# Patient Record
Sex: MALE | Race: WHITE | NOT HISPANIC OR LATINO | Employment: FULL TIME | URBAN - METROPOLITAN AREA
[De-identification: names, ages, dates, MRNs, and addresses within clinical notes are randomized per-mention and may not be internally consistent; named-entity substitution may affect disease eponyms.]

---

## 2018-05-14 LAB
ABSOL LYMPHOCYTES (HISTORICAL): 1.4 K/UL (ref 0.5–4)
ALBUMIN SERPL BCP-MCNC: 3.7 G/DL (ref 3–5.2)
ALP SERPL-CCNC: 102 U/L (ref 43–122)
ALT SERPL W P-5'-P-CCNC: 40 U/L (ref 9–52)
ANION GAP SERPL CALCULATED.3IONS-SCNC: 9 MMOL/L (ref 5–14)
AST SERPL W P-5'-P-CCNC: 25 U/L (ref 17–59)
BASOPHILS # BLD AUTO: 0 % (ref 0–1)
BASOPHILS # BLD AUTO: 0 K/UL (ref 0–0.1)
BILIRUB SERPL-MCNC: 0.4 MG/DL
BILIRUB UR QL STRIP: NEGATIVE MG/DL
BUN SERPL-MCNC: 13 MG/DL (ref 5–25)
CALCIUM SERPL-MCNC: 8.9 MG/DL (ref 8.4–10.2)
CHLORIDE SERPL-SCNC: 102 MEQ/L (ref 97–108)
CHOLEST SERPL-MCNC: 97 MG/DL
CHOLEST/HDLC SERPL: 2.4 {RATIO}
CLARITY UR: CLEAR
CO2 SERPL-SCNC: 28 MMOL/L (ref 22–30)
COLOR UR: YELLOW
CREATINE, SERUM (HISTORICAL): 0.88 MG/DL (ref 0.7–1.5)
CREATININE, RANDOM URINE (HISTORICAL): 122.8 MG/DL (ref 50–200)
DEPRECATED RDW RBC AUTO: 12.8 %
EGFR (HISTORICAL): >60 ML/MIN/1.73 M2
EOSINOPHIL # BLD AUTO: 0.3 K/UL (ref 0–0.4)
EOSINOPHIL NFR BLD AUTO: 7 % (ref 0–6)
EST. AVERAGE GLUCOSE BLD GHB EST-MCNC: 126 MG/DL
GLUCOSE SERPL-MCNC: 116 MG/DL (ref 70–99)
GLUCOSE UR STRIP-MCNC: NEGATIVE MG/DL
HBA1C MFR BLD HPLC: 6 %
HCT VFR BLD AUTO: 39.5 % (ref 41–53)
HDLC SERPL-MCNC: 40 MG/DL
HGB BLD-MCNC: 13.3 G/DL (ref 13.5–17.5)
HGB UR QL STRIP.AUTO: NEGATIVE
KETONES UR STRIP-MCNC: NEGATIVE MG/DL
LDL/HDL RATIO (HISTORICAL): 1.1
LDLC SERPL CALC-MCNC: 43 MG/DL
LEUKOCYTE ESTERASE UR QL STRIP: NEGATIVE
LYMPHOCYTES NFR BLD AUTO: 26 % (ref 25–45)
MCH RBC QN AUTO: 31.6 PG (ref 26–34)
MCHC RBC AUTO-ENTMCNC: 33.7 % (ref 31–36)
MCV RBC AUTO: 94 FL (ref 80–100)
MICROALBUM.,U,RANDOM (HISTORICAL): <0.6 MG/DL
MICROALBUMIN/CREATININE RATIO (HISTORICAL): NORMAL
MONOCYTES # BLD AUTO: 0.6 K/UL (ref 0.2–0.9)
MONOCYTES NFR BLD AUTO: 11 % (ref 1–10)
NEUTROPHILS ABS COUNT (HISTORICAL): 2.9 K/UL (ref 1.8–7.8)
NEUTS SEG NFR BLD AUTO: 56 % (ref 45–65)
NITRITE UR QL STRIP: NEGATIVE
PH UR STRIP.AUTO: 6 [PH] (ref 4.5–8)
PLATELET # BLD AUTO: 136 K/MCL (ref 150–450)
POTASSIUM SERPL-SCNC: 4.3 MEQ/L (ref 3.6–5)
PROT UR STRIP-MCNC: NEGATIVE MG/DL
RBC # BLD AUTO: 4.22 M/MCL (ref 4.5–5.9)
SODIUM SERPL-SCNC: 139 MEQ/L (ref 137–147)
SP GR UR STRIP.AUTO: 1.01 (ref 1–1.04)
TOTAL PROTEIN (HISTORICAL): 5.8 G/DL (ref 5.9–8.4)
TRIGL SERPL-MCNC: 68 MG/DL
TSH SERPL DL<=0.05 MIU/L-ACNC: 1.39 UIU/ML (ref 0.47–4.68)
UROBILINOGEN UR QL STRIP.AUTO: NEGATIVE MG/DL (ref 0–1)
VLDLC SERPL CALC-MCNC: 14 MG/DL (ref 0–40)
WBC # BLD AUTO: 5.2 K/MCL (ref 4.5–11)

## 2018-07-25 DIAGNOSIS — N40.0 BENIGN PROSTATIC HYPERPLASIA, UNSPECIFIED WHETHER LOWER URINARY TRACT SYMPTOMS PRESENT: Primary | ICD-10-CM

## 2018-07-26 RX ORDER — TAMSULOSIN HYDROCHLORIDE 0.4 MG/1
0.4 CAPSULE ORAL
Qty: 90 CAPSULE | Refills: 1 | Status: SHIPPED | OUTPATIENT
Start: 2018-07-26 | End: 2019-04-15 | Stop reason: SDUPTHER

## 2018-08-13 ENCOUNTER — APPOINTMENT (OUTPATIENT)
Dept: LAB | Facility: CLINIC | Age: 68
End: 2018-08-13
Payer: MEDICARE

## 2018-08-13 DIAGNOSIS — E13.8 DIABETES MELLITUS OF OTHER TYPE WITH COMPLICATION, UNSPECIFIED WHETHER LONG TERM INSULIN USE: ICD-10-CM

## 2018-08-13 DIAGNOSIS — E11.8 UNCONTROLLED TYPE 2 DIABETES MELLITUS WITH COMPLICATION, UNSPECIFIED WHETHER LONG TERM INSULIN USE: Primary | ICD-10-CM

## 2018-08-13 DIAGNOSIS — E78.49 FAMILIAL COMBINED HYPERLIPIDEMIA: ICD-10-CM

## 2018-08-13 DIAGNOSIS — E11.65 UNCONTROLLED TYPE 2 DIABETES MELLITUS WITH COMPLICATION, UNSPECIFIED WHETHER LONG TERM INSULIN USE: Primary | ICD-10-CM

## 2018-08-13 DIAGNOSIS — I10 ESSENTIAL HYPERTENSION, MALIGNANT: ICD-10-CM

## 2018-08-13 LAB
ALBUMIN SERPL BCP-MCNC: 3.7 G/DL (ref 3.5–5)
ALP SERPL-CCNC: 97 U/L (ref 46–116)
ALT SERPL W P-5'-P-CCNC: 33 U/L (ref 12–78)
ANION GAP SERPL CALCULATED.3IONS-SCNC: 7 MMOL/L (ref 4–13)
AST SERPL W P-5'-P-CCNC: 23 U/L (ref 5–45)
BASOPHILS # BLD AUTO: 0.03 THOUSANDS/ΜL (ref 0–0.1)
BASOPHILS NFR BLD AUTO: 1 % (ref 0–1)
BILIRUB SERPL-MCNC: 0.57 MG/DL (ref 0.2–1)
BUN SERPL-MCNC: 12 MG/DL (ref 5–25)
CALCIUM SERPL-MCNC: 8.6 MG/DL (ref 8.3–10.1)
CHLORIDE SERPL-SCNC: 104 MMOL/L (ref 100–108)
CHOLEST SERPL-MCNC: 85 MG/DL (ref 50–200)
CO2 SERPL-SCNC: 29 MMOL/L (ref 21–32)
CREAT SERPL-MCNC: 1 MG/DL (ref 0.6–1.3)
EOSINOPHIL # BLD AUTO: 0.31 THOUSAND/ΜL (ref 0–0.61)
EOSINOPHIL NFR BLD AUTO: 6 % (ref 0–6)
ERYTHROCYTE [DISTWIDTH] IN BLOOD BY AUTOMATED COUNT: 12.6 % (ref 11.6–15.1)
EST. AVERAGE GLUCOSE BLD GHB EST-MCNC: 120 MG/DL
GFR SERPL CREATININE-BSD FRML MDRD: 79 ML/MIN/1.73SQ M
GLUCOSE P FAST SERPL-MCNC: 106 MG/DL (ref 65–99)
HBA1C MFR BLD: 5.8 % (ref 4.2–6.3)
HCT VFR BLD AUTO: 41.1 % (ref 36.5–49.3)
HDLC SERPL-MCNC: 45 MG/DL (ref 40–60)
HGB BLD-MCNC: 13.1 G/DL (ref 12–17)
IMM GRANULOCYTES # BLD AUTO: 0.02 THOUSAND/UL (ref 0–0.2)
IMM GRANULOCYTES NFR BLD AUTO: 0 % (ref 0–2)
LDLC SERPL CALC-MCNC: 28 MG/DL (ref 0–100)
LYMPHOCYTES # BLD AUTO: 1.31 THOUSANDS/ΜL (ref 0.6–4.47)
LYMPHOCYTES NFR BLD AUTO: 27 % (ref 14–44)
MCH RBC QN AUTO: 31.4 PG (ref 26.8–34.3)
MCHC RBC AUTO-ENTMCNC: 31.9 G/DL (ref 31.4–37.4)
MCV RBC AUTO: 99 FL (ref 82–98)
MONOCYTES # BLD AUTO: 0.52 THOUSAND/ΜL (ref 0.17–1.22)
MONOCYTES NFR BLD AUTO: 11 % (ref 4–12)
NEUTROPHILS # BLD AUTO: 2.72 THOUSANDS/ΜL (ref 1.85–7.62)
NEUTS SEG NFR BLD AUTO: 55 % (ref 43–75)
NONHDLC SERPL-MCNC: 40 MG/DL
NRBC BLD AUTO-RTO: 0 /100 WBCS
PLATELET # BLD AUTO: 131 THOUSANDS/UL (ref 149–390)
PMV BLD AUTO: 11.2 FL (ref 8.9–12.7)
POTASSIUM SERPL-SCNC: 4 MMOL/L (ref 3.5–5.3)
PROT SERPL-MCNC: 6.4 G/DL (ref 6.4–8.2)
RBC # BLD AUTO: 4.17 MILLION/UL (ref 3.88–5.62)
SODIUM SERPL-SCNC: 140 MMOL/L (ref 136–145)
TRIGL SERPL-MCNC: 59 MG/DL
TSH SERPL DL<=0.05 MIU/L-ACNC: 1.17 UIU/ML (ref 0.36–3.74)
WBC # BLD AUTO: 4.91 THOUSAND/UL (ref 4.31–10.16)

## 2018-08-13 PROCEDURE — 80061 LIPID PANEL: CPT

## 2018-08-13 PROCEDURE — 80053 COMPREHEN METABOLIC PANEL: CPT

## 2018-08-13 PROCEDURE — 83036 HEMOGLOBIN GLYCOSYLATED A1C: CPT

## 2018-08-13 PROCEDURE — 85025 COMPLETE CBC W/AUTO DIFF WBC: CPT

## 2018-08-13 PROCEDURE — 36415 COLL VENOUS BLD VENIPUNCTURE: CPT

## 2018-08-13 PROCEDURE — 84443 ASSAY THYROID STIM HORMONE: CPT

## 2018-08-21 DIAGNOSIS — E11.9 TYPE 2 DIABETES MELLITUS WITHOUT COMPLICATION, UNSPECIFIED WHETHER LONG TERM INSULIN USE (HCC): ICD-10-CM

## 2018-08-21 DIAGNOSIS — I34.0 NONRHEUMATIC MITRAL (VALVE) INSUFFICIENCY: Primary | ICD-10-CM

## 2018-08-21 DIAGNOSIS — I10 ESSENTIAL HYPERTENSION: ICD-10-CM

## 2018-08-21 RX ORDER — LISINOPRIL 10 MG/1
10 TABLET ORAL DAILY
Qty: 90 TABLET | Refills: 1 | Status: SHIPPED | OUTPATIENT
Start: 2018-08-21 | End: 2019-01-04 | Stop reason: SDUPTHER

## 2018-08-21 RX ORDER — ATORVASTATIN CALCIUM 40 MG/1
40 TABLET, FILM COATED ORAL DAILY
Qty: 90 TABLET | Refills: 1 | Status: SHIPPED | OUTPATIENT
Start: 2018-08-21 | End: 2019-01-04 | Stop reason: SDUPTHER

## 2018-08-21 RX ORDER — AMLODIPINE BESYLATE 5 MG/1
5 TABLET ORAL DAILY
Qty: 90 TABLET | Refills: 0 | Status: SHIPPED | OUTPATIENT
Start: 2018-08-21 | End: 2018-12-13 | Stop reason: SDUPTHER

## 2018-08-21 RX ORDER — FINASTERIDE 5 MG/1
5 TABLET, FILM COATED ORAL DAILY
Qty: 90 TABLET | Refills: 1 | Status: SHIPPED | OUTPATIENT
Start: 2018-08-21 | End: 2018-12-03 | Stop reason: SDUPTHER

## 2018-08-27 ENCOUNTER — OFFICE VISIT (OUTPATIENT)
Dept: FAMILY MEDICINE CLINIC | Facility: CLINIC | Age: 68
End: 2018-08-27
Payer: MEDICARE

## 2018-08-27 VITALS
DIASTOLIC BLOOD PRESSURE: 70 MMHG | BODY MASS INDEX: 31.97 KG/M2 | HEART RATE: 62 BPM | OXYGEN SATURATION: 94 % | WEIGHT: 236 LBS | SYSTOLIC BLOOD PRESSURE: 110 MMHG | HEIGHT: 72 IN | TEMPERATURE: 97.7 F

## 2018-08-27 DIAGNOSIS — K30 INDIGESTION: ICD-10-CM

## 2018-08-27 DIAGNOSIS — N40.0 BENIGN PROSTATIC HYPERPLASIA WITHOUT URINARY OBSTRUCTION: ICD-10-CM

## 2018-08-27 DIAGNOSIS — E78.2 MIXED HYPERLIPIDEMIA: ICD-10-CM

## 2018-08-27 DIAGNOSIS — I10 ESSENTIAL HYPERTENSION: ICD-10-CM

## 2018-08-27 DIAGNOSIS — E11.9 TYPE 2 DIABETES MELLITUS WITHOUT COMPLICATION, WITHOUT LONG-TERM CURRENT USE OF INSULIN (HCC): Primary | ICD-10-CM

## 2018-08-27 PROBLEM — K63.5 COLON POLYP: Status: ACTIVE | Noted: 2018-08-27

## 2018-08-27 PROBLEM — I51.7 LEFT VENTRICULAR HYPERTROPHY: Status: ACTIVE | Noted: 2018-02-06

## 2018-08-27 PROBLEM — I34.0 MITRAL VALVE REGURGITATION: Status: ACTIVE | Noted: 2018-02-06

## 2018-08-27 PROCEDURE — 99214 OFFICE O/P EST MOD 30 MIN: CPT | Performed by: FAMILY MEDICINE

## 2018-08-27 RX ORDER — SILDENAFIL 100 MG/1
TABLET, FILM COATED ORAL EVERY 24 HOURS
COMMUNITY
Start: 2018-04-06 | End: 2018-12-05 | Stop reason: ALTCHOICE

## 2018-08-27 RX ORDER — ASPIRIN 81 MG/1
TABLET ORAL
COMMUNITY
Start: 2018-01-22

## 2018-08-27 RX ORDER — PANTOPRAZOLE SODIUM 40 MG/1
TABLET, DELAYED RELEASE ORAL EVERY 24 HOURS
COMMUNITY
Start: 2018-01-22 | End: 2018-11-02 | Stop reason: SDUPTHER

## 2018-08-27 RX ORDER — FLUTICASONE PROPIONATE 50 MCG
SPRAY, SUSPENSION (ML) NASAL AS NEEDED
COMMUNITY
Start: 2018-01-22 | End: 2020-03-17 | Stop reason: SDUPTHER

## 2018-08-27 RX ORDER — TADALAFIL 5 MG/1
TABLET ORAL EVERY 24 HOURS
COMMUNITY
Start: 2017-06-29 | End: 2018-08-27 | Stop reason: SDUPTHER

## 2018-08-27 NOTE — ASSESSMENT & PLAN NOTE
Chronic ,well controlled by pantoprazole  Discuss with pt important lose weight   Multiple small meal ,avoid eat and lying down ,avoid spicy food and avoid provoked food

## 2018-08-27 NOTE — PROGRESS NOTES
Assessment/Plan:    Type 2 diabetes mellitus (HCC)  Lab Results   Component Value Date    HGBA1C 5 8 08/13/2018     Chronic fair control patient on metformin 500 mg twice a day patient already on a statin and the Ace inhibitor the patient does followed by Ophthalmology for diabetic eye care we discussed with the patient important lose weight:      Essential hypertension  Chronic with controlled continue current medication low-salt diet less than 2 g a day ,   low caffeine intake   regular aerobic exercise 20 to totally minute a day diet and important lose weight discussed with the patient      Hyperlipidemia  Chronic ,fair control on current medication  Advised to maintain a low-fat low-cholesterol diet consult regarding potential comorbidity including cardiovascular disease consult regarding important weight loss      Indigestion  Chronic ,well controlled by pantoprazole  Discuss with pt important lose weight   Multiple small meal ,avoid eat and lying down ,avoid spicy food and avoid provoked food        Benign prostatic hyperplasia without urinary obstruction  Chronic patient asymptomatic the he is already on the Proscar and the Flomax but still symptomatic plan to refer the patient to Urology       Diagnoses and all orders for this visit:    Type 2 diabetes mellitus without complication, without long-term current use of insulin (HCC)  -     CBC and differential; Future  -     Comprehensive metabolic panel; Future  -     Hemoglobin A1C; Future  -     Lipid Panel with Direct LDL reflex; Future  -     TSH, 3rd generation with Free T4 reflex; Future  -     Microalbumin / creatinine urine ratio; Future  -     PSA, Total Screen; Future    Essential hypertension  -     CBC and differential; Future  -     Comprehensive metabolic panel; Future  -     Hemoglobin A1C; Future  -     Lipid Panel with Direct LDL reflex; Future  -     TSH, 3rd generation with Free T4 reflex;  Future  -     Microalbumin / creatinine urine ratio; Future  -     PSA, Total Screen; Future    Mixed hyperlipidemia  -     CBC and differential; Future  -     Comprehensive metabolic panel; Future  -     Hemoglobin A1C; Future  -     Lipid Panel with Direct LDL reflex; Future  -     TSH, 3rd generation with Free T4 reflex; Future  -     Microalbumin / creatinine urine ratio; Future  -     PSA, Total Screen; Future    Indigestion  -     CBC and differential; Future  -     Comprehensive metabolic panel; Future  -     Hemoglobin A1C; Future  -     Lipid Panel with Direct LDL reflex; Future  -     TSH, 3rd generation with Free T4 reflex; Future  -     Microalbumin / creatinine urine ratio; Future  -     PSA, Total Screen; Future    Benign prostatic hyperplasia without urinary obstruction  -     PSA, Total Screen; Future  -     Ambulatory referral to Urology; Future    Other orders  -     aspirin (ASPIR-81) 81 mg EC tablet; take 1 tablet (81MG)  by oral route 3X week  -     Discontinue: tadalafil (CIALIS) 5 MG tablet; every 24 hours  -     fluticasone (FLONASE) 50 mcg/act nasal spray; every 24 hours  -     hydrocortisone 2 5 % cream; every 24 hours  -     pantoprazole (PROTONIX) 40 mg tablet; every 24 hours  -     sildenafil (VIAGRA) 100 mg tablet; every 24 hours          Subjective:   Chief Complaint   Patient presents with    Follow-up     chronic conditions        Patient ID: Rosibel Gordon is a 74 y o  male      Patient here follow-up with a chronic condition The patient has long history of hypertension the blood pressure today is in control patient tolerates medication well and no chest pain or short of breath no palpitation no headache also patient was history of indigestion he is on pantoprazole deny any abdomen pain no nausea vomiting no heartburn no weight loss patient's history of non-insulin-dependent diabetic he is on metformin 500 twice a day tolerated well no side effect the his sugar number at home stable and he is asymptomatic, also patient history of hyperlipidemia on statin tolerated well without any side effect the patient history of benign prostatic hypertrophy for what he is taking the Flomax and Proscar per patient he still wake up at nighttime to go to the bathroom 2-3 times and he feel tired during the day secondary to that and he feel the stream is weak had to push to start urination  Recent blood work discussed with the patient        The following portions of the patient's history were reviewed and updated as appropriate: allergies, current medications, past family history, past medical history, past social history, past surgical history and problem list     Review of Systems   Constitutional: Negative for fatigue and fever  HENT: Negative for ear pain, sinus pain, sinus pressure and sore throat  Eyes: Negative for pain and redness  Respiratory: Negative for cough, chest tightness and shortness of breath  Cardiovascular: Negative for chest pain, palpitations and leg swelling  Gastrointestinal: Negative for abdominal pain, blood in stool, constipation, diarrhea and nausea  Genitourinary: Negative for flank pain, frequency and hematuria  Musculoskeletal: Negative for back pain and joint swelling  Skin: Negative for rash  Neurological: Negative for dizziness, numbness and headaches  Hematological: Does not bruise/bleed easily  Objective:  Vitals:    08/27/18 0856   BP: 110/70   Pulse: 62   Temp: 97 7 °F (36 5 °C)   TempSrc: Oral   SpO2: 94%   Weight: 107 kg (236 lb)   Height: 6' (1 829 m)      Physical Exam   Constitutional: He is oriented to person, place, and time  He appears well-developed and well-nourished  HENT:   Head: Normocephalic  Right Ear: External ear normal    Left Ear: External ear normal    Eyes: Conjunctivae and EOM are normal  Right eye exhibits no discharge  Left eye exhibits no discharge  Neck: No JVD present  Cardiovascular: Normal rate and regular rhythm  Exam reveals no gallop      Murmur heard   Pulmonary/Chest: Effort normal  No respiratory distress  He has no wheezes  He has no rales  He exhibits no tenderness  Abdominal: He exhibits no mass  There is no tenderness  There is no rebound  Musculoskeletal: He exhibits no edema or tenderness  Neurological: He is alert and oriented to person, place, and time  Skin: No rash noted  No erythema

## 2018-08-27 NOTE — ASSESSMENT & PLAN NOTE
Lab Results   Component Value Date    HGBA1C 5 8 08/13/2018     Chronic fair control patient on metformin 500 mg twice a day patient already on a statin and the Ace inhibitor the patient does followed by Ophthalmology for diabetic eye care we discussed with the patient important lose weight:

## 2018-08-27 NOTE — PATIENT INSTRUCTIONS
10% - bad control"> 10% - bad control,Hemoglobin A1c (HbA1c) greater than 10% indicating poor diabetic control,Haemoglobin A1c greater than 10% indicating poor diabetic control">   Diabetes Mellitus Type 2 in Adults, Ambulatory Care   GENERAL INFORMATION:   Diabetes mellitus type 2  is a disease that affects how your body uses glucose (sugar)  Insulin helps move sugar out of the blood so it can be used for energy  Normally, when the blood sugar level increases, the pancreas makes more insulin  Type 2 diabetes develops because either the body cannot make enough insulin, or it cannot use the insulin correctly  After many years, your pancreas may stop making insulin  Common symptoms include the following:   · More hunger or thirst than usual     · Frequent urination     · Weight loss without trying     · Blurred vision  Seek immediate care for the following symptoms:   · Severe abdominal pain, or pain that spreads to your back  You may also be vomiting  · Trouble staying awake or focusing    · Shaking or sweating    · Blurred or double vision    · Breath has a fruity, sweet smell    · Breathing is deep and labored, or rapid and shallow    · Heartbeat is fast and weak  Treatment for diabetes mellitus type 2  includes keeping your blood sugar at a normal level  You must eat the right foods, and exercise regularly  You may also need medicine if you cannot control your blood sugar level with nutrition and exercise  Manage diabetes mellitus type 2:   · Check your blood sugar level  You will be taught how to check a small drop of blood in a glucose monitor  Ask your healthcare provider when and how often to check during the day  Ask your healthcare provider what your blood sugar levels should be when you check them  · Keep track of carbohydrates (sugar and starchy foods)  Your blood sugar level can get too high if you eat too many carbohydrates   Your dietitian will help you plan meals and snacks that have the right amount of carbohydrates  · Eat low-fat foods  Some examples are skinless chicken and low-fat milk  · Eat less sodium (salt)  Some examples of high-sodium foods to limit are soy sauce, potato chips, and soup  Do not add salt to food you cook  Limit your use of table salt  · Eat high-fiber foods  Foods that are a good source of fiber include vegetables, whole grain bread, and beans  · Limit alcohol  Alcohol affects your blood sugar level and can make it harder to manage your diabetes  Women should limit alcohol to 1 drink a day  Men should limit alcohol to 2 drinks a day  A drink of alcohol is 12 ounces of beer, 5 ounces of wine, or 1½ ounces of liquor  · Get regular exercise  Exercise can help keep your blood sugar level steady, decrease your risk of heart disease, and help you lose weight  Exercise for at least 30 minutes, 5 days a week  Include muscle strengthening activities 2 days each week  Work with your healthcare provider to create an exercise plan  · Check your feet each day  for injuries or open sores  Ask your healthcare provider for activities you can do if you have an open sore  · Quit smoking  If you smoke, it is never too late to quit  Smoking can worsen the problems that may occur with diabetes  Ask your healthcare provider for information about how to stop smoking if you are having trouble quitting  · Ask about your weight:  Ask healthcare providers if you need to lose weight, and how much to lose  Ask them to help you with a weight loss program  Even a 10 to 15 pound weight loss can help you manage your blood sugar level  · Carry medical alert identification  Wear medical alert jewelry or carry a card that says you have diabetes  Ask your healthcare provider where to get these items  · Ask about vaccines  Diabetes puts you at risk of serious illness if you get the flu, pneumonia, or hepatitis   Ask your healthcare provider if you should get a flu, pneumonia, or hepatitis B vaccine, and when to get the vaccine  Follow up with your healthcare provider as directed:  Write down your questions so you remember to ask them during your visits  CARE AGREEMENT:   You have the right to help plan your care  Learn about your health condition and how it may be treated  Discuss treatment options with your caregivers to decide what care you want to receive  You always have the right to refuse treatment  The above information is an  only  It is not intended as medical advice for individual conditions or treatments  Talk to your doctor, nurse or pharmacist before following any medical regimen to see if it is safe and effective for you  © 2014 [address] is for End User's use only and may not be sold, redistributed or otherwise used for commercial purposes  All illustrations and images included in CareNotes® are the copyrighted property of [employer] D [employer]  or Romario Castillo

## 2018-08-27 NOTE — ASSESSMENT & PLAN NOTE
Chronic patient asymptomatic the he is already on the Proscar and the Flomax but still symptomatic plan to refer the patient to Urology

## 2018-08-27 NOTE — ASSESSMENT & PLAN NOTE
Chronic with controlled continue current medication low-salt diet less than 2 g a day ,   low caffeine intake   regular aerobic exercise 20 to totally minute a day diet and important lose weight discussed with the patient

## 2018-08-28 DIAGNOSIS — L30.9 DERMATITIS: Primary | ICD-10-CM

## 2018-10-19 ENCOUNTER — CLINICAL SUPPORT (OUTPATIENT)
Dept: FAMILY MEDICINE CLINIC | Facility: CLINIC | Age: 68
End: 2018-10-19
Payer: MEDICARE

## 2018-10-19 DIAGNOSIS — Z23 NEED FOR INFLUENZA VACCINATION: Primary | ICD-10-CM

## 2018-10-19 PROCEDURE — 90662 IIV NO PRSV INCREASED AG IM: CPT

## 2018-10-19 PROCEDURE — G0008 ADMIN INFLUENZA VIRUS VAC: HCPCS

## 2018-11-02 DIAGNOSIS — K30 INDIGESTION: Primary | ICD-10-CM

## 2018-11-02 RX ORDER — PANTOPRAZOLE SODIUM 40 MG/1
40 TABLET, DELAYED RELEASE ORAL DAILY
Qty: 90 TABLET | Refills: 1 | Status: SHIPPED | OUTPATIENT
Start: 2018-11-02 | End: 2020-03-17 | Stop reason: ALTCHOICE

## 2018-11-09 LAB — SEVERITY (EYE EXAM): NORMAL

## 2018-11-15 ENCOUNTER — APPOINTMENT (OUTPATIENT)
Dept: LAB | Facility: CLINIC | Age: 68
End: 2018-11-15
Payer: MEDICARE

## 2018-11-15 DIAGNOSIS — E78.2 MIXED HYPERLIPIDEMIA: ICD-10-CM

## 2018-11-15 DIAGNOSIS — N40.0 BENIGN PROSTATIC HYPERPLASIA WITHOUT URINARY OBSTRUCTION: ICD-10-CM

## 2018-11-15 DIAGNOSIS — I10 ESSENTIAL HYPERTENSION: ICD-10-CM

## 2018-11-15 DIAGNOSIS — K30 INDIGESTION: ICD-10-CM

## 2018-11-15 DIAGNOSIS — E11.9 TYPE 2 DIABETES MELLITUS WITHOUT COMPLICATION, WITHOUT LONG-TERM CURRENT USE OF INSULIN (HCC): ICD-10-CM

## 2018-11-15 LAB
ALBUMIN SERPL BCP-MCNC: 3.6 G/DL (ref 3.5–5)
ALP SERPL-CCNC: 113 U/L (ref 46–116)
ALT SERPL W P-5'-P-CCNC: 39 U/L (ref 12–78)
ANION GAP SERPL CALCULATED.3IONS-SCNC: 3 MMOL/L (ref 4–13)
AST SERPL W P-5'-P-CCNC: 22 U/L (ref 5–45)
BASOPHILS # BLD AUTO: 0.03 THOUSANDS/ΜL (ref 0–0.1)
BASOPHILS NFR BLD AUTO: 1 % (ref 0–1)
BILIRUB SERPL-MCNC: 0.61 MG/DL (ref 0.2–1)
BUN SERPL-MCNC: 16 MG/DL (ref 5–25)
CALCIUM SERPL-MCNC: 8.4 MG/DL (ref 8.3–10.1)
CHLORIDE SERPL-SCNC: 101 MMOL/L (ref 100–108)
CHOLEST SERPL-MCNC: 105 MG/DL (ref 50–200)
CO2 SERPL-SCNC: 31 MMOL/L (ref 21–32)
CREAT SERPL-MCNC: 1 MG/DL (ref 0.6–1.3)
CREAT UR-MCNC: 104 MG/DL
EOSINOPHIL # BLD AUTO: 0.3 THOUSAND/ΜL (ref 0–0.61)
EOSINOPHIL NFR BLD AUTO: 6 % (ref 0–6)
ERYTHROCYTE [DISTWIDTH] IN BLOOD BY AUTOMATED COUNT: 12.2 % (ref 11.6–15.1)
EST. AVERAGE GLUCOSE BLD GHB EST-MCNC: 128 MG/DL
GFR SERPL CREATININE-BSD FRML MDRD: 78 ML/MIN/1.73SQ M
GLUCOSE P FAST SERPL-MCNC: 100 MG/DL (ref 65–99)
HBA1C MFR BLD: 6.1 % (ref 4.2–6.3)
HCT VFR BLD AUTO: 43.8 % (ref 36.5–49.3)
HDLC SERPL-MCNC: 45 MG/DL (ref 40–60)
HGB BLD-MCNC: 14 G/DL (ref 12–17)
IMM GRANULOCYTES # BLD AUTO: 0.04 THOUSAND/UL (ref 0–0.2)
IMM GRANULOCYTES NFR BLD AUTO: 1 % (ref 0–2)
LDLC SERPL CALC-MCNC: 47 MG/DL (ref 0–100)
LYMPHOCYTES # BLD AUTO: 1.58 THOUSANDS/ΜL (ref 0.6–4.47)
LYMPHOCYTES NFR BLD AUTO: 29 % (ref 14–44)
MCH RBC QN AUTO: 31.5 PG (ref 26.8–34.3)
MCHC RBC AUTO-ENTMCNC: 32 G/DL (ref 31.4–37.4)
MCV RBC AUTO: 99 FL (ref 82–98)
MICROALBUMIN UR-MCNC: <5 MG/L (ref 0–20)
MICROALBUMIN/CREAT 24H UR: <5 MG/G CREATININE (ref 0–30)
MONOCYTES # BLD AUTO: 0.6 THOUSAND/ΜL (ref 0.17–1.22)
MONOCYTES NFR BLD AUTO: 11 % (ref 4–12)
NEUTROPHILS # BLD AUTO: 2.9 THOUSANDS/ΜL (ref 1.85–7.62)
NEUTS SEG NFR BLD AUTO: 52 % (ref 43–75)
NRBC BLD AUTO-RTO: 0 /100 WBCS
PLATELET # BLD AUTO: 149 THOUSANDS/UL (ref 149–390)
PMV BLD AUTO: 10.5 FL (ref 8.9–12.7)
POTASSIUM SERPL-SCNC: 4.1 MMOL/L (ref 3.5–5.3)
PROT SERPL-MCNC: 6.7 G/DL (ref 6.4–8.2)
PSA SERPL-MCNC: 1 NG/ML (ref 0–4)
RBC # BLD AUTO: 4.44 MILLION/UL (ref 3.88–5.62)
SODIUM SERPL-SCNC: 135 MMOL/L (ref 136–145)
TRIGL SERPL-MCNC: 67 MG/DL
TSH SERPL DL<=0.05 MIU/L-ACNC: 2.34 UIU/ML (ref 0.36–3.74)
WBC # BLD AUTO: 5.45 THOUSAND/UL (ref 4.31–10.16)

## 2018-11-15 PROCEDURE — 85025 COMPLETE CBC W/AUTO DIFF WBC: CPT

## 2018-11-15 PROCEDURE — 83036 HEMOGLOBIN GLYCOSYLATED A1C: CPT

## 2018-11-15 PROCEDURE — 82043 UR ALBUMIN QUANTITATIVE: CPT

## 2018-11-15 PROCEDURE — 80061 LIPID PANEL: CPT

## 2018-11-15 PROCEDURE — 84443 ASSAY THYROID STIM HORMONE: CPT

## 2018-11-15 PROCEDURE — G0103 PSA SCREENING: HCPCS

## 2018-11-15 PROCEDURE — 80053 COMPREHEN METABOLIC PANEL: CPT

## 2018-11-15 PROCEDURE — 36415 COLL VENOUS BLD VENIPUNCTURE: CPT

## 2018-11-15 PROCEDURE — 82570 ASSAY OF URINE CREATININE: CPT

## 2018-11-27 ENCOUNTER — OFFICE VISIT (OUTPATIENT)
Dept: FAMILY MEDICINE CLINIC | Facility: CLINIC | Age: 68
End: 2018-11-27
Payer: MEDICARE

## 2018-11-27 VITALS
HEART RATE: 63 BPM | BODY MASS INDEX: 32.64 KG/M2 | SYSTOLIC BLOOD PRESSURE: 110 MMHG | OXYGEN SATURATION: 97 % | RESPIRATION RATE: 16 BRPM | DIASTOLIC BLOOD PRESSURE: 80 MMHG | WEIGHT: 241 LBS | HEIGHT: 72 IN

## 2018-11-27 DIAGNOSIS — I10 ESSENTIAL HYPERTENSION: ICD-10-CM

## 2018-11-27 DIAGNOSIS — N40.0 BENIGN PROSTATIC HYPERPLASIA WITHOUT URINARY OBSTRUCTION: ICD-10-CM

## 2018-11-27 DIAGNOSIS — K30 INDIGESTION: ICD-10-CM

## 2018-11-27 DIAGNOSIS — E78.2 MIXED HYPERLIPIDEMIA: ICD-10-CM

## 2018-11-27 DIAGNOSIS — E11.9 TYPE 2 DIABETES MELLITUS WITHOUT COMPLICATION, WITHOUT LONG-TERM CURRENT USE OF INSULIN (HCC): Primary | ICD-10-CM

## 2018-11-27 PROCEDURE — 99214 OFFICE O/P EST MOD 30 MIN: CPT | Performed by: FAMILY MEDICINE

## 2018-11-27 NOTE — PATIENT INSTRUCTIONS

## 2018-11-28 NOTE — ASSESSMENT & PLAN NOTE
Chronic fair control asymptomatic patient already on Flomax and Proscar and no blood in the urine patient already refer to see Urology appointment the next months

## 2018-11-28 NOTE — PROGRESS NOTES
Subjective:   Chief Complaint   Patient presents with    Follow-up     chronic conditions         Patient ID: Lucius Biggs is a 79 y o  male  Patient here follow-up with a chronic condition  Patient's history of hyperlipidemia on atorvastatin 40 milligram once a day tolerated well without any side effect no rash no muscle pain deny any chest pain short of breath no palpitation no headache no blurred vision no weakness or lateralized of the symptom also patient had history of hypertension for what he is on lisinopril 10 milligram once a day and amlodipine 5 milligram once a day patient tolerated well without any side effect and he is asymptomatic no chest pain or short of breath and no palpitation   And patient was history of non-insulin-dependent diabetic on metformin 500 twice a day asymptomatic and patient on Ace inhibitor and statin no microalbuminuria and he deny any increased frequency urination no dizziness no thirsty no weight change  The patient's history of benign prostatic hypertrophy on Flomax and Proscar and patient already sent to seen Urology appointment next week he deny seen any blood in the urine  Patient history of indigestion the on pantoprazole he already seen by GI and he had multiple EGD he try to be off of the pantoprazole and did not help and patient will continue on it deny any heartburn no abdomen pain no nausea vomiting or diarrhea no weight change  Recent blood work discussed with the patient        The following portions of the patient's history were reviewed and updated as appropriate: allergies, current medications, past family history, past medical history, past social history, past surgical history and problem list     Review of Systems   Constitutional: Negative for fatigue and fever  HENT: Negative for ear pain, sinus pain, sinus pressure and sore throat  Eyes: Negative for pain and redness  Respiratory: Negative for cough, chest tightness and shortness of breath  Cardiovascular: Negative for chest pain, palpitations and leg swelling  Gastrointestinal: Negative for abdominal pain, blood in stool, constipation, diarrhea and nausea  Genitourinary: Negative for flank pain, frequency and hematuria  Musculoskeletal: Negative for back pain and joint swelling  Skin: Negative for rash  Neurological: Negative for dizziness, numbness and headaches  Hematological: Does not bruise/bleed easily  Objective:  Vitals:    11/27/18 0904   BP: 110/80   BP Location: Left arm   Patient Position: Sitting   Cuff Size: Large   Pulse: 63   Resp: 16   SpO2: 97%   Weight: 109 kg (241 lb)   Height: 6' (1 829 m)      Physical Exam   Constitutional: He is oriented to person, place, and time  He appears well-developed and well-nourished  HENT:   Head: Normocephalic  Right Ear: External ear normal    Left Ear: External ear normal    Eyes: Conjunctivae and EOM are normal  Right eye exhibits no discharge  Left eye exhibits no discharge  Neck: No JVD present  Cardiovascular: Normal rate and regular rhythm  Exam reveals no gallop  Murmur heard  Pulmonary/Chest: Effort normal  No respiratory distress  He has no wheezes  He has no rales  He exhibits no tenderness  Abdominal: He exhibits no mass  There is no tenderness  There is no rebound  Musculoskeletal: He exhibits no edema or tenderness  Neurological: He is alert and oriented to person, place, and time  Skin: No rash noted  No erythema           Assessment/Plan:    [facility]  Lab Results   Component Value Date    HGBA1C 6 1 11/15/2018     Chronic fair control asymptomatic patient on metformin 500 milligram twice a day continue current management patient already on Ace inhibitor and he is on statin negative fly microalbuminuria we encouraged patient to continue with the low carb diet and important lose weight      Essential hypertension  Chronic asymptomatic well controlled continue current medication amlodipine 5 milligram once a day and lisinopril 10 milligram once a day low-salt diet less than 2 g a day ,   low caffeine intake   regular aerobic exercise 20 to totally minute a day diet and important lose weight discussed with the patient      Hyperlipidemia  Chronic ,fair control on current medication  Advised to maintain a low-fat low-cholesterol diet consult regarding potential comorbidity including cardiovascular disease consult regarding important weight loss      Indigestion  Chronic ,well controlled by pantoprazole  Discuss with pt important lose weight   Multiple small meal ,avoid eat and lying down ,avoid spicy food and avoid provoked food        Benign prostatic hyperplasia without urinary obstruction  Chronic fair control asymptomatic patient already on Flomax and Proscar and no blood in the urine patient already refer to see Urology appointment the next months       Diagnoses and all orders for this visit:    Type 2 diabetes mellitus without complication, without long-term current use of insulin (HCC)  -     CBC and differential; Future  -     Comprehensive metabolic panel; Future  -     Hemoglobin A1C; Future  -     Lipid Panel with Direct LDL reflex; Future  -     TSH, 3rd generation with Free T4 reflex; Future    Essential hypertension  -     CBC and differential; Future  -     Comprehensive metabolic panel; Future  -     Hemoglobin A1C; Future  -     Lipid Panel with Direct LDL reflex; Future  -     TSH, 3rd generation with Free T4 reflex; Future    Mixed hyperlipidemia  -     CBC and differential; Future  -     Comprehensive metabolic panel; Future  -     Hemoglobin A1C; Future  -     Lipid Panel with Direct LDL reflex; Future  -     TSH, 3rd generation with Free T4 reflex; Future    Indigestion  -     CBC and differential; Future  -     Comprehensive metabolic panel; Future  -     Hemoglobin A1C; Future  -     Lipid Panel with Direct LDL reflex;  Future  -     TSH, 3rd generation with Free T4 reflex;  Future    Benign prostatic hyperplasia without urinary obstruction

## 2018-11-28 NOTE — ASSESSMENT & PLAN NOTE
Chronic asymptomatic well controlled continue current medication amlodipine 5 milligram once a day and lisinopril 10 milligram once a day low-salt diet less than 2 g a day ,   low caffeine intake   regular aerobic exercise 20 to totally minute a day diet and important lose weight discussed with the patient

## 2018-12-03 DIAGNOSIS — I34.0 NONRHEUMATIC MITRAL (VALVE) INSUFFICIENCY: ICD-10-CM

## 2018-12-03 RX ORDER — FINASTERIDE 5 MG/1
5 TABLET, FILM COATED ORAL DAILY
Qty: 90 TABLET | Refills: 0 | Status: SHIPPED | OUTPATIENT
Start: 2018-12-03 | End: 2019-04-02 | Stop reason: SDUPTHER

## 2018-12-05 ENCOUNTER — TELEPHONE (OUTPATIENT)
Dept: FAMILY MEDICINE CLINIC | Facility: CLINIC | Age: 68
End: 2018-12-05

## 2018-12-05 ENCOUNTER — CLINICAL SUPPORT (OUTPATIENT)
Dept: NUTRITION | Facility: HOSPITAL | Age: 68
End: 2018-12-05
Payer: MEDICARE

## 2018-12-05 ENCOUNTER — OFFICE VISIT (OUTPATIENT)
Dept: UROLOGY | Facility: MEDICAL CENTER | Age: 68
End: 2018-12-05
Payer: MEDICARE

## 2018-12-05 VITALS
HEIGHT: 72 IN | HEART RATE: 60 BPM | WEIGHT: 238 LBS | BODY MASS INDEX: 32.23 KG/M2 | DIASTOLIC BLOOD PRESSURE: 80 MMHG | SYSTOLIC BLOOD PRESSURE: 140 MMHG

## 2018-12-05 VITALS — BODY MASS INDEX: 31.79 KG/M2 | WEIGHT: 234.4 LBS

## 2018-12-05 DIAGNOSIS — N13.8 BPH WITH OBSTRUCTION/LOWER URINARY TRACT SYMPTOMS: ICD-10-CM

## 2018-12-05 DIAGNOSIS — N52.02 CORPORO-VENOUS OCCLUSIVE ERECTILE DYSFUNCTION: ICD-10-CM

## 2018-12-05 DIAGNOSIS — N40.1 BPH WITH OBSTRUCTION/LOWER URINARY TRACT SYMPTOMS: ICD-10-CM

## 2018-12-05 DIAGNOSIS — N40.1 NOCTURIA ASSOCIATED WITH BENIGN PROSTATIC HYPERPLASIA: Primary | ICD-10-CM

## 2018-12-05 DIAGNOSIS — E11.69 DIABETES MELLITUS ASSOCIATED WITH HORMONAL ETIOLOGY (HCC): Primary | ICD-10-CM

## 2018-12-05 DIAGNOSIS — E11.69 TYPE 2 DIABETES MELLITUS WITH OTHER SPECIFIED COMPLICATION, WITHOUT LONG-TERM CURRENT USE OF INSULIN (HCC): Primary | ICD-10-CM

## 2018-12-05 DIAGNOSIS — R35.1 NOCTURIA ASSOCIATED WITH BENIGN PROSTATIC HYPERPLASIA: Primary | ICD-10-CM

## 2018-12-05 LAB — POST-VOID RESIDUAL VOLUME, ML POC: 26 ML

## 2018-12-05 PROCEDURE — 99204 OFFICE O/P NEW MOD 45 MIN: CPT | Performed by: UROLOGY

## 2018-12-05 PROCEDURE — 51798 US URINE CAPACITY MEASURE: CPT | Performed by: UROLOGY

## 2018-12-05 PROCEDURE — 97802 MEDICAL NUTRITION INDIV IN: CPT

## 2018-12-05 RX ORDER — TADALAFIL 5 MG/1
5 TABLET ORAL DAILY PRN
Qty: 90 TABLET | Refills: 3 | Status: SHIPPED | OUTPATIENT
Start: 2018-12-05 | End: 2019-02-22 | Stop reason: SDUPTHER

## 2018-12-05 NOTE — LETTER
December 5, 2018     Almalee Goldmann, 47 [address]    Patient: Chidi Becerra   YOB: 1950   Date of Visit: 12/5/2018       Dear Dr Melvin Randolph: Thank you for referring Devonte Dyson to me for evaluation  Below are my notes for this consultation  If you have questions, please do not hesitate to call me  I look forward to following your patient along with you           Sincerely,        Danilo Lopez MD        CC: No Recipients

## 2018-12-05 NOTE — PROGRESS NOTES
Assessment/Plan:      Diagnoses and all orders for this visit:    Nocturia associated with benign prostatic hyperplasia  -     POCT Measure PVR  -     tadalafil (CIALIS) 5 MG tablet; Take 1 tablet (5 mg total) by mouth daily as needed for erectile dysfunction    BPH with obstruction/lower urinary tract symptoms  -     Ambulatory referral to Urology  -     tadalafil (CIALIS) 5 MG tablet; Take 1 tablet (5 mg total) by mouth daily as needed for erectile dysfunction    Corporo-venous occlusive erectile dysfunction  -     tadalafil (CIALIS) 5 MG tablet; Take 1 tablet (5 mg total) by mouth daily as needed for erectile dysfunction        Plan:  Being at the patient is on maximal BPH meds (Flomax and finasteride), only other medication we could try would be Cialis 5 mg daily  Prescription sent to his mail order pharmacy  Told patient he should not take the Viagra on a p r n  basis if he does take the daily Cialis as prescribed  Can check the status of his symptoms in 6 months  Subjective: Increasing nocturia     Patient ID: Kiley Gaytan is a 79 y o  male  HPI  Patient with a long history of BPH has been on Flomax maximum dosage 0 8 mg with dinner and finasteride  States he is having nocturia still 3-4 times per night  He denies any dysuria, history UTIs, hematuria, flank or abdominal pains  He does admit to modest urgency and frequency in the morning likely related to his caffeinated beverages  He states his PSAs have previously all been normal   He states he has a normal appetite and bowel function, he denies any weight loss or pain in new locations  Also admits to erectile dysfunction for which he is taking Viagra 100 mg p r n         Review of Systems   Constitutional: Negative  HENT: Negative  Eyes: Negative  Respiratory: Negative  Cardiovascular: Negative  Gastrointestinal: Negative  Endocrine: Negative  Genitourinary: Negative  Musculoskeletal: Negative      Skin: Negative  Allergic/Immunologic: Negative  Neurological: Negative  Hematological: Negative  Psychiatric/Behavioral: Negative  Objective:     Physical Exam   Constitutional: He is oriented to person, place, and time  He appears well-developed and well-nourished  No distress  HENT:   Head: Normocephalic and atraumatic  Nose: Nose normal    Mouth/Throat: Oropharynx is clear and moist    Eyes: Pupils are equal, round, and reactive to light  Conjunctivae and EOM are normal  No scleral icterus  Neck: Normal range of motion  Neck supple  Cardiovascular: Normal rate, regular rhythm, normal heart sounds and intact distal pulses  No murmur heard  Pulmonary/Chest: Effort normal and breath sounds normal  No respiratory distress  He has no wheezes  He has no rales  Abdominal: Soft  Bowel sounds are normal  He exhibits no distension and no mass  There is no tenderness  Genitourinary: Prostate is enlarged  Genitourinary Comments: Prostate exam:  2/4 enlarged prostate, without nodules or induration  Musculoskeletal: Normal range of motion  He exhibits no edema or tenderness  Lymphadenopathy:     He has no cervical adenopathy  Neurological: He is alert and oriented to person, place, and time  No cranial nerve deficit  Skin: Skin is warm and dry  No rash noted  No erythema  No pallor  Psychiatric: He has a normal mood and affect  His behavior is normal  Judgment and thought content normal    Nursing note and vitals reviewed          PSA from 11/15/2018 is 1 0    PVR today was 26 cc

## 2018-12-05 NOTE — PROGRESS NOTES
Initial Nutrition Assessment Form    Patient Name: Rufus Rico    YOB: 1950    Sex: Male     Assessment Date: 12/5/2018  Start Time: 130 Stop Time: 230 Total Minutes: 61     Data:  Present at session: self   Parent Concerns: n/a   Medical Dx/Reason for Referral: DM - wants to lose wt   Past Medical History:   Diagnosis Date    BPH (benign prostatic hyperplasia)     ED (erectile dysfunction)     Enlarged prostate     Herpes zoster 12/18/2014    Hyperlipidemia     Hypertension     IFG (impaired fasting glucose)     Metabolic syndrome     Psoriasis     Seasonal allergic rhinitis     Type 2 diabetes mellitus (HCC) 11/17/2014       Current Outpatient Prescriptions   Medication Sig Dispense Refill    amLODIPine (NORVASC) 5 mg tablet Take 1 tablet (5 mg total) by mouth daily 90 tablet 0    aspirin (ASPIR-81) 81 mg EC tablet take 1 tablet (81MG)  by oral route 3X week      atorvastatin (LIPITOR) 40 mg tablet Take 1 tablet (40 mg total) by mouth daily 90 tablet 1    finasteride (PROSCAR) 5 mg tablet Take 1 tablet (5 mg total) by mouth daily 90 tablet 0    fluticasone (FLONASE) 50 mcg/act nasal spray every 24 hours      hydrocortisone 2 5 % cream Apply topically every 24 hours 56 7 g 0    lisinopril (ZESTRIL) 10 mg tablet Take 1 tablet (10 mg total) by mouth daily 90 tablet 1    metFORMIN (GLUCOPHAGE) 500 mg tablet Take 1 tablet (500 mg total) by mouth 2 (two) times a day with meals 180 tablet 1    pantoprazole (PROTONIX) 40 mg tablet Take 1 tablet (40 mg total) by mouth daily 90 tablet 1    tadalafil (CIALIS) 5 MG tablet Take 1 tablet (5 mg total) by mouth daily as needed for erectile dysfunction 90 tablet 3    tamsulosin (FLOMAX) 0 4 mg Take 1 capsule (0 4 mg total) by mouth daily with dinner 90 capsule 1     No current facility-administered medications for this visit           Additional Meds/Supplements: Calcium and MVI   Special Learning Needs:    Height: *  HC Readings from Last 3 Encounters:   No data found for [facility]       Weight: Wt Readings from Last 12 Encounters:   12/05/18 106 kg (234 lb 6 4 oz)   12/05/18 108 kg (238 lb)   11/27/18 109 kg (241 lb)   08/27/18 107 kg (236 lb)     Estimated body mass index is 31 79 kg/m² as calculated from the following:    Height as of an earlier encounter on 12/5/18: 6' (1 829 m)  Weight as of this encounter: 106 kg (234 lb 6 4 oz)  Usual Weight: #  Ideal Body Weight: #   Recent Weight Change: []Yes     [x]No  Amount:       Energy Needs: No calculation needed   Allergies   Allergen Reactions    No Known Allergies     NKFA   History   Alcohol Use    Yes    1-2oz wine nightly; bourbon every night   5-1 oz   History   Smoking Status    Former Smoker   Smokeless Tobacco    Former User    n/a   Who shops? spouse   Who cooks? spouse   Exercise: 30-60 mins walk w/dog Saturday 1 hr in gym 1/2 elliptic/aerobic and 1/2 wts   Prior Counseling? []Yes     [x]No  When:    Why:         Diet Hx:  Cider vinegar in water  Breakfast:  20 oz coffee 1 oz 1/2 1/2; 1c cheerios and 1 c milk 1%; 1 c fruit     730 a m  gym before B yogurt and coffee 10 w/e   Lunch: Tomato soup 8 oz; 1 sl pizza w/ham; large salad- spinach feta carrots broccoli chix-grilled; dressing olive oil vinegar; water  1230-1   p m  Skips on w/e;          Dinner:  10 chix nuggets; caleb coconut soup; green beans ; tuna s/w and green beans; wine with dinner  7-730/ w/e 6ish    p m           Snacks: pretzel w/ cheddar horseradish cheese; carrots/dip AM - 1-2x/wk  PM - 4 1-2x/wk yogurt- greek  HS - n/a  2-3crax and cheese before dinner with drink        Nutrition Diagnosis:   Altered Nutrition-Related Laboratory values  related to Kidney, liver, cardiac, endocrine, neurologic, and/or pulmonary dysfunction as evidenced by  Abnormal plasma glucose and/or HgbA1c levels       Medical Nutrition Therapy Intervention:  [x]Individualized Meal Plan []Understanding Lab Values   []Basic Pathophysiology of Disease []Food/Medication Interactions   [x]Food Diary [x]Exercise   [x]Lifestyle/Behavior Modification Techniques []Medication, Mechanism of Action   []Label Reading []Self Blood Glucose Monitoring   [x]Weight/BMI Goals []Other -    Other Notes: does not check BG at home Bed 1030-11; w/o alarm 8; 7 galo       Comprehension: [x]Excellent  []Very Good  []Good  []Fair   []Poor    Receptivity: [x]Excellent  []Very Good  []Good  []Fair   []Poor    Expected Compliance: [x]Excellent  []Very Good  []Good  []Fair   []Poor        Goals:  1  Consistent meals as able, no skipping   2  Dinner latest 6 pm, larger B and lunch, smaller dinner   3  Decrease ETOh amounts  4  Adequate sleep 8 hrs as able  5  Activity class 1-2x/wk  6  Morning snack and decrease coffee 12 oz??       No Follow-up on file    Labs:  CMP  Lab Results   Component Value Date     05/14/2018    K 4 1 11/15/2018     11/15/2018    CO2 31 11/15/2018    ANIONGAP 9 05/14/2018    BUN 16 11/15/2018    CREATININE 1 00 11/15/2018    GLUCOSE 116 (H) 05/14/2018    GLUF 100 (H) 11/15/2018    CALCIUM 8 4 11/15/2018    AST 22 11/15/2018    ALT 39 11/15/2018    ALKPHOS 113 11/15/2018    PROT 5 8 (L) 05/14/2018    BILITOT 0 4 05/14/2018    EGFR 78 11/15/2018       BMP  Lab Results   Component Value Date    GLUCOSE 116 (H) 05/14/2018    CALCIUM 8 4 11/15/2018     05/14/2018    K 4 1 11/15/2018    CO2 31 11/15/2018     11/15/2018    BUN 16 11/15/2018    CREATININE 1 00 11/15/2018       Lipids  Lab Results   Component Value Date    CHOL 97 05/14/2018     Lab Results   Component Value Date    HDL 45 11/15/2018    HDL 45 08/13/2018    HDL 40 (L) 05/14/2018     Lab Results   Component Value Date    LDLCALC 47 11/15/2018    LDLCALC 28 08/13/2018    LDLCALC 43 05/14/2018     Lab Results   Component Value Date    TRIG 67 11/15/2018    TRIG 59 08/13/2018    TRIG 68 05/14/2018     No results found for: CHOLHDL    Hemoglobin A1C  Lab Results   Component Value Date HGBA1C 6 1 11/15/2018       Fasting Glucose  Lab Results   Component Value Date    GLUF 100 (H) 11/15/2018       Insulin     Thyroid  No results found for: TSH, D2SBPZJ, S8EHMGF, THYROIDAB    Hepatic Function Panel  Lab Results   Component Value Date    ALT 39 11/15/2018    AST 22 11/15/2018    ALKPHOS 113 11/15/2018    BILITOT 0 4 05/14/2018       Celiac Disease Antibody Panel  No results found for: ENDOMYSIAL IGA, GLIADIN IGA, GLIADIN IGG, IGA, TISSUE TRANSGLUT AB, TTG IGA   Iron  No results found for: IRON, TIBC, FERRITIN    Vitamins  No results found for: VITAMIN B2   No results found for: NICOTINAMIDE, NICOTINIC ACID   No results found for: VITAMINB6  No results found for: [id number]  No results found for: VITB5  No results found for: Y4HGUEVX  No results found for: THYROGLB  No results found for: VITAMIN K   No results found for: 25-HYDROXY VIT D   No components found for: VITAMINE     DSalmon MS RD LDN  [address] [facility] [address]  [address] [facility] [address]

## 2018-12-07 ENCOUNTER — TELEPHONE (OUTPATIENT)
Dept: UROLOGY | Facility: MEDICAL CENTER | Age: 68
End: 2018-12-07

## 2018-12-07 NOTE — TELEPHONE ENCOUNTER
Prior Authorization for Tadalafil (CIALIS) 5mg was requested and initiated via CoverMyMeds  com - Key: [id number] - Case ID#: [id number]  Final determination is expected within 24-72 hours

## 2018-12-10 NOTE — TELEPHONE ENCOUNTER
Request DENIED (CaseID#:  [id number]) over the weekend because a diagnosis was needed  I called [facility] ([phone]) and updated information verbally  Patient is already taking Finasteride and Tamsulosin but apparently needs to show failure of THREE medications?!  Trial of Dutasteride and/or Terazosin did not occur  I told the examiner that both drugs contraindicated for adverse response to blood pressure  Request was the APPROVED  Dara Soulier Dara Soulier Prior Authorization for Tadalafil (CIALIS) 5mg was APPROVED and valid from 09/11//2018 until 12/10/2019 (90 tablets every 90 days)  Left message on patient home phone voice mail regarding same

## 2018-12-13 DIAGNOSIS — I10 ESSENTIAL HYPERTENSION: ICD-10-CM

## 2018-12-13 RX ORDER — AMLODIPINE BESYLATE 5 MG/1
5 TABLET ORAL DAILY
Qty: 90 TABLET | Refills: 1 | Status: SHIPPED | OUTPATIENT
Start: 2018-12-13 | End: 2019-01-03 | Stop reason: SDUPTHER

## 2018-12-28 ENCOUNTER — CLINICAL SUPPORT (OUTPATIENT)
Dept: NUTRITION | Facility: HOSPITAL | Age: 68
End: 2018-12-28
Payer: MEDICARE

## 2018-12-28 VITALS — BODY MASS INDEX: 32.33 KG/M2 | WEIGHT: 238.4 LBS

## 2018-12-28 DIAGNOSIS — E11.69 DIABETES MELLITUS ASSOCIATED WITH HORMONAL ETIOLOGY (HCC): Primary | ICD-10-CM

## 2018-12-28 PROCEDURE — 97803 MED NUTRITION INDIV SUBSEQ: CPT

## 2018-12-28 NOTE — PROGRESS NOTES
Follow-Up Nutrition Assessment Form    Patient Name: Sloan Gatica    YOB: 1950    Sex: Male      Follow Up Date: 12/28/2018  Start Time: 835 Stop Time: 905 Total Minutes: 30     Data:  Present at session: self   Parent Concerns: n/a   Medical Dx/Reason for Referral: DM   Past Medical History:   Diagnosis Date    BPH (benign prostatic hyperplasia)     ED (erectile dysfunction)     Enlarged prostate     Herpes zoster 12/18/2014    Hyperlipidemia     Hypertension     IFG (impaired fasting glucose)     Metabolic syndrome     Psoriasis     Seasonal allergic rhinitis     Type 2 diabetes mellitus (HCC) 11/17/2014       Current Outpatient Prescriptions   Medication Sig Dispense Refill    amLODIPine (NORVASC) 5 mg tablet Take 1 tablet (5 mg total) by mouth daily 90 tablet 1    aspirin (ASPIR-81) 81 mg EC tablet take 1 tablet (81MG)  by oral route 3X week      atorvastatin (LIPITOR) 40 mg tablet Take 1 tablet (40 mg total) by mouth daily 90 tablet 1    finasteride (PROSCAR) 5 mg tablet Take 1 tablet (5 mg total) by mouth daily 90 tablet 0    fluticasone (FLONASE) 50 mcg/act nasal spray every 24 hours      hydrocortisone 2 5 % cream Apply topically every 24 hours 56 7 g 0    lisinopril (ZESTRIL) 10 mg tablet Take 1 tablet (10 mg total) by mouth daily 90 tablet 1    metFORMIN (GLUCOPHAGE) 500 mg tablet Take 1 tablet (500 mg total) by mouth 2 (two) times a day with meals 180 tablet 1    pantoprazole (PROTONIX) 40 mg tablet Take 1 tablet (40 mg total) by mouth daily 90 tablet 1    tadalafil (CIALIS) 5 MG tablet Take 1 tablet (5 mg total) by mouth daily as needed for erectile dysfunction 90 tablet 3    tamsulosin (FLOMAX) 0 4 mg Take 1 capsule (0 4 mg total) by mouth daily with dinner 90 capsule 1     No current facility-administered medications for this visit           Additional Meds/Supplements: n/a   Barriers to Learning: None   Labs: n/a   Height:   Ht Readings from Last 3 Encounters: 12/05/18 6' (1 829 m)   11/27/18 6' (1 829 m)   08/27/18 6' (1 829 m)      Weight: Wt Readings from Last 12 Encounters:   12/28/18 108 kg (238 lb 6 4 oz)   12/05/18 106 kg (234 lb 6 4 oz)   12/05/18 108 kg (238 lb)   11/27/18 109 kg (241 lb)   08/27/18 107 kg (236 lb)     Estimated body mass index is 32 33 kg/m² as calculated from the following:    Height as of 12/5/18: 6' (1 829 m)  Weight as of this encounter: 108 kg (238 lb 6 4 oz)  Wt  Change Since Last Visit: [x]Yes     []No  Amount: Inc 4#      Energy Needs: No calculation needed   Pain Screen: Are you having pain now? No      Goals Achieved:  Bedtime 1030-11 dinner a little eariler eating 2k per day, going over 2-300 cals/day with cocktails and rich foods  Has not started activity yet, mon-fri walking with dogs ; saturday only gym  Quality of sleep is good  B: bowl of cheerios w/fruit 1% milk coffee 1/2 and 1/2 or oatmeal with raisins and honey 7-745;   1230 lunch salad with chicken balsamic only oil and vinegar apple (bring from home) water; dinner cocktail before dinner and small snack while prep dinner cheese and crax or carrots w/dip/dressing; pretzel or two; triscuits x 2 w/2 sli cheese/caal's; 630-7 dinner leftovers; bowl of soup w rice noodles and beef ([place] restaurant) spaghetti and sauce; morrocan meatballs x 2 salad slice of bushtonawel and cookie; slice ham potatoes gruyere sauce salad peas and carrots 1 cocktail and one glass of wine x4 oz;  3 oz cocktail predinner  New Goals:   1   4121-6348 calories max   2  Gym 2x/wk   3  Dinner 630 latest- decrease premeal drinking/snacking       Initial PES:       New PES: No Change      New Problem List:  1 none new   2    3        Assessment:  Trying to watch portions and choices, getting same activity, still doing predinner cocktail is tracking food  Energy good during day, not checking BG         Medical Nutrition Therapy Intervention:  [x]Individualized Meal Plan []Understanding Lab Values   []Basic Pathophysiology of Disease []Food/Medication Interactions   [x]Food Diary [x]Exercise   []Lifestyle/Behavior Modification Techniques []Medication, Mechanism of Action   []Label Reading []Self Blood Glucose Monitoring   [x]Weight/BMI Goals []Other -    Other Notes: Son and GF visiting from [place] x 2 wks       Comprehension: [x]Excellent  []Very Good  []Good  []Fair   []Poor    Receptivity: [x]Excellent  []Very Good  []Good  []Fair   []Poor    Expected Compliance: [x]Excellent  []Very Good  []Good  []Fair   []Poor      Labs:  CMP  Lab Results   Component Value Date     05/14/2018    K 4 1 11/15/2018     11/15/2018    CO2 31 11/15/2018    ANIONGAP 9 05/14/2018    BUN 16 11/15/2018    CREATININE 1 00 11/15/2018    GLUCOSE 116 (H) 05/14/2018    GLUF 100 (H) 11/15/2018    CALCIUM 8 4 11/15/2018    AST 22 11/15/2018    ALT 39 11/15/2018    ALKPHOS 113 11/15/2018    PROT 5 8 (L) 05/14/2018    BILITOT 0 4 05/14/2018    EGFR 78 11/15/2018       BMP  Lab Results   Component Value Date    GLUCOSE 116 (H) 05/14/2018    CALCIUM 8 4 11/15/2018     05/14/2018    K 4 1 11/15/2018    CO2 31 11/15/2018     11/15/2018    BUN 16 11/15/2018    CREATININE 1 00 11/15/2018       Lipids  Lab Results   Component Value Date    CHOL 97 05/14/2018     Lab Results   Component Value Date    HDL 45 11/15/2018    HDL 45 08/13/2018    HDL 40 (L) 05/14/2018     Lab Results   Component Value Date    LDLCALC 47 11/15/2018    LDLCALC 28 08/13/2018    LDLCALC 43 05/14/2018     Lab Results   Component Value Date    TRIG 67 11/15/2018    TRIG 59 08/13/2018    TRIG 68 05/14/2018     No results found for: CHOLHDL    Hemoglobin A1C  Lab Results   Component Value Date    HGBA1C 6 1 11/15/2018       Fasting Glucose  Lab Results   Component Value Date    GLUF 100 (H) 11/15/2018       Insulin     Thyroid  No results found for: TSH, O3UPYBE, U3CFLSZ, THYROIDAB    Hepatic Function Panel  Lab Results   Component Value Date    ALT 39 11/15/2018    AST 22 11/15/2018    ALKPHOS 113 11/15/2018    BILITOT 0 4 05/14/2018       Celiac Disease Antibody Panel  No results found for: ENDOMYSIAL IGA, GLIADIN IGA, GLIADIN IGG, IGA, TISSUE TRANSGLUT AB, TTG IGA   Iron  No results found for: IRON, TIBC, FERRITIN    Vitamins  No results found for: VITAMIN B2   No results found for: NICOTINAMIDE, NICOTINIC ACID   No results found for: VITAMINB6  No results found for: [id number]  No results found for: VITB5  No results found for: H8GLNULA  No results found for: THYROGLB  No results found for: VITAMIN K   No results found for: 25-HYDROXY VIT D   No components found for: VITAMINE     No Follow-up on file      [id number] [facility] [address]  [address]  [facility] [address]

## 2019-01-03 DIAGNOSIS — I10 ESSENTIAL HYPERTENSION: ICD-10-CM

## 2019-01-03 RX ORDER — AMLODIPINE BESYLATE 5 MG/1
5 TABLET ORAL DAILY
Qty: 90 TABLET | Refills: 0 | Status: SHIPPED | OUTPATIENT
Start: 2019-01-03 | End: 2019-01-11 | Stop reason: SDUPTHER

## 2019-01-04 DIAGNOSIS — I34.0 NONRHEUMATIC MITRAL (VALVE) INSUFFICIENCY: ICD-10-CM

## 2019-01-04 DIAGNOSIS — E11.9 TYPE 2 DIABETES MELLITUS WITHOUT COMPLICATION, UNSPECIFIED WHETHER LONG TERM INSULIN USE (HCC): ICD-10-CM

## 2019-01-04 RX ORDER — ATORVASTATIN CALCIUM 40 MG/1
40 TABLET, FILM COATED ORAL DAILY
Qty: 90 TABLET | Refills: 0 | Status: SHIPPED | OUTPATIENT
Start: 2019-01-04 | End: 2019-04-15 | Stop reason: SDUPTHER

## 2019-01-04 RX ORDER — LISINOPRIL 10 MG/1
10 TABLET ORAL DAILY
Qty: 90 TABLET | Refills: 0 | Status: SHIPPED | OUTPATIENT
Start: 2019-01-04 | End: 2019-04-02 | Stop reason: SDUPTHER

## 2019-01-11 DIAGNOSIS — I10 ESSENTIAL HYPERTENSION: ICD-10-CM

## 2019-01-11 RX ORDER — AMLODIPINE BESYLATE 5 MG/1
5 TABLET ORAL DAILY
Qty: 90 TABLET | Refills: 0 | Status: SHIPPED | OUTPATIENT
Start: 2019-01-11 | End: 2019-04-02 | Stop reason: SDUPTHER

## 2019-02-04 ENCOUNTER — OFFICE VISIT (OUTPATIENT)
Dept: FAMILY MEDICINE CLINIC | Facility: CLINIC | Age: 69
End: 2019-02-04
Payer: MEDICARE

## 2019-02-04 VITALS
HEART RATE: 68 BPM | RESPIRATION RATE: 16 BRPM | DIASTOLIC BLOOD PRESSURE: 74 MMHG | HEIGHT: 72 IN | TEMPERATURE: 97.9 F | OXYGEN SATURATION: 94 % | BODY MASS INDEX: 31.42 KG/M2 | SYSTOLIC BLOOD PRESSURE: 132 MMHG | WEIGHT: 232 LBS

## 2019-02-04 DIAGNOSIS — E66.9 OBESITY (BMI 30.0-34.9): ICD-10-CM

## 2019-02-04 DIAGNOSIS — Z00.01 ENCOUNTER FOR WELL ADULT EXAM WITH ABNORMAL FINDINGS: Primary | ICD-10-CM

## 2019-02-04 DIAGNOSIS — Z23 NEED FOR SHINGLES VACCINE: ICD-10-CM

## 2019-02-04 DIAGNOSIS — Z11.59 NEED FOR HEPATITIS C SCREENING TEST: ICD-10-CM

## 2019-02-04 DIAGNOSIS — Z23 NEED FOR 23-POLYVALENT PNEUMOCOCCAL POLYSACCHARIDE VACCINE: ICD-10-CM

## 2019-02-04 PROBLEM — E66.811 OBESITY (BMI 30.0-34.9): Status: ACTIVE | Noted: 2019-02-04

## 2019-02-04 PROCEDURE — G0009 ADMIN PNEUMOCOCCAL VACCINE: HCPCS | Performed by: FAMILY MEDICINE

## 2019-02-04 PROCEDURE — G0439 PPPS, SUBSEQ VISIT: HCPCS | Performed by: FAMILY MEDICINE

## 2019-02-04 PROCEDURE — 90732 PPSV23 VACC 2 YRS+ SUBQ/IM: CPT | Performed by: FAMILY MEDICINE

## 2019-02-04 NOTE — PATIENT INSTRUCTIONS
Obesity   AMBULATORY CARE:   Obesity  is when your body mass index (BMI) is greater than 30  Your healthcare provider will use your height and weight to measure your BMI  The risks of obesity include  many health problems, such as injuries or physical disability  You may need tests to check for the following:  · Diabetes     · High blood pressure or high cholesterol     · Heart disease     · Gallbladder or liver disease     · Cancer of the colon, breast, prostate, liver, or kidney     · Sleep apnea     · Arthritis or gout  Seek care immediately if:   · You have a severe headache, confusion, or difficulty speaking  · You have weakness on one side of your body  · You have chest pain, sweating, or shortness of breath  Contact your healthcare provider if:   · You have symptoms of gallbladder or liver disease, such as pain in your upper abdomen  · You have knee or hip pain and discomfort while walking  · You have symptoms of diabetes, such as intense hunger and thirst, and frequent urination  · You have symptoms of sleep apnea, such as snoring or daytime sleepiness  · You have questions or concerns about your condition or care  Treatment for obesity  focuses on helping you lose weight to improve your health  Even a small decrease in BMI can reduce the risk for many health problems  Your healthcare provider will help you set a weight-loss goal   · Lifestyle changes  are the first step in treating obesity  These include making healthy food choices and getting regular physical activity  Your healthcare provider may suggest a weight-loss program that involves coaching, education, and therapy  · Medicine  may help you lose weight when it is used with a healthy diet and physical activity  · Surgery  can help you lose weight if you are very obese and have other health problems  There are several types of weight-loss surgery  Ask your healthcare provider for more information    Be successful losing weight:   · Set small, realistic goals  An example of a small goal is to walk for 20 minutes 5 days a week  Anther goal is to lose 5% of your body weight  · Tell friends, family members, and coworkers about your goals  and ask for their support  Ask a friend to lose weight with you, or join a weight-loss support group  · Identify foods or triggers that may cause you to overeat , and find ways to avoid them  Remove tempting high-calorie foods from your home and workplace  Place a bowl of fresh fruit on your kitchen counter  If stress causes you to eat, then find other ways to cope with stress  · Keep a diary to track what you eat and drink  Also write down how many minutes of physical activity you do each day  Weigh yourself once a week and record it in your diary  Eating changes: You will need to eat 500 to 1,000 fewer calories each day than you currently eat to lose 1 to 2 pounds a week  The following changes will help you cut calories:  · Eat smaller portions  Use small plates, no larger than 9 inches in diameter  Fill your plate half full of fruits and vegetables  Measure your food using measuring cups until you know what a serving size looks like  · Eat 3 meals and 1 or 2 snacks each day  Plan your meals in advance  Kim Laughter and eat at home most of the time  Eat slowly  · Eat fruits and vegetables at every meal   They are low in calories and high in fiber, which makes you feel full  Do not add butter, margarine, or cream sauce to vegetables  Use herbs to season steamed vegetables  · Eat less fat and fewer fried foods  Eat more baked or grilled chicken and fish  These protein sources are lower in calories and fat than red meat  Limit fast food  Dress your salads with olive oil and vinegar instead of bottled dressing  · Limit the amount of sugar you eat  Do not drink sugary beverages  Limit alcohol  Activity changes:  Physical activity is good for your body in many ways   It helps you burn calories and build strong muscles  It decreases stress and depression, and improves your mood  It can also help you sleep better  Talk to your healthcare provider before you begin an exercise program   · Exercise for at least 30 minutes 5 days a week  Start slowly  Set aside time each day for physical activity that you enjoy and that is convenient for you  It is best to do both weight training and an activity that increases your heart rate, such as walking, bicycling, or swimming  · Find ways to be more active  Do yard work and housecleaning  Walk up the stairs instead of using elevators  Spend your leisure time going to events that require walking, such as outdoor festivals or fairs  This extra physical activity can help you lose weight and keep it off  Follow up with your healthcare provider as directed: You may need to meet with a dietitian  Write down your questions so you remember to ask them during your visits  © 2017 [address] [employer] Information is for End User's use only and may not be sold, redistributed or otherwise used for commercial purposes  All illustrations and images included in CareNotes® are the copyrighted property of A [employer] M [employer] Inc  or Romario Castillo  The above information is an  only  It is not intended as medical advice for individual conditions or treatments  Talk to your doctor, nurse or pharmacist before following any medical regimen to see if it is safe and effective for you

## 2019-02-04 NOTE — PROGRESS NOTES
Assessment and Plan:    Problem List Items Addressed This Visit     Encounter for well adult exam with abnormal findings - Primary     Advice and education were given regarding nutrition, aerobic exercises, weight bearing exercises, cardiovascular risk reduction, fall risk reduction, and age appropriate supplements  The patient was counseled regarding instructions for management, risk factor reductions, prognosis, risks and benefits of treatment options, patient and family education, and importance of compliance with treatment  Obesity (BMI 30 0-34  9)     The BMI is above average  BMI counseling and education was provided to the patient  Nutrition recommendations include reducing portion sizes, decreasing overall calorie intake, 3-5 servings of fruits/vegetables daily, reducing fast food intake, consuming healthier snacks, decreasing soda and/or juice intake, moderation in carbohydrate intake and reducing intake of saturated fat and trans fat  Exercise recommendations include moderate aerobic physical activity for 150 minutes/week, exercising 3-5 times per week and joining a gym  Need for shingles vaccine    Relevant Medications    Zoster Vac Recomb Adjuvanted 50 MCG/0 5ML SUSR      Other Visit Diagnoses     Need for hepatitis C screening test        Relevant Orders    Hepatitis C antibody    Need for 23-polyvalent pneumococcal polysaccharide vaccine        Relevant Orders    PNEUMOCOCCAL POLYSACCHARIDE VACCINE 23-VALENT =>3YO SQ IM (Completed)        Health Maintenance Due   Topic Date Due    Hepatitis C Screening  1950    Medicare Annual Wellness Visit (AWV)  1950    Diabetic Foot Exam  09/07/1962    Pneumococcal PPSV23/PCV13 65+ Years / Low and Medium Risk (2 of 2 - PPSV23) 01/09/2019         HPI:  Frieda Elkins is a 79 y o  male here for his Subsequent Wellness Visit      Patient Active Problem List   Diagnosis    Allergic rhinitis    Benign prostatic hyperplasia without urinary obstruction    Displacement of lumbar intervertebral disc without myelopathy    Diverticulosis of colon    Elevated PSA    Male erectile disorder    Essential hypertension    Herpes zoster    Indigestion    Metabolic syndrome    Left ventricular hypertrophy    Mitral valve regurgitation    Type 2 diabetes mellitus (HCC)    Hyperlipidemia    Idiopathic peripheral neuropathy    Calculus of gallbladder with cholecystitis    Colon polyp    Encounter for well adult exam with abnormal findings    Obesity (BMI 30 0-34  9)    Need for shingles vaccine     Past Medical History:   Diagnosis Date    BPH (benign prostatic hyperplasia)     ED (erectile dysfunction)     Enlarged prostate     Herpes zoster 12/18/2014    Hyperlipidemia     Hypertension     IFG (impaired fasting glucose)     Metabolic syndrome     Psoriasis     Seasonal allergic rhinitis     Type 2 diabetes mellitus ([facility] [address] ) 11/17/2014     Past Surgical History:   Procedure Laterality Date    CHOLECYSTECTOMY  05/14/2015    COLONOSCOPY  2007    HERNIA REPAIR  05/14/2015     Family History   Problem Relation Age of Onset    Skin cancer Mother     Heart disease Father      History   Smoking Status    Former Smoker   Smokeless Tobacco    Former User     History   Alcohol Use    Yes      History   Drug Use No       Current Outpatient Prescriptions   Medication Sig Dispense Refill    amLODIPine (NORVASC) 5 mg tablet Take 1 tablet (5 mg total) by mouth daily 90 tablet 0    aspirin (ASPIR-81) 81 mg EC tablet take 1 tablet (81MG)  by oral route 3X week      atorvastatin (LIPITOR) 40 mg tablet Take 1 tablet (40 mg total) by mouth daily 90 tablet 0    finasteride (PROSCAR) 5 mg tablet Take 1 tablet (5 mg total) by mouth daily 90 tablet 0    fluticasone (FLONASE) 50 mcg/act nasal spray every 24 hours      hydrocortisone 2 5 % cream Apply topically every 24 hours 56 7 g 0    lisinopril (ZESTRIL) 10 mg tablet Take 1 tablet (10 mg total) by mouth daily 90 tablet 0    metFORMIN (GLUCOPHAGE) 500 mg tablet Take 1 tablet (500 mg total) by mouth 2 (two) times a day with meals 180 tablet 0    pantoprazole (PROTONIX) 40 mg tablet Take 1 tablet (40 mg total) by mouth daily 90 tablet 1    tadalafil (CIALIS) 5 MG tablet Take 1 tablet (5 mg total) by mouth daily as needed for erectile dysfunction 90 tablet 3    tamsulosin (FLOMAX) 0 4 mg Take 1 capsule (0 4 mg total) by mouth daily with dinner 90 capsule 1    Zoster Vac Recomb Adjuvanted 50 MCG/0 5ML SUSR Inject 1 vial into a muscle once for 1 dose 1 each 0     No current facility-administered medications for this visit  Allergies   Allergen Reactions    No Known Allergies      Immunization History   Administered Date(s) Administered    Influenza 10/13/2015, 10/31/2016, 10/16/2017    Influenza Split 10/29/2013    Influenza TIV (IM) 11/17/2009, 10/22/2010, 11/11/2011, 10/07/2014    Influenza, high dose seasonal 0 5 mL 10/19/2018    Pneumococcal Conjugate 13-Valent 01/09/2018    Pneumococcal Polysaccharide PPV23 02/04/2019    Tdap 07/28/2015    Zoster 10/27/2015       Patient Care Team:  Sami Dial MD as PCP - General (Family Medicine)    Medicare Screening Tests and Risk Assessments:  Last Medicare Wellness visit information reviewed, patient interviewed and updates made to the record today  Health Risk Assessment:  Patient rates overall health as fair  Patient feels that their physical health rating is Same  Eyesight was rated as Same  Hearing was rated as Same  Patient feels that their emotional and mental health rating is Same  Pain experienced by patient in the last 7 days has been None  Patient states that he has experienced no weight loss or gain in last 6 months  Emotional/Mental Health:  Patient has been feeling nervous/anxious  PHQ-9 Depression Screening:    Frequency of the following problems over the past two weeks:      1   Little interest or pleasure in doing things: 0 - not at all      2  Feeling down, depressed, or hopeless: 0 - not at all  PHQ-2 Score: 0          Broken Bones/Falls: Fall Risk Assessment:    In the past year, patient has experienced: No history of falling in past year          Bladder/Bowel:  Patient has leaked urine accidently in the last six months  Patient reports no loss of bowel control  Immunizations:  Patient has had a flu vaccination within the last year  Patient has received a pneumonia shot  Patient has received a shingles shot  Patient has received tetanus/diphtheria shot  Date of tetanus/diphtheria shot: 7/28/2015    Home Safety:  Patient does not have trouble with stairs inside or outside of their home  Patient currently reports that there are no safety hazards present in home, working smoke alarms, working carbon monoxide detectors  Preventative Screenings:   prostate cancer screen performed, colon cancer screen completed, cholesterol screen completed, glaucoma eye exam completed, 11/9/2018      Nutrition:  Current diet: Low Carb and No Added Salt with servings of the following:    Medications:  Patient is currently taking over-the-counter supplements  List of OTC medications includes: multi vitamins vitamin D  Patient is able to manage medications  Lifestyle Choices:  Patient reports no tobacco use  Patient has smoked or used tobacco in the past   Patient has stopped his tobacco use  Patient reports alcohol use  Patient drives a vehicle  Patient wears seat belt  Current level of exercise of physical activity described by patient as: OCCASIONAL           Activities of Daily Living:  Can get out of bed by his or her self, able to dress self, able to make own meals, able to do own shopping, able to bathe self, can do own laundry/housekeeping, can manage own money, pay bills and track expenses    Previous Hospitalizations:  No hospitalization or ED visit in past 12 months        Advanced Directives:  Patient has decided on a power of   Patient has spoken to designated power of   Patient has completed advanced directive  Preventative Screening/Counseling:      Cardiovascular:      General: Risks and Benefits Discussed and Screening Current      Counseling: Healthy Diet, Healthy Weight and Improve Cholesterol          Diabetes:      General: Risks and Benefits Discussed and Screening Current      Counseling: Healthy Diet and Healthy Weight          Colorectal Cancer:      General: Risks and Benefits Discussed and Screening Current      Comments: PATIENT IS UP-TO-DATE COLONOSCOPY February 2018        Prostate Cancer:      General: Risks and Benefits Discussed and Screening Current      Comments: DONE 11/2018         Osteoporosis:      General: Risks and Benefits Discussed          AAA:      General: Risks and Benefits Discussed          Glaucoma:      General: Risks and Benefits Discussed and Screening Current      Comments: Patient does followed by Ophthalmology regularly last eye exam was November 2018        HIV:      General: Risks and Benefits Discussed          Hepatitis C:      General: Risks and Benefits Discussed        Advanced Directives:   Patient has living will for healthcare, has durable POA for healthcare, Additional Comments:  PATIENT WIFE IS THE POWER OF   Other Preventative Counseling (Non-Medicare): Fall Prevention and Nutrition Counseling        Patient's shoes and socks removed  Right Foot/Ankle   Right Foot Inspection  Skin Exam: skin intact no warmth and no pre-ulcer                          Toe Exam: no swelling and erythema  Sensory       Monofilament testing: intact  Vascular  Capillary refills: < 3 seconds  The right DP pulse is 2+       Left Foot/Ankle  Left Foot Inspection  Skin Exam: skin intactno warmth and no pre-ulcer                         Toe Exam: no swelling and no erythema                   Sensory       Monofilament: intact  Vascular  Capillary refills: < 3 seconds  The left DP pulse is 2+  Assign Risk Category:  No deformity present; No loss of protective sensation; No weak pulses       Risk: 0   Patient and office for his annual physical exam and patient and deny any chest pain short of breath no palpitation no headache no blurred vision no weakness or lateralized of the symptom   Patient is up-to-date with the Tdap flu shot , the Prevnar 13 a due for a Pneumovax 23 will have it today, the patient already had the zoster VAX  I discussed with the patient the new immunization for the shingles and the CDC recommendation  No recent fall no recent hospitalization  BMI Counseling: Body mass index is 31 46 kg/m²  Discussed the patient's BMI with him  The BMI is above average  BMI counseling and education was provided to the patient  Nutrition recommendations include reducing portion sizes, decreasing overall calorie intake, 3-5 servings of fruits/vegetables daily, reducing fast food intake, consuming healthier snacks, decreasing soda and/or juice intake, moderation in carbohydrate intake and reducing intake of cholesterol  Exercise recommendations include moderate aerobic physical activity for 150 minutes/week

## 2019-02-22 DIAGNOSIS — N52.9 MALE ERECTILE DISORDER: Primary | ICD-10-CM

## 2019-02-22 RX ORDER — SILDENAFIL 100 MG/1
100 TABLET, FILM COATED ORAL DAILY PRN
Qty: 10 TABLET | Refills: 0 | Status: SHIPPED | OUTPATIENT
Start: 2019-02-22 | End: 2019-02-26 | Stop reason: SDUPTHER

## 2019-02-25 ENCOUNTER — TELEPHONE (OUTPATIENT)
Dept: FAMILY MEDICINE CLINIC | Facility: CLINIC | Age: 69
End: 2019-02-25

## 2019-02-26 DIAGNOSIS — N52.9 MALE ERECTILE DISORDER: ICD-10-CM

## 2019-02-26 RX ORDER — SILDENAFIL 100 MG/1
100 TABLET, FILM COATED ORAL DAILY PRN
Qty: 10 TABLET | Refills: 0 | Status: SHIPPED | OUTPATIENT
Start: 2019-02-26 | End: 2019-07-15 | Stop reason: SDUPTHER

## 2019-02-27 ENCOUNTER — APPOINTMENT (OUTPATIENT)
Dept: LAB | Facility: CLINIC | Age: 69
End: 2019-02-27
Payer: MEDICARE

## 2019-02-27 DIAGNOSIS — I10 ESSENTIAL HYPERTENSION: ICD-10-CM

## 2019-02-27 DIAGNOSIS — K30 INDIGESTION: ICD-10-CM

## 2019-02-27 DIAGNOSIS — E11.9 TYPE 2 DIABETES MELLITUS WITHOUT COMPLICATION, WITHOUT LONG-TERM CURRENT USE OF INSULIN (HCC): ICD-10-CM

## 2019-02-27 DIAGNOSIS — Z11.59 NEED FOR HEPATITIS C SCREENING TEST: ICD-10-CM

## 2019-02-27 DIAGNOSIS — E78.2 MIXED HYPERLIPIDEMIA: ICD-10-CM

## 2019-02-27 LAB
ALBUMIN SERPL BCP-MCNC: 3.8 G/DL (ref 3.5–5)
ALP SERPL-CCNC: 91 U/L (ref 46–116)
ALT SERPL W P-5'-P-CCNC: 34 U/L (ref 12–78)
ANION GAP SERPL CALCULATED.3IONS-SCNC: 5 MMOL/L (ref 4–13)
AST SERPL W P-5'-P-CCNC: 22 U/L (ref 5–45)
BASOPHILS # BLD AUTO: 0.02 THOUSANDS/ΜL (ref 0–0.1)
BASOPHILS NFR BLD AUTO: 0 % (ref 0–1)
BILIRUB SERPL-MCNC: 0.54 MG/DL (ref 0.2–1)
BUN SERPL-MCNC: 12 MG/DL (ref 5–25)
CALCIUM SERPL-MCNC: 8.5 MG/DL (ref 8.3–10.1)
CHLORIDE SERPL-SCNC: 105 MMOL/L (ref 100–108)
CHOLEST SERPL-MCNC: 96 MG/DL (ref 50–200)
CO2 SERPL-SCNC: 30 MMOL/L (ref 21–32)
CREAT SERPL-MCNC: 0.98 MG/DL (ref 0.6–1.3)
EOSINOPHIL # BLD AUTO: 0.26 THOUSAND/ΜL (ref 0–0.61)
EOSINOPHIL NFR BLD AUTO: 6 % (ref 0–6)
ERYTHROCYTE [DISTWIDTH] IN BLOOD BY AUTOMATED COUNT: 12.9 % (ref 11.6–15.1)
EST. AVERAGE GLUCOSE BLD GHB EST-MCNC: 117 MG/DL
GFR SERPL CREATININE-BSD FRML MDRD: 80 ML/MIN/1.73SQ M
GLUCOSE P FAST SERPL-MCNC: 108 MG/DL (ref 65–99)
HBA1C MFR BLD: 5.7 % (ref 4.2–6.3)
HCT VFR BLD AUTO: 42 % (ref 36.5–49.3)
HCV AB SER QL: NORMAL
HDLC SERPL-MCNC: 46 MG/DL (ref 40–60)
HGB BLD-MCNC: 13.6 G/DL (ref 12–17)
IMM GRANULOCYTES # BLD AUTO: 0.01 THOUSAND/UL (ref 0–0.2)
IMM GRANULOCYTES NFR BLD AUTO: 0 % (ref 0–2)
LDLC SERPL CALC-MCNC: 39 MG/DL (ref 0–100)
LYMPHOCYTES # BLD AUTO: 1.3 THOUSANDS/ΜL (ref 0.6–4.47)
LYMPHOCYTES NFR BLD AUTO: 28 % (ref 14–44)
MCH RBC QN AUTO: 32 PG (ref 26.8–34.3)
MCHC RBC AUTO-ENTMCNC: 32.4 G/DL (ref 31.4–37.4)
MCV RBC AUTO: 99 FL (ref 82–98)
MONOCYTES # BLD AUTO: 0.44 THOUSAND/ΜL (ref 0.17–1.22)
MONOCYTES NFR BLD AUTO: 9 % (ref 4–12)
NEUTROPHILS # BLD AUTO: 2.66 THOUSANDS/ΜL (ref 1.85–7.62)
NEUTS SEG NFR BLD AUTO: 57 % (ref 43–75)
NRBC BLD AUTO-RTO: 0 /100 WBCS
PLATELET # BLD AUTO: 143 THOUSANDS/UL (ref 149–390)
PMV BLD AUTO: 10.6 FL (ref 8.9–12.7)
POTASSIUM SERPL-SCNC: 4.1 MMOL/L (ref 3.5–5.3)
PROT SERPL-MCNC: 6.4 G/DL (ref 6.4–8.2)
RBC # BLD AUTO: 4.25 MILLION/UL (ref 3.88–5.62)
SODIUM SERPL-SCNC: 140 MMOL/L (ref 136–145)
TRIGL SERPL-MCNC: 55 MG/DL
TSH SERPL DL<=0.05 MIU/L-ACNC: 1.81 UIU/ML (ref 0.36–3.74)
WBC # BLD AUTO: 4.69 THOUSAND/UL (ref 4.31–10.16)

## 2019-02-27 PROCEDURE — 80053 COMPREHEN METABOLIC PANEL: CPT

## 2019-02-27 PROCEDURE — 36415 COLL VENOUS BLD VENIPUNCTURE: CPT

## 2019-02-27 PROCEDURE — 80061 LIPID PANEL: CPT

## 2019-02-27 PROCEDURE — 84443 ASSAY THYROID STIM HORMONE: CPT

## 2019-02-27 PROCEDURE — 83036 HEMOGLOBIN GLYCOSYLATED A1C: CPT

## 2019-02-27 PROCEDURE — 85025 COMPLETE CBC W/AUTO DIFF WBC: CPT

## 2019-02-27 PROCEDURE — 86803 HEPATITIS C AB TEST: CPT

## 2019-03-05 ENCOUNTER — OFFICE VISIT (OUTPATIENT)
Dept: FAMILY MEDICINE CLINIC | Facility: CLINIC | Age: 69
End: 2019-03-05
Payer: MEDICARE

## 2019-03-05 VITALS
TEMPERATURE: 97.7 F | OXYGEN SATURATION: 96 % | SYSTOLIC BLOOD PRESSURE: 130 MMHG | DIASTOLIC BLOOD PRESSURE: 78 MMHG | HEIGHT: 72 IN | BODY MASS INDEX: 31.15 KG/M2 | RESPIRATION RATE: 16 BRPM | WEIGHT: 230 LBS | HEART RATE: 60 BPM

## 2019-03-05 DIAGNOSIS — E78.2 MIXED HYPERLIPIDEMIA: ICD-10-CM

## 2019-03-05 DIAGNOSIS — K30 INDIGESTION: ICD-10-CM

## 2019-03-05 DIAGNOSIS — N40.0 BENIGN PROSTATIC HYPERPLASIA WITHOUT URINARY OBSTRUCTION: ICD-10-CM

## 2019-03-05 DIAGNOSIS — E11.69 DIABETES MELLITUS ASSOCIATED WITH HORMONAL ETIOLOGY (HCC): ICD-10-CM

## 2019-03-05 DIAGNOSIS — I10 ESSENTIAL HYPERTENSION: ICD-10-CM

## 2019-03-05 DIAGNOSIS — E11.9 TYPE 2 DIABETES MELLITUS WITHOUT COMPLICATION, WITHOUT LONG-TERM CURRENT USE OF INSULIN (HCC): Primary | ICD-10-CM

## 2019-03-05 PROCEDURE — 99214 OFFICE O/P EST MOD 30 MIN: CPT | Performed by: FAMILY MEDICINE

## 2019-03-05 RX ORDER — TADALAFIL 5 MG/1
1 TABLET ORAL EVERY 24 HOURS
COMMUNITY
Start: 2017-06-29 | End: 2019-12-17 | Stop reason: SDUPTHER

## 2019-03-05 NOTE — ASSESSMENT & PLAN NOTE
Chronic asymptomatic fair control the patient does use his pantoprazole p r n  and as needed   We encouraged patient to lose weight do not eat and lie down avoid the provoke food

## 2019-03-05 NOTE — PATIENT INSTRUCTIONS

## 2019-03-05 NOTE — ASSESSMENT & PLAN NOTE
Chronic asymptomatic fair control on atorvastatin 40 mg continue current management low-fat diet discussed with the patient increase physical activity discussed with the patient

## 2019-03-05 NOTE — PROGRESS NOTES
Subjective:   Chief Complaint   Patient presents with    Follow-up     chronic conditions        Patient ID: Nnamdi Jauregui is a 79 y o  male  Patient here follow-up with a chronic condition patient was history of non-insulin-dependent diabetic on metformin tolerated well without any side effect patient asymptomatic deny any increased thirsty no increased frequency urination and no dizziness no abdomen pain and no fatigue the patient already on Ace inhibitor and statin the patient was history of hyperlipidemia on statin tolerated well no rash no muscle pain deny any chest pain short of breath no palpitation no headache no blurred vision no weakness or lateralized of the symptom patient's history of hypertension on Norvasc and lisinopril tolerated well asymptomatic the blood pressure controlled the patient deny any the chest pain short of breath no palpitation no lower extremity edema no dyspnea on exertion patient history of benign prostatic hypertrophy he is on the finasteride and Flomax the asymptomatic no abdomen pain no flank pain no blood in the urine no increased frequency urination and no on October E a the patient was history of GERD he on pantoprazole tolerated well patient does use it on a p r n  basis the deny any heartburn no abdomen pain nausea vomiting no weight change  Recent blood work discussed with the patient      The following portions of the patient's history were reviewed and updated as appropriate: allergies, current medications, past family history, past medical history, past social history, past surgical history and problem list     Review of Systems   Constitutional: Negative for fatigue and fever  HENT: Negative for ear pain, sinus pressure, sinus pain and sore throat  Eyes: Negative for pain and redness  Respiratory: Negative for cough, chest tightness and shortness of breath  Cardiovascular: Negative for chest pain, palpitations and leg swelling     Gastrointestinal: Negative for abdominal pain, blood in stool, constipation, diarrhea and nausea  Genitourinary: Negative for flank pain, frequency and hematuria  Musculoskeletal: Negative for back pain and joint swelling  Skin: Negative for rash  Neurological: Negative for dizziness, numbness and headaches  Hematological: Does not bruise/bleed easily  Objective:  Vitals:    03/05/19 0841   BP: 130/78   BP Location: Left arm   Patient Position: Sitting   Cuff Size: Large   Pulse: 60   Resp: 16   Temp: 97 7 °F (36 5 °C)   TempSrc: Oral   SpO2: 96%   Weight: 104 kg (230 lb)   Height: 6' (1 829 m)      Physical Exam   Constitutional: He is oriented to person, place, and time  He appears well-developed and well-nourished  HENT:   Head: Normocephalic  Right Ear: External ear normal    Left Ear: External ear normal    Eyes: Conjunctivae and EOM are normal  Right eye exhibits no discharge  Left eye exhibits no discharge  Neck: No JVD present  Cardiovascular: Normal rate, regular rhythm and normal heart sounds  Exam reveals no gallop  No murmur heard  Pulmonary/Chest: Effort normal  No respiratory distress  He has no wheezes  He has no rales  He exhibits no tenderness  Abdominal: He exhibits no mass  There is no tenderness  There is no rebound  Musculoskeletal: He exhibits no edema or tenderness  Neurological: He is alert and oriented to person, place, and time  Skin: No rash noted  No erythema           Assessment/Plan:    Type 2 diabetes mellitus ([facility] [address] )  Lab Results   Component Value Date    HGBA1C 5 7 02/27/2019      chronic asymptomatic fair control continue current medication patient already on Ace inhibitor and patient already on statin the encouraged patient to continue lose weight and the monitor his blood sugar properly patient does followed by Ophthalmology    Essential hypertension  Chronic asymptomatic fair control with the current management continue current medication encouraged patient to continue lose weight increased physical activity and low-salt diet discussed with the patient    Indigestion  Chronic asymptomatic fair control the patient does use his pantoprazole p r n  and as needed   We encouraged patient to lose weight do not eat and lie down avoid the provoke food    Hyperlipidemia  Chronic asymptomatic fair control on atorvastatin 40 mg continue current management low-fat diet discussed with the patient increase physical activity discussed with the patient    Benign prostatic hyperplasia without urinary obstruction  Chronic asymptomatic fair control on current management patient tolerated well continue       Diagnoses and all orders for this visit:    Type 2 diabetes mellitus without complication, without long-term current use of insulin ([facility])  -     CBC and differential; Future  -     Hemoglobin A1C; Future  -     Lipid panel; Future  -     TSH, 3rd generation; Future  -     Microalbumin / creatinine urine ratio; Future  -     Comprehensive metabolic panel; Future    Diabetes mellitus associated with hormonal etiology ([facility] [address] )    Essential hypertension  -     CBC and differential; Future  -     Hemoglobin A1C; Future  -     Lipid panel; Future  -     TSH, 3rd generation; Future  -     Microalbumin / creatinine urine ratio; Future  -     Comprehensive metabolic panel; Future    Mixed hyperlipidemia  -     CBC and differential; Future  -     Hemoglobin A1C; Future  -     Lipid panel; Future  -     TSH, 3rd generation; Future  -     Microalbumin / creatinine urine ratio; Future  -     Comprehensive metabolic panel;  Future    Indigestion    Benign prostatic hyperplasia without urinary obstruction    Other orders  -     tadalafil (CIALIS) 5 MG tablet; 1 tablet every 24 hours

## 2019-03-05 NOTE — ASSESSMENT & PLAN NOTE
Lab Results   Component Value Date    HGBA1C 5 7 02/27/2019      chronic asymptomatic fair control continue current medication patient already on Ace inhibitor and patient already on statin the encouraged patient to continue lose weight and the monitor his blood sugar properly patient does followed by Ophthalmology

## 2019-03-05 NOTE — ASSESSMENT & PLAN NOTE
Chronic asymptomatic fair control with the current management continue current medication encouraged patient to continue lose weight increased physical activity and low-salt diet discussed with the patient

## 2019-04-02 DIAGNOSIS — E11.9 TYPE 2 DIABETES MELLITUS WITHOUT COMPLICATION, UNSPECIFIED WHETHER LONG TERM INSULIN USE (HCC): ICD-10-CM

## 2019-04-02 DIAGNOSIS — I10 ESSENTIAL HYPERTENSION: ICD-10-CM

## 2019-04-02 DIAGNOSIS — I34.0 NONRHEUMATIC MITRAL (VALVE) INSUFFICIENCY: ICD-10-CM

## 2019-04-02 RX ORDER — LISINOPRIL 10 MG/1
10 TABLET ORAL DAILY
Qty: 90 TABLET | Refills: 0 | Status: SHIPPED | OUTPATIENT
Start: 2019-04-02 | End: 2019-04-24 | Stop reason: SDUPTHER

## 2019-04-02 RX ORDER — FINASTERIDE 5 MG/1
5 TABLET, FILM COATED ORAL DAILY
Qty: 90 TABLET | Refills: 0 | Status: SHIPPED | OUTPATIENT
Start: 2019-04-02 | End: 2019-04-24 | Stop reason: SDUPTHER

## 2019-04-02 RX ORDER — AMLODIPINE BESYLATE 5 MG/1
5 TABLET ORAL DAILY
Qty: 90 TABLET | Refills: 0 | Status: SHIPPED | OUTPATIENT
Start: 2019-04-02 | End: 2019-04-24 | Stop reason: SDUPTHER

## 2019-04-15 DIAGNOSIS — I34.0 NONRHEUMATIC MITRAL (VALVE) INSUFFICIENCY: ICD-10-CM

## 2019-04-15 DIAGNOSIS — N40.0 BENIGN PROSTATIC HYPERPLASIA, UNSPECIFIED WHETHER LOWER URINARY TRACT SYMPTOMS PRESENT: ICD-10-CM

## 2019-04-15 DIAGNOSIS — E11.9 TYPE 2 DIABETES MELLITUS WITHOUT COMPLICATION, UNSPECIFIED WHETHER LONG TERM INSULIN USE (HCC): ICD-10-CM

## 2019-04-15 RX ORDER — ATORVASTATIN CALCIUM 40 MG/1
40 TABLET, FILM COATED ORAL DAILY
Qty: 90 TABLET | Refills: 1 | Status: SHIPPED | OUTPATIENT
Start: 2019-04-15 | End: 2019-04-24 | Stop reason: SDUPTHER

## 2019-04-15 RX ORDER — TAMSULOSIN HYDROCHLORIDE 0.4 MG/1
0.4 CAPSULE ORAL
Qty: 90 CAPSULE | Refills: 1 | Status: SHIPPED | OUTPATIENT
Start: 2019-04-15 | End: 2019-04-24 | Stop reason: SDUPTHER

## 2019-04-24 DIAGNOSIS — I34.0 NONRHEUMATIC MITRAL (VALVE) INSUFFICIENCY: ICD-10-CM

## 2019-04-24 DIAGNOSIS — E11.9 TYPE 2 DIABETES MELLITUS WITHOUT COMPLICATION, UNSPECIFIED WHETHER LONG TERM INSULIN USE (HCC): ICD-10-CM

## 2019-04-24 DIAGNOSIS — N40.0 BENIGN PROSTATIC HYPERPLASIA, UNSPECIFIED WHETHER LOWER URINARY TRACT SYMPTOMS PRESENT: ICD-10-CM

## 2019-04-24 DIAGNOSIS — I10 ESSENTIAL HYPERTENSION: ICD-10-CM

## 2019-04-24 RX ORDER — FINASTERIDE 5 MG/1
5 TABLET, FILM COATED ORAL DAILY
Qty: 90 TABLET | Refills: 0 | Status: SHIPPED | OUTPATIENT
Start: 2019-04-24 | End: 2019-08-05 | Stop reason: SDUPTHER

## 2019-04-24 RX ORDER — ATORVASTATIN CALCIUM 40 MG/1
40 TABLET, FILM COATED ORAL DAILY
Qty: 90 TABLET | Refills: 1 | Status: SHIPPED | OUTPATIENT
Start: 2019-04-24 | End: 2019-09-25 | Stop reason: SDUPTHER

## 2019-04-24 RX ORDER — TAMSULOSIN HYDROCHLORIDE 0.4 MG/1
0.4 CAPSULE ORAL
Qty: 90 CAPSULE | Refills: 1 | Status: SHIPPED | OUTPATIENT
Start: 2019-04-24 | End: 2019-09-25 | Stop reason: SDUPTHER

## 2019-04-24 RX ORDER — LISINOPRIL 10 MG/1
10 TABLET ORAL DAILY
Qty: 90 TABLET | Refills: 0 | Status: SHIPPED | OUTPATIENT
Start: 2019-04-24 | End: 2019-08-05 | Stop reason: SDUPTHER

## 2019-04-24 RX ORDER — AMLODIPINE BESYLATE 5 MG/1
5 TABLET ORAL DAILY
Qty: 90 TABLET | Refills: 0 | Status: SHIPPED | OUTPATIENT
Start: 2019-04-24 | End: 2019-07-13 | Stop reason: SDUPTHER

## 2019-06-05 DIAGNOSIS — E11.9 TYPE 2 DIABETES MELLITUS WITHOUT COMPLICATION, WITHOUT LONG-TERM CURRENT USE OF INSULIN (HCC): Primary | ICD-10-CM

## 2019-06-10 ENCOUNTER — APPOINTMENT (OUTPATIENT)
Dept: LAB | Facility: CLINIC | Age: 69
End: 2019-06-10
Payer: MEDICARE

## 2019-06-10 DIAGNOSIS — I10 ESSENTIAL HYPERTENSION: ICD-10-CM

## 2019-06-10 DIAGNOSIS — E78.2 MIXED HYPERLIPIDEMIA: ICD-10-CM

## 2019-06-10 DIAGNOSIS — E11.9 TYPE 2 DIABETES MELLITUS WITHOUT COMPLICATION, WITHOUT LONG-TERM CURRENT USE OF INSULIN (HCC): ICD-10-CM

## 2019-06-10 LAB
ALBUMIN SERPL BCP-MCNC: 3.6 G/DL (ref 3.5–5)
ALP SERPL-CCNC: 93 U/L (ref 46–116)
ALT SERPL W P-5'-P-CCNC: 30 U/L (ref 12–78)
ANION GAP SERPL CALCULATED.3IONS-SCNC: 4 MMOL/L (ref 4–13)
AST SERPL W P-5'-P-CCNC: 20 U/L (ref 5–45)
BASOPHILS # BLD AUTO: 0.03 THOUSANDS/ΜL (ref 0–0.1)
BASOPHILS NFR BLD AUTO: 1 % (ref 0–1)
BILIRUB SERPL-MCNC: 0.5 MG/DL (ref 0.2–1)
BUN SERPL-MCNC: 15 MG/DL (ref 5–25)
CALCIUM SERPL-MCNC: 8.5 MG/DL (ref 8.3–10.1)
CHLORIDE SERPL-SCNC: 106 MMOL/L (ref 100–108)
CHOLEST SERPL-MCNC: 108 MG/DL (ref 50–200)
CO2 SERPL-SCNC: 29 MMOL/L (ref 21–32)
CREAT SERPL-MCNC: 0.97 MG/DL (ref 0.6–1.3)
CREAT UR-MCNC: 190 MG/DL
EOSINOPHIL # BLD AUTO: 0.3 THOUSAND/ΜL (ref 0–0.61)
EOSINOPHIL NFR BLD AUTO: 6 % (ref 0–6)
ERYTHROCYTE [DISTWIDTH] IN BLOOD BY AUTOMATED COUNT: 12.8 % (ref 11.6–15.1)
EST. AVERAGE GLUCOSE BLD GHB EST-MCNC: 114 MG/DL
GFR SERPL CREATININE-BSD FRML MDRD: 81 ML/MIN/1.73SQ M
GLUCOSE P FAST SERPL-MCNC: 103 MG/DL (ref 65–99)
HBA1C MFR BLD: 5.6 % (ref 4.2–6.3)
HCT VFR BLD AUTO: 42 % (ref 36.5–49.3)
HDLC SERPL-MCNC: 45 MG/DL (ref 40–60)
HGB BLD-MCNC: 13.5 G/DL (ref 12–17)
IMM GRANULOCYTES # BLD AUTO: 0.01 THOUSAND/UL (ref 0–0.2)
IMM GRANULOCYTES NFR BLD AUTO: 0 % (ref 0–2)
LDLC SERPL CALC-MCNC: 50 MG/DL (ref 0–100)
LYMPHOCYTES # BLD AUTO: 1.18 THOUSANDS/ΜL (ref 0.6–4.47)
LYMPHOCYTES NFR BLD AUTO: 23 % (ref 14–44)
MCH RBC QN AUTO: 31.7 PG (ref 26.8–34.3)
MCHC RBC AUTO-ENTMCNC: 32.1 G/DL (ref 31.4–37.4)
MCV RBC AUTO: 99 FL (ref 82–98)
MICROALBUMIN UR-MCNC: 9.3 MG/L (ref 0–20)
MICROALBUMIN/CREAT 24H UR: 5 MG/G CREATININE (ref 0–30)
MONOCYTES # BLD AUTO: 0.53 THOUSAND/ΜL (ref 0.17–1.22)
MONOCYTES NFR BLD AUTO: 10 % (ref 4–12)
NEUTROPHILS # BLD AUTO: 3.12 THOUSANDS/ΜL (ref 1.85–7.62)
NEUTS SEG NFR BLD AUTO: 60 % (ref 43–75)
NONHDLC SERPL-MCNC: 63 MG/DL
NRBC BLD AUTO-RTO: 0 /100 WBCS
PLATELET # BLD AUTO: 133 THOUSANDS/UL (ref 149–390)
PMV BLD AUTO: 10.5 FL (ref 8.9–12.7)
POTASSIUM SERPL-SCNC: 4 MMOL/L (ref 3.5–5.3)
PROT SERPL-MCNC: 6.5 G/DL (ref 6.4–8.2)
RBC # BLD AUTO: 4.26 MILLION/UL (ref 3.88–5.62)
SODIUM SERPL-SCNC: 139 MMOL/L (ref 136–145)
TRIGL SERPL-MCNC: 64 MG/DL
TSH SERPL DL<=0.05 MIU/L-ACNC: 1.62 UIU/ML (ref 0.36–3.74)
WBC # BLD AUTO: 5.17 THOUSAND/UL (ref 4.31–10.16)

## 2019-06-10 PROCEDURE — 36415 COLL VENOUS BLD VENIPUNCTURE: CPT

## 2019-06-10 PROCEDURE — 82043 UR ALBUMIN QUANTITATIVE: CPT

## 2019-06-10 PROCEDURE — 80053 COMPREHEN METABOLIC PANEL: CPT

## 2019-06-10 PROCEDURE — 85025 COMPLETE CBC W/AUTO DIFF WBC: CPT

## 2019-06-10 PROCEDURE — 80061 LIPID PANEL: CPT

## 2019-06-10 PROCEDURE — 82570 ASSAY OF URINE CREATININE: CPT

## 2019-06-10 PROCEDURE — 83036 HEMOGLOBIN GLYCOSYLATED A1C: CPT

## 2019-06-10 PROCEDURE — 84443 ASSAY THYROID STIM HORMONE: CPT

## 2019-06-12 ENCOUNTER — OFFICE VISIT (OUTPATIENT)
Dept: UROLOGY | Facility: MEDICAL CENTER | Age: 69
End: 2019-06-12
Payer: MEDICARE

## 2019-06-12 VITALS
HEIGHT: 72 IN | SYSTOLIC BLOOD PRESSURE: 136 MMHG | HEART RATE: 66 BPM | DIASTOLIC BLOOD PRESSURE: 82 MMHG | BODY MASS INDEX: 30.48 KG/M2 | WEIGHT: 225 LBS

## 2019-06-12 DIAGNOSIS — N13.8 BPH WITH OBSTRUCTION/LOWER URINARY TRACT SYMPTOMS: Primary | ICD-10-CM

## 2019-06-12 DIAGNOSIS — N40.1 NOCTURIA ASSOCIATED WITH BENIGN PROSTATIC HYPERPLASIA: ICD-10-CM

## 2019-06-12 DIAGNOSIS — N40.1 BPH WITH OBSTRUCTION/LOWER URINARY TRACT SYMPTOMS: Primary | ICD-10-CM

## 2019-06-12 DIAGNOSIS — N52.02 CORPORO-VENOUS OCCLUSIVE ERECTILE DYSFUNCTION: ICD-10-CM

## 2019-06-12 DIAGNOSIS — R35.1 NOCTURIA ASSOCIATED WITH BENIGN PROSTATIC HYPERPLASIA: ICD-10-CM

## 2019-06-12 LAB
SL AMB  POCT GLUCOSE, UA: NEGATIVE
SL AMB LEUKOCYTE ESTERASE,UA: NEGATIVE
SL AMB POCT BILIRUBIN,UA: NEGATIVE
SL AMB POCT BLOOD,UA: NEGATIVE
SL AMB POCT CLARITY,UA: CLEAR
SL AMB POCT COLOR,UA: YELLOW
SL AMB POCT KETONES,UA: NEGATIVE
SL AMB POCT NITRITE,UA: NEGATIVE
SL AMB POCT PH,UA: 7.5
SL AMB POCT SPECIFIC GRAVITY,UA: 1.01
SL AMB POCT URINE PROTEIN: NEGATIVE
SL AMB POCT UROBILINOGEN: 1

## 2019-06-12 PROCEDURE — 81003 URINALYSIS AUTO W/O SCOPE: CPT | Performed by: UROLOGY

## 2019-06-12 PROCEDURE — 99214 OFFICE O/P EST MOD 30 MIN: CPT | Performed by: UROLOGY

## 2019-06-14 ENCOUNTER — OFFICE VISIT (OUTPATIENT)
Dept: FAMILY MEDICINE CLINIC | Facility: CLINIC | Age: 69
End: 2019-06-14
Payer: MEDICARE

## 2019-06-14 VITALS
OXYGEN SATURATION: 97 % | HEIGHT: 73 IN | WEIGHT: 234 LBS | DIASTOLIC BLOOD PRESSURE: 70 MMHG | TEMPERATURE: 97.7 F | HEART RATE: 60 BPM | BODY MASS INDEX: 31.01 KG/M2 | SYSTOLIC BLOOD PRESSURE: 120 MMHG

## 2019-06-14 DIAGNOSIS — E11.9 TYPE 2 DIABETES MELLITUS WITHOUT COMPLICATION, WITHOUT LONG-TERM CURRENT USE OF INSULIN (HCC): Primary | ICD-10-CM

## 2019-06-14 DIAGNOSIS — I10 ESSENTIAL HYPERTENSION: ICD-10-CM

## 2019-06-14 DIAGNOSIS — E78.2 MIXED HYPERLIPIDEMIA: ICD-10-CM

## 2019-06-14 DIAGNOSIS — K30 INDIGESTION: ICD-10-CM

## 2019-06-14 DIAGNOSIS — N40.0 BENIGN PROSTATIC HYPERPLASIA WITHOUT URINARY OBSTRUCTION: ICD-10-CM

## 2019-06-14 PROCEDURE — 99214 OFFICE O/P EST MOD 30 MIN: CPT | Performed by: FAMILY MEDICINE

## 2019-07-13 DIAGNOSIS — I10 ESSENTIAL HYPERTENSION: ICD-10-CM

## 2019-07-13 RX ORDER — AMLODIPINE BESYLATE 5 MG/1
TABLET ORAL
Qty: 90 TABLET | Refills: 0 | Status: SHIPPED | OUTPATIENT
Start: 2019-07-13 | End: 2019-09-25 | Stop reason: SDUPTHER

## 2019-07-15 DIAGNOSIS — N52.9 MALE ERECTILE DISORDER: ICD-10-CM

## 2019-07-15 RX ORDER — SILDENAFIL 100 MG/1
100 TABLET, FILM COATED ORAL DAILY PRN
Qty: 10 TABLET | Refills: 0 | Status: SHIPPED | OUTPATIENT
Start: 2019-07-15 | End: 2019-12-17 | Stop reason: SDUPTHER

## 2019-08-05 DIAGNOSIS — E11.9 TYPE 2 DIABETES MELLITUS WITHOUT COMPLICATION, UNSPECIFIED WHETHER LONG TERM INSULIN USE (HCC): ICD-10-CM

## 2019-08-05 DIAGNOSIS — I34.0 NONRHEUMATIC MITRAL (VALVE) INSUFFICIENCY: ICD-10-CM

## 2019-08-05 RX ORDER — LISINOPRIL 10 MG/1
TABLET ORAL
Qty: 90 TABLET | Refills: 0 | Status: SHIPPED | OUTPATIENT
Start: 2019-08-05 | End: 2019-08-08 | Stop reason: SDUPTHER

## 2019-08-05 RX ORDER — FINASTERIDE 5 MG/1
TABLET, FILM COATED ORAL
Qty: 90 TABLET | Refills: 0 | Status: SHIPPED | OUTPATIENT
Start: 2019-08-05 | End: 2019-09-25 | Stop reason: SDUPTHER

## 2019-08-08 DIAGNOSIS — I10 ESSENTIAL HYPERTENSION: Primary | ICD-10-CM

## 2019-08-08 DIAGNOSIS — I34.0 NONRHEUMATIC MITRAL (VALVE) INSUFFICIENCY: ICD-10-CM

## 2019-08-08 DIAGNOSIS — E11.9 TYPE 2 DIABETES MELLITUS WITHOUT COMPLICATION, UNSPECIFIED WHETHER LONG TERM INSULIN USE (HCC): ICD-10-CM

## 2019-08-08 RX ORDER — LISINOPRIL 10 MG/1
10 TABLET ORAL DAILY
Qty: 90 TABLET | Refills: 0 | Status: SHIPPED | OUTPATIENT
Start: 2019-08-08 | End: 2019-09-25 | Stop reason: SDUPTHER

## 2019-09-18 ENCOUNTER — APPOINTMENT (OUTPATIENT)
Dept: LAB | Facility: CLINIC | Age: 69
End: 2019-09-18
Payer: MEDICARE

## 2019-09-18 DIAGNOSIS — I10 ESSENTIAL HYPERTENSION: ICD-10-CM

## 2019-09-18 DIAGNOSIS — E78.2 MIXED HYPERLIPIDEMIA: ICD-10-CM

## 2019-09-18 DIAGNOSIS — E11.9 TYPE 2 DIABETES MELLITUS WITHOUT COMPLICATION, WITHOUT LONG-TERM CURRENT USE OF INSULIN (HCC): ICD-10-CM

## 2019-09-18 LAB
ANION GAP SERPL CALCULATED.3IONS-SCNC: 4 MMOL/L (ref 4–13)
BASOPHILS # BLD AUTO: 0.03 THOUSANDS/ΜL (ref 0–0.1)
BASOPHILS NFR BLD AUTO: 1 % (ref 0–1)
BUN SERPL-MCNC: 16 MG/DL (ref 5–25)
CALCIUM SERPL-MCNC: 8.9 MG/DL (ref 8.3–10.1)
CHLORIDE SERPL-SCNC: 108 MMOL/L (ref 100–108)
CHOLEST SERPL-MCNC: 112 MG/DL (ref 50–200)
CO2 SERPL-SCNC: 30 MMOL/L (ref 21–32)
CREAT SERPL-MCNC: 0.94 MG/DL (ref 0.6–1.3)
EOSINOPHIL # BLD AUTO: 0.4 THOUSAND/ΜL (ref 0–0.61)
EOSINOPHIL NFR BLD AUTO: 7 % (ref 0–6)
ERYTHROCYTE [DISTWIDTH] IN BLOOD BY AUTOMATED COUNT: 12.6 % (ref 11.6–15.1)
EST. AVERAGE GLUCOSE BLD GHB EST-MCNC: 117 MG/DL
GFR SERPL CREATININE-BSD FRML MDRD: 84 ML/MIN/1.73SQ M
GLUCOSE P FAST SERPL-MCNC: 110 MG/DL (ref 65–99)
HBA1C MFR BLD: 5.7 % (ref 4.2–6.3)
HCT VFR BLD AUTO: 42.1 % (ref 36.5–49.3)
HDLC SERPL-MCNC: 52 MG/DL (ref 40–60)
HGB BLD-MCNC: 13.7 G/DL (ref 12–17)
IMM GRANULOCYTES # BLD AUTO: 0.04 THOUSAND/UL (ref 0–0.2)
IMM GRANULOCYTES NFR BLD AUTO: 1 % (ref 0–2)
LDLC SERPL CALC-MCNC: 48 MG/DL (ref 0–100)
LYMPHOCYTES # BLD AUTO: 1.2 THOUSANDS/ΜL (ref 0.6–4.47)
LYMPHOCYTES NFR BLD AUTO: 21 % (ref 14–44)
MCH RBC QN AUTO: 32.2 PG (ref 26.8–34.3)
MCHC RBC AUTO-ENTMCNC: 32.5 G/DL (ref 31.4–37.4)
MCV RBC AUTO: 99 FL (ref 82–98)
MONOCYTES # BLD AUTO: 0.62 THOUSAND/ΜL (ref 0.17–1.22)
MONOCYTES NFR BLD AUTO: 11 % (ref 4–12)
NEUTROPHILS # BLD AUTO: 3.33 THOUSANDS/ΜL (ref 1.85–7.62)
NEUTS SEG NFR BLD AUTO: 59 % (ref 43–75)
NRBC BLD AUTO-RTO: 0 /100 WBCS
PLATELET # BLD AUTO: 129 THOUSANDS/UL (ref 149–390)
PMV BLD AUTO: 10.7 FL (ref 8.9–12.7)
POTASSIUM SERPL-SCNC: 4.1 MMOL/L (ref 3.5–5.3)
RBC # BLD AUTO: 4.26 MILLION/UL (ref 3.88–5.62)
SODIUM SERPL-SCNC: 142 MMOL/L (ref 136–145)
TRIGL SERPL-MCNC: 59 MG/DL
TSH SERPL DL<=0.05 MIU/L-ACNC: 1.22 UIU/ML (ref 0.36–3.74)
WBC # BLD AUTO: 5.62 THOUSAND/UL (ref 4.31–10.16)

## 2019-09-18 PROCEDURE — 80048 BASIC METABOLIC PNL TOTAL CA: CPT

## 2019-09-18 PROCEDURE — 83036 HEMOGLOBIN GLYCOSYLATED A1C: CPT

## 2019-09-18 PROCEDURE — 84443 ASSAY THYROID STIM HORMONE: CPT

## 2019-09-18 PROCEDURE — 85025 COMPLETE CBC W/AUTO DIFF WBC: CPT

## 2019-09-18 PROCEDURE — 80061 LIPID PANEL: CPT

## 2019-09-18 PROCEDURE — 36415 COLL VENOUS BLD VENIPUNCTURE: CPT

## 2019-09-25 ENCOUNTER — OFFICE VISIT (OUTPATIENT)
Dept: FAMILY MEDICINE CLINIC | Facility: CLINIC | Age: 69
End: 2019-09-25
Payer: MEDICARE

## 2019-09-25 VITALS
HEIGHT: 73 IN | WEIGHT: 234 LBS | BODY MASS INDEX: 31.01 KG/M2 | SYSTOLIC BLOOD PRESSURE: 130 MMHG | OXYGEN SATURATION: 97 % | TEMPERATURE: 98 F | DIASTOLIC BLOOD PRESSURE: 80 MMHG | HEART RATE: 56 BPM

## 2019-09-25 DIAGNOSIS — E11.9 DIABETIC EYE EXAM (HCC): ICD-10-CM

## 2019-09-25 DIAGNOSIS — E11.9 TYPE 2 DIABETES MELLITUS WITHOUT COMPLICATION, UNSPECIFIED WHETHER LONG TERM INSULIN USE (HCC): Primary | ICD-10-CM

## 2019-09-25 DIAGNOSIS — I10 ESSENTIAL HYPERTENSION: ICD-10-CM

## 2019-09-25 DIAGNOSIS — N40.0 BENIGN PROSTATIC HYPERPLASIA, UNSPECIFIED WHETHER LOWER URINARY TRACT SYMPTOMS PRESENT: ICD-10-CM

## 2019-09-25 DIAGNOSIS — Z01.00 DIABETIC EYE EXAM (HCC): ICD-10-CM

## 2019-09-25 DIAGNOSIS — Z23 NEED FOR INFLUENZA VACCINATION: ICD-10-CM

## 2019-09-25 DIAGNOSIS — E78.2 MIXED HYPERLIPIDEMIA: ICD-10-CM

## 2019-09-25 PROCEDURE — 90662 IIV NO PRSV INCREASED AG IM: CPT | Performed by: FAMILY MEDICINE

## 2019-09-25 PROCEDURE — G0008 ADMIN INFLUENZA VIRUS VAC: HCPCS | Performed by: FAMILY MEDICINE

## 2019-09-25 PROCEDURE — 99214 OFFICE O/P EST MOD 30 MIN: CPT | Performed by: FAMILY MEDICINE

## 2019-09-25 RX ORDER — FINASTERIDE 5 MG/1
5 TABLET, FILM COATED ORAL DAILY
Qty: 90 TABLET | Refills: 1 | Status: SHIPPED | OUTPATIENT
Start: 2019-09-25 | End: 2019-09-25 | Stop reason: SDUPTHER

## 2019-09-25 RX ORDER — TAMSULOSIN HYDROCHLORIDE 0.4 MG/1
0.4 CAPSULE ORAL
Qty: 90 CAPSULE | Refills: 1 | Status: SHIPPED | OUTPATIENT
Start: 2019-09-25 | End: 2020-02-12 | Stop reason: SDUPTHER

## 2019-09-25 RX ORDER — AMLODIPINE BESYLATE 5 MG/1
5 TABLET ORAL DAILY
Qty: 90 TABLET | Refills: 1 | Status: SHIPPED | OUTPATIENT
Start: 2019-09-25 | End: 2019-10-03 | Stop reason: SDUPTHER

## 2019-09-25 RX ORDER — ATORVASTATIN CALCIUM 40 MG/1
40 TABLET, FILM COATED ORAL DAILY
Qty: 90 TABLET | Refills: 1 | Status: SHIPPED | OUTPATIENT
Start: 2019-09-25 | End: 2020-04-03

## 2019-09-25 RX ORDER — LISINOPRIL 10 MG/1
10 TABLET ORAL DAILY
Qty: 90 TABLET | Refills: 1 | Status: SHIPPED | OUTPATIENT
Start: 2019-09-25 | End: 2019-12-17 | Stop reason: SDUPTHER

## 2019-09-25 RX ORDER — FINASTERIDE 5 MG/1
5 TABLET, FILM COATED ORAL DAILY
Qty: 90 TABLET | Refills: 1 | Status: SHIPPED | OUTPATIENT
Start: 2019-09-25 | End: 2020-04-03

## 2019-09-25 NOTE — ASSESSMENT & PLAN NOTE
A chronic asymptomatic fair control with the current diet patient the does have pantoprazole but does not use it daily basis day use it as needed and encouraged patient to lose weight

## 2019-09-25 NOTE — ASSESSMENT & PLAN NOTE
Chronic asymptomatic fair control LDL therapeutic in diabetic patient continue with the atorvastatin 40 mg once a day low-fat diet discussed with the patient

## 2019-09-25 NOTE — ASSESSMENT & PLAN NOTE
Lab Results   Component Value Date    HGBA1C 5 7 09/18/2019       Chronic asymptomatic fair control no sinus symptom of hypoglycemia encouraged patient to continue the current medication the patient already on statin patient already on Ace inhibitor patient does followed by Ophthalmology for diabetic eye care

## 2019-09-25 NOTE — PROGRESS NOTES
Subjective:   Chief Complaint   Patient presents with    Follow-up     chronic conditions        Patient ID: Alton Mc is a 81 y o  male  Patient here follow-up with a chronic condition patient was history of non-insulin-dependent diabetic on metformin tolerated well without any side effect deny any increased thirsty increased frequency urination no dizziness no headache and no abdomen pain patient history of hypertension blood pressure fair control on current medication and he tolerated medication well deny any chest pain short of breath no palpitation no headache no blurred vision no weakness and no dyspnea on exertion no lower extremity edema patient was history of hyperlipidemia on atorvastatin tolerated well no rash no muscle pain deny any chest pain short of breath no palpitation no TIA symptom patient history of indigestion control with diet asymptomatic patient has been pantoprazole [place] at home use it on p r n  basis no heartburn no abdomen pain no nausea vomiting and no weight change patient history of benign prostatic hypertrophy asymptomatic no abdomen pain no flank pain no nocturia no blood in the urine patient does followed by Urology  Recent blood work discussed with the patient      The following portions of the patient's history were reviewed and updated as appropriate: allergies, current medications, past family history, past medical history, past social history, past surgical history and problem list     Review of Systems   Constitutional: Negative for fatigue and fever  HENT: Negative for ear pain, sinus pressure, sinus pain and sore throat  Eyes: Negative for pain and redness  Respiratory: Negative for cough, chest tightness and shortness of breath  Cardiovascular: Negative for chest pain, palpitations and leg swelling  Gastrointestinal: Negative for abdominal pain, blood in stool, constipation, diarrhea and nausea     Genitourinary: Negative for flank pain, frequency and hematuria  Musculoskeletal: Negative for back pain and joint swelling  Skin: Negative for rash  Neurological: Negative for dizziness, numbness and headaches  Hematological: Does not bruise/bleed easily  Objective:  Vitals:    09/25/19 0843   BP: 130/80   Pulse: 56   Temp: 98 °F (36 7 °C)   TempSrc: Tympanic   SpO2: 97%   Weight: 106 kg (234 lb)   Height: 6' 1" (1 854 m)      Physical Exam   Constitutional: He is oriented to person, place, and time  He appears well-developed and well-nourished  HENT:   Head: Normocephalic  Right Ear: External ear normal    Left Ear: External ear normal    Eyes: Conjunctivae and EOM are normal  Right eye exhibits no discharge  Left eye exhibits no discharge  Neck: No JVD present  Cardiovascular: Normal rate and regular rhythm  Exam reveals no gallop  Murmur heard  Pulmonary/Chest: Effort normal  No respiratory distress  He has no wheezes  He has no rales  He exhibits no tenderness  Abdominal: He exhibits no mass  There is no tenderness  There is no rebound  Musculoskeletal: He exhibits no edema or tenderness  Neurological: He is alert and oriented to person, place, and time  Skin: No rash noted  No erythema           Assessment/Plan:    Type 2 diabetes mellitus ([facility] [address] )  Lab Results   Component Value Date    HGBA1C 5 7 09/18/2019       Chronic asymptomatic fair control no sinus symptom of hypoglycemia encouraged patient to continue the current medication the patient already on statin patient already on Ace inhibitor patient does followed by Ophthalmology for diabetic eye care      Essential hypertension  Chronic asymptomatic fair control continue current management encouraged patient to lose weight low-salt diet and increased physical activity    Hyperlipidemia  Chronic asymptomatic fair control LDL therapeutic in diabetic patient continue with the atorvastatin 40 mg once a day low-fat diet discussed with the patient    Benign prostatic hyperplasia  A chronic asymptomatic fair control continue current management patient does follow-up by Urology    Indigestion  A chronic asymptomatic fair control with the current diet patient the does have pantoprazole but does not use it daily basis day use it as needed and encouraged patient to lose weight       Diagnoses and all orders for this visit:    Type 2 diabetes mellitus without complication, unspecified whether long term insulin use (HCC)  -     metFORMIN (GLUCOPHAGE) 500 mg tablet; Take 1 tablet (500 mg total) by mouth 2 (two) times a day with meals  -     lisinopril (ZESTRIL) 10 mg tablet; Take 1 tablet (10 mg total) by mouth daily  -     CBC and differential; Future  -     Basic metabolic panel; Future  -     Lipid Panel with Direct LDL reflex; Future  -     Hemoglobin A1C; Future    Essential hypertension  -     lisinopril (ZESTRIL) 10 mg tablet; Take 1 tablet (10 mg total) by mouth daily  -     amLODIPine (NORVASC) 5 mg tablet; Take 1 tablet (5 mg total) by mouth daily  -     CBC and differential; Future  -     Basic metabolic panel; Future  -     Lipid Panel with Direct LDL reflex; Future  -     Hemoglobin A1C; Future    Benign prostatic hyperplasia, unspecified whether lower urinary tract symptoms present  -     tamsulosin (FLOMAX) 0 4 mg; Take 1 capsule (0 4 mg total) by mouth daily with dinner  -     finasteride (PROSCAR) 5 mg tablet; Take 1 tablet (5 mg total) by mouth daily    Mixed hyperlipidemia  -     atorvastatin (LIPITOR) 40 mg tablet; Take 1 tablet (40 mg total) by mouth daily  -     CBC and differential; Future  -     Basic metabolic panel; Future  -     Lipid Panel with Direct LDL reflex; Future  -     Hemoglobin A1C; Future    Diabetic eye exam [facility])  -     Ambulatory Referral to Ophthalmology; Future    Need for influenza vaccination  -     FLUZONE HIGH-DOSE: influenza vaccine, high-dose, preservative-free 0 5 mL    Other orders  -     Discontinue: finasteride (PROSCAR) 5 mg tablet;  Take 1 tablet (5 mg total) by mouth daily

## 2019-10-03 DIAGNOSIS — I10 ESSENTIAL HYPERTENSION: ICD-10-CM

## 2019-10-03 RX ORDER — AMLODIPINE BESYLATE 5 MG/1
TABLET ORAL
Qty: 90 TABLET | Refills: 0 | Status: SHIPPED | OUTPATIENT
Start: 2019-10-03 | End: 2020-01-06 | Stop reason: SDUPTHER

## 2019-11-12 LAB
LEFT EYE DIABETIC RETINOPATHY: NORMAL
RIGHT EYE DIABETIC RETINOPATHY: NORMAL

## 2019-12-12 ENCOUNTER — TRANSCRIBE ORDERS (OUTPATIENT)
Dept: LAB | Facility: CLINIC | Age: 69
End: 2019-12-12

## 2019-12-12 ENCOUNTER — APPOINTMENT (OUTPATIENT)
Dept: LAB | Facility: CLINIC | Age: 69
End: 2019-12-12
Payer: MEDICARE

## 2019-12-12 DIAGNOSIS — E11.9 TYPE 2 DIABETES MELLITUS WITHOUT COMPLICATION, UNSPECIFIED WHETHER LONG TERM INSULIN USE (HCC): ICD-10-CM

## 2019-12-12 DIAGNOSIS — I10 ESSENTIAL HYPERTENSION: ICD-10-CM

## 2019-12-12 DIAGNOSIS — N13.8 BPH WITH OBSTRUCTION/LOWER URINARY TRACT SYMPTOMS: ICD-10-CM

## 2019-12-12 DIAGNOSIS — N40.1 BPH WITH OBSTRUCTION/LOWER URINARY TRACT SYMPTOMS: ICD-10-CM

## 2019-12-12 DIAGNOSIS — E78.2 MIXED HYPERLIPIDEMIA: ICD-10-CM

## 2019-12-12 LAB
ANION GAP SERPL CALCULATED.3IONS-SCNC: 5 MMOL/L (ref 4–13)
BASOPHILS # BLD AUTO: 0.04 THOUSANDS/ΜL (ref 0–0.1)
BASOPHILS NFR BLD AUTO: 1 % (ref 0–1)
BUN SERPL-MCNC: 13 MG/DL (ref 5–25)
CALCIUM SERPL-MCNC: 9 MG/DL (ref 8.3–10.1)
CHLORIDE SERPL-SCNC: 105 MMOL/L (ref 100–108)
CHOLEST SERPL-MCNC: 102 MG/DL (ref 50–200)
CO2 SERPL-SCNC: 29 MMOL/L (ref 21–32)
CREAT SERPL-MCNC: 0.9 MG/DL (ref 0.6–1.3)
EOSINOPHIL # BLD AUTO: 0.35 THOUSAND/ΜL (ref 0–0.61)
EOSINOPHIL NFR BLD AUTO: 6 % (ref 0–6)
ERYTHROCYTE [DISTWIDTH] IN BLOOD BY AUTOMATED COUNT: 12.4 % (ref 11.6–15.1)
EST. AVERAGE GLUCOSE BLD GHB EST-MCNC: 114 MG/DL
GFR SERPL CREATININE-BSD FRML MDRD: 88 ML/MIN/1.73SQ M
GLUCOSE P FAST SERPL-MCNC: 106 MG/DL (ref 65–99)
HBA1C MFR BLD: 5.6 % (ref 4.2–6.3)
HCT VFR BLD AUTO: 42.4 % (ref 36.5–49.3)
HDLC SERPL-MCNC: 45 MG/DL
HGB BLD-MCNC: 13.9 G/DL (ref 12–17)
IMM GRANULOCYTES # BLD AUTO: 0.02 THOUSAND/UL (ref 0–0.2)
IMM GRANULOCYTES NFR BLD AUTO: 0 % (ref 0–2)
LDLC SERPL CALC-MCNC: 41 MG/DL (ref 0–100)
LYMPHOCYTES # BLD AUTO: 1.45 THOUSANDS/ΜL (ref 0.6–4.47)
LYMPHOCYTES NFR BLD AUTO: 27 % (ref 14–44)
MCH RBC QN AUTO: 32 PG (ref 26.8–34.3)
MCHC RBC AUTO-ENTMCNC: 32.8 G/DL (ref 31.4–37.4)
MCV RBC AUTO: 98 FL (ref 82–98)
MONOCYTES # BLD AUTO: 0.55 THOUSAND/ΜL (ref 0.17–1.22)
MONOCYTES NFR BLD AUTO: 10 % (ref 4–12)
NEUTROPHILS # BLD AUTO: 3.03 THOUSANDS/ΜL (ref 1.85–7.62)
NEUTS SEG NFR BLD AUTO: 56 % (ref 43–75)
NRBC BLD AUTO-RTO: 0 /100 WBCS
PLATELET # BLD AUTO: 142 THOUSANDS/UL (ref 149–390)
PMV BLD AUTO: 10.9 FL (ref 8.9–12.7)
POTASSIUM SERPL-SCNC: 3.9 MMOL/L (ref 3.5–5.3)
PSA SERPL-MCNC: 1.7 NG/ML (ref 0–4)
RBC # BLD AUTO: 4.34 MILLION/UL (ref 3.88–5.62)
SODIUM SERPL-SCNC: 139 MMOL/L (ref 136–145)
TRIGL SERPL-MCNC: 82 MG/DL
WBC # BLD AUTO: 5.44 THOUSAND/UL (ref 4.31–10.16)

## 2019-12-12 PROCEDURE — 84153 ASSAY OF PSA TOTAL: CPT

## 2019-12-12 PROCEDURE — 36415 COLL VENOUS BLD VENIPUNCTURE: CPT

## 2019-12-12 PROCEDURE — 83036 HEMOGLOBIN GLYCOSYLATED A1C: CPT

## 2019-12-12 PROCEDURE — 80048 BASIC METABOLIC PNL TOTAL CA: CPT

## 2019-12-12 PROCEDURE — 80061 LIPID PANEL: CPT

## 2019-12-12 PROCEDURE — 85025 COMPLETE CBC W/AUTO DIFF WBC: CPT

## 2019-12-17 ENCOUNTER — OFFICE VISIT (OUTPATIENT)
Dept: FAMILY MEDICINE CLINIC | Facility: CLINIC | Age: 69
End: 2019-12-17
Payer: MEDICARE

## 2019-12-17 VITALS
DIASTOLIC BLOOD PRESSURE: 70 MMHG | HEART RATE: 58 BPM | BODY MASS INDEX: 31.68 KG/M2 | HEIGHT: 73 IN | SYSTOLIC BLOOD PRESSURE: 130 MMHG | TEMPERATURE: 98 F | WEIGHT: 239 LBS | OXYGEN SATURATION: 97 %

## 2019-12-17 DIAGNOSIS — E11.9 TYPE 2 DIABETES MELLITUS WITHOUT COMPLICATION, UNSPECIFIED WHETHER LONG TERM INSULIN USE (HCC): ICD-10-CM

## 2019-12-17 DIAGNOSIS — E78.2 MIXED HYPERLIPIDEMIA: ICD-10-CM

## 2019-12-17 DIAGNOSIS — N52.02 CORPORO-VENOUS OCCLUSIVE ERECTILE DYSFUNCTION: Primary | ICD-10-CM

## 2019-12-17 DIAGNOSIS — G60.9 IDIOPATHIC PERIPHERAL NEUROPATHY: ICD-10-CM

## 2019-12-17 DIAGNOSIS — K30 INDIGESTION: Primary | ICD-10-CM

## 2019-12-17 DIAGNOSIS — I10 ESSENTIAL HYPERTENSION: ICD-10-CM

## 2019-12-17 DIAGNOSIS — N52.9 MALE ERECTILE DISORDER: ICD-10-CM

## 2019-12-17 PROCEDURE — 99214 OFFICE O/P EST MOD 30 MIN: CPT | Performed by: FAMILY MEDICINE

## 2019-12-17 RX ORDER — KETOCONAZOLE 20 MG/G
CREAM TOPICAL
Refills: 2 | COMMUNITY
Start: 2019-10-23 | End: 2021-07-30 | Stop reason: ALTCHOICE

## 2019-12-17 RX ORDER — LISINOPRIL 10 MG/1
10 TABLET ORAL DAILY
Qty: 90 TABLET | Refills: 1 | Status: SHIPPED | OUTPATIENT
Start: 2019-12-17 | End: 2020-07-19

## 2019-12-17 RX ORDER — KETOCONAZOLE 20 MG/ML
SHAMPOO TOPICAL
Refills: 5 | COMMUNITY
Start: 2019-10-23

## 2019-12-17 RX ORDER — TADALAFIL 5 MG/1
TABLET ORAL
Qty: 90 TABLET | Refills: 3 | Status: SHIPPED | OUTPATIENT
Start: 2019-12-17 | End: 2020-01-02 | Stop reason: SDUPTHER

## 2019-12-17 RX ORDER — SILDENAFIL 100 MG/1
100 TABLET, FILM COATED ORAL DAILY PRN
Qty: 10 TABLET | Refills: 0 | Status: SHIPPED | OUTPATIENT
Start: 2019-12-17 | End: 2020-06-30 | Stop reason: SDUPTHER

## 2019-12-17 NOTE — ASSESSMENT & PLAN NOTE
Lab Results   Component Value Date    HGBA1C 5 6 12/12/2019     Chronic asymptomatic fair control continue current management the metformin 500 twice a day  Encouraged patient to continue low carb diet and important lose weight patient already on statin  Negative for microalbuminuria

## 2019-12-17 NOTE — PROGRESS NOTES
Subjective:   Chief Complaint   Patient presents with    Follow-up     chronic conditions        Patient ID: Eliseo Pfeiffer is a 81 y o  male  Patient here follow-up with a chronic condition patient history of diabetic she he is on metformin 500 twice a day tolerated well without any side effect deny any increased thirsty increased frequency urination no dizziness no headache and no abdomen pain the patient already on statin he does followed by Ophthalmology no diabetic retinopathy and the microalbuminuria is negative patient's history of hyperlipidemia on statin tolerated well LDL therapeutic in diabetic patient deny any chest pain short of breath no palpitation no TIA symptom patient history of hypertension blood pressure fair control on current medication tolerated well deny any chest pain short of breath no palpitation no dyspnea on exertion patient with history of peripheral neuropathy he is on gabapentin stable asymptomatic and a deny any muscle weakness no numbness no tingling   Recent blood work discussed with the patient      The following portions of the patient's history were reviewed and updated as appropriate: allergies, current medications, past family history, past medical history, past social history, past surgical history and problem list     Review of Systems   Constitutional: Negative for fatigue and fever  HENT: Negative for ear pain, sinus pressure, sinus pain and sore throat  Eyes: Negative for pain and redness  Respiratory: Negative for cough, chest tightness and shortness of breath  Cardiovascular: Negative for chest pain, palpitations and leg swelling  Gastrointestinal: Negative for abdominal pain, blood in stool, constipation, diarrhea and nausea  Genitourinary: Negative for flank pain, frequency and hematuria  Musculoskeletal: Negative for back pain and joint swelling  Skin: Negative for rash  Neurological: Negative for dizziness, numbness and headaches  Hematological: Does not bruise/bleed easily  Objective:  Vitals:    12/17/19 0850   BP: 130/70   Pulse: 58   Temp: 98 °F (36 7 °C)   TempSrc: Tympanic   SpO2: 97%   Weight: 108 kg (239 lb)   Height: 6' 0 5" (1 842 m)      Physical Exam   Constitutional: He is oriented to person, place, and time  He appears well-developed and well-nourished  HENT:   Head: Normocephalic  Right Ear: External ear normal    Left Ear: External ear normal    Eyes: Conjunctivae and EOM are normal  Right eye exhibits no discharge  Left eye exhibits no discharge  Neck: No JVD present  Cardiovascular: Normal rate, regular rhythm and normal heart sounds  Exam reveals no gallop  No murmur heard  Pulmonary/Chest: Effort normal  No respiratory distress  He has no wheezes  He has no rales  He exhibits no tenderness  Abdominal: He exhibits no mass  There is no tenderness  There is no rebound  Musculoskeletal: He exhibits no edema or tenderness  Neurological: He is alert and oriented to person, place, and time  Skin: No rash noted  No erythema           Assessment/Plan:    Type 2 diabetes mellitus ([facility] [address] )    Lab Results   Component Value Date    HGBA1C 5 6 12/12/2019     Chronic asymptomatic fair control continue current management the metformin 500 twice a day  Encouraged patient to continue low carb diet and important lose weight patient already on statin  Negative for microalbuminuria    Essential hypertension  Chronic asymptomatic fair control continue current management low-salt diet increase physical activity discussed with the patient    Idiopathic peripheral neuropathy  Chronic fair controlled can be multifactorial including diabetic and including his lumbar discogenic disease the patient already on gabapentin tolerated well    Indigestion  A chronic asymptomatic fair control continue with the pantoprazole 40 mg once a day encouraged patient to avoid provoke food do not eat and lie down    Hyperlipidemia  Chronic asymptomatic fair control continue with the statin LDL therapeutic in diabetic patient       Diagnoses and all orders for this visit:    Indigestion  -     CBC and differential; Future  -     Basic metabolic panel; Future  -     Lipid Panel with Direct LDL reflex; Future  -     TSH, 3rd generation with Free T4 reflex; Future  -     Hemoglobin A1C; Future    Type 2 diabetes mellitus without complication, unspecified whether long term insulin use (HCC)  -     lisinopril (ZESTRIL) 10 mg tablet; Take 1 tablet (10 mg total) by mouth daily  -     CBC and differential; Future  -     Basic metabolic panel; Future  -     Lipid Panel with Direct LDL reflex; Future  -     TSH, 3rd generation with Free T4 reflex; Future  -     Hemoglobin A1C; Future    Essential hypertension  -     lisinopril (ZESTRIL) 10 mg tablet; Take 1 tablet (10 mg total) by mouth daily  -     CBC and differential; Future  -     Basic metabolic panel; Future  -     Lipid Panel with Direct LDL reflex; Future  -     TSH, 3rd generation with Free T4 reflex; Future  -     Hemoglobin A1C; Future    Male erectile disorder  -     sildenafil (VIAGRA) 100 mg tablet; Take 1 tablet (100 mg total) by mouth daily as needed for erectile dysfunction    Mixed hyperlipidemia  -     CBC and differential; Future  -     Basic metabolic panel; Future  -     Lipid Panel with Direct LDL reflex; Future  -     TSH, 3rd generation with Free T4 reflex;  Future  -     Hemoglobin A1C; Future    Idiopathic peripheral neuropathy    Other orders  -     ketoconazole (NIZORAL) 2 % shampoo; WASH SCALP AND FACE 2-3 TIMES A WEEK AS NEEDED FOR FLARES  -     ketoconazole (NIZORAL) 2 % cream; APPLY TO RASH ON FACE DAILY AS NEEDED FOR FLARES

## 2019-12-17 NOTE — ASSESSMENT & PLAN NOTE
A chronic asymptomatic fair control continue with the pantoprazole 40 mg once a day encouraged patient to avoid provoke food do not eat and lie down

## 2019-12-17 NOTE — ASSESSMENT & PLAN NOTE
Chronic fair controlled can be multifactorial including diabetic and including his lumbar discogenic disease the patient already on gabapentin tolerated well

## 2019-12-23 ENCOUNTER — OFFICE VISIT (OUTPATIENT)
Dept: URGENT CARE | Facility: MEDICAL CENTER | Age: 69
End: 2019-12-23
Payer: MEDICARE

## 2019-12-23 VITALS
DIASTOLIC BLOOD PRESSURE: 77 MMHG | HEIGHT: 74 IN | BODY MASS INDEX: 30.03 KG/M2 | RESPIRATION RATE: 20 BRPM | WEIGHT: 234 LBS | TEMPERATURE: 98.7 F | HEART RATE: 66 BPM | OXYGEN SATURATION: 98 % | SYSTOLIC BLOOD PRESSURE: 130 MMHG

## 2019-12-23 DIAGNOSIS — J40 BRONCHITIS: ICD-10-CM

## 2019-12-23 DIAGNOSIS — J01.90 ACUTE NON-RECURRENT SINUSITIS, UNSPECIFIED LOCATION: Primary | ICD-10-CM

## 2019-12-23 PROCEDURE — G0463 HOSPITAL OUTPT CLINIC VISIT: HCPCS | Performed by: PHYSICIAN ASSISTANT

## 2019-12-23 PROCEDURE — 99213 OFFICE O/P EST LOW 20 MIN: CPT | Performed by: PHYSICIAN ASSISTANT

## 2019-12-23 RX ORDER — BENZONATATE 100 MG/1
100 CAPSULE ORAL 3 TIMES DAILY PRN
Qty: 20 CAPSULE | Refills: 0 | Status: SHIPPED | OUTPATIENT
Start: 2019-12-23 | End: 2019-12-23

## 2019-12-23 RX ORDER — AMOXICILLIN AND CLAVULANATE POTASSIUM 875; 125 MG/1; MG/1
1 TABLET, FILM COATED ORAL EVERY 12 HOURS SCHEDULED
Qty: 20 TABLET | Refills: 0 | Status: SHIPPED | OUTPATIENT
Start: 2019-12-23 | End: 2019-12-23

## 2019-12-23 RX ORDER — FLUTICASONE PROPIONATE 50 MCG
1 SPRAY, SUSPENSION (ML) NASAL DAILY
Qty: 1 BOTTLE | Refills: 0 | Status: SHIPPED | OUTPATIENT
Start: 2019-12-23 | End: 2019-12-23

## 2019-12-23 RX ORDER — FLUTICASONE PROPIONATE 50 MCG
1 SPRAY, SUSPENSION (ML) NASAL DAILY
Qty: 1 BOTTLE | Refills: 0 | Status: SHIPPED | OUTPATIENT
Start: 2019-12-23

## 2019-12-23 RX ORDER — BENZONATATE 100 MG/1
100 CAPSULE ORAL 3 TIMES DAILY PRN
Qty: 20 CAPSULE | Refills: 0 | Status: SHIPPED | OUTPATIENT
Start: 2019-12-23 | End: 2020-02-04 | Stop reason: ALTCHOICE

## 2019-12-23 RX ORDER — AMOXICILLIN AND CLAVULANATE POTASSIUM 875; 125 MG/1; MG/1
1 TABLET, FILM COATED ORAL EVERY 12 HOURS SCHEDULED
Qty: 20 TABLET | Refills: 0 | Status: SHIPPED | OUTPATIENT
Start: 2019-12-23 | End: 2020-01-02

## 2019-12-23 NOTE — PROGRESS NOTES
[id number] [facility]        NAME: Malcom Maher is a 81 y o  male  : 1950    MRN: [id number]  DATE: 2019  TIME: 3:30 PM    /77   Pulse 66   Temp 98 7 °F (37 1 °C)   Resp 20   Ht 6' 2" (1 88 m)   Wt 106 kg (234 lb)   SpO2 98%   BMI 30 04 kg/m²     Assessment and Plan   Acute non-recurrent sinusitis, unspecified location [J01 90]  1  Acute non-recurrent sinusitis, unspecified location  amoxicillin-clavulanate (AUGMENTIN) 875-125 mg per tablet    benzonatate (TESSALON PERLES) 100 mg capsule    fluticasone (FLONASE) 50 mcg/act nasal spray    DISCONTINUED: amoxicillin-clavulanate (AUGMENTIN) 875-125 mg per tablet    DISCONTINUED: benzonatate (TESSALON PERLES) 100 mg capsule    DISCONTINUED: fluticasone (FLONASE) 50 mcg/act nasal spray   2  Bronchitis  amoxicillin-clavulanate (AUGMENTIN) 875-125 mg per tablet    benzonatate (TESSALON PERLES) 100 mg capsule    fluticasone (FLONASE) 50 mcg/act nasal spray    DISCONTINUED: amoxicillin-clavulanate (AUGMENTIN) 875-125 mg per tablet    DISCONTINUED: benzonatate (TESSALON PERLES) 100 mg capsule    DISCONTINUED: fluticasone (FLONASE) 50 mcg/act nasal spray         Patient Instructions       Follow up with PCP in 3-5 days  Proceed to  ER if symptoms worsen  Chief Complaint     Chief Complaint   Patient presents with    Cold Like Symptoms     Patient state he thinks he has th eflu  He has had a cough and nasal congestin since Friday, no fevers         History of Present Illness       Pt with cough and with congestion  X 3 days     Sinusitis   This is a new problem  The current episode started in the past 7 days  The problem is unchanged  There has been no fever  The fever has been present for 3 to 4 days  His pain is at a severity of 3/10  The pain is mild  Associated symptoms include sinus pressure and a sore throat   Pertinent negatives include no congestion, coughing, diaphoresis, ear pain, headaches, hoarse voice, neck pain, shortness of breath, sneezing or swollen glands  Past treatments include nothing  The treatment provided no relief  Review of Systems   Review of Systems   Constitutional: Negative  Negative for diaphoresis  HENT: Positive for sinus pressure and sore throat  Negative for congestion, ear pain, hoarse voice and sneezing  Eyes: Negative  Respiratory: Negative  Negative for cough and shortness of breath  Cardiovascular: Negative  Gastrointestinal: Negative  Endocrine: Negative  Genitourinary: Negative  Musculoskeletal: Negative  Negative for neck pain  Skin: Negative  Allergic/Immunologic: Negative  Neurological: Negative  Negative for headaches  Hematological: Negative  Psychiatric/Behavioral: Negative  All other systems reviewed and are negative          Current Medications       Current Outpatient Medications:     amLODIPine (NORVASC) 5 mg tablet, TAKE 1 TABLET DAILY, Disp: 90 tablet, Rfl: 0    aspirin (ASPIR-81) 81 mg EC tablet, take 1 tablet (81MG), Disp: , Rfl:     atorvastatin (LIPITOR) 40 mg tablet, Take 1 tablet (40 mg total) by mouth daily, Disp: 90 tablet, Rfl: 1    finasteride (PROSCAR) 5 mg tablet, Take 1 tablet (5 mg total) by mouth daily, Disp: 90 tablet, Rfl: 1    ketoconazole (NIZORAL) 2 % cream, APPLY TO RASH ON FACE DAILY AS NEEDED FOR FLARES, Disp: , Rfl: 2    lisinopril (ZESTRIL) 10 mg tablet, Take 1 tablet (10 mg total) by mouth daily, Disp: 90 tablet, Rfl: 1    metFORMIN (GLUCOPHAGE) 500 mg tablet, Take 1 tablet (500 mg total) by mouth 2 (two) times a day with meals, Disp: 180 tablet, Rfl: 1    pantoprazole (PROTONIX) 40 mg tablet, Take 1 tablet (40 mg total) by mouth daily, Disp: 90 tablet, Rfl: 1    sildenafil (VIAGRA) 100 mg tablet, Take 1 tablet (100 mg total) by mouth daily as needed for erectile dysfunction, Disp: 10 tablet, Rfl: 0    tadalafil (CIALIS) 5 MG tablet, TAKE 1 TABLET DAILY AS     NEEDED FOR ERECTILE        DYSFUNCTION, Disp: 90 tablet, Rfl: 3    tamsulosin (FLOMAX) 0 4 mg, Take 1 capsule (0 4 mg total) by mouth daily with dinner, Disp: 90 capsule, Rfl: 1    amoxicillin-clavulanate (AUGMENTIN) 875-125 mg per tablet, Take 1 tablet by mouth every 12 (twelve) hours for 10 days, Disp: 20 tablet, Rfl: 0    benzonatate (TESSALON PERLES) 100 mg capsule, Take 1 capsule (100 mg total) by mouth 3 (three) times a day as needed for cough, Disp: 20 capsule, Rfl: 0    fluticasone (FLONASE) 50 mcg/act nasal spray, as needed , Disp: , Rfl:     fluticasone (FLONASE) 50 mcg/act nasal spray, 1 spray into each nostril daily, Disp: 1 Bottle, Rfl: 0    hydrocortisone 2 5 % cream, Apply topically every 24 hours (Patient not taking: Reported on 12/23/2019), Disp: 56 7 g, Rfl: 0    ketoconazole (NIZORAL) 2 % shampoo, WASH SCALP AND FACE 2-3 TIMES A WEEK AS NEEDED FOR FLARES, Disp: , Rfl: 5    Current Allergies     Allergies as of 12/23/2019 - Reviewed 12/23/2019   Allergen Reaction Noted    No known allergies  05/22/2018            The following portions of the patient's history were reviewed and updated as appropriate: allergies, current medications, past family history, past medical history, past social history, past surgical history and problem list      Past Medical History:   Diagnosis Date    BPH (benign prostatic hyperplasia)     Diabetes mellitus associated with hormonal etiology ([facility] ) 3/5/2019    ED (erectile dysfunction)     Elevated PSA 1/15/2013    Enlarged prostate     Herpes zoster 12/18/2014    Hyperlipidemia     Hypertension     IFG (impaired fasting glucose)     Metabolic syndrome     Psoriasis     Seasonal allergic rhinitis     [facility] 11/17/2014       Past Surgical History:   Procedure Laterality Date    CHOLECYSTECTOMY  05/14/2015    COLONOSCOPY  2007    HERNIA REPAIR  05/14/2015       Family History   Problem Relation Age of Onset    Skin cancer Mother     Heart disease Father Medications have been verified  Objective   /77   Pulse 66   Temp 98 7 °F (37 1 °C)   Resp 20   Ht 6' 2" (1 88 m)   Wt 106 kg (234 lb)   SpO2 98%   BMI 30 04 kg/m²        Physical Exam     Physical Exam   Constitutional: He is oriented to person, place, and time  He appears well-developed and well-nourished  HENT:   Head: Normocephalic and atraumatic  Right Ear: External ear normal    Left Ear: External ear normal    Mouth/Throat: Oropharynx is clear and moist    Boggy mucosa  Yellow nasal d/c  Max sinus tenderness    Eyes: Pupils are equal, round, and reactive to light  Conjunctivae and EOM are normal    Neck: Normal range of motion  Neck supple  Cardiovascular: Normal rate, regular rhythm and normal heart sounds  Pulmonary/Chest: Effort normal    Minor coarse sounds    Abdominal: Soft  Bowel sounds are normal    Musculoskeletal: Normal range of motion  Neurological: He is alert and oriented to person, place, and time  Skin: Skin is warm  Capillary refill takes less than 2 seconds  Psychiatric: He has a normal mood and affect  His behavior is normal    Nursing note and vitals reviewed

## 2019-12-23 NOTE — PATIENT INSTRUCTIONS
Acute Bronchitis   WHAT YOU NEED TO KNOW:   Acute bronchitis is swelling and irritation in the air passages of your lungs  This irritation may cause you to cough or have other breathing problems  Acute bronchitis often starts because of another illness, such as a cold or the flu  The illness spreads from your nose and throat to your windpipe and airways  Bronchitis is often called a chest cold  Acute bronchitis lasts about 3 to 6 weeks and is usually not a serious illness  Your cough can last for several weeks  DISCHARGE INSTRUCTIONS:   Return to the emergency department if:   · You cough up blood  · Your lips or fingernails turn blue  · You feel like you are not getting enough air when you breathe  Contact your healthcare provider if:   · You have a fever  · Your breathing problems do not go away or get worse  · Your cough does not get better within 4 weeks  · You have questions or concerns about your condition or care  Self-care:   · Get more rest   Rest helps your body to heal  Slowly start to do more each day  Rest when you feel it is needed  · Avoid irritants in the air  Avoid chemicals, fumes, and dust  Wear a face mask if you must work around dust or fumes  Stay inside on days when air pollution levels are high  If you have allergies, stay inside when pollen counts are high  Do not use aerosol products, such as spray-on deodorant, bug spray, and hair spray  · Do not smoke or be around others who smoke  Nicotine and other chemicals in cigarettes and cigars damages the cilia that move mucus out of your lungs  Ask your healthcare provider for information if you currently smoke and need help to quit  E-cigarettes or smokeless tobacco still contain nicotine  Talk to your healthcare provider before you use these products  · Drink liquids as directed  Liquids help keep your air passages moist and help you cough up mucus   You may need to drink more liquids when you have acute bronchitis  Ask how much liquid to drink each day and which liquids are best for you  · Use a humidifier or vaporizer  Use a cool mist humidifier or a vaporizer to increase air moisture in your home  This may make it easier for you to breathe and help decrease your cough  Decrease risk for acute bronchitis:   · Get the vaccinations you need  Ask your healthcare provider if you should get vaccinated against the flu or pneumonia  · Prevent the spread of germs  You can decrease your risk of acute bronchitis and other illnesses by doing the following:     [facility] your hands often with soap and water  Carry germ-killing hand lotion or gel with you  You can use the lotion or gel to clean your hands when soap and water are not available  ¨ Do not touch your eyes, nose, or mouth unless you have washed your hands first     ¨ Always cover your mouth when you cough to prevent the spread of germs  It is best to cough into a tissue or your shirt sleeve instead of into your hand  Ask those around you cover their mouths when they cough  ¨ Try to avoid people who have a cold or the flu  If you are sick, stay away from others as much as possible  Medicines: Your healthcare provider may  give you any of the following:  · Ibuprofen or acetaminophen  are medicines that help lower your fever  They are available without a doctor's order  Ask your healthcare provider which medicine is right for you  Ask how much to take and how often to take it  Follow directions  These medicines can cause stomach bleeding if not taken correctly  Ibuprofen can cause kidney damage  Do not take ibuprofen if you have kidney disease, an ulcer, or allergies to aspirin  Acetaminophen can cause liver damage  Do not take more than 4,000 milligrams in 24 hours  · Decongestants  help loosen mucus in your lungs and make it easier to cough up  This can help you breathe easier  · Cough suppressants  decrease your urge to cough   If your cough produces mucus, do not take a cough suppressant unless your healthcare provider tells you to  Your healthcare provider may suggest that you take a cough suppressant at night so you can rest     · Inhalers  may be given  Your healthcare provider may give you one or more inhalers to help you breathe easier and cough less  An inhaler gives your medicine to open your airways  Ask your healthcare provider to show you how to use your inhaler correctly  · Take your medicine as directed  Contact your healthcare provider if you think your medicine is not helping or if you have side effects  Tell him of her if you are allergic to any medicine  Keep a list of the medicines, vitamins, and herbs you take  Include the amounts, and when and why you take them  Bring the list or the pill bottles to follow-up visits  Carry your medicine list with you in case of an emergency  Follow up with your healthcare provider as directed:  Write down questions you have so you will remember to ask them during your follow-up visits  © 2017 [employer] Information is for End User's use only and may not be sold, redistributed or otherwise used for commercial purposes  All illustrations and images included in CareNotes® are the copyrighted property of [employer]  or [employer] Cast[employer]  The above information is an  only  It is not intended as medical advice for individual conditions or treatments  Talk to your doctor, nurse or pharmacist before following any medical regimen to see if it is safe and effective for you  Sinusitis   WHAT YOU NEED TO KNOW:   Sinusitis is inflammation or infection of your sinuses  It is most often caused by a virus  Acute sinusitis may last up to 12 weeks  Chronic sinusitis lasts longer than 12 weeks  Recurrent sinusitis means you have 4 or more times in 1 year     DISCHARGE INSTRUCTIONS:   Return to the emergency department if:   · Your eye and eyelid are red, swollen, and painful  · You cannot open your eye  · You have vision changes, such as double vision  · Your eyeball bulges out or you cannot move your eye  · You are more sleepy than normal, or you notice changes in your ability to think, move, or talk  · You have a stiff neck, a fever, or a bad headache  · You have swelling of your forehead or scalp  Contact your healthcare provider if:   · Your symptoms do not improve after 3 days  · Your symptoms do not go away after 10 days  · You have nausea and are vomiting  · Your nose is bleeding  · You have questions or concerns about your condition or care  Medicines: Your symptoms may go away on their own  Your healthcare provider may recommend watchful waiting for up to 10 days before starting antibiotics  You may  need any of the following:  · Acetaminophen  decreases pain and fever  It is available without a doctor's order  Ask how much to take and how often to take it  Follow directions  Read the labels of all other medicines you are using to see if they also contain acetaminophen, or ask your doctor or pharmacist  Acetaminophen can cause liver damage if not taken correctly  Do not use more than 4 grams (4,000 milligrams) total of acetaminophen in one day  · NSAIDs , such as ibuprofen, help decrease swelling, pain, and fever  This medicine is available with or without a doctor's order  NSAIDs can cause stomach bleeding or kidney problems in certain people  If you take blood thinner medicine, always ask your healthcare provider if NSAIDs are safe for you  Always read the medicine label and follow directions  · Nasal steroid sprays  may help decrease inflammation in your nose and sinuses  · Decongestants  help reduce swelling and drain mucus in the nose and sinuses  They may help you breathe easier  · Antihistamines  help dry mucus in the nose and relieve sneezing       · Antibiotics  help treat or prevent a bacterial infection  · Take your medicine as directed  Contact your healthcare provider if you think your medicine is not helping or if you have side effects  Tell him or her if you are allergic to any medicine  Keep a list of the medicines, vitamins, and herbs you take  Include the amounts, and when and why you take them  Bring the list or the pill bottles to follow-up visits  Carry your medicine list with you in case of an emergency  Self-care:   · Rinse your sinuses  Use a sinus rinse device to rinse your nasal passages with a saline (salt water) solution or distilled water  Do not use tap water  This will help thin the mucus in your nose and rinse away pollen and dirt  It will also help reduce swelling so you can breathe normally  Ask your healthcare provider how often to do this  · Breathe in steam   Heat a bowl of water until you see steam  Lean over the bowl and make a tent over your head with a large towel  Breathe deeply for about 20 minutes  Be careful not to get too close to the steam or burn yourself  Do this 3 times a day  You can also breathe deeply when you take a hot shower  · Sleep with your head elevated  Place an extra pillow under your head before you go to sleep to help your sinuses drain  · Drink liquids as directed  Ask your healthcare provider how much liquid to drink each day and which liquids are best for you  Liquids will thin the mucus in your nose and help it drain  Avoid drinks that contain alcohol or caffeine  · Do not smoke, and avoid secondhand smoke  Nicotine and other chemicals in cigarettes and cigars can make your symptoms worse  Ask your healthcare provider for information if you currently smoke and need help to quit  E-cigarettes or smokeless tobacco still contain nicotine  Talk to your healthcare provider before you use these products  Prevent the spread of germs that cause sinusitis:  Wash your hands often with soap and water   Wash your hands after you use the bathroom, change a child's diaper, or sneeze  Wash your hands before you prepare or eat food  Follow up with your healthcare provider as directed: You may be referred to an ear, nose, and throat specialist  Write down your questions so you remember to ask them during your visits  © 2017 [employer] Information is for End User's use only and may not be sold, redistributed or otherwise used for commercial purposes  All illustrations and images included in CareNotes® are the copyrighted property of [employer] [place]  or Romario Castillo  The above information is an  only  It is not intended as medical advice for individual conditions or treatments  Talk to your doctor, nurse or pharmacist before following any medical regimen to see if it is safe and effective for you

## 2020-01-02 ENCOUNTER — OFFICE VISIT (OUTPATIENT)
Dept: UROLOGY | Facility: MEDICAL CENTER | Age: 70
End: 2020-01-02
Payer: MEDICARE

## 2020-01-02 VITALS
DIASTOLIC BLOOD PRESSURE: 80 MMHG | WEIGHT: 238 LBS | BODY MASS INDEX: 30.54 KG/M2 | HEIGHT: 74 IN | SYSTOLIC BLOOD PRESSURE: 130 MMHG | HEART RATE: 67 BPM

## 2020-01-02 DIAGNOSIS — N13.8 BPH WITH OBSTRUCTION/LOWER URINARY TRACT SYMPTOMS: Primary | ICD-10-CM

## 2020-01-02 DIAGNOSIS — R35.1 NOCTURIA ASSOCIATED WITH BENIGN PROSTATIC HYPERPLASIA: ICD-10-CM

## 2020-01-02 DIAGNOSIS — N52.02 CORPORO-VENOUS OCCLUSIVE ERECTILE DYSFUNCTION: ICD-10-CM

## 2020-01-02 DIAGNOSIS — N40.1 BPH WITH OBSTRUCTION/LOWER URINARY TRACT SYMPTOMS: Primary | ICD-10-CM

## 2020-01-02 DIAGNOSIS — N40.1 NOCTURIA ASSOCIATED WITH BENIGN PROSTATIC HYPERPLASIA: ICD-10-CM

## 2020-01-02 LAB
SL AMB  POCT GLUCOSE, UA: NORMAL
SL AMB LEUKOCYTE ESTERASE,UA: NORMAL
SL AMB POCT BILIRUBIN,UA: NORMAL
SL AMB POCT BLOOD,UA: NORMAL
SL AMB POCT CLARITY,UA: CLEAR
SL AMB POCT COLOR,UA: YELLOW
SL AMB POCT KETONES,UA: NORMAL
SL AMB POCT NITRITE,UA: NORMAL
SL AMB POCT PH,UA: 6
SL AMB POCT SPECIFIC GRAVITY,UA: 1.02
SL AMB POCT URINE PROTEIN: NORMAL
SL AMB POCT UROBILINOGEN: 0.2

## 2020-01-02 PROCEDURE — 81003 URINALYSIS AUTO W/O SCOPE: CPT | Performed by: UROLOGY

## 2020-01-02 PROCEDURE — 99214 OFFICE O/P EST MOD 30 MIN: CPT | Performed by: UROLOGY

## 2020-01-02 RX ORDER — TADALAFIL 5 MG/1
5 TABLET ORAL DAILY
Qty: 90 TABLET | Refills: 3 | Status: SHIPPED | OUTPATIENT
Start: 2020-01-02 | End: 2020-01-06 | Stop reason: SDUPTHER

## 2020-01-02 NOTE — PROGRESS NOTES
Assessment/Plan:      Diagnoses and all orders for this visit:    BPH with obstruction/lower urinary tract symptoms  Comments:  tadalafil (CIALIS) 5 MG tablet; Take 1 tablet (5 mg total) by mouth daily  Orders:  -     PSA Total, Diagnostic; Future    Nocturia associated with benign prostatic hyperplasia  Comments:  tadalafil (CIALIS) 5 MG tablet; Take 1 tablet (5 mg total) by mouth daily  Orders:  -     POCT urine dip auto non-scope    Corporo-venous occlusive erectile dysfunction  -     tadalafil (CIALIS) 5 MG tablet; Take 1 tablet (5 mg total) by mouth daily          Subjective:  No complaints     Patient ID: Arcelia Mittal is a 81 y o  male  HPI  Patient with a long history of BPH has been on Flomax maximum dosage 0 8 mg with dinner and finasteride  He was having nocturia still 3-4 times per night, so we recommended he take the finasteride and Flomax 0 4 mg, and added Cialis 5 mg daily and now his nocturia is down to 1-2 times per night, and he states he has been voiding during the day much better on that new regimen    He denies any dysuria, history UTIs, hematuria, flank or abdominal pains   He does admit to modest urgency and frequency in the morning likely related to his caffeinated beverages        He states his PSAs have previously all been normal   He states he has a normal appetite and bowel function, he denies any weight loss or pain in new locations       Also admits to erectile dysfunction for which he is taking Viagra 100 mg p r n  Carmina Serum        Review of Systems   Constitutional: Negative  HENT: Negative  Eyes: Negative  Respiratory: Negative  Cardiovascular: Negative  Gastrointestinal: Negative  Endocrine: Negative  Genitourinary: Negative  Musculoskeletal: Negative  Skin: Negative  Allergic/Immunologic: Negative  Neurological: Negative  Hematological: Negative  Psychiatric/Behavioral: Negative            Objective:     Physical Exam   Constitutional: He is oriented to person, place, and time  He appears well-developed and well-nourished  No distress  HENT:   Head: Normocephalic and atraumatic  Nose: Nose normal    Mouth/Throat: Oropharynx is clear and moist    Eyes: Pupils are equal, round, and reactive to light  Conjunctivae and EOM are normal  No scleral icterus  Neck: Normal range of motion  Neck supple  Cardiovascular: Normal rate, regular rhythm, normal heart sounds and intact distal pulses  No murmur heard  Pulmonary/Chest: Effort normal and breath sounds normal  No respiratory distress  He has no wheezes  He has no rales  Abdominal: Soft  Bowel sounds are normal  He exhibits no distension and no mass  There is no tenderness  Musculoskeletal: Normal range of motion  He exhibits no edema or tenderness  Lymphadenopathy:     He has no cervical adenopathy  Neurological: He is alert and oriented to person, place, and time  No cranial nerve deficit  Skin: Skin is warm and dry  No rash noted  No erythema  No pallor  Psychiatric: He has a normal mood and affect  His behavior is normal  Judgment and thought content normal    Nursing note and vitals reviewed          PSA Total, Diagnostic    Ref Range & Units 12/12/19  8:49 AM 11/15/18  8:34 AM   PSA, Diagnostic 0 0 - 4 0 ng/mL 1 7  1 0 CM   Comment:

## 2020-01-03 DIAGNOSIS — N52.02 CORPORO-VENOUS OCCLUSIVE ERECTILE DYSFUNCTION: ICD-10-CM

## 2020-01-03 NOTE — TELEPHONE ENCOUNTER
Paperwork dropped off by the patient during his visit yesterday (1/2/20)  [facility] DENIED his request for Tadalafil 5mg  I left a message for the patient to return my call to discuss cost-effective options for obtaining the drug  I left my direct dial number for ease in communicating with the patient

## 2020-01-06 DIAGNOSIS — I10 ESSENTIAL HYPERTENSION: ICD-10-CM

## 2020-01-06 RX ORDER — AMLODIPINE BESYLATE 5 MG/1
5 TABLET ORAL DAILY
Qty: 90 TABLET | Refills: 0 | Status: SHIPPED | OUTPATIENT
Start: 2020-01-06 | End: 2020-03-25

## 2020-01-06 RX ORDER — TADALAFIL 5 MG/1
5 TABLET ORAL DAILY
Qty: 90 TABLET | Refills: 3 | Status: SHIPPED | OUTPATIENT
Start: 2020-01-06 | End: 2021-07-30 | Stop reason: ALTCHOICE

## 2020-01-06 NOTE — TELEPHONE ENCOUNTER
Patient returned my call  He is paying a $90 copay for this same medication through his mail order pharmacy  He can obtain the same 90 count supply via [email or web address] in [facility] discount card and pay only $[phone]  Patient was very please and in agreement with the pharmacy change  Discount card was texted to the patient via cell phone; he verified receipt    Script for Tadalafil 5mg 1 PO QD #90 with 3 refills was queued and forwarded to the Advanced Practitioner covering the hospitals location for approval

## 2020-02-04 ENCOUNTER — OFFICE VISIT (OUTPATIENT)
Dept: FAMILY MEDICINE CLINIC | Facility: CLINIC | Age: 70
End: 2020-02-04
Payer: MEDICARE

## 2020-02-04 VITALS
DIASTOLIC BLOOD PRESSURE: 80 MMHG | HEIGHT: 73 IN | TEMPERATURE: 98.1 F | BODY MASS INDEX: 31.68 KG/M2 | OXYGEN SATURATION: 97 % | WEIGHT: 239 LBS | SYSTOLIC BLOOD PRESSURE: 120 MMHG | HEART RATE: 60 BPM

## 2020-02-04 DIAGNOSIS — E11.9 TYPE 2 DIABETES MELLITUS WITHOUT COMPLICATION, WITHOUT LONG-TERM CURRENT USE OF INSULIN (HCC): ICD-10-CM

## 2020-02-04 DIAGNOSIS — Z00.01 ENCOUNTER FOR WELL ADULT EXAM WITH ABNORMAL FINDINGS: ICD-10-CM

## 2020-02-04 PROCEDURE — 1160F RVW MEDS BY RX/DR IN RCRD: CPT | Performed by: FAMILY MEDICINE

## 2020-02-04 PROCEDURE — 3079F DIAST BP 80-89 MM HG: CPT | Performed by: FAMILY MEDICINE

## 2020-02-04 PROCEDURE — 1125F AMNT PAIN NOTED PAIN PRSNT: CPT | Performed by: FAMILY MEDICINE

## 2020-02-04 PROCEDURE — 3008F BODY MASS INDEX DOCD: CPT | Performed by: FAMILY MEDICINE

## 2020-02-04 PROCEDURE — 3074F SYST BP LT 130 MM HG: CPT | Performed by: FAMILY MEDICINE

## 2020-02-04 PROCEDURE — 1123F ACP DISCUSS/DSCN MKR DOCD: CPT | Performed by: FAMILY MEDICINE

## 2020-02-04 PROCEDURE — G0439 PPPS, SUBSEQ VISIT: HCPCS | Performed by: FAMILY MEDICINE

## 2020-02-04 PROCEDURE — 4040F PNEUMOC VAC/ADMIN/RCVD: CPT | Performed by: FAMILY MEDICINE

## 2020-02-04 PROCEDURE — 1036F TOBACCO NON-USER: CPT | Performed by: FAMILY MEDICINE

## 2020-02-04 PROCEDURE — 1170F FXNL STATUS ASSESSED: CPT | Performed by: FAMILY MEDICINE

## 2020-02-04 NOTE — ASSESSMENT & PLAN NOTE
Lab Results   Component Value Date    HGBA1C 5 6 12/12/2019   A chronic asymptomatic plan to refer the patient to see diabetic educator continue current management

## 2020-02-04 NOTE — ASSESSMENT & PLAN NOTE
Advice and education were given regarding nutrition, aerobic exercises, weight bearing exercises, cardiovascular risk reduction, fall risk reduction, and age appropriate supplements  The patient was counseled regarding instructions for management, risk factor reductions, prognosis, risks and benefits of treatment options, patient and family education, and importance of compliance with treatment       Patient does have a script for shingle vaccine but on the waiting list to get it

## 2020-02-04 NOTE — PATIENT INSTRUCTIONS

## 2020-02-04 NOTE — PROGRESS NOTES
Assessment and Plan:     Problem List Items Addressed This Visit        Endocrine    Type 2 diabetes mellitus ([facility] [address] )       Lab Results   Component Value Date    HGBA1C 5 6 12/12/2019   A chronic asymptomatic plan to refer the patient to see diabetic educator continue current management         Relevant Orders    Ambulatory referral to Diabetic Education       Other    Encounter for well adult exam with abnormal findings     Advice and education were given regarding nutrition, aerobic exercises, weight bearing exercises, cardiovascular risk reduction, fall risk reduction, and age appropriate supplements  The patient was counseled regarding instructions for management, risk factor reductions, prognosis, risks and benefits of treatment options, patient and family education, and importance of compliance with treatment  Patient does have a script for shingle vaccine but on the waiting list to get it         BMI 31 0-31 9,adult - Primary     The BMI is above average  BMI counseling and education was provided to the patient  Nutrition recommendations include reducing portion sizes, decreasing overall calorie intake, 3-5 servings of fruits/vegetables daily, reducing fast food intake, consuming healthier snacks, decreasing soda and/or juice intake, moderation in carbohydrate intake and reducing intake of saturated fat and trans fat  Exercise recommendations include moderate aerobic physical activity for 150 minutes/week, exercising 3-5 times per week and joining a gym  Preventive health issues were discussed with patient, and age appropriate screening tests were ordered as noted in patient's After Visit Summary  Personalized health advice and appropriate referrals for health education or preventive services given if needed, as noted in patient's After Visit Summary       History of Present Illness:     Patient presents for Medicare Annual Wellness visit    Patient Care Team:  Jagdish Lala MD as PCP - General (Family Medicine)  Marcelo Vail MD (Urology)  Gina Valiente MD (Ophthalmology)     Problem List:     Patient Active Problem List   Diagnosis    Allergic rhinitis    Benign prostatic hyperplasia    Displacement of lumbar intervertebral disc without myelopathy    Diverticulosis of colon    Male erectile disorder    Essential hypertension    Herpes zoster    Indigestion    Metabolic syndrome    Left ventricular hypertrophy    Mitral valve regurgitation    Type 2 diabetes mellitus (HCC)    Hyperlipidemia    Idiopathic peripheral neuropathy    Calculus of gallbladder with cholecystitis    Colon polyp    Encounter for well adult exam with abnormal findings    Obesity (BMI 30 0-34  9)    Need for shingles vaccine    BMI 31 0-31 9,adult      Past Medical and Surgical History:     Past Medical History:   Diagnosis Date    BPH (benign prostatic hyperplasia)     [facility] 3/5/2019    ED (erectile dysfunction)     Elevated PSA 1/15/2013    Enlarged prostate     Herpes zoster 12/18/2014    Hyperlipidemia     Hypertension     IFG (impaired fasting glucose)     Metabolic syndrome     Psoriasis     Seasonal allergic rhinitis     Type 2 diabetes mellitus [facility] 11/17/2014     Past Surgical History:   Procedure Laterality Date    CHOLECYSTECTOMY  05/14/2015    COLONOSCOPY  2007    HERNIA REPAIR  05/14/2015      Family History:     Family History   Problem Relation Age of Onset    Skin cancer Mother     Heart disease Father       Social History:     Social History     Socioeconomic History    Marital status: /Civil Union     Spouse name: None    Number of children: None    Years of education: None    Highest education level: None   Occupational History    None   Social Needs    Financial resource strain: Not hard at all   Diane-Valery insecurity:     Worry: Never true     Inability: Never true   frenting needs: Medical: No     Non-medical: No   Tobacco Use    Smoking status: Former Smoker    Smokeless tobacco: Former User   Substance and Sexual Activity    Alcohol use: Yes     Frequency: Never     Binge frequency: Never    Drug use: No    Sexual activity: Yes     Partners: Female   Lifestyle    Physical activity:     Days per week: 1 day     Minutes per session: 60 min    Stress: Not at all   Relationships    Social connections:     Talks on phone: More than three times a week     Gets together: Once a week     Attends Gnosticist service: More than 4 times per year     Active member of club or organization: Yes     Attends meetings of clubs or organizations: More than 4 times per year     Relationship status:     Intimate partner violence:     Fear of current or ex partner: No     Emotionally abused: No     Physically abused: No     Forced sexual activity: No   Other Topics Concern    None   Social History Narrative    Has children    Right handed       Medications and Allergies:     Current Outpatient Medications   Medication Sig Dispense Refill    amLODIPine (NORVASC) 5 mg tablet Take 1 tablet (5 mg total) by mouth daily 90 tablet 0    aspirin (ASPIR-81) 81 mg EC tablet take 1 tablet (81MG)      atorvastatin (LIPITOR) 40 mg tablet Take 1 tablet (40 mg total) by mouth daily 90 tablet 1    finasteride (PROSCAR) 5 mg tablet Take 1 tablet (5 mg total) by mouth daily 90 tablet 1    fluticasone (FLONASE) 50 mcg/act nasal spray as needed       fluticasone (FLONASE) 50 mcg/act nasal spray 1 spray into each nostril daily 1 Bottle 0    hydrocortisone 2 5 % cream Apply topically every 24 hours 56 7 g 0    ketoconazole (NIZORAL) 2 % cream APPLY TO RASH ON FACE DAILY AS NEEDED FOR FLARES  2    ketoconazole (NIZORAL) 2 % shampoo WASH SCALP AND FACE 2-3 TIMES A WEEK AS NEEDED FOR FLARES  5    lisinopril (ZESTRIL) 10 mg tablet Take 1 tablet (10 mg total) by mouth daily 90 tablet 1    metFORMIN (GLUCOPHAGE) 500 mg tablet Take 1 tablet (500 mg total) by mouth 2 (two) times a day with meals 180 tablet 1    pantoprazole (PROTONIX) 40 mg tablet Take 1 tablet (40 mg total) by mouth daily 90 tablet 1    sildenafil (VIAGRA) 100 mg tablet Take 1 tablet (100 mg total) by mouth daily as needed for erectile dysfunction 10 tablet 0    tadalafil (CIALIS) 5 MG tablet Take 1 tablet (5 mg total) by mouth daily 90 tablet 3    tamsulosin (FLOMAX) 0 4 mg Take 1 capsule (0 4 mg total) by mouth daily with dinner 90 capsule 1     No current facility-administered medications for this visit  Allergies   Allergen Reactions    No Known Allergies       Immunizations:     Immunization History   Administered Date(s) Administered    INFLUENZA 10/13/2015, 10/31/2016, 10/16/2017    Influenza Split 10/29/2013    Influenza TIV (IM) 11/17/2009, 10/22/2010, 11/11/2011, 10/07/2014    Influenza, high dose seasonal 0 5 mL 10/19/2018, 09/25/2019    Pneumococcal Conjugate 13-Valent 01/09/2018    Pneumococcal Polysaccharide PPV23 02/04/2019    Tdap 07/28/2015    Zoster 10/27/2015    Zoster Vaccine Recombinant 10/27/2015      Health Maintenance:         Topic Date Due    CRC Screening: Colonoscopy  02/21/2021    Hepatitis C Screening  Completed         Topic Date Due    Hepatitis B Vaccine (1 of 3 - Risk 3-dose series) 09/07/1969      Medicare Health Risk Assessment:     /80   Pulse 60   Temp 98 1 °F (36 7 °C) (Tympanic)   Ht 6' 0 5" (1 842 m)   Wt 108 kg (239 lb)   SpO2 97%   BMI 31 97 kg/m²      Yanira Zaman is here for his Subsequent Wellness visit  Last Medicare Wellness visit information reviewed, patient interviewed and updates made to the record today  Health Risk Assessment:   Patient rates overall health as good  Patient feels that their physical health rating is same  Eyesight was rated as same  Hearing was rated as same  Patient feels that their emotional and mental health rating is same   Pain experienced in the last 7 days has been none  Patient states that he has experienced no weight loss or gain in last 6 months  Depression Screening:   PHQ-2 Score: 0      Fall Risk Screening: In the past year, patient has experienced: no history of falling in past year      Home Safety:  Patient does not have trouble with stairs inside or outside of their home  Patient has working smoke alarms and has working carbon monoxide detector  Home safety hazards include: none  Nutrition:   Current diet is Regular  Medications:   Patient is currently taking over-the-counter supplements  OTC medications include: see medication list  Patient is able to manage medications  Activities of Daily Living (ADLs)/Instrumental Activities of Daily Living (IADLs):   Walk and transfer into and out of bed and chair?: Yes  Dress and groom yourself?: Yes    Bathe or shower yourself?: Yes    Feed yourself? Yes  Do your laundry/housekeeping?: Yes  Manage your money, pay your bills and track your expenses?: Yes  Make your own meals?: Yes    Do your own shopping?: Yes    Durable Medical Equipment Suppliers  none    Previous Hospitalizations:   Any hospitalizations or ED visits within the last 12 months?: No      Advance Care Planning:   Living will: Yes    Durable POA for healthcare:  Yes    Advanced directive: Yes    Advanced directive counseling given: Yes    Five wishes given: Yes    Patient declined ACP directive: No    End of Life Decisions reviewed with patient: Yes    Provider agrees with end of life decisions: Yes      Comments: Patient will provide copy of Living will    Cognitive Screening:   Provider or family/friend/caregiver concerned regarding cognition?: No    PREVENTIVE SCREENINGS      Cardiovascular Screening:    General: Screening Not Indicated and History Lipid Disorder      Diabetes Screening:     General: Screening Not Indicated and History Diabetes      Colorectal Cancer Screening:     General: Screening Current      Prostate Cancer Screening:    General: Screening Current      Osteoporosis Screening:    General: Screening Not Indicated      Abdominal Aortic Aneurysm (AAA) Screening:    Risk factors include: age between 73-69 yo and tobacco use        General: Screening Current      Lung Cancer Screening:     General: Screening Not Indicated      Hepatitis C Screening:    General: Screening Current    Patient's shoes and socks removed  Right Foot/Ankle   Right Foot Inspection  Skin Exam: skin intact no warmth and no pre-ulcer                          Toe Exam: no swelling and erythema  Sensory       Monofilament testing: intact  Vascular  Capillary refills: < 3 seconds  The right DP pulse is 2+  Left Foot/Ankle  Left Foot Inspection  Skin Exam: skin intactno warmth and no pre-ulcer                         Toe Exam: no swelling and no erythema                   Sensory       Monofilament: intact  Vascular  Capillary refills: < 3 seconds  The left DP pulse is 2+  Assign Risk Category:  No deformity present; No loss of protective sensation; No weak pulses       Risk: 0        Juany Bagley MD  BMI Counseling: Body mass index is 31 97 kg/m²  The BMI is above normal  Nutrition recommendations include reducing portion sizes, decreasing overall calorie intake, 3-5 servings of fruits/vegetables daily and consuming healthier snacks  Exercise recommendations include moderate aerobic physical activity for 150 minutes/week

## 2020-02-12 DIAGNOSIS — N40.0 BENIGN PROSTATIC HYPERPLASIA, UNSPECIFIED WHETHER LOWER URINARY TRACT SYMPTOMS PRESENT: ICD-10-CM

## 2020-02-12 RX ORDER — TAMSULOSIN HYDROCHLORIDE 0.4 MG/1
0.4 CAPSULE ORAL
Qty: 90 CAPSULE | Refills: 1 | Status: SHIPPED | OUTPATIENT
Start: 2020-02-12 | End: 2020-10-19 | Stop reason: SDUPTHER

## 2020-03-11 ENCOUNTER — APPOINTMENT (OUTPATIENT)
Dept: LAB | Facility: CLINIC | Age: 70
End: 2020-03-11
Payer: MEDICARE

## 2020-03-11 DIAGNOSIS — E78.2 MIXED HYPERLIPIDEMIA: ICD-10-CM

## 2020-03-11 DIAGNOSIS — K30 INDIGESTION: ICD-10-CM

## 2020-03-11 DIAGNOSIS — E11.9 TYPE 2 DIABETES MELLITUS WITHOUT COMPLICATION, UNSPECIFIED WHETHER LONG TERM INSULIN USE (HCC): ICD-10-CM

## 2020-03-11 DIAGNOSIS — I10 ESSENTIAL HYPERTENSION: ICD-10-CM

## 2020-03-11 LAB
ANION GAP SERPL CALCULATED.3IONS-SCNC: 3 MMOL/L (ref 4–13)
BASOPHILS # BLD AUTO: 0.03 THOUSANDS/ΜL (ref 0–0.1)
BASOPHILS NFR BLD AUTO: 1 % (ref 0–1)
BUN SERPL-MCNC: 17 MG/DL (ref 5–25)
CALCIUM SERPL-MCNC: 8.8 MG/DL (ref 8.3–10.1)
CHLORIDE SERPL-SCNC: 108 MMOL/L (ref 100–108)
CHOLEST SERPL-MCNC: 111 MG/DL (ref 50–200)
CO2 SERPL-SCNC: 30 MMOL/L (ref 21–32)
CREAT SERPL-MCNC: 0.94 MG/DL (ref 0.6–1.3)
EOSINOPHIL # BLD AUTO: 0.26 THOUSAND/ΜL (ref 0–0.61)
EOSINOPHIL NFR BLD AUTO: 5 % (ref 0–6)
ERYTHROCYTE [DISTWIDTH] IN BLOOD BY AUTOMATED COUNT: 12.4 % (ref 11.6–15.1)
EST. AVERAGE GLUCOSE BLD GHB EST-MCNC: 114 MG/DL
GFR SERPL CREATININE-BSD FRML MDRD: 84 ML/MIN/1.73SQ M
GLUCOSE P FAST SERPL-MCNC: 106 MG/DL (ref 65–99)
HBA1C MFR BLD: 5.6 %
HCT VFR BLD AUTO: 43.5 % (ref 36.5–49.3)
HDLC SERPL-MCNC: 45 MG/DL
HGB BLD-MCNC: 14 G/DL (ref 12–17)
IMM GRANULOCYTES # BLD AUTO: 0.02 THOUSAND/UL (ref 0–0.2)
IMM GRANULOCYTES NFR BLD AUTO: 0 % (ref 0–2)
LDLC SERPL CALC-MCNC: 51 MG/DL (ref 0–100)
LYMPHOCYTES # BLD AUTO: 1.33 THOUSANDS/ΜL (ref 0.6–4.47)
LYMPHOCYTES NFR BLD AUTO: 26 % (ref 14–44)
MCH RBC QN AUTO: 32 PG (ref 26.8–34.3)
MCHC RBC AUTO-ENTMCNC: 32.2 G/DL (ref 31.4–37.4)
MCV RBC AUTO: 99 FL (ref 82–98)
MONOCYTES # BLD AUTO: 0.52 THOUSAND/ΜL (ref 0.17–1.22)
MONOCYTES NFR BLD AUTO: 10 % (ref 4–12)
NEUTROPHILS # BLD AUTO: 2.93 THOUSANDS/ΜL (ref 1.85–7.62)
NEUTS SEG NFR BLD AUTO: 58 % (ref 43–75)
NRBC BLD AUTO-RTO: 0 /100 WBCS
PLATELET # BLD AUTO: 128 THOUSANDS/UL (ref 149–390)
PMV BLD AUTO: 10.6 FL (ref 8.9–12.7)
POTASSIUM SERPL-SCNC: 3.9 MMOL/L (ref 3.5–5.3)
RBC # BLD AUTO: 4.38 MILLION/UL (ref 3.88–5.62)
SODIUM SERPL-SCNC: 141 MMOL/L (ref 136–145)
TRIGL SERPL-MCNC: 76 MG/DL
TSH SERPL DL<=0.05 MIU/L-ACNC: 1.3 UIU/ML (ref 0.36–3.74)
WBC # BLD AUTO: 5.09 THOUSAND/UL (ref 4.31–10.16)

## 2020-03-11 PROCEDURE — 36415 COLL VENOUS BLD VENIPUNCTURE: CPT

## 2020-03-11 PROCEDURE — 85025 COMPLETE CBC W/AUTO DIFF WBC: CPT

## 2020-03-11 PROCEDURE — 80048 BASIC METABOLIC PNL TOTAL CA: CPT

## 2020-03-11 PROCEDURE — 84443 ASSAY THYROID STIM HORMONE: CPT

## 2020-03-11 PROCEDURE — 83036 HEMOGLOBIN GLYCOSYLATED A1C: CPT

## 2020-03-11 PROCEDURE — 80061 LIPID PANEL: CPT

## 2020-03-17 ENCOUNTER — OFFICE VISIT (OUTPATIENT)
Dept: FAMILY MEDICINE CLINIC | Facility: CLINIC | Age: 70
End: 2020-03-17
Payer: MEDICARE

## 2020-03-17 VITALS
TEMPERATURE: 97.8 F | BODY MASS INDEX: 31.01 KG/M2 | OXYGEN SATURATION: 97 % | SYSTOLIC BLOOD PRESSURE: 120 MMHG | HEART RATE: 57 BPM | DIASTOLIC BLOOD PRESSURE: 80 MMHG | WEIGHT: 234 LBS | HEIGHT: 73 IN

## 2020-03-17 DIAGNOSIS — I10 ESSENTIAL HYPERTENSION: Primary | ICD-10-CM

## 2020-03-17 DIAGNOSIS — E78.2 MIXED HYPERLIPIDEMIA: ICD-10-CM

## 2020-03-17 DIAGNOSIS — N40.0 BENIGN PROSTATIC HYPERPLASIA WITHOUT LOWER URINARY TRACT SYMPTOMS: ICD-10-CM

## 2020-03-17 PROBLEM — E66.9 OBESITY (BMI 30.0-34.9): Status: RESOLVED | Noted: 2019-02-04 | Resolved: 2020-03-17

## 2020-03-17 PROBLEM — E66.811 OBESITY (BMI 30.0-34.9): Status: RESOLVED | Noted: 2019-02-04 | Resolved: 2020-03-17

## 2020-03-17 PROCEDURE — 1160F RVW MEDS BY RX/DR IN RCRD: CPT | Performed by: FAMILY MEDICINE

## 2020-03-17 PROCEDURE — 4040F PNEUMOC VAC/ADMIN/RCVD: CPT | Performed by: FAMILY MEDICINE

## 2020-03-17 PROCEDURE — 1036F TOBACCO NON-USER: CPT | Performed by: FAMILY MEDICINE

## 2020-03-17 PROCEDURE — 99214 OFFICE O/P EST MOD 30 MIN: CPT | Performed by: FAMILY MEDICINE

## 2020-03-17 PROCEDURE — 3079F DIAST BP 80-89 MM HG: CPT | Performed by: FAMILY MEDICINE

## 2020-03-17 PROCEDURE — 3074F SYST BP LT 130 MM HG: CPT | Performed by: FAMILY MEDICINE

## 2020-03-17 PROCEDURE — 3008F BODY MASS INDEX DOCD: CPT | Performed by: FAMILY MEDICINE

## 2020-03-17 PROCEDURE — 3044F HG A1C LEVEL LT 7.0%: CPT | Performed by: FAMILY MEDICINE

## 2020-03-17 NOTE — ASSESSMENT & PLAN NOTE
Chronic asymptomatic fair control continue current diet encouraged patient avoid provoke food do not eat and lie down

## 2020-03-17 NOTE — PROGRESS NOTES
Subjective:   Chief Complaint   Patient presents with    Follow-up     chronic conditions        Patient ID: Josefina Chambers is a 81 y o  male  Patient here follow-up with a chronic condition patient history of the hypertension blood pressure fair control on current medication tolerated well without any side effect deny any chest pain short of breath no palpitation no dyspnea on exertion and no lower extremity edema patient history of hyperlipidemia on atorvastatin tolerated well deny any muscle pain or rash deny any chest pain short of breath no palpitation no TIA symptom patient history of indigestion he is off of the pantoprazole deny any heartburn no abdomen pain nausea vomiting or diarrhea no weight loss no blood in the urine patient was history of the benign prostatic hypertrophy he does follow up with the Urology periodically he tolerated his medication without side effect deny any abdomen pain no flank pain no blood in the urine recent blood work discussed with the patient      The following portions of the patient's history were reviewed and updated as appropriate: allergies, current medications, past family history, past medical history, past social history, past surgical history and problem list     Review of Systems   Constitutional: Negative for fatigue and fever  HENT: Negative for ear pain, sinus pressure, sinus pain and sore throat  Eyes: Negative for pain and redness  Respiratory: Negative for cough, chest tightness and shortness of breath  Cardiovascular: Negative for chest pain, palpitations and leg swelling  Gastrointestinal: Negative for abdominal pain, blood in stool, constipation, diarrhea and nausea  Genitourinary: Negative for flank pain, frequency and hematuria  Musculoskeletal: Negative for back pain and joint swelling  Skin: Negative for rash  Neurological: Negative for dizziness, numbness and headaches  Hematological: Does not bruise/bleed easily  Objective:  Vitals:    03/17/20 0940   BP: 120/80   Pulse: 57   Temp: 97 8 °F (36 6 °C)   TempSrc: Tympanic   SpO2: 97%   Weight: 106 kg (234 lb)   Height: 6' 1" (1 854 m)      Physical Exam   Constitutional: He is oriented to person, place, and time  He appears well-developed and well-nourished  HENT:   Head: Normocephalic  Right Ear: External ear normal    Left Ear: External ear normal    Eyes: Conjunctivae and EOM are normal  Right eye exhibits no discharge  Left eye exhibits no discharge  Neck: No JVD present  Cardiovascular: Normal rate, regular rhythm and normal heart sounds  Exam reveals no gallop  No murmur heard  Pulmonary/Chest: Effort normal  No respiratory distress  He has no wheezes  He has no rales  He exhibits no tenderness  Abdominal: He exhibits no mass  There is no tenderness  There is no rebound  Musculoskeletal: He exhibits no edema or tenderness  Neurological: He is alert and oriented to person, place, and time  Skin: No rash noted  No erythema  Assessment/Plan:    Essential hypertension  A chronic asymptomatic fair control continue current management low-salt diet increase physical activity discussed with the patient important lose weight    Benign prostatic hyperplasia  Chronic asymptomatic fair control continue current management    Hyperlipidemia  Chronic asymptomatic fair control LDL therapeutic in diabetic patient continue current dose of atorvastatin low-fat diet discussed with the patient important lose weight    Indigestion  Chronic asymptomatic fair control continue current diet encouraged patient avoid provoke food do not eat and lie down       Diagnoses and all orders for this visit:    Essential hypertension  -     CBC and differential; Future  -     Lipid Panel with Direct LDL reflex;  Future  -     Hemoglobin A1C; Future    Benign prostatic hyperplasia without lower urinary tract symptoms    Mixed hyperlipidemia  -     CBC and differential;

## 2020-03-17 NOTE — ASSESSMENT & PLAN NOTE
A chronic asymptomatic fair control continue current management low-salt diet increase physical activity discussed with the patient important lose weight

## 2020-03-17 NOTE — ASSESSMENT & PLAN NOTE
Chronic asymptomatic fair control LDL therapeutic in diabetic patient continue current dose of atorvastatin low-fat diet discussed with the patient important lose weight

## 2020-03-25 DIAGNOSIS — I10 ESSENTIAL HYPERTENSION: ICD-10-CM

## 2020-03-25 RX ORDER — AMLODIPINE BESYLATE 5 MG/1
TABLET ORAL
Qty: 90 TABLET | Refills: 0 | Status: SHIPPED | OUTPATIENT
Start: 2020-03-25 | End: 2020-06-29

## 2020-04-03 DIAGNOSIS — E78.2 MIXED HYPERLIPIDEMIA: ICD-10-CM

## 2020-04-03 DIAGNOSIS — N40.0 BENIGN PROSTATIC HYPERPLASIA, UNSPECIFIED WHETHER LOWER URINARY TRACT SYMPTOMS PRESENT: ICD-10-CM

## 2020-04-03 RX ORDER — ATORVASTATIN CALCIUM 40 MG/1
TABLET, FILM COATED ORAL
Qty: 90 TABLET | Refills: 1 | Status: SHIPPED | OUTPATIENT
Start: 2020-04-03 | End: 2020-09-21

## 2020-04-03 RX ORDER — FINASTERIDE 5 MG/1
TABLET, FILM COATED ORAL
Qty: 90 TABLET | Refills: 1 | Status: SHIPPED | OUTPATIENT
Start: 2020-04-03 | End: 2020-10-04

## 2020-04-20 DIAGNOSIS — E11.9 TYPE 2 DIABETES MELLITUS WITHOUT COMPLICATION, UNSPECIFIED WHETHER LONG TERM INSULIN USE (HCC): ICD-10-CM

## 2020-06-29 DIAGNOSIS — I10 ESSENTIAL HYPERTENSION: ICD-10-CM

## 2020-06-29 RX ORDER — AMLODIPINE BESYLATE 5 MG/1
TABLET ORAL
Qty: 90 TABLET | Refills: 0 | Status: SHIPPED | OUTPATIENT
Start: 2020-06-29 | End: 2020-09-21

## 2020-06-30 DIAGNOSIS — N52.9 MALE ERECTILE DISORDER: ICD-10-CM

## 2020-07-01 RX ORDER — SILDENAFIL 100 MG/1
100 TABLET, FILM COATED ORAL DAILY PRN
Qty: 10 TABLET | Refills: 0 | Status: SHIPPED | OUTPATIENT
Start: 2020-07-01 | End: 2021-05-14

## 2020-07-08 ENCOUNTER — APPOINTMENT (OUTPATIENT)
Dept: LAB | Facility: CLINIC | Age: 70
End: 2020-07-08
Payer: MEDICARE

## 2020-07-08 DIAGNOSIS — I10 ESSENTIAL HYPERTENSION: ICD-10-CM

## 2020-07-08 DIAGNOSIS — E78.2 MIXED HYPERLIPIDEMIA: ICD-10-CM

## 2020-07-08 LAB
BASOPHILS # BLD AUTO: 0.03 THOUSANDS/ΜL (ref 0–0.1)
BASOPHILS NFR BLD AUTO: 1 % (ref 0–1)
CHOLEST SERPL-MCNC: 116 MG/DL (ref 50–200)
EOSINOPHIL # BLD AUTO: 0.25 THOUSAND/ΜL (ref 0–0.61)
EOSINOPHIL NFR BLD AUTO: 5 % (ref 0–6)
ERYTHROCYTE [DISTWIDTH] IN BLOOD BY AUTOMATED COUNT: 12.5 % (ref 11.6–15.1)
EST. AVERAGE GLUCOSE BLD GHB EST-MCNC: 117 MG/DL
HBA1C MFR BLD: 5.7 %
HCT VFR BLD AUTO: 43.9 % (ref 36.5–49.3)
HDLC SERPL-MCNC: 46 MG/DL
HGB BLD-MCNC: 14.2 G/DL (ref 12–17)
IMM GRANULOCYTES # BLD AUTO: 0.01 THOUSAND/UL (ref 0–0.2)
IMM GRANULOCYTES NFR BLD AUTO: 0 % (ref 0–2)
LDLC SERPL CALC-MCNC: 57 MG/DL (ref 0–100)
LYMPHOCYTES # BLD AUTO: 1.52 THOUSANDS/ΜL (ref 0.6–4.47)
LYMPHOCYTES NFR BLD AUTO: 28 % (ref 14–44)
MCH RBC QN AUTO: 31.9 PG (ref 26.8–34.3)
MCHC RBC AUTO-ENTMCNC: 32.3 G/DL (ref 31.4–37.4)
MCV RBC AUTO: 99 FL (ref 82–98)
MONOCYTES # BLD AUTO: 0.54 THOUSAND/ΜL (ref 0.17–1.22)
MONOCYTES NFR BLD AUTO: 10 % (ref 4–12)
NEUTROPHILS # BLD AUTO: 3 THOUSANDS/ΜL (ref 1.85–7.62)
NEUTS SEG NFR BLD AUTO: 56 % (ref 43–75)
NRBC BLD AUTO-RTO: 0 /100 WBCS
PLATELET # BLD AUTO: 135 THOUSANDS/UL (ref 149–390)
PMV BLD AUTO: 10.7 FL (ref 8.9–12.7)
RBC # BLD AUTO: 4.45 MILLION/UL (ref 3.88–5.62)
TRIGL SERPL-MCNC: 64 MG/DL
WBC # BLD AUTO: 5.35 THOUSAND/UL (ref 4.31–10.16)

## 2020-07-08 PROCEDURE — 80061 LIPID PANEL: CPT

## 2020-07-08 PROCEDURE — 36415 COLL VENOUS BLD VENIPUNCTURE: CPT

## 2020-07-08 PROCEDURE — 83036 HEMOGLOBIN GLYCOSYLATED A1C: CPT

## 2020-07-08 PROCEDURE — 85025 COMPLETE CBC W/AUTO DIFF WBC: CPT

## 2020-07-19 DIAGNOSIS — I10 ESSENTIAL HYPERTENSION: ICD-10-CM

## 2020-07-19 DIAGNOSIS — E11.9 TYPE 2 DIABETES MELLITUS WITHOUT COMPLICATION, UNSPECIFIED WHETHER LONG TERM INSULIN USE (HCC): ICD-10-CM

## 2020-07-19 RX ORDER — LISINOPRIL 10 MG/1
TABLET ORAL
Qty: 90 TABLET | Refills: 0 | Status: SHIPPED | OUTPATIENT
Start: 2020-07-19 | End: 2020-10-04

## 2020-08-14 ENCOUNTER — OFFICE VISIT (OUTPATIENT)
Dept: FAMILY MEDICINE CLINIC | Facility: CLINIC | Age: 70
End: 2020-08-14
Payer: MEDICARE

## 2020-08-14 VITALS
SYSTOLIC BLOOD PRESSURE: 110 MMHG | DIASTOLIC BLOOD PRESSURE: 70 MMHG | WEIGHT: 236 LBS | HEART RATE: 60 BPM | TEMPERATURE: 96.8 F | OXYGEN SATURATION: 97 % | BODY MASS INDEX: 31.97 KG/M2 | HEIGHT: 72 IN

## 2020-08-14 DIAGNOSIS — N40.0 BENIGN PROSTATIC HYPERPLASIA WITHOUT LOWER URINARY TRACT SYMPTOMS: ICD-10-CM

## 2020-08-14 DIAGNOSIS — I10 ESSENTIAL HYPERTENSION: ICD-10-CM

## 2020-08-14 DIAGNOSIS — R06.83 SNORING: Primary | ICD-10-CM

## 2020-08-14 DIAGNOSIS — E11.9 TYPE 2 DIABETES MELLITUS WITHOUT COMPLICATION, WITHOUT LONG-TERM CURRENT USE OF INSULIN (HCC): ICD-10-CM

## 2020-08-14 DIAGNOSIS — Z12.5 ENCOUNTER FOR SCREENING FOR MALIGNANT NEOPLASM OF PROSTATE: ICD-10-CM

## 2020-08-14 DIAGNOSIS — E78.2 MIXED HYPERLIPIDEMIA: ICD-10-CM

## 2020-08-14 PROCEDURE — 1036F TOBACCO NON-USER: CPT | Performed by: FAMILY MEDICINE

## 2020-08-14 PROCEDURE — 3078F DIAST BP <80 MM HG: CPT | Performed by: FAMILY MEDICINE

## 2020-08-14 PROCEDURE — 3044F HG A1C LEVEL LT 7.0%: CPT | Performed by: FAMILY MEDICINE

## 2020-08-14 PROCEDURE — 3008F BODY MASS INDEX DOCD: CPT | Performed by: FAMILY MEDICINE

## 2020-08-14 PROCEDURE — 2022F DILAT RTA XM EVC RTNOPTHY: CPT | Performed by: FAMILY MEDICINE

## 2020-08-14 PROCEDURE — 4040F PNEUMOC VAC/ADMIN/RCVD: CPT | Performed by: FAMILY MEDICINE

## 2020-08-14 PROCEDURE — 3074F SYST BP LT 130 MM HG: CPT | Performed by: FAMILY MEDICINE

## 2020-08-14 PROCEDURE — 99214 OFFICE O/P EST MOD 30 MIN: CPT | Performed by: FAMILY MEDICINE

## 2020-08-14 PROCEDURE — 1160F RVW MEDS BY RX/DR IN RCRD: CPT | Performed by: FAMILY MEDICINE

## 2020-08-14 NOTE — ASSESSMENT & PLAN NOTE
A chronic asymptomatic fair control on current management patient does cough follow up with the Urology periodically he is due for the PSA yearly and in December will order today

## 2020-08-14 NOTE — ASSESSMENT & PLAN NOTE
Chronic asymptomatic fair control LDL therapeutic in diabetic patient continue current dose of atorvastatin 40 mg once a day

## 2020-08-14 NOTE — ASSESSMENT & PLAN NOTE
Lab Results   Component Value Date    HGBA1C 5 7 (H) 07/08/2020     Chronic asymptomatic continue metformin 500 mg twice a day hemoglobin A1c will control discussed with the patient low carb diet important lose weight increased physical activity patient already on a statin and patient already on Ace inhibitor

## 2020-08-14 NOTE — ASSESSMENT & PLAN NOTE
Chronic asymptomatic fair control continue current management including amlodipine 5 mg lisinopril 10 mg low-salt diet increase physical activity important lose weight discussed with the patient

## 2020-08-14 NOTE — PROGRESS NOTES
BMI Counseling: Body mass index is 32 01 kg/m²  The BMI is above normal  Nutrition recommendations include consuming healthier snacks and limiting drinks that contain sugar  Exercise recommendations include exercising 3-5 times per week  Subjective:   Chief Complaint   Patient presents with    Follow-up     chronic conditions        Patient ID: Merle Wheeler is a 81 y o  male  Patient here follow-up with a chronic condition patient history of non-insulin-dependent diabetic on metformin 500 twice a day tolerated well without any side effect patient asymptomatic deny any increased thirsty increased frequency urination no dizziness no headache and no abdomen pain patient was history of hyperlipidemia on atorvastatin and no rash no muscle pain deny any chest pain short of breath no palpitation no TIA symptom patient history of hypertension blood pressure fair control on current medication tolerated well without any side effect a deny any chest pain short of breath no palpitation no dyspnea on exertion and no lower extremity edema  Patient with BMI 32 he been concerned about the snoring and sometimes get up at night gasping for air fatigue on and off no major weight change no rash and no muscle atrophy  Recent blood work discussed with the patient      The following portions of the patient's history were reviewed and updated as appropriate: allergies, current medications, past family history, past medical history, past social history, past surgical history and problem list     Review of Systems   Constitutional: Negative for activity change, appetite change, fatigue and fever  Snoring   HENT: Negative for congestion, ear pain, sinus pressure, sinus pain and sore throat  Eyes: Negative for pain, discharge, redness and itching  Respiratory: Negative for cough, chest tightness, shortness of breath and stridor  Cardiovascular: Negative for chest pain, palpitations and leg swelling  Gastrointestinal: Negative for abdominal pain, blood in stool, constipation, diarrhea and nausea  Genitourinary: Negative for dysuria, flank pain, frequency and hematuria  Musculoskeletal: Negative for back pain, joint swelling and neck pain  Skin: Negative for pallor and rash  Neurological: Negative for dizziness, tremors, weakness, numbness and headaches  Hematological: Does not bruise/bleed easily  Objective:  Vitals:    08/14/20 0826   BP: 110/70   Pulse: 60   Temp: (!) 96 8 °F (36 °C)   TempSrc: Tympanic   SpO2: 97%   Weight: 107 kg (236 lb)   Height: 6' (1 829 m)      Physical Exam  Constitutional:       General: He is not in acute distress  Appearance: He is well-developed  He is not diaphoretic  HENT:      Head: Normocephalic  Right Ear: Tympanic membrane, ear canal and external ear normal       Left Ear: Tympanic membrane, ear canal and external ear normal       Nose: Nose normal  No congestion or rhinorrhea  Mouth/Throat:      Mouth: Mucous membranes are moist       Pharynx: Oropharynx is clear  No oropharyngeal exudate or posterior oropharyngeal erythema  Eyes:      General:         Right eye: No discharge  Left eye: No discharge  Conjunctiva/sclera: Conjunctivae normal    Neck:      Musculoskeletal: Normal range of motion and neck supple  Vascular: No JVD  Cardiovascular:      Rate and Rhythm: Normal rate and regular rhythm  Heart sounds: Normal heart sounds  No murmur  No gallop  Pulmonary:      Effort: Pulmonary effort is normal  No respiratory distress  Breath sounds: Normal breath sounds  No stridor  No wheezing or rales  Chest:      Chest wall: No tenderness  Abdominal:      General: There is no distension  Palpations: Abdomen is soft  There is no mass  Tenderness: There is no abdominal tenderness  There is no rebound  Musculoskeletal: Normal range of motion  General: No tenderness     Lymphadenopathy: Cervical: No cervical adenopathy  Skin:     General: Skin is warm  Findings: No erythema or rash  Neurological:      Mental Status: He is alert and oriented to person, place, and time  Sensory: No sensory deficit  Gait: Gait normal    Psychiatric:         Mood and Affect: Mood normal          Behavior: Behavior normal            Assessment/Plan:    Type 2 diabetes mellitus (HCC)    Lab Results   Component Value Date    HGBA1C 5 7 (H) 07/08/2020     Chronic asymptomatic continue metformin 500 mg twice a day hemoglobin A1c will control discussed with the patient low carb diet important lose weight increased physical activity patient already on a statin and patient already on Ace inhibitor    Snoring  New diagnosis symptomatic discussed with the patient sleep position and the important lose weight and plan to refer the patient to have a sleep study    BMI 32 0-32 9,adult  Chronic asymptomatic uncontrolled discussed portion control low carb low-fat diet increase physical activity    Hyperlipidemia  Chronic asymptomatic fair control LDL therapeutic in diabetic patient continue current dose of atorvastatin 40 mg once a day    Benign prostatic hyperplasia  A chronic asymptomatic fair control on current management patient does cough follow up with the Urology periodically he is due for the PSA yearly and in December will order today    Essential hypertension  Chronic asymptomatic fair control continue current management including amlodipine 5 mg lisinopril 10 mg low-salt diet increase physical activity important lose weight discussed with the patient       Diagnoses and all orders for this visit:    Snoring  -     Ambulatory referral to Sleep Medicine; Future    Type 2 diabetes mellitus without complication, without long-term current use of insulin (HCC)  -     CBC and differential; Future  -     Basic metabolic panel; Future  -     Lipid Panel with Direct LDL reflex;  Future    Essential hypertension  -     CBC and differential; Future  -     Basic metabolic panel; Future  -     Lipid Panel with Direct LDL reflex; Future    Benign prostatic hyperplasia without lower urinary tract symptoms    BMI 32 0-32 9,adult  -     CBC and differential; Future  -     Basic metabolic panel; Future  -     Lipid Panel with Direct LDL reflex;  Future    Encounter for screening for malignant neoplasm of prostate     Mixed hyperlipidemia

## 2020-08-14 NOTE — ASSESSMENT & PLAN NOTE
New diagnosis symptomatic discussed with the patient sleep position and the important lose weight and plan to refer the patient to have a sleep study

## 2020-08-14 NOTE — ASSESSMENT & PLAN NOTE
Chronic asymptomatic uncontrolled discussed portion control low carb low-fat diet increase physical activity

## 2020-09-20 DIAGNOSIS — E78.2 MIXED HYPERLIPIDEMIA: ICD-10-CM

## 2020-09-20 DIAGNOSIS — I10 ESSENTIAL HYPERTENSION: ICD-10-CM

## 2020-09-21 RX ORDER — AMLODIPINE BESYLATE 5 MG/1
TABLET ORAL
Qty: 90 TABLET | Refills: 1 | Status: SHIPPED | OUTPATIENT
Start: 2020-09-21 | End: 2021-03-17 | Stop reason: SDUPTHER

## 2020-09-21 RX ORDER — ATORVASTATIN CALCIUM 40 MG/1
TABLET, FILM COATED ORAL
Qty: 90 TABLET | Refills: 1 | Status: SHIPPED | OUTPATIENT
Start: 2020-09-21 | End: 2021-03-17 | Stop reason: SDUPTHER

## 2020-09-29 ENCOUNTER — OFFICE VISIT (OUTPATIENT)
Dept: SLEEP CENTER | Facility: CLINIC | Age: 70
End: 2020-09-29
Payer: MEDICARE

## 2020-09-29 VITALS
HEART RATE: 58 BPM | BODY MASS INDEX: 30.6 KG/M2 | DIASTOLIC BLOOD PRESSURE: 72 MMHG | SYSTOLIC BLOOD PRESSURE: 122 MMHG | OXYGEN SATURATION: 98 % | TEMPERATURE: 97.1 F | HEIGHT: 74 IN | WEIGHT: 238.4 LBS

## 2020-09-29 DIAGNOSIS — R06.83 SNORING: ICD-10-CM

## 2020-09-29 DIAGNOSIS — G47.19 EXCESSIVE DAYTIME SLEEPINESS: ICD-10-CM

## 2020-09-29 DIAGNOSIS — G47.33 OBSTRUCTIVE SLEEP APNEA: Primary | ICD-10-CM

## 2020-09-29 PROCEDURE — 99204 OFFICE O/P NEW MOD 45 MIN: CPT | Performed by: NURSE PRACTITIONER

## 2020-09-29 NOTE — PATIENT INSTRUCTIONS
1  Schedule diagnostic sleep study with CPAP titration study to follow, if needed  2  Schedule follow up visit to review study results  3  Schedule DME appointment for CPAP equipment, if needed      Nursing Support:  When: Monday through Friday 7A-5PM except holidays  Where: Our direct line is [phone]  If you are having a true emergency please call 911  In the event that the line is busy or it is after hours please leave a voice message and we will return your call  Please speak clearly, leaving your full name, birth date, best number to reach you and the reason for your call  Medication refills: We will need the name of the medication, the dosage, the ordering provider, whether you get a 30 or 90 day refill, and the pharmacy name and address  Medications will be ordered by the provider only  Nurses cannot call in prescriptions  Please allow 7 days for medication refills  Physician requested updates: If your provider requested that you call with an update after starting medication, please be ready to provide us the medication and dosage, what time you take your medication, the time you attempt to fall asleep, time you fall asleep, when you wake up, and what time you get out of bed  Sleep Study Results: We will contact you with sleep study results and/or next steps after the physician has reviewed your testing

## 2020-09-29 NOTE — PROGRESS NOTES
Consultation - 24 [address] Goodwin, 1950, MRN: [id number]    9/29/2020        Reason for Consult / Principal Problem:  Evaluation of possible Obstructive Sleep Apnea  Excessive daytime sleepiness  Obesity       Thank you for the opportunity of participating in the evaluation and care of this patient in the Sleep Clinic at [facility]  Subjective:     HPI: Iker Carranza is a 78y o  year old male  He presents for a consultation regarding evaluation of possible obstructive sleep apnea  His wife reported snoring and choking/gasping after brief periods of apnea  He experiences some difficulty with his mind racing at bedtime, causing difficulty with sleep initiation on some nights  He does not always awaken feeling refreshed and may experience some mental fogginess in the am   He becomes sleepy in the afternoon hours, which has been worsening  In addition to insomnia and EDS, his comorbid conditions include mild obesity, HTN, type 2 diabetes, GERD  Review of Systems      Genitourinary need to urinate more than twice a night and difficulty with erection   Cardiology ankle/leg swelling   Gastrointestinal frequent heartburn/acid reflux   Neurology difficulty with memory and balance problems   Constitutional fatigue and excessive sweating at night   Integumentary rash or dry skin   Psychiatry none   Musculoskeletal muscle aches and back pain   Pulmonary frequent cough and snoring   ENT none   Endocrine frequent urination   Hematological none       Employment:  He currently works full time as a , working 9:00am-5:00pm    Sleep Schedule:       Bedtime:  11:00-11:30pm      Latency:  15 minutes      Wakeup time:  8:00-9:00am without the use of an alarm    Awakenings:       Frequency:  2 times per night      Causes:   For urination nightly and occasionally reflux symptoms 2 times per week       Duration:  He takes pepcid or tums and may be up for an hour    Daytime Sleepiness / Inappropriate Sleep:       Most severe:  Late afternoon 3-4:00pm       Naps :  He does not intentionally take naps      Inappropriate drowsiness / sleep:  He may doze off in the afternoon, when daytime has been physically active    Snoring:  He snores with interrupted breathing with gagging and gasping    Apnea:  He has had witnessed apnea    Change in Weight:  Weight has been relatively stable    Restless Leg Syndrome:  No clinical symptoms consistent with this diagnosis     Other Complaints:  No reports of sleep walking, sleep talking, sleep paralysis or hallucinations surrounding sleep  He awakens with some frontal headaches at times, which he attributes to sinus pressure  He may have had bruxism in the past with no treatment and no associated symptoms  Social History:      Caffeine:  3-4 cups of coffee in the am, may have a cup in the afternoon       Tobacco:   reports that he has quit smoking  His smoking use included pipe and cigarettes  He has quit using smokeless tobacco      E-cig/Vaping:    E-Cigarette/Vaping    E-Cigarette Use Never User       E-Cigarette/Vaping Substances    Nicotine No     THC No     CBD No     Flavoring No     Other No     Unknown No          Alcohol:   reports current alcohol use  Wine with dinner, no later than 8:00pm      Drugs:   reports no history of drug use       Reports has vaped medical marijuana at bedtime with improvement in sleep initiation insomnia       The review of systems and following portions of the patient's history were reviewed and updated as appropriate: allergies, current medications, past family history, past medical history, past social history, past surgical history and problem list         Objective:       Vitals:    09/29/20 0900   BP: 122/72   Pulse: 58   Temp: (!) 97 1 °F (36 2 °C)   SpO2: 98%   Weight: 108 kg (238 lb 6 4 oz)   Height: 6' 2" (1 88 m)     Body mass index is 30 61 kg/m²  Neck Circumference: 17  Irene Sleepiness Scale:  Total score: 12      Current Outpatient Medications:     amLODIPine (NORVASC) 5 mg tablet, TAKE 1 TABLET DAILY, Disp: 90 tablet, Rfl: 1    aspirin (ASPIR-81) 81 mg EC tablet, take 1 tablet (81MG), Disp: , Rfl:     atorvastatin (LIPITOR) 40 mg tablet, TAKE 1 TABLET DAILY, Disp: 90 tablet, Rfl: 1    finasteride (PROSCAR) 5 mg tablet, TAKE 1 TABLET DAILY, Disp: 90 tablet, Rfl: 1    fluticasone (FLONASE) 50 mcg/act nasal spray, 1 spray into each nostril daily, Disp: 1 Bottle, Rfl: 0    hydrocortisone 2 5 % cream, Apply topically every 24 hours, Disp: 56 7 g, Rfl: 0    ketoconazole (NIZORAL) 2 % cream, APPLY TO RASH ON FACE DAILY AS NEEDED FOR FLARES, Disp: , Rfl: 2    ketoconazole (NIZORAL) 2 % shampoo, WASH SCALP AND FACE 2-3 TIMES A WEEK AS NEEDED FOR FLARES, Disp: , Rfl: 5    lisinopril (ZESTRIL) 10 mg tablet, TAKE 1 TABLET DAILY, Disp: 90 tablet, Rfl: 0    metFORMIN (GLUCOPHAGE) 500 mg tablet, TAKE 1 TABLET TWICE A DAY  WITH MEALS, Disp: 180 tablet, Rfl: 0    sildenafil (VIAGRA) 100 mg tablet, Take 1 tablet (100 mg total) by mouth daily as needed for erectile dysfunction, Disp: 10 tablet, Rfl: 0    tadalafil (CIALIS) 5 MG tablet, Take 1 tablet (5 mg total) by mouth daily, Disp: 90 tablet, Rfl: 3    tamsulosin (FLOMAX) 0 4 mg, Take 1 capsule (0 4 mg total) by mouth daily with dinner, Disp: 90 capsule, Rfl: 1    Physical Exam  General Appearance:   Alert, cooperative, no distress, appears stated age, mildly obese     Head:   Normocephalic, without obvious abnormality, atraumatic     Eyes:   PERRL, conjunctiva/corneas clear, EOM's intact          Nose:  Nares normal, septum slightly deviated, mucosa normal, no drainage or sinus tenderness           Throat:  Lips, teeth and gums normal; tongue normal size and  shape and midline in position; mucosa moist and redundant bilaterally, uvula thick and dipping below the base of the tongue, tonsils not visualized, Mallampati class 4       Neck:  Supple, symmetrical, trachea midline, no adenopathy; Thyroid: No enlargement, tenderness or nodules; no carotid bruit or JVD     Lungs:      Clear to auscultation bilaterally, respirations unlabored     Heart:   Regular rate and rhythm, S1 and S2 normal, no murmur, rub or gallop       Extremities:  Extremities normal, atraumatic, no cyanosis and +1 pitting edema on right LE, no edema noted on left       Skin:  Skin color, texture, turgor normal, no rashes or lesions       Neurologic:  No focal deficits noted  Normal strength, sensation throughout     Sleep Study Results:  No prior sleep study      ASSESSMENT / PLAN     1  Obstructive sleep apnea  Diagnostic Sleep Study    CPAP Study   2  Snoring  Ambulatory referral to Sleep Medicine    Diagnostic Sleep Study    CPAP Study   3  Excessive daytime sleepiness  Diagnostic Sleep Study    CPAP Study         Counseling / Coordination of Care  Total clinic time spent today 50 minutes  Greater than 50% of total time was spent with the patient and / or family counseling and / or coordination of care  A description of the counseling / coordination of care:     diagnostic results, instructions for management, risk factor reductions, prognosis, patient and family education, impressions, risks and benefits of treatment options and importance of compliance with treatment    Today we discussed the anatomy and physiology of the upper airway  I pointed out how changes in this region can result in both snoring and abnormal breathing events including apneas and hypopneas  I explained the most common co-morbidities of untreated sleep apnea  After this we talked about some forms of treatment including application of positive airway pressure, mandibular advancement devices and surgery       In order to evaluate the possibility of Obstructive Sleep Apnea as a cause of the patient's symptoms, a diagnostic sleep study will be completed to identify the presence or absence of abnormal nocturnal breathing  If significant abnormal nocturnal breathing is detected, nasal CPAP will be titrated to find the optimum pressure needed to maintain upper airway patency during sleep  Following testing, the patient will return to the Sleep [address] [place] [address] for a review of the test results and to discuss possible treatment options  Will plan for set up of CPAP equipment that day, if needed  The following instructions have been given to the patient today:    Patient Instructions   1  Schedule diagnostic sleep study with CPAP titration study to follow, if needed  2  Schedule follow up visit to review study results  3  Schedule DME appointment for CPAP equipment, if needed      Nursing Support:  When: Monday through Friday 7A-5PM except holidays  Where: Our direct line is [phone]  If you are having a true emergency please call 911  In the event that the line is busy or it is after hours please leave a voice message and we will return your call  Please speak clearly, leaving your full name, birth date, best number to reach you and the reason for your call  Medication refills: We will need the name of the medication, the dosage, the ordering provider, whether you get a 30 or 90 day refill, and the pharmacy name and address  Medications will be ordered by the provider only  Nurses cannot call in prescriptions  Please allow 7 days for medication refills  Physician requested updates: If your provider requested that you call with an update after starting medication, please be ready to provide us the medication and dosage, what time you take your medication, the time you attempt to fall asleep, time you fall asleep, when you wake up, and what time you get out of bed  Sleep Study Results: We will contact you with sleep study results and/or next steps after the physician has reviewed your testing        NISHANT Epstein  [facility]

## 2020-10-04 DIAGNOSIS — E11.9 TYPE 2 DIABETES MELLITUS WITHOUT COMPLICATION, UNSPECIFIED WHETHER LONG TERM INSULIN USE (HCC): ICD-10-CM

## 2020-10-04 DIAGNOSIS — I10 ESSENTIAL HYPERTENSION: ICD-10-CM

## 2020-10-04 DIAGNOSIS — N40.0 BENIGN PROSTATIC HYPERPLASIA, UNSPECIFIED WHETHER LOWER URINARY TRACT SYMPTOMS PRESENT: ICD-10-CM

## 2020-10-04 RX ORDER — FINASTERIDE 5 MG/1
TABLET, FILM COATED ORAL
Qty: 90 TABLET | Refills: 1 | Status: SHIPPED | OUTPATIENT
Start: 2020-10-04 | End: 2021-04-05

## 2020-10-04 RX ORDER — LISINOPRIL 10 MG/1
TABLET ORAL
Qty: 90 TABLET | Refills: 0 | Status: SHIPPED | OUTPATIENT
Start: 2020-10-04 | End: 2020-12-26

## 2020-10-19 DIAGNOSIS — N40.0 BENIGN PROSTATIC HYPERPLASIA, UNSPECIFIED WHETHER LOWER URINARY TRACT SYMPTOMS PRESENT: ICD-10-CM

## 2020-10-19 RX ORDER — TAMSULOSIN HYDROCHLORIDE 0.4 MG/1
0.4 CAPSULE ORAL
Qty: 90 CAPSULE | Refills: 1 | Status: SHIPPED | OUTPATIENT
Start: 2020-10-19 | End: 2021-04-25

## 2020-11-20 ENCOUNTER — APPOINTMENT (OUTPATIENT)
Dept: LAB | Facility: CLINIC | Age: 70
End: 2020-11-20
Payer: MEDICARE

## 2020-11-20 DIAGNOSIS — E11.9 TYPE 2 DIABETES MELLITUS WITHOUT COMPLICATION, WITHOUT LONG-TERM CURRENT USE OF INSULIN (HCC): ICD-10-CM

## 2020-11-20 DIAGNOSIS — Z12.5 ENCOUNTER FOR SCREENING FOR MALIGNANT NEOPLASM OF PROSTATE: ICD-10-CM

## 2020-11-20 DIAGNOSIS — I10 ESSENTIAL HYPERTENSION: ICD-10-CM

## 2020-11-20 DIAGNOSIS — N40.0 BENIGN PROSTATIC HYPERPLASIA WITHOUT LOWER URINARY TRACT SYMPTOMS: ICD-10-CM

## 2020-11-20 LAB
ANION GAP SERPL CALCULATED.3IONS-SCNC: 2 MMOL/L (ref 4–13)
BASOPHILS # BLD AUTO: 0.03 THOUSANDS/ΜL (ref 0–0.1)
BASOPHILS NFR BLD AUTO: 1 % (ref 0–1)
BUN SERPL-MCNC: 14 MG/DL (ref 5–25)
CALCIUM SERPL-MCNC: 9.2 MG/DL (ref 8.3–10.1)
CHLORIDE SERPL-SCNC: 108 MMOL/L (ref 100–108)
CHOLEST SERPL-MCNC: 109 MG/DL (ref 50–200)
CO2 SERPL-SCNC: 31 MMOL/L (ref 21–32)
CREAT SERPL-MCNC: 0.96 MG/DL (ref 0.6–1.3)
EOSINOPHIL # BLD AUTO: 0.35 THOUSAND/ΜL (ref 0–0.61)
EOSINOPHIL NFR BLD AUTO: 6 % (ref 0–6)
ERYTHROCYTE [DISTWIDTH] IN BLOOD BY AUTOMATED COUNT: 12.4 % (ref 11.6–15.1)
GFR SERPL CREATININE-BSD FRML MDRD: 81 ML/MIN/1.73SQ M
GLUCOSE P FAST SERPL-MCNC: 117 MG/DL (ref 65–99)
HCT VFR BLD AUTO: 42.5 % (ref 36.5–49.3)
HDLC SERPL-MCNC: 53 MG/DL
HGB BLD-MCNC: 14.2 G/DL (ref 12–17)
IMM GRANULOCYTES # BLD AUTO: 0.02 THOUSAND/UL (ref 0–0.2)
IMM GRANULOCYTES NFR BLD AUTO: 0 % (ref 0–2)
LDLC SERPL CALC-MCNC: 44 MG/DL (ref 0–100)
LYMPHOCYTES # BLD AUTO: 1.55 THOUSANDS/ΜL (ref 0.6–4.47)
LYMPHOCYTES NFR BLD AUTO: 26 % (ref 14–44)
MCH RBC QN AUTO: 32.8 PG (ref 26.8–34.3)
MCHC RBC AUTO-ENTMCNC: 33.4 G/DL (ref 31.4–37.4)
MCV RBC AUTO: 98 FL (ref 82–98)
MONOCYTES # BLD AUTO: 0.62 THOUSAND/ΜL (ref 0.17–1.22)
MONOCYTES NFR BLD AUTO: 10 % (ref 4–12)
NEUTROPHILS # BLD AUTO: 3.47 THOUSANDS/ΜL (ref 1.85–7.62)
NEUTS SEG NFR BLD AUTO: 57 % (ref 43–75)
NRBC BLD AUTO-RTO: 0 /100 WBCS
PLATELET # BLD AUTO: 141 THOUSANDS/UL (ref 149–390)
PMV BLD AUTO: 10.5 FL (ref 8.9–12.7)
POTASSIUM SERPL-SCNC: 3.9 MMOL/L (ref 3.5–5.3)
PSA SERPL-MCNC: 0.6 NG/ML (ref 0–4)
RBC # BLD AUTO: 4.33 MILLION/UL (ref 3.88–5.62)
SODIUM SERPL-SCNC: 141 MMOL/L (ref 136–145)
TRIGL SERPL-MCNC: 62 MG/DL
WBC # BLD AUTO: 6.04 THOUSAND/UL (ref 4.31–10.16)

## 2020-11-20 PROCEDURE — 80061 LIPID PANEL: CPT

## 2020-11-20 PROCEDURE — 85025 COMPLETE CBC W/AUTO DIFF WBC: CPT

## 2020-11-20 PROCEDURE — G0103 PSA SCREENING: HCPCS

## 2020-11-20 PROCEDURE — 36415 COLL VENOUS BLD VENIPUNCTURE: CPT

## 2020-11-20 PROCEDURE — 80048 BASIC METABOLIC PNL TOTAL CA: CPT

## 2020-11-23 ENCOUNTER — OFFICE VISIT (OUTPATIENT)
Dept: FAMILY MEDICINE CLINIC | Facility: CLINIC | Age: 70
End: 2020-11-23
Payer: MEDICARE

## 2020-11-23 VITALS
DIASTOLIC BLOOD PRESSURE: 80 MMHG | OXYGEN SATURATION: 97 % | BODY MASS INDEX: 32.23 KG/M2 | TEMPERATURE: 98 F | WEIGHT: 238 LBS | HEIGHT: 72 IN | SYSTOLIC BLOOD PRESSURE: 132 MMHG | HEART RATE: 56 BPM

## 2020-11-23 DIAGNOSIS — I10 ESSENTIAL HYPERTENSION: ICD-10-CM

## 2020-11-23 DIAGNOSIS — E11.9 TYPE 2 DIABETES MELLITUS WITHOUT COMPLICATION, WITHOUT LONG-TERM CURRENT USE OF INSULIN (HCC): ICD-10-CM

## 2020-11-23 DIAGNOSIS — E78.2 MIXED HYPERLIPIDEMIA: ICD-10-CM

## 2020-11-23 DIAGNOSIS — Z23 NEED FOR INFLUENZA VACCINATION: Primary | ICD-10-CM

## 2020-11-23 DIAGNOSIS — K30 INDIGESTION: ICD-10-CM

## 2020-11-23 PROCEDURE — 90662 IIV NO PRSV INCREASED AG IM: CPT

## 2020-11-23 PROCEDURE — G0008 ADMIN INFLUENZA VIRUS VAC: HCPCS

## 2020-11-23 PROCEDURE — 99214 OFFICE O/P EST MOD 30 MIN: CPT | Performed by: FAMILY MEDICINE

## 2020-12-18 ENCOUNTER — HOSPITAL ENCOUNTER (OUTPATIENT)
Dept: SLEEP CENTER | Facility: CLINIC | Age: 70
Discharge: HOME | End: 2020-12-18
Payer: MEDICARE

## 2020-12-18 DIAGNOSIS — R06.83 SNORING: ICD-10-CM

## 2020-12-18 DIAGNOSIS — G47.33 OBSTRUCTIVE SLEEP APNEA: ICD-10-CM

## 2020-12-18 DIAGNOSIS — G47.19 EXCESSIVE DAYTIME SLEEPINESS: ICD-10-CM

## 2020-12-18 PROCEDURE — 95810 POLYSOM 6/> YRS 4/> PARAM: CPT

## 2020-12-21 ENCOUNTER — TELEPHONE (OUTPATIENT)
Dept: SLEEP CENTER | Facility: CLINIC | Age: 70
End: 2020-12-21

## 2020-12-21 DIAGNOSIS — Z20.822 ENCOUNTER FOR PREPROCEDURE SCREENING LABORATORY TESTING FOR COVID-19: ICD-10-CM

## 2020-12-21 DIAGNOSIS — Z01.812 ENCOUNTER FOR PREPROCEDURE SCREENING LABORATORY TESTING FOR COVID-19: ICD-10-CM

## 2020-12-21 DIAGNOSIS — G47.33 OBSTRUCTIVE SLEEP APNEA: Primary | ICD-10-CM

## 2020-12-26 DIAGNOSIS — E11.9 TYPE 2 DIABETES MELLITUS WITHOUT COMPLICATION, UNSPECIFIED WHETHER LONG TERM INSULIN USE (HCC): ICD-10-CM

## 2020-12-26 DIAGNOSIS — I10 ESSENTIAL HYPERTENSION: ICD-10-CM

## 2020-12-26 RX ORDER — LISINOPRIL 10 MG/1
TABLET ORAL
Qty: 90 TABLET | Refills: 0 | Status: SHIPPED | OUTPATIENT
Start: 2020-12-26 | End: 2021-03-17 | Stop reason: SDUPTHER

## 2020-12-29 RX ORDER — ZOLPIDEM TARTRATE 10 MG/1
TABLET ORAL
Qty: 1 TABLET | Refills: 0 | Status: SHIPPED | OUTPATIENT
Start: 2020-12-29 | End: 2021-04-06 | Stop reason: ALTCHOICE

## 2020-12-30 ENCOUNTER — TELEPHONE (OUTPATIENT)
Dept: SLEEP CENTER | Facility: CLINIC | Age: 70
End: 2020-12-30

## 2021-01-14 ENCOUNTER — TELEPHONE (OUTPATIENT)
Dept: SLEEP CENTER | Facility: CLINIC | Age: 71
End: 2021-01-14

## 2021-01-22 ENCOUNTER — TELEPHONE (OUTPATIENT)
Dept: UROLOGY | Facility: MEDICAL CENTER | Age: 71
End: 2021-01-22

## 2021-01-22 NOTE — TELEPHONE ENCOUNTER
Call placed to patient and spoke with him  Informed him that PSA drawn in November can be used for his upcoming appointment in March  Pt is aware

## 2021-01-22 NOTE — TELEPHONE ENCOUNTER
This is a patient of Dr Lewis Hannon in [facility]  Patient called and had his PSA done by his primary in November and has an appointment in March with us  He wants to know if he needs another PSA  He goes to [facility]  Please call patient back at [phone]  He said a voice mail can be left on his phone

## 2021-01-28 DIAGNOSIS — Z01.812 ENCOUNTER FOR PREPROCEDURE SCREENING LABORATORY TESTING FOR COVID-19: ICD-10-CM

## 2021-01-28 DIAGNOSIS — Z20.822 ENCOUNTER FOR PREPROCEDURE SCREENING LABORATORY TESTING FOR COVID-19: ICD-10-CM

## 2021-01-28 PROCEDURE — U0003 INFECTIOUS AGENT DETECTION BY NUCLEIC ACID (DNA OR RNA); SEVERE ACUTE RESPIRATORY SYNDROME CORONAVIRUS 2 (SARS-COV-2) (CORONAVIRUS DISEASE [COVID-19]), AMPLIFIED PROBE TECHNIQUE, MAKING USE OF HIGH THROUGHPUT TECHNOLOGIES AS DESCRIBED BY CMS-2020-01-R: HCPCS

## 2021-01-28 PROCEDURE — U0005 INFEC AGEN DETEC AMPLI PROBE: HCPCS

## 2021-01-29 LAB — SARS-COV-2 RNA RESP QL NAA+PROBE: NEGATIVE

## 2021-02-07 ENCOUNTER — HOSPITAL ENCOUNTER (OUTPATIENT)
Dept: SLEEP CENTER | Facility: CLINIC | Age: 71
Discharge: HOME | End: 2021-02-07
Payer: MEDICARE

## 2021-02-07 DIAGNOSIS — G47.19 EXCESSIVE DAYTIME SLEEPINESS: ICD-10-CM

## 2021-02-07 DIAGNOSIS — G47.33 OBSTRUCTIVE SLEEP APNEA: ICD-10-CM

## 2021-02-07 DIAGNOSIS — R06.83 SNORING: ICD-10-CM

## 2021-02-07 PROCEDURE — 95811 POLYSOM 6/>YRS CPAP 4/> PARM: CPT

## 2021-02-07 PROCEDURE — 95811 POLYSOM 6/>YRS CPAP 4/> PARM: CPT | Performed by: INTERNAL MEDICINE

## 2021-02-09 DIAGNOSIS — G47.33 OBSTRUCTIVE SLEEP APNEA: Primary | ICD-10-CM

## 2021-02-09 NOTE — PROGRESS NOTES
CPAP titration study completed  Patient titrated to BiPAP IPAP 20cm/EPAP 16cm, using full face mask  Equipment ordered  Patient to be scheduled for set up of equipment with compliance follow up visit 31-91 days after set up

## 2021-02-11 ENCOUNTER — TELEPHONE (OUTPATIENT)
Dept: SLEEP CENTER | Facility: CLINIC | Age: 71
End: 2021-02-11

## 2021-02-11 NOTE — TELEPHONE ENCOUNTER
----- Message from Luis Alfredo Keene sent at 2/9/2021  4:05 PM EST -----  CPAP titration study completed  Patient titrated to BiPAP IPAP 20cm/EPAP 16cm, using full face mask  Equipment ordered  Patient to be scheduled for set up of equipment with compliance follow up visit 31-91 days after set up

## 2021-02-11 NOTE — TELEPHONE ENCOUNTER
Spoke with patient, advised above      Scheduled DME set up 3/8/2021 (patient request)  in [facility] representative aware    Compliance follow up scheduled 4/20/2021

## 2021-02-27 ENCOUNTER — IMMUNIZATIONS (OUTPATIENT)
Dept: FAMILY MEDICINE CLINIC | Facility: HOSPITAL | Age: 71
End: 2021-02-27

## 2021-02-27 DIAGNOSIS — Z23 ENCOUNTER FOR IMMUNIZATION: Primary | ICD-10-CM

## 2021-02-27 PROCEDURE — 91300 SARS-COV-2 / COVID-19 MRNA VACCINE (PFIZER-BIONTECH) 30 MCG: CPT

## 2021-02-27 PROCEDURE — 0001A SARS-COV-2 / COVID-19 MRNA VACCINE (PFIZER-BIONTECH) 30 MCG: CPT

## 2021-03-01 DIAGNOSIS — E11.9 TYPE 2 DIABETES MELLITUS WITHOUT COMPLICATION, UNSPECIFIED WHETHER LONG TERM INSULIN USE (HCC): ICD-10-CM

## 2021-03-03 ENCOUNTER — OFFICE VISIT (OUTPATIENT)
Dept: FAMILY MEDICINE CLINIC | Facility: CLINIC | Age: 71
End: 2021-03-03
Payer: MEDICARE

## 2021-03-03 VITALS
HEIGHT: 72 IN | BODY MASS INDEX: 33.46 KG/M2 | SYSTOLIC BLOOD PRESSURE: 110 MMHG | OXYGEN SATURATION: 97 % | TEMPERATURE: 98.1 F | WEIGHT: 247 LBS | DIASTOLIC BLOOD PRESSURE: 70 MMHG | HEART RATE: 58 BPM

## 2021-03-03 DIAGNOSIS — E78.2 MIXED HYPERLIPIDEMIA: ICD-10-CM

## 2021-03-03 DIAGNOSIS — K63.5 POLYP OF COLON, UNSPECIFIED PART OF COLON, UNSPECIFIED TYPE: ICD-10-CM

## 2021-03-03 DIAGNOSIS — Z00.00 MEDICARE ANNUAL WELLNESS VISIT, SUBSEQUENT: Primary | ICD-10-CM

## 2021-03-03 DIAGNOSIS — E11.9 TYPE 2 DIABETES MELLITUS WITHOUT COMPLICATION, WITHOUT LONG-TERM CURRENT USE OF INSULIN (HCC): ICD-10-CM

## 2021-03-03 LAB — SL AMB POCT HEMOGLOBIN AIC: 6 (ref ?–6.5)

## 2021-03-03 PROCEDURE — G0438 PPPS, INITIAL VISIT: HCPCS | Performed by: FAMILY MEDICINE

## 2021-03-03 PROCEDURE — 83036 HEMOGLOBIN GLYCOSYLATED A1C: CPT | Performed by: FAMILY MEDICINE

## 2021-03-03 PROCEDURE — 1123F ACP DISCUSS/DSCN MKR DOCD: CPT | Performed by: FAMILY MEDICINE

## 2021-03-03 NOTE — PATIENT INSTRUCTIONS

## 2021-03-03 NOTE — PROGRESS NOTES
Assessment and Plan:     Problem List Items Addressed This Visit        Digestive    Colon polyp      A patient history of colon polyp the colonoscopy 2018 recommend to repeat in 3 years patient due for 1 will contact the a GI            Endocrine    Type 2 diabetes mellitus ([facility] [address] )    Relevant Orders    POCT hemoglobin A1c (Completed)    Microalbumin / creatinine urine ratio    CBC and differential    Comprehensive metabolic panel    Lipid Panel with Direct LDL reflex    TSH, 3rd generation with Free T4 reflex       Other    Hyperlipidemia    Relevant Orders    Microalbumin / creatinine urine ratio    CBC and differential    Comprehensive metabolic panel    Lipid Panel with Direct LDL reflex    TSH, 3rd generation with Free T4 reflex    BMI 33 0-33 9,adult     Chronic uncontrolled portion control low carb low-fat diet and increased physical activity discussed with the patient         Medicare annual wellness visit, subsequent - Primary     Advice and education were given regarding nutrition, aerobic exercises, weight bearing exercises, cardiovascular risk reduction, fall risk reduction, and age appropriate supplements  The patient was counseled regarding instructions for management, risk factor reductions, prognosis, risks and benefits of treatment options, patient and family education, and importance of compliance with treatment  BMI Counseling: Body mass index is 33 5 kg/m²  The BMI is above normal  Nutrition recommendations include decreasing portion sizes, decreasing fast food intake and limiting drinks that contain sugar  Exercise recommendations include exercising 3-5 times per week  No pharmacotherapy was ordered  Patient referred to PCP due to patient being overweight  Preventive health issues were discussed with patient, and age appropriate screening tests were ordered as noted in patient's After Visit Summary    Personalized health advice and appropriate referrals for health education or preventive services given if needed, as noted in patient's After Visit Summary       History of Present Illness:     Patient presents for Medicare Annual Wellness visit    Patient Care Team:  Kirsten Renee MD as PCP - General (Family Medicine)  Anya Law MD (Ophthalmology)  Dinora Linder MD (Urology)     Problem List:     Patient Active Problem List   Diagnosis    Allergic rhinitis    Benign prostatic hyperplasia    Displacement of lumbar intervertebral disc without myelopathy    Diverticulosis of colon    Male erectile disorder    Essential hypertension    Herpes zoster    Indigestion    Metabolic syndrome    Left ventricular hypertrophy    Mitral valve regurgitation    Type 2 diabetes mellitus ([address] )    Hyperlipidemia    Idiopathic peripheral neuropathy    Calculus of gallbladder with cholecystitis    Colon polyp    Encounter for well adult exam with abnormal findings    Need for shingles vaccine    BMI 33 0-33 9,adult    Snoring    Obstructive sleep apnea    Excessive daytime sleepiness    Medicare annual wellness visit, subsequent      Past Medical and Surgical History:     Past Medical History:   Diagnosis Date    BPH (benign prostatic hyperplasia)     Diabetes mellitus associated with hormonal etiology ([facility] [address] ) 3/5/2019    ED (erectile dysfunction)     Elevated PSA 1/15/2013    Enlarged prostate     Herpes zoster 12/18/2014    Hyperlipidemia     Hypertension     IFG (impaired fasting glucose)     Metabolic syndrome     Obesity (BMI 30 0-34 9) 2/4/2019    Psoriasis     Seasonal allergic rhinitis     Type 2 diabetes mellitus ([address] ) 11/17/2014     Past Surgical History:   Procedure Laterality Date    CHOLECYSTECTOMY  05/14/2015    COLONOSCOPY  2007    HERNIA REPAIR  05/14/2015      Family History:     Family History   Problem Relation Age of Onset    Skin cancer Mother     Heart disease Father       Social History:     E-Cigarette/Vaping    E-Cigarette Use Never User      E-Cigarette/Vaping Substances    Nicotine No     THC No     CBD No     Flavoring No     Other No     Unknown No      Social History     Socioeconomic History    Marital status: /Civil Union     Spouse name: None    Number of children: None    Years of education: None    Highest education level: None   Occupational History    None   Social Needs    Financial resource strain: Not hard at all   Diane-Valery insecurity     Worry: Never true     Inability: Never true    Transportation needs     Medical: No     Non-medical: No   Tobacco Use    Smoking status: Former Smoker     Types: Pipe, Cigarettes    Smokeless tobacco: Former User   Substance and Sexual Activity    Alcohol use: Yes     Frequency: 4 or more times a week     Binge frequency: Never    Drug use: No    Sexual activity: Yes     Partners: Female   Lifestyle    Physical activity     Days per week: 1 day     Minutes per session: 60 min    Stress: Not at all   Relationships    Social connections     Talks on phone: More than three times a week     Gets together: Once a week     Attends [employer] service: More than 4 times per year     Active member of club or organization: Yes     Attends meetings of clubs or organizations: More than 4 times per year     Relationship status:     Intimate partner violence     Fear of current or ex partner: No     Emotionally abused: No     Physically abused: No     Forced sexual activity: No   Other Topics Concern    None   Social History Narrative    Has children    Right handed      Medications and Allergies:     Current Outpatient Medications   Medication Sig Dispense Refill    amLODIPine (NORVASC) 5 mg tablet TAKE 1 TABLET DAILY 90 tablet 1    aspirin (ASPIR-81) 81 mg EC tablet take 1 tablet (81MG)      atorvastatin (LIPITOR) 40 mg tablet TAKE 1 TABLET DAILY 90 tablet 1    finasteride (PROSCAR) 5 mg tablet TAKE 1 TABLET DAILY 90 tablet 1    fluticasone (FLONASE) 50 mcg/act nasal spray 1 spray into each nostril daily 1 Bottle 0    hydrocortisone 2 5 % cream Apply topically every 24 hours 56 7 g 0    ketoconazole (NIZORAL) 2 % cream APPLY TO RASH ON FACE DAILY AS NEEDED FOR FLARES  2    ketoconazole (NIZORAL) 2 % shampoo WASH SCALP AND FACE 2-3 TIMES A WEEK AS NEEDED FOR FLARES  5    lisinopril (ZESTRIL) 10 mg tablet TAKE 1 TABLET DAILY 90 tablet 0    metFORMIN (GLUCOPHAGE) 500 mg tablet TAKE 1 TABLET TWICE A DAY  WITH MEALS 180 tablet 0    sildenafil (VIAGRA) 100 mg tablet Take 1 tablet (100 mg total) by mouth daily as needed for erectile dysfunction 10 tablet 0    tadalafil (CIALIS) 5 MG tablet Take 1 tablet (5 mg total) by mouth daily 90 tablet 3    tamsulosin (FLOMAX) 0 4 mg Take 1 capsule (0 4 mg total) by mouth daily with dinner 90 capsule 1    zolpidem (AMBIEN) 10 mg tablet Take 1/2 tablet at lights out and may repeat in 30 minutes, if not sleeping 1 tablet 0     No current facility-administered medications for this visit  Allergies   Allergen Reactions    No Known Allergies       Immunizations:     Immunization History   Administered Date(s) Administered    INFLUENZA 10/13/2015, 10/31/2016, 10/16/2017    Influenza Split 10/29/2013    Influenza, high dose seasonal 0 7 mL 10/19/2018, 09/25/2019, 11/23/2020    Influenza, seasonal, injectable 11/17/2009, 10/22/2010, 11/11/2011, 10/07/2014    Pneumococcal Conjugate 13-Valent 01/09/2018    Pneumococcal Polysaccharide PPV23 02/04/2019    SARS-CoV-2 / COVID-19 mRNA IM (Pfizer-BioNTech) 02/27/2021    Tdap 07/28/2015    Zoster 10/27/2015    Zoster Vaccine Recombinant 10/27/2015      Health Maintenance:         Topic Date Due    Colonoscopy Surveillance  02/21/2021    Colorectal Cancer Screening  02/21/2028    Hepatitis C Screening  Completed     There are no preventive care reminders to display for this patient     Medicare Health Risk Assessment:     /70   Pulse 58   Temp 98 1 °F (36 7 °C) (Tympanic) Ht 6' (1 829 m)   Wt 112 kg (247 lb)   SpO2 97%   BMI 33 50 kg/m²      Jemima Cortez is here for his Subsequent Wellness visit  Last Medicare Wellness visit information reviewed, patient interviewed and updates made to the record today  Health Risk Assessment:   Patient rates overall health as good  Patient feels that their physical health rating is slightly worse  Eyesight was rated as same  Hearing was rated as same  Patient feels that their emotional and mental health rating is same  Pain experienced in the last 7 days has been none  Patient states that he has experienced no weight loss or gain in last 6 months  Depression Screening:   PHQ-2 Score: 0      Fall Risk Screening: In the past year, patient has experienced: no history of falling in past year      Home Safety:  Patient does not have trouble with stairs inside or outside of their home  Patient has working smoke alarms and has working carbon monoxide detector  Home safety hazards include: none  Nutrition:   Current diet is Regular  Medications:   Patient is currently taking over-the-counter supplements  OTC medications include: see medication list  Patient is able to manage medications  Activities of Daily Living (ADLs)/Instrumental Activities of Daily Living (IADLs):   Walk and transfer into and out of bed and chair?: Yes  Dress and groom yourself?: Yes    Bathe or shower yourself?: Yes    Feed yourself? Yes  Do your laundry/housekeeping?: Yes  Manage your money, pay your bills and track your expenses?: Yes  Make your own meals?: Yes    Do your own shopping?: Yes    Durable Medical Equipment Suppliers  none    Previous Hospitalizations:   Any hospitalizations or ED visits within the last 12 months?: No      Advance Care Planning:   Living will: Yes    Durable POA for healthcare:  Yes    Advanced directive: Yes    Advanced directive counseling given: Yes    Five wishes given: No    Patient declined ACP directive: No    End of Life Decisions reviewed with patient: Yes    Provider agrees with end of life decisions: Yes      Cognitive Screening:   Provider or family/friend/caregiver concerned regarding cognition?: No    PREVENTIVE SCREENINGS      Cardiovascular Screening:    General: Screening Not Indicated and History Lipid Disorder      Diabetes Screening:     General: Screening Not Indicated and History Diabetes      Colorectal Cancer Screening:     General: Screening Current      Prostate Cancer Screening:    General: Screening Current      Osteoporosis Screening:    General: Screening Not Indicated      Abdominal Aortic Aneurysm (AAA) Screening:    Risk factors include: age between 73-69 yo and tobacco use        Lung Cancer Screening:     General: Screening Not Indicated      Hepatitis C Screening:    General: Screening Current    Patient's shoes and socks removed  Right Foot/Ankle   Right Foot Inspection  Skin Exam: skin intact no warmth and no pre-ulcer                          Toe Exam: no swelling and erythema  Sensory       Monofilament testing: intact  Vascular  Capillary refills: < 3 seconds  The right DP pulse is 2+  Left Foot/Ankle  Left Foot Inspection  Skin Exam: skin intactno warmth and no pre-ulcer                         Toe Exam: no swelling and no erythema                   Sensory       Monofilament: intact  Vascular  Capillary refills: < 3 seconds  The left DP pulse is 2+  Assign Risk Category:  No deformity present; No loss of protective sensation;  No weak pulses       Risk: 0      Shikha Gentile MD

## 2021-03-03 NOTE — ASSESSMENT & PLAN NOTE
Chronic uncontrolled portion control low carb low-fat diet and increased physical activity discussed with the patient

## 2021-03-04 NOTE — ASSESSMENT & PLAN NOTE
A patient history of colon polyp the colonoscopy 2018 recommend to repeat in 3 years patient due for 1 will contact the a GI

## 2021-03-09 ENCOUNTER — OFFICE VISIT (OUTPATIENT)
Dept: FAMILY MEDICINE CLINIC | Facility: CLINIC | Age: 71
End: 2021-03-09
Payer: MEDICARE

## 2021-03-09 VITALS
HEIGHT: 72 IN | TEMPERATURE: 97.8 F | HEART RATE: 60 BPM | DIASTOLIC BLOOD PRESSURE: 80 MMHG | WEIGHT: 247 LBS | BODY MASS INDEX: 33.46 KG/M2 | SYSTOLIC BLOOD PRESSURE: 120 MMHG | OXYGEN SATURATION: 98 %

## 2021-03-09 DIAGNOSIS — M54.50 ACUTE BILATERAL LOW BACK PAIN WITHOUT SCIATICA: Primary | ICD-10-CM

## 2021-03-09 PROCEDURE — 99214 OFFICE O/P EST MOD 30 MIN: CPT | Performed by: FAMILY MEDICINE

## 2021-03-09 RX ORDER — METHOCARBAMOL 500 MG/1
500 TABLET, FILM COATED ORAL 3 TIMES DAILY
Qty: 30 TABLET | Refills: 0 | Status: SHIPPED | OUTPATIENT
Start: 2021-03-09 | End: 2021-07-30 | Stop reason: ALTCHOICE

## 2021-03-09 RX ORDER — NAPROXEN 250 MG/1
250 TABLET ORAL 2 TIMES DAILY WITH MEALS
Qty: 60 TABLET | Refills: 0 | Status: SHIPPED | OUTPATIENT
Start: 2021-03-09 | End: 2021-04-01

## 2021-03-10 ENCOUNTER — TELEPHONE (OUTPATIENT)
Dept: SLEEP CENTER | Facility: CLINIC | Age: 71
End: 2021-03-10

## 2021-03-10 PROBLEM — M54.50 ACUTE BILATERAL LOW BACK PAIN WITHOUT SCIATICA: Status: ACTIVE | Noted: 2021-03-09

## 2021-03-10 PROBLEM — M54.50 ACUTE BILATERAL LOW BACK PAIN WITHOUT SCIATICA: Status: ACTIVE | Noted: 2021-03-10

## 2021-03-10 NOTE — PROGRESS NOTES
Subjective:   Chief Complaint   Patient presents with    Back Pain     spasms        Patient ID: Crow Bush is a 78 y o  male  Back Pain  This is a new problem  The current episode started yesterday  The problem occurs constantly  The problem is unchanged  The pain is present in the lumbar spine  The pain is at a severity of 6/10  The pain is moderate  The symptoms are aggravated by twisting  Stiffness is present all day  Pertinent negatives include no abdominal pain, bladder incontinence, bowel incontinence, chest pain, dysuria, fever, headaches, leg pain, numbness, paresthesias, tingling, weakness or weight loss  Risk factors include obesity  He has tried nothing for the symptoms  The following portions of the patient's history were reviewed and updated as appropriate: allergies, current medications, past family history, past medical history, past social history, past surgical history and problem list     Review of Systems   Constitutional: Negative for activity change, appetite change, fatigue, fever and weight loss  HENT: Negative for congestion, ear pain, sinus pressure, sinus pain and sore throat  Eyes: Negative for pain, discharge, redness and itching  Respiratory: Negative for cough, chest tightness, shortness of breath and stridor  Cardiovascular: Negative for chest pain, palpitations and leg swelling  Gastrointestinal: Negative for abdominal pain, blood in stool, bowel incontinence, constipation, diarrhea and nausea  Genitourinary: Negative for bladder incontinence, dysuria, flank pain, frequency and hematuria  Musculoskeletal: Positive for back pain  Negative for joint swelling and neck pain  Skin: Negative for pallor and rash  Neurological: Negative for dizziness, tingling, tremors, weakness, numbness, headaches and paresthesias  Hematological: Does not bruise/bleed easily               Objective:  Vitals:    03/09/21 1106   BP: 120/80   Pulse: 60   Temp: 97 8 °F (36 6 °C)   TempSrc: Tympanic   SpO2: 98%   Weight: 112 kg (247 lb)   Height: 6' (1 829 m)      Physical Exam  Vitals signs and nursing note reviewed  Constitutional:       General: He is not in acute distress  Appearance: Normal appearance  He is well-developed  He is not diaphoretic  HENT:      Head: Normocephalic  Right Ear: Tympanic membrane, ear canal and external ear normal       Left Ear: Tympanic membrane, ear canal and external ear normal       Nose: Nose normal  No congestion or rhinorrhea  Mouth/Throat:      Mouth: Mucous membranes are moist       Pharynx: Oropharynx is clear  No oropharyngeal exudate or posterior oropharyngeal erythema  Eyes:      General:         Right eye: No discharge  Left eye: No discharge  Conjunctiva/sclera: Conjunctivae normal    Neck:      Musculoskeletal: Normal range of motion and neck supple  Vascular: No JVD  Cardiovascular:      Rate and Rhythm: Normal rate and regular rhythm  Heart sounds: Normal heart sounds  No murmur  No gallop  Pulmonary:      Effort: Pulmonary effort is normal  No respiratory distress  Breath sounds: Normal breath sounds  No stridor  No wheezing or rales  Chest:      Chest wall: No tenderness  Abdominal:      General: There is no distension  Palpations: Abdomen is soft  There is no mass  Tenderness: There is no abdominal tenderness  There is no rebound  Musculoskeletal:      Lumbar back: He exhibits decreased range of motion, pain and spasm  He exhibits no tenderness and no bony tenderness  Comments: DTR is normal B/L sensation intact   Lymphadenopathy:      Cervical: No cervical adenopathy  Skin:     General: Skin is warm  Findings: No erythema or rash  Neurological:      Mental Status: He is alert and oriented to person, place, and time  Sensory: No sensory deficit        Gait: Gait normal    Psychiatric:         Mood and Affect: Mood normal          Behavior: Behavior normal            Assessment/Plan:    Acute bilateral low back pain without sciatica   A new diagnosis acute symptomatic no history of fall or trauma most probably secondary to muscle spasm start patient on muscle relaxer Robaxin 500 mg proper use and possible side effect discussed with the patient also a Naprosyn 250 twice a day proper use and possible side effect discussed with the patient if there is no improvement to call the office       Diagnoses and all orders for this visit:    Acute bilateral low back pain without sciatica  -     naproxen (NAPROSYN) 250 mg tablet; Take 1 tablet (250 mg total) by mouth 2 (two) times a day with meals  -     methocarbamol (ROBAXIN) 500 mg tablet;  Take 1 tablet (500 mg total) by mouth 3 (three) times a day

## 2021-03-10 NOTE — ASSESSMENT & PLAN NOTE
A new diagnosis acute symptomatic no history of fall or trauma most probably secondary to muscle spasm start patient on muscle relaxer Robaxin 500 mg proper use and possible side effect discussed with the patient also a Naprosyn 250 twice a day proper use and possible side effect discussed with the patient if there is no improvement to call the office

## 2021-03-17 DIAGNOSIS — E11.9 TYPE 2 DIABETES MELLITUS WITHOUT COMPLICATION, UNSPECIFIED WHETHER LONG TERM INSULIN USE (HCC): ICD-10-CM

## 2021-03-17 DIAGNOSIS — E78.2 MIXED HYPERLIPIDEMIA: ICD-10-CM

## 2021-03-17 DIAGNOSIS — I10 ESSENTIAL HYPERTENSION: ICD-10-CM

## 2021-03-17 RX ORDER — AMLODIPINE BESYLATE 5 MG/1
5 TABLET ORAL DAILY
Qty: 90 TABLET | Refills: 1 | Status: SHIPPED | OUTPATIENT
Start: 2021-03-17 | End: 2021-09-09

## 2021-03-17 RX ORDER — LISINOPRIL 10 MG/1
10 TABLET ORAL DAILY
Qty: 90 TABLET | Refills: 1 | Status: SHIPPED | OUTPATIENT
Start: 2021-03-17 | End: 2021-04-06 | Stop reason: DRUGHIGH

## 2021-03-17 RX ORDER — ATORVASTATIN CALCIUM 40 MG/1
40 TABLET, FILM COATED ORAL DAILY
Qty: 90 TABLET | Refills: 1 | Status: SHIPPED | OUTPATIENT
Start: 2021-03-17 | End: 2021-09-09

## 2021-03-18 ENCOUNTER — OFFICE VISIT (OUTPATIENT)
Dept: UROLOGY | Facility: MEDICAL CENTER | Age: 71
End: 2021-03-18
Payer: MEDICARE

## 2021-03-18 VITALS
BODY MASS INDEX: 33.46 KG/M2 | HEART RATE: 70 BPM | DIASTOLIC BLOOD PRESSURE: 80 MMHG | SYSTOLIC BLOOD PRESSURE: 140 MMHG | WEIGHT: 247 LBS | HEIGHT: 72 IN

## 2021-03-18 DIAGNOSIS — N13.8 BPH WITH URINARY OBSTRUCTION: Primary | ICD-10-CM

## 2021-03-18 DIAGNOSIS — N40.1 BPH WITH URINARY OBSTRUCTION: Primary | ICD-10-CM

## 2021-03-18 PROCEDURE — 99214 OFFICE O/P EST MOD 30 MIN: CPT | Performed by: UROLOGY

## 2021-03-20 ENCOUNTER — IMMUNIZATIONS (OUTPATIENT)
Dept: FAMILY MEDICINE CLINIC | Facility: HOSPITAL | Age: 71
End: 2021-03-20

## 2021-03-20 DIAGNOSIS — Z23 ENCOUNTER FOR IMMUNIZATION: Primary | ICD-10-CM

## 2021-03-20 PROCEDURE — 91300 SARS-COV-2 / COVID-19 MRNA VACCINE (PFIZER-BIONTECH) 30 MCG: CPT

## 2021-03-20 PROCEDURE — 0002A SARS-COV-2 / COVID-19 MRNA VACCINE (PFIZER-BIONTECH) 30 MCG: CPT

## 2021-04-01 DIAGNOSIS — M54.50 ACUTE BILATERAL LOW BACK PAIN WITHOUT SCIATICA: ICD-10-CM

## 2021-04-01 RX ORDER — NAPROXEN 250 MG/1
250 TABLET ORAL 2 TIMES DAILY WITH MEALS
Qty: 60 TABLET | Refills: 0 | Status: SHIPPED | OUTPATIENT
Start: 2021-04-01 | End: 2021-04-06 | Stop reason: ALTCHOICE

## 2021-04-03 ENCOUNTER — APPOINTMENT (OUTPATIENT)
Dept: LAB | Facility: HOSPITAL | Age: 71
End: 2021-04-03
Payer: MEDICARE

## 2021-04-03 DIAGNOSIS — E11.9 TYPE 2 DIABETES MELLITUS WITHOUT COMPLICATION, WITHOUT LONG-TERM CURRENT USE OF INSULIN (HCC): ICD-10-CM

## 2021-04-03 DIAGNOSIS — E78.2 MIXED HYPERLIPIDEMIA: ICD-10-CM

## 2021-04-03 LAB
ALBUMIN SERPL BCP-MCNC: 3.9 G/DL (ref 3–5.2)
ALP SERPL-CCNC: 120 U/L (ref 43–122)
ALT SERPL W P-5'-P-CCNC: 29 U/L
ANION GAP SERPL CALCULATED.3IONS-SCNC: 3 MMOL/L (ref 5–14)
AST SERPL W P-5'-P-CCNC: 29 U/L (ref 17–59)
BASOPHILS # BLD AUTO: 0 THOUSANDS/ΜL (ref 0–0.1)
BASOPHILS NFR BLD AUTO: 1 % (ref 0–1)
BILIRUB SERPL-MCNC: 0.5 MG/DL
BUN SERPL-MCNC: 18 MG/DL (ref 5–25)
CALCIUM SERPL-MCNC: 8.9 MG/DL (ref 8.4–10.2)
CHLORIDE SERPL-SCNC: 104 MMOL/L (ref 97–108)
CHOLEST SERPL-MCNC: 103 MG/DL
CO2 SERPL-SCNC: 33 MMOL/L (ref 22–30)
CREAT SERPL-MCNC: 0.98 MG/DL (ref 0.7–1.5)
EOSINOPHIL # BLD AUTO: 0.3 THOUSAND/ΜL (ref 0–0.4)
EOSINOPHIL NFR BLD AUTO: 5 % (ref 0–6)
ERYTHROCYTE [DISTWIDTH] IN BLOOD BY AUTOMATED COUNT: 13.2 %
GFR SERPL CREATININE-BSD FRML MDRD: 79 ML/MIN/1.73SQ M
GLUCOSE P FAST SERPL-MCNC: 116 MG/DL (ref 70–99)
HCT VFR BLD AUTO: 40.5 % (ref 41–53)
HDLC SERPL-MCNC: 35 MG/DL
HGB BLD-MCNC: 13.5 G/DL (ref 13.5–17.5)
LDLC SERPL CALC-MCNC: 55 MG/DL
LYMPHOCYTES # BLD AUTO: 1.4 THOUSANDS/ΜL (ref 0.5–4)
LYMPHOCYTES NFR BLD AUTO: 19 % (ref 25–45)
MCH RBC QN AUTO: 32.1 PG (ref 26–34)
MCHC RBC AUTO-ENTMCNC: 33.2 G/DL (ref 31–36)
MCV RBC AUTO: 97 FL (ref 80–100)
MONOCYTES # BLD AUTO: 0.8 THOUSAND/ΜL (ref 0.2–0.9)
MONOCYTES NFR BLD AUTO: 11 % (ref 1–10)
NEUTROPHILS # BLD AUTO: 4.6 THOUSANDS/ΜL (ref 1.8–7.8)
NEUTS SEG NFR BLD AUTO: 64 % (ref 45–65)
PLATELET # BLD AUTO: 155 THOUSANDS/UL (ref 150–450)
PMV BLD AUTO: 8.8 FL (ref 8.9–12.7)
POTASSIUM SERPL-SCNC: 4.2 MMOL/L (ref 3.6–5)
PROT SERPL-MCNC: 6.7 G/DL (ref 5.9–8.4)
RBC # BLD AUTO: 4.19 MILLION/UL (ref 4.5–5.9)
SODIUM SERPL-SCNC: 140 MMOL/L (ref 137–147)
TRIGL SERPL-MCNC: 64 MG/DL
TSH SERPL DL<=0.05 MIU/L-ACNC: 1.14 UIU/ML (ref 0.47–4.68)
WBC # BLD AUTO: 7.2 THOUSAND/UL (ref 4.5–11)

## 2021-04-03 PROCEDURE — 36415 COLL VENOUS BLD VENIPUNCTURE: CPT

## 2021-04-03 PROCEDURE — 84443 ASSAY THYROID STIM HORMONE: CPT

## 2021-04-03 PROCEDURE — 85025 COMPLETE CBC W/AUTO DIFF WBC: CPT

## 2021-04-03 PROCEDURE — 80053 COMPREHEN METABOLIC PANEL: CPT

## 2021-04-03 PROCEDURE — 80061 LIPID PANEL: CPT

## 2021-04-04 DIAGNOSIS — N40.0 BENIGN PROSTATIC HYPERPLASIA, UNSPECIFIED WHETHER LOWER URINARY TRACT SYMPTOMS PRESENT: ICD-10-CM

## 2021-04-05 ENCOUNTER — APPOINTMENT (OUTPATIENT)
Dept: LAB | Facility: HOSPITAL | Age: 71
End: 2021-04-05
Payer: MEDICARE

## 2021-04-05 LAB
CREAT UR-MCNC: 76.5 MG/DL
MICROALBUMIN UR-MCNC: 5.4 MG/L (ref 0–20)
MICROALBUMIN/CREAT 24H UR: 7 MG/G CREATININE (ref 0–30)

## 2021-04-05 PROCEDURE — 82043 UR ALBUMIN QUANTITATIVE: CPT | Performed by: FAMILY MEDICINE

## 2021-04-05 PROCEDURE — 82570 ASSAY OF URINE CREATININE: CPT | Performed by: FAMILY MEDICINE

## 2021-04-05 RX ORDER — FINASTERIDE 5 MG/1
TABLET, FILM COATED ORAL
Qty: 90 TABLET | Refills: 1 | Status: SHIPPED | OUTPATIENT
Start: 2021-04-05 | End: 2021-12-14

## 2021-04-06 ENCOUNTER — OFFICE VISIT (OUTPATIENT)
Dept: FAMILY MEDICINE CLINIC | Facility: CLINIC | Age: 71
End: 2021-04-06
Payer: MEDICARE

## 2021-04-06 VITALS
TEMPERATURE: 97.1 F | OXYGEN SATURATION: 99 % | HEART RATE: 61 BPM | WEIGHT: 246 LBS | HEIGHT: 72 IN | SYSTOLIC BLOOD PRESSURE: 148 MMHG | DIASTOLIC BLOOD PRESSURE: 90 MMHG | BODY MASS INDEX: 33.32 KG/M2

## 2021-04-06 DIAGNOSIS — E78.2 MIXED HYPERLIPIDEMIA: ICD-10-CM

## 2021-04-06 DIAGNOSIS — E11.9 TYPE 2 DIABETES MELLITUS WITHOUT COMPLICATION, WITHOUT LONG-TERM CURRENT USE OF INSULIN (HCC): Primary | ICD-10-CM

## 2021-04-06 DIAGNOSIS — I10 ESSENTIAL HYPERTENSION: ICD-10-CM

## 2021-04-06 PROCEDURE — 99214 OFFICE O/P EST MOD 30 MIN: CPT | Performed by: FAMILY MEDICINE

## 2021-04-06 RX ORDER — LISINOPRIL 20 MG/1
20 TABLET ORAL DAILY
Qty: 90 TABLET | Refills: 0 | Status: SHIPPED | OUTPATIENT
Start: 2021-04-06 | End: 2021-06-23

## 2021-04-06 NOTE — PROGRESS NOTES
Subjective:   Chief Complaint   Patient presents with    Follow-up     chronic conditions and review labs         Patient ID: Gayle Estrada is a 78 y o  male  Patient here follow-up condition patient was history of hypertension currently on lisinopril 10 mg and amlodipine 5 mg blood pressure today in the office on control in he had previously on the reading blood pressure uncontrolled patient asymptomatic deny any chest pain short of breath no palpitation no dyspnea on exertion no lower extremity edema patient has been taking his medication daily and an not skipping dose patient history of the non-insulin-dependent diabetic on metformin 500 mg once a day deny any increased thirsty increased frequency urination no dizziness no headache and no abdomen pain patient history of hyperlipidemia on statin deny any rash or muscle pain and patient asymptomatic recent blood work review with the patient      The following portions of the patient's history were reviewed and updated as appropriate: allergies, current medications, past family history, past medical history, past social history, past surgical history and problem list     Review of Systems   Constitutional: Negative for activity change, appetite change, fatigue and fever  HENT: Negative for congestion, ear pain, sinus pressure, sinus pain and sore throat  Eyes: Negative for pain, discharge, redness and itching  Respiratory: Negative for cough, chest tightness, shortness of breath and stridor  Cardiovascular: Negative for chest pain, palpitations and leg swelling  Gastrointestinal: Negative for abdominal pain, blood in stool, constipation, diarrhea and nausea  Genitourinary: Negative for dysuria, flank pain, frequency and hematuria  Musculoskeletal: Negative for back pain, joint swelling and neck pain  Skin: Negative for pallor and rash  Neurological: Negative for dizziness, tremors, weakness, numbness and headaches     Hematological: Does not bruise/bleed easily  Objective:  Vitals:    04/06/21 0929 04/06/21 0941   BP: 142/90 148/90   BP Location: Left arm    Patient Position: Sitting    Pulse: 61    Temp: (!) 97 1 °F (36 2 °C)    TempSrc: Tympanic    SpO2: 99%    Weight: 112 kg (246 lb)    Height: 6' (1 829 m)       Physical Exam  Vitals signs and nursing note reviewed  Constitutional:       General: He is not in acute distress  Appearance: Normal appearance  He is well-developed  He is not diaphoretic  HENT:      Head: Normocephalic  Right Ear: Tympanic membrane, ear canal and external ear normal       Left Ear: Tympanic membrane, ear canal and external ear normal       Nose: Nose normal  No congestion or rhinorrhea  Mouth/Throat:      Mouth: Mucous membranes are moist       Pharynx: Oropharynx is clear  No oropharyngeal exudate or posterior oropharyngeal erythema  Eyes:      General:         Right eye: No discharge  Left eye: No discharge  Conjunctiva/sclera: Conjunctivae normal    Neck:      Musculoskeletal: Normal range of motion and neck supple  Vascular: No JVD  Cardiovascular:      Rate and Rhythm: Normal rate and regular rhythm  Heart sounds: Normal heart sounds  No murmur  No gallop  Pulmonary:      Effort: Pulmonary effort is normal  No respiratory distress  Breath sounds: Normal breath sounds  No stridor  No wheezing or rales  Chest:      Chest wall: No tenderness  Abdominal:      General: There is no distension  Palpations: Abdomen is soft  There is no mass  Tenderness: There is no abdominal tenderness  There is no rebound  Musculoskeletal: Normal range of motion  General: No tenderness  Lymphadenopathy:      Cervical: No cervical adenopathy  Skin:     General: Skin is warm  Findings: No erythema or rash  Neurological:      Mental Status: He is alert and oriented to person, place, and time  Sensory: No sensory deficit        Gait: Gait normal    Psychiatric:         Mood and Affect: Mood normal          Behavior: Behavior normal            Assessment/Plan:    Essential hypertension   A chronic asymptomatic uncontrolled blood pressure in the office normal we check her blood pressure it is 148/90 will increase the lisinopril to 20 mg once a day continue with the amlodipine 5 mg once a day and proper use of medication possible side effect discussed the patient  Low salt diet and important lose weight discussed with the patient    [facility]    Lab Results   Component Value Date    HGBA1C 6 0 03/03/2021      chronic asymptomatic hemoglobin A1c fair control continue with the metformin 500 mg once a day low carb diet important lose weight discussed the patient patient already on statin and he is on Ace inhibitor    Hyperlipidemia   Chronic asymptomatic LDL therapeutic in diabetic patient continue atorvastatin 40 mg once a day low-fat diet discussed the patient       Diagnoses and all orders for this visit:    Type 2 diabetes mellitus without complication, without long-term current use of insulin (HCC)  -     metFORMIN (GLUCOPHAGE) 500 mg tablet; Take 1 tablet (500 mg total) by mouth daily with breakfast    Essential hypertension  -     lisinopril (ZESTRIL) 20 mg tablet; Take 1 tablet (20 mg total) by mouth daily    Mixed hyperlipidemia    Other orders  -     Discontinue: metFORMIN (GLUCOPHAGE) 500 mg tablet;  Take 1 tablet (500 mg total) by mouth daily with breakfast

## 2021-04-06 NOTE — ASSESSMENT & PLAN NOTE
Lab Results   Component Value Date    HGBA1C 6 0 03/03/2021      chronic asymptomatic hemoglobin A1c fair control continue with the metformin 500 mg once a day low carb diet important lose weight discussed the patient patient already on statin and he is on Ace inhibitor

## 2021-04-06 NOTE — ASSESSMENT & PLAN NOTE
A chronic asymptomatic uncontrolled blood pressure in the office normal we check her blood pressure it is 148/90 will increase the lisinopril to 20 mg once a day continue with the amlodipine 5 mg once a day and proper use of medication possible side effect discussed the patient  Low salt diet and important lose weight discussed with the patient

## 2021-04-06 NOTE — ASSESSMENT & PLAN NOTE
Chronic asymptomatic LDL therapeutic in diabetic patient continue atorvastatin 40 mg once a day low-fat diet discussed the patient

## 2021-04-23 ENCOUNTER — OFFICE VISIT (OUTPATIENT)
Dept: SLEEP CENTER | Facility: CLINIC | Age: 71
End: 2021-04-23
Payer: MEDICARE

## 2021-04-23 VITALS
BODY MASS INDEX: 33.59 KG/M2 | SYSTOLIC BLOOD PRESSURE: 142 MMHG | WEIGHT: 248 LBS | DIASTOLIC BLOOD PRESSURE: 80 MMHG | HEIGHT: 72 IN

## 2021-04-23 DIAGNOSIS — G47.33 OBSTRUCTIVE SLEEP APNEA TREATED WITH BILEVEL POSITIVE AIRWAY PRESSURE (BIPAP): Primary | ICD-10-CM

## 2021-04-23 PROBLEM — R06.83 SNORING: Status: RESOLVED | Noted: 2020-08-14 | Resolved: 2021-04-23

## 2021-04-23 PROBLEM — Z99.89 OBSTRUCTIVE SLEEP APNEA TREATED WITH CONTINUOUS POSITIVE AIRWAY PRESSURE (CPAP): Status: ACTIVE | Noted: 2020-09-29

## 2021-04-23 PROCEDURE — 99214 OFFICE O/P EST MOD 30 MIN: CPT | Performed by: NURSE PRACTITIONER

## 2021-04-23 NOTE — PATIENT INSTRUCTIONS
1   Continue use of CPAP equipment nightly  2  Continue to clean your equipment, as discussed  3  Contact the Sleep [address] [place] [address] with any questions or concerns prior to your next visit, as needed  4  Schedule visit for follow-up in 6 months    Consider downloading the Dream  nina to view your usage, mask fit and AHI, with a goal of AHI less than 5  Call if it is consistently higher than 5-8  Consider changing the tubing heat to 1 and keep the humidity at 4, increasing or decreasing to your comfort  Biotene oral rinse can help with oral dryness  Xylimelts can be considered    Nursing Support:  When: Monday through Friday 7A-5PM except holidays  Where: Our direct line is [phone]  If you are having a true emergency please call 911  In the event that the line is busy or it is after hours please leave a voice message and we will return your call  Please speak clearly, leaving your full name, birth date, best number to reach you and the reason for your call  Medication refills: We will need the name of the medication, the dosage, the ordering provider, whether you get a 30 or 90 day refill, and the pharmacy name and address  Medications will be ordered by the provider only  Nurses cannot call in prescriptions  Please allow 7 days for medication refills  Physician requested updates: If your provider requested that you call with an update after starting medication, please be ready to provide us the medication and dosage, what time you take your medication, the time you attempt to fall asleep, time you fall asleep, when you wake up, and what time you get out of bed  Sleep Study Results: We will contact you with sleep study results and/or next steps after the physician has reviewed your testing

## 2021-04-23 NOTE — PROGRESS NOTES
Progress Note - [address] 78 y o  male   DQF:[birth year], MRN: [id number]  4/23/2021      Follow Up Evaluation / Problem:  Obstructive Sleep Apnea  Obesity  HTN      Thank you for the opportunity of participating in the evaluation and care of this patient in the Sleep Clinic at [facility]  HPI: Agustina Toure is a 78y o  year old male  The patient presents for follow up of obstructive sleep apnea  He had concern of loud snoring, choking, gasping and having periods of witnessed apnea  He also noted mental fogginess in the morning and excessive daytime sleepiness  He completed a diagnostic sleep study in December 2020, followed by a CPAP titration study  He was titrated to BiPAP and was set up with  equipment and began using it in early March  He presents to review compliance and effectiveness of treatment      Review of Systems      Genitourinary need to urinate more than twice a night and difficulty with erection   Cardiology none   Gastrointestinal none   Neurology none   Constitutional none   Integumentary rash or dry skin   Psychiatry none   Musculoskeletal none   Pulmonary snoring   ENT none   Endocrine frequent urination   Hematological none       Current Outpatient Medications:     amLODIPine (NORVASC) 5 mg tablet, Take 1 tablet (5 mg total) by mouth daily, Disp: 90 tablet, Rfl: 1    aspirin (ASPIR-81) 81 mg EC tablet, take 1 tablet (81MG), Disp: , Rfl:     atorvastatin (LIPITOR) 40 mg tablet, Take 1 tablet (40 mg total) by mouth daily, Disp: 90 tablet, Rfl: 1    finasteride (PROSCAR) 5 mg tablet, TAKE 1 TABLET DAILY, Disp: 90 tablet, Rfl: 1    fluticasone (FLONASE) 50 mcg/act nasal spray, 1 spray into each nostril daily, Disp: 1 Bottle, Rfl: 0    hydrocortisone 2 5 % cream, Apply topically every 24 hours, Disp: 56 7 g, Rfl: 0    ketoconazole (NIZORAL) 2 % cream, APPLY TO RASH ON FACE DAILY AS NEEDED FOR FLARES, Disp: , Rfl: 2    ketoconazole (NIZORAL) 2 % shampoo, WASH SCALP AND FACE 2-3 TIMES A WEEK AS NEEDED FOR FLARES, Disp: , Rfl: 5    lisinopril (ZESTRIL) 20 mg tablet, Take 1 tablet (20 mg total) by mouth daily, Disp: 90 tablet, Rfl: 0    metFORMIN (GLUCOPHAGE) 500 mg tablet, Take 1 tablet (500 mg total) by mouth daily with breakfast, Disp: 90 tablet, Rfl: 0    methocarbamol (ROBAXIN) 500 mg tablet, Take 1 tablet (500 mg total) by mouth 3 (three) times a day, Disp: 30 tablet, Rfl: 0    sildenafil (VIAGRA) 100 mg tablet, Take 1 tablet (100 mg total) by mouth daily as needed for erectile dysfunction, Disp: 10 tablet, Rfl: 0    tadalafil (CIALIS) 5 MG tablet, Take 1 tablet (5 mg total) by mouth daily, Disp: 90 tablet, Rfl: 3    tamsulosin (FLOMAX) 0 4 mg, Take 1 capsule (0 4 mg total) by mouth daily with dinner, Disp: 90 capsule, Rfl: 1    Arrey Sleepiness Scale  Sitting and reading: Slight chance of dozing  Watching TV: Slight chance of dozing  Sitting, inactive in a public place (e g  a theatre or a meeting): Would never doze  As a passenger in a car for an hour without a break: Slight chance of dozing  Lying down to rest in the afternoon when circumstances permit: Would never doze  Sitting and talking to someone: Would never doze  Sitting quietly after a lunch without alcohol: Would never doze  In a car, while stopped for a few minutes in traffic: Would never doze  Total score: 3              Vitals:    04/23/21 0900   BP: 142/80   Weight: 112 kg (248 lb)   Height: 6' (1 829 m)   He takes BP medications at night    Body mass index is 33 63 kg/m²  Neck Circumference: 17       EPWORTH SLEEPINESS SCORE  Total score: 3      Past History Since Last Sleep Center Visit:   He denies any changes to his health since his last visit  He mentions that he has had his lisinopril increased slightly due to elevated BP readings  He reports that he has been using the BiPAP equipment since set up    He states that the pressure is tolerable, but feels a bit strong at times  He notices improved mental clarity in the am since using BiPAP  Nocturia has improved as well with one trip to the bathroom per night now instead of 2-3  The patient reports that he cleans the equipment appropriately using mild soap and water  Results of the diagnostic sleep study, completed on 12/19/20, are as follows: The AHI in the supine position was 70 8  The AHI during REM sleep was 30 3  The amount of sleep time below 90% was 106 3 minutes  The lowest oxygen saturation was 76 0%  IMPRESSION:   1  Severe obstructive sleep apnea   2  Severe hypoxia  3  Sleep onset and sleep maintenance insomnia     Based on the results of this study, a follow-up study to titrate positive airway pressure is strongly recommended  He may benefit from a sleep aid for the study night  Clinical correlation is advised  Results of the CPAP titration study, completed on 2/8/21, are as follows:  IMPRESSION:   The patient had significant reduction of his sleep disorder breathing events down from AHI 28 3 events per hour on his control night study to 0 0 events per hour after titration to bilevel PAP therapy 20/16 cm H2O, he achieved REM sleep and slept in supine position on this pressure  To be noted the patient was switched from CPAP to bilevel PAP therapy due to persistent events on CPAP  A bilevel PAP therapy with pressure 20/16 cm H2O using a ResMed AitTouch medium fullface mask is recommended  Compliance to be reviewed within 4-6 weeks, clinical correlation suggested       The review of systems and following portions of the patient's history were reviewed and updated as appropriate: allergies, current medications, past family history, past medical history, past social history, past surgical history, and problem list         OBJECTIVE    PAP Pressure: Nasal BiPAP set to deliver 20 cm of IPAP and 16 cm of EPAP  Pressure change to AutoBiPAP max IPAP 20cm, min EPAP 9cm, min PS 0, max PS 5  Type of mask used: full face    DME Provider: [employer]    Physical Exam:     General Appearance:   Alert, cooperative, no distress, appears stated age, mildly obese     Head:   Normocephalic, without obvious abnormality, atraumatic     Eyes:   PERRL, conjunctiva/corneas clear, EOM's intact          Nose:  Nares normal, septum midline, no drainage or sinus tenderness           Throat:  Lips, teeth and gums normal; tongue normal size and  shape and midline mucosa moist and redundant bilaterally, uvula thick and only partially visualized, tonsils not visualized, Mallampati class 4       Neck:  Supple, symmetrical, trachea midline, no adenopathy; Thyroid: No enlargement, tenderness or nodules; no carotid bruit or JVD     Lungs:      Clear to auscultation bilaterally, respirations unlabored     Heart:   Regular rate and rhythm, S1 and S2 normal, no murmur, rub or gallop       Extremities:  Extremities normal, atraumatic, no cyanosis or edema       Skin:  Skin color, texture, turgor normal, no rashes or lesions       Neurologic:  No focal deficits noted       ASSESSMENT / PLAN    1  Obstructive sleep apnea treated with bilevel positive airway pressure (BiPAP)  PAP DME Pressure Change    PAP DME Resupply/Reorder           Counseling / Coordination of Care  Total clinic time spent today 30 minutes  Greater than 50% of total time was spent with the patient and / or family counseling and / or coordination of care  A description of the counseling / coordination of care:     Impressions, Diagnostic results, Prognosis, Instructions for management, Risks and benefits of treatment, Patient and family education, Risk factor reductions and Importance of compliance with treatment    Today I reviewed the patient's compliance data  he has been able to use the equipment 100% of all days recorded  Average usage was 4 or more hours 90% of all days recorded    The estimated AHI is 4 4 abnormal breathing events per hour  Due to concern of pressure feeling very strong and the mask lifting at times, a pressure change to AutoBiPAP has been ordered at the settings noted above  The patient feels they benefit from the use of PAP equipment and would like to continue PAP therapy  Response to treatment has been very good  [place] rating improved from 12 to 3  A prescription for supplies has been provided for the next year  We discussed the use of a CPAP pillow, which may help to prevent movement of the mask with lateral sleeping  We discussed the use of Dream  nina, which will allow the patient to view usage, mask fit and AHI, with a goal of less than 5  He will continue using this equipment at the settings noted above for the next 6 months  At that timehe will then return for a routine follow-up evaluation  I have asked the patient to contact the [address] if he encounters any difficulties prior to that time  The following instructions have been given to the patient today:    Patient Instructions   1  Continue use of CPAP equipment nightly  2  Continue to clean your equipment, as discussed  3  Contact the Sleep [address] [place] [address] with any questions or concerns prior to your next visit, as needed  4  Schedule visit for follow-up in 6 months    Consider downloading the Dream  nina to view your usage, mask fit and AHI, with a goal of AHI less than 5  Call if it is consistently higher than 5-8  Consider changing the tubing heat to 1 and keep the humidity at 4, increasing or decreasing to your comfort  Biotene oral rinse can help with oral dryness  Xylimelts can be considered    Nursing Support:  When: Monday through Friday 7A-5PM except holidays  Where: Our direct line is [phone]  If you are having a true emergency please call 911  In the event that the line is busy or it is after hours please leave a voice message and we will return your call    Please speak clearly, leaving your full name, birth date, best number to reach you and the reason for your call  Medication refills: We will need the name of the medication, the dosage, the ordering provider, whether you get a 30 or 90 day refill, and the pharmacy name and address  Medications will be ordered by the provider only  Nurses cannot call in prescriptions  Please allow 7 days for medication refills  Physician requested updates: If your provider requested that you call with an update after starting medication, please be ready to provide us the medication and dosage, what time you take your medication, the time you attempt to fall asleep, time you fall asleep, when you wake up, and what time you get out of bed  Sleep Study Results: We will contact you with sleep study results and/or next steps after the physician has reviewed your testing          Maddy Abbasi [address] [place] [address]

## 2021-04-25 DIAGNOSIS — N40.0 BENIGN PROSTATIC HYPERPLASIA, UNSPECIFIED WHETHER LOWER URINARY TRACT SYMPTOMS PRESENT: ICD-10-CM

## 2021-04-25 RX ORDER — TAMSULOSIN HYDROCHLORIDE 0.4 MG/1
CAPSULE ORAL
Qty: 90 CAPSULE | Refills: 1 | Status: SHIPPED | OUTPATIENT
Start: 2021-04-25 | End: 2021-10-14

## 2021-04-26 ENCOUNTER — TELEPHONE (OUTPATIENT)
Dept: SLEEP CENTER | Facility: CLINIC | Age: 71
End: 2021-04-26

## 2021-05-14 DIAGNOSIS — N52.9 MALE ERECTILE DISORDER: ICD-10-CM

## 2021-05-14 RX ORDER — SILDENAFIL 100 MG/1
TABLET, FILM COATED ORAL
Qty: 10 TABLET | Refills: 0 | Status: SHIPPED | OUTPATIENT
Start: 2021-05-14 | End: 2021-06-09

## 2021-05-17 DIAGNOSIS — E11.9 TYPE 2 DIABETES MELLITUS WITHOUT COMPLICATION, WITHOUT LONG-TERM CURRENT USE OF INSULIN (HCC): ICD-10-CM

## 2021-06-08 ENCOUNTER — OFFICE VISIT (OUTPATIENT)
Dept: FAMILY MEDICINE CLINIC | Facility: CLINIC | Age: 71
End: 2021-06-08
Payer: MEDICARE

## 2021-06-08 VITALS
DIASTOLIC BLOOD PRESSURE: 70 MMHG | HEART RATE: 67 BPM | SYSTOLIC BLOOD PRESSURE: 110 MMHG | WEIGHT: 249 LBS | OXYGEN SATURATION: 97 % | TEMPERATURE: 98.5 F | HEIGHT: 72 IN | BODY MASS INDEX: 33.72 KG/M2

## 2021-06-08 DIAGNOSIS — E11.9 TYPE 2 DIABETES MELLITUS WITHOUT COMPLICATION, WITHOUT LONG-TERM CURRENT USE OF INSULIN (HCC): ICD-10-CM

## 2021-06-08 DIAGNOSIS — I10 ESSENTIAL HYPERTENSION: Primary | ICD-10-CM

## 2021-06-08 DIAGNOSIS — E78.2 MIXED HYPERLIPIDEMIA: ICD-10-CM

## 2021-06-08 PROCEDURE — 99213 OFFICE O/P EST LOW 20 MIN: CPT | Performed by: FAMILY MEDICINE

## 2021-06-08 NOTE — PROGRESS NOTES
Subjective:   Chief Complaint   Patient presents with    Blood Pressure Check        Patient ID: Cathleen Nguyen is a 78 y o  male  Patient here follow-up with a blood pressure patient had the a blood pressure and control the last visit to increase the lisinopril to 20 mg once a day and Norvasc 5 mg tolerated the medication without side effect no cough no rash and and patient asymptomatic no chest pain or short of breath no palpitation no lower extremity edema      The following portions of the patient's history were reviewed and updated as appropriate: allergies, current medications, past family history, past medical history, past social history, past surgical history and problem list     Review of Systems   Constitutional: Negative for activity change, appetite change, fatigue and fever  HENT: Negative for congestion, ear pain, sinus pressure, sinus pain and sore throat  Eyes: Negative for pain, discharge, redness and itching  Respiratory: Negative for cough, chest tightness, shortness of breath and stridor  Cardiovascular: Negative for chest pain, palpitations and leg swelling  Gastrointestinal: Negative for abdominal pain, blood in stool, constipation, diarrhea and nausea  Genitourinary: Negative for dysuria, flank pain, frequency and hematuria  Musculoskeletal: Negative for back pain, joint swelling and neck pain  Skin: Negative for pallor and rash  Neurological: Negative for dizziness, tremors, weakness, numbness and headaches  Hematological: Does not bruise/bleed easily  Objective:  Vitals:    06/08/21 1327   BP: 110/70   Pulse: 67   Temp: 98 5 °F (36 9 °C)   TempSrc: Tympanic   SpO2: 97%   Weight: 113 kg (249 lb)   Height: 6' (1 829 m)      Physical Exam  Vitals signs and nursing note reviewed  Constitutional:       General: He is not in acute distress  Appearance: Normal appearance  He is well-developed  He is not diaphoretic  HENT:      Head: Normocephalic  Right Ear: Tympanic membrane, ear canal and external ear normal       Left Ear: Tympanic membrane, ear canal and external ear normal       Nose: Nose normal  No congestion or rhinorrhea  Mouth/Throat:      Mouth: Mucous membranes are moist       Pharynx: Oropharynx is clear  No oropharyngeal exudate or posterior oropharyngeal erythema  Eyes:      General:         Right eye: No discharge  Left eye: No discharge  Conjunctiva/sclera: Conjunctivae normal    Neck:      Musculoskeletal: Normal range of motion and neck supple  Vascular: No JVD  Cardiovascular:      Rate and Rhythm: Normal rate and regular rhythm  Heart sounds: Normal heart sounds  No murmur  No gallop  Pulmonary:      Effort: Pulmonary effort is normal  No respiratory distress  Breath sounds: Normal breath sounds  No stridor  No wheezing or rales  Chest:      Chest wall: No tenderness  Abdominal:      General: There is no distension  Palpations: Abdomen is soft  There is no mass  Tenderness: There is no abdominal tenderness  There is no rebound  Musculoskeletal: Normal range of motion  General: No tenderness  Lymphadenopathy:      Cervical: No cervical adenopathy  Skin:     General: Skin is warm  Findings: No erythema or rash  Neurological:      Mental Status: He is alert and oriented to person, place, and time  Sensory: No sensory deficit        Gait: Gait normal    Psychiatric:         Mood and Affect: Mood normal          Behavior: Behavior normal            Assessment/Plan:    Essential hypertension   Chronic asymptomatic improve in the blood pressure since we adjust the the lisinopril to 20 mg once a day and we continue also continue Norvasc 5 mg once a day low-salt diet important lose weight discussed with the patient       Diagnoses and all orders for this visit:    Essential hypertension  -     Hemoglobin A1C; Future  -     CBC and differential; Future  -     Basic metabolic panel; Future  -     Lipid Panel with Direct LDL reflex; Future  -     TSH, 3rd generation with Free T4 reflex; Future    Type 2 diabetes mellitus without complication, without long-term current use of insulin (HCC)  -     Hemoglobin A1C; Future  -     CBC and differential; Future  -     Basic metabolic panel; Future  -     Lipid Panel with Direct LDL reflex; Future  -     TSH, 3rd generation with Free T4 reflex; Future    Mixed hyperlipidemia  -     Hemoglobin A1C; Future  -     CBC and differential; Future  -     Basic metabolic panel; Future  -     Lipid Panel with Direct LDL reflex; Future  -     TSH, 3rd generation with Free T4 reflex;  Future

## 2021-06-09 DIAGNOSIS — N52.9 MALE ERECTILE DISORDER: ICD-10-CM

## 2021-06-09 RX ORDER — SILDENAFIL 100 MG/1
TABLET, FILM COATED ORAL
Qty: 10 TABLET | Refills: 0 | Status: SHIPPED | OUTPATIENT
Start: 2021-06-09 | End: 2021-08-20 | Stop reason: SDUPTHER

## 2021-06-10 NOTE — ASSESSMENT & PLAN NOTE
Chronic asymptomatic improve in the blood pressure since we adjust the the lisinopril to 20 mg once a day and we continue also continue Norvasc 5 mg once a day low-salt diet important lose weight discussed with the patient

## 2021-06-22 DIAGNOSIS — I10 ESSENTIAL HYPERTENSION: ICD-10-CM

## 2021-06-23 RX ORDER — LISINOPRIL 20 MG/1
TABLET ORAL
Qty: 90 TABLET | Refills: 0 | Status: SHIPPED | OUTPATIENT
Start: 2021-06-23 | End: 2021-09-09

## 2021-07-16 ENCOUNTER — TELEPHONE (OUTPATIENT)
Dept: LAB | Facility: HOSPITAL | Age: 71
End: 2021-07-16

## 2021-07-19 ENCOUNTER — TELEPHONE (OUTPATIENT)
Dept: LAB | Facility: HOSPITAL | Age: 71
End: 2021-07-19

## 2021-07-21 ENCOUNTER — APPOINTMENT (OUTPATIENT)
Dept: LAB | Facility: CLINIC | Age: 71
End: 2021-07-21
Payer: MEDICARE

## 2021-07-21 DIAGNOSIS — E78.2 MIXED HYPERLIPIDEMIA: ICD-10-CM

## 2021-07-21 DIAGNOSIS — I10 ESSENTIAL HYPERTENSION: ICD-10-CM

## 2021-07-21 DIAGNOSIS — E11.9 TYPE 2 DIABETES MELLITUS WITHOUT COMPLICATION, WITHOUT LONG-TERM CURRENT USE OF INSULIN (HCC): ICD-10-CM

## 2021-07-21 LAB
ANION GAP SERPL CALCULATED.3IONS-SCNC: 3 MMOL/L (ref 4–13)
BASOPHILS # BLD AUTO: 0.03 THOUSANDS/ΜL (ref 0–0.1)
BASOPHILS NFR BLD AUTO: 1 % (ref 0–1)
BUN SERPL-MCNC: 12 MG/DL (ref 5–25)
CALCIUM SERPL-MCNC: 8.9 MG/DL (ref 8.3–10.1)
CHLORIDE SERPL-SCNC: 104 MMOL/L (ref 100–108)
CHOLEST SERPL-MCNC: 99 MG/DL (ref 50–200)
CO2 SERPL-SCNC: 32 MMOL/L (ref 21–32)
CREAT SERPL-MCNC: 0.9 MG/DL (ref 0.6–1.3)
EOSINOPHIL # BLD AUTO: 0.23 THOUSAND/ΜL (ref 0–0.61)
EOSINOPHIL NFR BLD AUTO: 4 % (ref 0–6)
ERYTHROCYTE [DISTWIDTH] IN BLOOD BY AUTOMATED COUNT: 12.8 % (ref 11.6–15.1)
EST. AVERAGE GLUCOSE BLD GHB EST-MCNC: 120 MG/DL
GFR SERPL CREATININE-BSD FRML MDRD: 87 ML/MIN/1.73SQ M
GLUCOSE P FAST SERPL-MCNC: 112 MG/DL (ref 65–99)
HBA1C MFR BLD: 5.8 %
HCT VFR BLD AUTO: 40.4 % (ref 36.5–49.3)
HDLC SERPL-MCNC: 45 MG/DL
HGB BLD-MCNC: 13.1 G/DL (ref 12–17)
IMM GRANULOCYTES # BLD AUTO: 0.02 THOUSAND/UL (ref 0–0.2)
IMM GRANULOCYTES NFR BLD AUTO: 0 % (ref 0–2)
LDLC SERPL CALC-MCNC: 42 MG/DL (ref 0–100)
LYMPHOCYTES # BLD AUTO: 1.16 THOUSANDS/ΜL (ref 0.6–4.47)
LYMPHOCYTES NFR BLD AUTO: 21 % (ref 14–44)
MCH RBC QN AUTO: 32.3 PG (ref 26.8–34.3)
MCHC RBC AUTO-ENTMCNC: 32.4 G/DL (ref 31.4–37.4)
MCV RBC AUTO: 100 FL (ref 82–98)
MONOCYTES # BLD AUTO: 0.59 THOUSAND/ΜL (ref 0.17–1.22)
MONOCYTES NFR BLD AUTO: 11 % (ref 4–12)
NEUTROPHILS # BLD AUTO: 3.4 THOUSANDS/ΜL (ref 1.85–7.62)
NEUTS SEG NFR BLD AUTO: 63 % (ref 43–75)
NRBC BLD AUTO-RTO: 0 /100 WBCS
PLATELET # BLD AUTO: 133 THOUSANDS/UL (ref 149–390)
PMV BLD AUTO: 10.8 FL (ref 8.9–12.7)
POTASSIUM SERPL-SCNC: 3.9 MMOL/L (ref 3.5–5.3)
RBC # BLD AUTO: 4.05 MILLION/UL (ref 3.88–5.62)
SODIUM SERPL-SCNC: 139 MMOL/L (ref 136–145)
TRIGL SERPL-MCNC: 60 MG/DL
TSH SERPL DL<=0.05 MIU/L-ACNC: 1.34 UIU/ML (ref 0.36–3.74)
WBC # BLD AUTO: 5.43 THOUSAND/UL (ref 4.31–10.16)

## 2021-07-21 PROCEDURE — 36415 COLL VENOUS BLD VENIPUNCTURE: CPT

## 2021-07-21 PROCEDURE — 84443 ASSAY THYROID STIM HORMONE: CPT

## 2021-07-21 PROCEDURE — 80048 BASIC METABOLIC PNL TOTAL CA: CPT

## 2021-07-21 PROCEDURE — 80061 LIPID PANEL: CPT

## 2021-07-21 PROCEDURE — 85025 COMPLETE CBC W/AUTO DIFF WBC: CPT

## 2021-07-21 PROCEDURE — 83036 HEMOGLOBIN GLYCOSYLATED A1C: CPT

## 2021-07-30 ENCOUNTER — OFFICE VISIT (OUTPATIENT)
Dept: FAMILY MEDICINE CLINIC | Facility: CLINIC | Age: 71
End: 2021-07-30
Payer: MEDICARE

## 2021-07-30 VITALS
DIASTOLIC BLOOD PRESSURE: 80 MMHG | WEIGHT: 245 LBS | TEMPERATURE: 98 F | SYSTOLIC BLOOD PRESSURE: 120 MMHG | OXYGEN SATURATION: 99 % | HEART RATE: 60 BPM | BODY MASS INDEX: 33.18 KG/M2 | HEIGHT: 72 IN

## 2021-07-30 DIAGNOSIS — Z99.89 OBSTRUCTIVE SLEEP APNEA TREATED WITH CONTINUOUS POSITIVE AIRWAY PRESSURE (CPAP): ICD-10-CM

## 2021-07-30 DIAGNOSIS — E11.9 TYPE 2 DIABETES MELLITUS WITHOUT COMPLICATION, WITHOUT LONG-TERM CURRENT USE OF INSULIN (HCC): ICD-10-CM

## 2021-07-30 DIAGNOSIS — I10 ESSENTIAL HYPERTENSION: Primary | ICD-10-CM

## 2021-07-30 DIAGNOSIS — D69.6 PLATELETS DECREASED (HCC): ICD-10-CM

## 2021-07-30 DIAGNOSIS — E78.2 MIXED HYPERLIPIDEMIA: ICD-10-CM

## 2021-07-30 DIAGNOSIS — G47.33 OBSTRUCTIVE SLEEP APNEA TREATED WITH CONTINUOUS POSITIVE AIRWAY PRESSURE (CPAP): ICD-10-CM

## 2021-07-30 PROCEDURE — 99214 OFFICE O/P EST MOD 30 MIN: CPT | Performed by: FAMILY MEDICINE

## 2021-07-30 NOTE — ASSESSMENT & PLAN NOTE
A chronic patient on CPAP with pressure 9 tolerated well without side effect he notice a dramatic improve in his sleep the and even improve in his acid reflex medication recommend to continue using the CPAP machine and sleep position discussed with the patient

## 2021-07-30 NOTE — ASSESSMENT & PLAN NOTE
Chronic asymptomatic fair control continue current management including atorvastatin 40 mg once a day LDL therapeutic in diabetic patient low-fat diet recommended

## 2021-07-30 NOTE — ASSESSMENT & PLAN NOTE
Chronic ,well controlled   Anti -reflex measures :  · Raise the head of the bed 4 to 6 inches  · Avoid smoking, alcohol  · Avoid excess coffee, tea or other caffeinated beverages  · Avoid garments that fit tightly through the abdomen  · Avoid eating before bed     · Weight loss is beneficial

## 2021-07-30 NOTE — PROGRESS NOTES
Subjective:   Chief Complaint   Patient presents with    Follow-up     chronic conditions        Patient ID: Alexandria Herrera is a 78 y o  male  Patient here follow-up with a chronic condition patient history of non-insulin-dependent diabetic on metformin 500 mg once a day tolerated well without any side effect he deny any increased thirsty increased frequency urination no dizziness no headache and no abdomen pain patient compliant with the medication and he been trying to lose weight he lost 4 lb from previous visit patient was history of hyperlipidemia on statin tolerated well without any rash or muscle pain deny any chest pain short of breath no palpitation no TIA symptom patient was history of the indigestion deny any heartburn and he notice since he been using the CPAP machine for obstructive sleep apnea heartburn symptom has been improved recent blood work review with the patient      The following portions of the patient's history were reviewed and updated as appropriate: allergies, current medications, past family history, past medical history, past social history, past surgical history and problem list     Review of Systems   Constitutional: Negative for activity change, appetite change, fatigue and fever  HENT: Negative for congestion, ear pain, sinus pressure, sinus pain and sore throat  Eyes: Negative for pain, discharge, redness and itching  Respiratory: Negative for cough, chest tightness, shortness of breath and stridor  Cardiovascular: Negative for chest pain, palpitations and leg swelling  Gastrointestinal: Negative for abdominal pain, blood in stool, constipation, diarrhea and nausea  Genitourinary: Negative for dysuria, flank pain, frequency and hematuria  Musculoskeletal: Negative for back pain, joint swelling and neck pain  Skin: Negative for pallor and rash  Neurological: Negative for dizziness, tremors, weakness, numbness and headaches     Hematological: Does not bruise/bleed easily  Objective:  Vitals:    07/30/21 0931   BP: 120/80   Pulse: 60   Temp: 98 °F (36 7 °C)   TempSrc: Tympanic   SpO2: 99%   Weight: 111 kg (245 lb)   Height: 6' (1 829 m)      Physical Exam  Vitals and nursing note reviewed  Constitutional:       General: He is not in acute distress  Appearance: Normal appearance  He is well-developed  He is not diaphoretic  HENT:      Head: Normocephalic  Right Ear: Tympanic membrane, ear canal and external ear normal       Left Ear: Tympanic membrane, ear canal and external ear normal       Nose: Nose normal  No congestion or rhinorrhea  Mouth/Throat:      Mouth: Mucous membranes are moist       Pharynx: Oropharynx is clear  No oropharyngeal exudate or posterior oropharyngeal erythema  Eyes:      General:         Right eye: No discharge  Left eye: No discharge  Conjunctiva/sclera: Conjunctivae normal    Neck:      Vascular: No JVD  Cardiovascular:      Rate and Rhythm: Normal rate and regular rhythm  Heart sounds: Normal heart sounds  No murmur heard  No gallop  Pulmonary:      Effort: Pulmonary effort is normal  No respiratory distress  Breath sounds: Normal breath sounds  No stridor  No wheezing or rales  Chest:      Chest wall: No tenderness  Abdominal:      General: There is no distension  Palpations: Abdomen is soft  There is no mass  Tenderness: There is no abdominal tenderness  There is no rebound  Musculoskeletal:         General: No tenderness  Normal range of motion  Cervical back: Normal range of motion and neck supple  Lymphadenopathy:      Cervical: No cervical adenopathy  Skin:     General: Skin is warm  Findings: No erythema or rash  Neurological:      Mental Status: He is alert and oriented to person, place, and time  Sensory: No sensory deficit        Gait: Gait normal    Psychiatric:         Mood and Affect: Mood normal          Behavior: Behavior normal            Assessment/Plan:    Type 2 diabetes mellitus ([facility] [address] )    Lab Results   Component Value Date    HGBA1C 5 8 (H) 07/21/2021    chronic asymptomatic fair control continue with the metformin 500 mg  Once a day hemoglobin A1c improved compared with before encouraged patient to continue with the low carb diet and continue losing weight patient already on Ace inhibitor and he is status on statin patient up-to-date with diabetic eye in foot exam    Obstructive sleep apnea treated with continuous positive airway pressure (CPAP)   A chronic patient on CPAP with pressure 9 tolerated well without side effect he notice a dramatic improve in his sleep the and even improve in his acid reflex medication recommend to continue using the CPAP machine and sleep position discussed with the patient    Hyperlipidemia   Chronic asymptomatic fair control continue current management including atorvastatin 40 mg once a day LDL therapeutic in diabetic patient low-fat diet recommended    Indigestion   Chronic ,well controlled   Anti -reflex measures :  · Raise the head of the bed 4 to 6 inches  · Avoid smoking, alcohol  · Avoid excess coffee, tea or other caffeinated beverages  · Avoid garments that fit tightly through the abdomen  · Avoid eating before bed  · Weight loss is beneficial            Platelets decreased (HCC)   A chronic asymptomatic fluctuated and the platelet no bleeding or ecchymosis discussed with the patient aware of the diagnosis the who recommend avoid nonsteroidal anti-inflammatory drugs       Diagnoses and all orders for this visit:    Essential hypertension  -     CBC and differential; Future  -     Comprehensive metabolic panel; Future  -     Lipid panel; Future  -     TSH, 3rd generation with Free T4 reflex; Future  -     Hemoglobin A1C; Future    Type 2 diabetes mellitus without complication, without long-term current use of insulin (HCC)  -     metFORMIN (GLUCOPHAGE) 500 mg tablet;  Take 1 tablet (500 mg total) by mouth daily with breakfast  -     CBC and differential; Future  -     Comprehensive metabolic panel; Future  -     Lipid panel; Future  -     TSH, 3rd generation with Free T4 reflex; Future  -     Hemoglobin A1C; Future    Obstructive sleep apnea treated with continuous positive airway pressure (CPAP)  -     CBC and differential; Future  -     Comprehensive metabolic panel; Future  -     Lipid panel; Future  -     TSH, 3rd generation with Free T4 reflex; Future  -     Hemoglobin A1C; Future    Mixed hyperlipidemia  -     CBC and differential; Future  -     Comprehensive metabolic panel; Future  -     Lipid panel; Future  -     TSH, 3rd generation with Free T4 reflex; Future  -     Hemoglobin A1C; Future    Platelets decreased (HCC)  -     CBC and differential; Future  -     Comprehensive metabolic panel; Future  -     Lipid panel; Future  -     TSH, 3rd generation with Free T4 reflex;  Future  -     Hemoglobin A1C; Future

## 2021-07-30 NOTE — ASSESSMENT & PLAN NOTE
Lab Results   Component Value Date    HGBA1C 5 8 (H) 07/21/2021    chronic asymptomatic fair control continue with the metformin 500 mg  Once a day hemoglobin A1c improved compared with before encouraged patient to continue with the low carb diet and continue losing weight patient already on Ace inhibitor and he is status on statin patient up-to-date with diabetic eye in foot exam

## 2021-07-30 NOTE — ASSESSMENT & PLAN NOTE
A chronic asymptomatic fluctuated and the platelet no bleeding or ecchymosis discussed with the patient aware of the diagnosis the who recommend avoid nonsteroidal anti-inflammatory drugs

## 2021-08-20 DIAGNOSIS — N52.9 MALE ERECTILE DISORDER: ICD-10-CM

## 2021-08-20 RX ORDER — SILDENAFIL 100 MG/1
100 TABLET, FILM COATED ORAL AS NEEDED
Qty: 10 TABLET | Refills: 0 | Status: SHIPPED | OUTPATIENT
Start: 2021-08-20 | End: 2022-01-07 | Stop reason: SDUPTHER

## 2021-09-08 DIAGNOSIS — I10 ESSENTIAL HYPERTENSION: ICD-10-CM

## 2021-09-09 DIAGNOSIS — E78.2 MIXED HYPERLIPIDEMIA: ICD-10-CM

## 2021-09-09 DIAGNOSIS — I10 ESSENTIAL HYPERTENSION: ICD-10-CM

## 2021-09-09 RX ORDER — LISINOPRIL 20 MG/1
TABLET ORAL
Qty: 90 TABLET | Refills: 0 | Status: SHIPPED | OUTPATIENT
Start: 2021-09-09 | End: 2021-12-14

## 2021-09-09 RX ORDER — ATORVASTATIN CALCIUM 40 MG/1
TABLET, FILM COATED ORAL
Qty: 90 TABLET | Refills: 1 | Status: SHIPPED | OUTPATIENT
Start: 2021-09-09 | End: 2022-03-04 | Stop reason: SDUPTHER

## 2021-09-09 RX ORDER — AMLODIPINE BESYLATE 5 MG/1
TABLET ORAL
Qty: 90 TABLET | Refills: 1 | Status: SHIPPED | OUTPATIENT
Start: 2021-09-09 | End: 2022-03-04 | Stop reason: SDUPTHER

## 2021-10-02 ENCOUNTER — IMMUNIZATIONS (OUTPATIENT)
Dept: FAMILY MEDICINE CLINIC | Facility: MEDICAL CENTER | Age: 71
End: 2021-10-02

## 2021-10-02 DIAGNOSIS — Z23 ENCOUNTER FOR IMMUNIZATION: Primary | ICD-10-CM

## 2021-10-02 PROCEDURE — 91300 SARSCOV2 VAC 30MCG/0.3ML IM: CPT

## 2021-10-14 DIAGNOSIS — N40.0 BENIGN PROSTATIC HYPERPLASIA, UNSPECIFIED WHETHER LOWER URINARY TRACT SYMPTOMS PRESENT: ICD-10-CM

## 2021-10-14 RX ORDER — TAMSULOSIN HYDROCHLORIDE 0.4 MG/1
CAPSULE ORAL
Qty: 90 CAPSULE | Refills: 1 | Status: SHIPPED | OUTPATIENT
Start: 2021-10-14 | End: 2022-04-15

## 2021-10-21 DIAGNOSIS — E11.9 TYPE 2 DIABETES MELLITUS WITHOUT COMPLICATION, WITHOUT LONG-TERM CURRENT USE OF INSULIN (HCC): ICD-10-CM

## 2021-10-25 ENCOUNTER — OFFICE VISIT (OUTPATIENT)
Dept: SLEEP CENTER | Facility: CLINIC | Age: 71
End: 2021-10-25
Payer: MEDICARE

## 2021-10-25 VITALS
WEIGHT: 250 LBS | SYSTOLIC BLOOD PRESSURE: 150 MMHG | DIASTOLIC BLOOD PRESSURE: 84 MMHG | HEART RATE: 72 BPM | HEIGHT: 72 IN | BODY MASS INDEX: 33.86 KG/M2

## 2021-10-25 DIAGNOSIS — Z99.89 OBSTRUCTIVE SLEEP APNEA TREATED WITH CONTINUOUS POSITIVE AIRWAY PRESSURE (CPAP): Primary | ICD-10-CM

## 2021-10-25 DIAGNOSIS — G47.33 OBSTRUCTIVE SLEEP APNEA TREATED WITH CONTINUOUS POSITIVE AIRWAY PRESSURE (CPAP): Primary | ICD-10-CM

## 2021-10-25 PROCEDURE — 99214 OFFICE O/P EST MOD 30 MIN: CPT | Performed by: NURSE PRACTITIONER

## 2021-10-26 ENCOUNTER — TELEPHONE (OUTPATIENT)
Dept: SLEEP CENTER | Facility: CLINIC | Age: 71
End: 2021-10-26

## 2021-11-06 ENCOUNTER — APPOINTMENT (OUTPATIENT)
Dept: LAB | Facility: HOSPITAL | Age: 71
End: 2021-11-06
Payer: MEDICARE

## 2021-11-06 DIAGNOSIS — D69.6 PLATELETS DECREASED (HCC): ICD-10-CM

## 2021-11-06 DIAGNOSIS — G47.33 OBSTRUCTIVE SLEEP APNEA TREATED WITH CONTINUOUS POSITIVE AIRWAY PRESSURE (CPAP): ICD-10-CM

## 2021-11-06 DIAGNOSIS — E78.2 MIXED HYPERLIPIDEMIA: ICD-10-CM

## 2021-11-06 DIAGNOSIS — I10 ESSENTIAL HYPERTENSION: ICD-10-CM

## 2021-11-06 DIAGNOSIS — Z99.89 OBSTRUCTIVE SLEEP APNEA TREATED WITH CONTINUOUS POSITIVE AIRWAY PRESSURE (CPAP): ICD-10-CM

## 2021-11-06 DIAGNOSIS — E11.9 TYPE 2 DIABETES MELLITUS WITHOUT COMPLICATION, WITHOUT LONG-TERM CURRENT USE OF INSULIN (HCC): ICD-10-CM

## 2021-11-06 LAB
ALBUMIN SERPL BCP-MCNC: 3.9 G/DL (ref 3–5.2)
ALP SERPL-CCNC: 96 U/L (ref 43–122)
ALT SERPL W P-5'-P-CCNC: 31 U/L
ANION GAP SERPL CALCULATED.3IONS-SCNC: 4 MMOL/L (ref 5–14)
AST SERPL W P-5'-P-CCNC: 34 U/L (ref 17–59)
BASOPHILS # BLD AUTO: 0 THOUSANDS/ΜL (ref 0–0.1)
BASOPHILS NFR BLD AUTO: 1 % (ref 0–1)
BILIRUB SERPL-MCNC: 0.61 MG/DL
BUN SERPL-MCNC: 17 MG/DL (ref 5–25)
CALCIUM SERPL-MCNC: 9.4 MG/DL (ref 8.4–10.2)
CHLORIDE SERPL-SCNC: 102 MMOL/L (ref 97–108)
CHOLEST SERPL-MCNC: 108 MG/DL
CO2 SERPL-SCNC: 31 MMOL/L (ref 22–30)
CREAT SERPL-MCNC: 0.92 MG/DL (ref 0.7–1.5)
EOSINOPHIL # BLD AUTO: 0.3 THOUSAND/ΜL (ref 0–0.4)
EOSINOPHIL NFR BLD AUTO: 6 % (ref 0–6)
ERYTHROCYTE [DISTWIDTH] IN BLOOD BY AUTOMATED COUNT: 13.1 %
EST. AVERAGE GLUCOSE BLD GHB EST-MCNC: 117 MG/DL
GFR SERPL CREATININE-BSD FRML MDRD: 85 ML/MIN/1.73SQ M
GLUCOSE P FAST SERPL-MCNC: 115 MG/DL (ref 70–99)
HBA1C MFR BLD: 5.7 %
HCT VFR BLD AUTO: 38.4 % (ref 41–53)
HDLC SERPL-MCNC: 34 MG/DL
HGB BLD-MCNC: 13.1 G/DL (ref 13.5–17.5)
LDLC SERPL CALC-MCNC: 61 MG/DL
LYMPHOCYTES # BLD AUTO: 1.4 THOUSANDS/ΜL (ref 0.5–4)
LYMPHOCYTES NFR BLD AUTO: 29 % (ref 25–45)
MCH RBC QN AUTO: 32.7 PG (ref 26–34)
MCHC RBC AUTO-ENTMCNC: 34.1 G/DL (ref 31–36)
MCV RBC AUTO: 96 FL (ref 80–100)
MONOCYTES # BLD AUTO: 0.5 THOUSAND/ΜL (ref 0.2–0.9)
MONOCYTES NFR BLD AUTO: 10 % (ref 1–10)
NEUTROPHILS # BLD AUTO: 2.7 THOUSANDS/ΜL (ref 1.8–7.8)
NEUTS SEG NFR BLD AUTO: 55 % (ref 45–65)
NONHDLC SERPL-MCNC: 74 MG/DL
PLATELET # BLD AUTO: 130 THOUSANDS/UL (ref 150–450)
PMV BLD AUTO: 9.2 FL (ref 8.9–12.7)
POTASSIUM SERPL-SCNC: 4.2 MMOL/L (ref 3.6–5)
PROT SERPL-MCNC: 6.5 G/DL (ref 5.9–8.4)
RBC # BLD AUTO: 4.01 MILLION/UL (ref 4.5–5.9)
SODIUM SERPL-SCNC: 137 MMOL/L (ref 137–147)
TRIGL SERPL-MCNC: 65 MG/DL
TSH SERPL DL<=0.05 MIU/L-ACNC: 1.23 UIU/ML (ref 0.47–4.68)
WBC # BLD AUTO: 5 THOUSAND/UL (ref 4.5–11)

## 2021-11-06 PROCEDURE — 85025 COMPLETE CBC W/AUTO DIFF WBC: CPT

## 2021-11-06 PROCEDURE — 84443 ASSAY THYROID STIM HORMONE: CPT

## 2021-11-06 PROCEDURE — 80053 COMPREHEN METABOLIC PANEL: CPT

## 2021-11-06 PROCEDURE — 83036 HEMOGLOBIN GLYCOSYLATED A1C: CPT

## 2021-11-06 PROCEDURE — 36415 COLL VENOUS BLD VENIPUNCTURE: CPT

## 2021-11-06 PROCEDURE — 80061 LIPID PANEL: CPT

## 2021-11-09 ENCOUNTER — OFFICE VISIT (OUTPATIENT)
Dept: FAMILY MEDICINE CLINIC | Facility: CLINIC | Age: 71
End: 2021-11-09
Payer: MEDICARE

## 2021-11-09 VITALS
HEART RATE: 65 BPM | SYSTOLIC BLOOD PRESSURE: 130 MMHG | HEIGHT: 72 IN | BODY MASS INDEX: 33.67 KG/M2 | WEIGHT: 248.6 LBS | TEMPERATURE: 97.8 F | OXYGEN SATURATION: 99 % | DIASTOLIC BLOOD PRESSURE: 80 MMHG | RESPIRATION RATE: 16 BRPM

## 2021-11-09 DIAGNOSIS — I10 ESSENTIAL HYPERTENSION: ICD-10-CM

## 2021-11-09 DIAGNOSIS — E11.9 TYPE 2 DIABETES MELLITUS WITHOUT COMPLICATION, WITHOUT LONG-TERM CURRENT USE OF INSULIN (HCC): Primary | ICD-10-CM

## 2021-11-09 DIAGNOSIS — N40.1 BPH WITH URINARY OBSTRUCTION: ICD-10-CM

## 2021-11-09 DIAGNOSIS — E78.2 MIXED HYPERLIPIDEMIA: ICD-10-CM

## 2021-11-09 DIAGNOSIS — N13.8 BPH WITH URINARY OBSTRUCTION: ICD-10-CM

## 2021-11-09 PROCEDURE — 99214 OFFICE O/P EST MOD 30 MIN: CPT | Performed by: FAMILY MEDICINE

## 2021-12-02 ENCOUNTER — TELEPHONE (OUTPATIENT)
Dept: FAMILY MEDICINE CLINIC | Facility: CLINIC | Age: 71
End: 2021-12-02

## 2021-12-02 DIAGNOSIS — Z20.822 EXPOSURE TO COVID-19 VIRUS: Primary | ICD-10-CM

## 2021-12-02 PROCEDURE — 0241U HB NFCT DS VIR RESP RNA 4 TRGT: CPT | Performed by: FAMILY MEDICINE

## 2022-01-07 DIAGNOSIS — N52.9 MALE ERECTILE DISORDER: ICD-10-CM

## 2022-01-07 DIAGNOSIS — E11.9 TYPE 2 DIABETES MELLITUS WITHOUT COMPLICATION, WITHOUT LONG-TERM CURRENT USE OF INSULIN (HCC): ICD-10-CM

## 2022-01-07 RX ORDER — SILDENAFIL 100 MG/1
100 TABLET, FILM COATED ORAL AS NEEDED
Qty: 10 TABLET | Refills: 0 | Status: SHIPPED | OUTPATIENT
Start: 2022-01-07 | End: 2022-06-30 | Stop reason: SDUPTHER

## 2022-01-27 ENCOUNTER — TELEPHONE (OUTPATIENT)
Dept: FAMILY MEDICINE CLINIC | Facility: CLINIC | Age: 72
End: 2022-01-27

## 2022-03-02 ENCOUNTER — RA CDI HCC (OUTPATIENT)
Dept: OTHER | Facility: HOSPITAL | Age: 72
End: 2022-03-02

## 2022-03-04 ENCOUNTER — APPOINTMENT (OUTPATIENT)
Dept: LAB | Facility: CLINIC | Age: 72
End: 2022-03-04
Payer: MEDICARE

## 2022-03-04 DIAGNOSIS — E78.2 MIXED HYPERLIPIDEMIA: ICD-10-CM

## 2022-03-04 DIAGNOSIS — E11.9 TYPE 2 DIABETES MELLITUS WITHOUT COMPLICATION, WITHOUT LONG-TERM CURRENT USE OF INSULIN (HCC): ICD-10-CM

## 2022-03-04 DIAGNOSIS — I10 ESSENTIAL HYPERTENSION: ICD-10-CM

## 2022-03-04 DIAGNOSIS — N40.0 BENIGN PROSTATIC HYPERPLASIA, UNSPECIFIED WHETHER LOWER URINARY TRACT SYMPTOMS PRESENT: ICD-10-CM

## 2022-03-04 LAB
ANION GAP SERPL CALCULATED.3IONS-SCNC: 4 MMOL/L (ref 4–13)
BASOPHILS # BLD AUTO: 0.04 THOUSANDS/ΜL (ref 0–0.1)
BASOPHILS NFR BLD AUTO: 1 % (ref 0–1)
BUN SERPL-MCNC: 16 MG/DL (ref 5–25)
CALCIUM SERPL-MCNC: 8.9 MG/DL (ref 8.3–10.1)
CHLORIDE SERPL-SCNC: 106 MMOL/L (ref 100–108)
CHOLEST SERPL-MCNC: 95 MG/DL
CO2 SERPL-SCNC: 29 MMOL/L (ref 21–32)
CREAT SERPL-MCNC: 1.04 MG/DL (ref 0.6–1.3)
EOSINOPHIL # BLD AUTO: 0.27 THOUSAND/ΜL (ref 0–0.61)
EOSINOPHIL NFR BLD AUTO: 5 % (ref 0–6)
ERYTHROCYTE [DISTWIDTH] IN BLOOD BY AUTOMATED COUNT: 13.1 % (ref 11.6–15.1)
EST. AVERAGE GLUCOSE BLD GHB EST-MCNC: 126 MG/DL
GFR SERPL CREATININE-BSD FRML MDRD: 72 ML/MIN/1.73SQ M
GLUCOSE P FAST SERPL-MCNC: 126 MG/DL (ref 65–99)
HBA1C MFR BLD: 6 %
HCT VFR BLD AUTO: 41.2 % (ref 36.5–49.3)
HDLC SERPL-MCNC: 43 MG/DL
HGB BLD-MCNC: 13.1 G/DL (ref 12–17)
IMM GRANULOCYTES # BLD AUTO: 0.03 THOUSAND/UL (ref 0–0.2)
IMM GRANULOCYTES NFR BLD AUTO: 1 % (ref 0–2)
LDLC SERPL CALC-MCNC: 38 MG/DL (ref 0–100)
LYMPHOCYTES # BLD AUTO: 1.83 THOUSANDS/ΜL (ref 0.6–4.47)
LYMPHOCYTES NFR BLD AUTO: 31 % (ref 14–44)
MCH RBC QN AUTO: 31.6 PG (ref 26.8–34.3)
MCHC RBC AUTO-ENTMCNC: 31.8 G/DL (ref 31.4–37.4)
MCV RBC AUTO: 100 FL (ref 82–98)
MONOCYTES # BLD AUTO: 0.55 THOUSAND/ΜL (ref 0.17–1.22)
MONOCYTES NFR BLD AUTO: 9 % (ref 4–12)
NEUTROPHILS # BLD AUTO: 3.27 THOUSANDS/ΜL (ref 1.85–7.62)
NEUTS SEG NFR BLD AUTO: 53 % (ref 43–75)
NONHDLC SERPL-MCNC: 52 MG/DL
NRBC BLD AUTO-RTO: 0 /100 WBCS
PLATELET # BLD AUTO: 135 THOUSANDS/UL (ref 149–390)
PMV BLD AUTO: 11.4 FL (ref 8.9–12.7)
POTASSIUM SERPL-SCNC: 3.8 MMOL/L (ref 3.5–5.3)
RBC # BLD AUTO: 4.14 MILLION/UL (ref 3.88–5.62)
SODIUM SERPL-SCNC: 139 MMOL/L (ref 136–145)
TRIGL SERPL-MCNC: 68 MG/DL
TSH SERPL DL<=0.05 MIU/L-ACNC: 1.37 UIU/ML (ref 0.36–3.74)
WBC # BLD AUTO: 5.99 THOUSAND/UL (ref 4.31–10.16)

## 2022-03-04 PROCEDURE — 85025 COMPLETE CBC W/AUTO DIFF WBC: CPT

## 2022-03-04 PROCEDURE — 84443 ASSAY THYROID STIM HORMONE: CPT

## 2022-03-04 PROCEDURE — 80048 BASIC METABOLIC PNL TOTAL CA: CPT

## 2022-03-04 PROCEDURE — 83036 HEMOGLOBIN GLYCOSYLATED A1C: CPT

## 2022-03-04 PROCEDURE — 80061 LIPID PANEL: CPT

## 2022-03-04 PROCEDURE — 36415 COLL VENOUS BLD VENIPUNCTURE: CPT

## 2022-03-04 RX ORDER — LISINOPRIL 20 MG/1
20 TABLET ORAL DAILY
Qty: 90 TABLET | Refills: 0 | Status: SHIPPED | OUTPATIENT
Start: 2022-03-04 | End: 2022-06-06

## 2022-03-04 RX ORDER — FINASTERIDE 5 MG/1
5 TABLET, FILM COATED ORAL DAILY
Qty: 90 TABLET | Refills: 0 | Status: SHIPPED | OUTPATIENT
Start: 2022-03-04 | End: 2022-06-06

## 2022-03-04 RX ORDER — ATORVASTATIN CALCIUM 40 MG/1
40 TABLET, FILM COATED ORAL DAILY
Qty: 90 TABLET | Refills: 0 | Status: SHIPPED | OUTPATIENT
Start: 2022-03-04 | End: 2022-06-06

## 2022-03-04 RX ORDER — AMLODIPINE BESYLATE 5 MG/1
5 TABLET ORAL DAILY
Qty: 90 TABLET | Refills: 0 | Status: SHIPPED | OUTPATIENT
Start: 2022-03-04 | End: 2022-06-06

## 2022-03-08 ENCOUNTER — OFFICE VISIT (OUTPATIENT)
Dept: FAMILY MEDICINE CLINIC | Facility: CLINIC | Age: 72
End: 2022-03-08
Payer: MEDICARE

## 2022-03-08 VITALS
SYSTOLIC BLOOD PRESSURE: 110 MMHG | HEIGHT: 71 IN | BODY MASS INDEX: 35.7 KG/M2 | WEIGHT: 255 LBS | HEART RATE: 74 BPM | TEMPERATURE: 98.6 F | DIASTOLIC BLOOD PRESSURE: 70 MMHG | OXYGEN SATURATION: 99 %

## 2022-03-08 DIAGNOSIS — Z00.00 MEDICARE ANNUAL WELLNESS VISIT, SUBSEQUENT: Primary | ICD-10-CM

## 2022-03-08 DIAGNOSIS — D69.6 PLATELETS DECREASED (HCC): ICD-10-CM

## 2022-03-08 DIAGNOSIS — E11.9 TYPE 2 DIABETES MELLITUS WITHOUT COMPLICATION, WITHOUT LONG-TERM CURRENT USE OF INSULIN (HCC): ICD-10-CM

## 2022-03-08 DIAGNOSIS — E78.2 MIXED HYPERLIPIDEMIA: ICD-10-CM

## 2022-03-08 DIAGNOSIS — E66.01 OBESITY, MORBID (HCC): ICD-10-CM

## 2022-03-08 DIAGNOSIS — I10 ESSENTIAL HYPERTENSION: ICD-10-CM

## 2022-03-08 PROBLEM — M54.50 ACUTE BILATERAL LOW BACK PAIN WITHOUT SCIATICA: Status: RESOLVED | Noted: 2021-03-09 | Resolved: 2022-03-08

## 2022-03-08 PROBLEM — Z00.01 ENCOUNTER FOR WELL ADULT EXAM WITH ABNORMAL FINDINGS: Status: RESOLVED | Noted: 2019-02-04 | Resolved: 2022-03-08

## 2022-03-08 PROCEDURE — 1123F ACP DISCUSS/DSCN MKR DOCD: CPT | Performed by: FAMILY MEDICINE

## 2022-03-08 PROCEDURE — G0439 PPPS, SUBSEQ VISIT: HCPCS | Performed by: FAMILY MEDICINE

## 2022-03-08 PROCEDURE — 99214 OFFICE O/P EST MOD 30 MIN: CPT | Performed by: FAMILY MEDICINE

## 2022-03-08 NOTE — PROGRESS NOTES
Assessment and Plan:     Problem List Items Addressed This Visit        Endocrine    Type 2 diabetes mellitus ([facility] [address] )       Lab Results   Component Value Date    HGBA1C 6 0 (H) 03/04/2022     Chronic asymptomatic fair control continue with the metformin 500 mg once a day proper use and possible side effect discussed the patient low carb diet important lose weight discussed the patient patient already on statin and already on Ace inhibitor         Relevant Medications    metFORMIN (GLUCOPHAGE) 500 mg tablet    Other Relevant Orders    CBC and differential    Basic metabolic panel    Lipid Panel with Direct LDL reflex    TSH, 3rd generation with Free T4 reflex    Hemoglobin A1C       Cardiovascular and Mediastinum    Essential hypertension     Chronic asymptomatic fair control continue current management including lisinopril 20 mg once a day amlodipine 5 mg once a day low-salt diet important lose weight discussed the patient         Relevant Orders    CBC and differential    Basic metabolic panel    Lipid Panel with Direct LDL reflex    TSH, 3rd generation with Free T4 reflex    Hemoglobin A1C       Other    Hyperlipidemia     Chronic asymptomatic LDL therapeutic in diabetic patient continue with atorvastatin 40 mg once a day low-fat diet discussed the patient         Relevant Orders    CBC and differential    Basic metabolic panel    Lipid Panel with Direct LDL reflex    TSH, 3rd generation with Free T4 reflex    Hemoglobin A1C    Medicare annual wellness visit, subsequent - Primary     Advice and education were given regarding nutrition, aerobic exercises, weight bearing exercises, cardiovascular risk reduction, fall risk reduction, and age appropriate supplements  The patient was counseled regarding instructions for management, risk factor reductions, prognosis, risks and benefits of treatment options, patient and family education, and importance of compliance with treatment                Platelets decreased ([facility] [address] )     Chronic a no bleeding no bruises the platelet number recent blood work improved compared with before         Relevant Orders    CBC and differential    Basic metabolic panel    Lipid Panel with Direct LDL reflex    TSH, 3rd generation with Free T4 reflex    Hemoglobin A1C    Obesity, morbid (HCC)     A chronic uncontrolled and BMI 35 57 patient history of hypertension and diabetic patient has been trying to do diet not successful to lose weight the recommend the portion control low carb low-fat diet increase physical activity refer the patient to weight management         Relevant Orders    Ambulatory Referral to Weight Management    CBC and differential    Basic metabolic panel    Lipid Panel with Direct LDL reflex    TSH, 3rd generation with Free T4 reflex    Hemoglobin A1C        BMI Counseling: Body mass index is 35 57 kg/m²  The BMI is above normal  Nutrition recommendations include decreasing portion sizes, decreasing fast food intake and limiting drinks that contain sugar  Exercise recommendations include exercising 3-5 times per week  No pharmacotherapy was ordered  Patient referred to weight management  Rationale for BMI follow-up plan is due to patient being overweight or obese  Depression Screening and Follow-up Plan: Patient was screened for depression during today's encounter  They screened negative with a PHQ-2 score of 0  Preventive health issues were discussed with patient, and age appropriate screening tests were ordered as noted in patient's After Visit Summary  Personalized health advice and appropriate referrals for health education or preventive services given if needed, as noted in patient's After Visit Summary       History of Present Illness:     Patient presents for Medicare Annual Wellness visit    Patient Care Team:  Yuniel Gonzales MD as PCP - General (Family Medicine)  Mark Hill MD (Ophthalmology)  Lily Sifuentes MD (Urology)     Problem List:     Patient Active Problem List   Diagnosis    Allergic rhinitis    BPH with urinary obstruction    Displacement of lumbar intervertebral disc without myelopathy    Diverticulosis of colon    Male erectile disorder    Essential hypertension    Herpes zoster    Indigestion    Metabolic syndrome    Left ventricular hypertrophy    Mitral valve regurgitation    Type 2 diabetes mellitus ([facility] [address] )    Hyperlipidemia    Idiopathic peripheral neuropathy    Calculus of gallbladder with cholecystitis    Colon polyp    Need for shingles vaccine    Obstructive sleep apnea treated with continuous positive airway pressure (CPAP)    Excessive daytime sleepiness    Medicare annual wellness visit, subsequent    Platelets decreased ([facility] [address] )    Obesity, morbid ([facility] [address] )      Past Medical and Surgical History:     Past Medical History:   Diagnosis Date    Acute bilateral low back pain without sciatica 3/9/2021    BMI 33 0-33 9,adult 2/4/2020    BPH (benign prostatic hyperplasia)     Diabetes mellitus associated with hormonal etiology ([facility] [address] ) 3/5/2019    ED (erectile dysfunction)     Elevated PSA 1/15/2013    Encounter for well adult exam with abnormal findings 2/4/2019    Enlarged prostate     Herpes zoster 12/18/2014    Hyperlipidemia     Hypertension     IFG (impaired fasting glucose)     Metabolic syndrome     Obesity (BMI 30 0-34 9) 2/4/2019    Psoriasis     Seasonal allergic rhinitis     Type 2 diabetes mellitus ([facility] [address] ) 11/17/2014     Past Surgical History:   Procedure Laterality Date    CHOLECYSTECTOMY  05/14/2015    COLONOSCOPY  2007    HERNIA REPAIR  05/14/2015      Family History:     Family History   Problem Relation Age of Onset    Skin cancer Mother     Heart disease Father       Social History:     Social History     Socioeconomic History    Marital status: /Civil Union     Spouse name: None    Number of children: None    Years of education: None    Highest education level: None   Occupational History  None   Tobacco Use    Smoking status: Former Smoker     Types: Pipe, Cigarettes    Smokeless tobacco: Former User   Vaping Use    Vaping Use: Never used   Substance and Sexual Activity    Alcohol use: Yes    Drug use: No    Sexual activity: Yes     Partners: Female   Other Topics Concern    None   Social History Narrative    Has children    Right handed     Social Determinants of Health     Financial Resource Strain: Low Risk     Difficulty of Paying Living Expenses: Not hard at all   Food Insecurity: No Food Insecurity    Worried About Running Out of Food in the Last Year: Never true    Melissa of Food in the Last Year: Never true   Transportation Needs: No Transportation Needs    Lack of Transportation (Medical): No    Lack of Transportation (Non-Medical): No   Physical Activity: Insufficiently Active    Days of Exercise per Week: 1 day    Minutes of Exercise per Session: 60 min   Stress: No Stress Concern Present    Feeling of Stress : Not at all   Social Connections: Socially Integrated    Frequency of Communication with Friends and Family: More than three times a week    Frequency of Social Gatherings with Friends and Family: Once a week    Attends Faith Services: More than 4 times per year    Active Member of [employer] or Organizations:  Yes    Attends Club or Organization Meetings: More than 4 times per year    Marital Status:    Intimate Partner Violence: Not At Risk    Fear of Current or Ex-Partner: No    Emotionally Abused: No    Physically Abused: No    Sexually Abused: No   Housing Stability: Not on file      Medications and Allergies:     Current Outpatient Medications   Medication Sig Dispense Refill    amLODIPine (NORVASC) 5 mg tablet Take 1 tablet (5 mg total) by mouth daily 90 tablet 0    aspirin (ASPIR-81) 81 mg EC tablet take 1 tablet (81MG)      atorvastatin (LIPITOR) 40 mg tablet Take 1 tablet (40 mg total) by mouth daily 90 tablet 0    finasteride (PROSCAR) 5 mg tablet Take 1 tablet (5 mg total) by mouth daily 90 tablet 0    fluticasone (FLONASE) 50 mcg/act nasal spray 1 spray into each nostril daily 1 Bottle 0    hydrocortisone 2 5 % cream Apply topically every 24 hours 56 7 g 0    ketoconazole (NIZORAL) 2 % shampoo WASH SCALP AND FACE 2-3 TIMES A WEEK AS NEEDED FOR FLARES  5    lisinopril (ZESTRIL) 20 mg tablet Take 1 tablet (20 mg total) by mouth daily 90 tablet 0    metFORMIN (GLUCOPHAGE) 500 mg tablet Take 1 tablet (500 mg total) by mouth daily with breakfast 90 tablet 0    sildenafil (VIAGRA) 100 mg tablet Take 1 tablet (100 mg total) by mouth as needed for erectile dysfunction 10 tablet 0    tamsulosin (FLOMAX) 0 4 mg TAKE 1 CAPSULE DAILY WITH  DINNER 90 capsule 1     No current facility-administered medications for this visit  Allergies   Allergen Reactions    No Known Allergies       Immunizations:     Immunization History   Administered Date(s) Administered    COVID-19 PFIZER VACCINE 0 3 ML IM 02/27/2021, 03/20/2021, 10/02/2021    INFLUENZA 10/13/2015, 10/31/2016, 10/16/2017    Influenza Split 10/29/2013    Influenza, high dose seasonal 0 7 mL 10/19/2018, 09/25/2019, 11/23/2020, 10/26/2021    Influenza, seasonal, injectable 11/17/2009, 10/22/2010, 11/11/2011, 10/07/2014    Pneumococcal Conjugate 13-Valent 01/09/2018    Pneumococcal Polysaccharide PPV23 02/04/2019    Tdap 07/28/2015    Zoster 10/27/2015    Zoster Vaccine Recombinant 10/27/2015, 06/12/2021      Health Maintenance:         Topic Date Due    Colorectal Cancer Screening  06/23/2024    Hepatitis C Screening  Completed     There are no preventive care reminders to display for this patient  Medicare Health Risk Assessment:     /70   Pulse 74   Temp 98 6 °F (37 °C) (Tympanic)   Ht 5' 11" (1 803 m)   Wt 116 kg (255 lb)   SpO2 99%   BMI 35 57 kg/m²      Yesenia Session is here for his Subsequent Wellness visit   Last Medicare Wellness visit information reviewed, patient interviewed and updates made to the record today  Health Risk Assessment:   Patient rates overall health as good  Patient feels that their physical health rating is same  Patient is satisfied with their life  Eyesight was rated as same  Hearing was rated as same  Patient feels that their emotional and mental health rating is same  Patients states they are never, rarely angry  Patient states they are never, rarely unusually tired/fatigued  Pain experienced in the last 7 days has been none  Patient states that he has experienced no weight loss or gain in last 6 months  Depression Screening:   PHQ-2 Score: 0      Fall Risk Screening: In the past year, patient has experienced: no history of falling in past year      Home Safety:  Patient does not have trouble with stairs inside or outside of their home  Patient has working smoke alarms and has working carbon monoxide detector  Home safety hazards include: none  Nutrition:   Current diet is Regular  Medications:   Patient is currently taking over-the-counter supplements  OTC medications include: see medication list  Patient is able to manage medications  Activities of Daily Living (ADLs)/Instrumental Activities of Daily Living (IADLs):   Walk and transfer into and out of bed and chair?: Yes  Dress and groom yourself?: Yes    Bathe or shower yourself?: Yes    Feed yourself? Yes  Do your laundry/housekeeping?: Yes  Manage your money, pay your bills and track your expenses?: Yes  Make your own meals?: Yes    Do your own shopping?: Yes    Durable Medical Equipment Suppliers  none    Previous Hospitalizations:   Any hospitalizations or ED visits within the last 12 months?: No      Advance Care Planning:   Living will: Yes    Durable POA for healthcare:  Yes    Advanced directive: Yes    Advanced directive counseling given: Yes    Five wishes given: Yes    Patient declined ACP directive: No    End of Life Decisions reviewed with patient: Yes Provider agrees with end of life decisions: Yes      Cognitive Screening:   Provider or family/friend/caregiver concerned regarding cognition?: No    PREVENTIVE SCREENINGS      Cardiovascular Screening:    General: Screening Not Indicated and History Lipid Disorder      Diabetes Screening:     General: Screening Not Indicated and History Diabetes      Colorectal Cancer Screening:     General: Screening Current      Prostate Cancer Screening:    General: Risks and Benefits Discussed      Osteoporosis Screening:    General: Risks and Benefits Discussed      Abdominal Aortic Aneurysm (AAA) Screening:    Risk factors include: age between 73-69 yo and tobacco use        General: Risks and Benefits Discussed      Lung Cancer Screening:     General: Screening Not Indicated      Hepatitis C Screening:    General: Screening Current    Screening, Brief Intervention, and Referral to Treatment (SBIRT)    Screening  Typical number of drinks in a day: 2  Typical number of drinks in a week: 7  Interpretation: Low risk drinking behavior  AUDIT-C Screenin) How often did you have a drink containing alcohol in the past year? never  2) How many drinks did you have on a typical day when you were drinking in the past year? 0  3) How often did you have 6 or more drinks on one occasion in the past year? never    AUDIT-C Score: 0  Interpretation: Score 0-3 (male): Negative screen for alcohol misuse    Single Item Drug Screening:  How often have you used an illegal drug (including marijuana) or a prescription medication for non-medical reasons in the past year? never    Single Item Drug Screen Score: 0  Interpretation: Negative screen for possible drug use disorder    Brief Intervention  Alcohol & drug use screenings were reviewed  No concerns regarding substance use disorder identified         Yashira Benitez MD

## 2022-03-08 NOTE — PATIENT INSTRUCTIONS

## 2022-03-08 NOTE — PROGRESS NOTES
BMI Counseling: Body mass index is 35 57 kg/m²  The BMI is above normal  Nutrition recommendations include decreasing portion sizes, decreasing fast food intake and limiting drinks that contain sugar  Exercise recommendations include exercising 3-5 times per week  No pharmacotherapy was ordered  Rationale for BMI follow-up plan is due to patient being overweight or obese  Depression Screening and Follow-up Plan: Patient was screened for depression during today's encounter  They screened negative with a PHQ-2 score of 0  Subjective:   Chief Complaint   Patient presents with    Medicare Wellness Visit    Follow-up     chronic conditions        Patient ID: Philip Rodriguez is a 73 y o  male  Patient here for Medicare exam follow-up with a chronic condition including insulin-dependent diabetic hypertension hyperlipidemia and the patient compliant with the medication and the tolerated well without side effect patient having hard time to lose weight recent blood work discussed the patient      The following portions of the patient's history were reviewed and updated as appropriate: allergies, current medications, past family history, past medical history, past social history, past surgical history and problem list     Review of Systems   Constitutional: Negative for activity change, appetite change, fatigue and fever  HENT: Negative for congestion, ear pain, sinus pressure, sinus pain and sore throat  Eyes: Negative for pain, discharge, redness and itching  Respiratory: Negative for cough, chest tightness, shortness of breath and stridor  Cardiovascular: Negative for chest pain, palpitations and leg swelling  Gastrointestinal: Negative for abdominal pain, blood in stool, constipation, diarrhea and nausea  Genitourinary: Negative for dysuria, flank pain, frequency and hematuria  Musculoskeletal: Negative for back pain, joint swelling and neck pain  Skin: Negative for pallor and rash  Neurological: Negative for dizziness, tremors, weakness, numbness and headaches  Hematological: Does not bruise/bleed easily  Objective:  Vitals:    03/08/22 0922   BP: 110/70   Pulse: 74   Temp: 98 6 °F (37 °C)   TempSrc: Tympanic   SpO2: 99%   Weight: 116 kg (255 lb)   Height: 5' 11" (1 803 m)      Physical Exam  Vitals and nursing note reviewed  Constitutional:       General: He is not in acute distress  Appearance: He is well-developed  He is not diaphoretic  HENT:      Head: Normocephalic  Right Ear: External ear normal       Left Ear: External ear normal       Nose: No congestion or rhinorrhea  Mouth/Throat:      Pharynx: No oropharyngeal exudate or posterior oropharyngeal erythema  Eyes:      General:         Right eye: No discharge  Left eye: No discharge  Conjunctiva/sclera: Conjunctivae normal    Neck:      Vascular: No JVD  Cardiovascular:      Rate and Rhythm: Normal rate and regular rhythm  Pulses: no weak pulses          Dorsalis pedis pulses are 2+ on the right side and 2+ on the left side  Heart sounds: Normal heart sounds  No murmur heard  No gallop  Pulmonary:      Effort: Pulmonary effort is normal  No respiratory distress  Breath sounds: Normal breath sounds  No stridor  No wheezing or rales  Chest:      Chest wall: No tenderness  Abdominal:      General: There is no distension  Palpations: Abdomen is soft  There is no mass  Tenderness: There is no abdominal tenderness  There is no rebound  Musculoskeletal:         General: No tenderness  Cervical back: Normal range of motion and neck supple  Feet:      Right foot:      Skin integrity: No warmth  Left foot:      Skin integrity: No warmth  Lymphadenopathy:      Cervical: No cervical adenopathy  Skin:     General: Skin is warm  Findings: No erythema or rash     Neurological:      Mental Status: He is alert and oriented to person, place, and time          Patient's shoes and socks removed  Right Foot/Ankle   Right Foot Inspection  Skin Exam: skin intact  No warmth and no pre-ulcer  Toe Exam: No swelling and erythema    Sensory   Monofilament testing: intact    Vascular  Capillary refills: < 3 seconds  The right DP pulse is 2+  Left Foot/Ankle  Left Foot Inspection  Skin Exam: skin intact  No warmth and no pre-ulcer  Toe Exam: No swelling and no erythema  Sensory   Monofilament testing: intact    Vascular  Capillary refills: < 3 seconds  The left DP pulse is 2+  Assign Risk Category  No deformity present  No loss of protective sensation  No weak pulses  Risk: 0    Assessment/Plan:    Medicare annual wellness visit, subsequent  Advice and education were given regarding nutrition, aerobic exercises, weight bearing exercises, cardiovascular risk reduction, fall risk reduction, and age appropriate supplements  The patient was counseled regarding instructions for management, risk factor reductions, prognosis, risks and benefits of treatment options, patient and family education, and importance of compliance with treatment           Obesity, morbid ([facility] )  A chronic uncontrolled and BMI 35 57 patient history of hypertension and diabetic patient has been trying to do diet not successful to lose weight the recommend the portion control low carb low-fat diet increase physical activity refer the patient to weight management    Type 2 diabetes mellitus ([facility] )    Lab Results   Component Value Date    HGBA1C 6 0 (H) 03/04/2022     Chronic asymptomatic fair control continue with the metformin 500 mg once a day proper use and possible side effect discussed the patient low carb diet important lose weight discussed the patient patient already on statin and already on Ace inhibitor    Essential hypertension  Chronic asymptomatic fair control continue current management including lisinopril 20 mg once a day amlodipine 5 mg once a day low-salt diet important lose weight discussed the patient    Platelets decreased ([facility] [address] )  Chronic a no bleeding no bruises the platelet number recent blood work improved compared with before    Hyperlipidemia  Chronic asymptomatic LDL therapeutic in diabetic patient continue with atorvastatin 40 mg once a day low-fat diet discussed the patient       Diagnoses and all orders for this visit:    Medicare annual wellness visit, subsequent    Obesity, morbid ([facility] [address] )  -     Ambulatory Referral to Weight Management; Future  -     CBC and differential; Future  -     Basic metabolic panel; Future  -     Lipid Panel with Direct LDL reflex; Future  -     TSH, 3rd generation with Free T4 reflex; Future  -     Hemoglobin A1C; Future    Type 2 diabetes mellitus without complication, without long-term current use of insulin (HCC)  -     metFORMIN (GLUCOPHAGE) 500 mg tablet; Take 1 tablet (500 mg total) by mouth daily with breakfast  -     CBC and differential; Future  -     Basic metabolic panel; Future  -     Lipid Panel with Direct LDL reflex; Future  -     TSH, 3rd generation with Free T4 reflex; Future  -     Hemoglobin A1C; Future    Platelets decreased (HCC)  -     CBC and differential; Future  -     Basic metabolic panel; Future  -     Lipid Panel with Direct LDL reflex; Future  -     TSH, 3rd generation with Free T4 reflex; Future  -     Hemoglobin A1C; Future    Essential hypertension  -     CBC and differential; Future  -     Basic metabolic panel; Future  -     Lipid Panel with Direct LDL reflex; Future  -     TSH, 3rd generation with Free T4 reflex; Future  -     Hemoglobin A1C; Future    Mixed hyperlipidemia  -     CBC and differential; Future  -     Basic metabolic panel; Future  -     Lipid Panel with Direct LDL reflex; Future  -     TSH, 3rd generation with Free T4 reflex;  Future  -     Hemoglobin A1C; Future

## 2022-03-09 NOTE — ASSESSMENT & PLAN NOTE
Lab Results   Component Value Date    HGBA1C 6 0 (H) 03/04/2022     Chronic asymptomatic fair control continue with the metformin 500 mg once a day proper use and possible side effect discussed the patient low carb diet important lose weight discussed the patient patient already on statin and already on Ace inhibitor

## 2022-03-09 NOTE — ASSESSMENT & PLAN NOTE
A chronic uncontrolled and BMI 35 57 patient history of hypertension and diabetic patient has been trying to do diet not successful to lose weight the recommend the portion control low carb low-fat diet increase physical activity refer the patient to weight management

## 2022-03-09 NOTE — ASSESSMENT & PLAN NOTE
Chronic a no bleeding no bruises the platelet number recent blood work improved compared with before

## 2022-03-09 NOTE — ASSESSMENT & PLAN NOTE
Chronic asymptomatic LDL therapeutic in diabetic patient continue with atorvastatin 40 mg once a day low-fat diet discussed the patient

## 2022-03-09 NOTE — ASSESSMENT & PLAN NOTE
Chronic asymptomatic fair control continue current management including lisinopril 20 mg once a day amlodipine 5 mg once a day low-salt diet important lose weight discussed the patient

## 2022-04-04 ENCOUNTER — TELEPHONE (OUTPATIENT)
Dept: OTHER | Facility: OTHER | Age: 72
End: 2022-04-04

## 2022-04-05 NOTE — TELEPHONE ENCOUNTER
Left voice message for patient to give us a call back [phone] if still interested in scheduling an appointment at our office

## 2022-04-13 ENCOUNTER — TELEPHONE (OUTPATIENT)
Dept: BARIATRICS | Facility: CLINIC | Age: 72
End: 2022-04-13

## 2022-04-15 DIAGNOSIS — N40.0 BENIGN PROSTATIC HYPERPLASIA, UNSPECIFIED WHETHER LOWER URINARY TRACT SYMPTOMS PRESENT: ICD-10-CM

## 2022-04-15 RX ORDER — TAMSULOSIN HYDROCHLORIDE 0.4 MG/1
CAPSULE ORAL
Qty: 90 CAPSULE | Refills: 1 | Status: SHIPPED | OUTPATIENT
Start: 2022-04-15

## 2022-05-13 ENCOUNTER — CONSULT (OUTPATIENT)
Dept: BARIATRICS | Facility: CLINIC | Age: 72
End: 2022-05-13
Payer: MEDICARE

## 2022-05-13 VITALS
DIASTOLIC BLOOD PRESSURE: 80 MMHG | HEART RATE: 86 BPM | SYSTOLIC BLOOD PRESSURE: 136 MMHG | WEIGHT: 245.9 LBS | TEMPERATURE: 98.3 F | HEIGHT: 72 IN | BODY MASS INDEX: 33.31 KG/M2

## 2022-05-13 DIAGNOSIS — G47.33 OBSTRUCTIVE SLEEP APNEA TREATED WITH CONTINUOUS POSITIVE AIRWAY PRESSURE (CPAP): ICD-10-CM

## 2022-05-13 DIAGNOSIS — Z99.89 OBSTRUCTIVE SLEEP APNEA TREATED WITH CONTINUOUS POSITIVE AIRWAY PRESSURE (CPAP): ICD-10-CM

## 2022-05-13 DIAGNOSIS — E66.9 OBESITY (BMI 30.0-34.9): Primary | ICD-10-CM

## 2022-05-13 DIAGNOSIS — R73.9 HYPERGLYCEMIA: ICD-10-CM

## 2022-05-13 PROCEDURE — 99203 OFFICE O/P NEW LOW 30 MIN: CPT | Performed by: FAMILY MEDICINE

## 2022-05-13 NOTE — PROGRESS NOTES
Assessment/Plan:  Karuna Garcia was seen today for consult  Diagnoses and all orders for this visit:    Obesity (BMI 30 0-34 9)  -     Ambulatory Referral to Weight Management  Hyperglycemia  Obstructive sleep apnea treated with continuous positive airway pressure (CPAP)  Patient presents for medical weight management consultation  After discussing the programs at [facility] offers he feels healthy core will be the most beneficial   Patient has started to exercise and is aware of his dietary pitfalls therefore I suspect he will do quite well with healthy core  He will be scheduled as such  May benefit from anti obesity medication in the future but hold off at this time  Weight loss will help improve his glucose levels and help improve sleep apnea  Patient is already on metformin for glucose and does wear a CPAP for sleep apnea  - Discussed options of HealthyCORE-Intensive Lifestyle Intervention Program, Very Low Calorie Diet-VLCD and Conservative Program and the role of weight loss medications  - Explained the importance of making lifestyle changes first before starting any anti-obesity medications  Patient should demonstrate lifestyle changes first before anti-obesity medication can be initiated  - Patient is interested in pursuing HealthyCORE-Intensive Lifestyle Intervention Program  - Initial weight loss goal of 5-10% weight loss for improved health  - Weight loss can improve patient's co-morbid conditions and/or prevent weight-related complications  Goals:  Do not skip any meals! Food log (ie ) www myfitnesspal com,sparkpeople  com,loseit com,calorieking  com,etc  baritastic (use skinnytaste  com, dietdoctor  com or smartphone [employer] for recipes)  No sugary beverages  At least 64oz of water daily  Increase physical activity by 10 minutes daily   Gradually increase physical activity to a goal of 5 days per week for 30 minutes of MODERATE intensity PLUS 2 days per week of FULL BODY resistance training (use smartphone apps FitON, Home Workout, etc )    Total time spent: 30 min, with >50% face-to-face time spent counseling patient on nonsurgical interventions for the treatment of excess weight  Discussed in detail nonsurgical options including intensive lifestyle intervention program, very low-calorie diet program and conservative program   Discussed the role of weight loss medications  Counseled patient on diet behavior and exercise modification for weight loss  Follow up in approximately 3 months with Non-Surgical Physician/Advanced Practitioner  Subjective:   Chief Complaint   Patient presents with    Consult     MWM - Consult ,Pt goal wt is 210lb, Patient has sleep apnea  Patient ID: Crow Bush  is a 73 y o  male with excess weight/obesity here to pursue weight management  Previous notes and records have been reviewed  Past Medical History:   Diagnosis Date    Acute bilateral low back pain without sciatica 3/9/2021    BMI 33 0-33 9,adult 2/4/2020    BPH (benign prostatic hyperplasia)     Diabetes mellitus associated with hormonal etiology ([facility] ) 3/5/2019    ED (erectile dysfunction)     Elevated PSA 1/15/2013    Encounter for well adult exam with abnormal findings 2/4/2019    Enlarged prostate     Herpes zoster 12/18/2014    Hyperlipidemia     Hypertension     IFG (impaired fasting glucose)     Metabolic syndrome     Obesity (BMI 30 0-34 9) 2/4/2019    Psoriasis     Seasonal allergic rhinitis     Type 2 diabetes mellitus ([facility] [address] ) 11/17/2014     Past Surgical History:   Procedure Laterality Date    CHOLECYSTECTOMY  05/14/2015    COLONOSCOPY  2007    HERNIA REPAIR  05/14/2015       HPI:  Patient presents for medical weight management consultation  He is aware his diet needs to change  He also is aware he needs to exercise more  He has started exercising two days per week  He plans on increasing this  He would like help with dietary changes      Wt Readings from Last 20 Encounters:   05/13/22 112 kg (245 lb 14 4 oz)   03/08/22 116 kg (255 lb)   11/09/21 113 kg (248 lb 9 6 oz)   10/25/21 113 kg (250 lb)   07/30/21 111 kg (245 lb)   06/08/21 113 kg (249 lb)   04/23/21 112 kg (248 lb)   04/06/21 112 kg (246 lb)   03/18/21 112 kg (247 lb)   03/09/21 112 kg (247 lb)   03/03/21 112 kg (247 lb)   11/23/20 108 kg (238 lb)   09/29/20 108 kg (238 lb 6 4 oz)   08/14/20 107 kg (236 lb)   03/17/20 106 kg (234 lb)   02/04/20 108 kg (239 lb)   01/02/20 108 kg (238 lb)   12/23/19 106 kg (234 lb)   12/17/19 108 kg (239 lb)   09/25/19 106 kg (234 lb)     Obesity/Excess Weight:  Severity: Mild  Onset:  Past few years    Modifiers: Diet and Exercise  Contributing factors: Poor Food Choices and Insufficient Physical Activity  Associated symptoms: comorbid conditions and fatigue    B- cheerios w/ fruit and 2%milk  S- no  L- salad w/ chicken  S- veggie w/ dip and maybe cheese and crackers  D- protein w/ rice and veggie  S- cookies    Hydration: 6 16oz bottles water daily, 24oz coffee daily half and half no sugar, 2%milk in cereal  Alcohol: 1 glass bourbon daily and 1 glass wine w/ dinner daily   Smoking: no  Exercise: 2 days/wk 1hr  Occupation:   Sleep: 8  STOP bang: HUMBERTO on CPAP    The following portions of the patient's history were reviewed and updated as appropriate: allergies, current medications, past family history, past medical history, past social history, past surgical history, and problem list     Review of Systems   Constitutional: Negative for fever  Respiratory: Negative for shortness of breath  Cardiovascular: Negative for chest pain  Gastrointestinal: Negative for abdominal pain and blood in stool  Genitourinary: Negative for dysuria and hematuria  Musculoskeletal: Negative for arthralgias and myalgias  Skin: Negative for rash  Neurological: Negative for headaches  Psychiatric/Behavioral: Negative for dysphoric mood and suicidal ideas   The patient is not nervous/anxious  Objective:  /80   Pulse 86   Temp 98 3 °F (36 8 °C) (Tympanic)   Ht 6' 0 2" (1 834 m)   Wt 112 kg (245 lb 14 4 oz)   BMI 33 17 kg/m²   Constitutional: Well-developed, well-nourished and Obese Body mass index is 33 17 kg/m²  Yamilet Aspen HEENT: No conjunctival injection  Pulmonary: No increased work of breathing or signs of respiratory distress  CV: Well-perfused  GI: Obese  Non-distended  MSK: No edema   Neuro: Oriented to person, place and time  Normal Speech  Normal gait  Psych: Normal affect and mood  Labs and Imaging  Recent labs and imaging have been personally reviewed    Lab Results   Component Value Date    WBC 5 99 03/04/2022    HGB 13 1 03/04/2022    HCT 41 2 03/04/2022     (H) 03/04/2022     (L) 03/04/2022     Lab Results   Component Value Date     05/14/2018    SODIUM 139 03/04/2022    K 3 8 03/04/2022     03/04/2022    CO2 29 03/04/2022    ANIONGAP 9 05/14/2018    AGAP 4 03/04/2022    BUN 16 03/04/2022    CREATININE 1 04 03/04/2022    GLUF 126 (H) 03/04/2022    CALCIUM 8 9 03/04/2022    AST 34 11/06/2021    ALT 31 11/06/2021    ALKPHOS 96 11/06/2021    PROT 5 8 (L) 05/14/2018    TP 6 5 11/06/2021    BILITOT 0 4 05/14/2018    TBILI 0 61 11/06/2021    EGFR 72 03/04/2022     Lab Results   Component Value Date    HGBA1C 6 0 (H) 03/04/2022     Lab Results   Component Value Date    EGR2BLRFYHDH 1 370 03/04/2022     Lab Results   Component Value Date    CHOLESTEROL 95 03/04/2022     Lab Results   Component Value Date    HDL 43 03/04/2022     Lab Results   Component Value Date    TRIG 68 03/04/2022     Lab Results   Component Value Date    LDLCALC 38 03/04/2022

## 2022-06-05 DIAGNOSIS — I10 ESSENTIAL HYPERTENSION: ICD-10-CM

## 2022-06-05 DIAGNOSIS — E78.2 MIXED HYPERLIPIDEMIA: ICD-10-CM

## 2022-06-05 DIAGNOSIS — N40.0 BENIGN PROSTATIC HYPERPLASIA, UNSPECIFIED WHETHER LOWER URINARY TRACT SYMPTOMS PRESENT: ICD-10-CM

## 2022-06-06 RX ORDER — LISINOPRIL 20 MG/1
TABLET ORAL
Qty: 90 TABLET | Refills: 0 | Status: SHIPPED | OUTPATIENT
Start: 2022-06-06

## 2022-06-06 RX ORDER — ATORVASTATIN CALCIUM 40 MG/1
TABLET, FILM COATED ORAL
Qty: 90 TABLET | Refills: 0 | Status: SHIPPED | OUTPATIENT
Start: 2022-06-06 | End: 2022-07-15 | Stop reason: DRUGHIGH

## 2022-06-06 RX ORDER — AMLODIPINE BESYLATE 5 MG/1
TABLET ORAL
Qty: 90 TABLET | Refills: 0 | Status: SHIPPED | OUTPATIENT
Start: 2022-06-06

## 2022-06-06 RX ORDER — FINASTERIDE 5 MG/1
TABLET, FILM COATED ORAL
Qty: 90 TABLET | Refills: 0 | Status: SHIPPED | OUTPATIENT
Start: 2022-06-06

## 2022-06-07 NOTE — PROGRESS NOTES
Weight Management Medical Nutrition Assessment  Jasmine Scott is here for initial RD session for Healthy Core  Seen by MD last month  Current wt: 246 7 lbs  Seca completed, results reviewed  Excess calories via dessert items, alcohol, snacking prior to dinner  Importance of interval eating reviewed  His wife enjoys baking and this is the source of the desserts  Discussed not having to give them up but to have smaller sizes than prior  Has attempted food logging in the past but not currently doing  Recommend this for self accountability  Meal plan provided      Dislikes: cottage cheese    Patient seen by Medical Provider in past 6 months:  yes  Requested to schedule appointment with Medical Provider: Yes    Anthropometric Measurements  Start Weight (#): 246 4 lbs 6/13/22  Current Weight (#): -  TBW % Change from start weight: -  Ideal Body Weight (#):184 6 lbs BMI 25 (6'0 25")  Goal Weight (#): 210 lbs   Highest: 250 lbs  Lowest: 180 lbs     Weight Loss History  Previous weight loss attempts: Exercise  Self Created Diets (Portion Control, Healthy Food Choices, etc )    Food and Nutrition Related History  Wake up: 7:00   Bed Time:     Food Recall  Breakfast: 7:30-8:00: cheerios, 2%, mixed fruit, coffee w/h/h   Snack: skip   Lunch: 12:30-1:00: salad w/~3 chicken, sometimes hard boiled egg, 1/2 avocado, 2 tbsp oil/2 tbsp vinegar, apple   Snack: 6:00: hummus/chips, carrots, few slices cheese/crackers   Dinner: 6:30-7:00: ~6 oz protein, ?1/2 cup carb, ~1/2 cup veggie   Snack: apple OR baked good     Beverages: water and coffee, alcohol   Volume of beverage intake:  80-96 oz water, 2-3 cups coffee, 1 drink (scotch or bourbon/vermouth) and 1 glass wine     Weekends: Same  Cravings: desserts  Trouble area of day: dinner     Frequency of Eating out: irregularly   Food restrictions: n/a  Cooking: spouse    Food Shopping: spouse     Physical Activity Intake  Activity:yoga, would like to swim 1x/wk  Frequency:1-2x/wk  Physical limitations/barriers to exercise: n/a    Estimated Needs  Energy  SECA: BMR: 1896    X 1 3 -1000 =1465  [address] [place] [employer] Needs: BMR : 1924  1-2# loss weekly sedentary: 8877-0986           1-2# loss weekly lightly active: 3680-6277  Maintenance calories for sedentary activity level: 2309  Protein:  gm     (1 2-1 5g/kg IBW)  Fluid: 95 oz     (35mL/kg IBW)    Nutrition Diagnosis  Yes; Overweight/obesity  related to Excess energy intake as evidenced by  BMI more than normative standard for age and sex (obesity-grade I 26-30  9)       Nutrition Intervention    Nutrition Prescription  Calories: 0665-4224  Protein:  gm    Meal Plan (Cooper/Pro)  Breakfast: 350-400, 20-25  Snack: 100-150, 5-10  Lunch: 350-450, 25-30  Snack: 100-150, 5-10  Dinner: 500-550, 35  Snack: 0-100, 0-5    Nutrition Education:    Healthy Core Manual  Calorie controlled menu  Lean protein food choices  Healthy snack options  Food journaling tips      Nutrition Counseling:  Strategies: meal planning, portion sizes, healthy snack choices, hydration, fiber intake, protein intake, exercise, food journal      Monitoring and Evaluation:  Evaluation criteria:  Energy Intake  Meet protein needs  Maintain adequate hydration  Monitor weekly weight  Meal planning/preparation  Food journal   Decreased portions at mealtimes and snacks  Physical activity     Barriers to learning:none  Readiness to change: Preparation:  (Getting ready to change)   Comprehension: very good  Expected Compliance: good

## 2022-06-13 ENCOUNTER — OFFICE VISIT (OUTPATIENT)
Dept: BARIATRICS | Facility: CLINIC | Age: 72
End: 2022-06-13

## 2022-06-13 VITALS — HEIGHT: 72 IN | BODY MASS INDEX: 33.37 KG/M2 | WEIGHT: 246.37 LBS

## 2022-06-13 DIAGNOSIS — R63.5 ABNORMAL WEIGHT GAIN: ICD-10-CM

## 2022-06-13 PROCEDURE — WMPRO12

## 2022-06-13 PROCEDURE — RECHECK

## 2022-06-23 ENCOUNTER — CLINICAL SUPPORT (OUTPATIENT)
Dept: BARIATRICS | Facility: CLINIC | Age: 72
End: 2022-06-23

## 2022-06-23 VITALS — BODY MASS INDEX: 33.13 KG/M2 | WEIGHT: 244.6 LBS | HEIGHT: 72 IN

## 2022-06-23 DIAGNOSIS — R63.5 ABNORMAL WEIGHT GAIN: Primary | ICD-10-CM

## 2022-06-23 PROCEDURE — RECHECK

## 2022-06-30 DIAGNOSIS — N52.9 MALE ERECTILE DISORDER: ICD-10-CM

## 2022-07-01 RX ORDER — SILDENAFIL 100 MG/1
100 TABLET, FILM COATED ORAL AS NEEDED
Qty: 10 TABLET | Refills: 0 | Status: SHIPPED | OUTPATIENT
Start: 2022-07-01

## 2022-07-07 ENCOUNTER — CLINICAL SUPPORT (OUTPATIENT)
Dept: BARIATRICS | Facility: CLINIC | Age: 72
End: 2022-07-07

## 2022-07-07 VITALS — BODY MASS INDEX: 31.97 KG/M2 | HEIGHT: 72 IN | WEIGHT: 236 LBS

## 2022-07-07 DIAGNOSIS — R63.5 ABNORMAL WEIGHT GAIN: Primary | ICD-10-CM

## 2022-07-07 PROCEDURE — RECHECK

## 2022-07-11 ENCOUNTER — APPOINTMENT (OUTPATIENT)
Dept: LAB | Facility: CLINIC | Age: 72
End: 2022-07-11
Payer: MEDICARE

## 2022-07-11 DIAGNOSIS — E11.9 TYPE 2 DIABETES MELLITUS WITHOUT COMPLICATION, WITHOUT LONG-TERM CURRENT USE OF INSULIN (HCC): ICD-10-CM

## 2022-07-11 DIAGNOSIS — E78.2 MIXED HYPERLIPIDEMIA: ICD-10-CM

## 2022-07-11 DIAGNOSIS — E66.01 OBESITY, MORBID (HCC): ICD-10-CM

## 2022-07-11 DIAGNOSIS — I10 ESSENTIAL HYPERTENSION: ICD-10-CM

## 2022-07-11 DIAGNOSIS — D69.6 PLATELETS DECREASED (HCC): ICD-10-CM

## 2022-07-11 LAB
ANION GAP SERPL CALCULATED.3IONS-SCNC: 3 MMOL/L (ref 4–13)
BASOPHILS # BLD AUTO: 0.03 THOUSANDS/ΜL (ref 0–0.1)
BASOPHILS NFR BLD AUTO: 1 % (ref 0–1)
BUN SERPL-MCNC: 17 MG/DL (ref 5–25)
CALCIUM SERPL-MCNC: 9 MG/DL (ref 8.3–10.1)
CHLORIDE SERPL-SCNC: 105 MMOL/L (ref 100–108)
CHOLEST SERPL-MCNC: 73 MG/DL
CO2 SERPL-SCNC: 28 MMOL/L (ref 21–32)
CREAT SERPL-MCNC: 1.12 MG/DL (ref 0.6–1.3)
EOSINOPHIL # BLD AUTO: 0.18 THOUSAND/ΜL (ref 0–0.61)
EOSINOPHIL NFR BLD AUTO: 4 % (ref 0–6)
ERYTHROCYTE [DISTWIDTH] IN BLOOD BY AUTOMATED COUNT: 12.6 % (ref 11.6–15.1)
EST. AVERAGE GLUCOSE BLD GHB EST-MCNC: 114 MG/DL
GFR SERPL CREATININE-BSD FRML MDRD: 66 ML/MIN/1.73SQ M
GLUCOSE P FAST SERPL-MCNC: 95 MG/DL (ref 65–99)
HBA1C MFR BLD: 5.6 %
HCT VFR BLD AUTO: 40 % (ref 36.5–49.3)
HDLC SERPL-MCNC: 41 MG/DL
HGB BLD-MCNC: 13.1 G/DL (ref 12–17)
IMM GRANULOCYTES # BLD AUTO: 0.01 THOUSAND/UL (ref 0–0.2)
IMM GRANULOCYTES NFR BLD AUTO: 0 % (ref 0–2)
LDLC SERPL CALC-MCNC: 19 MG/DL (ref 0–100)
LYMPHOCYTES # BLD AUTO: 1.39 THOUSANDS/ΜL (ref 0.6–4.47)
LYMPHOCYTES NFR BLD AUTO: 31 % (ref 14–44)
MCH RBC QN AUTO: 32.7 PG (ref 26.8–34.3)
MCHC RBC AUTO-ENTMCNC: 32.8 G/DL (ref 31.4–37.4)
MCV RBC AUTO: 100 FL (ref 82–98)
MONOCYTES # BLD AUTO: 0.54 THOUSAND/ΜL (ref 0.17–1.22)
MONOCYTES NFR BLD AUTO: 12 % (ref 4–12)
NEUTROPHILS # BLD AUTO: 2.38 THOUSANDS/ΜL (ref 1.85–7.62)
NEUTS SEG NFR BLD AUTO: 52 % (ref 43–75)
NRBC BLD AUTO-RTO: 0 /100 WBCS
PLATELET # BLD AUTO: 139 THOUSANDS/UL (ref 149–390)
PMV BLD AUTO: 10.9 FL (ref 8.9–12.7)
POTASSIUM SERPL-SCNC: 4.2 MMOL/L (ref 3.5–5.3)
RBC # BLD AUTO: 4.01 MILLION/UL (ref 3.88–5.62)
SODIUM SERPL-SCNC: 136 MMOL/L (ref 136–145)
TRIGL SERPL-MCNC: 65 MG/DL
TSH SERPL DL<=0.05 MIU/L-ACNC: 1.3 UIU/ML (ref 0.45–4.5)
WBC # BLD AUTO: 4.53 THOUSAND/UL (ref 4.31–10.16)

## 2022-07-11 PROCEDURE — 80061 LIPID PANEL: CPT

## 2022-07-11 PROCEDURE — 84443 ASSAY THYROID STIM HORMONE: CPT

## 2022-07-11 PROCEDURE — 80048 BASIC METABOLIC PNL TOTAL CA: CPT

## 2022-07-11 PROCEDURE — 83036 HEMOGLOBIN GLYCOSYLATED A1C: CPT

## 2022-07-11 PROCEDURE — 85025 COMPLETE CBC W/AUTO DIFF WBC: CPT

## 2022-07-11 PROCEDURE — 36415 COLL VENOUS BLD VENIPUNCTURE: CPT

## 2022-07-15 ENCOUNTER — OFFICE VISIT (OUTPATIENT)
Dept: FAMILY MEDICINE CLINIC | Facility: CLINIC | Age: 72
End: 2022-07-15
Payer: MEDICARE

## 2022-07-15 VITALS
WEIGHT: 235 LBS | DIASTOLIC BLOOD PRESSURE: 80 MMHG | OXYGEN SATURATION: 98 % | SYSTOLIC BLOOD PRESSURE: 120 MMHG | BODY MASS INDEX: 31.83 KG/M2 | TEMPERATURE: 98.2 F | HEART RATE: 63 BPM | HEIGHT: 72 IN

## 2022-07-15 DIAGNOSIS — N13.8 BPH WITH URINARY OBSTRUCTION: ICD-10-CM

## 2022-07-15 DIAGNOSIS — N40.1 BPH WITH URINARY OBSTRUCTION: ICD-10-CM

## 2022-07-15 DIAGNOSIS — E11.9 TYPE 2 DIABETES MELLITUS WITHOUT COMPLICATION, WITHOUT LONG-TERM CURRENT USE OF INSULIN (HCC): Primary | ICD-10-CM

## 2022-07-15 DIAGNOSIS — I10 ESSENTIAL HYPERTENSION: ICD-10-CM

## 2022-07-15 DIAGNOSIS — E78.2 MIXED HYPERLIPIDEMIA: ICD-10-CM

## 2022-07-15 PROCEDURE — 99214 OFFICE O/P EST MOD 30 MIN: CPT | Performed by: FAMILY MEDICINE

## 2022-07-15 RX ORDER — ATORVASTATIN CALCIUM 20 MG/1
20 TABLET, FILM COATED ORAL DAILY
Qty: 90 TABLET | Refills: 0 | Status: SHIPPED | OUTPATIENT
Start: 2022-07-15 | End: 2022-10-07

## 2022-07-15 NOTE — PROGRESS NOTES
Weight Management Medical Nutrition Assessment  Tarikudence Ice is here for f/u RD session for Healthy Core  Current wt: 232 4  lbs  He has lost 14 3 lbs x ~5 wks  He has been using optivia product line with 1 meal per day  He started this on June 30  He does not wish to continue in our program as he found our classes to be superficial and not helpful  All future appointments cancelled  Dislikes: cottage cheese    Patient seen by Medical Provider in past 6 months:  yes  Requested to schedule appointment with Medical Provider: No    Anthropometric Measurements  Start Weight (#): 246 4 lbs 6/13/22  Current Weight (#): 232 4 lbs   TBW % Change from start weight: 5 8%  Ideal Body Weight (#):184 6 lbs BMI 25 (6'0 25")  Goal Weight (#): 210 lbs   Highest: 250 lbs  Lowest: 180 lbs     Weight Loss History  Previous weight loss attempts: Exercise  Self Created Diets (Portion Control, Healthy Food Choices, etc )    Food and Nutrition Related History  Wake up: 7:00   Bed Time:     Food Recall  Breakfast: 7:30-8:00: optivia cereal w/almond milk or optivia shake OR 2 egg beaters, low sodium turkey sausage, coffee w/h/h   Snack: 10-11:00 optivia bar   Lunch: 12:30-1:00: optivia soup    Snack: 3:00: optivia snack item or bar  Dinner: 6:30-7:00: ~6 oz protein, ~1 cup veggie   Snack: optivia shake or bar    Beverages: water and coffee, alcohol   Volume of beverage intake:   60 oz+ water, 2-3 cups coffee, 1 drink (scotch or bourbon/vermouth) and 1 glass wine     Weekends: Same  Cravings: desserts  Trouble area of day: dinner     Frequency of Eating out: irregularly   Food restrictions: n/a  Cooking: spouse    Food Shopping: spouse     Physical Activity Intake  Activity:yoga  Frequency:1x/wk  Physical limitations/barriers to exercise: n/a    Estimated Needs NOT UPDATED 7/18/22  Energy  SECA: BMR: 1896    X 1 3 -1000 =1465  [employer] Needs:  BMR : 1924  1-2# loss weekly sedentary: 4039-8129           1-2# loss weekly lightly active: 6907-7090  Maintenance calories for sedentary activity level: 2309  Protein:  gm     (1 2-1 5g/kg IBW)  Fluid: 95 oz     (35mL/kg IBW)    Nutrition Diagnosis  Yes; Overweight/obesity  related to Excess energy intake as evidenced by  BMI more than normative standard for age and sex (obesity-grade I 26-30  9)       Nutrition Intervention    Nutrition Prescription  Calories: 7252-9646  Protein:  gm    Meal Plan (Cooper/Pro)  Breakfast: 350-400, 20-25  Snack: 100-150, 5-10  Lunch: 350-450, 25-30  Snack: 100-150, 5-10  Dinner: 500-550, 35  Snack: 0-100, 0-5    Nutrition Education:    Healthy Core Manual  Calorie controlled menu  Lean protein food choices  Healthy snack options  Food journaling tips      Nutrition Counseling:  Strategies: meal planning, portion sizes, healthy snack choices, hydration, fiber intake, protein intake, exercise, food journal      Monitoring and Evaluation:  Evaluation criteria:  Energy Intake  Meet protein needs  Maintain adequate hydration  Monitor weekly weight  Meal planning/preparation  Food journal   Decreased portions at mealtimes and snacks  Physical activity     Barriers to learning:none  Readiness to change: Preparation:  (Getting ready to change)   Comprehension: very good  Expected Compliance: good

## 2022-07-15 NOTE — PROGRESS NOTES
Subjective:   Chief Complaint   Patient presents with    Follow-up     Chronic conditions        Patient ID: Elise Adams is a 73 y o  male  Patient here follow-up with a chronic condition including insulin-dependent diabetic hypertension hyperlipidemia patient since been seen last time he start a new diet and is very strict follow it up strictly he did lose weight and he notice the blood sugar number dramatic improve recent blood work review with the patient      The following portions of the patient's history were reviewed and updated as appropriate: allergies, current medications, past family history, past medical history, past social history, past surgical history and problem list     Review of Systems   Constitutional: Negative for activity change, appetite change, fatigue and fever  HENT: Negative for congestion, ear pain, sinus pressure, sinus pain and sore throat  Eyes: Negative for pain, discharge, redness and itching  Respiratory: Negative for cough, chest tightness, shortness of breath and stridor  Cardiovascular: Negative for chest pain, palpitations and leg swelling  Gastrointestinal: Negative for abdominal pain, blood in stool, constipation, diarrhea and nausea  Genitourinary: Negative for dysuria, flank pain, frequency and hematuria  Musculoskeletal: Negative for back pain, joint swelling and neck pain  Skin: Negative for pallor and rash  Neurological: Negative for dizziness, tremors, weakness, numbness and headaches  Hematological: Does not bruise/bleed easily  Objective:  Vitals:    07/15/22 0956   BP: 120/80   Pulse: 63   Temp: 98 2 °F (36 8 °C)   TempSrc: Tympanic   SpO2: 98%   Weight: 107 kg (235 lb)   Height: 6' (1 829 m)      Physical Exam  Vitals and nursing note reviewed  Constitutional:       General: He is not in acute distress  Appearance: He is well-developed  He is not diaphoretic  HENT:      Head: Normocephalic        Right Ear: External ear normal       Left Ear: External ear normal    Eyes:      General:         Right eye: No discharge  Left eye: No discharge  Conjunctiva/sclera: Conjunctivae normal    Neck:      Vascular: No JVD  Cardiovascular:      Rate and Rhythm: Normal rate and regular rhythm  Heart sounds: Normal heart sounds  No murmur heard  No gallop  Pulmonary:      Effort: Pulmonary effort is normal  No respiratory distress  Breath sounds: Normal breath sounds  No stridor  No wheezing or rales  Chest:      Chest wall: No tenderness  Abdominal:      General: There is no distension  Palpations: Abdomen is soft  There is no mass  Tenderness: There is no abdominal tenderness  There is no rebound  Musculoskeletal:         General: No tenderness  Cervical back: Normal range of motion and neck supple  Lymphadenopathy:      Cervical: No cervical adenopathy  Skin:     General: Skin is warm  Findings: No erythema or rash  Neurological:      Mental Status: He is alert and oriented to person, place, and time             Assessment/Plan:    Type 2 diabetes mellitus ([facility] )    Lab Results   Component Value Date    HGBA1C 5 6 07/11/2022   Chronic asymptomatic hemoglobin A1c improved compared with before patient on metformin 500 mg once a day tolerated well without side effect the patient has been strict with his diet and he lost weight happy with the result patient already on statin and ACEI he is up-to-date diabetic eye in foot exam    Essential hypertension  A chronic asymptomatic fair control continue with the amlodipine 5 mg once a day lisinopril 20 mg once a day low-salt diet discussed the patient encouraged patient to continue with his current diet as his been losing weight    Hyperlipidemia  Chronic asymptomatic LDL is very low current blood work short level 19 patient currently on atorvastatin 40 mg recommend to decrease it to 20 mg once a day and will re-evaluate continue with the low-fat diet    BPH with urinary obstruction  A chronic stable on current medication including Flomax 0 4 mg once a day        Diagnoses and all orders for this visit:    Type 2 diabetes mellitus without complication, without long-term current use of insulin (HCC)  -     CBC and differential; Future  -     Basic metabolic panel; Future  -     Lipid panel; Future  -     Hemoglobin A1C; Future    Essential hypertension  -     CBC and differential; Future  -     Basic metabolic panel; Future  -     Lipid panel; Future  -     Hemoglobin A1C; Future    Mixed hyperlipidemia  -     atorvastatin (LIPITOR) 20 mg tablet; Take 1 tablet (20 mg total) by mouth daily  -     CBC and differential; Future  -     Basic metabolic panel; Future  -     Lipid panel;  Future  -     Hemoglobin A1C; Future    BPH with urinary obstruction

## 2022-07-17 NOTE — ASSESSMENT & PLAN NOTE
A chronic asymptomatic fair control continue with the amlodipine 5 mg once a day lisinopril 20 mg once a day low-salt diet discussed the patient encouraged patient to continue with his current diet as his been losing weight

## 2022-07-17 NOTE — ASSESSMENT & PLAN NOTE
Chronic asymptomatic LDL is very low current blood work short level 19 patient currently on atorvastatin 40 mg recommend to decrease it to 20 mg once a day and will re-evaluate continue with the low-fat diet

## 2022-07-17 NOTE — ASSESSMENT & PLAN NOTE
Lab Results   Component Value Date    HGBA1C 5 6 07/11/2022   Chronic asymptomatic hemoglobin A1c improved compared with before patient on metformin 500 mg once a day tolerated well without side effect the patient has been strict with his diet and he lost weight happy with the result patient already on statin and ACEI he is up-to-date diabetic eye in foot exam

## 2022-07-18 ENCOUNTER — OFFICE VISIT (OUTPATIENT)
Dept: BARIATRICS | Facility: CLINIC | Age: 72
End: 2022-07-18

## 2022-07-18 VITALS — BODY MASS INDEX: 31.48 KG/M2 | WEIGHT: 232.4 LBS | HEIGHT: 72 IN

## 2022-07-18 DIAGNOSIS — R63.5 ABNORMAL WEIGHT GAIN: Primary | ICD-10-CM

## 2022-07-18 PROCEDURE — RECHECK

## 2022-08-28 DIAGNOSIS — I10 ESSENTIAL HYPERTENSION: ICD-10-CM

## 2022-08-29 RX ORDER — LISINOPRIL 20 MG/1
TABLET ORAL
Qty: 90 TABLET | Refills: 0 | Status: SHIPPED | OUTPATIENT
Start: 2022-08-29

## 2022-08-29 RX ORDER — AMLODIPINE BESYLATE 5 MG/1
TABLET ORAL
Qty: 90 TABLET | Refills: 0 | Status: SHIPPED | OUTPATIENT
Start: 2022-08-29

## 2022-09-14 DIAGNOSIS — N40.0 BENIGN PROSTATIC HYPERPLASIA, UNSPECIFIED WHETHER LOWER URINARY TRACT SYMPTOMS PRESENT: ICD-10-CM

## 2022-09-14 RX ORDER — FINASTERIDE 5 MG/1
TABLET, FILM COATED ORAL
Qty: 90 TABLET | Refills: 0 | Status: SHIPPED | OUTPATIENT
Start: 2022-09-14

## 2022-09-28 ENCOUNTER — OFFICE VISIT (OUTPATIENT)
Dept: FAMILY MEDICINE CLINIC | Facility: CLINIC | Age: 72
End: 2022-09-28
Payer: MEDICARE

## 2022-09-28 ENCOUNTER — APPOINTMENT (OUTPATIENT)
Dept: LAB | Facility: HOSPITAL | Age: 72
End: 2022-09-28
Payer: MEDICARE

## 2022-09-28 ENCOUNTER — HOSPITAL ENCOUNTER (OUTPATIENT)
Dept: RADIOLOGY | Facility: HOSPITAL | Age: 72
Discharge: HOME | End: 2022-09-28
Payer: MEDICARE

## 2022-09-28 VITALS
HEIGHT: 72 IN | TEMPERATURE: 98.2 F | HEART RATE: 64 BPM | OXYGEN SATURATION: 95 % | SYSTOLIC BLOOD PRESSURE: 120 MMHG | BODY MASS INDEX: 28.99 KG/M2 | WEIGHT: 214 LBS | DIASTOLIC BLOOD PRESSURE: 80 MMHG

## 2022-09-28 DIAGNOSIS — Z86.16 HISTORY OF COVID-19: ICD-10-CM

## 2022-09-28 DIAGNOSIS — J12.82 PNEUMONIA DUE TO COVID-19 VIRUS: ICD-10-CM

## 2022-09-28 DIAGNOSIS — R06.02 SHORTNESS OF BREATH: ICD-10-CM

## 2022-09-28 DIAGNOSIS — R05.9 COUGH: ICD-10-CM

## 2022-09-28 DIAGNOSIS — I10 ESSENTIAL HYPERTENSION: ICD-10-CM

## 2022-09-28 DIAGNOSIS — J12.82 PNEUMONIA DUE TO COVID-19 VIRUS: Primary | ICD-10-CM

## 2022-09-28 DIAGNOSIS — E78.2 MIXED HYPERLIPIDEMIA: ICD-10-CM

## 2022-09-28 DIAGNOSIS — R06.02 SHORTNESS OF BREATH: Primary | ICD-10-CM

## 2022-09-28 DIAGNOSIS — E11.9 TYPE 2 DIABETES MELLITUS WITHOUT COMPLICATION, WITHOUT LONG-TERM CURRENT USE OF INSULIN (HCC): ICD-10-CM

## 2022-09-28 DIAGNOSIS — U07.1 PNEUMONIA DUE TO COVID-19 VIRUS: Primary | ICD-10-CM

## 2022-09-28 DIAGNOSIS — U07.1 PNEUMONIA DUE TO COVID-19 VIRUS: ICD-10-CM

## 2022-09-28 DIAGNOSIS — J18.9 PNEUMONIA DUE TO INFECTIOUS ORGANISM, UNSPECIFIED LATERALITY, UNSPECIFIED PART OF LUNG: Primary | ICD-10-CM

## 2022-09-28 LAB
BASOPHILS # BLD AUTO: 0.02 THOUSANDS/ΜL (ref 0–0.1)
BASOPHILS NFR BLD AUTO: 0 % (ref 0–1)
D DIMER PPP FEU-MCNC: 8.91 UG/ML FEU
EOSINOPHIL # BLD AUTO: 0.11 THOUSAND/ΜL (ref 0–0.61)
EOSINOPHIL NFR BLD AUTO: 2 % (ref 0–6)
ERYTHROCYTE [DISTWIDTH] IN BLOOD BY AUTOMATED COUNT: 12.5 % (ref 11.6–15.1)
HCT VFR BLD AUTO: 37.8 % (ref 36.5–49.3)
HGB BLD-MCNC: 12 G/DL (ref 12–17)
IMM GRANULOCYTES # BLD AUTO: 0.05 THOUSAND/UL (ref 0–0.2)
IMM GRANULOCYTES NFR BLD AUTO: 1 % (ref 0–2)
LYMPHOCYTES # BLD AUTO: 1.16 THOUSANDS/ΜL (ref 0.6–4.47)
LYMPHOCYTES NFR BLD AUTO: 19 % (ref 14–44)
MCH RBC QN AUTO: 31.3 PG (ref 26.8–34.3)
MCHC RBC AUTO-ENTMCNC: 31.7 G/DL (ref 31.4–37.4)
MCV RBC AUTO: 98 FL (ref 82–98)
MONOCYTES # BLD AUTO: 0.6 THOUSAND/ΜL (ref 0.17–1.22)
MONOCYTES NFR BLD AUTO: 10 % (ref 4–12)
NEUTROPHILS # BLD AUTO: 4.3 THOUSANDS/ΜL (ref 1.85–7.62)
NEUTS SEG NFR BLD AUTO: 68 % (ref 43–75)
NRBC BLD AUTO-RTO: 0 /100 WBCS
PLATELET # BLD AUTO: 299 THOUSANDS/UL (ref 149–390)
PMV BLD AUTO: 8.5 FL (ref 8.9–12.7)
RBC # BLD AUTO: 3.84 MILLION/UL (ref 3.88–5.62)
WBC # BLD AUTO: 6.24 THOUSAND/UL (ref 4.31–10.16)

## 2022-09-28 PROCEDURE — 99215 OFFICE O/P EST HI 40 MIN: CPT | Performed by: FAMILY MEDICINE

## 2022-09-28 PROCEDURE — 85025 COMPLETE CBC W/AUTO DIFF WBC: CPT

## 2022-09-28 PROCEDURE — 71046 X-RAY EXAM CHEST 2 VIEWS: CPT

## 2022-09-28 PROCEDURE — 85379 FIBRIN DEGRADATION QUANT: CPT

## 2022-09-28 PROCEDURE — 36415 COLL VENOUS BLD VENIPUNCTURE: CPT

## 2022-09-28 PROCEDURE — 93000 ELECTROCARDIOGRAM COMPLETE: CPT | Performed by: FAMILY MEDICINE

## 2022-09-28 RX ORDER — ALBUTEROL SULFATE 90 UG/1
2 AEROSOL, METERED RESPIRATORY (INHALATION) EVERY 6 HOURS PRN
Qty: 18 G | Refills: 0 | Status: SHIPPED | OUTPATIENT
Start: 2022-09-28 | End: 2022-10-20

## 2022-09-28 RX ORDER — BENZONATATE 100 MG/1
100 CAPSULE ORAL 3 TIMES DAILY PRN
Qty: 20 CAPSULE | Refills: 0 | Status: SHIPPED | OUTPATIENT
Start: 2022-09-28

## 2022-09-28 RX ORDER — METHYLPREDNISOLONE 4 MG/1
TABLET ORAL
Qty: 21 EACH | Refills: 0 | Status: SHIPPED | OUTPATIENT
Start: 2022-09-28

## 2022-09-28 RX ORDER — AZITHROMYCIN 250 MG/1
TABLET, FILM COATED ORAL
Qty: 6 TABLET | Refills: 0 | Status: SHIPPED | OUTPATIENT
Start: 2022-09-28 | End: 2022-10-02

## 2022-09-29 ENCOUNTER — TELEPHONE (OUTPATIENT)
Dept: FAMILY MEDICINE CLINIC | Facility: CLINIC | Age: 72
End: 2022-09-29

## 2022-09-29 PROBLEM — U07.1 PNEUMONIA DUE TO COVID-19 VIRUS: Status: ACTIVE | Noted: 2022-09-29

## 2022-09-29 PROBLEM — U07.1 PNEUMONIA DUE TO COVID-19 VIRUS: Status: ACTIVE | Noted: 2022-09-28

## 2022-09-29 PROBLEM — Z86.16 HISTORY OF COVID-19: Status: ACTIVE | Noted: 2022-09-29

## 2022-09-29 PROBLEM — R06.02 SHORTNESS OF BREATH: Status: ACTIVE | Noted: 2022-09-29

## 2022-09-29 PROBLEM — J12.82 PNEUMONIA DUE TO COVID-19 VIRUS: Status: ACTIVE | Noted: 2022-09-29

## 2022-09-29 PROBLEM — J12.82 PNEUMONIA DUE TO COVID-19 VIRUS: Status: ACTIVE | Noted: 2022-09-28

## 2022-09-29 PROBLEM — R05.9 COUGH: Status: ACTIVE | Noted: 2022-09-29

## 2022-09-29 PROBLEM — R05.9 COUGH: Status: ACTIVE | Noted: 2022-09-28

## 2022-09-29 PROBLEM — R06.02 SHORTNESS OF BREATH: Status: ACTIVE | Noted: 2022-09-28

## 2022-09-29 NOTE — ASSESSMENT & PLAN NOTE
A call patient later on statin the chest x-ray it show COVID pneumonia ground-glass the bilateral lower lobe recommend the Zithromax the and Medrol pack as directed proper use and possible side effect discussed the patient if there is no improvement to go to the emergency room

## 2022-09-29 NOTE — PROGRESS NOTES
Name: Malia Simmons      : 1950      MRN: [id number]  Encounter Provider: Lucita Jade MD  Encounter Date: 2022   Encounter department: [facility]     1  Shortness of breath  Assessment & Plan:  A new diagnosis symptomatic she short of breath the patient who recently and  diagnosed with COVID EKG in the office the no significant finding discussed the patient and his wife  On physical exam rales in the lower lung recommend for stat chest x-ray it will order echocardiogram to rule out the viral induced cardiomyopathy plan also to check CBC and D-dimer    Orders:  -     CBC and differential; Future  -     D-dimer, quantitative; Future  -     XR chest pa & lateral; Future; Expected date: 2022  -     Echo complete w/ contrast if indicated; Future; Expected date: 2022  -     POCT ECG    2  History of COVID-19  Assessment & Plan:  History of COVID the tested positive in     Orders:  -     CBC and differential; Future  -     D-dimer, quantitative; Future  -     XR chest pa & lateral; Future; Expected date: 2022  -     Echo complete w/ contrast if indicated; Future; Expected date: 2022    3  Cough  Assessment & Plan:  Acute symptomatic secondary to COVID recommend the albuterol inhaler 2 puffs every 4-6 hours stat chest x-ray will order today    Orders:  -     benzonatate (TESSALON PERLES) 100 mg capsule; Take 1 capsule (100 mg total) by mouth 3 (three) times a day as needed for cough  -     albuterol (Ventolin HFA) 90 mcg/act inhaler; Inhale 2 puffs every 6 (six) hours as needed for wheezing    4   Pneumonia due to COVID-19 virus  Assessment & Plan:  A call patient later on statin the chest x-ray it show COVID pneumonia ground-glass the bilateral lower lobe recommend the Zithromax the and Medrol pack as directed proper use and possible side effect discussed the patient if there is no improvement to go to the emergency room             Subjective      Patient here with his wife the concerned about the short of breath and cough patient recently was in 8 of the attending his son sara and he got the DATY the on September 13 and since then he been coughing short of breath and fatigue the no hematosis no dyspnea on exertion no lower extremity edema    Review of Systems   Constitutional: Positive for fatigue  Negative for chills and fever  HENT: Negative for ear pain and sore throat  Eyes: Negative for pain and visual disturbance  Respiratory: Positive for cough and shortness of breath  Cardiovascular: Negative for chest pain and palpitations  Gastrointestinal: Negative for abdominal pain and vomiting  Genitourinary: Negative for dysuria and hematuria  Musculoskeletal: Negative for arthralgias and back pain  Skin: Negative for color change and rash  Neurological: Negative for seizures and syncope  All other systems reviewed and are negative        Current Outpatient Medications on File Prior to Visit   Medication Sig    amLODIPine (NORVASC) 5 mg tablet TAKE 1 TABLET DAILY    aspirin (ECOTRIN LOW STRENGTH) 81 mg EC tablet take 1 tablet (81MG)    atorvastatin (LIPITOR) 20 mg tablet Take 1 tablet (20 mg total) by mouth daily    finasteride (PROSCAR) 5 mg tablet TAKE 1 TABLET DAILY    fluticasone (FLONASE) 50 mcg/act nasal spray 1 spray into each nostril daily    hydrocortisone 2 5 % cream Apply topically every 24 hours    ketoconazole (NIZORAL) 2 % shampoo WASH SCALP AND FACE 2-3 TIMES A WEEK AS NEEDED FOR FLARES    lisinopril (ZESTRIL) 20 mg tablet TAKE 1 TABLET DAILY    metFORMIN (GLUCOPHAGE) 500 mg tablet Take 1 tablet (500 mg total) by mouth daily with breakfast    sildenafil (VIAGRA) 100 mg tablet Take 1 tablet (100 mg total) by mouth as needed for erectile dysfunction    tamsulosin (FLOMAX) 0 4 mg TAKE 1 CAPSULE DAILY WITH  DINNER       Objective     /80   Pulse 64   Temp 98 2 °F (36 8 °C) (Tympanic)   Ht 6' (1 829 m)   Wt 97 1 kg (214 lb)   SpO2 95%   BMI 29 02 kg/m²     Physical Exam  Vitals and nursing note reviewed  Constitutional:       General: He is not in acute distress  Appearance: He is well-developed  He is not diaphoretic  HENT:      Head: Normocephalic  Right Ear: External ear normal       Left Ear: External ear normal    Eyes:      General:         Right eye: No discharge  Left eye: No discharge  Conjunctiva/sclera: Conjunctivae normal    Neck:      Vascular: No JVD  Cardiovascular:      Rate and Rhythm: Normal rate and regular rhythm  Heart sounds: Normal heart sounds  No murmur heard  No gallop  Pulmonary:      Effort: Pulmonary effort is normal  No respiratory distress  Breath sounds: No stridor  Rales present  No wheezing  Chest:      Chest wall: No tenderness  Abdominal:      General: There is no distension  Palpations: Abdomen is soft  There is no mass  Tenderness: There is no abdominal tenderness  There is no rebound  Musculoskeletal:         General: No tenderness  Cervical back: Normal range of motion and neck supple  Lymphadenopathy:      Cervical: No cervical adenopathy  Skin:     General: Skin is warm  Findings: No erythema or rash  Neurological:      Mental Status: He is alert and oriented to person, place, and time         Sisi Oshea MD

## 2022-09-29 NOTE — TELEPHONE ENCOUNTER
I call the patient yesterday at 6:30 p m   I discussed the result with him we send the Zithromax and Medrol pack to the pharmacy proper use and possible side effect discussed the patient also discussed the patient [place] symptom get worse to go to the emergency room

## 2022-09-29 NOTE — ASSESSMENT & PLAN NOTE
A new diagnosis symptomatic she short of breath the patient who recently and September 13 diagnosed with COVID EKG in the office the no significant finding discussed the patient and his wife  On physical exam rales in the lower lung recommend for stat chest x-ray it will order echocardiogram to rule out the viral induced cardiomyopathy plan also to check CBC and D-dimer

## 2022-09-29 NOTE — ASSESSMENT & PLAN NOTE
Acute symptomatic secondary to COVID recommend the albuterol inhaler 2 puffs every 4-6 hours stat chest x-ray will order today

## 2022-10-07 DIAGNOSIS — E78.2 MIXED HYPERLIPIDEMIA: ICD-10-CM

## 2022-10-07 RX ORDER — ATORVASTATIN CALCIUM 20 MG/1
TABLET, FILM COATED ORAL
Qty: 90 TABLET | Refills: 0 | Status: SHIPPED | OUTPATIENT
Start: 2022-10-07

## 2022-10-12 PROBLEM — Z00.00 MEDICARE ANNUAL WELLNESS VISIT, SUBSEQUENT: Status: RESOLVED | Noted: 2021-03-03 | Resolved: 2022-10-12

## 2022-10-25 ENCOUNTER — OFFICE VISIT (OUTPATIENT)
Dept: SLEEP CENTER | Facility: CLINIC | Age: 72
End: 2022-10-25

## 2022-10-25 VITALS
HEART RATE: 63 BPM | DIASTOLIC BLOOD PRESSURE: 65 MMHG | BODY MASS INDEX: 28.04 KG/M2 | WEIGHT: 207 LBS | SYSTOLIC BLOOD PRESSURE: 135 MMHG | HEIGHT: 72 IN

## 2022-10-25 DIAGNOSIS — G47.33 OSA TREATED WITH BIPAP: Primary | ICD-10-CM

## 2022-10-25 NOTE — PROGRESS NOTES
Progress Note - [address] 81 y o  male   YNN:[birth year], MRN: [id number]  10/25/2022          Follow Up Evaluation / Problem:     Obstructive Sleep Apnea      Thank you for the opportunity of participating in the evaluation and care of this patient in the Sleep Clinic at [facility]  HPI: Agustina Adame is a 81y o  year old male  The patient presents for follow up of obstructive sleep apnea  Patient was diagnosis with moderate to severe obstructive sleep apnea with hypoxia on 12/21/2020 on a diagnostic sleep study  He then had a CPAP study on 02/07/2021  He was set up with BiPAP therapy on 03/02/2021 with a maximum IPAP of 20 and a minimum EPAP of 16 with a full face mask  He then had a pressure adjustment with Valene Mullet on 04/23/2021 to a max IPAP of 20 and a minimum EPAP of 9 and max pressure support of 5  Patient has been using the current setting since the change was made in April  He presents today to review his compliance and effectiveness of treatment      Review of Systems      Genitourinary need to urinate more than twice a night and difficulty with erection   Cardiology none   Gastrointestinal none   Neurology none   Constitutional excessive sweating at night   Integumentary none   Psychiatry none   Musculoskeletal none   Pulmonary frequent cough   ENT none   Endocrine frequent urination   Hematological none       Current Outpatient Medications:   •  albuterol (PROVENTIL HFA,VENTOLIN HFA) 90 mcg/act inhaler, INHALE 2 PUFFS EVERY 6 HOURS AS NEEDED FOR WHEEZING, Disp: 18 g, Rfl: 2  •  amLODIPine (NORVASC) 5 mg tablet, TAKE 1 TABLET DAILY, Disp: 90 tablet, Rfl: 0  •  aspirin (ECOTRIN LOW STRENGTH) 81 mg EC tablet, take 1 tablet (81MG), Disp: , Rfl:   •  atorvastatin (LIPITOR) 20 mg tablet, TAKE 1 TABLET DAILY, Disp: 90 tablet, Rfl: 0  •  benzonatate (TESSALON PERLES) 100 mg capsule, Take 1 capsule (100 mg total) by mouth 3 (three) times a day as needed for cough, Disp: 20 capsule, Rfl: 0  •  finasteride (PROSCAR) 5 mg tablet, TAKE 1 TABLET DAILY, Disp: 90 tablet, Rfl: 0  •  fluticasone (FLONASE) 50 mcg/act nasal spray, 1 spray into each nostril daily, Disp: 1 Bottle, Rfl: 0  •  hydrocortisone 2 5 % cream, Apply topically every 24 hours, Disp: 56 7 g, Rfl: 0  •  ketoconazole (NIZORAL) 2 % shampoo, WASH SCALP AND FACE 2-3 TIMES A WEEK AS NEEDED FOR FLARES, Disp: , Rfl: 5  •  lisinopril (ZESTRIL) 20 mg tablet, TAKE 1 TABLET DAILY, Disp: 90 tablet, Rfl: 0  •  metFORMIN (GLUCOPHAGE) 500 mg tablet, Take 1 tablet (500 mg total) by mouth daily with breakfast, Disp: 90 tablet, Rfl: 0  •  methylPREDNISolone 4 MG tablet therapy pack, Use as directed on package, Disp: 21 each, Rfl: 0  •  sildenafil (VIAGRA) 100 mg tablet, Take 1 tablet (100 mg total) by mouth as needed for erectile dysfunction, Disp: 10 tablet, Rfl: 0  •  tamsulosin (FLOMAX) 0 4 mg, TAKE 1 CAPSULE DAILY WITH  DINNER, Disp: 90 capsule, Rfl: 0    [place] Sleepiness Scale  Sitting and reading: Slight chance of dozing  Watching TV: Slight chance of dozing  Sitting, inactive in a public place (e g  a theatre or a meeting): Would never doze  As a passenger in a car for an hour without a break: Slight chance of dozing  Lying down to rest in the afternoon when circumstances permit: Would never doze  Sitting and talking to someone: Would never doze  Sitting quietly after a lunch without alcohol: Would never doze  In a car, while stopped for a few minutes in traffic: Would never doze  Total score: 3              Vitals:    10/25/22 1402   BP: 135/65   BP Location: Left arm   Patient Position: Sitting   Cuff Size: Standard   Pulse: 63   Weight: 93 9 kg (207 lb)   Height: 6' (1 829 m)       Body mass index is 28 07 kg/m²              [place] SLEEPINESS SCORE  Total score: 3      Past History Since Last Sleep Center Visit:     Patient states he was using his BiPAP nightly without difficulty up until August when he traveled to [facility] for 5 weeks and then contracted COVID-19 on September 13th  He states he did not take his BiPAP with him on his trip  When he returned after [place] COVID, he found it impossible to use his BiPAP machine due to coughing, shortness of breath and difficulty breathing  He states he is mostly back to normal in regards to his breathing  He is able to use his BiPAP machine now  His compliance report is only showing 50% usage as he had COVID during this time  The patient reports that he cleans the equipment appropriately and changes supplies on a regular basis  The review of systems and following portions of the patient's history were reviewed and updated as appropriate: allergies, current medications, past family history, past medical history, past social history, past surgical history, and problem list         OBJECTIVE  Equipment set up date:  03/02/2021  PAP Pressure: Nasal BiPAP set to deliver 20 cm of IPAP and 9 cm of EPAP pressure support max of 5, minimum pressure support 0  Type of mask used: full face  DME Provider: Darron Toth  Denies aerophagia or AM headaches    Physical Exam:     General Appearance:   Alert, cooperative, no distress, appears stated age     Head:   Normocephalic, without obvious abnormality, atraumatic     Eyes:   PERRL, conjunctiva/corneas clear, EOM's intact          Nose:  Nares normal, septum midline, no drainage or sinus tenderness           Throat:  Lips, teeth and gums normal; tongue normal size and midline mucosa moist, uvula thick, tonsils not visualized, Mallampati class 3-4       Neck:  Supple, symmetrical, trachea midline, no adenopathy;  Thyroid: No enlargement, tenderness or nodules; no carotid bruit or JVD     Lungs:      Clear to auscultation bilaterally, respirations unlabored     Heart:   Regular rate and rhythm, S1 and S2 normal, no murmur, rub or gallop       Extremities:  Extremities normal, atraumatic, no cyanosis or edema       Skin:  Skin color, texture, turgor normal, no rashes or lesions       Neurologic:  No focal deficits noted       ASSESSMENT / PLAN    1  HUMBERTO treated with BiPAP  Assessment & Plan:  Patient has not been using his BiPAP consistently due to his recent travel to [facility] and COVID-19 infection  He states he will not be traveling any time in the near future and is feeling much better after recovering from [place]  He states he is now able to use his BiPAP machine  When he was able to use it, his AHI was noted to be 1 4 on his compliance report  He will continue to use it nightly at his current pressure settings with a full face mask and return in 3 months for re-evaluation of his compliance and effectiveness of treatment  If his compliance report looks good at that time, we can continue following him once a year  Orders:  -     PAP DME Resupply/Reorder           Counseling / Coordination of Care  Total clinic time spent today 30 minutes in chart review, face-to-face time spent with the patient, coordination of care and documentation  A description of the counseling / coordination of care:     Impressions, Diagnostic results, Prognosis, Instructions for management, Risks and benefits of treatment, Patient and family education, Risk factor reductions and Importance of compliance with treatment    Today I reviewed the patient's compliance data  he has been able to use the equipment 50 % of all days recorded  Average usage was 4 or more hours 46 7 % of all days recorded  The patient uses the equipment for an average of 6 hours and 51 minutes per night  The estimated AHI is 1 4 abnormal breathing events per hour  The patient feels they benefit from the use of PAP equipment and would like to continue PAP therapy  Response to treatment has been good  A prescription for supplies has been provided to last for the next year      He will continue using this equipment at the settings noted above for the next 3 months  Since the patient had COVID and was not using his BiPAP consistently, I asked him to return in 3 months to review his compliance report to ensure that his treatment is effective  At that timehe will then return for a routine follow-up evaluation  I have asked the patient to contact the Sleep [address] [place] [address] if he encounters any difficulties prior to that time  The following instructions have been given to the patient today:    Patient Instructions   Continue using her BiPAP nightly at the current settings  When you are using it, your compliance report looks good  Will follow back with you in 3 months to ensure proper treatment  Nursing Support:  When: Monday through Friday 7A-5PM except holidays  Where: Our direct line is [phone]  If you are having a true emergency please call 911  In the event that the line is busy or it is after hours please leave a voice message and we will return your call  Please speak clearly, leaving your full name, birth date, best number to reach you and the reason for your call  Medication refills: We will need the name of the medication, the dosage, the ordering provider, whether you get a 30 or 90 day refill, and the pharmacy name and address  Medications will be ordered by the provider only  Nurses cannot call in prescriptions  Please allow 7 days for medication refills  Physician requested updates: If your provider requested that you call with an update after starting medication, please be ready to provide us the medication and dosage, what time you take your medication, the time you attempt to fall asleep, time you fall asleep, when you wake up, and what time you get out of bed  Sleep Study Results: We will contact you with sleep study results and/or next steps after the physician has reviewed your testing             KEE Cradenas 15

## 2022-10-25 NOTE — ASSESSMENT & PLAN NOTE
Patient has not been using his BiPAP consistently due to his recent travel to [facility] and COVID-19 infection  He states he will not be traveling any time in the near future and is feeling much better after recovering from [place]  He states he is now able to use his BiPAP machine  When he was able to use it, his AHI was noted to be 1 4 on his compliance report  He will continue to use it nightly at his current pressure settings with a full face mask and return in 3 months for re-evaluation of his compliance and effectiveness of treatment  If his compliance report looks good at that time, we can continue following him once a year

## 2022-10-25 NOTE — PATIENT INSTRUCTIONS
Continue using her BiPAP nightly at the current settings  When you are using it, your compliance report looks good  Will follow back with you in 3 months to ensure proper treatment  Nursing Support:  When: Monday through Friday 7A-5PM except holidays  Where: Our direct line is [phone]  If you are having a true emergency please call 911  In the event that the line is busy or it is after hours please leave a voice message and we will return your call  Please speak clearly, leaving your full name, birth date, best number to reach you and the reason for your call  Medication refills: We will need the name of the medication, the dosage, the ordering provider, whether you get a 30 or 90 day refill, and the pharmacy name and address  Medications will be ordered by the provider only  Nurses cannot call in prescriptions  Please allow 7 days for medication refills  Physician requested updates: If your provider requested that you call with an update after starting medication, please be ready to provide us the medication and dosage, what time you take your medication, the time you attempt to fall asleep, time you fall asleep, when you wake up, and what time you get out of bed  Sleep Study Results: We will contact you with sleep study results and/or next steps after the physician has reviewed your testing

## 2022-10-26 ENCOUNTER — TELEPHONE (OUTPATIENT)
Dept: SLEEP CENTER | Facility: CLINIC | Age: 72
End: 2022-10-26

## 2022-10-26 ENCOUNTER — HOSPITAL ENCOUNTER (OUTPATIENT)
Dept: NON INVASIVE DIAGNOSTICS | Facility: HOSPITAL | Age: 72
Discharge: HOME | End: 2022-10-26
Payer: MEDICARE

## 2022-10-26 VITALS
HEART RATE: 63 BPM | WEIGHT: 214 LBS | BODY MASS INDEX: 28.99 KG/M2 | SYSTOLIC BLOOD PRESSURE: 135 MMHG | DIASTOLIC BLOOD PRESSURE: 65 MMHG | HEIGHT: 72 IN

## 2022-10-26 DIAGNOSIS — Z86.16 HISTORY OF COVID-19: ICD-10-CM

## 2022-10-26 DIAGNOSIS — R06.02 SHORTNESS OF BREATH: ICD-10-CM

## 2022-10-26 LAB
AORTIC ROOT: 4 CM
APICAL FOUR CHAMBER EJECTION FRACTION: 69 %
ASCENDING AORTA: 4.3 CM
AV REGURGITATION PRESSURE HALF TIME: 955 MS
E WAVE DECELERATION TIME: 184 MS
FRACTIONAL SHORTENING: 27 % (ref 28–44)
INTERVENTRICULAR SEPTUM IN DIASTOLE (PARASTERNAL SHORT AXIS VIEW): 1.4 CM
INTERVENTRICULAR SEPTUM: 1.4 CM (ref 0.6–1.1)
LAAS-AP2: 35.7 CM2
LAAS-AP4: 32.7 CM2
LEFT ATRIUM SIZE: 4.5 CM
LEFT INTERNAL DIMENSION IN SYSTOLE: 3.6 CM (ref 2.1–4)
LEFT VENTRICULAR INTERNAL DIMENSION IN DIASTOLE: 4.9 CM (ref 3.5–6)
LEFT VENTRICULAR POSTERIOR WALL IN END DIASTOLE: 1.3 CM
LEFT VENTRICULAR STROKE VOLUME: 58 ML
LVSV (TEICH): 58 ML
MV E'TISSUE VEL-SEP: 8 CM/S
MV PEAK A VEL: 0.4 M/S
MV PEAK E VEL: 75 CM/S
MV STENOSIS PRESSURE HALF TIME: 53 MS
MV VALVE AREA P 1/2 METHOD: 4.15 CM2
RIGHT ATRIAL 2D VOLUME: 101 ML
RIGHT ATRIUM AREA SYSTOLE A4C: 27.2 CM2
RIGHT VENTRICLE ID DIMENSION: 5.8 CM
SL CV AV DECELERATION TIME RETROGRADE: 3292 MS
SL CV AV PEAK GRADIENT RETROGRADE: 68 MMHG
SL CV LEFT ATRIUM LENGTH A2C: 7.2 CM
SL CV PED ECHO LEFT VENTRICLE DIASTOLIC VOLUME (MOD BIPLANE) 2D: 112 ML
SL CV PED ECHO LEFT VENTRICLE SYSTOLIC VOLUME (MOD BIPLANE) 2D: 54 ML
TR MAX PG: 36 MMHG
TR PEAK VELOCITY: 3 M/S
TRICUSPID VALVE PEAK REGURGITATION VELOCITY: 2.98 M/S

## 2022-10-26 PROCEDURE — 93306 TTE W/DOPPLER COMPLETE: CPT

## 2022-10-26 PROCEDURE — 93306 TTE W/DOPPLER COMPLETE: CPT | Performed by: INTERNAL MEDICINE

## 2022-11-09 DIAGNOSIS — N52.9 MALE ERECTILE DISORDER: ICD-10-CM

## 2022-11-09 RX ORDER — SILDENAFIL 100 MG/1
100 TABLET, FILM COATED ORAL AS NEEDED
Qty: 10 TABLET | Refills: 0 | Status: SHIPPED | OUTPATIENT
Start: 2022-11-09

## 2022-11-14 ENCOUNTER — APPOINTMENT (OUTPATIENT)
Dept: LAB | Facility: CLINIC | Age: 72
End: 2022-11-14

## 2022-11-14 LAB
ANION GAP SERPL CALCULATED.3IONS-SCNC: 4 MMOL/L (ref 4–13)
BASOPHILS # BLD AUTO: 0.02 THOUSANDS/ÂΜL (ref 0–0.1)
BASOPHILS NFR BLD AUTO: 1 % (ref 0–1)
BUN SERPL-MCNC: 16 MG/DL (ref 5–25)
CALCIUM SERPL-MCNC: 9.4 MG/DL (ref 8.3–10.1)
CHLORIDE SERPL-SCNC: 104 MMOL/L (ref 96–108)
CHOLEST SERPL-MCNC: 92 MG/DL
CO2 SERPL-SCNC: 28 MMOL/L (ref 21–32)
CREAT SERPL-MCNC: 0.89 MG/DL (ref 0.6–1.3)
EOSINOPHIL # BLD AUTO: 0.3 THOUSAND/ÂΜL (ref 0–0.61)
EOSINOPHIL NFR BLD AUTO: 7 % (ref 0–6)
ERYTHROCYTE [DISTWIDTH] IN BLOOD BY AUTOMATED COUNT: 13.5 % (ref 11.6–15.1)
EST. AVERAGE GLUCOSE BLD GHB EST-MCNC: 105 MG/DL
GFR SERPL CREATININE-BSD FRML MDRD: 86 ML/MIN/1.73SQ M
GLUCOSE P FAST SERPL-MCNC: 100 MG/DL (ref 65–99)
HBA1C MFR BLD: 5.3 %
HCT VFR BLD AUTO: 39.4 % (ref 36.5–49.3)
HDLC SERPL-MCNC: 49 MG/DL
HGB BLD-MCNC: 12.5 G/DL (ref 12–17)
IMM GRANULOCYTES # BLD AUTO: 0.01 THOUSAND/UL (ref 0–0.2)
IMM GRANULOCYTES NFR BLD AUTO: 0 % (ref 0–2)
LDLC SERPL CALC-MCNC: 29 MG/DL (ref 0–100)
LYMPHOCYTES # BLD AUTO: 1.38 THOUSANDS/ÂΜL (ref 0.6–4.47)
LYMPHOCYTES NFR BLD AUTO: 34 % (ref 14–44)
MCH RBC QN AUTO: 31.7 PG (ref 26.8–34.3)
MCHC RBC AUTO-ENTMCNC: 31.7 G/DL (ref 31.4–37.4)
MCV RBC AUTO: 100 FL (ref 82–98)
MONOCYTES # BLD AUTO: 0.4 THOUSAND/ÂΜL (ref 0.17–1.22)
MONOCYTES NFR BLD AUTO: 10 % (ref 4–12)
NEUTROPHILS # BLD AUTO: 1.93 THOUSANDS/ÂΜL (ref 1.85–7.62)
NEUTS SEG NFR BLD AUTO: 48 % (ref 43–75)
NONHDLC SERPL-MCNC: 43 MG/DL
NRBC BLD AUTO-RTO: 0 /100 WBCS
PLATELET # BLD AUTO: 126 THOUSANDS/UL (ref 149–390)
PMV BLD AUTO: 10.9 FL (ref 8.9–12.7)
POTASSIUM SERPL-SCNC: 3.5 MMOL/L (ref 3.5–5.3)
RBC # BLD AUTO: 3.94 MILLION/UL (ref 3.88–5.62)
SODIUM SERPL-SCNC: 136 MMOL/L (ref 135–147)
TRIGL SERPL-MCNC: 70 MG/DL
WBC # BLD AUTO: 4.04 THOUSAND/UL (ref 4.31–10.16)

## 2022-11-17 DIAGNOSIS — I10 ESSENTIAL HYPERTENSION: ICD-10-CM

## 2022-11-18 ENCOUNTER — OFFICE VISIT (OUTPATIENT)
Dept: FAMILY MEDICINE CLINIC | Facility: CLINIC | Age: 72
End: 2022-11-18

## 2022-11-18 VITALS
OXYGEN SATURATION: 99 % | DIASTOLIC BLOOD PRESSURE: 80 MMHG | WEIGHT: 203 LBS | TEMPERATURE: 97.5 F | HEIGHT: 71 IN | HEART RATE: 61 BPM | BODY MASS INDEX: 28.42 KG/M2 | SYSTOLIC BLOOD PRESSURE: 120 MMHG

## 2022-11-18 DIAGNOSIS — I51.7 LEFT VENTRICULAR HYPERTROPHY: ICD-10-CM

## 2022-11-18 DIAGNOSIS — Z23 NEED FOR INFLUENZA VACCINATION: ICD-10-CM

## 2022-11-18 DIAGNOSIS — E78.2 MIXED HYPERLIPIDEMIA: ICD-10-CM

## 2022-11-18 DIAGNOSIS — I34.0 NONRHEUMATIC MITRAL VALVE REGURGITATION: ICD-10-CM

## 2022-11-18 DIAGNOSIS — I10 ESSENTIAL HYPERTENSION: ICD-10-CM

## 2022-11-18 DIAGNOSIS — D69.6 PLATELETS DECREASED (HCC): ICD-10-CM

## 2022-11-18 DIAGNOSIS — R79.89 ABNORMAL CBC: Primary | ICD-10-CM

## 2022-11-18 DIAGNOSIS — E11.9 TYPE 2 DIABETES MELLITUS WITHOUT COMPLICATION, WITHOUT LONG-TERM CURRENT USE OF INSULIN (HCC): ICD-10-CM

## 2022-11-18 DIAGNOSIS — I51.7 ATRIAL ENLARGEMENT, LEFT: ICD-10-CM

## 2022-11-18 RX ORDER — AMLODIPINE BESYLATE 5 MG/1
TABLET ORAL
Qty: 90 TABLET | Refills: 0 | Status: SHIPPED | OUTPATIENT
Start: 2022-11-18

## 2022-11-19 PROBLEM — I51.7 ATRIAL ENLARGEMENT, LEFT: Status: ACTIVE | Noted: 2022-11-18

## 2022-11-19 PROBLEM — I51.7 ATRIAL ENLARGEMENT, LEFT: Status: ACTIVE | Noted: 2022-11-19

## 2022-11-19 PROBLEM — R79.89 ABNORMAL CBC: Status: ACTIVE | Noted: 2022-11-18

## 2022-11-19 PROBLEM — R79.89 ABNORMAL CBC: Status: ACTIVE | Noted: 2022-11-19

## 2022-11-19 NOTE — ASSESSMENT & PLAN NOTE
Lab Results   Component Value Date    HGBA1C 5 3 11/14/2022   Chronic asymptomatic hemoglobin A1c will control I patient has been watching for the low carb diet and he lost weight the recommend the to start the metformin encouraged patient to continue with the low carb diet patient already on statin on Ace inhibitor

## 2022-11-19 NOTE — ASSESSMENT & PLAN NOTE
New diagnosis finding on recent echocardiogram patient asymptomatic recommend patient to be evaluated by Cardiology

## 2022-11-19 NOTE — ASSESSMENT & PLAN NOTE
New diagnosis finding on the the recent echocardiogram result review with the patient also there is enlargement in the left atrial recommend patient to be evaluated by Cardiology

## 2022-11-19 NOTE — PROGRESS NOTES
Name: aMrixa Reynoso      : 1950      MRN: [id number]  Encounter Provider: Jae Iniguez MD  Encounter Date: 2022   Encounter department: [facility]     1  Abnormal CBC  Assessment & Plan:  Finding on recent blood work abnormal CBC recommend to repeat CBC in 4 week    Orders:  -     CBC and differential; Future; Expected date: 2022  -     CBC and differential; Future; Expected date: 2023  -     Comprehensive metabolic panel; Future; Expected date: 2023  -     Lipid panel; Future; Expected date: 2023  -     TSH, 3rd generation with Free T4 reflex; Future; Expected date: 2023    2  Type 2 diabetes mellitus without complication, without long-term current use of insulin [facility])  Assessment & Plan:    Lab Results   Component Value Date    HGBA1C 5 3 2022   Chronic asymptomatic hemoglobin A1c will control I patient has been watching for the low carb diet and he lost weight the recommend the to start the metformin encouraged patient to continue with the low carb diet patient already on statin on Ace inhibitor    Orders:  -     CBC and differential; Future; Expected date: 2023  -     Comprehensive metabolic panel; Future; Expected date: 2023  -     Lipid panel; Future; Expected date: 2023  -     TSH, 3rd generation with Free T4 reflex; Future; Expected date: 2023    3  Need for influenza vaccination  Comments:  Benefits versus side effect discussed the patient  Orders:  -     FLUZONE HIGH-DOSE: influenza vaccine, high-dose, preservative-free 0 7 mL    4  Mixed hyperlipidemia  -     CBC and differential; Future; Expected date: 2023  -     Comprehensive metabolic panel; Future; Expected date: 2023  -     Lipid panel; Future; Expected date: 2023  -     TSH, 3rd generation with Free T4 reflex; Future; Expected date: 2023    5   Essential hypertension  Assessment & Plan:  Chronic asymptomatic fair control continue current management including amlodipine 5 mg once a day lisinopril 20 mg once a day low-salt diet important lose weight review    Orders:  -     CBC and differential; Future; Expected date: 02/18/2023  -     Comprehensive metabolic panel; Future; Expected date: 02/18/2023  -     Lipid panel; Future; Expected date: 02/18/2023  -     TSH, 3rd generation with Free T4 reflex; Future; Expected date: 02/18/2023    6  Platelets decreased (HCC)  -     CBC and differential; Future; Expected date: 12/16/2022  -     CBC and differential; Future; Expected date: 02/18/2023  -     Comprehensive metabolic panel; Future; Expected date: 02/18/2023  -     Lipid panel; Future; Expected date: 02/18/2023  -     TSH, 3rd generation with Free T4 reflex; Future; Expected date: 02/18/2023    7  Atrial enlargement, left  Assessment & Plan:  New diagnosis finding on recent echocardiogram patient asymptomatic recommend patient to be evaluated by Cardiology    Orders:  -     Ambulatory Referral to Cardiology; Future    8  Nonrheumatic mitral valve regurgitation  Assessment & Plan:  New diagnosis finding on the the recent echocardiogram result review with the patient also there is enlargement in the left atrial recommend patient to be evaluated by Cardiology    Orders:  -     Ambulatory Referral to Cardiology; Future    9  Left ventricular hypertrophy  Assessment & Plan:  Chronic asymptomatic blood pressure well controlled the patient recently had echocardiogram result review with the patient             Subjective      Patient here in the office follow-up with a chronic condition the patient compliant with the medication tolerated well without side effect no new concern the recent blood work discussed the patient and echocardiogram also review with the patient    Review of Systems   Constitutional: Negative for chills and fever  HENT: Negative for ear pain and sore throat      Eyes: Negative for pain and visual disturbance  Respiratory: Negative for cough and shortness of breath  Cardiovascular: Negative for chest pain and palpitations  Gastrointestinal: Negative for abdominal pain and vomiting  Genitourinary: Negative for dysuria and hematuria  Musculoskeletal: Negative for arthralgias and back pain  Skin: Negative for color change and rash  Neurological: Negative for seizures and syncope  All other systems reviewed and are negative  Current Outpatient Medications on File Prior to Visit   Medication Sig   • albuterol (PROVENTIL HFA,VENTOLIN HFA) 90 mcg/act inhaler INHALE 2 PUFFS EVERY 6 HOURS AS NEEDED FOR WHEEZING   • amLODIPine (NORVASC) 5 mg tablet TAKE 1 TABLET DAILY   • aspirin (ECOTRIN LOW STRENGTH) 81 mg EC tablet take 1 tablet (81MG)   • atorvastatin (LIPITOR) 20 mg tablet TAKE 1 TABLET DAILY   • finasteride (PROSCAR) 5 mg tablet TAKE 1 TABLET DAILY   • fluticasone (FLONASE) 50 mcg/act nasal spray 1 spray into each nostril daily   • hydrocortisone 2 5 % cream Apply topically every 24 hours   • ketoconazole (NIZORAL) 2 % shampoo WASH SCALP AND FACE 2-3 TIMES A WEEK AS NEEDED FOR FLARES   • lisinopril (ZESTRIL) 20 mg tablet TAKE 1 TABLET DAILY   • sildenafil (VIAGRA) 100 mg tablet Take 1 tablet (100 mg total) by mouth as needed for erectile dysfunction   • tamsulosin (FLOMAX) 0 4 mg TAKE 1 CAPSULE DAILY WITH  DINNER       Objective     /80 (BP Location: Left arm, Patient Position: Sitting)   Pulse 61   Temp 97 5 °F (36 4 °C) (Tympanic)   Ht 5' 11" (1 803 m)   Wt 92 1 kg (203 lb)   SpO2 99%   BMI 28 31 kg/m²     Physical Exam  Vitals and nursing note reviewed  Constitutional:       General: He is not in acute distress  Appearance: He is well-developed and well-nourished  He is not diaphoretic  HENT:      Head: Normocephalic        Right Ear: External ear normal       Left Ear: External ear normal       Nose: Nose normal       Mouth/Throat:      Pharynx: Oropharynx is clear    Eyes:      General:         Right eye: No discharge  Left eye: No discharge  Extraocular Movements: EOM normal       Conjunctiva/sclera: Conjunctivae normal    Neck:      Vascular: No JVD  Cardiovascular:      Rate and Rhythm: Normal rate and regular rhythm  Heart sounds: Normal heart sounds  No murmur heard  No gallop  Pulmonary:      Effort: Pulmonary effort is normal  No respiratory distress  Breath sounds: Normal breath sounds  No stridor  No wheezing or rales  Chest:      Chest wall: No tenderness  Abdominal:      General: There is no distension  Palpations: Abdomen is soft  There is no mass  Tenderness: There is no abdominal tenderness  There is no rebound  Musculoskeletal:         General: No tenderness or edema  Cervical back: Normal range of motion and neck supple  Lymphadenopathy:      Cervical: No cervical adenopathy  Skin:     General: Skin is warm  Findings: No erythema or rash  Neurological:      Mental Status: He is alert and oriented to person, place, and time     Psychiatric:         Mood and Affect: Mood normal        Emani Peralta MD

## 2022-11-19 NOTE — ASSESSMENT & PLAN NOTE
Chronic asymptomatic blood pressure well controlled the patient recently had echocardiogram result review with the patient

## 2022-11-19 NOTE — ASSESSMENT & PLAN NOTE
Chronic asymptomatic fair control continue current management including amlodipine 5 mg once a day lisinopril 20 mg once a day low-salt diet important lose weight review

## 2022-11-26 DIAGNOSIS — I10 ESSENTIAL HYPERTENSION: ICD-10-CM

## 2022-11-28 PROBLEM — R05.9 COUGH: Status: RESOLVED | Noted: 2022-09-28 | Resolved: 2022-11-28

## 2022-11-28 PROBLEM — J12.82 PNEUMONIA DUE TO COVID-19 VIRUS: Status: RESOLVED | Noted: 2022-09-28 | Resolved: 2022-11-28

## 2022-11-28 PROBLEM — U07.1 PNEUMONIA DUE TO COVID-19 VIRUS: Status: RESOLVED | Noted: 2022-09-28 | Resolved: 2022-11-28

## 2022-11-28 RX ORDER — LISINOPRIL 20 MG/1
TABLET ORAL
Qty: 90 TABLET | Refills: 0 | Status: SHIPPED | OUTPATIENT
Start: 2022-11-28

## 2022-12-06 LAB
LEFT EYE DIABETIC RETINOPATHY: NORMAL
RIGHT EYE DIABETIC RETINOPATHY: NORMAL
SEVERITY (EYE EXAM): NORMAL

## 2022-12-13 DIAGNOSIS — N40.0 BENIGN PROSTATIC HYPERPLASIA, UNSPECIFIED WHETHER LOWER URINARY TRACT SYMPTOMS PRESENT: ICD-10-CM

## 2022-12-13 RX ORDER — FINASTERIDE 5 MG/1
TABLET, FILM COATED ORAL
Qty: 90 TABLET | Refills: 0 | Status: SHIPPED | OUTPATIENT
Start: 2022-12-13

## 2022-12-29 ENCOUNTER — TELEMEDICINE (OUTPATIENT)
Dept: FAMILY MEDICINE CLINIC | Facility: CLINIC | Age: 72
End: 2022-12-29

## 2022-12-29 ENCOUNTER — TELEPHONE (OUTPATIENT)
Dept: FAMILY MEDICINE CLINIC | Facility: CLINIC | Age: 72
End: 2022-12-29

## 2022-12-29 DIAGNOSIS — K92.1 BLOOD IN STOOL: Primary | ICD-10-CM

## 2022-12-29 NOTE — TELEPHONE ENCOUNTER
He wanted to update you  Said he just had diarrhea and the liquid was bright red  He said you asked him about this on your call    his # [phone]

## 2022-12-29 NOTE — TELEPHONE ENCOUNTER
Patient called and left message stating he had blood in stool   Called and left message for patient to call office to set up apt or virtual apt with benjamin

## 2022-12-30 ENCOUNTER — HOSPITAL ENCOUNTER (INPATIENT)
Facility: HOSPITAL | Age: 72
LOS: 2 days | Discharge: HOME | End: 2023-01-01
Attending: EMERGENCY MEDICINE | Admitting: INTERNAL MEDICINE

## 2022-12-30 ENCOUNTER — APPOINTMENT (OUTPATIENT)
Dept: LAB | Facility: HOSPITAL | Age: 72
End: 2022-12-30

## 2022-12-30 ENCOUNTER — APPOINTMENT (EMERGENCY)
Dept: CT IMAGING | Facility: HOSPITAL | Age: 72
End: 2022-12-30

## 2022-12-30 ENCOUNTER — TELEPHONE (OUTPATIENT)
Dept: FAMILY MEDICINE CLINIC | Facility: CLINIC | Age: 72
End: 2022-12-30

## 2022-12-30 DIAGNOSIS — K62.5 RECTAL BLEEDING: ICD-10-CM

## 2022-12-30 DIAGNOSIS — K63.1 PERFORATION BOWEL (HCC): ICD-10-CM

## 2022-12-30 DIAGNOSIS — K92.1 BLOOD IN STOOL: ICD-10-CM

## 2022-12-30 DIAGNOSIS — K57.20 DIVERTICULITIS OF COLON WITH PERFORATION: ICD-10-CM

## 2022-12-30 DIAGNOSIS — K57.92 ACUTE DIVERTICULITIS: Primary | ICD-10-CM

## 2022-12-30 LAB
ALBUMIN SERPL BCP-MCNC: 3.8 G/DL (ref 3.5–5)
ALP SERPL-CCNC: 110 U/L (ref 43–122)
ALT SERPL W P-5'-P-CCNC: 42 U/L
ANION GAP SERPL CALCULATED.3IONS-SCNC: 6 MMOL/L (ref 5–14)
APTT PPP: 33 SECONDS (ref 23–37)
AST SERPL W P-5'-P-CCNC: 33 U/L (ref 17–59)
ATRIAL RATE: 63 BPM
BACTERIA UR QL AUTO: NORMAL /HPF
BASOPHILS # BLD AUTO: 0.02 THOUSANDS/ÂΜL (ref 0–0.1)
BASOPHILS # BLD AUTO: 0.03 THOUSANDS/ÂΜL (ref 0–0.1)
BASOPHILS NFR BLD AUTO: 0 % (ref 0–1)
BASOPHILS NFR BLD AUTO: 1 % (ref 0–1)
BILIRUB SERPL-MCNC: 0.81 MG/DL (ref 0.2–1)
BILIRUB UR QL STRIP: NEGATIVE
BUN SERPL-MCNC: 15 MG/DL (ref 5–25)
CALCIUM SERPL-MCNC: 9.3 MG/DL (ref 8.4–10.2)
CHLORIDE SERPL-SCNC: 100 MMOL/L (ref 96–108)
CLARITY UR: CLEAR
CO2 SERPL-SCNC: 32 MMOL/L (ref 21–32)
COLOR UR: YELLOW
CREAT SERPL-MCNC: 0.8 MG/DL (ref 0.7–1.5)
EOSINOPHIL # BLD AUTO: 0.3 THOUSAND/ÂΜL (ref 0–0.61)
EOSINOPHIL # BLD AUTO: 0.44 THOUSAND/ÂΜL (ref 0–0.61)
EOSINOPHIL NFR BLD AUTO: 6 % (ref 0–6)
EOSINOPHIL NFR BLD AUTO: 6 % (ref 0–6)
ERYTHROCYTE [DISTWIDTH] IN BLOOD BY AUTOMATED COUNT: 13.3 % (ref 11.6–15.1)
ERYTHROCYTE [DISTWIDTH] IN BLOOD BY AUTOMATED COUNT: 13.3 % (ref 11.6–15.1)
GFR SERPL CREATININE-BSD FRML MDRD: 90 ML/MIN/1.73SQ M
GLUCOSE SERPL-MCNC: 109 MG/DL (ref 70–99)
GLUCOSE SERPL-MCNC: 96 MG/DL (ref 65–140)
GLUCOSE UR STRIP-MCNC: NEGATIVE MG/DL
HCT VFR BLD AUTO: 33.5 % (ref 36.5–49.3)
HCT VFR BLD AUTO: 34.4 % (ref 36.5–49.3)
HEMOCCULT STL QL IA: POSITIVE
HGB BLD-MCNC: 10.6 G/DL (ref 12–17)
HGB BLD-MCNC: 10.9 G/DL (ref 12–17)
HGB UR QL STRIP.AUTO: NEGATIVE
IMM GRANULOCYTES # BLD AUTO: 0.01 THOUSAND/UL (ref 0–0.2)
IMM GRANULOCYTES # BLD AUTO: 0.02 THOUSAND/UL (ref 0–0.2)
IMM GRANULOCYTES NFR BLD AUTO: 0 % (ref 0–2)
IMM GRANULOCYTES NFR BLD AUTO: 0 % (ref 0–2)
INR PPP: 1.04 (ref 0.84–1.19)
KETONES UR STRIP-MCNC: NEGATIVE MG/DL
LACTATE SERPL-SCNC: 0.6 MMOL/L (ref 0.5–2)
LEUKOCYTE ESTERASE UR QL STRIP: 25
LIPASE SERPL-CCNC: 26 U/L (ref 23–300)
LYMPHOCYTES # BLD AUTO: 1.05 THOUSANDS/ÂΜL (ref 0.6–4.47)
LYMPHOCYTES # BLD AUTO: 1.6 THOUSANDS/ÂΜL (ref 0.6–4.47)
LYMPHOCYTES NFR BLD AUTO: 20 % (ref 14–44)
LYMPHOCYTES NFR BLD AUTO: 23 % (ref 14–44)
MAGNESIUM SERPL-MCNC: 2.2 MG/DL (ref 1.6–2.3)
MCH RBC QN AUTO: 31.1 PG (ref 26.8–34.3)
MCH RBC QN AUTO: 31.5 PG (ref 26.8–34.3)
MCHC RBC AUTO-ENTMCNC: 31.6 G/DL (ref 31.4–37.4)
MCHC RBC AUTO-ENTMCNC: 31.7 G/DL (ref 31.4–37.4)
MCV RBC AUTO: 98 FL (ref 82–98)
MCV RBC AUTO: 99 FL (ref 82–98)
MONOCYTES # BLD AUTO: 0.5 THOUSAND/ÂΜL (ref 0.17–1.22)
MONOCYTES # BLD AUTO: 0.75 THOUSAND/ÂΜL (ref 0.17–1.22)
MONOCYTES NFR BLD AUTO: 11 % (ref 4–12)
MONOCYTES NFR BLD AUTO: 9 % (ref 4–12)
NEUTROPHILS # BLD AUTO: 3.46 THOUSANDS/ÂΜL (ref 1.85–7.62)
NEUTROPHILS # BLD AUTO: 4.15 THOUSANDS/ÂΜL (ref 1.85–7.62)
NEUTS SEG NFR BLD AUTO: 60 % (ref 43–75)
NEUTS SEG NFR BLD AUTO: 64 % (ref 43–75)
NITRITE UR QL STRIP: NEGATIVE
NON-SQ EPI CELLS URNS QL MICRO: NORMAL /HPF
NRBC BLD AUTO-RTO: 0 /100 WBCS
NRBC BLD AUTO-RTO: 0 /100 WBCS
P AXIS: 81 DEGREES
PH UR STRIP.AUTO: 7 [PH]
PHOSPHATE SERPL-MCNC: 4.4 MG/DL (ref 2.5–4.8)
PLATELET # BLD AUTO: 128 THOUSANDS/UL (ref 149–390)
PLATELET # BLD AUTO: 136 THOUSANDS/UL (ref 149–390)
PMV BLD AUTO: 10.3 FL (ref 8.9–12.7)
PMV BLD AUTO: 10.3 FL (ref 8.9–12.7)
POTASSIUM SERPL-SCNC: 3.6 MMOL/L (ref 3.5–5.3)
PR INTERVAL: 156 MS
PROT SERPL-MCNC: 7 G/DL (ref 6.4–8.4)
PROT UR STRIP-MCNC: NEGATIVE MG/DL
PROTHROMBIN TIME: 13.9 SECONDS (ref 11.6–14.5)
QRS AXIS: 52 DEGREES
QRSD INTERVAL: 114 MS
QT INTERVAL: 432 MS
QTC INTERVAL: 442 MS
RBC # BLD AUTO: 3.37 MILLION/UL (ref 3.88–5.62)
RBC # BLD AUTO: 3.51 MILLION/UL (ref 3.88–5.62)
RBC #/AREA URNS AUTO: NORMAL /HPF
SODIUM SERPL-SCNC: 138 MMOL/L (ref 135–147)
SP GR UR STRIP.AUTO: 1.01 (ref 1–1.04)
T WAVE AXIS: 39 DEGREES
UROBILINOGEN UA: NEGATIVE MG/DL
VENTRICULAR RATE: 63 BPM
WBC # BLD AUTO: 5.35 THOUSAND/UL (ref 4.31–10.16)
WBC # BLD AUTO: 6.98 THOUSAND/UL (ref 4.31–10.16)
WBC #/AREA URNS AUTO: NORMAL /HPF

## 2022-12-30 RX ORDER — INSULIN LISPRO 100 [IU]/ML
1-6 INJECTION, SOLUTION INTRAVENOUS; SUBCUTANEOUS
Status: DISCONTINUED | OUTPATIENT
Start: 2022-12-31 | End: 2023-01-01 | Stop reason: HOSPADM

## 2022-12-30 RX ORDER — METRONIDAZOLE 500 MG/100ML
500 INJECTION, SOLUTION INTRAVENOUS ONCE
Status: DISCONTINUED | OUTPATIENT
Start: 2022-12-30 | End: 2022-12-30

## 2022-12-30 RX ORDER — CEFTRIAXONE 1 G/50ML
1000 INJECTION, SOLUTION INTRAVENOUS ONCE
Status: COMPLETED | OUTPATIENT
Start: 2022-12-30 | End: 2022-12-30

## 2022-12-30 RX ORDER — ACETAMINOPHEN 325 MG/1
650 TABLET ORAL EVERY 6 HOURS PRN
Status: DISCONTINUED | OUTPATIENT
Start: 2022-12-30 | End: 2023-01-01 | Stop reason: HOSPADM

## 2022-12-30 RX ORDER — ONDANSETRON 2 MG/ML
4 INJECTION INTRAMUSCULAR; INTRAVENOUS EVERY 6 HOURS PRN
Status: DISCONTINUED | OUTPATIENT
Start: 2022-12-30 | End: 2023-01-01 | Stop reason: HOSPADM

## 2022-12-30 RX ORDER — INSULIN LISPRO 100 [IU]/ML
1-6 INJECTION, SOLUTION INTRAVENOUS; SUBCUTANEOUS
Status: DISCONTINUED | OUTPATIENT
Start: 2022-12-30 | End: 2023-01-01 | Stop reason: HOSPADM

## 2022-12-30 RX ADMIN — CEFTRIAXONE 1000 MG: 1 INJECTION, SOLUTION INTRAVENOUS at 20:23

## 2022-12-30 RX ADMIN — SODIUM CHLORIDE 1000 ML: 0.9 INJECTION, SOLUTION INTRAVENOUS at 17:03

## 2022-12-30 RX ADMIN — IOHEXOL 100 ML: 350 INJECTION, SOLUTION INTRAVENOUS at 17:59

## 2022-12-30 RX ADMIN — METRONIDAZOLE 500 MG: 500 INJECTION, SOLUTION INTRAVENOUS at 21:20

## 2022-12-30 RX ADMIN — PIPERACILLIN SODIUM,TAZOBACTAM SODIUM 3.38 G: 3; .375 INJECTION, POWDER, FOR SOLUTION INTRAVENOUS at 23:42

## 2022-12-30 NOTE — PROGRESS NOTES
Name: Macie Washington      : 1950      MRN: [id number]  Encounter Provider: NISHANT Delaney  Encounter Date: 2022   Encounter department: [place] [address]  Blood in stool  Assessment & Plan:  Acute symptomatic new onset blood in stool following BM  Denies abdominal pain, diarrhea, constipation, and fever  Not in toilet bowl or on toilet paper when wiping  Will recommend patient have cbc to evaluate hemoglobin, occult blood test, and referral placed to GI  Educated with worsening of symptoms report to ED  Orders:  -     Ambulatory Referral to Gastroenterology; Future  -     CBC and differential; Future  -     Occult Blood, Fecal Immunochemical; Future           Subjective      Patient with virtual appt with c/o blood in stool  Patient reports that yesterday started with blood in his stool  Patient reports it's imbedded into the stool, not during wiping or outside of BM  Patient denies abdominal pain, constipation, diarrhea, and fever  Does have PMH GERD    Review of Systems   Constitutional: Negative for activity change, appetite change, chills, fatigue and fever  HENT: Negative for congestion, rhinorrhea, sinus pressure, sinus pain and sore throat  Respiratory: Negative for cough, chest tightness and shortness of breath  Gastrointestinal: Positive for blood in stool  Negative for abdominal distention, abdominal pain, anal bleeding, constipation, diarrhea, nausea and vomiting  Musculoskeletal: Negative for myalgias  Skin: Negative for color change, pallor and rash  Neurological: Negative for dizziness, syncope, weakness, light-headedness and headaches  Hematological: Negative for adenopathy  Psychiatric/Behavioral: Negative for agitation and confusion         Current Outpatient Medications on File Prior to Visit   Medication Sig   • albuterol (PROVENTIL HFA,VENTOLIN HFA) 90 mcg/act inhaler INHALE 2 PUFFS EVERY 6 HOURS AS NEEDED FOR WHEEZING   • amLODIPine (NORVASC) 5 mg tablet TAKE 1 TABLET DAILY   • aspirin (ECOTRIN LOW STRENGTH) 81 mg EC tablet take 1 tablet (81MG)   • atorvastatin (LIPITOR) 20 mg tablet TAKE 1 TABLET DAILY   • finasteride (PROSCAR) 5 mg tablet TAKE 1 TABLET DAILY   • fluticasone (FLONASE) 50 mcg/act nasal spray 1 spray into each nostril daily   • hydrocortisone 2 5 % cream Apply topically every 24 hours   • ketoconazole (NIZORAL) 2 % shampoo WASH SCALP AND FACE 2-3 TIMES A WEEK AS NEEDED FOR FLARES   • lisinopril (ZESTRIL) 20 mg tablet TAKE 1 TABLET DAILY   • sildenafil (VIAGRA) 100 mg tablet Take 1 tablet (100 mg total) by mouth as needed for erectile dysfunction   • tamsulosin (FLOMAX) 0 4 mg TAKE 1 CAPSULE DAILY WITH  DINNER       Objective     There were no vitals taken for this visit  Physical Exam  Vitals and nursing note reviewed  Constitutional:       General: He is not in acute distress  Appearance: Normal appearance  He is not ill-appearing, toxic-appearing or diaphoretic  HENT:      Head: Normocephalic and atraumatic  Nose: No congestion or rhinorrhea  Mouth/Throat:      Mouth: Mucous membranes are moist    Eyes:      General: No scleral icterus  Right eye: No discharge  Left eye: No discharge  Conjunctiva/sclera: Conjunctivae normal    Pulmonary:      Effort: Pulmonary effort is normal  No respiratory distress  Musculoskeletal:         General: Normal range of motion  Cervical back: Normal range of motion  Skin:     General: Skin is dry  Neurological:      Mental Status: He is alert and oriented to person, place, and time  Psychiatric:         Mood and Affect: Mood normal          Behavior: Behavior normal          Thought Content:  Thought content normal          Judgment: Judgment normal        Morenita Cami Frame

## 2022-12-30 NOTE — ED PROVIDER NOTES
History  Chief Complaint   Patient presents with   • Rectal Bleeding     Patient reports having bright blood in his stools with some gas  Reports he called his GI office and they said to come to the ED for testing  82 yo M with past medical history diverticulosis, BPH, diabetes, hypertension, hyperlipidemia presenting for evaluation of bright red blood per rectum  Patient states for the last 2 days he has had explosive diarrhea and noticed bright red blood in the toilet bowl and around the stool  He reports cramping bloating abdominal sensation but denies any abdominal pain  He denies any nausea or vomiting  Denies any real changes in his diet  No new restaurants supplements or vitamins  No new medications  Denies taking any over-the-counter medication for symptoms prior to arrival   He did follow-up with his GI doctor who did outpatient lab work  Patient was found to have a drop in his hemoglobin from 12 to 10 and Hemoccult test was positive so he was advised to come to the ED for further evaluation  Last colonoscopy 1 year ago  Prior to Admission Medications   Prescriptions Last Dose Informant Patient Reported? Taking?    albuterol (PROVENTIL HFA,VENTOLIN HFA) 90 mcg/act inhaler   No No   Sig: INHALE 2 PUFFS EVERY 6 HOURS AS NEEDED FOR WHEEZING   amLODIPine (NORVASC) 5 mg tablet   No No   Sig: TAKE 1 TABLET DAILY   aspirin (ECOTRIN LOW STRENGTH) 81 mg EC tablet   Yes No   Sig: take 1 tablet (81MG)   atorvastatin (LIPITOR) 20 mg tablet   No No   Sig: TAKE 1 TABLET DAILY   finasteride (PROSCAR) 5 mg tablet   No No   Sig: TAKE 1 TABLET DAILY   fluticasone (FLONASE) 50 mcg/act nasal spray   No No   Si spray into each nostril daily   hydrocortisone 2 5 % cream   No No   Sig: Apply topically every 24 hours   ketoconazole (NIZORAL) 2 % shampoo   Yes No   Sig: WASH SCALP AND FACE 2-3 TIMES A WEEK AS NEEDED FOR FLARES   lisinopril (ZESTRIL) 20 mg tablet   No No   Sig: TAKE 1 TABLET DAILY sildenafil (VIAGRA) 100 mg tablet   No No   Sig: Take 1 tablet (100 mg total) by mouth as needed for erectile dysfunction   tamsulosin (FLOMAX) 0 4 mg   No No   Sig: TAKE 1 CAPSULE DAILY WITH  DINNER      Facility-Administered Medications: None       Past Medical History:   Diagnosis Date   • Acute bilateral low back pain without sciatica 3/9/2021   • BMI 33 0-33 9,adult 2020   • BPH (benign prostatic hyperplasia)    • Diabetes mellitus associated with hormonal etiology ([facility] ) 3/5/2019   • ED (erectile dysfunction)    • Elevated PSA 1/15/2013   • Encounter for well adult exam with abnormal findings 2019   • Enlarged prostate    • Herpes zoster 2014   • Hyperlipidemia    • Hypertension    • IFG (impaired fasting glucose)    • Metabolic syndrome    • Obesity (BMI 30 0-34 9) 2019   • Psoriasis    • Seasonal allergic rhinitis    • Type 2 diabetes mellitus ([place] [address] ) 2014       Past Surgical History:   Procedure Laterality Date   • CHOLECYSTECTOMY  2015   • COLONOSCOPY     • HERNIA REPAIR  2015       Family History   Problem Relation Age of Onset   • Skin cancer Mother    • Heart disease Father      I have reviewed and agree with the history as documented  E-Cigarette/Vaping   • E-Cigarette Use Never User      E-Cigarette/Vaping Substances   • Nicotine No    • THC No    • CBD No    • Flavoring No    • Other No    • Unknown No      Social History     Tobacco Use   • Smoking status: Former     Packs/day: 0 50     Years: 12 00     Pack years: 6 00     Types: Pipe, Cigarettes     Start date: 1968     Quit date: 6/10/1980     Years since quittin 5   • Smokeless tobacco: Never   Vaping Use   • Vaping Use: Never used   Substance Use Topics   • Alcohol use: Yes     Comment: social   • Drug use: No       Review of Systems   All other systems reviewed and are negative  Physical Exam  Physical Exam  Vitals and nursing note reviewed     Constitutional:       General: He is not in acute distress  Appearance: He is well-developed  HENT:      Head: Normocephalic and atraumatic  Eyes:      Conjunctiva/sclera: Conjunctivae normal       Comments: EOM grossly intact   Neck:      Vascular: No JVD  Cardiovascular:      Rate and Rhythm: Normal rate  Pulmonary:      Effort: Pulmonary effort is normal    Abdominal:      General: Abdomen is flat  There is distension (mild)  Palpations: Abdomen is soft  Tenderness: There is abdominal tenderness (mild generalized tenderness)  There is no guarding or rebound  Hernia: No hernia is present  Genitourinary:     Comments: No fissure visualized, no external hemorrhoids visualized  Musculoskeletal:      Cervical back: Normal range of motion and neck supple  Comments: FROM, steady gait, cap refill brisk, strength and sensation grossly intact throughout   Skin:     General: Skin is warm and dry  Capillary Refill: Capillary refill takes less than 2 seconds  Neurological:      Mental Status: He is alert and oriented to person, place, and time     Psychiatric:         Behavior: Behavior normal          Vital Signs  ED Triage Vitals   Temperature Pulse Respirations Blood Pressure SpO2   12/30/22 1631 12/30/22 1631 12/30/22 1631 12/30/22 1631 12/30/22 1631   98 2 °F (36 8 °C) 60 16 124/67 100 %      Temp Source Heart Rate Source Patient Position - Orthostatic VS BP Location FiO2 (%)   12/30/22 1631 12/30/22 1631 12/30/22 1631 12/30/22 1631 --   Tympanic Monitor Sitting Left arm       Pain Score       12/30/22 1832       No Pain           Vitals:    12/30/22 1631 12/30/22 1832   BP: 124/67 121/72   Pulse: 60 56   Patient Position - Orthostatic VS: Sitting Lying         Visual Acuity      ED Medications  Medications   metroNIDAZOLE (FLAGYL) IVPB (premix) 500 mg 100 mL (has no administration in time range)   cefTRIAXone (ROCEPHIN) IVPB (premix in dextrose) 1,000 mg 50 mL (1,000 mg Intravenous New Bag 12/30/22 2023)   sodium chloride 0 9 % bolus 1,000 mL (1,000 mL Intravenous New Bag 12/30/22 1703)   iohexol (OMNIPAQUE) 350 MG/ML injection (SINGLE-DOSE) 100 mL (100 mL Intravenous Given 12/30/22 1759)       Diagnostic Studies  Results Reviewed     Procedure Component Value Units Date/Time    Blood culture [[id number]]     Lab Status: No result Specimen: Blood from Arm, Left     Urine Microscopic [[id number]]  (Normal) Collected: 12/30/22 1716    Lab Status: Final result Specimen: Urine, Clean Catch Updated: 12/30/22 1746     RBC, UA None Seen /hpf      WBC, UA 1-2 /hpf      Epithelial Cells None Seen /hpf      Bacteria, UA None Seen /hpf     Lipase [[id number]]  (Normal) Collected: 12/30/22 1702    Lab Status: Final result Specimen: Blood from Arm, Right Updated: 12/30/22 1743     Lipase 26 u/L     Phosphorus [[id number]]  (Normal) Collected: 12/30/22 1702    Lab Status: Final result Specimen: Blood from Arm, Right Updated: 12/30/22 1743     Phosphorus 4 4 mg/dL     Magnesium [[id number]]  (Normal) Collected: 12/30/22 1702    Lab Status: Final result Specimen: Blood from Arm, Right Updated: 12/30/22 1743     Magnesium 2 2 mg/dL     Lactic acid [[id number]]  (Normal) Collected: 12/30/22 1702    Lab Status: Final result Specimen: Blood from Arm, Right Updated: 12/30/22 1743     LACTIC ACID 0 6 mmol/L     Narrative:      Result may be elevated if tourniquet was used during collection      Comprehensive metabolic panel [[id number]]  (Abnormal) Collected: 12/30/22 1702    Lab Status: Final result Specimen: Blood from Arm, Right Updated: 12/30/22 1743     Sodium 138 mmol/L      Potassium 3 6 mmol/L      Chloride 100 mmol/L      CO2 32 mmol/L      ANION GAP 6 mmol/L      BUN 15 mg/dL      Creatinine 0 80 mg/dL      Glucose 109 mg/dL      Calcium 9 3 mg/dL      AST 33 U/L      ALT 42 U/L      Alkaline Phosphatase 110 U/L      Total Protein 7 0 g/dL      Albumin 3 8 g/dL      Total Bilirubin 0 81 mg/dL      eGFR 90 ml/min/1 73sq m     Narrative:      [employer] guidelines for Chronic Kidney Disease (CKD):   •  Stage 1 with normal or high GFR (GFR > 90 mL/min/1 73 square meters)  •  Stage 2 Mild CKD (GFR = 60-89 mL/min/1 73 square meters)  •  Stage 3A Moderate CKD (GFR = 45-59 mL/min/1 73 square meters)  •  Stage 3B Moderate CKD (GFR = 30-44 mL/min/1 73 square meters)  •  Stage 4 Severe CKD (GFR = 15-29 mL/min/1 73 square meters)  •  Stage 5 End Stage CKD (GFR <15 mL/min/1 73 square meters)  Note: GFR calculation is accurate only with a steady state creatinine    Protime-INR [[id number]]  (Normal) Collected: 12/30/22 1702    Lab Status: Final result Specimen: Blood from Arm, Right Updated: 12/30/22 1743     Protime 13 9 seconds      INR 1 04    APTT [[id number]]  (Normal) Collected: 12/30/22 1702    Lab Status: Final result Specimen: Blood from Arm, Right Updated: 12/30/22 1743     PTT 33 seconds     UA w Reflex to Microscopic w Reflex to Culture [[id number]]  (Abnormal) Collected: 12/30/22 1716    Lab Status: Final result Specimen: Urine, Clean Catch Updated: 12/30/22 1733     Color, UA Yellow     Clarity, UA Clear     Specific Gravity, UA 1 010     pH, UA 7 0     Leukocytes, UA 25 0     Nitrite, UA Negative     Protein, UA Negative mg/dl      Glucose, UA Negative mg/dl      Ketones, UA Negative mg/dl      Bilirubin, UA Negative     Occult Blood, UA Negative     UROBILINOGEN UA Negative mg/dL     CBC and differential [[id number]]  (Abnormal) Collected: 12/30/22 1702    Lab Status: Final result Specimen: Blood from Arm, Right Updated: 12/30/22 1731     WBC 5 35 Thousand/uL      RBC 3 51 Million/uL      Hemoglobin 10 9 g/dL      Hematocrit 34 4 %      MCV 98 fL      MCH 31 1 pg      MCHC 31 7 g/dL      RDW 13 3 %      MPV 10 3 fL      Platelets 750 Thousands/uL      nRBC 0 /100 WBCs      Neutrophils Relative 64 %      Immat GRANS % 0 %      Lymphocytes Relative 20 %      Monocytes Relative 9 %      Eosinophils Relative 6 %      Basophils Relative 1 % Neutrophils Absolute 3 46 Thousands/µL      Immature Grans Absolute 0 01 Thousand/uL      Lymphocytes Absolute 1 05 Thousands/µL      Monocytes Absolute 0 50 Thousand/µL      Eosinophils Absolute 0 30 Thousand/µL      Basophils Absolute 0 03 Thousands/µL                  CT abdomen pelvis with contrast   Final Result by Danni Salas DO ([id number])      Colonic diverticulosis with inflammatory changes around the sigmoid colon and phlegmonous changes with a questionable area of microperforation  Findings likely represent complicated acute diverticulitis  No drainable fluid collection  Recommend    posttreatment colonoscopy to rule out underlying neoplasm  The study was marked in [facility] for immediate notification  Workstation performed: [id number]                    Procedures  ECG 12 Lead Documentation Only    Date/Time: 12/30/2022 5:56 PM  Performed by: Allyn Sanon PA-C  Authorized by: Allyn Sanon PA-C     Indications / Diagnosis:  Rectal bleeding  ECG reviewed by me, the ED Provider: yes    Patient location:  ED  Interpretation:     Interpretation: non-specific    Rate:     ECG rate:  63    ECG rate assessment: normal    Rhythm:     Rhythm: sinus rhythm      Rhythm comment:  PAC  Ectopy:     Ectopy: none    QRS:     QRS axis:  Normal  Conduction:     Conduction: normal    ST segments:     ST segments:  Normal  Comments:                   ED Course  ED Course as of 12/30/22 2049   Fri Dec 30, 2022   2016 CT findings: Colonic diverticulosis with inflammatory changes around the sigmoid colon and phlegmonous changes with a questionable area of microperforation  Findings likely represent complicated acute diverticulitis  No drainable fluid collection  Recommend   posttreatment colonoscopy to rule out underlying neoplasm       2030 TT sent to Dr Dipak Worley with gen surg who advised pt is able to stay here under medicine service     2031 TT sent to [facility] for admission SBIRT 22yo+    Flowsheet Row Most Recent Value   SBIRT (25 yo +)    In order to provide better care to our patients, we are screening all of our patients for alcohol and drug use  Would it be okay to ask you these screening questions? No Filed at: 12/30/2022 1702                    [facility]  Number of Diagnoses or Management Options  Acute diverticulitis  Perforation bowel [facility])  Rectal bleeding  Diagnosis management comments: 80-year-old male presenting for initial evaluation of rectal bleeding, patient was found to have acute complicated diverticulitis with microperforation, was given Flagyl and ceftriaxone in the emergency department, blood cultures were sent, did reach out to general surgery and they advised medicine admit, will admit to the hospital service  He is well appearing with some abd distension but it is soft  Hemodynamically stable  Portions of the record may have been created with voice recognition software  Occasional wrong word or "sound a like" substitutions may have occurred due to the inherent limitations of voice recognition software  Read the chart carefully and recognize, using context, where substitutions have occurred          Disposition  Final diagnoses:   Acute diverticulitis   Rectal bleeding   Perforation bowel ([address] )     Time reflects when diagnosis was documented in both MDM as applicable and the Disposition within this note     Time User Action Codes Description Comment    12/30/2022  8:46 PM Adilia Muhammad Add [K57 92] Acute diverticulitis     12/30/2022  8:46 PM Adilia Muhammad Add [K62 5] Rectal bleeding     12/30/2022  8:47 PM Adilia Muhammad Add [K63 1] Perforation bowel [facility])       ED Disposition     ED Disposition   Admit    Condition   Stable    Date/Time   Fri Dec 30, 2022  8:46 PM    Comment   Case was discussed with SLIM PA and the patient's admission status was agreed to be Admission Status: inpatient status to the service of   [facility]             Follow-up Information    None         Patient's Medications   Discharge Prescriptions    No medications on file       No discharge procedures on file      PDMP Review       Value Time User    PDMP Reviewed  Yes 12/29/2020 12:33 PM Ning Saldaña, [facility]          ED Provider  Electronically Signed by           Joe Agee PA-C  12/30/22 2049

## 2022-12-30 NOTE — TELEPHONE ENCOUNTER
Spoke with patient advised we have left a message with the GI department to get him scheduled and instructions to go to the ER if the rectal bleeding continues

## 2022-12-30 NOTE — ASSESSMENT & PLAN NOTE
Acute symptomatic new onset blood in stool following BM  Denies abdominal pain, diarrhea, constipation, and fever  Not in toilet bowl or on toilet paper when wiping  Will recommend patient have cbc to evaluate hemoglobin, occult blood test, and referral placed to GI  Educated with worsening of symptoms report to ED

## 2022-12-30 NOTE — LETTER
[address] [place] [address]  [address] [facility] [id number]      January 13, 2023    MRN: [id number]     Phone: [phone]     Dear Mr Linnea Rogers recently had a(n)  performed on  at  [address] [place] [address] that was requested by Federico Patricio PA-C  The study was reviewed by a radiologist, which is a physician who specializes in medical imaging  The radiologist issued a report describing his or her findings  In that report there was a finding that the radiologist felt warranted further discussion with your health care provider and that discussion would be beneficial to you  The results were sent to Federico Patricio PA-C on    We recommend that you contact Federico Patricio PA-C at  or set up an appointment to discuss the results of the imaging test  If you have already heard from Federico Patricio PA-C regarding the results of your study, you can disregard this letter  This letter is not meant to alarm you, but intended to encourage you to follow-up on your results with the provider that sent you for the imaging study  In addition, we have enclosed answers to frequently asked questions by other patients who have also received a letter to review results with their health care provider (see page two)  Thank you for choosing [address] [place] [address] for your medical imaging needs  FREQUENTLY ASKED QUESTIONS    1  Why am I receiving this letter? [facility] requires us to notify patients who have findings on imaging exams that may require more testing or follow-up with a health professional within the next 3 months  2  How serious is the finding on the imaging test?  This letter is sent to all patients who may need follow-up or more testing within the next 3 months    Receiving this letter does not necessarily mean you have a life-threatening imaging finding or disease  Recommendations in the radiologist’s imaging report are general in nature and it is up to your healthcare provider to say whether those recommendations make sense for your situation  You are strongly encouraged to talk to your health care provider about the results and ask whether additional steps need to be taken  3  Where can I get a copy of the final report for my recent radiology exam?  To get a full copy of the report you can access your records online at [email or web address] or please contact [facility] Medical Records Department at [phone] Monday through Friday between 8 am and 6 pm          4  What do I need to do now? Please contact your health care provider who requested the imaging study to discuss what further actions (if any) are needed  You may have already heard from (your ordering provider) in regard to this test in which case you can disregard this letter  NOTICE IN ACCORDANCE WITH THE [place] STATE “PATIENT TEST RESULT INFORMATION ACT OF 2018”    You are receiving this notice as a result of a determination by your diagnostic imaging service that further discussions of your test results are warranted and would be beneficial to you  The complete results of your test or tests have been or will be sent to the health care practitioner that ordered the test or tests  It is recommended that you contact your health care practitioner to discuss your results as soon as possible

## 2022-12-30 NOTE — TELEPHONE ENCOUNTER
----- Message from Renny Benitez, [address] sent at 12/30/2022 10:22 AM EST -----  Patient is having a drop in hemoglobin  Can you please see how soon we can get him into GI   With worsening of rectal bleeding should go to ED

## 2022-12-31 DIAGNOSIS — N40.0 BENIGN PROSTATIC HYPERPLASIA, UNSPECIFIED WHETHER LOWER URINARY TRACT SYMPTOMS PRESENT: ICD-10-CM

## 2022-12-31 PROBLEM — K57.91 GASTROINTESTINAL HEMORRHAGE ASSOCIATED WITH INTESTINAL DIVERTICULOSIS: Status: ACTIVE | Noted: 2022-12-31

## 2022-12-31 PROBLEM — K57.20 DIVERTICULITIS OF COLON WITH PERFORATION: Status: ACTIVE | Noted: 2022-12-31

## 2022-12-31 PROBLEM — K57.93 GASTROINTESTINAL HEMORRHAGE ASSOCIATED WITH INTESTINAL DIVERTICULITIS: Status: ACTIVE | Noted: 2022-12-31

## 2022-12-31 PROBLEM — K57.92 DIVERTICULITIS: Status: ACTIVE | Noted: 2022-12-31

## 2022-12-31 LAB
ANION GAP SERPL CALCULATED.3IONS-SCNC: 4 MMOL/L (ref 5–14)
BUN SERPL-MCNC: 12 MG/DL (ref 5–25)
CALCIUM SERPL-MCNC: 8.5 MG/DL (ref 8.4–10.2)
CHLORIDE SERPL-SCNC: 105 MMOL/L (ref 96–108)
CO2 SERPL-SCNC: 29 MMOL/L (ref 21–32)
CREAT SERPL-MCNC: 0.76 MG/DL (ref 0.7–1.5)
ERYTHROCYTE [DISTWIDTH] IN BLOOD BY AUTOMATED COUNT: 13.2 % (ref 11.6–15.1)
GFR SERPL CREATININE-BSD FRML MDRD: 92 ML/MIN/1.73SQ M
GLUCOSE SERPL-MCNC: 117 MG/DL (ref 65–140)
GLUCOSE SERPL-MCNC: 144 MG/DL (ref 65–140)
GLUCOSE SERPL-MCNC: 92 MG/DL (ref 70–99)
GLUCOSE SERPL-MCNC: 94 MG/DL (ref 65–140)
GLUCOSE SERPL-MCNC: 94 MG/DL (ref 65–140)
HCT VFR BLD AUTO: 29.6 % (ref 36.5–49.3)
HGB BLD-MCNC: 9.7 G/DL (ref 12–17)
MCH RBC QN AUTO: 31.7 PG (ref 26.8–34.3)
MCHC RBC AUTO-ENTMCNC: 32.8 G/DL (ref 31.4–37.4)
MCV RBC AUTO: 97 FL (ref 82–98)
PLATELET # BLD AUTO: 115 THOUSANDS/UL (ref 149–390)
PMV BLD AUTO: 10.5 FL (ref 8.9–12.7)
POTASSIUM SERPL-SCNC: 3.6 MMOL/L (ref 3.5–5.3)
RBC # BLD AUTO: 3.06 MILLION/UL (ref 3.88–5.62)
SODIUM SERPL-SCNC: 138 MMOL/L (ref 135–147)
WBC # BLD AUTO: 3.66 THOUSAND/UL (ref 4.31–10.16)

## 2022-12-31 RX ORDER — ATORVASTATIN CALCIUM 20 MG/1
20 TABLET, FILM COATED ORAL
Status: DISCONTINUED | OUTPATIENT
Start: 2022-12-31 | End: 2023-01-01 | Stop reason: HOSPADM

## 2022-12-31 RX ORDER — ALBUTEROL SULFATE 90 UG/1
2 AEROSOL, METERED RESPIRATORY (INHALATION) EVERY 4 HOURS PRN
Status: DISCONTINUED | OUTPATIENT
Start: 2022-12-31 | End: 2023-01-01 | Stop reason: HOSPADM

## 2022-12-31 RX ORDER — AMLODIPINE BESYLATE 5 MG/1
5 TABLET ORAL DAILY
Status: DISCONTINUED | OUTPATIENT
Start: 2022-12-31 | End: 2023-01-01 | Stop reason: HOSPADM

## 2022-12-31 RX ORDER — FLUTICASONE PROPIONATE 50 MCG
1 SPRAY, SUSPENSION (ML) NASAL DAILY
Status: DISCONTINUED | OUTPATIENT
Start: 2022-12-31 | End: 2023-01-01 | Stop reason: HOSPADM

## 2022-12-31 RX ORDER — LISINOPRIL 20 MG/1
20 TABLET ORAL DAILY
Status: DISCONTINUED | OUTPATIENT
Start: 2022-12-31 | End: 2023-01-01 | Stop reason: HOSPADM

## 2022-12-31 RX ORDER — FINASTERIDE 5 MG/1
5 TABLET, FILM COATED ORAL DAILY
Status: DISCONTINUED | OUTPATIENT
Start: 2022-12-31 | End: 2023-01-01 | Stop reason: HOSPADM

## 2022-12-31 RX ORDER — TAMSULOSIN HYDROCHLORIDE 0.4 MG/1
0.4 CAPSULE ORAL
Status: DISCONTINUED | OUTPATIENT
Start: 2022-12-31 | End: 2023-01-01 | Stop reason: HOSPADM

## 2022-12-31 RX ADMIN — FINASTERIDE 5 MG: 5 TABLET, FILM COATED ORAL at 08:20

## 2022-12-31 RX ADMIN — PIPERACILLIN SODIUM,TAZOBACTAM SODIUM 3.38 G: 3; .375 INJECTION, POWDER, FOR SOLUTION INTRAVENOUS at 04:57

## 2022-12-31 RX ADMIN — TAMSULOSIN HYDROCHLORIDE 0.4 MG: 0.4 CAPSULE ORAL at 15:45

## 2022-12-31 RX ADMIN — PIPERACILLIN SODIUM,TAZOBACTAM SODIUM 3.38 G: 3; .375 INJECTION, POWDER, FOR SOLUTION INTRAVENOUS at 23:00

## 2022-12-31 RX ADMIN — AMLODIPINE BESYLATE 5 MG: 5 TABLET ORAL at 08:21

## 2022-12-31 RX ADMIN — PIPERACILLIN SODIUM,TAZOBACTAM SODIUM 3.38 G: 3; .375 INJECTION, POWDER, FOR SOLUTION INTRAVENOUS at 11:23

## 2022-12-31 RX ADMIN — PIPERACILLIN SODIUM,TAZOBACTAM SODIUM 3.38 G: 3; .375 INJECTION, POWDER, FOR SOLUTION INTRAVENOUS at 16:03

## 2022-12-31 RX ADMIN — ATORVASTATIN CALCIUM 20 MG: 20 TABLET, FILM COATED ORAL at 17:01

## 2022-12-31 RX ADMIN — LISINOPRIL 20 MG: 20 TABLET ORAL at 08:20

## 2022-12-31 NOTE — ASSESSMENT & PLAN NOTE
· Admit to medicine  · CT imaging obtained by ER showed "Colonic diverticulosis with inflammatory changes around the sigmoid colon and phlegmonous changes with a questionable area of microperforation  Findings likely represent complicated acute diverticulitis  No drainable fluid collection  Recommend posttreatment colonoscopy to rule out underlying neoplasm    · Case was discussed by ER provider with general surgery on-call  · Placed on clear liquid diet  · Give Zosyn 3 375 g IV every 6 hours  · Blood cultures currently pending  · Will give pain control and antiemetics as needed  · Consult general surgery

## 2022-12-31 NOTE — ED NOTES
Patient OOB using a toothbrush and washing his face; ate a clear liquid breakfast and denies pain at this time     Richardson Osuna RN  12/31/22 5299

## 2022-12-31 NOTE — ASSESSMENT & PLAN NOTE
· Presented with bright red blood per rectum  · Hemoglobin 12 5 (11/14)   · Hemoglobin notably 10 9 on arrival which has down trended to 9 7 this morning  · The patient denies any further active bleeding  · Continue to trend hemoglobin and transfuse for hemoglobin less than 7  · Otherwise hemodynamically stable at this time

## 2022-12-31 NOTE — PLAN OF CARE
Problem: PAIN - ADULT  Goal: Verbalizes/displays adequate comfort level or baseline comfort level  Description: Interventions:  - Encourage patient to monitor pain and request assistance  - Assess pain using appropriate pain scale  - Administer analgesics based on type and severity of pain and evaluate response  - Implement non-pharmacological measures as appropriate and evaluate response  - Consider cultural and social influences on pain and pain management  - Notify physician/advanced practitioner if interventions unsuccessful or patient reports new pain  Outcome: Progressing     Problem: INFECTION - ADULT  Goal: Absence or prevention of progression during hospitalization  Description: INTERVENTIONS:  - Assess and monitor for signs and symptoms of infection  - Monitor lab/diagnostic results  - Monitor all insertion sites, i e  indwelling lines, tubes, and drains  - Monitor endotracheal if appropriate and nasal secretions for changes in amount and color  - [place] appropriate cooling/warming therapies per order  - Administer medications as ordered  - Instruct and encourage patient and family to use good hand hygiene technique  - Identify and instruct in appropriate isolation precautions for identified infection/condition  Outcome: Progressing  Goal: Absence of fever/infection during neutropenic period  Description: INTERVENTIONS:  - Monitor WBC    Outcome: Progressing     Problem: SAFETY ADULT  Goal: Patient will remain free of falls  Description: INTERVENTIONS:  - Educate patient/family on patient safety including physical limitations  - Instruct patient to call for assistance with activity   - Consult OT/PT to assist with strengthening/mobility   - Keep Call bell within reach  - Keep bed low and locked with side rails adjusted as appropriate  - Keep care items and personal belongings within reach  - Initiate and maintain comfort rounds  - Make Fall Risk Sign visible to staff  - Apply yellow socks and bracelet for high fall risk patients  - Consider moving patient to room near nurses station  Outcome: Progressing  Goal: Maintain or return to baseline ADL function  Description: INTERVENTIONS:  -  Assess patient's ability to carry out ADLs; assess patient's baseline for ADL function and identify physical deficits which impact ability to perform ADLs (bathing, care of mouth/teeth, toileting, grooming, dressing, etc )  - Assess/evaluate cause of self-care deficits   - Assess range of motion  - Assess patient's mobility; develop plan if impaired  - Assess patient's need for assistive devices and provide as appropriate  - Encourage maximum independence but intervene and supervise when necessary  - Involve family in performance of ADLs  - Assess for home care needs following discharge   - Consider OT consult to assist with ADL evaluation and planning for discharge  - Provide patient education as appropriate  Outcome: Progressing  Goal: Maintains/Returns to pre admission functional level  Description: INTERVENTIONS:  - Perform BMAT or MOVE assessment daily    - Set and communicate daily mobility goal to care team and patient/family/caregiver     - Collaborate with rehabilitation services on mobility goals if consulted    - Out of bed for toileting  - Record patient progress and toleration of activity level   Outcome: Progressing     Problem: DISCHARGE PLANNING  Goal: Discharge to home or other facility with appropriate resources  Description: INTERVENTIONS:  - Identify barriers to discharge w/patient and caregiver  - Arrange for needed discharge resources and transportation as appropriate  - Identify discharge learning needs (meds, wound care, etc )  - Arrange for interpretive services to assist at discharge as needed  - Refer to Case Management Department for coordinating discharge planning if the patient needs post-hospital services based on physician/advanced practitioner order or complex needs related to functional status, cognitive ability, or social support system  Outcome: Progressing     Problem: Knowledge Deficit  Goal: Patient/family/caregiver demonstrates understanding of disease process, treatment plan, medications, and discharge instructions  Description: Complete learning assessment and assess knowledge base    Interventions:  - Provide teaching at level of understanding  - Provide teaching via preferred learning methods  Outcome: Progressing

## 2022-12-31 NOTE — ASSESSMENT & PLAN NOTE
· CT abd/pelvis (12/30): Colonic diverticulosis with inflammatory changes around the sigmoid colon and phlegmonous changes with a questionable area of microperforation   Findings likely represent complicated acute diverticulitis   No drainable fluid collection     · Continue clear liquid diet  · Continue Zosyn  · Recommend posttreatment colonoscopy to rule out underlying neoplasm  · General surgery consultation requested

## 2022-12-31 NOTE — ASSESSMENT & PLAN NOTE
· Continue prehospital lisinopril 20 mg p o  daily and Norvasc 5 mg p o  daily  · Monitor blood pressure trends while admitted

## 2022-12-31 NOTE — ASSESSMENT & PLAN NOTE
Lab Results   Component Value Date    HGBA1C 5 3 11/14/2022       Recent Labs     12/30/22 2151   POCGLU 96       Blood Sugar Average: Last 72 hrs:  (P) 96     · Placed on [facility] clear liquid diet  · Patient is not on any home medications for diabetes  · Obtain Accu-Cheks before meals and at bedtime with Humalog correction dose before meals and at bedtime

## 2022-12-31 NOTE — H&P
[facility]  H&P- [place] 1950, 81 y o  male MRN: [id number]  Unit/Bed#: 7T [facility] 709-01 Encounter: [id number]  Primary Care Provider: Karly Hope MD   Date and time admitted to hospital: 12/30/2022  4:27 PM    * Gastrointestinal hemorrhage associated with intestinal diverticulosis  Assessment & Plan  · Presented with bright red blood per rectum  · Hemoglobin 12 5 (11/14)   · Hemoglobin notably 10 9 on arrival which has down trended to 9 7 this morning  · The patient denies any further active bleeding  · Continue to trend hemoglobin and transfuse for hemoglobin less than 7  · Otherwise hemodynamically stable at this time    Diverticulitis of colon with perforation  Assessment & Plan  · CT abd/pelvis (12/30): Colonic diverticulosis with inflammatory changes around the sigmoid colon and phlegmonous changes with a questionable area of microperforation   Findings likely represent complicated acute diverticulitis   No drainable fluid collection     · Continue clear liquid diet  · Continue Zosyn  · Recommend posttreatment colonoscopy to rule out underlying neoplasm  · General surgery consultation requested    Essential hypertension  Assessment & Plan  · Continue prehospital lisinopril 20 mg p o  daily and Norvasc 5 mg p o  daily  · Monitor blood pressure trends while admitted    Hyperlipidemia  Assessment & Plan  · Continue prehospital Lipitor 20 mg p o  daily    Type 2 diabetes mellitus ([facility] [address] )  Assessment & Plan  Lab Results   Component Value Date    HGBA1C 5 3 11/14/2022       Recent Labs     12/30/22  2151   POCGLU 96       Blood Sugar Average: Last 72 hrs:  (P) 96     · Placed on [facility] clear liquid diet  · Patient is not on any home medications for diabetes  · Obtain Accu-Cheks before meals and at bedtime with Humalog correction dose before meals and at bedtime    HUMBERTO treated with BiPAP  Assessment & Plan  · Continue prehospital BiPAP nightly    BPH with urinary obstruction  Assessment & Plan  · Continue prehospital Flomax 0 4 mg p o  nightly and Proscar 5 mg p o  daily    VTE Prophylaxis: SCDs only secondary to GI bleed  Code Status: Level 1 until more information is available  Discussion with family: None present at bedside at time of exam    Anticipated Length of Stay:  Patient will be admitted on an Inpatient basis with an anticipated length of stay of  > 2 midnights  Justification for Hospital Stay: Lower GI bleed secondary to diverticulitis with discern for possible microperforate requiring IV antibiotics and further surgical evaluation    Total Time for Visit, including Counseling / Coordination of Care: 1 hour  Greater than 50% of this total time spent on direct patient counseling and coordination of care  Chief Complaint:   Bright red blood per rectum x2 days    History of Present Illness:    Philip Rodriguez is a 81 y o  male who presents with bright red blood per rectum x2 days  Patient with a known history of diverticulosis presents to the ER for evaluation of bright red blood per rectum and diarrhea  The patient notes symptoms began approximately 2 days ago and are as associated with some cramping and bloating though he denies any abdominal pain  Patient denies any fever or chills  He did initially reach out to his gastroenterologist and was advised to present to the emergency department  Upon arrival he was noted to have a drop in his hemoglobin  Previously documented hemoglobin from 11/14 was 12 5 and notably 10 6 on arrival   CT abdomen and pelvis was obtained and demonstrated diverticulosis with likely complicated diverticulitis given possible microperforation  He was ultimately started on Zosyn and admitted to the medical floor for further observation and management  Review of Systems:    Review of Systems   Constitutional: Negative for chills and fever  HENT: Negative for ear pain, sinus pressure and sore throat      Eyes: Negative for pain and visual disturbance  Respiratory: Negative for cough, shortness of breath and wheezing  Cardiovascular: Negative for chest pain and palpitations  Gastrointestinal: Positive for abdominal distention, blood in stool and diarrhea  Negative for abdominal pain, nausea and vomiting  Genitourinary: Negative for dysuria, frequency, hematuria and urgency  Musculoskeletal: Negative for arthralgias and back pain  Skin: Negative for color change and rash  Neurological: Negative for seizures, syncope, weakness, light-headedness and headaches  Psychiatric/Behavioral: Negative for agitation, confusion and hallucinations  All other systems reviewed and are negative  Past Medical and Surgical History:     Past Medical History:   Diagnosis Date   • Acute bilateral low back pain without sciatica 3/9/2021   • BMI 33 0-33 9,adult 2/4/2020   • BPH (benign prostatic hyperplasia)    • Diabetes mellitus associated with hormonal etiology ([facility] ) 3/5/2019   • ED (erectile dysfunction)    • Elevated PSA 1/15/2013   • Encounter for well adult exam with abnormal findings 2/4/2019   • Herpes zoster 12/18/2014   • Hyperlipidemia    • Hypertension    • IFG (impaired fasting glucose)    • Metabolic syndrome    • Obesity (BMI 30 0-34 9) 2/4/2019   • Psoriasis    • Seasonal allergic rhinitis    • Type 2 diabetes mellitus ([facility] ) 11/17/2014       Past Surgical History:   Procedure Laterality Date   • CHOLECYSTECTOMY  05/14/2015   • COLONOSCOPY  2007   • HERNIA REPAIR  05/14/2015       Meds/Allergies:    Prior to Admission medications    Medication Sig Start Date End Date Taking?  Authorizing Provider   albuterol (PROVENTIL HFA,VENTOLIN HFA) 90 mcg/act inhaler INHALE 2 PUFFS EVERY 6 HOURS AS NEEDED FOR WHEEZING 10/20/22   Bonnie Schuster MD   amLODIPine (NORVASC) 5 mg tablet TAKE 1 TABLET DAILY 11/18/22   Bonnie Schuster MD   aspirin (ECOTRIN LOW STRENGTH) 81 mg EC tablet take 1 tablet (81MG) 1/22/18   Historical Provider, MD atorvastatin (LIPITOR) 20 mg tablet TAKE 1 TABLET DAILY 22   Lucita Jade MD   finasteride (PROSCAR) 5 mg tablet TAKE 1 TABLET DAILY 22   Lucita Jade MD   fluticasone [facility]) 50 mcg/act nasal spray 1 spray into each nostril daily 19   Bhupendra Shaw PA-C   hydrocortisone 2 5 % cream Apply topically every 24 hours 18   Lucita Jade MD   ketoconazole (NIZORAL) 2 % shampoo WASH SCALP AND FACE 2-3 TIMES A WEEK AS NEEDED FOR FLARES 10/23/19   Historical Provider, MD   lisinopril (ZESTRIL) 20 mg tablet TAKE 1 TABLET DAILY 22   Lucita Jade MD   sildenafil (VIAGRA) 100 mg tablet Take 1 tablet (100 mg total) by mouth as needed for erectile dysfunction 22   Lucita Jade MD   tamsulosin (FLOMAX) 0 4 mg TAKE 1 CAPSULE DAILY WITH  DINNER 10/3/22   Lucita Jade MD     all medications and allergies reviewed    Allergies:    Allergies   Allergen Reactions   • No Known Allergies        Social History:     Marital Status: /Civil Union   Occupation: Retired  Patient Pre-hospital Living Situation: Home  Patient Pre-hospital Level of Mobility: Ambulates without difficulty  Patient Pre-hospital Diet Restrictions: None    Substance Use History:   Social History     Substance and Sexual Activity   Alcohol Use Yes    Comment: social     Social History     Tobacco Use   Smoking Status Former   • Packs/day: 0 50   • Years: 12 00   • Pack years: 6 00   • Types: Pipe, Cigarettes   • Start date: 1968   • Quit date: 6/10/1980   • Years since quittin 5   Smokeless Tobacco Never     Social History     Substance and Sexual Activity   Drug Use No       Family History:  I have reviewed the patient's family history    Physical Exam:     Vitals:   Blood Pressure: 136/88 (22 0900)  Pulse: 60 (22)  Temperature: (!) 96 4 °F (35 8 °C) (22)  Temp Source: Temporal (22)  Respirations: 18 (22)  Height: 6' 1" (185 4 cm) (22)  Weight - Scale: 89 8 kg (197 lb 15 6 oz) (12/31/22 0900)  SpO2: 97 % (12/31/22 0900)    Physical Exam  Vitals and nursing note reviewed  Constitutional:       General: He is not in acute distress  Appearance: Normal appearance  HENT:      Head: Normocephalic and atraumatic  Nose: Nose normal       Mouth/Throat:      Mouth: Mucous membranes are moist       Pharynx: Oropharynx is clear  No oropharyngeal exudate or posterior oropharyngeal erythema  Eyes:      Extraocular Movements: Extraocular movements intact  Conjunctiva/sclera: Conjunctivae normal       Pupils: Pupils are equal, round, and reactive to light  Cardiovascular:      Rate and Rhythm: Normal rate and regular rhythm  Pulses: Normal pulses  Heart sounds: Normal heart sounds  Pulmonary:      Effort: Pulmonary effort is normal  No respiratory distress  Breath sounds: Normal breath sounds  Abdominal:      General: Abdomen is flat  Bowel sounds are normal       Palpations: Abdomen is soft  Tenderness: There is no abdominal tenderness  Genitourinary:     Rectum: Guaiac result positive  Musculoskeletal:         General: Normal range of motion  Cervical back: Normal range of motion and neck supple  Right lower leg: No edema  Left lower leg: No edema  Skin:     General: Skin is warm and dry  Capillary Refill: Capillary refill takes less than 2 seconds  Neurological:      General: No focal deficit present  Mental Status: He is alert and oriented to person, place, and time  Psychiatric:         Mood and Affect: Mood normal          Behavior: Behavior normal          Judgment: Judgment normal          Additional Data:     Lab Results: I have personally reviewed pertinent reports        Results from last 7 days   Lab Units 12/31/22  0503 12/30/22  1702   WBC Thousand/uL 3 66* 5 35   HEMOGLOBIN g/dL 9 7* 10 9*   HEMATOCRIT % 29 6* 34 4*   PLATELETS Thousands/uL 115* 136*   NEUTROS PCT %  --  64 LYMPHS PCT %  --  20   MONOS PCT %  --  9   EOS PCT %  --  6     Results from last 7 days   Lab Units 12/31/22  0503 12/30/22  1702   SODIUM mmol/L 138 138   POTASSIUM mmol/L 3 6 3 6   CHLORIDE mmol/L 105 100   CO2 mmol/L 29 32   BUN mg/dL 12 15   CREATININE mg/dL 0 76 0 80   ANION GAP mmol/L 4* 6   CALCIUM mg/dL 8 5 9 3   ALBUMIN g/dL  --  3 8   TOTAL BILIRUBIN mg/dL  --  0 81   ALK PHOS U/L  --  110   ALT U/L  --  42   AST U/L  --  33   GLUCOSE RANDOM mg/dL 92 109*     Results from last 7 days   Lab Units 12/30/22  1702   INR  1 04     Results from last 7 days   Lab Units 12/31/22  0745 12/30/22  2151   POC GLUCOSE mg/dl 94 96         Results from last 7 days   Lab Units 12/30/22  1702   LACTIC ACID mmol/L 0 6       Imaging: I have personally reviewed pertinent reports  CT abdomen pelvis with contrast   Final Result by Emiliano Perea DO ([id number])      Colonic diverticulosis with inflammatory changes around the sigmoid colon and phlegmonous changes with a questionable area of microperforation  Findings likely represent complicated acute diverticulitis  No drainable fluid collection  Recommend    posttreatment colonoscopy to rule out underlying neoplasm  The study was marked in [facility] for immediate notification  Workstation performed: [id number]               EKG, Pathology, and Other Studies Reviewed on Admission:   · EKG: Normal sinus rhythm at a rate of 63 without ischemia    Epic / Care Everywhere Records Reviewed: Yes    ** Please Note: This note has been constructed using a voice recognition system   **

## 2022-12-31 NOTE — CONSULTS
Consultation - General Surgery   Afshan Sofia 81 y o  male MRN: [id number]  Unit/Bed#: 7T [facility] 709-01 Encounter: [id number]    Assessment/Plan     Assessment:  Diverticulosis, diverticulitis with likely diverticular bleed  Plan:  Diverticular bleed seems to have resolved, will check another hemoglobin tomorrow morning  Can likely advance to a low fiber diet, discussed examples of low fiber diet with patient and his wife and explained that he will likely need to be on this for about 6 weeks that we does typically stick to a low fiber diet at baseline  He already has an appointment with GI this coming week, discussed potential need for repeat colonoscopy given that the last colonoscopy listed poor prep though endoscopist was able to resect 2 small polyps so if there was some type of underlying tumor this likely would have been identified on the colonoscopy  Discussed repeating a CT scan in about 4 weeks to follow inflammatory changes, phlegmon to ensure resolution, this can be discussed with GI  Continue IV antibiotics while inpatient, transition to oral on discharge  History of Present Illness     HPI:  Afshan Sofia is a 81 y o  male who presents with bright red blood per rectum  He had 2 days of diarrhea with lower abdominal cramping and urgency which has since improved  He denies any abdominal pain  Loose stools improved and had a normal bowel movement in the hospital   He has a known history of diverticulosis as seen on colonoscopy in 2021  He says that he recently had peanuts on [place] mar[place]us and thinks that this could have brought on this episode, denies ever having an episode of diverticulitis in the past   He normally follows a low fiber diet  He did CT scan in the emergency room which shows inflammatory changes around the sigmoid colon without drainable collection  Hemoglobin was 10 6 in the emergency room, 9 7 this morning  He denies any further blood per rectum    He is currently tolerating a clear liquid diet with no nausea vomiting or abdominal pain  Consults    Review of Systems   Gastrointestinal: Positive for anal bleeding and diarrhea  Negative for abdominal pain, constipation, nausea and rectal pain  All other systems reviewed and are negative        Historical Information   Past Medical History:   Diagnosis Date   • Acute bilateral low back pain without sciatica 3/9/2021   • BMI 33 0-33 9,adult 2020   • BPH (benign prostatic hyperplasia)    • Diabetes mellitus associated with hormonal etiology ([facility] ) 3/5/2019   • ED (erectile dysfunction)    • Elevated PSA 1/15/2013   • Encounter for well adult exam with abnormal findings 2019   • Herpes zoster 2014   • Hyperlipidemia    • Hypertension    • IFG (impaired fasting glucose)    • Metabolic syndrome    • Obesity (BMI 30 0-34 9) 2019   • Psoriasis    • Seasonal allergic rhinitis    • Type 2 diabetes mellitus ([facility] ) 2014     Past Surgical History:   Procedure Laterality Date   • CHOLECYSTECTOMY  2015   • COLONOSCOPY     • HERNIA REPAIR  2015     Social History   Social History     Substance and Sexual Activity   Alcohol Use Yes    Comment: social     Social History     Substance and Sexual Activity   Drug Use No     E-Cigarette/Vaping   • E-Cigarette Use Never User      E-Cigarette/Vaping Substances   • Nicotine No    • THC No    • CBD No    • Flavoring No    • Other No    • Unknown No      Social History     Tobacco Use   Smoking Status Former   • Packs/day: 0 50   • Years: 12 00   • Pack years: 6 00   • Types: Pipe, Cigarettes   • Start date: 1968   • Quit date: 6/10/1980   • Years since quittin 5   Smokeless Tobacco Never     Family History: non-contributory    Meds/Allergies   all current active meds have been reviewed  Allergies   Allergen Reactions   • No Known Allergies        Objective   First Vitals:   Blood Pressure: 124/67 (22 1631)  Pulse: 60 (22 1631)  Temperature: 98 2 °F (36 8 °C) (12/30/22 1631)  Temp Source: Tympanic (12/30/22 1631)  Respirations: 16 (12/30/22 1631)  Height: 6' 1" (185 4 cm) (12/31/22 0900)  Weight - Scale: 93 1 kg (205 lb 3 2 oz) (12/30/22 1631)  SpO2: 100 % (12/30/22 1631)    Current Vitals:   Blood Pressure: 136/88 (12/31/22 0900)  Pulse: 60 (12/31/22 0900)  Temperature: (!) 96 4 °F (35 8 °C) (12/31/22 0900)  Temp Source: Temporal (12/31/22 0900)  Respirations: 18 (12/31/22 0900)  Height: 6' 1" (185 4 cm) (12/31/22 0900)  Weight - Scale: 89 8 kg (197 lb 15 6 oz) (12/31/22 0900)  SpO2: 97 % (12/31/22 0900)      Intake/Output Summary (Last 24 hours) at 12/31/2022 1251  Last data filed at 12/31/2022 5588  Gross per 24 hour   Intake 1630 ml   Output --   Net 1630 ml       Invasive Devices     Peripheral Intravenous Line  Duration           Peripheral IV 12/31/22 Left;Ventral (anterior) Forearm <1 day                Physical Exam  Vitals reviewed  Constitutional:       General: He is not in acute distress  Appearance: Normal appearance  He is normal weight  He is not toxic-appearing  HENT:      Head: Normocephalic and atraumatic  Nose: Nose normal       Mouth/Throat:      Mouth: Mucous membranes are moist       Pharynx: Oropharynx is clear  Eyes:      Extraocular Movements: Extraocular movements intact  Conjunctiva/sclera: Conjunctivae normal       Pupils: Pupils are equal, round, and reactive to light  Cardiovascular:      Rate and Rhythm: Normal rate and regular rhythm  Heart sounds: Normal heart sounds  Pulmonary:      Effort: Pulmonary effort is normal  No respiratory distress  Breath sounds: Normal breath sounds  Abdominal:      General: Abdomen is flat  There is no distension  Palpations: Abdomen is soft  Tenderness: There is no abdominal tenderness  There is no guarding or rebound  Musculoskeletal:         General: No swelling or tenderness  Normal range of motion        Cervical back: Normal range of motion and neck supple  Skin:     General: Skin is warm and dry  Neurological:      General: No focal deficit present  Mental Status: He is alert and oriented to person, place, and time  Lab Results:   I have personally reviewed pertinent lab results  , CBC:   Lab Results   Component Value Date    WBC 3 66 (L) 12/31/2022    HGB 9 7 (L) 12/31/2022    HCT 29 6 (L) 12/31/2022    MCV 97 12/31/2022     (L) 12/31/2022    MCH 31 7 12/31/2022    MCHC 32 8 12/31/2022    RDW 13 2 12/31/2022    MPV 10 5 12/31/2022    NRBC 0 12/30/2022   , CMP:   Lab Results   Component Value Date    SODIUM 138 12/31/2022    K 3 6 12/31/2022     12/31/2022    CO2 29 12/31/2022    BUN 12 12/31/2022    CREATININE 0 76 12/31/2022    CALCIUM 8 5 12/31/2022    AST 33 12/30/2022    ALT 42 12/30/2022    ALKPHOS 110 12/30/2022    EGFR 92 12/31/2022     Imaging: I have personally reviewed pertinent reports  and I have personally reviewed pertinent films in PACS  EKG, Pathology, and Other Studies: I have personally reviewed pertinent reports  Counseling / Coordination of Care  Total floor / unit time spent today 20 minutes  Greater than 50% of total time was spent with the patient and / or family counseling and / or coordination of care    A description of the counseling / coordination of care: Review of records, discussion of outpatient follow-up and plan of care

## 2023-01-01 VITALS
DIASTOLIC BLOOD PRESSURE: 71 MMHG | OXYGEN SATURATION: 97 % | HEIGHT: 73 IN | BODY MASS INDEX: 26.24 KG/M2 | RESPIRATION RATE: 18 BRPM | HEART RATE: 66 BPM | WEIGHT: 197.97 LBS | SYSTOLIC BLOOD PRESSURE: 116 MMHG | TEMPERATURE: 97.7 F

## 2023-01-01 LAB
ANION GAP SERPL CALCULATED.3IONS-SCNC: 4 MMOL/L (ref 5–14)
BUN SERPL-MCNC: 9 MG/DL (ref 5–25)
CALCIUM SERPL-MCNC: 8.8 MG/DL (ref 8.4–10.2)
CHLORIDE SERPL-SCNC: 104 MMOL/L (ref 96–108)
CO2 SERPL-SCNC: 32 MMOL/L (ref 21–32)
CREAT SERPL-MCNC: 0.91 MG/DL (ref 0.7–1.5)
ERYTHROCYTE [DISTWIDTH] IN BLOOD BY AUTOMATED COUNT: 13.1 % (ref 11.6–15.1)
GFR SERPL CREATININE-BSD FRML MDRD: 85 ML/MIN/1.73SQ M
GLUCOSE SERPL-MCNC: 106 MG/DL (ref 70–99)
GLUCOSE SERPL-MCNC: 116 MG/DL (ref 65–140)
HCT VFR BLD AUTO: 31.6 % (ref 36.5–49.3)
HGB BLD-MCNC: 10.5 G/DL (ref 12–17)
MCH RBC QN AUTO: 32.6 PG (ref 26.8–34.3)
MCHC RBC AUTO-ENTMCNC: 33.2 G/DL (ref 31.4–37.4)
MCV RBC AUTO: 98 FL (ref 82–98)
PLATELET # BLD AUTO: 149 THOUSANDS/UL (ref 149–390)
PMV BLD AUTO: 9.8 FL (ref 8.9–12.7)
POTASSIUM SERPL-SCNC: 3.8 MMOL/L (ref 3.5–5.3)
RBC # BLD AUTO: 3.22 MILLION/UL (ref 3.88–5.62)
SODIUM SERPL-SCNC: 140 MMOL/L (ref 135–147)
WBC # BLD AUTO: 5.46 THOUSAND/UL (ref 4.31–10.16)

## 2023-01-01 RX ORDER — AMOXICILLIN AND CLAVULANATE POTASSIUM 875; 125 MG/1; MG/1
1 TABLET, FILM COATED ORAL EVERY 12 HOURS SCHEDULED
Qty: 14 TABLET | Refills: 0 | Status: SHIPPED | OUTPATIENT
Start: 2023-01-01 | End: 2023-01-01 | Stop reason: SDUPTHER

## 2023-01-01 RX ORDER — AMOXICILLIN AND CLAVULANATE POTASSIUM 875; 125 MG/1; MG/1
1 TABLET, FILM COATED ORAL EVERY 12 HOURS SCHEDULED
Qty: 14 TABLET | Refills: 0 | Status: SHIPPED | OUTPATIENT
Start: 2023-01-01 | End: 2023-01-08

## 2023-01-01 RX ADMIN — FINASTERIDE 5 MG: 5 TABLET, FILM COATED ORAL at 08:48

## 2023-01-01 RX ADMIN — LISINOPRIL 20 MG: 20 TABLET ORAL at 08:48

## 2023-01-01 RX ADMIN — AMLODIPINE BESYLATE 5 MG: 5 TABLET ORAL at 08:48

## 2023-01-01 RX ADMIN — PIPERACILLIN SODIUM,TAZOBACTAM SODIUM 3.38 G: 3; .375 INJECTION, POWDER, FOR SOLUTION INTRAVENOUS at 05:01

## 2023-01-01 NOTE — ASSESSMENT & PLAN NOTE
· Presented with bright red blood per rectum  · Hemoglobin 12 5 (11/14)   · Able globin stable at 10 5  · The patient denies any further active bleeding

## 2023-01-01 NOTE — ASSESSMENT & PLAN NOTE
· CT abd/pelvis (12/30): Colonic diverticulosis with inflammatory changes around the sigmoid colon and phlegmonous changes with a questionable area of microperforation   Findings likely represent complicated acute diverticulitis   No drainable fluid collection     · Tolerating low fiber low residue diet without difficulty or pain  · Scheduled with his outpatient GI doctor this coming Thursday  · Plan to discharge home with an additional 7 days of Augmentin

## 2023-01-01 NOTE — DISCHARGE SUMMARY
[facility]  [facility] 1950[facility] 81 [facility]  [facility] MRN: [id number]  Unit/Bed#: 7T [facility] 709-01 Encounter: [id number]  Primary Care Provider: Karuna Chan MD   Date and time admitted to hospital: 12/30/2022  4:27 PM    * Gastrointestinal hemorrhage associated with intestinal diverticulosis  Assessment & Plan  · Presented with bright red blood per rectum  · Hemoglobin 12 5 (11/14)   · Able globin stable at 10 5  · The patient denies any further active bleeding    Diverticulitis of colon with perforation  Assessment & Plan  · CT abd/pelvis (12/30): Colonic diverticulosis with inflammatory changes around the sigmoid colon and phlegmonous changes with a questionable area of microperforation   Findings likely represent complicated acute diverticulitis   No drainable fluid collection     · Tolerating low fiber low residue diet without difficulty or pain  · Scheduled with his outpatient GI doctor this coming Thursday  · Plan to discharge home with an additional 7 days of Augmentin    Essential hypertension  Assessment & Plan  · Continue prehospital lisinopril 20 mg p o  daily and Norvasc 5 mg p o  daily    Hyperlipidemia  Assessment & Plan  · Continue prehospital Lipitor 20 mg p o  daily    Type 2 diabetes mellitus [facility])  Assessment & Plan  Lab Results   Component Value Date    HGBA1C 5 3 11/14/2022       Recent Labs     12/31/22  1119 12/31/22  1532 12/31/22 2000 01/01/23  0547   POCGLU 144* 94 117 116       Blood Sugar Average: Last 72 hrs:  (P) 368 [id number]     · Patient is not on any home medications for diabetes  · Carb controlled diet recommended    HUMBERTO treated with BiPAP  Assessment & Plan  · Continue prehospital BiPAP nightly    BPH with urinary obstruction  Assessment & Plan  · Continue prehospital Flomax 0 4 mg p o  nightly and Proscar 5 mg p o  daily    Medical Problems     Resolved Problems  Date Reviewed: 12/30/2022   None       Discharging Physician / Practitioner: Vesta Amezcua DO  PCP: Maurisio Wetzel MD  Admission Date:   Admission Orders (From admission, onward)     Ordered        12/30/22 2047  INPATIENT ADMISSION  Once                      Discharge Date: 01/01/23    Consultations During Hospital Stay:  · General surgery    Procedures Performed:   · None    Significant Findings / Test Results:   · CT abd/pelvis (12/30): Colonic diverticulosis with inflammatory changes around the sigmoid colon and phlegmonous changes with a questionable area of microperforation   Findings likely represent complicated acute diverticulitis   No drainable fluid collection       Incidental Findings:   · None     Test Results Pending at Discharge (will require follow up): · None     Outpatient Tests Requested:  · To be determined upon outpatient follow-up with PCP and GI    Complications:  None    Reason for Admission: Rectal bleeding and complicated diverticulitis    Hospital Course:   Philip Rodriguez is a 81 y o  male patient who originally presented to the hospital on 12/30/2022 due to rectal bleeding  Please see H&P as documented by myself for complete details regarding history of presenting illness  In brief, the patient has a known history of diverticulosis who noted 2 days of bright red blood per rectum  Bleeding was associated with some diarrhea, cramping, and bloating  He reached out to his outpatient GI physician who recommended he present to the ED  Upon arrival he did note to have a two-point drop in his hemoglobin from previously documented laboratory values and imaging demonstrated complicated diverticulitis with possible microperforation  He was ultimately admitted to the medical floor for for further observation and general surgery evaluation  During his admission he remained hemodynamically stable with no further bleeding or drop in hemoglobin    As his pain and bleeding resolved he was initiated on a low fiber low residue diet which he tolerated without difficulty  He was ultimately discharged home with an additional 7 days of oral Augmentin and advised to follow-up outpatient with his previously established gastroenterologist   He was discharged in stable condition and all of his questions were answered prior to departure  This is a brief discharge summary please see full medical record for more details  Please see above list of diagnoses and related plan for additional information  Condition at Discharge: good    Discharge Day Visit / Exam:   Subjective: Patient sitting up in bedside chair without any acute complaints  He was eating breakfast without difficulty or pain  No significant overnight events reported by nursing  Vitals: Blood Pressure: 116/71 (01/01/23 0719)  Pulse: 66 (01/01/23 0719)  Temperature: 97 7 °F (36 5 °C) (01/01/23 0719)  Temp Source: Temporal (01/01/23 0719)  Respirations: 18 (01/01/23 0719)  Height: 6' 1" (185 4 cm) (12/31/22 0900)  Weight - Scale: 89 8 kg (197 lb 15 6 oz) (12/31/22 0900)  SpO2: 97 % (01/01/23 0719)     Exam:   Physical Exam  Vitals and nursing note reviewed  Constitutional:       General: He is not in acute distress  Appearance: Normal appearance  HENT:      Head: Normocephalic and atraumatic  Mouth/Throat:      Mouth: Mucous membranes are moist       Pharynx: Oropharynx is clear  Eyes:      Extraocular Movements: Extraocular movements intact  Conjunctiva/sclera: Conjunctivae normal    Cardiovascular:      Rate and Rhythm: Normal rate and regular rhythm  Pulses: Normal pulses  Heart sounds: Normal heart sounds  Pulmonary:      Effort: Pulmonary effort is normal  No respiratory distress  Breath sounds: Normal breath sounds  No wheezing  Abdominal:      General: Bowel sounds are normal  There is no distension  Palpations: Abdomen is soft  Tenderness: There is no abdominal tenderness  Musculoskeletal:         General: Normal range of motion        Cervical back: Normal range of motion and neck supple  Skin:     General: Skin is warm and dry  Neurological:      General: No focal deficit present  Mental Status: He is alert and oriented to person, place, and time  Mental status is at baseline  Psychiatric:         Mood and Affect: Mood normal          Behavior: Behavior normal          Judgment: Judgment normal        Discussion with Family: Patient updated on plan of care  Ismael Caldera Discharge instructions/Information to patient and family:   See after visit summary for information provided to patient and family  Provisions for Follow-Up Care:  See after visit summary for information related to follow-up care and any pertinent home health orders  Disposition:   Home    Planned Readmission: None     Discharge Statement:  I spent > 35 minutes discharging the patient  This time was spent on the day of discharge  I had direct contact with the patient on the day of discharge  Greater than 50% of the total time was spent examining patient, answering all patient questions, arranging and discussing plan of care with patient as well as directly providing post-discharge instructions  Additional time then spent on discharge activities  Discharge Medications:  See after visit summary for reconciled discharge medications provided to patient and/or family        **Please Note: This note may have been constructed using a voice recognition system**

## 2023-01-01 NOTE — ASSESSMENT & PLAN NOTE
Lab Results   Component Value Date    HGBA1C 5 3 11/14/2022       Recent Labs     12/31/22  1119 12/31/22  1532 12/31/22 2000 01/01/23  0547   POCGLU 144* 94 117 116       Blood Sugar Average: Last 72 hrs:  (P) 003 [id number]     · Patient is not on any home medications for diabetes  · Carb controlled diet recommended

## 2023-01-01 NOTE — PLAN OF CARE
Problem: INFECTION - ADULT  Goal: Absence or prevention of progression during hospitalization  Description: INTERVENTIONS:  - Assess and monitor for signs and symptoms of infection  - Monitor lab/diagnostic results  - Monitor all insertion sites, i e  indwelling lines, tubes, and drains  - Monitor endotracheal if appropriate and nasal secretions for changes in amount and color  - [place] appropriate cooling/warming therapies per order  - Administer medications as ordered  - Instruct and encourage patient and family to use good hand hygiene technique  - Identify and instruct in appropriate isolation precautions for identified infection/condition  Outcome: Progressing     Problem: INFECTION - ADULT  Goal: Absence of fever/infection during neutropenic period  Description: INTERVENTIONS:  - Monitor WBC    Outcome: Progressing     Problem: DISCHARGE PLANNING  Goal: Discharge to home or other facility with appropriate resources  Description: INTERVENTIONS:  - Identify barriers to discharge w/patient and caregiver  - Arrange for needed discharge resources and transportation as appropriate  - Identify discharge learning needs (meds, wound care, etc )  - Arrange for interpretive services to assist at discharge as needed  - Refer to Case Management Department for coordinating discharge planning if the patient needs post-hospital services based on physician/advanced practitioner order or complex needs related to functional status, cognitive ability, or social support system  Outcome: Progressing     Problem: Knowledge Deficit  Goal: Patient/family/caregiver demonstrates understanding of disease process, treatment plan, medications, and discharge instructions  Description: Complete learning assessment and assess knowledge base    Interventions:  - Provide teaching at level of understanding  - Provide teaching via preferred learning methods  Outcome: Progressing

## 2023-01-03 ENCOUNTER — TRANSITIONAL CARE MANAGEMENT (OUTPATIENT)
Dept: FAMILY MEDICINE CLINIC | Facility: CLINIC | Age: 73
End: 2023-01-03

## 2023-01-03 RX ORDER — TAMSULOSIN HYDROCHLORIDE 0.4 MG/1
CAPSULE ORAL
Qty: 90 CAPSULE | Refills: 0 | Status: SHIPPED | OUTPATIENT
Start: 2023-01-03

## 2023-01-04 ENCOUNTER — OFFICE VISIT (OUTPATIENT)
Dept: FAMILY MEDICINE CLINIC | Facility: CLINIC | Age: 73
End: 2023-01-04

## 2023-01-04 VITALS
SYSTOLIC BLOOD PRESSURE: 120 MMHG | DIASTOLIC BLOOD PRESSURE: 70 MMHG | WEIGHT: 201 LBS | OXYGEN SATURATION: 99 % | HEART RATE: 67 BPM | HEIGHT: 72 IN | TEMPERATURE: 98.5 F | BODY MASS INDEX: 27.22 KG/M2

## 2023-01-04 DIAGNOSIS — K57.20 DIVERTICULITIS OF COLON WITH PERFORATION: Primary | ICD-10-CM

## 2023-01-04 DIAGNOSIS — R71.0 DROP IN HEMOGLOBIN: ICD-10-CM

## 2023-01-04 DIAGNOSIS — E66.3 OVERWEIGHT WITH BODY MASS INDEX (BMI) OF 27 TO 27.9 IN ADULT: ICD-10-CM

## 2023-01-04 DIAGNOSIS — E11.9 TYPE 2 DIABETES MELLITUS WITHOUT COMPLICATION, WITHOUT LONG-TERM CURRENT USE OF INSULIN (HCC): ICD-10-CM

## 2023-01-04 DIAGNOSIS — K57.91 GASTROINTESTINAL HEMORRHAGE ASSOCIATED WITH INTESTINAL DIVERTICULOSIS: ICD-10-CM

## 2023-01-04 DIAGNOSIS — D69.6 PLATELETS DECREASED (HCC): ICD-10-CM

## 2023-01-04 LAB
CREAT UR-MCNC: 17.9 MG/DL
MICROALBUMIN UR-MCNC: <5 MG/L (ref 0–20)
MICROALBUMIN/CREAT 24H UR: <28 MG/G CREATININE (ref 0–30)

## 2023-01-04 NOTE — PROGRESS NOTES
Assessment & Plan     1  Diverticulitis of colon with perforation  Assessment & Plan:  Status post recent hospitalization from December atorvastatin UA first patient went on to receive diverticulosis developed blood in the stool abdomen cramps and diarrhea patient discharged on Augmentin for 7 days recommendation to follow-up with a GI reevaluation to have a colonoscopy in 6 to 8-week      2  Drop in hemoglobin  Assessment & Plan:  Drop in hemoglobin secondary to GI bleed during hospitalization patient did not have any blood transfusion at the time of discharge January 1 hemoglobin was 10 5 recommend to repeat the CBC in 1 week    Orders:  -     CBC and differential; Future  -     Ferritin; Future  -     Iron; Future  -     Iron Saturation %; Future    3  Gastrointestinal hemorrhage associated with intestinal diverticulosis  Assessment & Plan:   Status post hospitalization from December 3, 1930 January 1 patient had abdomen cramping with diarrhea and blood in the stool was a drop in hemoglobin two-point patient during hospitalization evaluated by GI and surgical team received antibiotic recommend to follow-up with GI as outpatient hemoglobin at the time of the discharge 10 5 recommend repeat a CBC in 1 week      4  Type 2 diabetes mellitus without complication, without long-term current use of insulin (HCC)  -     Microalbumin / creatinine urine ratio    5  Overweight with body mass index (BMI) of 27 to 27 9 in adult    6  Platelets decreased ([address] )        Depression Screening and Follow-up Plan: Patient was screened for depression during today's encounter  They screened negative with a PHQ-2 score of 0  Subjective     Transitional Care Management Review:   Lewis Hurtado is a 81 y o  male here for TCM follow up       During the TCM phone call patient stated:  TCM Call     Date and time call was made  1/3/2023 10:12 AM    Hospital care reviewed  Records reviewed    Patient was hospitialized at  [address] [facility] [facility]    Date of Admission  12/30/22    Date of discharge  01/01/23    Diagnosis  gastrointestinal hemorrhage associated with diverticulosis    Disposition  Home    Were the patients medications reviewed and updated  No    Current Symptoms  None      TCM Call     Post hospital issues  None    Should patient be enrolled in anticoag monitoring? No    Scheduled for follow up? Yes    Did you obtain your prescribed medications  Yes    Do you need help managing your prescriptions or medications  No    Is transportation to your appointment needed  No    I have advised the patient to call PCP with any new or worsening symptoms  mary kate chahal    Living Arrangements  Spouse or Significiant other    Support System  Spouse    The type of support provided  Emotional; Physical    Do you have social support  Yes, as much as I need    Are you recieving any outpatient services  No    Are you recieving home care services  No    Are you using any community resources  No    Current waiver services  No    Have you fallen in the last 12 months  No    Interperter language line needed  No    Counseling  Patient        Patient here in the office status post hospitalization patient to the hospital in December 2022 with blood in the stool has been going on for 2 days associated with diarrhea and bloating and cramping    Patient has history of diverticulosis that he contacted GI who recommended to the emergency room in the emergency room blood work showed drop in the hemoglobin also CAT scan of the abdomen showed diverticulosis there was a possible microperforation patient had a hospitalization evaluated by GI and surgical team patient was started on antibiotic and recommended to treat empirically he discharged with a 7-day course of Augmentin and recommendation to follow-up with GI as outpatient no blood transfusion and no surgical approach has been done during hospitalization patient was a stable to discharge on January 1, 2023  I reviewed with the patient hospital record including CAT scan of the abdomen and pelvic result blood work result and we reconciled medication today he denies any abdomen pain no nausea no vomiting no abdomen distention no decrease in oral intake     Review of Systems   Constitutional: Negative for chills and fever  HENT: Negative for ear pain and sore throat  Eyes: Negative for pain and visual disturbance  Respiratory: Negative for cough and shortness of breath  Cardiovascular: Negative for chest pain and palpitations  Gastrointestinal: Negative for abdominal pain and vomiting  Genitourinary: Negative for dysuria and hematuria  Musculoskeletal: Negative for arthralgias and back pain  Skin: Negative for color change and rash  Neurological: Negative for seizures and syncope  All other systems reviewed and are negative  Objective     /70 (BP Location: Left arm, Patient Position: Sitting)   Pulse 67   Temp 98 5 °F (36 9 °C) (Tympanic)   Ht 6' (1 829 m)   Wt 91 2 kg (201 lb)   SpO2 99%   BMI 27 26 kg/m²      Physical Exam  Vitals and nursing note reviewed  Constitutional:       General: He is not in acute distress  Appearance: He is well-developed  HENT:      Head: Normocephalic and atraumatic  Eyes:      Conjunctiva/sclera: Conjunctivae normal    Cardiovascular:      Rate and Rhythm: Normal rate and regular rhythm  Heart sounds: No murmur heard  Pulmonary:      Effort: Pulmonary effort is normal  No respiratory distress  Breath sounds: Normal breath sounds  Abdominal:      General: There is no distension  Palpations: Abdomen is soft  There is no mass  Tenderness: There is no abdominal tenderness  There is no guarding or rebound  Musculoskeletal:         General: No swelling  Cervical back: Neck supple  Skin:     General: Skin is warm and dry  Capillary Refill: Capillary refill takes less than 2 seconds     Neurological:      Mental Status: He is alert     Psychiatric:         Mood and Affect: Mood normal        Medications have been reviewed by provider in current encounter    Maurisio Wetzel MD

## 2023-01-05 LAB — BACTERIA BLD CULT: NORMAL

## 2023-01-06 PROBLEM — R71.0 DROP IN HEMOGLOBIN: Status: ACTIVE | Noted: 2023-01-06

## 2023-01-08 PROBLEM — E66.3 OVERWEIGHT WITH BODY MASS INDEX (BMI) OF 27 TO 27.9 IN ADULT: Status: ACTIVE | Noted: 2022-03-08

## 2023-01-08 NOTE — ASSESSMENT & PLAN NOTE
Status post recent hospitalization from December atorvastatin UA first patient went on to receive diverticulosis developed blood in the stool abdomen cramps and diarrhea patient discharged on Augmentin for 7 days recommendation to follow-up with a GI reevaluation to have a colonoscopy in 6 to 8-week

## 2023-01-08 NOTE — ASSESSMENT & PLAN NOTE
Drop in hemoglobin secondary to GI bleed during hospitalization patient did not have any blood transfusion at the time of discharge January 1 hemoglobin was 10 5 recommend to repeat the CBC in 1 week

## 2023-01-13 DIAGNOSIS — N52.9 MALE ERECTILE DISORDER: ICD-10-CM

## 2023-01-13 RX ORDER — SILDENAFIL 100 MG/1
100 TABLET, FILM COATED ORAL AS NEEDED
Qty: 10 TABLET | Refills: 0 | Status: SHIPPED | OUTPATIENT
Start: 2023-01-13

## 2023-01-26 ENCOUNTER — OFFICE VISIT (OUTPATIENT)
Dept: SLEEP CENTER | Facility: CLINIC | Age: 73
End: 2023-01-26

## 2023-01-26 ENCOUNTER — CONSULT (OUTPATIENT)
Dept: CARDIOLOGY CLINIC | Facility: CLINIC | Age: 73
End: 2023-01-26

## 2023-01-26 VITALS
HEIGHT: 72 IN | BODY MASS INDEX: 27.44 KG/M2 | HEART RATE: 52 BPM | DIASTOLIC BLOOD PRESSURE: 78 MMHG | SYSTOLIC BLOOD PRESSURE: 128 MMHG | WEIGHT: 202.6 LBS

## 2023-01-26 VITALS
BODY MASS INDEX: 28.04 KG/M2 | DIASTOLIC BLOOD PRESSURE: 66 MMHG | SYSTOLIC BLOOD PRESSURE: 129 MMHG | WEIGHT: 207 LBS | HEIGHT: 72 IN | HEART RATE: 53 BPM

## 2023-01-26 DIAGNOSIS — I51.7 LEFT VENTRICULAR HYPERTROPHY: ICD-10-CM

## 2023-01-26 DIAGNOSIS — G47.19 EXCESSIVE DAYTIME SLEEPINESS: ICD-10-CM

## 2023-01-26 DIAGNOSIS — R06.02 SHORTNESS OF BREATH: ICD-10-CM

## 2023-01-26 DIAGNOSIS — I34.0 NONRHEUMATIC MITRAL VALVE REGURGITATION: ICD-10-CM

## 2023-01-26 DIAGNOSIS — E78.2 MIXED HYPERLIPIDEMIA: ICD-10-CM

## 2023-01-26 DIAGNOSIS — Z86.16 HISTORY OF COVID-19: ICD-10-CM

## 2023-01-26 DIAGNOSIS — I51.7 ATRIAL ENLARGEMENT, LEFT: Primary | ICD-10-CM

## 2023-01-26 DIAGNOSIS — G47.33 OSA TREATED WITH BIPAP: Primary | ICD-10-CM

## 2023-01-26 DIAGNOSIS — I10 ESSENTIAL HYPERTENSION: ICD-10-CM

## 2023-01-26 RX ORDER — SODIUM, POTASSIUM,MAG SULFATES 17.5-3.13G
SOLUTION, RECONSTITUTED, ORAL ORAL
COMMUNITY
Start: 2023-01-05

## 2023-01-26 NOTE — PROGRESS NOTES
Cardiology   MD Ash Salcedo MD Lana Army, DO, [facility]  MD Samm Sinha DO, Briant Meckel, DO, [facility]  -------------------------------------------------------------------  [facility]  [address], [place] [address]  [phone]  [phone]  01/26/23  Parth Bullard  YOB: 1950   MRN: [id number]      Referring Physician: Yimi Dwyer MD  [address], [place] [address]     HPI: Parth Bullard is a 81 y o  male with:   Mild concentric left ventricular hypertrophy  Grade 2 diastolic dysfunction  Dilated RV with normal RV systolic function  Marked left atrial and mild to moderate right atrial enlargement  Mitral valve sclerosis with slight redundancy of the anterior Leaflet of the Valve with Mild to Moderate Mitral Regurgitation with a Eccentric jet  Mild to moderate tricuspid regurgitation with mild pulmonary hypertension estimated PASP of 46 mmHg  Diverticulitis  HLD  HUMBERTO on bipap  HTN  BPH    Tobacco: none, quit 1980  Alcohol: cocktail + glass of wine daily  Drugs: none    Family History: Mother - afib,   Father - valve issues needed heart surgery    He presents today for evaluation with cardiology given the findings on his echocardiogram in October as well as some symptoms of of shortness of breath with exertion  He states that he had COVID in late September and since then has felt like he has never quite fully recovered  He states that he still gets fatigue  He states that when going up steps he will feel like he gets a little bit winded as well  No significant lower extremity edema  He states that over the past 6 months he has been trying to lose weight and is doing very good regarding that, overall down about 50 pounds, along with exercise    However he states that regarding his exercise he feels like he has lost significant stamina and feels short of breath to the point where he cannot do the level of exercise that he used to  Review of Systems   Constitutional: Positive for fatigue  Negative for chills and fever  HENT: Negative for facial swelling and sore throat  Eyes: Negative for visual disturbance  Respiratory: Negative for cough, chest tightness, shortness of breath and wheezing  Cardiovascular: Negative for chest pain, palpitations and leg swelling  Gastrointestinal: Negative for abdominal pain, blood in stool, constipation, diarrhea, nausea and vomiting  Endocrine: Negative for cold intolerance and heat intolerance  Genitourinary: Negative for decreased urine volume, difficulty urinating, dysuria and hematuria  Musculoskeletal: Negative for arthralgias, back pain and myalgias  Skin: Negative for rash  Neurological: Negative for dizziness, syncope, weakness and numbness  Psychiatric/Behavioral: Negative for agitation, behavioral problems and confusion  The patient is not nervous/anxious  OBJECTIVE  Vitals:    01/26/23 0922   BP: 128/78   Pulse: (!) 52       Physical Exam  Constitutional: awake, alert and oriented, in no acute distress, no obvious deformities  Head: Normocephalic, without obvious abnormality, atraumatic  Eyes: conjunctivae clear and moist  Sclera anicteric  No xanthelasmas  Pupils equal bilaterally  Extraocular motions are full  Ear nose mouth and throat: ears are symmetrical bilaterally, hearing appears to be equal bilaterally, no nasal discharge or epistaxis, oropharynx is clear with moist mucous membranes  Neck:  Trachea is midline, neck is supple, no thyromegaly or significant lymphadenopathy, there is full range of motion    Lungs: clear to auscultation bilaterally, no wheezes, no rales, no rhonchi, no accessory muscle use, breathing is nonlabored  Heart: regular rate and rhythm, S1, S2 normal, he has a 3 out of 6 systolic ejection murmur heard best laterally at the left midclavicular line with radiation towards the axilla  There is a slight diastolic murmur heard at the right upper sternal border as well, no click, rub or gallop, no lower extremity edema  Abdomen: soft, non-tender; bowel sounds normal; no masses,  no organomegaly  Psychiatric:  Patient is oriented to time, place, person, mood/affect is negative for depression, anxiety, agitation, appears to have appropriate insight  Skin: Skin is warm, dry, intact  No obvious rashes or lesions on exposed extremities  Nail beds are pink with no cyanosis or clubbing  EKG:  Results for orders placed or performed in visit on 01/26/23   POCT ECG    Impression    Sinus bradycardia incomplete RBBB        IMPRESSION:  Mild concentric left ventricular hypertrophy by echo  Grade 2 diastolic dysfunction  Dilated RV with normal RV systolic function  Marked left atrial and mild to moderate right atrial enlargement  Mitral valve sclerosis with slight redundancy of the anterior Leaflet of the Valve with Mild to Moderate Mitral Regurgitation with a Eccentric jet  Mild to moderate tricuspid regurgitation with mild pulmonary hypertension estimated PASP of 46 mmHg  Diverticulitis  HLD  HUMBERTO on bipap  HTN  BPH    DISCUSSION/RECOMMENDATIONS:  He presents today for evaluation with cardiology given the findings on his echocardiogram as well as symptoms of fatigue and shortness of breath that have been present since he was diagnosed with COVID back in September/October 2022  His echo shows moderate mitral regurgitation with a eccentric jet which could be underestimating the true degree of MR  He has a significant murmur on physical examination and marked left atrial enlargement on his transthoracic echo  With his symptoms of worsening fatigue and dyspnea to the point where he cannot exercise to the same degree as he used to, with these findings on echo, his valvular heart disease could certainly be the cause of all of the above    Would plan for further, closer evaluation of his valvular heart disease with a transesophageal echocardiogram  He has no contraindications to TANG, reviewed these with him in the office today  Ember Caldera DO, MARTI, [facility]  --------------------------------------------------------------------------------  TREADMILL STRESS  No results found for this or any previous visit      ----------------------------------------------------------------------------------------------  NUCLEAR STRESS TEST: No results found for this or any previous visit  No results found for this or any previous visit       --------------------------------------------------------------------------------  CATH:  No results found for this or any previous visit     --------------------------------------------------------------------------------  ECHO:   No results found for this or any previous visit  No results found for this or any previous visit     --------------------------------------------------------------------------------  HOLTER  No results found for this or any previous visit  No results found for this or any previous visit     --------------------------------------------------------------------------------  CAROTIDS  No results found for this or any previous visit      --------------------------------------------------------------------------------  Diagnoses and all orders for this visit:    Atrial enlargement, left  -     Ambulatory Referral to Cardiology  -     POCT ECG  -     TANG; Future    Nonrheumatic mitral valve regurgitation  -     Ambulatory Referral to Cardiology  -     POCT ECG  -     TANG; Future    Essential hypertension    Left ventricular hypertrophy    Mixed hyperlipidemia    Excessive daytime sleepiness    History of COVID-19    Shortness of breath  -     TANG;  Future    Other orders  -     Na Sulfate-K Sulfate-Mg Sulf 17 5-3 13-1 6 GM/177ML SOLN; 177 MILLILITER BY MOUTH AS DIRECTED (Patient not taking: Reported on 1/26/2023) ======================================================    Past Medical History:   Diagnosis Date   • Acute bilateral low back pain without sciatica 3/9/2021   • BMI 33 0-33 9,adult 2/4/2020   • BPH (benign prostatic hyperplasia)    • Diabetes mellitus associated with hormonal etiology ([facility] ) 3/5/2019   • ED (erectile dysfunction)    • Elevated PSA 1/15/2013   • Encounter for well adult exam with abnormal findings 2/4/2019   • Herpes zoster 12/18/2014   • Hyperlipidemia    • Hypertension    • IFG (impaired fasting glucose)    • Metabolic syndrome    • Obesity (BMI 30 0-34 9) 2/4/2019   • Psoriasis    • Seasonal allergic rhinitis    • Type 2 diabetes mellitus ([facility] ) 11/17/2014     Past Surgical History:   Procedure Laterality Date   • CHOLECYSTECTOMY  05/14/2015   • COLONOSCOPY  2007   • HERNIA REPAIR  05/14/2015         Medications  Current Outpatient Medications   Medication Sig Dispense Refill   • albuterol (PROVENTIL HFA,VENTOLIN HFA) 90 mcg/act inhaler INHALE 2 PUFFS EVERY 6 HOURS AS NEEDED FOR WHEEZING 18 g 2   • amLODIPine (NORVASC) 5 mg tablet TAKE 1 TABLET DAILY 90 tablet 0   • aspirin (ECOTRIN LOW STRENGTH) 81 mg EC tablet take 1 tablet (81MG)     • atorvastatin (LIPITOR) 20 mg tablet TAKE 1 TABLET DAILY 90 tablet 1   • finasteride (PROSCAR) 5 mg tablet TAKE 1 TABLET DAILY 90 tablet 0   • fluticasone (FLONASE) 50 mcg/act nasal spray 1 spray into each nostril daily 1 Bottle 0   • hydrocortisone 2 5 % cream Apply topically every 24 hours 56 7 g 0   • ketoconazole (NIZORAL) 2 % shampoo WASH SCALP AND FACE 2-3 TIMES A WEEK AS NEEDED FOR FLARES  5   • lisinopril (ZESTRIL) 20 mg tablet TAKE 1 TABLET DAILY 90 tablet 0   • sildenafil (VIAGRA) 100 mg tablet Take 1 tablet (100 mg total) by mouth as needed for erectile dysfunction 10 tablet 0   • tamsulosin (FLOMAX) 0 4 mg TAKE 1 CAPSULE DAILY WITH  DINNER 90 capsule 0   • Na Sulfate-K Sulfate-Mg Sulf 17 5-3 13-1 6 GM/177ML SOLN 177 MILLILITER BY MOUTH AS DIRECTED (Patient not taking: Reported on 2023)       No current facility-administered medications for this visit  Allergies   Allergen Reactions   • No Known Allergies        Social History     Socioeconomic History   • Marital status: /Civil Union     Spouse name: Not on file   • Number of children: Not on file   • Years of education: Not on file   • Highest education level: Not on file   Occupational History   • Not on file   Tobacco Use   • Smoking status: Former     Packs/day: 0 50     Years: 12 00     Pack years: 6 00     Types: Pipe, Cigarettes     Start date: 1968     Quit date: 6/10/1980     Years since quittin 6   • Smokeless tobacco: Never   Vaping Use   • Vaping Use: Never used   Substance and Sexual Activity   • Alcohol use: Yes     Comment: social   • Drug use: No   • Sexual activity: Yes     Partners: Female   Other Topics Concern   • Not on file   Social History Narrative    Has children    Right handed     Social Determinants of Health     Financial Resource Strain: Not on file   Food Insecurity: Not on file   Transportation Needs: No Transportation Needs   • Lack of Transportation (Medical): No   • Lack of Transportation (Non-Medical):  No   Physical Activity: Not on file   Stress: Not on file   Social Connections: Not on file   Intimate Partner Violence: Not on file   Housing Stability: Not on file        Family History   Problem Relation Age of Onset   • Skin cancer Mother    • Heart disease Father        Lab Results   Component Value Date    WBC 5 46 2023    HGB 10 5 (L) 2023    HCT 31 6 (L) 2023    MCV 98 2023     2023      Lab Results   Component Value Date    SODIUM 140 2023    K 3 8 2023     2023    CO2 32 2023    BUN 9 2023    CREATININE 0 91 2023    GLUC 106 (H) 2023    CALCIUM 8 8 2023      Lab Results   Component Value Date    HGBA1C 5 3 2022      Lab Results   Component Value Date    CHOL 97 05/14/2018     Lab Results   Component Value Date    HDL 49 11/14/2022    HDL 41 07/11/2022    HDL 43 03/04/2022     Lab Results   Component Value Date    LDLCALC 29 11/14/2022    LDLCALC 19 07/11/2022    LDLCALC 38 03/04/2022     Lab Results   Component Value Date    TRIG 70 11/14/2022    TRIG 65 07/11/2022    TRIG 68 03/04/2022     No results found for: [place], [place]   Lab Results   Component Value Date    INR 1 04 12/30/2022    PROTIME 13 9 12/30/2022          Patient Active Problem List    Diagnosis Date Noted   • Drop in hemoglobin 01/06/2023   • Gastrointestinal hemorrhage associated with intestinal diverticulosis 12/31/2022   • Diverticulitis of colon with perforation 12/31/2022   • Blood in stool 12/30/2022   • Abnormal CBC 11/18/2022   • Atrial enlargement, left 11/18/2022   • History of COVID-19 09/29/2022   • Shortness of breath 09/28/2022   • Overweight with body mass index (BMI) of 27 to 27 9 in adult 03/08/2022   • Platelets decreased ([address] ) 07/30/2021   • HUMBERTO treated with BiPAP 09/29/2020   • Excessive daytime sleepiness 09/29/2020   • Need for shingles vaccine 02/04/2019   • Colon polyp 08/27/2018   • Left ventricular hypertrophy 02/06/2018   • Mitral valve regurgitation 02/06/2018   • BPH with urinary obstruction 10/31/2016   • Calculus of gallbladder with cholecystitis 04/29/2015   • Displacement of lumbar intervertebral disc without myelopathy 04/09/2015   • Idiopathic peripheral neuropathy 04/09/2015   • Diverticulosis of colon 01/26/2015   • Indigestion 12/23/2014   • Herpes zoster 12/18/2014   • Type 2 diabetes mellitus ([address] ) 11/17/2014   • Allergic rhinitis 01/15/2013   • Male erectile disorder 01/15/2013   • Essential hypertension 22/56/1640   • Metabolic syndrome 00/55/7071   • Hyperlipidemia 01/15/2013       Portions of the record may have been created with voice recognition software   Occasional wrong word or "sound a like" substitutions may have occurred due to the inherent limitations of voice recognition software  Read the chart carefully and recognize, using context, where substitutions have occurred      Rolan Martinez DO, [facility]  1/26/2023 10:04 AM

## 2023-01-26 NOTE — ASSESSMENT & PLAN NOTE
Patient returns for follow-up of his obstructive sleep apnea  At his last visit, he was having difficulty in using his BiPAP due to a recent COVID infection  He is back to using it nightly without difficulty  His compliance report looks great with an AHI of 1 9  He will continue at his current settings and follow-up in 1 year or sooner if he has any concerns

## 2023-01-26 NOTE — PATIENT INSTRUCTIONS
Your compliance report looks great  Continue to use your BiPAP nightly  We can follow back with you in 1 year or sooner if you have any concerns  Nursing Support:  When: Monday through Friday 7A-5PM except holidays  Where: Our direct line is [phone]  If you are having a true emergency please call 911  In the event that the line is busy or it is after hours please leave a voice message and we will return your call  Please speak clearly, leaving your full name, birth date, best number to reach you and the reason for your call  Medication refills: We will need the name of the medication, the dosage, the ordering provider, whether you get a 30 or 90 day refill, and the pharmacy name and address  Medications will be ordered by the provider only  Nurses cannot call in prescriptions  Please allow 7 days for medication refills  Physician requested updates: If your provider requested that you call with an update after starting medication, please be ready to provide us the medication and dosage, what time you take your medication, the time you attempt to fall asleep, time you fall asleep, when you wake up, and what time you get out of bed  Sleep Study Results: We will contact you with sleep study results and/or next steps after the physician has reviewed your testing

## 2023-01-26 NOTE — PROGRESS NOTES
Progress Note - [address] 81 y o  male   XYD:5[birth year], MRN: [id number]  1/26/2023          Follow Up Evaluation / Problem:     Obstructive Sleep Apnea      Thank you for the opportunity of participating in the evaluation and care of this patient in the Sleep Clinic at [facility]  HPI: Mukul Sam is a 81y o  year old male  The patient presents for follow up of obstructive sleep apnea  Patient was diagnosis with moderate to severe obstructive sleep apnea with hypoxia on 12/21/2020 on a diagnostic sleep study  He then had a CPAP study on 02/07/2021  He was set up with BiPAP therapy on 03/02/2021 with a maximum IPAP of 20 and a minimum EPAP of 16 with a full face mask  He then had a pressure adjustment with Tamika Manners on 04/23/2021 to a max IPAP of 20 and a minimum EPAP of 9 and max pressure support of 5  Patient has been using the current setting since the change was made in April  He presents today to review his compliance and effectiveness of treatment          Current Outpatient Medications:   •  albuterol (PROVENTIL HFA,VENTOLIN HFA) 90 mcg/act inhaler, INHALE 2 PUFFS EVERY 6 HOURS AS NEEDED FOR WHEEZING, Disp: 18 g, Rfl: 2  •  amLODIPine (NORVASC) 5 mg tablet, TAKE 1 TABLET DAILY, Disp: 90 tablet, Rfl: 0  •  aspirin (ECOTRIN LOW STRENGTH) 81 mg EC tablet, take 1 tablet (81MG), Disp: , Rfl:   •  atorvastatin (LIPITOR) 20 mg tablet, TAKE 1 TABLET DAILY, Disp: 90 tablet, Rfl: 1  •  finasteride (PROSCAR) 5 mg tablet, TAKE 1 TABLET DAILY, Disp: 90 tablet, Rfl: 0  •  fluticasone (FLONASE) 50 mcg/act nasal spray, 1 spray into each nostril daily, Disp: 1 Bottle, Rfl: 0  •  hydrocortisone 2 5 % cream, Apply topically every 24 hours, Disp: 56 7 g, Rfl: 0  •  ketoconazole (NIZORAL) 2 % shampoo, WASH SCALP AND FACE 2-3 TIMES A WEEK AS NEEDED FOR FLARES, Disp: , Rfl: 5  •  lisinopril (ZESTRIL) 20 mg tablet, TAKE 1 TABLET DAILY, Disp: 90 tablet, Rfl: 0  •  sildenafil (VIAGRA) 100 mg tablet, Take 1 tablet (100 mg total) by mouth as needed for erectile dysfunction, Disp: 10 tablet, Rfl: 0  •  tamsulosin (FLOMAX) 0 4 mg, TAKE 1 CAPSULE DAILY WITH  DINNER, Disp: 90 capsule, Rfl: 0  •  Na Sulfate-K Sulfate-Mg Sulf 17 5-3 13-1 6 GM/177ML SOLN, 177 MILLILITER BY MOUTH AS DIRECTED (Patient not taking: Reported on 1/26/2023), Disp: , Rfl:     [place] Sleepiness Scale  Sitting and reading: Slight chance of dozing  Watching TV: Slight chance of dozing  Sitting, inactive in a public place (e g  a theatre or a meeting): Would never doze  As a passenger in a car for an hour without a break: Moderate chance of dozing  Lying down to rest in the afternoon when circumstances permit: Would never doze  Sitting and talking to someone: Would never doze  Sitting quietly after a lunch without alcohol: Would never doze  In a car, while stopped for a few minutes in traffic: Would never doze  Total score: 4              Vitals:    01/26/23 1328   BP: 129/66   BP Location: Left arm   Patient Position: Sitting   Cuff Size: Large   Pulse: (!) 53   Weight: 93 9 kg (207 lb)   Height: 6' (1 829 m)       Body mass index is 28 07 kg/m²  EPWORTH SLEEPINESS SCORE  Total score: 4      Past History Since Last Sleep Center Visit:     His last appointment in October, the patient was having difficulty in using his BiPAP due to a recent COVID infection  He states he has been using it nightly now without difficulty other than dry mouth  He states his humidifier runs out of water prior to him waking up in the morning  Otherwise he has no complaints or concerns regarding his BiPAP treatment  He was hospitalized for diverticulitis since his last visit  The patient reports that he cleans the equipment appropriately and changes supplies on a regular basis      The review of systems and following portions of the patient's history were reviewed and updated as appropriate: allergies, current medications, past family history, past medical history, past social history, past surgical history, and problem list         OBJECTIVE  Equipment set up date:  03/02/2021  PAP Pressure: Nasal BiPAP set to deliver 20 cm of IPAP and 9 cm of EPAP pressure support max of 5, minimum pressure support 0  Type of mask used: full face  DME Provider: Darron Toth  Denies aerophagia or AM headaches    Physical Exam:     General Appearance:   Alert, cooperative, no distress, appears stated age     ASSESSMENT / PLAN    1  HUMBERTO treated with BiPAP  Assessment & Plan:  Patient returns for follow-up of his obstructive sleep apnea  At his last visit, he was having difficulty in using his BiPAP due to a recent COVID infection  He is back to using it nightly without difficulty  His compliance report looks great with an AHI of 1 9  He will continue at his current settings and follow-up in 1 year or sooner if he has any concerns  Orders:  -     PAP DME Resupply/Reorder           Counseling / Coordination of Care  Total clinic time spent today 20 minutes in chart review, face-to-face time spent with the patient, coordination of care and documentation  A description of the counseling / coordination of care:     Impressions, Diagnostic results, Prognosis, Instructions for management, Risks and benefits of treatment, Patient and family education, Risk factor reductions and Importance of compliance with treatment    Today I reviewed the patient's compliance data  he has been able to use the equipment 93 3% of all days recorded  Average usage was 4 or more hours 93 3% of all days recorded  The patient uses the equipment for an average of 7 hours and 31 minutes per night  The estimated AHI is 1 9 abnormal breathing events per hour  The patient feels they benefit from the use of PAP equipment and would like to continue PAP therapy  Response to treatment has been good    A prescription for supplies has been provided to last for the next year  He will continue using this equipment at the settings noted above for the next 12 months  At that timehe will then return for a routine follow-up evaluation  I have asked the patient to contact the Sleep [address] [place] [address] if he encounters any difficulties prior to that time  The following instructions have been given to the patient today:    Patient Instructions   Your compliance report looks great  Continue to use your BiPAP nightly  We can follow back with you in 1 year or sooner if you have any concerns  Nursing Support:  When: Monday through Friday 7A-5PM except holidays  Where: Our direct line is [phone]  If you are having a true emergency please call 911  In the event that the line is busy or it is after hours please leave a voice message and we will return your call  Please speak clearly, leaving your full name, birth date, best number to reach you and the reason for your call  Medication refills: We will need the name of the medication, the dosage, the ordering provider, whether you get a 30 or 90 day refill, and the pharmacy name and address  Medications will be ordered by the provider only  Nurses cannot call in prescriptions  Please allow 7 days for medication refills  Physician requested updates: If your provider requested that you call with an update after starting medication, please be ready to provide us the medication and dosage, what time you take your medication, the time you attempt to fall asleep, time you fall asleep, when you wake up, and what time you get out of bed  Sleep Study Results: We will contact you with sleep study results and/or next steps after the physician has reviewed your testing             KEE Betancur 15

## 2023-01-27 ENCOUNTER — TELEPHONE (OUTPATIENT)
Dept: SLEEP CENTER | Facility: CLINIC | Age: 73
End: 2023-01-27

## 2023-01-27 LAB

## 2023-01-31 ENCOUNTER — TELEPHONE (OUTPATIENT)
Dept: CARDIOLOGY CLINIC | Facility: CLINIC | Age: 73
End: 2023-01-31

## 2023-01-31 NOTE — TELEPHONE ENCOUNTER
Mr Vidhi Ritter is scheduled for a TANG on february 15th 2023 at the [address] [place] [address] Dr  Please check if a prior authorization is needed  Diagnosis: I51 7, I34 0, and R06 02 and procedure code is [id number]

## 2023-02-07 NOTE — TELEPHONE ENCOUNTER
Joann Loan is not required   Verified that patient has Medicare primary with a supplement secondary

## 2023-02-13 RX ORDER — SODIUM CHLORIDE 9 MG/ML
125 INJECTION, SOLUTION INTRAVENOUS CONTINUOUS
Status: CANCELLED | OUTPATIENT
Start: 2023-02-13

## 2023-02-14 RX ORDER — SODIUM CHLORIDE 9 MG/ML
50 INJECTION, SOLUTION INTRAVENOUS CONTINUOUS
Status: CANCELLED | OUTPATIENT
Start: 2023-02-14

## 2023-02-15 ENCOUNTER — HOSPITAL ENCOUNTER (OUTPATIENT)
Dept: NON INVASIVE DIAGNOSTICS | Facility: HOSPITAL | Age: 73
Discharge: HOME | End: 2023-02-15

## 2023-02-15 ENCOUNTER — ANESTHESIA EVENT (OUTPATIENT)
Dept: NON INVASIVE DIAGNOSTICS | Facility: HOSPITAL | Age: 73
End: 2023-02-15

## 2023-02-15 ENCOUNTER — ANESTHESIA (OUTPATIENT)
Dept: NON INVASIVE DIAGNOSTICS | Facility: HOSPITAL | Age: 73
End: 2023-02-15

## 2023-02-15 VITALS
DIASTOLIC BLOOD PRESSURE: 83 MMHG | BODY MASS INDEX: 26.95 KG/M2 | TEMPERATURE: 97.3 F | RESPIRATION RATE: 20 BRPM | SYSTOLIC BLOOD PRESSURE: 147 MMHG | HEART RATE: 57 BPM | WEIGHT: 199 LBS | HEIGHT: 72 IN | OXYGEN SATURATION: 100 %

## 2023-02-15 DIAGNOSIS — I34.0 MITRAL VALVE INSUFFICIENCY, UNSPECIFIED ETIOLOGY: Primary | ICD-10-CM

## 2023-02-15 DIAGNOSIS — I51.7 ATRIAL ENLARGEMENT, LEFT: ICD-10-CM

## 2023-02-15 DIAGNOSIS — R06.02 SHORTNESS OF BREATH: ICD-10-CM

## 2023-02-15 DIAGNOSIS — I34.0 NONRHEUMATIC MITRAL VALVE REGURGITATION: ICD-10-CM

## 2023-02-15 DIAGNOSIS — I10 ESSENTIAL HYPERTENSION: ICD-10-CM

## 2023-02-15 LAB
ANION GAP SERPL CALCULATED.3IONS-SCNC: 6 MMOL/L (ref 4–13)
ATRIAL RATE: 50 BPM
BUN SERPL-MCNC: 16 MG/DL (ref 5–25)
CALCIUM SERPL-MCNC: 9.2 MG/DL (ref 8.4–10.2)
CHLORIDE SERPL-SCNC: 106 MMOL/L (ref 96–108)
CO2 SERPL-SCNC: 27 MMOL/L (ref 21–32)
CREAT SERPL-MCNC: 0.85 MG/DL (ref 0.6–1.3)
ERYTHROCYTE [DISTWIDTH] IN BLOOD BY AUTOMATED COUNT: 13 % (ref 11.6–15.1)
GFR SERPL CREATININE-BSD FRML MDRD: 88 ML/MIN/1.73SQ M
GLUCOSE P FAST SERPL-MCNC: 105 MG/DL (ref 65–99)
GLUCOSE SERPL-MCNC: 105 MG/DL (ref 65–140)
HCT VFR BLD AUTO: 37.4 % (ref 36.5–49.3)
HGB BLD-MCNC: 12.3 G/DL (ref 12–17)
MAGNESIUM SERPL-MCNC: 2.3 MG/DL (ref 1.9–2.7)
MCH RBC QN AUTO: 32.6 PG (ref 26.8–34.3)
MCHC RBC AUTO-ENTMCNC: 32.9 G/DL (ref 31.4–37.4)
MCV RBC AUTO: 99 FL (ref 82–98)
P AXIS: 32 DEGREES
PLATELET # BLD AUTO: 113 THOUSANDS/UL (ref 149–390)
PMV BLD AUTO: 10.3 FL (ref 8.9–12.7)
POTASSIUM SERPL-SCNC: 4.8 MMOL/L (ref 3.5–5.3)
PR INTERVAL: 156 MS
QRS AXIS: 23 DEGREES
QRSD INTERVAL: 118 MS
QT INTERVAL: 456 MS
QTC INTERVAL: 415 MS
RBC # BLD AUTO: 3.77 MILLION/UL (ref 3.88–5.62)
SL CV LV EF: 60
SODIUM SERPL-SCNC: 139 MMOL/L (ref 135–147)
T WAVE AXIS: 29 DEGREES
VENTRICULAR RATE: 50 BPM
WBC # BLD AUTO: 4.09 THOUSAND/UL (ref 4.31–10.16)

## 2023-02-15 RX ORDER — SODIUM CHLORIDE 9 MG/ML
50 INJECTION, SOLUTION INTRAVENOUS CONTINUOUS
Status: DISCONTINUED | OUTPATIENT
Start: 2023-02-15 | End: 2023-02-16 | Stop reason: HOSPADM

## 2023-02-15 RX ORDER — LIDOCAINE HYDROCHLORIDE 20 MG/ML
INJECTION, SOLUTION EPIDURAL; INFILTRATION; INTRACAUDAL; PERINEURAL AS NEEDED
Status: DISCONTINUED | OUTPATIENT
Start: 2023-02-15 | End: 2023-02-15

## 2023-02-15 RX ORDER — EPHEDRINE SULFATE 50 MG/ML
INJECTION INTRAVENOUS AS NEEDED
Status: DISCONTINUED | OUTPATIENT
Start: 2023-02-15 | End: 2023-02-15

## 2023-02-15 RX ORDER — SODIUM CHLORIDE 9 MG/ML
125 INJECTION, SOLUTION INTRAVENOUS CONTINUOUS
Status: DISCONTINUED | OUTPATIENT
Start: 2023-02-15 | End: 2023-02-16 | Stop reason: HOSPADM

## 2023-02-15 RX ORDER — AMLODIPINE BESYLATE 5 MG/1
TABLET ORAL
Qty: 90 TABLET | Refills: 0 | Status: SHIPPED | OUTPATIENT
Start: 2023-02-15

## 2023-02-15 RX ORDER — PROPOFOL 10 MG/ML
INJECTION, EMULSION INTRAVENOUS CONTINUOUS PRN
Status: DISCONTINUED | OUTPATIENT
Start: 2023-02-15 | End: 2023-02-15

## 2023-02-15 RX ORDER — PROPOFOL 10 MG/ML
INJECTION, EMULSION INTRAVENOUS AS NEEDED
Status: DISCONTINUED | OUTPATIENT
Start: 2023-02-15 | End: 2023-02-15

## 2023-02-15 RX ADMIN — SODIUM CHLORIDE 50 ML/HR: 0.9 INJECTION, SOLUTION INTRAVENOUS at 11:46

## 2023-02-15 RX ADMIN — EPHEDRINE SULFATE 10 MG: 50 INJECTION, SOLUTION INTRAVENOUS at 12:15

## 2023-02-15 RX ADMIN — PROPOFOL 100 MCG/KG/MIN: 10 INJECTION, EMULSION INTRAVENOUS at 12:16

## 2023-02-15 RX ADMIN — PROPOFOL 100 MG: 10 INJECTION, EMULSION INTRAVENOUS at 12:16

## 2023-02-15 RX ADMIN — LIDOCAINE HYDROCHLORIDE 100 MG: 20 INJECTION, SOLUTION EPIDURAL; INFILTRATION; INTRACAUDAL; PERINEURAL at 12:16

## 2023-02-15 NOTE — ANESTHESIA POSTPROCEDURE EVALUATION
Post-Op Assessment Note    CV Status:  Stable    Pain management: adequate     Mental Status:  Alert and awake   Hydration Status:  Euvolemic   PONV Controlled:  Controlled   Airway Patency:  Patent      Post Op Vitals Reviewed: Yes      Staff: Anesthesiologist         No notable events documented      BP      Temp      Pulse    Resp      SpO2      /67   Pulse (!) 52   Temp 97 8 °F (36 6 °C) (Temporal)   Resp 20   Ht 6' (1 829 m)   Wt 90 3 kg (199 lb)   SpO2 93%   BMI 26 99 kg/m²
no weakness/no headache/no paresthesias/no generalized seizures/no transient paralysis/no syncope

## 2023-02-15 NOTE — ANESTHESIA PREPROCEDURE EVALUATION
Procedure:  TANG    Relevant Problems   CARDIO   (+) Essential hypertension   (+) Hyperlipidemia   (+) Mitral valve regurgitation      ENDO   (+) Type 2 diabetes mellitus (HCC)      GI/HEPATIC   (+) Gastrointestinal hemorrhage associated with intestinal diverticulosis      /RENAL   (+) BPH with urinary obstruction      NEURO/PSYCH   (+) History of COVID-19      PULMONARY   (+) HUMBERTO treated with BiPAP   (+) Shortness of breath        Physical Exam    Airway    Mallampati score: II  TM Distance: <3 FB  Neck ROM: full     Dental   No notable dental hx     Cardiovascular  Rhythm: regular, Rate: normal, Murmur, Cardiovascular exam normal    Pulmonary  Pulmonary exam normal Breath sounds clear to auscultation,     Other Findings        Anesthesia Plan  ASA Score- 3     Anesthesia Type- general with ASA Monitors  Additional Monitors:   Airway Plan:           Plan Factors-    Chart reviewed  EKG reviewed  Imaging results reviewed  Existing labs reviewed  Patient summary reviewed  Patient is not a current smoker  Patient not instructed to abstain from smoking on day of procedure  Patient did not smoke on day of surgery  There is medical exclusion for perioperative obstructive sleep apnea risk education  Induction- intravenous  Postoperative Plan-     Informed Consent- Anesthetic plan and risks discussed with patient and spouse Won Rubin

## 2023-02-17 ENCOUNTER — TELEPHONE (OUTPATIENT)
Dept: CARDIAC SURGERY | Facility: CLINIC | Age: 73
End: 2023-02-17

## 2023-02-17 NOTE — TELEPHONE ENCOUNTER
Called patient and left message requesting a call back to schedule a consultation appointment with Dr Shahzad Caballero

## 2023-02-22 ENCOUNTER — OFFICE VISIT (OUTPATIENT)
Dept: CARDIAC SURGERY | Facility: CLINIC | Age: 73
End: 2023-02-22

## 2023-02-22 VITALS
DIASTOLIC BLOOD PRESSURE: 79 MMHG | BODY MASS INDEX: 28.82 KG/M2 | WEIGHT: 212.8 LBS | HEIGHT: 72 IN | TEMPERATURE: 96.9 F | OXYGEN SATURATION: 99 % | SYSTOLIC BLOOD PRESSURE: 130 MMHG | HEART RATE: 54 BPM

## 2023-02-22 DIAGNOSIS — R06.02 SHORTNESS OF BREATH: ICD-10-CM

## 2023-02-22 DIAGNOSIS — Z86.16 HISTORY OF COVID-19: ICD-10-CM

## 2023-02-22 DIAGNOSIS — G47.33 OSA TREATED WITH BIPAP: ICD-10-CM

## 2023-02-22 DIAGNOSIS — I34.0 NONRHEUMATIC MITRAL VALVE REGURGITATION: Primary | ICD-10-CM

## 2023-02-22 DIAGNOSIS — E66.01 OBESITY, MORBID (HCC): ICD-10-CM

## 2023-02-22 DIAGNOSIS — Z82.49 FH: CEREBRAL ANEURYSM: ICD-10-CM

## 2023-02-22 NOTE — H&P (VIEW-ONLY)
Consultation - Cardiothoracic Surgery   Mukul Sam 81 y o  male MRN: [id number]    Physician Requesting Consult: Dr Ermias Greenberg    Reason for Consult / Principal Problem: Mitral regurgitation    History of Present Illness: Mukul Sam is a 81y o  year old male referred for surgical consultation for mitral regurgitation  Patient PMHx is notable for HTN, HLD, DM2, HUMBERTO (CPAP), Diverticulosis w/ GIB, BPH, thrombocytopenia (113), remote h/o skin melanoma (upper arm) s/p excision, psoriasis and elevated PSA  Patient developed SOB w/ cough in September 2022  He was seen by his PCP and tested (+) for Covid  Echo was ordered and performed in October, demonstrating markedly enlarged LA with mild to moderate MR  He was referred for Cardiology consultation, however, he ended up hospitalized in December with lower GIB  CT abd/pelvis demonstrated  findings c/w acute diverticulitis  He has since recovered from his GI illness and was seen in Jan 2023 for Cardiology consultation  3D TANG on 2/15/23 revealed EF 60%, flail posterior MV leaflet with severe MR, LA severely dilated and mild TR  Patient is accompanied by his wife today  He endorses the above history  He reports his PCP first identified a heart murmur in 2018  He denies h/o childhood murmur or rheumativc fever  He reports ongoing fatigue, decrease in activity tolerance,  MICHELLE, occasional lightheadedness and palpitations  He reports issues with LE edema that has improved with an intentional weight loss of 50 lb since June 2022, due to dietary changes  Patient normally practices yoga for an hour, 3 times per week and does HIIT workouts but has not been able to exercise due to his symptoms  Patient is an ,  and lives with his wife in a 3 story home      FH is notable for father undergoing MVR at age 80, no longer living; mother alive at age 80 with new onset Afib and  fatal cerebral aneurysms in multiple family members including brother & 3 paternal cousins and a 4th cousin found to have cerebral aneurysms on imaging, repaired surgically  Patient is a former smoker, quit in [address]; he drinks a glass of wine in the evening and admits to occasional marijuana use (vapes)  He is scheduled for a Colonoscopy 3/6/23  Recent dental care in 2022  Past Medical History:  Past Medical History:   Diagnosis Date   • Acute bilateral low back pain without sciatica 2021   • BMI 33 0-33 9,adult 2020   • BPH (benign prostatic hyperplasia)    • COVID-19    • Diabetes mellitus associated with hormonal etiology ([facility] ) 2019   • Diverticulitis    • ED (erectile dysfunction)    • Elevated PSA 01/15/2013   • Encounter for well adult exam with abnormal findings 2019   • Herpes zoster 2014   • Hyperlipidemia    • Hypertension    • IFG (impaired fasting glucose)    • Metabolic syndrome    • Obesity (BMI 30 0-34 9) 2019   • Psoriasis    • Seasonal allergic rhinitis    • Type 2 diabetes mellitus ([place] [id number] ) 2014         Past Surgical History:   Past Surgical History:   Procedure Laterality Date   • CHOLECYSTECTOMY  2015   • COLONOSCOPY     • HERNIA REPAIR  2015         Family History:  Family History   Problem Relation Age of Onset   • Skin cancer Mother    • Heart disease Father          Social History:    Social History     Substance and Sexual Activity   Alcohol Use Yes    Comment: social     Social History     Substance and Sexual Activity   Drug Use No     Social History     Tobacco Use   Smoking Status Former   • Packs/day: 0 50   • Years: 12 00   • Pack years: 6 00   • Types: Pipe, Cigarettes   • Start date: 1968   • Quit date: 6/10/1980   • Years since quittin 7   Smokeless Tobacco Never       Home Medications:   Prior to Admission medications    Medication Sig Start Date End Date Taking?  Authorizing Provider   amLODIPine (NORVASC) 5 mg tablet TAKE 1 TABLET DAILY 2/15/23  Yes Mira Martin MD   aspirin (ECOTRIN LOW STRENGTH) 81 mg EC tablet take 1 tablet (81MG) 1/22/18  Yes Historical Provider, MD   atorvastatin (LIPITOR) 20 mg tablet TAKE 1 TABLET DAILY 12/28/22  Yes Yeni Maravilla MD   finasteride (PROSCAR) 5 mg tablet TAKE 1 TABLET DAILY 12/13/22  Yes Yeni Maravilla MD   fluticasone (FLONASE) 50 mcg/act nasal spray 1 spray into each nostril daily  Patient taking differently: 1 spray into each nostril if needed 12/23/19  Yes Sobia Caicedo PA-C   hydrocortisone 2 5 % cream Apply topically every 24 hours  Patient taking differently: Apply topically if needed 8/28/18  Yes Yeni Maravilla MD   lisinopril (ZESTRIL) 20 mg tablet TAKE 1 TABLET DAILY 11/28/22  Yes Yeni Maravilla MD   sildenafil (VIAGRA) 100 mg tablet Take 1 tablet (100 mg total) by mouth as needed for erectile dysfunction 1/13/23  Yes Yeni Maravilla MD   tamsulosin (FLOMAX) 0 4 mg TAKE 1 CAPSULE DAILY WITH  DINNER 1/3/23  Yes Yeni Maravilla MD   albuterol (PROVENTIL HFA,VENTOLIN HFA) 90 mcg/act inhaler INHALE 2 PUFFS EVERY 6 HOURS AS NEEDED FOR WHEEZING  Patient not taking: Reported on 2/22/2023 10/20/22   Yeni Maravilla MD   ketoconazole (NIZORAL) 2 % shampoo WASH SCALP AND FACE 2-3 TIMES A WEEK AS NEEDED FOR FLARES  Patient not taking: Reported on 2/22/2023 10/23/19   Historical Provider, MD       Allergies:   Allergies   Allergen Reactions   • No Known Allergies        Review of Systems:  Review of Systems - History obtained from chart review and the patient  General ROS: positive for  - fatigue and change in activity tolerance   negative for - chills, fever or sleep disturbance  Psychological ROS: negative  Ophthalmic ROS: positive for - uses glasses  ENT ROS: negative  Allergy and Immunology ROS: negative  Hematological and Lymphatic ROS: negative  Endocrine ROS: negative  Breast ROS: negative  Respiratory ROS: no cough, shortness of breath, or wheezing  Cardiovascular ROS: positive for - dyspnea on exertion, edema, murmur and palpitations  negative for - chest pain, irregular heartbeat, orthopnea, paroxysmal nocturnal dyspnea or rapid heart rate  Gastrointestinal ROS: no abdominal pain, change in bowel habits, or black or bloody stools  Genito-Urinary ROS: no dysuria, trouble voiding, or hematuria  Musculoskeletal ROS: negative  Neurological ROS: positive for - occasional lightheadedness  Dermatological ROS: negative    Vital Signs:     Vitals:    02/22/23 1025 02/22/23 1026   BP: 128/64 130/79   BP Location: Left arm Right arm   Patient Position: Sitting Sitting   Cuff Size: Standard Standard   Pulse: (!) 54    Temp: (!) 96 9 °F (36 1 °C)    TempSrc: Tympanic    SpO2: 99%    Weight: 96 5 kg (212 lb 12 8 oz)    Height: 6' (1 829 m)        Physical Exam:    General: Alert, oriented, well developed, no acute distress  HEENT/NECK:  PERRLA  No jugular venous distention  Cardiac:Regular rate and rhythm, III/VI systolic murmur at apex  Carotid arteries: 2+ pulses, no bruits  Pulmonary:  Breath sounds clear bilaterally  Abdomen:  Non-tender, Non-distended  Positive bowel sounds  Upper extremities: 2+ radial pulses; brisk capillary refill; right hand dominance  Lower extremities: Extremities warm/dry  PT/DP pulses 2+ bilaterally  1+ edema B/L  Neuro: Alert and oriented X 3  Sensation is grossly intact  No focal deficits  Musculoskeletal: MAEE, stable gait  Skin: Warm/Dry, without rashes or lesions      Lab Results:     Results from last 7 days   Lab Units 02/15/23  1130   WBC Thousand/uL 4 09*   HEMOGLOBIN g/dL 12 3   HEMATOCRIT % 37 4   PLATELETS Thousands/uL 113*     Results from last 7 days   Lab Units 02/15/23  1130   POTASSIUM mmol/L 4 8   CHLORIDE mmol/L 106   CO2 mmol/L 27   BUN mg/dL 16   CREATININE mg/dL 0 85   CALCIUM mg/dL 9 2         Lab Results   Component Value Date    HGBA1C 5 3 11/14/2022         Imaging Studies:     Echocardiogram: 10/26/23    Technically adequate transthoracic echocardiogram study      1  Mild concentric left ventricular hypertrophy with prominent basal interventricular septum, normal left ventricular systolic function, suspected  grade 2 diastolic dysfunction  Normal estimated left ventricular filling pressure  2   dilated right ventricular cavity, normal right ventricular systolic function  3  Marked left atrial and mild to moderate right atrial cavity enlargement  4  Trileaflet aortic valve with sclerosis, mild aortic valve regurgitation  5  Mitral valve sclerosis with slight redundancy of anterior mitral valve  Mild to moderate mitral valve regurgitation with eccentric regurgitation jet  6  Mild-to-moderate tricuspid and mild pulmonic valve regurgitation  7  Mild pulmonary hypertension  Estimated RVSP/PASP is 46 mmHg  8  No pericardial effusion      No previous echocardiogram is available for comparison        Findings    Left Ventricle Normal left ventricular cavity size, mild concentric left ventricular hypertrophy with slightly prominent basal interventricular septum, normal left ventricular systolic function and normal regional wall motion  Ejection fraction is estimated as around 60%  Grade 2 diastolic dysfunction with normal estimated left ventricular filling pressures  Right Ventricle Moderately dilated right ventricular, normal right ventricular systolic function  Estimated right ventricular systolic pressure is increased, 46 mmHg mild pulmonary hypertension  Left Atrium Severe left atrial cavity enlargement  LAESVI was  greater than 60 mL/meter squared  Right Atrium Mild to moderate right atrial cavity enlargement  Prominent eustachian valve noted in right atrial cavity  Aortic Valve Trileaflet aortic valve with normal leaflet thickness and mild sclerosis, adequate cuspal separation  Mild aortic valve regurgitation  Mitral Valve Normal mitral valve structure with slight redundancy of anterior mitral valve leaflet without definite mitral valve prolapse    Mild mitral valve leaflet sclerosis, adequate leaflet mobility  There is mild to moderate mitral valve regurgitation with eccentric, posteriorly directed regurgitation jet  Tricuspid Valve Normal tricuspid valve morphology and leaflet separation  Mild to moderate tricuspid valve regurgitation  Pulmonic Valve Mild thickness of the pulmonic valve  There is at least mild pulmonic valve regurgitation  Ascending Aorta Dilated aortic root and ectasia of ascending aorta measuring up to 4 3 cm  IVC/SVC Borderline dilated inferior vena cava with blunted respirophasic change in diameter  Pericardium The pericardium is normal in appearance  There is no pericardial effusion       Left Ventricle Measurements    Function/Volumes   A4C EF 69 %         Dimensions   LVIDd 4 9 cm         LVIDS 3 6 cm         IVSd 1 4 cm         LVPWd 1 3 cm         FS 27 %         Diastolic Filling   MV E' Tissue Velocity Septal 8 cm/s         E wave deceleration time 184 ms         MV Peak E Guilherme 75 cm/s         MV Peak A Guilherme 0 4 m/s               Right Ventricle Measurements    Dimensions   RVID d 5 8 cm               Left Atrium Measurements    Dimensions   LA size 4 5 cm         LA length (A2C) 7 2 cm               Right Atrium Measurements    Dimensions   RA 2D Volume 101 mL         RAA A4C 27 2 cm2               Aortic Valve Measurements    Regurgitation   AV peak gradient 68 mmHg         AV Deceleration Time 3,292 ms         AV regurgitation pressure 1/2 time 955 ms               Mitral Valve Measurements    Stenosis   MV stenosis pressure 1/2 time 53 ms         MV valve area p 1/2 method 4 15 cm2               Tricuspid Valve Measurements    RVSP Parameters   TR Peak Guilherme 3 m/s         Triscuspid Valve Regurgitation Peak Gradient 36 mmHg               Aorta Measurements    Aortic Dimensions   Ao root 4 cm         Asc Ao 4 3 cm                3D TANG: 2/15/23    •  Left Ventricle: Left ventricular cavity size is normal  Wall thickness is normal  The left ventricular ejection fraction is 60%  Systolic function is normal  Wall motion is normal  Diastolic function is normal   •  Left Atrium: The atrium is severely dilated  •  Atrial Septum: No patent foramen ovale detected using color flow Doppler and saline contrast injection at rest   •  Left Atrial Appendage: There is normal function  There is no thrombus  •  Aortic Valve: There is mild regurgitation with a centrally directed jet  •  Mitral Valve: The posterior leaflet is flail  There is severe regurgitation with an eccentrically directed jet, with coanda effect and pulmonary vein flow reversal noted  The left atrium is severely dilated  PISA could not be performed due to the eccentric jet  No torn chordae were seen  MR Vmax 6 57 m/s  There is no evidence of stenosis  Valve visualization did not allow for optimal 3D imaging  •  Tricuspid Valve: There is mild regurgitation      Findings    Left Ventricle Left ventricular cavity size is normal  Wall thickness is normal  The left ventricular ejection fraction is 60%  Systolic function is normal   Wall motion is normal  Diastolic function is normal    Right Ventricle Right ventricular cavity size is normal  Systolic function is normal  Wall thickness is normal    Left Atrium The atrium is severely dilated  Atrial Septum No patent foramen ovale detected using color flow Doppler and saline contrast injection at rest    Left Atrial Appendage Left atrial appendage is normal in size  There is normal function  There is no thrombus  Aortic Valve The aortic valve is trileaflet  The leaflets are not thickened  The leaflets are not calcified  The leaflets exhibit normal mobility  There is mild regurgitation with a centrally directed jet  The aortic valve has no significant stenosis  Mitral Valve The posterior leaflet is flail  There is severe regurgitation with an eccentrically directed jet, with coanda effect and pulmonary vein flow reversal noted   The left atrium is severely dilated  PISA could not be performed due to the eccentric jet  No torn chordae were seen  MR Vmax 6 57 m/s  There is no evidence of stenosis  Valve visualization did not allow for optimal 3D imaging  Tricuspid Valve Tricuspid valve structure is normal  There is mild regurgitation  There is no evidence of stenosis  Pulmonic Valve Pulmonic valve structure is normal  There is trace regurgitation  There is no evidence of stenosis  Ascending Aorta The aortic root is normal in size  Small amount of atheroma was seen in the upper aspect of the descending thoracic aorta as well as the distal aspect of the aortic arch  Pericardium There is no pericardial effusion  The pericardium is normal in appearance  Pulmonary Veins There is systolic flow reversal in the left upper and left lower veins           I have personally reviewed pertinent films in PACS     PCP and Cardiology notes reviewed    Assessment:  Patient Active Problem List    Diagnosis Date Noted   • FH: cerebral aneurysm 02/22/2023   • Obesity, morbid ([address] ) 02/22/2023   • Drop in hemoglobin 01/06/2023   • Gastrointestinal hemorrhage associated with intestinal diverticulosis 12/31/2022   • Diverticulitis of colon with perforation 12/31/2022   • Blood in stool 12/30/2022   • Abnormal CBC 11/18/2022   • Atrial enlargement, left 11/18/2022   • History of COVID-19 09/29/2022   • Shortness of breath 09/28/2022   • Overweight with body mass index (BMI) of 27 to 27 9 in adult 03/08/2022   • Platelets decreased ([address] ) 07/30/2021   • HUMBERTO treated with BiPAP 09/29/2020   • Excessive daytime sleepiness 09/29/2020   • Need for shingles vaccine 02/04/2019   • Colon polyp 08/27/2018   • Left ventricular hypertrophy 02/06/2018   • Mitral valve regurgitation 02/06/2018   • BPH with urinary obstruction 10/31/2016   • Calculus of gallbladder with cholecystitis 04/29/2015   • Displacement of lumbar intervertebral disc without myelopathy 04/09/2015   • Idiopathic peripheral neuropathy 04/09/2015   • Diverticulosis of colon 01/26/2015   • Indigestion 12/23/2014   • Herpes zoster 12/18/2014   • Type 2 diabetes mellitus ([facility] ) 11/17/2014   • Allergic rhinitis 01/15/2013   • Male erectile disorder 01/15/2013   • Essential hypertension 95/12/7875   • Metabolic syndrome 27/80/0212   • Hyperlipidemia 01/15/2013       Impression/Plan:    Severe Mitral Regurgitation    Risks and benefits of mitral valve repair vs replacement were discussed in detail today with the patient  They understand and wish to proceed with further workup and ultimately surgical intervention  We have ordered routine preoperative laboratory and vascular studies: cardiac cath, CTA H&N (FH cerebral aneurysms) and non-contrast chest CT scan  Patient will return to the office for follow up after testing completed  Yaa Martin was comfortable with our recommendations, and their questions were answered to their satisfaction  Thank you for allowing us to participate in the care of this patient         SIGNATURE: NISHANT Soria  DATE: February 22, 2023  TIME: 11:09 AM

## 2023-02-22 NOTE — PROGRESS NOTES
Consultation - Cardiothoracic Surgery   Ann Marie Burrell 81 y o  male MRN: [id number]    Physician Requesting Consult: Dr Jac Cordero    Reason for Consult / Principal Problem: Mitral regurgitation    History of Present Illness: Ann Marie Burrell is a 81y o  year old male referred for surgical consultation for mitral regurgitation  Patient PMHx is notable for HTN, HLD, DM2, HUMBERTO (CPAP), Diverticulosis w/ GIB, BPH, thrombocytopenia (113), remote h/o skin melanoma (upper arm) s/p excision, psoriasis and elevated PSA  Patient developed SOB w/ cough in September 2022  He was seen by his PCP and tested (+) for Covid  Echo was ordered and performed in October, demonstrating markedly enlarged LA with mild to moderate MR  He was referred for Cardiology consultation, however, he ended up hospitalized in December with lower GIB  CT abd/pelvis demonstrated  findings c/w acute diverticulitis  He has since recovered from his GI illness and was seen in Jan 2023 for Cardiology consultation  3D TANG on 2/15/23 revealed EF 60%, flail posterior MV leaflet with severe MR, LA severely dilated and mild TR  Patient is accompanied by his wife today  He endorses the above history  He reports his PCP first identified a heart murmur in 2018  He denies h/o childhood murmur or rheumativc fever  He reports ongoing fatigue, decrease in activity tolerance,  MICHELLE, occasional lightheadedness and palpitations  He reports issues with LE edema that has improved with an intentional weight loss of 50 lb since June 2022, due to dietary changes  Patient normally practices yoga for an hour, 3 times per week and does HIIT workouts but has not been able to exercise due to his symptoms  Patient is an ,  and lives with his wife in a 3 story home      FH is notable for father undergoing MVR at age 80, no longer living; mother alive at age 80 with new onset Afib and  fatal cerebral aneurysms in multiple family members including brother & 3 paternal cousins and a 4th cousin found to have cerebral aneurysms on imaging, repaired surgically  Patient is a former smoker, quit in [address]; he drinks a glass of wine in the evening and admits to occasional marijuana use (vapes)  He is scheduled for a Colonoscopy 3/6/23  Recent dental care in 2022  Past Medical History:  Past Medical History:   Diagnosis Date   • Acute bilateral low back pain without sciatica 2021   • BMI 33 0-33 9,adult 2020   • BPH (benign prostatic hyperplasia)    • COVID-19    • Diabetes mellitus associated with hormonal etiology ([facility] ) 2019   • Diverticulitis    • ED (erectile dysfunction)    • Elevated PSA 01/15/2013   • Encounter for well adult exam with abnormal findings 2019   • Herpes zoster 2014   • Hyperlipidemia    • Hypertension    • IFG (impaired fasting glucose)    • Metabolic syndrome    • Obesity (BMI 30 0-34 9) 2019   • Psoriasis    • Seasonal allergic rhinitis    • Type 2 diabetes mellitus ([place] [address] ) 2014         Past Surgical History:   Past Surgical History:   Procedure Laterality Date   • CHOLECYSTECTOMY  2015   • COLONOSCOPY     • HERNIA REPAIR  2015         Family History:  Family History   Problem Relation Age of Onset   • Skin cancer Mother    • Heart disease Father          Social History:    Social History     Substance and Sexual Activity   Alcohol Use Yes    Comment: social     Social History     Substance and Sexual Activity   Drug Use No     Social History     Tobacco Use   Smoking Status Former   • Packs/day: 0 50   • Years: 12 00   • Pack years: 6 00   • Types: Pipe, Cigarettes   • Start date: 1968   • Quit date: 6/10/1980   • Years since quittin 7   Smokeless Tobacco Never       Home Medications:   Prior to Admission medications    Medication Sig Start Date End Date Taking?  Authorizing Provider   amLODIPine (NORVASC) 5 mg tablet TAKE 1 TABLET DAILY 2/15/23  Yes Nathalie Lucia MD   aspirin (ECOTRIN LOW STRENGTH) 81 mg EC tablet take 1 tablet (81MG) 1/22/18  Yes Historical Provider, MD   atorvastatin (LIPITOR) 20 mg tablet TAKE 1 TABLET DAILY 12/28/22  Yes Rosemarie Gan MD   finasteride (PROSCAR) 5 mg tablet TAKE 1 TABLET DAILY 12/13/22  Yes Rosemarie Gan MD   fluticasone (FLONASE) 50 mcg/act nasal spray 1 spray into each nostril daily  Patient taking differently: 1 spray into each nostril if needed 12/23/19  Yes Robert Duarte PA-C   hydrocortisone 2 5 % cream Apply topically every 24 hours  Patient taking differently: Apply topically if needed 8/28/18  Yes Rosemarie Gan MD   lisinopril (ZESTRIL) 20 mg tablet TAKE 1 TABLET DAILY 11/28/22  Yes Rosemarie Gan MD   sildenafil (VIAGRA) 100 mg tablet Take 1 tablet (100 mg total) by mouth as needed for erectile dysfunction 1/13/23  Yes Rosemarie Gan MD   tamsulosin (FLOMAX) 0 4 mg TAKE 1 CAPSULE DAILY WITH  DINNER 1/3/23  Yes Rosemarie Gan MD   albuterol (PROVENTIL HFA,VENTOLIN HFA) 90 mcg/act inhaler INHALE 2 PUFFS EVERY 6 HOURS AS NEEDED FOR WHEEZING  Patient not taking: Reported on 2/22/2023 10/20/22   Rosemarie Gan MD   ketoconazole (NIZORAL) 2 % shampoo WASH SCALP AND FACE 2-3 TIMES A WEEK AS NEEDED FOR FLARES  Patient not taking: Reported on 2/22/2023 10/23/19   Historical Provider, MD       Allergies:   Allergies   Allergen Reactions   • No Known Allergies        Review of Systems:  Review of Systems - History obtained from chart review and the patient  General ROS: positive for  - fatigue and change in activity tolerance   negative for - chills, fever or sleep disturbance  Psychological ROS: negative  Ophthalmic ROS: positive for - uses glasses  ENT ROS: negative  Allergy and Immunology ROS: negative  Hematological and Lymphatic ROS: negative  Endocrine ROS: negative  Breast ROS: negative  Respiratory ROS: no cough, shortness of breath, or wheezing  Cardiovascular ROS: positive for - dyspnea on exertion, edema, murmur and palpitations  negative for - chest pain, irregular heartbeat, orthopnea, paroxysmal nocturnal dyspnea or rapid heart rate  Gastrointestinal ROS: no abdominal pain, change in bowel habits, or black or bloody stools  Genito-Urinary ROS: no dysuria, trouble voiding, or hematuria  Musculoskeletal ROS: negative  Neurological ROS: positive for - occasional lightheadedness  Dermatological ROS: negative    Vital Signs:     Vitals:    02/22/23 1025 02/22/23 1026   BP: 128/64 130/79   BP Location: Left arm Right arm   Patient Position: Sitting Sitting   Cuff Size: Standard Standard   Pulse: (!) 54    Temp: (!) 96 9 °F (36 1 °C)    TempSrc: Tympanic    SpO2: 99%    Weight: 96 5 kg (212 lb 12 8 oz)    Height: 6' (1 829 m)        Physical Exam:    General: Alert, oriented, well developed, no acute distress  HEENT/NECK:  PERRLA  No jugular venous distention  Cardiac:Regular rate and rhythm, III/VI systolic murmur at apex  Carotid arteries: 2+ pulses, no bruits  Pulmonary:  Breath sounds clear bilaterally  Abdomen:  Non-tender, Non-distended  Positive bowel sounds  Upper extremities: 2+ radial pulses; brisk capillary refill; right hand dominance  Lower extremities: Extremities warm/dry  PT/DP pulses 2+ bilaterally  1+ edema B/L  Neuro: Alert and oriented X 3  Sensation is grossly intact  No focal deficits  Musculoskeletal: MAEE, stable gait  Skin: Warm/Dry, without rashes or lesions      Lab Results:     Results from last 7 days   Lab Units 02/15/23  1130   WBC Thousand/uL 4 09*   HEMOGLOBIN g/dL 12 3   HEMATOCRIT % 37 4   PLATELETS Thousands/uL 113*     Results from last 7 days   Lab Units 02/15/23  1130   POTASSIUM mmol/L 4 8   CHLORIDE mmol/L 106   CO2 mmol/L 27   BUN mg/dL 16   CREATININE mg/dL 0 85   CALCIUM mg/dL 9 2         Lab Results   Component Value Date    HGBA1C 5 3 11/14/2022         Imaging Studies:     Echocardiogram: 10/26/23    Technically adequate transthoracic echocardiogram study      1  Mild concentric left ventricular hypertrophy with prominent basal interventricular septum, normal left ventricular systolic function, suspected  grade 2 diastolic dysfunction  Normal estimated left ventricular filling pressure  2   dilated right ventricular cavity, normal right ventricular systolic function  3  Marked left atrial and mild to moderate right atrial cavity enlargement  4  Trileaflet aortic valve with sclerosis, mild aortic valve regurgitation  5  Mitral valve sclerosis with slight redundancy of anterior mitral valve  Mild to moderate mitral valve regurgitation with eccentric regurgitation jet  6  Mild-to-moderate tricuspid and mild pulmonic valve regurgitation  7  Mild pulmonary hypertension  Estimated RVSP/PASP is 46 mmHg  8  No pericardial effusion      No previous echocardiogram is available for comparison        Findings    Left Ventricle Normal left ventricular cavity size, mild concentric left ventricular hypertrophy with slightly prominent basal interventricular septum, normal left ventricular systolic function and normal regional wall motion  Ejection fraction is estimated as around 60%  Grade 2 diastolic dysfunction with normal estimated left ventricular filling pressures  Right Ventricle Moderately dilated right ventricular, normal right ventricular systolic function  Estimated right ventricular systolic pressure is increased, 46 mmHg mild pulmonary hypertension  Left Atrium Severe left atrial cavity enlargement  LAESVI was  greater than 60 mL/meter squared  Right Atrium Mild to moderate right atrial cavity enlargement  Prominent eustachian valve noted in right atrial cavity  Aortic Valve Trileaflet aortic valve with normal leaflet thickness and mild sclerosis, adequate cuspal separation  Mild aortic valve regurgitation  Mitral Valve Normal mitral valve structure with slight redundancy of anterior mitral valve leaflet without definite mitral valve prolapse    Mild mitral valve leaflet sclerosis, adequate leaflet mobility  There is mild to moderate mitral valve regurgitation with eccentric, posteriorly directed regurgitation jet  Tricuspid Valve Normal tricuspid valve morphology and leaflet separation  Mild to moderate tricuspid valve regurgitation  Pulmonic Valve Mild thickness of the pulmonic valve  There is at least mild pulmonic valve regurgitation  Ascending Aorta Dilated aortic root and ectasia of ascending aorta measuring up to 4 3 cm  IVC/SVC Borderline dilated inferior vena cava with blunted respirophasic change in diameter  Pericardium The pericardium is normal in appearance  There is no pericardial effusion       Left Ventricle Measurements    Function/Volumes   A4C EF 69 %         Dimensions   LVIDd 4 9 cm         LVIDS 3 6 cm         IVSd 1 4 cm         LVPWd 1 3 cm         FS 27 %         Diastolic Filling   MV E' Tissue Velocity Septal 8 cm/s         E wave deceleration time 184 ms         MV Peak E Guilherme 75 cm/s         MV Peak A Guilherme 0 4 m/s               Right Ventricle Measurements    Dimensions   RVID d 5 8 cm               Left Atrium Measurements    Dimensions   LA size 4 5 cm         LA length (A2C) 7 2 cm               Right Atrium Measurements    Dimensions   RA 2D Volume 101 mL         RAA A4C 27 2 cm2               Aortic Valve Measurements    Regurgitation   AV peak gradient 68 mmHg         AV Deceleration Time 3,292 ms         AV regurgitation pressure 1/2 time 955 ms               Mitral Valve Measurements    Stenosis   MV stenosis pressure 1/2 time 53 ms         MV valve area p 1/2 method 4 15 cm2               Tricuspid Valve Measurements    RVSP Parameters   TR Peak Guilherme 3 m/s         Triscuspid Valve Regurgitation Peak Gradient 36 mmHg               Aorta Measurements    Aortic Dimensions   Ao root 4 cm         Asc Ao 4 3 cm                3D TANG: 2/15/23    •  Left Ventricle: Left ventricular cavity size is normal  Wall thickness is normal  The left ventricular ejection fraction is 60%  Systolic function is normal  Wall motion is normal  Diastolic function is normal   •  Left Atrium: The atrium is severely dilated  •  Atrial Septum: No patent foramen ovale detected using color flow Doppler and saline contrast injection at rest   •  Left Atrial Appendage: There is normal function  There is no thrombus  •  Aortic Valve: There is mild regurgitation with a centrally directed jet  •  Mitral Valve: The posterior leaflet is flail  There is severe regurgitation with an eccentrically directed jet, with coanda effect and pulmonary vein flow reversal noted  The left atrium is severely dilated  PISA could not be performed due to the eccentric jet  No torn chordae were seen  MR Vmax 6 57 m/s  There is no evidence of stenosis  Valve visualization did not allow for optimal 3D imaging  •  Tricuspid Valve: There is mild regurgitation      Findings    Left Ventricle Left ventricular cavity size is normal  Wall thickness is normal  The left ventricular ejection fraction is 60%  Systolic function is normal   Wall motion is normal  Diastolic function is normal    Right Ventricle Right ventricular cavity size is normal  Systolic function is normal  Wall thickness is normal    Left Atrium The atrium is severely dilated  Atrial Septum No patent foramen ovale detected using color flow Doppler and saline contrast injection at rest    Left Atrial Appendage Left atrial appendage is normal in size  There is normal function  There is no thrombus  Aortic Valve The aortic valve is trileaflet  The leaflets are not thickened  The leaflets are not calcified  The leaflets exhibit normal mobility  There is mild regurgitation with a centrally directed jet  The aortic valve has no significant stenosis  Mitral Valve The posterior leaflet is flail  There is severe regurgitation with an eccentrically directed jet, with coanda effect and pulmonary vein flow reversal noted   The left atrium is severely dilated  PISA could not be performed due to the eccentric jet  No torn chordae were seen  MR Vmax 6 57 m/s  There is no evidence of stenosis  Valve visualization did not allow for optimal 3D imaging  Tricuspid Valve Tricuspid valve structure is normal  There is mild regurgitation  There is no evidence of stenosis  Pulmonic Valve Pulmonic valve structure is normal  There is trace regurgitation  There is no evidence of stenosis  Ascending Aorta The aortic root is normal in size  Small amount of atheroma was seen in the upper aspect of the descending thoracic aorta as well as the distal aspect of the aortic arch  Pericardium There is no pericardial effusion  The pericardium is normal in appearance  Pulmonary Veins There is systolic flow reversal in the left upper and left lower veins           I have personally reviewed pertinent films in PACS     PCP and Cardiology notes reviewed    Assessment:  Patient Active Problem List    Diagnosis Date Noted   • FH: cerebral aneurysm 02/22/2023   • Obesity, morbid ([address] ) 02/22/2023   • Drop in hemoglobin 01/06/2023   • Gastrointestinal hemorrhage associated with intestinal diverticulosis 12/31/2022   • Diverticulitis of colon with perforation 12/31/2022   • Blood in stool 12/30/2022   • Abnormal CBC 11/18/2022   • Atrial enlargement, left 11/18/2022   • History of COVID-19 09/29/2022   • Shortness of breath 09/28/2022   • Overweight with body mass index (BMI) of 27 to 27 9 in adult 03/08/2022   • Platelets decreased ([address] ) 07/30/2021   • HUMBERTO treated with BiPAP 09/29/2020   • Excessive daytime sleepiness 09/29/2020   • Need for shingles vaccine 02/04/2019   • Colon polyp 08/27/2018   • Left ventricular hypertrophy 02/06/2018   • Mitral valve regurgitation 02/06/2018   • BPH with urinary obstruction 10/31/2016   • Calculus of gallbladder with cholecystitis 04/29/2015   • Displacement of lumbar intervertebral disc without myelopathy 04/09/2015   • Idiopathic peripheral neuropathy 04/09/2015   • Diverticulosis of colon 01/26/2015   • Indigestion 12/23/2014   • Herpes zoster 12/18/2014   • Type 2 diabetes mellitus ([facility] ) 11/17/2014   • Allergic rhinitis 01/15/2013   • Male erectile disorder 01/15/2013   • Essential hypertension 21/73/2604   • Metabolic syndrome 01/79/6467   • Hyperlipidemia 01/15/2013       Impression/Plan:    Severe Mitral Regurgitation    Risks and benefits of mitral valve repair vs replacement were discussed in detail today with the patient  They understand and wish to proceed with further workup and ultimately surgical intervention  We have ordered routine preoperative laboratory and vascular studies: cardiac cath, CTA H&N (FH cerebral aneurysms) and non-contrast chest CT scan  Patient will return to the office for follow up after testing completed  Alexandru Steele was comfortable with our recommendations, and their questions were answered to their satisfaction  Thank you for allowing us to participate in the care of this patient         SIGNATURE: NISHANT Campos  DATE: February 22, 2023  TIME: 11:09 AM

## 2023-02-22 NOTE — LETTER
February 22, 2023     Barbara Mayer DO  [address] [facility] [address]    Patient: Robin Harrington   YOB: 1950   Date of Visit: 2/22/2023       Dear Dr Ana Han: Thank you for referring Robin Harrington to me for evaluation  Below are my notes for this consultation  If you have questions, please do not hesitate to call me  I look forward to following your patient along with you  Sincerely,        Bud Caicedo DO        CC: MD Carl Vargas, [facility]  2/22/2023 11:48 AM  Attested  Consultation - Cardiothoracic Surgery   Robin Harrington 81 y o  male MRN: [id number]    Physician Requesting Consult: Dr Ana Han    Reason for Consult / Principal Problem: Mitral regurgitation    History of Present Illness: Robin Harrington is a 81y o  year old male referred for surgical consultation for mitral regurgitation  Patient PMHx is notable for HTN, HLD, DM2, HUMBERTO (CPAP), Diverticulosis w/ GIB, BPH, thrombocytopenia (113), remote h/o skin melanoma (upper arm) s/p excision, psoriasis and elevated PSA  Patient developed SOB w/ cough in September 2022  He was seen by his PCP and tested (+) for Covid  Echo was ordered and performed in October, demonstrating markedly enlarged LA with mild to moderate MR  He was referred for Cardiology consultation, however, he ended up hospitalized in December with lower GIB  CT abd/pelvis demonstrated  findings c/w acute diverticulitis  He has since recovered from his GI illness and was seen in Jan 2023 for Cardiology consultation  3D TANG on 2/15/23 revealed EF 60%, flail posterior MV leaflet with severe MR, LA severely dilated and mild TR  Patient is accompanied by his wife today  He endorses the above history  He reports his PCP first identified a heart murmur in 2018  He denies h/o childhood murmur or rheumativc fever  He reports ongoing fatigue, decrease in activity tolerance,  MICHELLE, occasional lightheadedness and palpitations   He reports issues with LE edema that has improved with an intentional weight loss of 50 lb since June 2022, due to dietary changes  Patient normally practices yoga for an hour, 3 times per week and does HIIT workouts but has not been able to exercise due to his symptoms  Patient is an ,  and lives with his wife in a 3 story home  FH is notable for father undergoing MVR at age 80, no longer living; mother alive at age 80 with new onset Afib and  fatal cerebral aneurysms in multiple family members including brother & 3 paternal cousins and a 1th cousin found to have cerebral aneurysms on imaging, repaired surgically  Patient is a former smoker, quit in [address]; he drinks a glass of wine in the evening and admits to occasional marijuana use (vapes)  He is scheduled for a Colonoscopy 3/6/23  Recent dental care in December 2022        Past Medical History:  Past Medical History:   Diagnosis Date   • Acute bilateral low back pain without sciatica 03/09/2021   • BMI 33 0-33 9,adult 02/04/2020   • BPH (benign prostatic hyperplasia)    • COVID-19    • Diabetes mellitus associated with hormonal etiology ([facility] ) 03/05/2019   • Diverticulitis    • ED (erectile dysfunction)    • Elevated PSA 01/15/2013   • Encounter for well adult exam with abnormal findings 02/04/2019   • Herpes zoster 12/18/2014   • Hyperlipidemia    • Hypertension    • IFG (impaired fasting glucose)    • Metabolic syndrome    • Obesity (BMI 30 0-34 9) 02/04/2019   • Psoriasis    • Seasonal allergic rhinitis    • Type 2 diabetes mellitus ([facility] ) 11/17/2014         Past Surgical History:   Past Surgical History:   Procedure Laterality Date   • CHOLECYSTECTOMY  05/14/2015   • COLONOSCOPY  2007   • HERNIA REPAIR  05/14/2015         Family History:  Family History   Problem Relation Age of Onset   • Skin cancer Mother    • Heart disease Father          Social History:    Social History     Substance and Sexual Activity   Alcohol Use Yes    Comment: social     Social History     Substance and Sexual Activity   Drug Use No     Social History     Tobacco Use   Smoking Status Former   • Packs/day: 0 50   • Years: 12 00   • Pack years: 6 00   • Types: Pipe, Cigarettes   • Start date: 1968   • Quit date: 6/10/1980   • Years since quittin 7   Smokeless Tobacco Never       Home Medications:   Prior to Admission medications    Medication Sig Start Date End Date Taking? Authorizing Provider   amLODIPine (NORVASC) 5 mg tablet TAKE 1 TABLET DAILY 2/15/23  Yes Marixa Correa MD   aspirin (ECOTRIN LOW STRENGTH) 81 mg EC tablet take 1 tablet (81MG) 18  Yes Historical Provider, MD   atorvastatin (LIPITOR) 20 mg tablet TAKE 1 TABLET DAILY 22  Yes Marixa Correa MD   finasteride (PROSCAR) 5 mg tablet TAKE 1 TABLET DAILY 22  Yes Marixa Correa MD   fluticasone (FLONASE) 50 mcg/act nasal spray 1 spray into each nostril daily  Patient taking differently: 1 spray into each nostril if needed 19  Yes Marjorie Oreilly PA-C   hydrocortisone 2 5 % cream Apply topically every 24 hours  Patient taking differently: Apply topically if needed 18  Yes Marixa Correa MD   lisinopril (ZESTRIL) 20 mg tablet TAKE 1 TABLET DAILY 22  Yes Marixa Correa MD   sildenafil (VIAGRA) 100 mg tablet Take 1 tablet (100 mg total) by mouth as needed for erectile dysfunction 23  Yes Marixa Correa MD   tamsulosin (FLOMAX) 0 4 mg TAKE 1 CAPSULE DAILY WITH  DINNER 1/3/23  Yes Marixa Correa MD   albuterol (PROVENTIL HFA,VENTOLIN HFA) 90 mcg/act inhaler INHALE 2 PUFFS EVERY 6 HOURS AS NEEDED FOR WHEEZING  Patient not taking: Reported on 2023 10/20/22   Marixa Correa MD   ketoconazole (NIZORAL) 2 % shampoo WASH SCALP AND FACE 2-3 TIMES A WEEK AS NEEDED FOR FLARES  Patient not taking: Reported on 2023 10/23/19   Historical Provider, MD       Allergies:   Allergies   Allergen Reactions   • No Known Allergies        Review of Systems:  Review of Systems - History obtained from chart review and the patient  General ROS: positive for  - fatigue and change in activity tolerance   negative for - chills, fever or sleep disturbance  Psychological ROS: negative  Ophthalmic ROS: positive for - uses glasses  ENT ROS: negative  Allergy and Immunology ROS: negative  Hematological and Lymphatic ROS: negative  Endocrine ROS: negative  Breast ROS: negative  Respiratory ROS: no cough, shortness of breath, or wheezing  Cardiovascular ROS: positive for - dyspnea on exertion, edema, murmur and palpitations  negative for - chest pain, irregular heartbeat, orthopnea, paroxysmal nocturnal dyspnea or rapid heart rate  Gastrointestinal ROS: no abdominal pain, change in bowel habits, or black or bloody stools  Genito-Urinary ROS: no dysuria, trouble voiding, or hematuria  Musculoskeletal ROS: negative  Neurological ROS: positive for - occasional lightheadedness  Dermatological ROS: negative    Vital Signs:     Vitals:    02/22/23 1025 02/22/23 1026   BP: 128/64 130/79   BP Location: Left arm Right arm   Patient Position: Sitting Sitting   Cuff Size: Standard Standard   Pulse: (!) 54    Temp: (!) 96 9 °F (36 1 °C)    TempSrc: Tympanic    SpO2: 99%    Weight: 96 5 kg (212 lb 12 8 oz)    Height: 6' (1 829 m)        Physical Exam:    General: Alert, oriented, well developed, no acute distress  HEENT/NECK:  PERRLA  No jugular venous distention  Cardiac:Regular rate and rhythm, III/VI systolic murmur at apex  Carotid arteries: 2+ pulses, no bruits  Pulmonary:  Breath sounds clear bilaterally  Abdomen:  Non-tender, Non-distended  Positive bowel sounds  Upper extremities: 2+ radial pulses; brisk capillary refill; right hand dominance  Lower extremities: Extremities warm/dry  PT/DP pulses 2+ bilaterally  1+ edema B/L  Neuro: Alert and oriented X 3  Sensation is grossly intact  No focal deficits  Musculoskeletal: MAEE, stable gait  Skin: Warm/Dry, without rashes or lesions      Lab Results: Results from last 7 days   Lab Units 02/15/23  1130   WBC Thousand/uL 4 09*   HEMOGLOBIN g/dL 12 3   HEMATOCRIT % 37 4   PLATELETS Thousands/uL 113*     Results from last 7 days   Lab Units 02/15/23  1130   POTASSIUM mmol/L 4 8   CHLORIDE mmol/L 106   CO2 mmol/L 27   BUN mg/dL 16   CREATININE mg/dL 0 85   CALCIUM mg/dL 9 2         Lab Results   Component Value Date    HGBA1C 5 3 11/14/2022         Imaging Studies:     Echocardiogram: 10/26/23    Technically adequate transthoracic echocardiogram study      1  Mild concentric left ventricular hypertrophy with prominent basal interventricular septum, normal left ventricular systolic function, suspected  grade 2 diastolic dysfunction  Normal estimated left ventricular filling pressure  2   dilated right ventricular cavity, normal right ventricular systolic function  3  Marked left atrial and mild to moderate right atrial cavity enlargement  4  Trileaflet aortic valve with sclerosis, mild aortic valve regurgitation  5  Mitral valve sclerosis with slight redundancy of anterior mitral valve  Mild to moderate mitral valve regurgitation with eccentric regurgitation jet  6  Mild-to-moderate tricuspid and mild pulmonic valve regurgitation  7  Mild pulmonary hypertension  Estimated RVSP/PASP is 46 mmHg  8  No pericardial effusion      No previous echocardiogram is available for comparison        Findings    Left Ventricle Normal left ventricular cavity size, mild concentric left ventricular hypertrophy with slightly prominent basal interventricular septum, normal left ventricular systolic function and normal regional wall motion  Ejection fraction is estimated as around 60%  Grade 2 diastolic dysfunction with normal estimated left ventricular filling pressures  Right Ventricle Moderately dilated right ventricular, normal right ventricular systolic function  Estimated right ventricular systolic pressure is increased, 46 mmHg mild pulmonary hypertension     Left Atrium Severe left atrial cavity enlargement  LAESVI was  greater than 60 mL/meter squared  Right Atrium Mild to moderate right atrial cavity enlargement  Prominent eustachian valve noted in right atrial cavity  Aortic Valve Trileaflet aortic valve with normal leaflet thickness and mild sclerosis, adequate cuspal separation  Mild aortic valve regurgitation  Mitral Valve Normal mitral valve structure with slight redundancy of anterior mitral valve leaflet without definite mitral valve prolapse  Mild mitral valve leaflet sclerosis, adequate leaflet mobility  There is mild to moderate mitral valve regurgitation with eccentric, posteriorly directed regurgitation jet  Tricuspid Valve Normal tricuspid valve morphology and leaflet separation  Mild to moderate tricuspid valve regurgitation  Pulmonic Valve Mild thickness of the pulmonic valve  There is at least mild pulmonic valve regurgitation  Ascending Aorta Dilated aortic root and ectasia of ascending aorta measuring up to 4 3 cm  IVC/SVC Borderline dilated inferior vena cava with blunted respirophasic change in diameter  Pericardium The pericardium is normal in appearance  There is no pericardial effusion       Left Ventricle Measurements    Function/Volumes   A4C EF 69 %         Dimensions   LVIDd 4 9 cm         LVIDS 3 6 cm         IVSd 1 4 cm         LVPWd 1 3 cm         FS 27 %         Diastolic Filling   MV E' Tissue Velocity Septal 8 cm/s         E wave deceleration time 184 ms         MV Peak E Guilherme 75 cm/s         MV Peak A Guilherme 0 4 m/s               Right Ventricle Measurements    Dimensions   RVID d 5 8 cm               Left Atrium Measurements    Dimensions   LA size 4 5 cm         LA length (A2C) 7 2 cm               Right Atrium Measurements    Dimensions   RA 2D Volume 101 mL         RAA A4C 27 2 cm2               Aortic Valve Measurements    Regurgitation   AV peak gradient 68 mmHg         AV Deceleration Time 3,292 ms         AV regurgitation pressure 1/2 time 955 ms               Mitral Valve Measurements    Stenosis   MV stenosis pressure 1/2 time 53 ms         MV valve area p 1/2 method 4 15 cm2               Tricuspid Valve Measurements    RVSP Parameters   TR Peak Guilherme 3 m/s         Triscuspid Valve Regurgitation Peak Gradient 36 mmHg               Aorta Measurements    Aortic Dimensions   Ao root 4 cm         Asc Ao 4 3 cm                3D TANG: 2/15/23    •  Left Ventricle: Left ventricular cavity size is normal  Wall thickness is normal  The left ventricular ejection fraction is 60%  Systolic function is normal  Wall motion is normal  Diastolic function is normal   •  Left Atrium: The atrium is severely dilated  •  Atrial Septum: No patent foramen ovale detected using color flow Doppler and saline contrast injection at rest   •  Left Atrial Appendage: There is normal function  There is no thrombus  •  Aortic Valve: There is mild regurgitation with a centrally directed jet  •  Mitral Valve: The posterior leaflet is flail  There is severe regurgitation with an eccentrically directed jet, with coanda effect and pulmonary vein flow reversal noted  The left atrium is severely dilated  PISA could not be performed due to the eccentric jet  No torn chordae were seen  MR Vmax 6 57 m/s  There is no evidence of stenosis  Valve visualization did not allow for optimal 3D imaging  •  Tricuspid Valve: There is mild regurgitation      Findings    Left Ventricle Left ventricular cavity size is normal  Wall thickness is normal  The left ventricular ejection fraction is 60%  Systolic function is normal   Wall motion is normal  Diastolic function is normal    Right Ventricle Right ventricular cavity size is normal  Systolic function is normal  Wall thickness is normal    Left Atrium The atrium is severely dilated     Atrial Septum No patent foramen ovale detected using color flow Doppler and saline contrast injection at rest    Left Atrial Appendage Left atrial appendage is normal in size  There is normal function  There is no thrombus  Aortic Valve The aortic valve is trileaflet  The leaflets are not thickened  The leaflets are not calcified  The leaflets exhibit normal mobility  There is mild regurgitation with a centrally directed jet  The aortic valve has no significant stenosis  Mitral Valve The posterior leaflet is flail  There is severe regurgitation with an eccentrically directed jet, with coanda effect and pulmonary vein flow reversal noted  The left atrium is severely dilated  PISA could not be performed due to the eccentric jet  No torn chordae were seen  MR Vmax 6 57 m/s  There is no evidence of stenosis  Valve visualization did not allow for optimal 3D imaging  Tricuspid Valve Tricuspid valve structure is normal  There is mild regurgitation  There is no evidence of stenosis  Pulmonic Valve Pulmonic valve structure is normal  There is trace regurgitation  There is no evidence of stenosis  Ascending Aorta The aortic root is normal in size  Small amount of atheroma was seen in the upper aspect of the descending thoracic aorta as well as the distal aspect of the aortic arch  Pericardium There is no pericardial effusion  The pericardium is normal in appearance  Pulmonary Veins There is systolic flow reversal in the left upper and left lower veins           I have personally reviewed pertinent films in PACS     PCP and Cardiology notes reviewed    Assessment:  Patient Active Problem List    Diagnosis Date Noted   • FH: cerebral aneurysm 02/22/2023   • Obesity, morbid ([address] ) 02/22/2023   • Drop in hemoglobin 01/06/2023   • Gastrointestinal hemorrhage associated with intestinal diverticulosis 12/31/2022   • Diverticulitis of colon with perforation 12/31/2022   • Blood in stool 12/30/2022   • Abnormal CBC 11/18/2022   • Atrial enlargement, left 11/18/2022   • History of COVID-19 09/29/2022   • Shortness of breath 09/28/2022   • Overweight with body mass index (BMI) of 27 to 27 9 in adult 03/08/2022   • Platelets decreased ([facility] ) 07/30/2021   • HUMBERTO treated with BiPAP 09/29/2020   • Excessive daytime sleepiness 09/29/2020   • Need for shingles vaccine 02/04/2019   • Colon polyp 08/27/2018   • Left ventricular hypertrophy 02/06/2018   • Mitral valve regurgitation 02/06/2018   • BPH with urinary obstruction 10/31/2016   • Calculus of gallbladder with cholecystitis 04/29/2015   • Displacement of lumbar intervertebral disc without myelopathy 04/09/2015   • Idiopathic peripheral neuropathy 04/09/2015   • Diverticulosis of colon 01/26/2015   • Indigestion 12/23/2014   • Herpes zoster 12/18/2014   • Type 2 diabetes mellitus ([facility] ) 11/17/2014   • Allergic rhinitis 01/15/2013   • Male erectile disorder 01/15/2013   • Essential hypertension 15/49/0637   • Metabolic syndrome 99/18/8673   • Hyperlipidemia 01/15/2013       Impression/Plan:    Severe Mitral Regurgitation    Risks and benefits of mitral valve repair vs replacement were discussed in detail today with the patient  They understand and wish to proceed with further workup and ultimately surgical intervention  We have ordered routine preoperative laboratory and vascular studies: cardiac cath, CTA H&N (FH cerebral aneurysms) and non-contrast chest CT scan  Patient will return to the office for follow up after testing completed  Sara Simmons was comfortable with our recommendations, and their questions were answered to their satisfaction  Thank you for allowing us to participate in the care of this patient  SIGNATURE: NISHANT Oliver  DATE: February 22, 2023  TIME: 11:09 AM  Attestation signed by Lilly Shannon DO at 2/22/2023 12:53 PM:  I supervised the Advanced Practitioner  ? I performed, in its entirety, the assessment/plan component of the visit    I agree with the Advanced Practitioner's note with the following additions/exceptions:      Mr Sawant Boots to the office today accompanied by his wife   Referred for mitral regurgitation  His echocardiogram was reviewed by myself personally and findings shared with him  Demonstrates severe aortic regurgitation secondary to P2 prolapse and likely torn cord  We discussed treatment options I recommended mitral valve repair  There is high likelihood of repair of this mitral valve  The risks, benefits, alternatives to mitral valve repair been discussed  He consents and is willing to proceed with the remainder of his work-up in anticipation of mitral valve repair      Bud Caicedo DO 02/22/23

## 2023-02-23 ENCOUNTER — TELEPHONE (OUTPATIENT)
Dept: CARDIAC SURGERY | Facility: CLINIC | Age: 73
End: 2023-02-23

## 2023-02-23 ENCOUNTER — TELEPHONE (OUTPATIENT)
Dept: CARDIOLOGY CLINIC | Facility: CLINIC | Age: 73
End: 2023-02-23

## 2023-02-23 NOTE — TELEPHONE ENCOUNTER
Patient was seen by Dr Esther Romero and needs a Cardiac cath done before his follow up appointment schedule for 3/8/23  Please contact the patient to schedule

## 2023-02-27 ENCOUNTER — TELEPHONE (OUTPATIENT)
Dept: CARDIOLOGY CLINIC | Facility: CLINIC | Age: 73
End: 2023-02-27

## 2023-02-27 ENCOUNTER — PREP FOR PROCEDURE (OUTPATIENT)
Dept: CARDIOLOGY CLINIC | Facility: CLINIC | Age: 73
End: 2023-02-27

## 2023-02-27 ENCOUNTER — HOSPITAL ENCOUNTER (OUTPATIENT)
Dept: CT IMAGING | Facility: HOSPITAL | Age: 73
Discharge: HOME | End: 2023-02-27

## 2023-02-27 ENCOUNTER — LAB (OUTPATIENT)
Dept: LAB | Facility: HOSPITAL | Age: 73
End: 2023-02-27

## 2023-02-27 DIAGNOSIS — I10 ESSENTIAL HYPERTENSION: ICD-10-CM

## 2023-02-27 DIAGNOSIS — R79.89 ABNORMAL CBC: ICD-10-CM

## 2023-02-27 DIAGNOSIS — I34.0 NONRHEUMATIC MITRAL VALVE REGURGITATION: ICD-10-CM

## 2023-02-27 DIAGNOSIS — I34.0 NONRHEUMATIC MITRAL VALVE REGURGITATION: Primary | ICD-10-CM

## 2023-02-27 DIAGNOSIS — R06.02 SHORTNESS OF BREATH: ICD-10-CM

## 2023-02-27 DIAGNOSIS — R71.0 DROP IN HEMOGLOBIN: ICD-10-CM

## 2023-02-27 DIAGNOSIS — Z82.49 FH: CEREBRAL ANEURYSM: ICD-10-CM

## 2023-02-27 DIAGNOSIS — Z86.16 HISTORY OF COVID-19: ICD-10-CM

## 2023-02-27 DIAGNOSIS — E78.2 MIXED HYPERLIPIDEMIA: ICD-10-CM

## 2023-02-27 DIAGNOSIS — G47.33 OSA TREATED WITH BIPAP: ICD-10-CM

## 2023-02-27 DIAGNOSIS — I34.0 SEVERE MITRAL REGURGITATION: Primary | ICD-10-CM

## 2023-02-27 DIAGNOSIS — E11.9 TYPE 2 DIABETES MELLITUS WITHOUT COMPLICATION, WITHOUT LONG-TERM CURRENT USE OF INSULIN (HCC): ICD-10-CM

## 2023-02-27 DIAGNOSIS — D69.6 PLATELETS DECREASED (HCC): ICD-10-CM

## 2023-02-27 RX ADMIN — IOHEXOL 85 ML: 350 INJECTION, SOLUTION INTRAVENOUS at 12:43

## 2023-02-27 NOTE — TELEPHONE ENCOUNTER
Patient scheduled for R+LHC on 3/3/23 at [facility] with Dr Armando Richardson  Instructions sent to patient through [address]  Per patient, Medicare as primary insurance

## 2023-02-28 ENCOUNTER — APPOINTMENT (OUTPATIENT)
Dept: LAB | Facility: CLINIC | Age: 73
End: 2023-02-28

## 2023-02-28 LAB
ALBUMIN SERPL BCP-MCNC: 3.6 G/DL (ref 3.5–5)
ALP SERPL-CCNC: 96 U/L (ref 46–116)
ALT SERPL W P-5'-P-CCNC: 36 U/L (ref 12–78)
ANION GAP SERPL CALCULATED.3IONS-SCNC: 4 MMOL/L (ref 4–13)
AST SERPL W P-5'-P-CCNC: 29 U/L (ref 5–45)
BASOPHILS # BLD AUTO: 0.02 THOUSANDS/ÂΜL (ref 0–0.1)
BASOPHILS NFR BLD AUTO: 1 % (ref 0–1)
BILIRUB SERPL-MCNC: 0.41 MG/DL (ref 0.2–1)
BUN SERPL-MCNC: 16 MG/DL (ref 5–25)
CALCIUM SERPL-MCNC: 9 MG/DL (ref 8.3–10.1)
CHLORIDE SERPL-SCNC: 106 MMOL/L (ref 96–108)
CHOLEST SERPL-MCNC: 88 MG/DL
CO2 SERPL-SCNC: 28 MMOL/L (ref 21–32)
CREAT SERPL-MCNC: 0.83 MG/DL (ref 0.6–1.3)
EOSINOPHIL # BLD AUTO: 0.25 THOUSAND/ÂΜL (ref 0–0.61)
EOSINOPHIL NFR BLD AUTO: 6 % (ref 0–6)
ERYTHROCYTE [DISTWIDTH] IN BLOOD BY AUTOMATED COUNT: 13.2 % (ref 11.6–15.1)
FERRITIN SERPL-MCNC: 20 NG/ML (ref 8–388)
GFR SERPL CREATININE-BSD FRML MDRD: 89 ML/MIN/1.73SQ M
GLUCOSE P FAST SERPL-MCNC: 93 MG/DL (ref 65–99)
HCT VFR BLD AUTO: 39.2 % (ref 36.5–49.3)
HDLC SERPL-MCNC: 49 MG/DL
HGB BLD-MCNC: 12 G/DL (ref 12–17)
IMM GRANULOCYTES # BLD AUTO: 0.01 THOUSAND/UL (ref 0–0.2)
IMM GRANULOCYTES NFR BLD AUTO: 0 % (ref 0–2)
INR PPP: 1.02 (ref 0.84–1.19)
IRON SATN MFR SERPL: 16 % (ref 20–50)
IRON SERPL-MCNC: 54 UG/DL (ref 65–175)
LDLC SERPL CALC-MCNC: 33 MG/DL (ref 0–100)
LYMPHOCYTES # BLD AUTO: 1.3 THOUSANDS/ÂΜL (ref 0.6–4.47)
LYMPHOCYTES NFR BLD AUTO: 30 % (ref 14–44)
MCH RBC QN AUTO: 31.3 PG (ref 26.8–34.3)
MCHC RBC AUTO-ENTMCNC: 30.6 G/DL (ref 31.4–37.4)
MCV RBC AUTO: 102 FL (ref 82–98)
MONOCYTES # BLD AUTO: 0.51 THOUSAND/ÂΜL (ref 0.17–1.22)
MONOCYTES NFR BLD AUTO: 12 % (ref 4–12)
NEUTROPHILS # BLD AUTO: 2.23 THOUSANDS/ÂΜL (ref 1.85–7.62)
NEUTS SEG NFR BLD AUTO: 51 % (ref 43–75)
NONHDLC SERPL-MCNC: 39 MG/DL
NRBC BLD AUTO-RTO: 0 /100 WBCS
PLATELET # BLD AUTO: 121 THOUSANDS/UL (ref 149–390)
PMV BLD AUTO: 11.2 FL (ref 8.9–12.7)
POTASSIUM SERPL-SCNC: 3.6 MMOL/L (ref 3.5–5.3)
PROT SERPL-MCNC: 6.4 G/DL (ref 6.4–8.4)
PROTHROMBIN TIME: 13.7 SECONDS (ref 11.6–14.5)
RBC # BLD AUTO: 3.83 MILLION/UL (ref 3.88–5.62)
SODIUM SERPL-SCNC: 138 MMOL/L (ref 135–147)
TIBC SERPL-MCNC: 340 UG/DL (ref 250–450)
TRIGL SERPL-MCNC: 29 MG/DL
TSH SERPL DL<=0.05 MIU/L-ACNC: 1.78 UIU/ML (ref 0.45–4.5)
WBC # BLD AUTO: 4.32 THOUSAND/UL (ref 4.31–10.16)

## 2023-03-01 DIAGNOSIS — E61.1 LOW IRON: Primary | ICD-10-CM

## 2023-03-01 RX ORDER — FERROUS SULFATE TAB EC 324 MG (65 MG FE EQUIVALENT) 324 (65 FE) MG
324 TABLET DELAYED RESPONSE ORAL
Qty: 30 TABLET | Refills: 1 | Status: SHIPPED | OUTPATIENT
Start: 2023-03-01

## 2023-03-01 NOTE — TELEPHONE ENCOUNTER
----- Message from Roman Merlos MD sent at 3/1/2023 12:36 PM EST -----  Low iron ,recommend to start Ferous sulfate 325 mg po/day

## 2023-03-02 DIAGNOSIS — I10 ESSENTIAL HYPERTENSION: ICD-10-CM

## 2023-03-02 DIAGNOSIS — N40.0 BENIGN PROSTATIC HYPERPLASIA, UNSPECIFIED WHETHER LOWER URINARY TRACT SYMPTOMS PRESENT: ICD-10-CM

## 2023-03-02 RX ORDER — LISINOPRIL 20 MG/1
TABLET ORAL
Qty: 90 TABLET | Refills: 0 | Status: SHIPPED | OUTPATIENT
Start: 2023-03-02

## 2023-03-02 RX ORDER — FINASTERIDE 5 MG/1
TABLET, FILM COATED ORAL
Qty: 90 TABLET | Refills: 0 | Status: SHIPPED | OUTPATIENT
Start: 2023-03-02

## 2023-03-02 NOTE — PROGRESS NOTES
voicemail left after prior unsuccessful call attempts: voicemail information included, arrival time of 0700, need for transportation, directions to [facility] unit, and NPO status  Call back number provided

## 2023-03-03 ENCOUNTER — HOSPITAL ENCOUNTER (OUTPATIENT)
Facility: HOSPITAL | Age: 73
Setting detail: OUTPATIENT SURGERY
Discharge: HOME | End: 2023-03-03
Attending: INTERNAL MEDICINE | Admitting: INTERNAL MEDICINE

## 2023-03-03 VITALS
BODY MASS INDEX: 26.51 KG/M2 | DIASTOLIC BLOOD PRESSURE: 74 MMHG | HEART RATE: 49 BPM | SYSTOLIC BLOOD PRESSURE: 128 MMHG | OXYGEN SATURATION: 94 % | HEIGHT: 73 IN | TEMPERATURE: 97.7 F | WEIGHT: 200 LBS | RESPIRATION RATE: 46 BRPM

## 2023-03-03 DIAGNOSIS — J40 BRONCHITIS: ICD-10-CM

## 2023-03-03 DIAGNOSIS — L30.9 DERMATITIS: ICD-10-CM

## 2023-03-03 DIAGNOSIS — J01.90 ACUTE NON-RECURRENT SINUSITIS, UNSPECIFIED LOCATION: ICD-10-CM

## 2023-03-03 DIAGNOSIS — I34.0 SEVERE MITRAL REGURGITATION: ICD-10-CM

## 2023-03-03 LAB
ATRIAL RATE: 48 BPM
P AXIS: 78 DEGREES
PR INTERVAL: 160 MS
QRS AXIS: 31 DEGREES
QRSD INTERVAL: 122 MS
QT INTERVAL: 462 MS
QTC INTERVAL: 412 MS
T WAVE AXIS: 38 DEGREES
VENTRICULAR RATE: 48 BPM

## 2023-03-03 RX ORDER — VERAPAMIL HYDROCHLORIDE 2.5 MG/ML
INJECTION, SOLUTION INTRAVENOUS CODE/TRAUMA/SEDATION MEDICATION
Status: DISCONTINUED | OUTPATIENT
Start: 2023-03-03 | End: 2023-03-03 | Stop reason: HOSPADM

## 2023-03-03 RX ORDER — LIDOCAINE HYDROCHLORIDE 10 MG/ML
INJECTION, SOLUTION EPIDURAL; INFILTRATION; INTRACAUDAL; PERINEURAL CODE/TRAUMA/SEDATION MEDICATION
Status: DISCONTINUED | OUTPATIENT
Start: 2023-03-03 | End: 2023-03-03 | Stop reason: HOSPADM

## 2023-03-03 RX ORDER — FLUTICASONE PROPIONATE 50 MCG
1 SPRAY, SUSPENSION (ML) NASAL AS NEEDED
Start: 2023-03-03

## 2023-03-03 RX ORDER — HEPARIN SODIUM 1000 [USP'U]/ML
INJECTION, SOLUTION INTRAVENOUS; SUBCUTANEOUS CODE/TRAUMA/SEDATION MEDICATION
Status: DISCONTINUED | OUTPATIENT
Start: 2023-03-03 | End: 2023-03-03 | Stop reason: HOSPADM

## 2023-03-03 RX ORDER — NITROGLYCERIN 20 MG/100ML
INJECTION INTRAVENOUS CODE/TRAUMA/SEDATION MEDICATION
Status: DISCONTINUED | OUTPATIENT
Start: 2023-03-03 | End: 2023-03-03 | Stop reason: HOSPADM

## 2023-03-03 RX ORDER — ASPIRIN 81 MG/1
324 TABLET, CHEWABLE ORAL ONCE
Status: COMPLETED | OUTPATIENT
Start: 2023-03-03 | End: 2023-03-03

## 2023-03-03 RX ORDER — SODIUM CHLORIDE 9 MG/ML
125 INJECTION, SOLUTION INTRAVENOUS CONTINUOUS
Status: DISCONTINUED | OUTPATIENT
Start: 2023-03-03 | End: 2023-03-03 | Stop reason: HOSPADM

## 2023-03-03 RX ORDER — MIDAZOLAM HYDROCHLORIDE 2 MG/2ML
INJECTION, SOLUTION INTRAMUSCULAR; INTRAVENOUS CODE/TRAUMA/SEDATION MEDICATION
Status: DISCONTINUED | OUTPATIENT
Start: 2023-03-03 | End: 2023-03-03 | Stop reason: HOSPADM

## 2023-03-03 RX ORDER — FENTANYL CITRATE 50 UG/ML
INJECTION, SOLUTION INTRAMUSCULAR; INTRAVENOUS CODE/TRAUMA/SEDATION MEDICATION
Status: DISCONTINUED | OUTPATIENT
Start: 2023-03-03 | End: 2023-03-03 | Stop reason: HOSPADM

## 2023-03-03 RX ADMIN — SODIUM CHLORIDE 125 ML/HR: 0.9 INJECTION, SOLUTION INTRAVENOUS at 07:09

## 2023-03-03 RX ADMIN — ASPIRIN 81 MG CHEWABLE TABLET 324 MG: 81 TABLET CHEWABLE at 07:08

## 2023-03-03 NOTE — INTERVAL H&P NOTE
H&P reviewed  After examining the patient I find no changes in the patients condition since the H&P had been written      Vitals:    03/03/23 0720   BP: 138/68   Pulse: (!) 48   Resp: 16   Temp: 98 °F (36 7 °C)   SpO2: 98%       For pre-op coronary angiogram, pending mitral valve surgery for severe MR (posteriorly directed with P2 prolapse/flail chord)  -2/15 TANG: LVEF 60%, no WMA, no DD,  normal RV size & function, 1+ TR    ECG 03/03/23  Sinus bradycardia with IVCD, HR 48        Ben Eastman MD / 03/03/23 / 8:27 AM

## 2023-03-03 NOTE — DISCHARGE INSTR - AVS FIRST PAGE
1  Please see the post cardiac catheterization dishcarge instructions  No heavy lifting, greater than 10 lbs  or strenuous  activity for 48 hrs  2 Remove band aid tomorrow  Shower and wash area- wrist gently with soap and water- beginning tomorrow  Rinse and pat dry  Apply new water seal band aid  Repeat this process for 5 days  No powders, creams lotions or antibiotic ointments  for 5 days  No tub baths, hot tubs or swimming for 5 days  3  Please call our office ([phone]) if you have any fever, redness, swelling, discharge from your wrist access site      4 No driving for 1 day

## 2023-03-08 ENCOUNTER — OFFICE VISIT (OUTPATIENT)
Dept: CARDIAC SURGERY | Facility: CLINIC | Age: 73
End: 2023-03-08

## 2023-03-08 VITALS
OXYGEN SATURATION: 99 % | DIASTOLIC BLOOD PRESSURE: 65 MMHG | WEIGHT: 212.6 LBS | HEART RATE: 51 BPM | TEMPERATURE: 97.1 F | BODY MASS INDEX: 28.18 KG/M2 | SYSTOLIC BLOOD PRESSURE: 125 MMHG | HEIGHT: 73 IN

## 2023-03-08 DIAGNOSIS — I34.0 NONRHEUMATIC MITRAL VALVE REGURGITATION: Primary | ICD-10-CM

## 2023-03-08 RX ORDER — CEFAZOLIN SODIUM 2 G/50ML
2000 SOLUTION INTRAVENOUS ONCE
OUTPATIENT
Start: 2023-03-08 | End: 2023-03-08

## 2023-03-08 RX ORDER — CHLORHEXIDINE GLUCONATE 0.12 MG/ML
15 RINSE ORAL ONCE
OUTPATIENT
Start: 2023-03-08 | End: 2023-03-08

## 2023-03-08 NOTE — PROGRESS NOTES
Pre-Op History & Physical - Cardiothoracic Surgery   Magdalena Saleem 81 y o  male MRN: [id number]    Physician Requesting Consult: Dr Sonia Clemons    Reason for Consult / Principal Problem: Mitral regurgitation    History of Present Illness: Magdalena Saleem is a 81y o  year old male , seen in our office on 2/22/23 for initial surgical consultation for severe mitral regurgitation  Patient returns today to review pre-op testing  In review, patient's PMHx is notable for HTN, HLD, DM2, HUMBERTO (CPAP), diverticulosis w/ GIB, BPH, thrombocytopenia (113), remote h/o skin melanoma (upper arm) - s/p excision, psoriasis and elevated PSA  Patient developed SOB w/ cough in September 2022  He was seen by his PCP and tested (+) for Covid  Echo was ordered and performed in October, demonstrating markedly enlarged LA with mild to moderate MR  He was referred for Cardiology consultation, however, he ended up hospitalized in December with lower GIB  CT abd/pelvis demonstrated  findings c/w acute diverticulitis  He has since recovered from his GI illness and was seen in Jan 2023 for Cardiology consultation  3D TANG on 2/15/23 revealed EF 60%, flail posterior MV leaflet with severe MR, LA severely dilated and mild TR      Patient is accompanied by his wife today  He endorses the above history  He reports his PCP first identified a heart murmur in 2018  He denies h/o childhood murmur or rheumativc fever  He reports ongoing fatigue, decrease in activity tolerance,  MICHELLE, occasional lightheadedness and palpitations  He reports issues with LE edema that has improved with an intentional weight loss of 50 lb since June 2022, due to dietary changes     Patient normally practices yoga for an hour, 3 times per week and does HIIT workouts but has not been able to exercise due to his symptoms       Patient is a practicing , is  and lives with his wife in a 3 story home      FH is notable for father undergoing MVR at age 80, no longer living; mother alive at age 80 with new onset Afib and h/o fatal cerebral aneurysms in multiple family members including brother & 3 paternal cousins and a 1th cousin found to have cerebral aneurysms on imaging, repaired surgically  Today additional FH is shared: paternal grandmother underwent surgery in the 1950's for "heart aneurysm "      Patient is a former smoker, quit in 1980; he drinks a glass of wine in the evening and admits to occasional marijuana use (vapes)     Up to date with dental care  Past Medical History:  Past Medical History:   Diagnosis Date   • Acute bilateral low back pain without sciatica 03/09/2021   • BMI 33 0-33 9,adult 02/04/2020   • BPH (benign prostatic hyperplasia)    • COVID-19    • Diabetes mellitus associated with hormonal etiology ([facility] ) 03/05/2019   • Diverticulitis    • ED (erectile dysfunction)    • Elevated PSA 01/15/2013   • Encounter for well adult exam with abnormal findings 02/04/2019   • Herpes zoster 12/18/2014   • Hyperlipidemia    • Hypertension    • IFG (impaired fasting glucose)    • Metabolic syndrome    • Obesity (BMI 30 0-34 9) 02/04/2019   • Psoriasis    • Seasonal allergic rhinitis    • Type 2 diabetes mellitus ([facility] ) 11/17/2014         Past Surgical History:   Past Surgical History:   Procedure Laterality Date   • CARDIAC CATHETERIZATION N/A 03/03/2023    Procedure: Cardiac Coronary Angiogram;  Surgeon: Samuel Justice DO;  Location: [facility];   Service: Cardiology   • CHOLECYSTECTOMY  05/14/2015   • COLONOSCOPY  2007   • COLONOSCOPY  06/23/2021   • HERNIA REPAIR  05/14/2015         Family History:  Family History   Problem Relation Age of Onset   • Skin cancer Mother    • Heart disease Father          Social History:    Social History     Substance and Sexual Activity   Alcohol Use Yes    Comment: social     Social History     Substance and Sexual Activity   Drug Use No     Social History     Tobacco Use   Smoking Status Former   • Packs/day: 0 50   • Years: 12 00   • Pack years: 6 00   • Types: Pipe, Cigarettes   • Start date: 1968   • Quit date: 6/10/1980   • Years since quittin 7   Smokeless Tobacco Never       Home Medications:   Prior to Admission medications    Medication Sig Start Date End Date Taking? Authorizing Provider   amLODIPine (NORVASC) 5 mg tablet TAKE 1 TABLET DAILY 2/15/23   Dejon Boone MD   aspirin (ECOTRIN LOW STRENGTH) 81 mg EC tablet take 1 tablet (81MG) 18   Historical Provider, MD   atorvastatin (LIPITOR) 20 mg tablet TAKE 1 TABLET DAILY 22   Dejon Boone MD   ferrous sulfate 324 (65 Fe) mg Take 1 tablet (324 mg total) by mouth daily before breakfast 3/1/23   Dejon Boone MD   finasteride (PROSCAR) 5 mg tablet TAKE 1 TABLET DAILY 3/2/23   Dejon Boone MD   fluticasone Vallie Mas) 50 mcg/act nasal spray 1 spray into each nostril if needed for allergies 3/3/23   NISHANT Doll   hydrocortisone 2 5 % cream Apply topically if needed for rash 3/3/23   NISHANT Doll   lisinopril (ZESTRIL) 20 mg tablet TAKE 1 TABLET DAILY 3/2/23   Dejon Boone MD   sildenafil (VIAGRA) 100 mg tablet Take 1 tablet (100 mg total) by mouth as needed for erectile dysfunction 23   Dejon Boone MD   tamsulosin (FLOMAX) 0 4 mg TAKE 1 CAPSULE DAILY WITH  DINNER 1/3/23   Dejon Boone MD       Allergies:   Allergies   Allergen Reactions   • No Known Allergies        Review of Systems:  Review of Systems - History obtained from chart review and the patient  General ROS: positive for  - fatigue and change in activity tolerance  negative for - chills, fever, sleep disturbance or weight gain  Psychological ROS: negative  Ophthalmic ROS: positive for - uses glasses  ENT ROS: negative  Allergy and Immunology ROS: negative  Hematological and Lymphatic ROS: negative  Endocrine ROS: negative  Breast ROS: negative  Respiratory ROS: no cough, shortness of breath, or wheezing  Cardiovascular ROS: positive for - dyspnea on exertion, edema, murmur and palpitations  negative for - chest pain, irregular heartbeat, loss of consciousness, orthopnea, paroxysmal nocturnal dyspnea or rapid heart rate  Gastrointestinal ROS: no abdominal pain, change in bowel habits, or black or bloody stools  Genito-Urinary ROS: no dysuria, trouble voiding, or hematuria  Musculoskeletal ROS: negative  Neurological ROS: positive for - occasional lightheadedness  Dermatological ROS: negative    Vital Signs:     Vitals:    03/08/23 1009 03/08/23 1014   BP: 118/62 125/65   BP Location: Left arm Right arm   Patient Position: Sitting Sitting   Cuff Size: Large Large   Pulse: (!) 51    Temp: (!) 97 1 °F (36 2 °C)    TempSrc: Tympanic    SpO2: 99%    Weight: 96 4 kg (212 lb 9 6 oz)    Height: 6' 1" (1 854 m)        Physical Exam:    General:  Alert, oriented, well developed, no acute thcutu6rh  HEENT/NECK:  PERRLA  No jugular venous distention  Cardiac:Regular rate and rhythm, III/VI systolic murmur at apex  Carotid arteries: 2+ pulse,s no bruits  Pulmonary:  Breath sounds clear bilaterally  Abdomen:  Non-tender, Non-distended  Positive bowel sounds  Upper extremities: 2+ radial pulses; brisk capillary refill; RIGHT hand dominant  Lower extremities: Extremities warm/dry  PT/DP pulses 2+ bilaterally  1+ edema B/L  Musculoskeletal: MAEE, stable gait  Neuro: Alert and oriented X 3  Sensation is grossly intact  No focal deficits  Skin: Warm/Dry, without rashes or lesions      Lab Results:   Lab Results   Component Value Date    WBC 4 32 02/28/2023    HGB 12 0 02/28/2023    HCT 39 2 02/28/2023     (H) 02/28/2023     (L) 02/28/2023     Lab Results   Component Value Date     05/14/2018    SODIUM 138 02/28/2023    K 3 6 02/28/2023     02/28/2023    CO2 28 02/28/2023    ANIONGAP 9 05/14/2018    AGAP 4 02/28/2023    BUN 16 02/28/2023    CREATININE 0 83 02/28/2023    GLUC 105 02/15/2023    GLUF 93 02/28/2023    CALCIUM 9 0 02/28/2023    AST 29 02/28/2023    ALT 36 02/28/2023    ALKPHOS 96 02/28/2023    PROT 5 8 (L) 05/14/2018    TP 6 4 02/28/2023    BILITOT 0 4 05/14/2018    TBILI 0 41 02/28/2023    EGFR 89 02/28/2023     Lab Results   Component Value Date    INR 1 02 02/28/2023    INR 1 04 12/30/2022    PROTIME 13 7 02/28/2023    PROTIME 13 9 12/30/2022     Lab Results   Component Value Date    HGBA1C 5 3 11/14/2022     Lab Results   Component Value Date    CHOLESTEROL 88 02/28/2023    CHOLESTEROL 92 11/14/2022    CHOLESTEROL 73 07/11/2022     Lab Results   Component Value Date    HDL 49 02/28/2023    HDL 49 11/14/2022    HDL 41 07/11/2022     Lab Results   Component Value Date    TRIG 29 02/28/2023    TRIG 70 11/14/2022    TRIG 65 07/11/2022     Lab Results   Component Value Date    NONHDLC 39 02/28/2023    [place] 43 11/14/2022    [place] 52 03/04/2022       Imaging Studies:     Cardiac Catheterization: 3/3/23     Large dist LMCA aneurysm (approximately 15mm diameter in width)  involving the prox LAD & LCx; otherwise mild plaque     TANG: 2/15/23  •  Left Ventricle: Left ventricular cavity size is normal  Wall thickness is normal  The left ventricular ejection fraction is 60%  Systolic function is normal  Wall motion is normal  Diastolic function is normal   •  Left Atrium: The atrium is severely dilated  •  Atrial Septum: No patent foramen ovale detected using color flow Doppler and saline contrast injection at rest   •  Left Atrial Appendage: There is normal function  There is no thrombus  •  Aortic Valve: There is mild regurgitation with a centrally directed jet  •  Mitral Valve: The posterior leaflet is flail  There is severe regurgitation with an eccentrically directed jet, with coanda effect and pulmonary vein flow reversal noted  The left atrium is severely dilated  PISA could not be performed due to the eccentric jet  No torn chordae were seen  MR Vmax 6 57 m/s  There is no evidence of stenosis   Valve visualization did not allow for optimal 3D imaging  •  Tricuspid Valve: There is mild regurgitation      Findings    Left Ventricle Left ventricular cavity size is normal  Wall thickness is normal  The left ventricular ejection fraction is 60%  Systolic function is normal   Wall motion is normal  Diastolic function is normal    Right Ventricle Right ventricular cavity size is normal  Systolic function is normal  Wall thickness is normal    Left Atrium The atrium is severely dilated  Atrial Septum No patent foramen ovale detected using color flow Doppler and saline contrast injection at rest    Left Atrial Appendage Left atrial appendage is normal in size  There is normal function  There is no thrombus  Aortic Valve The aortic valve is trileaflet  The leaflets are not thickened  The leaflets are not calcified  The leaflets exhibit normal mobility  There is mild regurgitation with a centrally directed jet  The aortic valve has no significant stenosis  Mitral Valve The posterior leaflet is flail  There is severe regurgitation with an eccentrically directed jet, with coanda effect and pulmonary vein flow reversal noted  The left atrium is severely dilated  PISA could not be performed due to the eccentric jet  No torn chordae were seen  MR Vmax 6 57 m/s  There is no evidence of stenosis  Valve visualization did not allow for optimal 3D imaging  Tricuspid Valve Tricuspid valve structure is normal  There is mild regurgitation  There is no evidence of stenosis  Pulmonic Valve Pulmonic valve structure is normal  There is trace regurgitation  There is no evidence of stenosis  Ascending Aorta The aortic root is normal in size  Small amount of atheroma was seen in the upper aspect of the descending thoracic aorta as well as the distal aspect of the aortic arch  Pericardium There is no pericardial effusion  The pericardium is normal in appearance  Pulmonary Veins There is systolic flow reversal in the left upper and left lower veins  Echocardiogram: 10/26/22     1  Mild concentric left ventricular hypertrophy with prominent basal interventricular septum, normal left ventricular systolic function, suspected  grade 2 diastolic dysfunction  Normal estimated left ventricular filling pressure  2   dilated right ventricular cavity, normal right ventricular systolic function  3  Marked left atrial and mild to moderate right atrial cavity enlargement  4  Trileaflet aortic valve with sclerosis, mild aortic valve regurgitation  5  Mitral valve sclerosis with slight redundancy of anterior mitral valve  Mild to moderate mitral valve regurgitation with eccentric regurgitation jet  6  Mild-to-moderate tricuspid and mild pulmonic valve regurgitation  7  Mild pulmonary hypertension  Estimated RVSP/PASP is 46 mmHg  8  No pericardial effusion      No previous echocardiogram is available for comparison        Findings    Left Ventricle Normal left ventricular cavity size, mild concentric left ventricular hypertrophy with slightly prominent basal interventricular septum, normal left ventricular systolic function and normal regional wall motion  Ejection fraction is estimated as around 60%  Grade 2 diastolic dysfunction with normal estimated left ventricular filling pressures  Right Ventricle Moderately dilated right ventricular, normal right ventricular systolic function  Estimated right ventricular systolic pressure is increased, 46 mmHg mild pulmonary hypertension  Left Atrium Severe left atrial cavity enlargement  LAESVI was  greater than 60 mL/meter squared  Right Atrium Mild to moderate right atrial cavity enlargement  Prominent eustachian valve noted in right atrial cavity  Aortic Valve Trileaflet aortic valve with normal leaflet thickness and mild sclerosis, adequate cuspal separation  Mild aortic valve regurgitation     Mitral Valve Normal mitral valve structure with slight redundancy of anterior mitral valve leaflet without definite mitral valve prolapse  Mild mitral valve leaflet sclerosis, adequate leaflet mobility  There is mild to moderate mitral valve regurgitation with eccentric, posteriorly directed regurgitation jet  Tricuspid Valve Normal tricuspid valve morphology and leaflet separation  Mild to moderate tricuspid valve regurgitation  Pulmonic Valve Mild thickness of the pulmonic valve  There is at least mild pulmonic valve regurgitation  Ascending Aorta Dilated aortic root and ectasia of ascending aorta measuring up to 4 3 cm  IVC/SVC Borderline dilated inferior vena cava with blunted respirophasic change in diameter  Pericardium The pericardium is normal in appearance  There is no pericardial effusion  Left Ventricle Measurements    Function/Volumes   A4C EF 69 %         Dimensions   LVIDd 4 9 cm         LVIDS 3 6 cm         IVSd 1 4 cm         LVPWd 1 3 cm         FS 27 %         Diastolic Filling   MV E' Tissue Velocity Septal 8 cm/s         E wave deceleration time 184 ms         MV Peak E Guilherme 75 cm/s         MV Peak A Guilherme 0 4 m/s               Right Ventricle Measurements    Dimensions   RVID d 5 8 cm               Left Atrium Measurements    Dimensions   LA size 4 5 cm         LA length (A2C) 7 2 cm               Right Atrium Measurements    Dimensions   RA 2D Volume 101 mL         RAA A4C 27 2 cm2               Aortic Valve Measurements    Regurgitation   AV peak gradient 68 mmHg         AV Deceleration Time 3,292 ms         AV regurgitation pressure 1/2 time 955 ms               Mitral Valve Measurements    Stenosis   MV stenosis pressure 1/2 time 53 ms         MV valve area p 1/2 method 4 15 cm2               Tricuspid Valve Measurements    RVSP Parameters   TR Peak Guilherme 3 m/s         Triscuspid Valve Regurgitation Peak Gradient 36 mmHg               Aorta Measurements    Aortic Dimensions   Ao root 4 cm         Asc Ao 4 3 cm                CT Chest w/o contrast: 2/27/23    1    Mild postinflammatory scarring with peripheral reticulation and groundglass opacities corresponding to previous Covid-19 infiltrates      2   4 2 cm ascending aortic ectasia    ECG: 3/3/23    Sinus bradycardia  Non-specific intra-ventricular conduction delay    CTA H & N:  2/27/23    1  No hemodynamically significant stenosis in the major arteries of the neck  2   No intracranial aneurysm or major intracranial arterial stenosis  3   No acute intracranial hemorrhage        I have personally reviewed pertinent films in PACS       PCP and Cardiology notes reviewed      Assessment:  Patient Active Problem List    Diagnosis Date Noted   • FH: cerebral aneurysm 02/22/2023   • Obesity, morbid ([address] ) 02/22/2023   • Drop in hemoglobin 01/06/2023   • Gastrointestinal hemorrhage associated with intestinal diverticulosis 12/31/2022   • Diverticulitis of colon with perforation 12/31/2022   • Blood in stool 12/30/2022   • Abnormal CBC 11/18/2022   • Atrial enlargement, left 11/18/2022   • History of COVID-19 09/29/2022   • Shortness of breath 09/28/2022   • Overweight with body mass index (BMI) of 27 to 27 9 in adult 03/08/2022   • Platelets decreased ([address] ) 07/30/2021   • HUMBERTO treated with BiPAP 09/29/2020   • Excessive daytime sleepiness 09/29/2020   • Need for shingles vaccine 02/04/2019   • Colon polyp 08/27/2018   • Left ventricular hypertrophy 02/06/2018   • Mitral regurgitation 02/06/2018   • BPH with urinary obstruction 10/31/2016   • Calculus of gallbladder with cholecystitis 04/29/2015   • Displacement of lumbar intervertebral disc without myelopathy 04/09/2015   • Idiopathic peripheral neuropathy 04/09/2015   • Diverticulosis of colon 01/26/2015   • Indigestion 12/23/2014   • Herpes zoster 12/18/2014   • Type 2 diabetes mellitus ([address] ) 11/17/2014   • Allergic rhinitis 01/15/2013   • Male erectile disorder 01/15/2013   • Essential hypertension 58/31/3490   • Metabolic syndrome 63/08/0108   • Hyperlipidemia 01/15/2013 Impression/Plan:    Mitral Regurgitation  Left Main Coronary Artery Aneurysm     Risks and benefits of mitral valve repair vs replacement (tissue) and ligation of left atrial appendage were discussed in detail today with the patient and his wife  We have reviewed results all preoperative radiographic,  laboratory and vascular studies  They understand and wish to proceed with surgical intervention on 3/21/23 with MAXX Smith     Pre-op instructions reviewed with patient  He was instructed to stop Multivitamin now and Stop Lisinopril 3 days before surgery    Arcelia Rodriguez was comfortable with our recommendations, and their questions were answered to their satisfaction  Thank you for allowing us to participate in the care of this patient  The patient recently had a screening colonoscopy in 6/23/21  Therefore GI referral is not indicated at this time       SIGNATURE: NISHANT Barney  DATE: March 8, 2023  TIME: 10:32 AM

## 2023-03-08 NOTE — H&P
Pre-Op History & Physical - Cardiothoracic Surgery   Herb Ross 81 y o  male MRN: [id number]    Physician Requesting Consult: Dr Henrietta Madrid    Reason for Consult / Principal Problem: Mitral regurgitation    History of Present Illness: Herb Ross is a 81y o  year old male , seen in our office on 2/22/23 for initial surgical consultation for severe mitral regurgitation  Patient returns today to review pre-op testing  In review, patient's PMHx is notable for HTN, HLD, DM2, HUMBERTO (CPAP), diverticulosis w/ GIB, BPH, thrombocytopenia (113), remote h/o skin melanoma (upper arm) - s/p excision, psoriasis and elevated PSA  Patient developed SOB w/ cough in September 2022  He was seen by his PCP and tested (+) for Covid  Echo was ordered and performed in October, demonstrating markedly enlarged LA with mild to moderate MR  He was referred for Cardiology consultation, however, he ended up hospitalized in December with lower GIB  CT abd/pelvis demonstrated  findings c/w acute diverticulitis  He has since recovered from his GI illness and was seen in Jan 2023 for Cardiology consultation  3D TANG on 2/15/23 revealed EF 60%, flail posterior MV leaflet with severe MR, LA severely dilated and mild TR      Patient is accompanied by his wife today  He endorses the above history  He reports his PCP first identified a heart murmur in 2018  He denies h/o childhood murmur or rheumativc fever  He reports ongoing fatigue, decrease in activity tolerance,  MICHELLE, occasional lightheadedness and palpitations  He reports issues with LE edema that has improved with an intentional weight loss of 50 lb since June 2022, due to dietary changes     Patient normally practices yoga for an hour, 3 times per week and does HIIT workouts but has not been able to exercise due to his symptoms       Patient is a practicing , is  and lives with his wife in a 3 story home      FH is notable for father undergoing MVR at age 80, no longer living; mother alive at age 80 with new onset Afib and h/o fatal cerebral aneurysms in multiple family members including brother & 3 paternal cousins and a 1th cousin found to have cerebral aneurysms on imaging, repaired surgically  Today additional FH is shared: paternal grandmother underwent surgery in the 1950's for "heart aneurysm "      Patient is a former smoker, quit in 1980; he drinks a glass of wine in the evening and admits to occasional marijuana use (vapes)     Up to date with dental care  Past Medical History:  Past Medical History:   Diagnosis Date   • Acute bilateral low back pain without sciatica 03/09/2021   • BMI 33 0-33 9,adult 02/04/2020   • BPH (benign prostatic hyperplasia)    • COVID-19    • Diabetes mellitus associated with hormonal etiology ([facility] ) 03/05/2019   • Diverticulitis    • ED (erectile dysfunction)    • Elevated PSA 01/15/2013   • Encounter for well adult exam with abnormal findings 02/04/2019   • Herpes zoster 12/18/2014   • Hyperlipidemia    • Hypertension    • IFG (impaired fasting glucose)    • Metabolic syndrome    • Obesity (BMI 30 0-34 9) 02/04/2019   • Psoriasis    • Seasonal allergic rhinitis    • Type 2 diabetes mellitus ([facility] ) 11/17/2014         Past Surgical History:   Past Surgical History:   Procedure Laterality Date   • CARDIAC CATHETERIZATION N/A 03/03/2023    Procedure: Cardiac Coronary Angiogram;  Surgeon: Sudheer Rodriguez DO;  Location: [facility];   Service: Cardiology   • CHOLECYSTECTOMY  05/14/2015   • COLONOSCOPY  2007   • COLONOSCOPY  06/23/2021   • HERNIA REPAIR  05/14/2015         Family History:  Family History   Problem Relation Age of Onset   • Skin cancer Mother    • Heart disease Father          Social History:    Social History     Substance and Sexual Activity   Alcohol Use Yes    Comment: social     Social History     Substance and Sexual Activity   Drug Use No     Social History     Tobacco Use   Smoking Status Former   • Packs/day: 0 50   • Years: 12 00   • Pack years: 6 00   • Types: Pipe, Cigarettes   • Start date: 1968   • Quit date: 6/10/1980   • Years since quittin 7   Smokeless Tobacco Never       Home Medications:   Prior to Admission medications    Medication Sig Start Date End Date Taking? Authorizing Provider   amLODIPine (NORVASC) 5 mg tablet TAKE 1 TABLET DAILY 2/15/23   Mariaa Suh MD   aspirin (ECOTRIN LOW STRENGTH) 81 mg EC tablet take 1 tablet (81MG) 18   Historical Provider, MD   atorvastatin (LIPITOR) 20 mg tablet TAKE 1 TABLET DAILY 22   Mariaa Suh MD   ferrous sulfate 324 (65 Fe) mg Take 1 tablet (324 mg total) by mouth daily before breakfast 3/1/23   Mariaa Suh MD   finasteride (PROSCAR) 5 mg tablet TAKE 1 TABLET DAILY 3/2/23   Mariaa Suh MD   fluticasone Maryfrances Kindler) 50 mcg/act nasal spray 1 spray into each nostril if needed for allergies 3/3/23   NISHANT Fish   hydrocortisone 2 5 % cream Apply topically if needed for rash 3/3/23   NISHANT Fish   lisinopril (ZESTRIL) 20 mg tablet TAKE 1 TABLET DAILY 3/2/23   Mariaa Suh MD   sildenafil (VIAGRA) 100 mg tablet Take 1 tablet (100 mg total) by mouth as needed for erectile dysfunction 23   Mariaa Suh MD   tamsulosin (FLOMAX) 0 4 mg TAKE 1 CAPSULE DAILY WITH  DINNER 1/3/23   Mariaa Suh MD       Allergies:   Allergies   Allergen Reactions   • No Known Allergies        Review of Systems:  Review of Systems - History obtained from chart review and the patient  General ROS: positive for  - fatigue and change in activity tolerance  negative for - chills, fever, sleep disturbance or weight gain  Psychological ROS: negative  Ophthalmic ROS: positive for - uses glasses  ENT ROS: negative  Allergy and Immunology ROS: negative  Hematological and Lymphatic ROS: negative  Endocrine ROS: negative  Breast ROS: negative  Respiratory ROS: no cough, shortness of breath, or wheezing  Cardiovascular ROS: positive for - dyspnea on exertion, edema, murmur and palpitations  negative for - chest pain, irregular heartbeat, loss of consciousness, orthopnea, paroxysmal nocturnal dyspnea or rapid heart rate  Gastrointestinal ROS: no abdominal pain, change in bowel habits, or black or bloody stools  Genito-Urinary ROS: no dysuria, trouble voiding, or hematuria  Musculoskeletal ROS: negative  Neurological ROS: positive for - occasional lightheadedness  Dermatological ROS: negative    Vital Signs:     Vitals:    03/08/23 1009 03/08/23 1014   BP: 118/62 125/65   BP Location: Left arm Right arm   Patient Position: Sitting Sitting   Cuff Size: Large Large   Pulse: (!) 51    Temp: (!) 97 1 °F (36 2 °C)    TempSrc: Tympanic    SpO2: 99%    Weight: 96 4 kg (212 lb 9 6 oz)    Height: 6' 1" (1 854 m)        Physical Exam:    General:  Alert, oriented, well developed, no acute [id number]  HEENT/NECK:  PERRLA  No jugular venous distention  Cardiac:Regular rate and rhythm, III/VI systolic murmur at apex  Carotid arteries: 2+ pulse,s no bruits  Pulmonary:  Breath sounds clear bilaterally  Abdomen:  Non-tender, Non-distended  Positive bowel sounds  Upper extremities: 2+ radial pulses; brisk capillary refill; RIGHT hand dominant  Lower extremities: Extremities warm/dry  PT/DP pulses 2+ bilaterally  1+ edema B/L  Musculoskeletal: MAEE, stable gait  Neuro: Alert and oriented X 3  Sensation is grossly intact  No focal deficits  Skin: Warm/Dry, without rashes or lesions      Lab Results:   Lab Results   Component Value Date    WBC 4 32 02/28/2023    HGB 12 0 02/28/2023    HCT 39 2 02/28/2023     (H) 02/28/2023     (L) 02/28/2023     Lab Results   Component Value Date     05/14/2018    SODIUM 138 02/28/2023    K 3 6 02/28/2023     02/28/2023    CO2 28 02/28/2023    ANIONGAP 9 05/14/2018    AGAP 4 02/28/2023    BUN 16 02/28/2023    CREATININE 0 83 02/28/2023    GLUC 105 02/15/2023    GLUF 93 02/28/2023    CALCIUM 9 0 02/28/2023    AST 29 02/28/2023    ALT 36 02/28/2023    ALKPHOS 96 02/28/2023    PROT 5 8 (L) 05/14/2018    TP 6 4 02/28/2023    BILITOT 0 4 05/14/2018    TBILI 0 41 02/28/2023    EGFR 89 02/28/2023     Lab Results   Component Value Date    INR 1 02 02/28/2023    INR 1 04 12/30/2022    PROTIME 13 7 02/28/2023    PROTIME 13 9 12/30/2022     Lab Results   Component Value Date    HGBA1C 5 3 11/14/2022     Lab Results   Component Value Date    CHOLESTEROL 88 02/28/2023    CHOLESTEROL 92 11/14/2022    CHOLESTEROL 73 07/11/2022     Lab Results   Component Value Date    HDL 49 02/28/2023    HDL 49 11/14/2022    HDL 41 07/11/2022     Lab Results   Component Value Date    TRIG 29 02/28/2023    TRIG 70 11/14/2022    TRIG 65 07/11/2022     Lab Results   Component Value Date    NONHDLC 39 02/28/2023    [place] 43 11/14/2022    [place] 52 03/04/2022       Imaging Studies:     Cardiac Catheterization: 3/3/23     Large dist LMCA aneurysm (approximately 15mm diameter in width)  involving the prox LAD & LCx; otherwise mild plaque     TANG: 2/15/23  •  Left Ventricle: Left ventricular cavity size is normal  Wall thickness is normal  The left ventricular ejection fraction is 60%  Systolic function is normal  Wall motion is normal  Diastolic function is normal   •  Left Atrium: The atrium is severely dilated  •  Atrial Septum: No patent foramen ovale detected using color flow Doppler and saline contrast injection at rest   •  Left Atrial Appendage: There is normal function  There is no thrombus  •  Aortic Valve: There is mild regurgitation with a centrally directed jet  •  Mitral Valve: The posterior leaflet is flail  There is severe regurgitation with an eccentrically directed jet, with coanda effect and pulmonary vein flow reversal noted  The left atrium is severely dilated  PISA could not be performed due to the eccentric jet  No torn chordae were seen  MR Vmax 6 57 m/s  There is no evidence of stenosis   Valve visualization did not allow for optimal 3D imaging  •  Tricuspid Valve: There is mild regurgitation      Findings    Left Ventricle Left ventricular cavity size is normal  Wall thickness is normal  The left ventricular ejection fraction is 60%  Systolic function is normal   Wall motion is normal  Diastolic function is normal    Right Ventricle Right ventricular cavity size is normal  Systolic function is normal  Wall thickness is normal    Left Atrium The atrium is severely dilated  Atrial Septum No patent foramen ovale detected using color flow Doppler and saline contrast injection at rest    Left Atrial Appendage Left atrial appendage is normal in size  There is normal function  There is no thrombus  Aortic Valve The aortic valve is trileaflet  The leaflets are not thickened  The leaflets are not calcified  The leaflets exhibit normal mobility  There is mild regurgitation with a centrally directed jet  The aortic valve has no significant stenosis  Mitral Valve The posterior leaflet is flail  There is severe regurgitation with an eccentrically directed jet, with coanda effect and pulmonary vein flow reversal noted  The left atrium is severely dilated  PISA could not be performed due to the eccentric jet  No torn chordae were seen  MR Vmax 6 57 m/s  There is no evidence of stenosis  Valve visualization did not allow for optimal 3D imaging  Tricuspid Valve Tricuspid valve structure is normal  There is mild regurgitation  There is no evidence of stenosis  Pulmonic Valve Pulmonic valve structure is normal  There is trace regurgitation  There is no evidence of stenosis  Ascending Aorta The aortic root is normal in size  Small amount of atheroma was seen in the upper aspect of the descending thoracic aorta as well as the distal aspect of the aortic arch  Pericardium There is no pericardial effusion  The pericardium is normal in appearance  Pulmonary Veins There is systolic flow reversal in the left upper and left lower veins  Echocardiogram: 10/26/22     1  Mild concentric left ventricular hypertrophy with prominent basal interventricular septum, normal left ventricular systolic function, suspected  grade 2 diastolic dysfunction  Normal estimated left ventricular filling pressure  2   dilated right ventricular cavity, normal right ventricular systolic function  3  Marked left atrial and mild to moderate right atrial cavity enlargement  4  Trileaflet aortic valve with sclerosis, mild aortic valve regurgitation  5  Mitral valve sclerosis with slight redundancy of anterior mitral valve  Mild to moderate mitral valve regurgitation with eccentric regurgitation jet  6  Mild-to-moderate tricuspid and mild pulmonic valve regurgitation  7  Mild pulmonary hypertension  Estimated RVSP/PASP is 46 mmHg  8  No pericardial effusion      No previous echocardiogram is available for comparison        Findings    Left Ventricle Normal left ventricular cavity size, mild concentric left ventricular hypertrophy with slightly prominent basal interventricular septum, normal left ventricular systolic function and normal regional wall motion  Ejection fraction is estimated as around 60%  Grade 2 diastolic dysfunction with normal estimated left ventricular filling pressures  Right Ventricle Moderately dilated right ventricular, normal right ventricular systolic function  Estimated right ventricular systolic pressure is increased, 46 mmHg mild pulmonary hypertension  Left Atrium Severe left atrial cavity enlargement  LAESVI was  greater than 60 mL/meter squared  Right Atrium Mild to moderate right atrial cavity enlargement  Prominent eustachian valve noted in right atrial cavity  Aortic Valve Trileaflet aortic valve with normal leaflet thickness and mild sclerosis, adequate cuspal separation  Mild aortic valve regurgitation     Mitral Valve Normal mitral valve structure with slight redundancy of anterior mitral valve leaflet without definite mitral valve prolapse  Mild mitral valve leaflet sclerosis, adequate leaflet mobility  There is mild to moderate mitral valve regurgitation with eccentric, posteriorly directed regurgitation jet  Tricuspid Valve Normal tricuspid valve morphology and leaflet separation  Mild to moderate tricuspid valve regurgitation  Pulmonic Valve Mild thickness of the pulmonic valve  There is at least mild pulmonic valve regurgitation  Ascending Aorta Dilated aortic root and ectasia of ascending aorta measuring up to 4 3 cm  IVC/SVC Borderline dilated inferior vena cava with blunted respirophasic change in diameter  Pericardium The pericardium is normal in appearance  There is no pericardial effusion  Left Ventricle Measurements    Function/Volumes   A4C EF 69 %         Dimensions   LVIDd 4 9 cm         LVIDS 3 6 cm         IVSd 1 4 cm         LVPWd 1 3 cm         FS 27 %         Diastolic Filling   MV E' Tissue Velocity Septal 8 cm/s         E wave deceleration time 184 ms         MV Peak E Guliherme 75 cm/s         MV Peak A Guilherme 0 4 m/s               Right Ventricle Measurements    Dimensions   RVID d 5 8 cm               Left Atrium Measurements    Dimensions   LA size 4 5 cm         LA length (A2C) 7 2 cm               Right Atrium Measurements    Dimensions   RA 2D Volume 101 mL         RAA A4C 27 2 cm2               Aortic Valve Measurements    Regurgitation   AV peak gradient 68 mmHg         AV Deceleration Time 3,292 ms         AV regurgitation pressure 1/2 time 955 ms               Mitral Valve Measurements    Stenosis   MV stenosis pressure 1/2 time 53 ms         MV valve area p 1/2 method 4 15 cm2               Tricuspid Valve Measurements    RVSP Parameters   TR Peak Guilherme 3 m/s         Triscuspid Valve Regurgitation Peak Gradient 36 mmHg               Aorta Measurements    Aortic Dimensions   Ao root 4 cm         Asc Ao 4 3 cm                CT Chest w/o contrast: 2/27/23    1    Mild postinflammatory scarring with peripheral reticulation and groundglass opacities corresponding to previous Covid-19 infiltrates      2   4 2 cm ascending aortic ectasia    ECG: 3/3/23    Sinus bradycardia  Non-specific intra-ventricular conduction delay    CTA H & N:  2/27/23    1  No hemodynamically significant stenosis in the major arteries of the neck  2   No intracranial aneurysm or major intracranial arterial stenosis  3   No acute intracranial hemorrhage        I have personally reviewed pertinent films in PACS       PCP and Cardiology notes reviewed      Assessment:  Patient Active Problem List    Diagnosis Date Noted   • FH: cerebral aneurysm 02/22/2023   • Obesity, morbid ([address] ) 02/22/2023   • Drop in hemoglobin 01/06/2023   • Gastrointestinal hemorrhage associated with intestinal diverticulosis 12/31/2022   • Diverticulitis of colon with perforation 12/31/2022   • Blood in stool 12/30/2022   • Abnormal CBC 11/18/2022   • Atrial enlargement, left 11/18/2022   • History of COVID-19 09/29/2022   • Shortness of breath 09/28/2022   • Overweight with body mass index (BMI) of 27 to 27 9 in adult 03/08/2022   • Platelets decreased ([address] ) 07/30/2021   • HUMBERTO treated with BiPAP 09/29/2020   • Excessive daytime sleepiness 09/29/2020   • Need for shingles vaccine 02/04/2019   • Colon polyp 08/27/2018   • Left ventricular hypertrophy 02/06/2018   • Mitral regurgitation 02/06/2018   • BPH with urinary obstruction 10/31/2016   • Calculus of gallbladder with cholecystitis 04/29/2015   • Displacement of lumbar intervertebral disc without myelopathy 04/09/2015   • Idiopathic peripheral neuropathy 04/09/2015   • Diverticulosis of colon 01/26/2015   • Indigestion 12/23/2014   • Herpes zoster 12/18/2014   • Type 2 diabetes mellitus ([address] ) 11/17/2014   • Allergic rhinitis 01/15/2013   • Male erectile disorder 01/15/2013   • Essential hypertension 02/27/1977   • Metabolic syndrome 17/23/5555   • Hyperlipidemia 01/15/2013 Impression/Plan:    Mitral Regurgitation  Left Main Coronary Artery Aneurysm     Risks and benefits of mitral valve repair vs replacement (tissue) and ligation of left atrial appendage were discussed in detail today with the patient and his wife  We have reviewed results all preoperative radiographic,  laboratory and vascular studies  They understand and wish to proceed with surgical intervention on 3/21/23 with MAXX Hoffman     Pre-op instructions reviewed with patient  He was instructed to stop Multivitamin now and Stop Lisinopril 3 days before surgery    Cristine Garcia was comfortable with our recommendations, and their questions were answered to their satisfaction  Thank you for allowing us to participate in the care of this patient  The patient recently had a screening colonoscopy in 6/23/21  Therefore GI referral is not indicated at this time       SIGNATURE: NISHANT Palacios  DATE: March 8, 2023  TIME: 10:32 AM

## 2023-03-08 NOTE — PATIENT INSTRUCTIONS
1  You will receive a phone call from the hospital between 2:00 PM and 8:00 PM the day prior to surgery to confirm arrival time and location  For surgery on Mondays, you will receive a call on Friday  2  Do not drink or eat anything after midnight the night before surgery  That includes no water, candy, gum, lozenges, Lifesavers, etc  We recommend you not eat any salty or fatty snack foods, consume alcohol or smoke the night before surgery  3  Continue taking aspirin but only 81 mg daily  4  If you take Coumadin and/or Plavix, discontinue it 5 days before surgery  5  If you are diabetic, do not take any of your diabetic pills the morning of surgery  If you take Lantus insulin, you may take it at your regularly scheduled time the day before surgery  Do not take any other insulins the morning of surgery  6  The 2 nights before surgery, take a shower each night using the special antiseptic soap or soap sponges you received from the office or hospital  Tito Fess your hair with regular shampoo and rinse completely before using the antiseptic sponges  Use the sponge to wash from your neck down, with special attention to the armpits and groin area  Do not use any other soap or cleanser on your skin  Do not use lotions, powder, deodorant or perfume of any kind on your skin after you shower  Use clean bed linens and wear clean pajamas after your shower  7  You will be prescribed Mupirocin nasal ointment  Apply to both nostrils twice a day for 5 days prior to surgery  8  Do not take a shower the morning of surgery; you'll be given a special" bath" at the hospital   9  Notify the CT surgery office if you develop a cold, sore throat, cough, fever or other health issues before your surgery  10  Other medication changes included the following: Stop Multivitamin now;  Stop Lisinopril 3 days before surgery

## 2023-03-08 NOTE — H&P (VIEW-ONLY)
Pre-Op History & Physical - Cardiothoracic Surgery   Roas Evans 81 y o  male MRN: [id number]    Physician Requesting Consult: Dr Latisha Hashimoto    Reason for Consult / Principal Problem: Mitral regurgitation    History of Present Illness: Rosa Evans is a 81y o  year old male , seen in our office on 2/22/23 for initial surgical consultation for severe mitral regurgitation  Patient returns today to review pre-op testing  In review, patient's PMHx is notable for HTN, HLD, DM2, HUMBERTO (CPAP), diverticulosis w/ GIB, BPH, thrombocytopenia (113), remote h/o skin melanoma (upper arm) - s/p excision, psoriasis and elevated PSA  Patient developed SOB w/ cough in September 2022  He was seen by his PCP and tested (+) for Covid  Echo was ordered and performed in October, demonstrating markedly enlarged LA with mild to moderate MR  He was referred for Cardiology consultation, however, he ended up hospitalized in December with lower GIB  CT abd/pelvis demonstrated  findings c/w acute diverticulitis  He has since recovered from his GI illness and was seen in Jan 2023 for Cardiology consultation  3D TANG on 2/15/23 revealed EF 60%, flail posterior MV leaflet with severe MR, LA severely dilated and mild TR      Patient is accompanied by his wife today  He endorses the above history  He reports his PCP first identified a heart murmur in 2018  He denies h/o childhood murmur or rheumativc fever  He reports ongoing fatigue, decrease in activity tolerance,  MICHELLE, occasional lightheadedness and palpitations  He reports issues with LE edema that has improved with an intentional weight loss of 50 lb since June 2022, due to dietary changes     Patient normally practices yoga for an hour, 3 times per week and does HIIT workouts but has not been able to exercise due to his symptoms       Patient is a practicing , is  and lives with his wife in a 3 story home      FH is notable for father undergoing MVR at age 80, no longer "living; mother alive at age 80 with new onset Afib and h/o fatal cerebral aneurysms in multiple family members including brother & 3 paternal cousins and a 1th cousin found to have cerebral aneurysms on imaging, repaired surgically  Today additional FH is shared: paternal grandmother underwent surgery in the 65's for \"heart aneurysm  \"      Patient is a former smoker, quit in 1980; he drinks a glass of wine in the evening and admits to occasional marijuana use (vapes)     Up to date with dental care  Past Medical History:  Past Medical History:   Diagnosis Date   • Acute bilateral low back pain without sciatica 03/09/2021   • BMI 33 0-33 9,adult 02/04/2020   • BPH (benign prostatic hyperplasia)    • COVID-19    • Diabetes mellitus associated with hormonal etiology ([facility] ) 03/05/2019   • Diverticulitis    • ED (erectile dysfunction)    • Elevated PSA 01/15/2013   • Encounter for well adult exam with abnormal findings 02/04/2019   • Herpes zoster 12/18/2014   • Hyperlipidemia    • Hypertension    • IFG (impaired fasting glucose)    • Metabolic syndrome    • Obesity (BMI 30 0-34 9) 02/04/2019   • Psoriasis    • Seasonal allergic rhinitis    • Type 2 diabetes mellitus ([facility] ) 11/17/2014         Past Surgical History:   Past Surgical History:   Procedure Laterality Date   • CARDIAC CATHETERIZATION N/A 03/03/2023    Procedure: Cardiac Coronary Angiogram;  Surgeon: Zachary Hughes DO;  Location: [facility];   Service: Cardiology   • CHOLECYSTECTOMY  05/14/2015   • COLONOSCOPY  2007   • COLONOSCOPY  06/23/2021   • HERNIA REPAIR  05/14/2015         Family History:  Family History   Problem Relation Age of Onset   • Skin cancer Mother    • Heart disease Father          Social History:    Social History     Substance and Sexual Activity   Alcohol Use Yes    Comment: social     Social History     Substance and Sexual Activity   Drug Use No     Social History     Tobacco Use   Smoking Status Former   • " Packs/day: 0 50   • Years: 12 00   • Pack years: 6 00   • Types: Pipe, Cigarettes   • Start date: 1968   • Quit date: 6/10/1980   • Years since quittin 7   Smokeless Tobacco Never       Home Medications:   Prior to Admission medications    Medication Sig Start Date End Date Taking? Authorizing Provider   amLODIPine (NORVASC) 5 mg tablet TAKE 1 TABLET DAILY 2/15/23   Chandu Sin MD   aspirin (ECOTRIN LOW STRENGTH) 81 mg EC tablet take 1 tablet (81MG) 18   Historical Provider, MD   atorvastatin (LIPITOR) 20 mg tablet TAKE 1 TABLET DAILY 22   Chandu Sin MD   ferrous sulfate 324 (65 Fe) mg Take 1 tablet (324 mg total) by mouth daily before breakfast 3/1/23   Chandu Sin MD   finasteride (PROSCAR) 5 mg tablet TAKE 1 TABLET DAILY 3/2/23   Chandu Sin MD   fluticasone [facility]) 50 mcg/act nasal spray 1 spray into each nostril if needed for allergies 3/3/23   Antione Brittany, CRNP   hydrocortisone 2 5 % cream Apply topically if needed for rash 3/3/23   Antione [place], CRNP   lisinopril (ZESTRIL) 20 mg tablet TAKE 1 TABLET DAILY 3/2/23   Chandu Sin MD   sildenafil (VIAGRA) 100 mg tablet Take 1 tablet (100 mg total) by mouth as needed for erectile dysfunction 23   Chandu Sin MD   tamsulosin (FLOMAX) 0 4 mg TAKE 1 CAPSULE DAILY WITH  DINNER 1/3/23   Chandu Sin MD       Allergies:   Allergies   Allergen Reactions   • No Known Allergies        Review of Systems:  Review of Systems - History obtained from chart review and the patient  General ROS: positive for  - fatigue and change in activity tolerance  negative for - chills, fever, sleep disturbance or weight gain  Psychological ROS: negative  Ophthalmic ROS: positive for - uses glasses  ENT ROS: negative  Allergy and Immunology ROS: negative  Hematological and Lymphatic ROS: negative  Endocrine ROS: negative  Breast ROS: negative  Respiratory ROS: no cough, shortness of breath, or wheezing  Cardiovascular ROS: positive for - dyspnea on "exertion, edema, murmur and palpitations  negative for - chest pain, irregular heartbeat, loss of consciousness, orthopnea, paroxysmal nocturnal dyspnea or rapid heart rate  Gastrointestinal ROS: no abdominal pain, change in bowel habits, or black or bloody stools  Genito-Urinary ROS: no dysuria, trouble voiding, or hematuria  Musculoskeletal ROS: negative  Neurological ROS: positive for - occasional lightheadedness  Dermatological ROS: negative    Vital Signs:     Vitals:    03/08/23 1009 03/08/23 1014   BP: 118/62 125/65   BP Location: Left arm Right arm   Patient Position: Sitting Sitting   Cuff Size: Large Large   Pulse: (!) 51    Temp: (!) 97 1 °F (36 2 °C)    TempSrc: Tympanic    SpO2: 99%    Weight: 96 4 kg (212 lb 9 6 oz)    Height: 6' 1\" (1 854 m)        Physical Exam:    General:  Alert, oriented, well developed, no acute [id number]  HEENT/NECK:  PERRLA  No jugular venous distention  Cardiac:Regular rate and rhythm, III/VI systolic murmur at apex  Carotid arteries: 2+ pulse,s no bruits  Pulmonary:  Breath sounds clear bilaterally  Abdomen:  Non-tender, Non-distended  Positive bowel sounds  Upper extremities: 2+ radial pulses; brisk capillary refill; RIGHT hand dominant  Lower extremities: Extremities warm/dry  PT/DP pulses 2+ bilaterally  1+ edema B/L  Musculoskeletal: MAEE, stable gait  Neuro: Alert and oriented X 3  Sensation is grossly intact  No focal deficits  Skin: Warm/Dry, without rashes or lesions      Lab Results:   Lab Results   Component Value Date    WBC 4 32 02/28/2023    HGB 12 0 02/28/2023    HCT 39 2 02/28/2023     (H) 02/28/2023     (L) 02/28/2023     Lab Results   Component Value Date     05/14/2018    SODIUM 138 02/28/2023    K 3 6 02/28/2023     02/28/2023    CO2 28 02/28/2023    ANIONGAP 9 05/14/2018    AGAP 4 02/28/2023    BUN 16 02/28/2023    CREATININE 0 83 02/28/2023    GLUC 105 02/15/2023    GLUF 93 02/28/2023    CALCIUM 9 0 02/28/2023    AST " 29 02/28/2023    ALT 36 02/28/2023    ALKPHOS 96 02/28/2023    PROT 5 8 (L) 05/14/2018    TP 6 4 02/28/2023    BILITOT 0 4 05/14/2018    TBILI 0 41 02/28/2023    EGFR 89 02/28/2023     Lab Results   Component Value Date    INR 1 02 02/28/2023    INR 1 04 12/30/2022    PROTIME 13 7 02/28/2023    PROTIME 13 9 12/30/2022     Lab Results   Component Value Date    HGBA1C 5 3 11/14/2022     Lab Results   Component Value Date    CHOLESTEROL 88 02/28/2023    CHOLESTEROL 92 11/14/2022    CHOLESTEROL 73 07/11/2022     Lab Results   Component Value Date    HDL 49 02/28/2023    HDL 49 11/14/2022    HDL 41 07/11/2022     Lab Results   Component Value Date    TRIG 29 02/28/2023    TRIG 70 11/14/2022    TRIG 65 07/11/2022     Lab Results   Component Value Date    NONHDLC 39 02/28/2023    [place] 43 11/14/2022    [place] 52 03/04/2022       Imaging Studies:     Cardiac Catheterization: 3/3/23     Large dist LMCA aneurysm (approximately 15mm diameter in width)  involving the prox LAD & LCx; otherwise mild plaque     TANG: 2/15/23  •  Left Ventricle: Left ventricular cavity size is normal  Wall thickness is normal  The left ventricular ejection fraction is 60%  Systolic function is normal  Wall motion is normal  Diastolic function is normal   •  Left Atrium: The atrium is severely dilated  •  Atrial Septum: No patent foramen ovale detected using color flow Doppler and saline contrast injection at rest   •  Left Atrial Appendage: There is normal function  There is no thrombus  •  Aortic Valve: There is mild regurgitation with a centrally directed jet  •  Mitral Valve: The posterior leaflet is flail  There is severe regurgitation with an eccentrically directed jet, with coanda effect and pulmonary vein flow reversal noted  The left atrium is severely dilated  PISA could not be performed due to the eccentric jet  No torn chordae were seen  MR Vmax 6 57 m/s  There is no evidence of stenosis   Valve visualization did not allow for optimal 3D imaging  •  Tricuspid Valve: There is mild regurgitation      Findings    Left Ventricle Left ventricular cavity size is normal  Wall thickness is normal  The left ventricular ejection fraction is 60%  Systolic function is normal   Wall motion is normal  Diastolic function is normal    Right Ventricle Right ventricular cavity size is normal  Systolic function is normal  Wall thickness is normal    Left Atrium The atrium is severely dilated  Atrial Septum No patent foramen ovale detected using color flow Doppler and saline contrast injection at rest    Left Atrial Appendage Left atrial appendage is normal in size  There is normal function  There is no thrombus  Aortic Valve The aortic valve is trileaflet  The leaflets are not thickened  The leaflets are not calcified  The leaflets exhibit normal mobility  There is mild regurgitation with a centrally directed jet  The aortic valve has no significant stenosis  Mitral Valve The posterior leaflet is flail  There is severe regurgitation with an eccentrically directed jet, with coanda effect and pulmonary vein flow reversal noted  The left atrium is severely dilated  PISA could not be performed due to the eccentric jet  No torn chordae were seen  MR Vmax 6 57 m/s  There is no evidence of stenosis  Valve visualization did not allow for optimal 3D imaging  Tricuspid Valve Tricuspid valve structure is normal  There is mild regurgitation  There is no evidence of stenosis  Pulmonic Valve Pulmonic valve structure is normal  There is trace regurgitation  There is no evidence of stenosis  Ascending Aorta The aortic root is normal in size  Small amount of atheroma was seen in the upper aspect of the descending thoracic aorta as well as the distal aspect of the aortic arch  Pericardium There is no pericardial effusion  The pericardium is normal in appearance  Pulmonary Veins There is systolic flow reversal in the left upper and left lower veins  Echocardiogram: 10/26/22     1  Mild concentric left ventricular hypertrophy with prominent basal interventricular septum, normal left ventricular systolic function, suspected  grade 2 diastolic dysfunction  Normal estimated left ventricular filling pressure  2   dilated right ventricular cavity, normal right ventricular systolic function  3  Marked left atrial and mild to moderate right atrial cavity enlargement  4  Trileaflet aortic valve with sclerosis, mild aortic valve regurgitation  5  Mitral valve sclerosis with slight redundancy of anterior mitral valve  Mild to moderate mitral valve regurgitation with eccentric regurgitation jet  6  Mild-to-moderate tricuspid and mild pulmonic valve regurgitation  7  Mild pulmonary hypertension  Estimated RVSP/PASP is 46 mmHg  8  No pericardial effusion      No previous echocardiogram is available for comparison        Findings    Left Ventricle Normal left ventricular cavity size, mild concentric left ventricular hypertrophy with slightly prominent basal interventricular septum, normal left ventricular systolic function and normal regional wall motion  Ejection fraction is estimated as around 60%  Grade 2 diastolic dysfunction with normal estimated left ventricular filling pressures  Right Ventricle Moderately dilated right ventricular, normal right ventricular systolic function  Estimated right ventricular systolic pressure is increased, 46 mmHg mild pulmonary hypertension  Left Atrium Severe left atrial cavity enlargement  LAESVI was  greater than 60 mL/meter squared  Right Atrium Mild to moderate right atrial cavity enlargement  Prominent eustachian valve noted in right atrial cavity  Aortic Valve Trileaflet aortic valve with normal leaflet thickness and mild sclerosis, adequate cuspal separation  Mild aortic valve regurgitation     Mitral Valve Normal mitral valve structure with slight redundancy of anterior mitral valve leaflet without definite mitral valve prolapse  Mild mitral valve leaflet sclerosis, adequate leaflet mobility  There is mild to moderate mitral valve regurgitation with eccentric, posteriorly directed regurgitation jet  Tricuspid Valve Normal tricuspid valve morphology and leaflet separation  Mild to moderate tricuspid valve regurgitation  Pulmonic Valve Mild thickness of the pulmonic valve  There is at least mild pulmonic valve regurgitation  Ascending Aorta Dilated aortic root and ectasia of ascending aorta measuring up to 4 3 cm  IVC/SVC Borderline dilated inferior vena cava with blunted respirophasic change in diameter  Pericardium The pericardium is normal in appearance  There is no pericardial effusion  Left Ventricle Measurements    Function/Volumes   A4C EF 69 %         Dimensions   LVIDd 4 9 cm         LVIDS 3 6 cm         IVSd 1 4 cm         LVPWd 1 3 cm         FS 27 %         Diastolic Filling   MV E' Tissue Velocity Septal 8 cm/s         E wave deceleration time 184 ms         MV Peak E Guilherme 75 cm/s         MV Peak A Guilherme 0 4 m/s               Right Ventricle Measurements    Dimensions   RVID d 5 8 cm               Left Atrium Measurements    Dimensions   LA size 4 5 cm         LA length (A2C) 7 2 cm               Right Atrium Measurements    Dimensions   RA 2D Volume 101 mL         RAA A4C 27 2 cm2               Aortic Valve Measurements    Regurgitation   AV peak gradient 68 mmHg         AV Deceleration Time 3,292 ms         AV regurgitation pressure 1/2 time 955 ms               Mitral Valve Measurements    Stenosis   MV stenosis pressure 1/2 time 53 ms         MV valve area p 1/2 method 4 15 cm2               Tricuspid Valve Measurements    RVSP Parameters   TR Peak Guilherme 3 m/s         Triscuspid Valve Regurgitation Peak Gradient 36 mmHg               Aorta Measurements    Aortic Dimensions   Ao root 4 cm         Asc Ao 4 3 cm                CT Chest w/o contrast: 2/27/23    1    Mild postinflammatory scarring with peripheral reticulation and groundglass opacities corresponding to previous Covid-19 infiltrates      2   4 2 cm ascending aortic ectasia    ECG: 3/3/23    Sinus bradycardia  Non-specific intra-ventricular conduction delay    CTA H & N:  2/27/23    1  No hemodynamically significant stenosis in the major arteries of the neck  2   No intracranial aneurysm or major intracranial arterial stenosis  3   No acute intracranial hemorrhage        I have personally reviewed pertinent films in PACS       PCP and Cardiology notes reviewed      Assessment:  Patient Active Problem List    Diagnosis Date Noted   • FH: cerebral aneurysm 02/22/2023   • Obesity, morbid ([address] ) 02/22/2023   • Drop in hemoglobin 01/06/2023   • Gastrointestinal hemorrhage associated with intestinal diverticulosis 12/31/2022   • Diverticulitis of colon with perforation 12/31/2022   • Blood in stool 12/30/2022   • Abnormal CBC 11/18/2022   • Atrial enlargement, left 11/18/2022   • History of COVID-19 09/29/2022   • Shortness of breath 09/28/2022   • Overweight with body mass index (BMI) of 27 to 27 9 in adult 03/08/2022   • Platelets decreased ([address] ) 07/30/2021   • HUMBERTO treated with BiPAP 09/29/2020   • Excessive daytime sleepiness 09/29/2020   • Need for shingles vaccine 02/04/2019   • Colon polyp 08/27/2018   • Left ventricular hypertrophy 02/06/2018   • Mitral regurgitation 02/06/2018   • BPH with urinary obstruction 10/31/2016   • Calculus of gallbladder with cholecystitis 04/29/2015   • Displacement of lumbar intervertebral disc without myelopathy 04/09/2015   • Idiopathic peripheral neuropathy 04/09/2015   • Diverticulosis of colon 01/26/2015   • Indigestion 12/23/2014   • Herpes zoster 12/18/2014   • Type 2 diabetes mellitus ([address] ) 11/17/2014   • Allergic rhinitis 01/15/2013   • Male erectile disorder 01/15/2013   • Essential hypertension 70/19/7992   • Metabolic syndrome 76/21/0700   • Hyperlipidemia 01/15/2013 Impression/Plan:    Mitral Regurgitation  Left Main Coronary Artery Aneurysm     Risks and benefits of mitral valve repair vs replacement (tissue) and ligation of left atrial appendage were discussed in detail today with the patient and his wife  We have reviewed results all preoperative radiographic,  laboratory and vascular studies  They understand and wish to proceed with surgical intervention on 3/21/23 with MAXX Novak     Pre-op instructions reviewed with patient  He was instructed to stop Multivitamin now and Stop Lisinopril 3 days before surgery    Lexus Renteria was comfortable with our recommendations, and their questions were answered to their satisfaction  Thank you for allowing us to participate in the care of this patient  The patient recently had a screening colonoscopy in 6/23/21  Therefore GI referral is not indicated at this time       SIGNATURE: NISHANT Nolan  DATE: March 8, 2023  TIME: 10:32 AM

## 2023-03-08 NOTE — LETTER
March 8, 2023     [address] [facility] [place] [address]    Patient: Mary Velarde   YOB: 1950   Date of Visit: 3/8/2023       Dear Dr Terry Molina: Thank you for referring Mary Velarde to me for evaluation  Below are my notes for this consultation  If you have questions, please do not hesitate to call me  I look forward to following your patient along with you  Sincerely,        Wynn Bamberger,         CC: MD Luly Nunez CRNP  3/8/2023 11:02 AM  Attested  Pre-Op History & Physical - Cardiothoracic Surgery   Mary Velarde 81 y o  male MRN: [id number]    Physician Requesting Consult: Dr Terry Molina    Reason for Consult / Principal Problem: Mitral regurgitation    History of Present Illness: Mary eVlarde is a 81y o  year old male , seen in our office on 2/22/23 for initial surgical consultation for severe mitral regurgitation  Patient returns today to review pre-op testing  In review, patient's PMHx is notable for HTN, HLD, DM2, HUMBERTO (CPAP), diverticulosis w/ GIB, BPH, thrombocytopenia (113), remote h/o skin melanoma (upper arm) - s/p excision, psoriasis and elevated PSA  Patient developed SOB w/ cough in September 2022  He was seen by his PCP and tested (+) for Covid  Echo was ordered and performed in October, demonstrating markedly enlarged LA with mild to moderate MR  He was referred for Cardiology consultation, however, he ended up hospitalized in December with lower GIB  CT abd/pelvis demonstrated  findings c/w acute diverticulitis  He has since recovered from his GI illness and was seen in Jan 2023 for Cardiology consultation  3D TANG on 2/15/23 revealed EF 60%, flail posterior MV leaflet with severe MR, LA severely dilated and mild TR      Patient is accompanied by his wife today  He endorses the above history  He reports his PCP first identified a heart murmur in 2018  He denies h/o childhood murmur or rheumativc fever    He reports ongoing fatigue, decrease in activity tolerance,  MICHELLE, occasional lightheadedness and palpitations  He reports issues with LE edema that has improved with an intentional weight loss of 50 lb since June 2022, due to dietary changes  Patient normally practices yoga for an hour, 3 times per week and does HIIT workouts but has not been able to exercise due to his symptoms       Patient is a practicing , is  and lives with his wife in a 3 story home      FH is notable for father undergoing MVR at age 80, no longer living; mother alive at age 80 with new onset Afib and h/o fatal cerebral aneurysms in multiple family members including brother & 3 paternal cousins and a 1th cousin found to have cerebral aneurysms on imaging, repaired surgically  Today additional FH is shared: paternal grandmother underwent surgery in the 1950's for "heart aneurysm "      Patient is a former smoker, quit in 1980; he drinks a glass of wine in the evening and admits to occasional marijuana use (vapes)     Up to date with dental care      Past Medical History:  Past Medical History:   Diagnosis Date   • Acute bilateral low back pain without sciatica 03/09/2021   • BMI 33 0-33 9,adult 02/04/2020   • BPH (benign prostatic hyperplasia)    • COVID-19    • Diabetes mellitus associated with hormonal etiology ([facility] ) 03/05/2019   • Diverticulitis    • ED (erectile dysfunction)    • Elevated PSA 01/15/2013   • Encounter for well adult exam with abnormal findings 02/04/2019   • Herpes zoster 12/18/2014   • Hyperlipidemia    • Hypertension    • IFG (impaired fasting glucose)    • Metabolic syndrome    • Obesity (BMI 30 0-34 9) 02/04/2019   • Psoriasis    • Seasonal allergic rhinitis    • Type 2 diabetes mellitus ([facility] ) 11/17/2014         Past Surgical History:   Past Surgical History:   Procedure Laterality Date   • CARDIAC CATHETERIZATION N/A 03/03/2023    Procedure: Cardiac Coronary Angiogram;  Surgeon: Abdi Pinon DO; Location: [facility]; Service: Cardiology   • CHOLECYSTECTOMY  2015   • COLONOSCOPY     • COLONOSCOPY  2021   • HERNIA REPAIR  2015         Family History:  Family History   Problem Relation Age of Onset   • Skin cancer Mother    • Heart disease Father          Social History:    Social History     Substance and Sexual Activity   Alcohol Use Yes    Comment: social     Social History     Substance and Sexual Activity   Drug Use No     Social History     Tobacco Use   Smoking Status Former   • Packs/day: 0 50   • Years: 12 00   • Pack years: 6 00   • Types: Pipe, Cigarettes   • Start date: 1968   • Quit date: 6/10/1980   • Years since quittin 7   Smokeless Tobacco Never       Home Medications:   Prior to Admission medications    Medication Sig Start Date End Date Taking? Authorizing Provider   amLODIPine (NORVASC) 5 mg tablet TAKE 1 TABLET DAILY 2/15/23   Veronia Hodgkins, MD   aspirin (ECOTRIN LOW STRENGTH) 81 mg EC tablet take 1 tablet (81MG) 18   Historical Provider, MD   atorvastatin (LIPITOR) 20 mg tablet TAKE 1 TABLET DAILY 22   Veronia Hodgkins, MD   ferrous sulfate 324 (65 Fe) mg Take 1 tablet (324 mg total) by mouth daily before breakfast 3/1/23   Veronia Hodgkins, MD   finasteride (PROSCAR) 5 mg tablet TAKE 1 TABLET DAILY 3/2/23   Veronia Hodgkins, MD   fluticasone [facility]) 50 mcg/act nasal spray 1 spray into each nostril if needed for allergies 3/3/23   NISHANT Boyd   hydrocortisone 2 5 % cream Apply topically if needed for rash 3/3/23   NISHANT Boyd   lisinopril (ZESTRIL) 20 mg tablet TAKE 1 TABLET DAILY 3/2/23   Veronia Hodgkins, MD   sildenafil (VIAGRA) 100 mg tablet Take 1 tablet (100 mg total) by mouth as needed for erectile dysfunction 23   Veronia Hodgkins, MD   tamsulosin (FLOMAX) 0 4 mg TAKE 1 CAPSULE DAILY WITH  DINNER 1/3/23   Veronia Hodgkins, MD       Allergies:   Allergies   Allergen Reactions   • No Known Allergies        Review of Systems:  Review of Systems - History obtained from chart review and the patient  General ROS: positive for  - fatigue and change in activity tolerance  negative for - chills, fever, sleep disturbance or weight gain  Psychological ROS: negative  Ophthalmic ROS: positive for - uses glasses  ENT ROS: negative  Allergy and Immunology ROS: negative  Hematological and Lymphatic ROS: negative  Endocrine ROS: negative  Breast ROS: negative  Respiratory ROS: no cough, shortness of breath, or wheezing  Cardiovascular ROS: positive for - dyspnea on exertion, edema, murmur and palpitations  negative for - chest pain, irregular heartbeat, loss of consciousness, orthopnea, paroxysmal nocturnal dyspnea or rapid heart rate  Gastrointestinal ROS: no abdominal pain, change in bowel habits, or black or bloody stools  Genito-Urinary ROS: no dysuria, trouble voiding, or hematuria  Musculoskeletal ROS: negative  Neurological ROS: positive for - occasional lightheadedness  Dermatological ROS: negative    Vital Signs:     Vitals:    03/08/23 1009 03/08/23 1014   BP: 118/62 125/65   BP Location: Left arm Right arm   Patient Position: Sitting Sitting   Cuff Size: Large Large   Pulse: (!) 51    Temp: (!) 97 1 °F (36 2 °C)    TempSrc: Tympanic    SpO2: 99%    Weight: 96 4 kg (212 lb 9 6 oz)    Height: 6' 1" (1 854 m)        Physical Exam:    General:  Alert, oriented, well developed, no acute [id number]  HEENT/NECK:  PERRLA  No jugular venous distention  Cardiac:Regular rate and rhythm, III/VI systolic murmur at apex  Carotid arteries: 2+ pulse,s no bruits  Pulmonary:  Breath sounds clear bilaterally  Abdomen:  Non-tender, Non-distended  Positive bowel sounds  Upper extremities: 2+ radial pulses; brisk capillary refill; RIGHT hand dominant  Lower extremities: Extremities warm/dry  PT/DP pulses 2+ bilaterally  1+ edema B/L  Musculoskeletal: MAEE, stable gait  Neuro: Alert and oriented X 3  Sensation is grossly intact  No focal deficits    Skin: Warm/Dry, without rashes or lesions  Lab Results:   Lab Results   Component Value Date    WBC 4 32 02/28/2023    HGB 12 0 02/28/2023    HCT 39 2 02/28/2023     (H) 02/28/2023     (L) 02/28/2023     Lab Results   Component Value Date     05/14/2018    SODIUM 138 02/28/2023    K 3 6 02/28/2023     02/28/2023    CO2 28 02/28/2023    ANIONGAP 9 05/14/2018    AGAP 4 02/28/2023    BUN 16 02/28/2023    CREATININE 0 83 02/28/2023    GLUC 105 02/15/2023    GLUF 93 02/28/2023    CALCIUM 9 0 02/28/2023    AST 29 02/28/2023    ALT 36 02/28/2023    ALKPHOS 96 02/28/2023    PROT 5 8 (L) 05/14/2018    TP 6 4 02/28/2023    BILITOT 0 4 05/14/2018    TBILI 0 41 02/28/2023    EGFR 89 02/28/2023     Lab Results   Component Value Date    INR 1 02 02/28/2023    INR 1 04 12/30/2022    PROTIME 13 7 02/28/2023    PROTIME 13 9 12/30/2022     Lab Results   Component Value Date    HGBA1C 5 3 11/14/2022     Lab Results   Component Value Date    CHOLESTEROL 88 02/28/2023    CHOLESTEROL 92 11/14/2022    CHOLESTEROL 73 07/11/2022     Lab Results   Component Value Date    HDL 49 02/28/2023    HDL 49 11/14/2022    HDL 41 07/11/2022     Lab Results   Component Value Date    TRIG 29 02/28/2023    TRIG 70 11/14/2022    TRIG 65 07/11/2022     Lab Results   Component Value Date    NONHDLC 39 02/28/2023    [place] 43 11/14/2022    [place] 52 03/04/2022       Imaging Studies:     Cardiac Catheterization: 3/3/23     Large dist LMCA aneurysm (approximately 15mm diameter in width)  involving the prox LAD & LCx; otherwise mild plaque     TANG: 2/15/23  •  Left Ventricle: Left ventricular cavity size is normal  Wall thickness is normal  The left ventricular ejection fraction is 60%  Systolic function is normal  Wall motion is normal  Diastolic function is normal   •  Left Atrium: The atrium is severely dilated    •  Atrial Septum: No patent foramen ovale detected using color flow Doppler and saline contrast injection at rest   • Left Atrial Appendage: There is normal function  There is no thrombus  •  Aortic Valve: There is mild regurgitation with a centrally directed jet  •  Mitral Valve: The posterior leaflet is flail  There is severe regurgitation with an eccentrically directed jet, with coanda effect and pulmonary vein flow reversal noted  The left atrium is severely dilated  PISA could not be performed due to the eccentric jet  No torn chordae were seen  MR Vmax 6 57 m/s  There is no evidence of stenosis  Valve visualization did not allow for optimal 3D imaging  •  Tricuspid Valve: There is mild regurgitation      Findings    Left Ventricle Left ventricular cavity size is normal  Wall thickness is normal  The left ventricular ejection fraction is 60%  Systolic function is normal   Wall motion is normal  Diastolic function is normal    Right Ventricle Right ventricular cavity size is normal  Systolic function is normal  Wall thickness is normal    Left Atrium The atrium is severely dilated  Atrial Septum No patent foramen ovale detected using color flow Doppler and saline contrast injection at rest    Left Atrial Appendage Left atrial appendage is normal in size  There is normal function  There is no thrombus  Aortic Valve The aortic valve is trileaflet  The leaflets are not thickened  The leaflets are not calcified  The leaflets exhibit normal mobility  There is mild regurgitation with a centrally directed jet  The aortic valve has no significant stenosis  Mitral Valve The posterior leaflet is flail  There is severe regurgitation with an eccentrically directed jet, with coanda effect and pulmonary vein flow reversal noted  The left atrium is severely dilated  PISA could not be performed due to the eccentric jet  No torn chordae were seen  MR Vmax 6 57 m/s  There is no evidence of stenosis  Valve visualization did not allow for optimal 3D imaging     Tricuspid Valve Tricuspid valve structure is normal  There is mild regurgitation  There is no evidence of stenosis  Pulmonic Valve Pulmonic valve structure is normal  There is trace regurgitation  There is no evidence of stenosis  Ascending Aorta The aortic root is normal in size  Small amount of atheroma was seen in the upper aspect of the descending thoracic aorta as well as the distal aspect of the aortic arch  Pericardium There is no pericardial effusion  The pericardium is normal in appearance  Pulmonary Veins There is systolic flow reversal in the left upper and left lower veins  Echocardiogram: 10/26/22     1  Mild concentric left ventricular hypertrophy with prominent basal interventricular septum, normal left ventricular systolic function, suspected  grade 2 diastolic dysfunction  Normal estimated left ventricular filling pressure  2   dilated right ventricular cavity, normal right ventricular systolic function  3  Marked left atrial and mild to moderate right atrial cavity enlargement  4  Trileaflet aortic valve with sclerosis, mild aortic valve regurgitation  5  Mitral valve sclerosis with slight redundancy of anterior mitral valve  Mild to moderate mitral valve regurgitation with eccentric regurgitation jet  6  Mild-to-moderate tricuspid and mild pulmonic valve regurgitation  7  Mild pulmonary hypertension  Estimated RVSP/PASP is 46 mmHg  8  No pericardial effusion      No previous echocardiogram is available for comparison        Findings    Left Ventricle Normal left ventricular cavity size, mild concentric left ventricular hypertrophy with slightly prominent basal interventricular septum, normal left ventricular systolic function and normal regional wall motion  Ejection fraction is estimated as around 60%  Grade 2 diastolic dysfunction with normal estimated left ventricular filling pressures  Right Ventricle Moderately dilated right ventricular, normal right ventricular systolic function    Estimated right ventricular systolic pressure is increased, 46 mmHg mild pulmonary hypertension  Left Atrium Severe left atrial cavity enlargement  LAESVI was  greater than 60 mL/meter squared  Right Atrium Mild to moderate right atrial cavity enlargement  Prominent eustachian valve noted in right atrial cavity  Aortic Valve Trileaflet aortic valve with normal leaflet thickness and mild sclerosis, adequate cuspal separation  Mild aortic valve regurgitation  Mitral Valve Normal mitral valve structure with slight redundancy of anterior mitral valve leaflet without definite mitral valve prolapse  Mild mitral valve leaflet sclerosis, adequate leaflet mobility  There is mild to moderate mitral valve regurgitation with eccentric, posteriorly directed regurgitation jet  Tricuspid Valve Normal tricuspid valve morphology and leaflet separation  Mild to moderate tricuspid valve regurgitation  Pulmonic Valve Mild thickness of the pulmonic valve  There is at least mild pulmonic valve regurgitation  Ascending Aorta Dilated aortic root and ectasia of ascending aorta measuring up to 4 3 cm  IVC/SVC Borderline dilated inferior vena cava with blunted respirophasic change in diameter  Pericardium The pericardium is normal in appearance  There is no pericardial effusion       Left Ventricle Measurements    Function/Volumes   A4C EF 69 %         Dimensions   LVIDd 4 9 cm         LVIDS 3 6 cm         IVSd 1 4 cm         LVPWd 1 3 cm         FS 27 %         Diastolic Filling   MV E' Tissue Velocity Septal 8 cm/s         E wave deceleration time 184 ms         MV Peak E Guilherme 75 cm/s         MV Peak A Guilherme 0 4 m/s               Right Ventricle Measurements    Dimensions   RVID d 5 8 cm               Left Atrium Measurements    Dimensions   LA size 4 5 cm         LA length (A2C) 7 2 cm               Right Atrium Measurements    Dimensions   RA 2D Volume 101 mL         RAA A4C 27 2 cm2               Aortic Valve Measurements    Regurgitation   AV peak gradient 68 mmHg         AV Deceleration Time 3,292 ms         AV regurgitation pressure 1/2 time 955 ms               Mitral Valve Measurements    Stenosis   MV stenosis pressure 1/2 time 53 ms         MV valve area p 1/2 method 4 15 cm2               Tricuspid Valve Measurements    RVSP Parameters   TR Peak Guilherme 3 m/s         Triscuspid Valve Regurgitation Peak Gradient 36 mmHg               Aorta Measurements    Aortic Dimensions   Ao root 4 cm         Asc Ao 4 3 cm                CT Chest w/o contrast: 2/27/23    1  Mild postinflammatory scarring with peripheral reticulation and groundglass opacities corresponding to previous Covid-19 infiltrates      2   4 2 cm ascending aortic ectasia    ECG: 3/3/23    Sinus bradycardia  Non-specific intra-ventricular conduction delay    CTA H & N:  2/27/23    1  No hemodynamically significant stenosis in the major arteries of the neck  2   No intracranial aneurysm or major intracranial arterial stenosis  3   No acute intracranial hemorrhage        I have personally reviewed pertinent films in PACS       PCP and Cardiology notes reviewed      Assessment:  Patient Active Problem List    Diagnosis Date Noted   • FH: cerebral aneurysm 02/22/2023   • Obesity, morbid ([address] ) 02/22/2023   • Drop in hemoglobin 01/06/2023   • Gastrointestinal hemorrhage associated with intestinal diverticulosis 12/31/2022   • Diverticulitis of colon with perforation 12/31/2022   • Blood in stool 12/30/2022   • Abnormal CBC 11/18/2022   • Atrial enlargement, left 11/18/2022   • History of COVID-19 09/29/2022   • Shortness of breath 09/28/2022   • Overweight with body mass index (BMI) of 27 to 27 9 in adult 03/08/2022   • Platelets decreased ([address] ) 07/30/2021   • HUMBERTO treated with BiPAP 09/29/2020   • Excessive daytime sleepiness 09/29/2020   • Need for shingles vaccine 02/04/2019   • Colon polyp 08/27/2018   • Left ventricular hypertrophy 02/06/2018   • Mitral regurgitation 02/06/2018   • BPH with urinary obstruction 10/31/2016   • Calculus of gallbladder with cholecystitis 04/29/2015   • Displacement of lumbar intervertebral disc without myelopathy 04/09/2015   • Idiopathic peripheral neuropathy 04/09/2015   • Diverticulosis of colon 01/26/2015   • Indigestion 12/23/2014   • Herpes zoster 12/18/2014   • Type 2 diabetes mellitus ([facility] ) 11/17/2014   • Allergic rhinitis 01/15/2013   • Male erectile disorder 01/15/2013   • Essential hypertension 07/92/7153   • Metabolic syndrome 49/13/8812   • Hyperlipidemia 01/15/2013         Impression/Plan:    Mitral Regurgitation  Left Main Coronary Artery Aneurysm     Risks and benefits of mitral valve repair vs replacement (tissue) and ligation of left atrial appendage were discussed in detail today with the patient and his wife  We have reviewed results all preoperative radiographic,  laboratory and vascular studies  They understand and wish to proceed with surgical intervention on 3/21/23 with MAXX Gee     Pre-op instructions reviewed with patient  He was instructed to stop Multivitamin now and Stop Lisinopril 3 days before surgery    Dianne Barron was comfortable with our recommendations, and their questions were answered to their satisfaction  Thank you for allowing us to participate in the care of this patient  The patient recently had a screening colonoscopy in 6/23/21  Therefore GI referral is not indicated at this time  NISHANT Baron  DATE: March 8, 2023  TIME: 10:32 AM  Attestation signed by Bethany Nunez DO at 3/8/2023 11:16 AM:  I supervised the Advanced Practitioner  ? I performed, in its entirety, the assessment/plan component of the visit  I agree with the Advanced Practitioner's note with the following additions/exceptions:      Mr Alvarado Dawley seen back in the office today to completing his preoperative work-up for severe mitral regurgitation    His cardiac catheterization was reviewed by myself personally and findings shared with him  This demonstrates a large left main proximal circumflex and proximal the remainder of his coronary arteries are without disease  We discussed therapies for this rare finding, and I have recommended medical therapy  We also reviewed again his echo findings of severe mitral regurgitation  I recommended mitral valve repair  If replacement would be a  I have recommended a tissue valve  We also discussed ligation left atrial appendage  He consents and is willing to proceed with mitral valve repair, possible replacement with tissue valve, ligation left atrial appendage  His surgery date is scheduled for the 21st of this month      Sb Segura DO 23

## 2023-03-09 ENCOUNTER — PREP FOR PROCEDURE (OUTPATIENT)
Dept: CARDIAC SURGERY | Facility: CLINIC | Age: 73
End: 2023-03-09

## 2023-03-10 DIAGNOSIS — N52.9 MALE ERECTILE DISORDER: ICD-10-CM

## 2023-03-10 RX ORDER — SILDENAFIL 100 MG/1
100 TABLET, FILM COATED ORAL AS NEEDED
Qty: 10 TABLET | Refills: 0 | Status: SHIPPED | OUTPATIENT
Start: 2023-03-10

## 2023-03-15 ENCOUNTER — LAB REQUISITION (OUTPATIENT)
Dept: LAB | Facility: HOSPITAL | Age: 73
End: 2023-03-15

## 2023-03-15 ENCOUNTER — APPOINTMENT (OUTPATIENT)
Dept: LAB | Facility: CLINIC | Age: 73
End: 2023-03-15

## 2023-03-15 DIAGNOSIS — I34.0 NONRHEUMATIC MITRAL VALVE REGURGITATION: ICD-10-CM

## 2023-03-15 DIAGNOSIS — I34.0 NONRHEUMATIC MITRAL (VALVE) INSUFFICIENCY: ICD-10-CM

## 2023-03-15 LAB
ABO GROUP BLD: NORMAL
BLD GP AB SCN SERPL QL: NEGATIVE
RH BLD: POSITIVE
SPECIMEN EXPIRATION DATE: NORMAL

## 2023-03-16 ENCOUNTER — TELEPHONE (OUTPATIENT)
Dept: FAMILY MEDICINE CLINIC | Facility: CLINIC | Age: 73
End: 2023-03-16

## 2023-03-16 LAB — MRSA NOSE QL CULT: NORMAL

## 2023-03-16 NOTE — TELEPHONE ENCOUNTER
Patient having open heart surgery next Tuesday 3/21/2023 requesting rx for hospital bed  be send to [facility] he will be unable to climb stairs to bedroom after surgery please advise

## 2023-03-17 LAB
DME PARACHUTE DELIVERY DATE REQUESTED: NORMAL
DME PARACHUTE ITEM DESCRIPTION: NORMAL
DME PARACHUTE ORDER STATUS: NORMAL
DME PARACHUTE SUPPLIER NAME: NORMAL
DME PARACHUTE SUPPLIER PHONE: NORMAL

## 2023-03-20 ENCOUNTER — ANESTHESIA EVENT (OUTPATIENT)
Dept: PERIOP | Facility: HOSPITAL | Age: 73
End: 2023-03-20

## 2023-03-21 ENCOUNTER — APPOINTMENT (OUTPATIENT)
Dept: NON INVASIVE DIAGNOSTICS | Facility: HOSPITAL | Age: 73
End: 2023-03-21
Attending: THORACIC SURGERY (CARDIOTHORACIC VASCULAR SURGERY)

## 2023-03-21 ENCOUNTER — APPOINTMENT (OUTPATIENT)
Dept: RADIOLOGY | Facility: HOSPITAL | Age: 73
End: 2023-03-21

## 2023-03-21 ENCOUNTER — ANESTHESIA (OUTPATIENT)
Dept: PERIOP | Facility: HOSPITAL | Age: 73
End: 2023-03-21

## 2023-03-21 ENCOUNTER — HOSPITAL ENCOUNTER (INPATIENT)
Facility: HOSPITAL | Age: 73
LOS: 6 days | Discharge: HOME HEALTH | End: 2023-03-27
Attending: THORACIC SURGERY (CARDIOTHORACIC VASCULAR SURGERY) | Admitting: THORACIC SURGERY (CARDIOTHORACIC VASCULAR SURGERY)

## 2023-03-21 DIAGNOSIS — Z98.890 S/P LEFT ATRIAL APPENDAGE LIGATION: ICD-10-CM

## 2023-03-21 DIAGNOSIS — I34.9 NONRHEUMATIC MITRAL VALVE DISORDER, UNSPECIFIED: Primary | ICD-10-CM

## 2023-03-21 DIAGNOSIS — Z98.890 S/P MITRAL VALVE REPAIR: ICD-10-CM

## 2023-03-21 DIAGNOSIS — I10 ESSENTIAL HYPERTENSION: ICD-10-CM

## 2023-03-21 DIAGNOSIS — R73.9 STRESS HYPERGLYCEMIA: ICD-10-CM

## 2023-03-21 LAB
ABO GROUP BLD: NORMAL
ANION GAP SERPL CALCULATED.3IONS-SCNC: 1 MMOL/L (ref 4–13)
APTT PPP: 36 SECONDS (ref 23–37)
ATRIAL RATE: 80 BPM
BACTERIA UR QL AUTO: ABNORMAL /HPF
BASE EXCESS BLDA CALC-SCNC: -1 MMOL/L (ref -2–3)
BASE EXCESS BLDA CALC-SCNC: -3 MMOL/L (ref -2–3)
BASE EXCESS BLDA CALC-SCNC: -5.5 MMOL/L
BASE EXCESS BLDA CALC-SCNC: 0 MMOL/L (ref -2–3)
BASE EXCESS BLDA CALC-SCNC: 0 MMOL/L (ref -2–3)
BASE EXCESS BLDA CALC-SCNC: 2 MMOL/L (ref -2–3)
BASE EXCESS BLDA CALC-SCNC: 3 MMOL/L (ref -2–3)
BILIRUB UR QL STRIP: NEGATIVE
BILIRUB UR QL STRIP: NEGATIVE
BUN SERPL-MCNC: 12 MG/DL (ref 5–25)
CA-I BLD-SCNC: 1.07 MMOL/L (ref 1.12–1.32)
CA-I BLD-SCNC: 1.11 MMOL/L (ref 1.12–1.32)
CA-I BLD-SCNC: 1.2 MMOL/L (ref 1.12–1.32)
CA-I BLD-SCNC: 1.21 MMOL/L (ref 1.12–1.32)
CA-I BLD-SCNC: 1.21 MMOL/L (ref 1.12–1.32)
CA-I BLD-SCNC: 1.28 MMOL/L (ref 1.12–1.32)
CALCIUM SERPL-MCNC: 7.2 MG/DL (ref 8.3–10.1)
CHLORIDE SERPL-SCNC: 116 MMOL/L (ref 96–108)
CLARITY UR: CLEAR
CLARITY UR: CLEAR
CO2 SERPL-SCNC: 25 MMOL/L (ref 21–32)
COLOR UR: ABNORMAL
COLOR UR: NORMAL
CREAT SERPL-MCNC: 0.76 MG/DL (ref 0.6–1.3)
FIBRINOGEN PPP-MCNC: 183 MG/DL (ref 227–495)
FIO2 GAS DIL.REBREATH: 65 L
GFR SERPL CREATININE-BSD FRML MDRD: 92 ML/MIN/1.73SQ M
GLUCOSE SERPL-MCNC: 110 MG/DL (ref 65–140)
GLUCOSE SERPL-MCNC: 115 MG/DL (ref 65–140)
GLUCOSE SERPL-MCNC: 122 MG/DL (ref 65–140)
GLUCOSE SERPL-MCNC: 126 MG/DL (ref 65–140)
GLUCOSE SERPL-MCNC: 131 MG/DL (ref 65–140)
GLUCOSE SERPL-MCNC: 136 MG/DL (ref 65–140)
GLUCOSE SERPL-MCNC: 141 MG/DL (ref 65–140)
GLUCOSE SERPL-MCNC: 142 MG/DL (ref 65–140)
GLUCOSE SERPL-MCNC: 152 MG/DL (ref 65–140)
GLUCOSE SERPL-MCNC: 155 MG/DL (ref 65–140)
GLUCOSE SERPL-MCNC: 160 MG/DL (ref 65–140)
GLUCOSE SERPL-MCNC: 165 MG/DL (ref 65–140)
GLUCOSE SERPL-MCNC: 167 MG/DL (ref 65–140)
GLUCOSE SERPL-MCNC: 191 MG/DL (ref 65–140)
GLUCOSE UR STRIP-MCNC: NEGATIVE MG/DL
GLUCOSE UR STRIP-MCNC: NEGATIVE MG/DL
HCO3 BLDA-SCNC: 21.1 MMOL/L (ref 22–28)
HCO3 BLDA-SCNC: 24.4 MMOL/L (ref 22–28)
HCO3 BLDA-SCNC: 24.7 MMOL/L (ref 22–28)
HCO3 BLDA-SCNC: 27.2 MMOL/L (ref 22–28)
HCO3 BLDA-SCNC: 27.5 MMOL/L (ref 24–30)
HCO3 BLDA-SCNC: 29.6 MMOL/L (ref 24–30)
HCO3 BLDA-SCNC: 30.4 MMOL/L (ref 22–28)
HCT VFR BLD AUTO: 40.5 % (ref 36.5–49.3)
HCT VFR BLD AUTO: 42.5 % (ref 36.5–49.3)
HCT VFR BLD CALC: 31 % (ref 36.5–49.3)
HCT VFR BLD CALC: 31 % (ref 36.5–49.3)
HCT VFR BLD CALC: 33 % (ref 36.5–49.3)
HCT VFR BLD CALC: 36 % (ref 36.5–49.3)
HCT VFR BLD CALC: 37 % (ref 36.5–49.3)
HCT VFR BLD CALC: 37 % (ref 36.5–49.3)
HGB BLD-MCNC: 13.2 G/DL (ref 12–17)
HGB BLD-MCNC: 13.9 G/DL (ref 12–17)
HGB BLDA-MCNC: 10.5 G/DL (ref 12–17)
HGB BLDA-MCNC: 10.5 G/DL (ref 12–17)
HGB BLDA-MCNC: 11.2 G/DL (ref 12–17)
HGB BLDA-MCNC: 12.2 G/DL (ref 12–17)
HGB BLDA-MCNC: 12.6 G/DL (ref 12–17)
HGB BLDA-MCNC: 12.6 G/DL (ref 12–17)
HGB UR QL STRIP.AUTO: ABNORMAL
HGB UR QL STRIP.AUTO: NEGATIVE
HOROWITZ INDEX BLDA+IHG-RTO: 50 MM[HG]
INR PPP: 1.48 (ref 0.84–1.19)
KCT BLD-ACNC: 132 SEC (ref 89–137)
KCT BLD-ACNC: 138 SEC (ref 89–137)
KCT BLD-ACNC: 420 SEC (ref 89–137)
KCT BLD-ACNC: 441 SEC (ref 89–137)
KCT BLD-ACNC: 556 SEC (ref 89–137)
KCT BLD-ACNC: 631 SEC (ref 89–137)
KCT BLD-ACNC: 650 SEC (ref 89–137)
KETONES UR STRIP-MCNC: NEGATIVE MG/DL
KETONES UR STRIP-MCNC: NEGATIVE MG/DL
LEUKOCYTE ESTERASE UR QL STRIP: NEGATIVE
LEUKOCYTE ESTERASE UR QL STRIP: NEGATIVE
NITRITE UR QL STRIP: NEGATIVE
NITRITE UR QL STRIP: NEGATIVE
NON-SQ EPI CELLS URNS QL MICRO: ABNORMAL /HPF
O2 CT BLDA-SCNC: 20.4 ML/DL (ref 16–23)
OXYHGB MFR BLDA: 96.8 % (ref 94–97)
P AXIS: 70 DEGREES
PCO2 BLD: 26 MMOL/L (ref 21–32)
PCO2 BLD: 26 MMOL/L (ref 21–32)
PCO2 BLD: 29 MMOL/L (ref 21–32)
PCO2 BLD: 29 MMOL/L (ref 21–32)
PCO2 BLD: 31 MMOL/L (ref 21–32)
PCO2 BLD: 32 MMOL/L (ref 21–32)
PCO2 BLD: 44.6 MM HG (ref 36–44)
PCO2 BLD: 50.6 MM HG (ref 36–44)
PCO2 BLD: 52.4 MM HG (ref 36–44)
PCO2 BLD: 56.3 MM HG (ref 42–50)
PCO2 BLD: 63.1 MM HG (ref 42–50)
PCO2 BLD: 63.9 MM HG (ref 36–44)
PCO2 BLDA: 45.4 MM HG (ref 36–44)
PEEP RESPIRATORY: 6 CM[H2O]
PH BLD: 7.28 [PH] (ref 7.3–7.4)
PH BLD: 7.29 [PH] (ref 7.35–7.45)
PH BLD: 7.29 [PH] (ref 7.35–7.45)
PH BLD: 7.3 [PH] (ref 7.3–7.4)
PH BLD: 7.32 [PH] (ref 7.35–7.45)
PH BLD: 7.35 [PH] (ref 7.35–7.45)
PH BLDA: 7.29 [PH] (ref 7.35–7.45)
PH UR STRIP.AUTO: 5.5 [PH]
PH UR STRIP.AUTO: 6 [PH]
PLATELET # BLD AUTO: 110 THOUSANDS/UL (ref 149–390)
PLATELET # BLD AUTO: 76 THOUSANDS/UL (ref 149–390)
PMV BLD AUTO: 10.3 FL (ref 8.9–12.7)
PMV BLD AUTO: 10.4 FL (ref 8.9–12.7)
PO2 BLD: 169 MM HG (ref 75–129)
PO2 BLD: 171 MM HG (ref 75–129)
PO2 BLD: 180 MM HG (ref 75–129)
PO2 BLD: 55 MM HG (ref 35–45)
PO2 BLD: 66 MM HG (ref 35–45)
PO2 BLD: >400 MM HG (ref 75–129)
PO2 BLDA: 118.8 MM HG (ref 75–129)
POTASSIUM BLD-SCNC: 3.8 MMOL/L (ref 3.5–5.3)
POTASSIUM BLD-SCNC: 3.9 MMOL/L (ref 3.5–5.3)
POTASSIUM BLD-SCNC: 4 MMOL/L (ref 3.5–5.3)
POTASSIUM BLD-SCNC: 4.2 MMOL/L (ref 3.5–5.3)
POTASSIUM BLD-SCNC: 4.5 MMOL/L (ref 3.5–5.3)
POTASSIUM BLD-SCNC: 4.6 MMOL/L (ref 3.5–5.3)
POTASSIUM SERPL-SCNC: 3.9 MMOL/L (ref 3.5–5.3)
POTASSIUM SERPL-SCNC: 4.3 MMOL/L (ref 3.5–5.3)
POTASSIUM SERPL-SCNC: 4.4 MMOL/L (ref 3.5–5.3)
PR INTERVAL: 174 MS
PROT UR STRIP-MCNC: NEGATIVE MG/DL
PROT UR STRIP-MCNC: NEGATIVE MG/DL
PROTHROMBIN TIME: 18.2 SECONDS (ref 11.6–14.5)
QRS AXIS: -75 DEGREES
QRSD INTERVAL: 136 MS
QT INTERVAL: 456 MS
QTC INTERVAL: 525 MS
RBC #/AREA URNS AUTO: ABNORMAL /HPF
RH BLD: POSITIVE
SAO2 % BLD FROM PO2: 83 % (ref 60–85)
SAO2 % BLD FROM PO2: 90 % (ref 60–85)
SAO2 % BLD FROM PO2: 99 % (ref 60–85)
SODIUM BLD-SCNC: 138 MMOL/L (ref 136–145)
SODIUM BLD-SCNC: 138 MMOL/L (ref 136–145)
SODIUM BLD-SCNC: 139 MMOL/L (ref 136–145)
SODIUM BLD-SCNC: 140 MMOL/L (ref 136–145)
SODIUM BLD-SCNC: 141 MMOL/L (ref 136–145)
SODIUM BLD-SCNC: 141 MMOL/L (ref 136–145)
SODIUM SERPL-SCNC: 142 MMOL/L (ref 135–147)
SP GR UR STRIP.AUTO: 1.01 (ref 1–1.03)
SP GR UR STRIP.AUTO: 1.02 (ref 1–1.03)
SPECIMEN SOURCE: ABNORMAL
SPECIMEN SOURCE: NORMAL
T WAVE AXIS: 87 DEGREES
UROBILINOGEN UR STRIP-ACNC: <2 MG/DL
UROBILINOGEN UR STRIP-ACNC: <2 MG/DL
VENT AC: 18
VENT- AC: AC
VENTRICULAR RATE: 80 BPM
VT SETTING VENT: 550 ML
WBC #/AREA URNS AUTO: ABNORMAL /HPF

## 2023-03-21 PROCEDURE — 5A1221Z PERFORMANCE OF CARDIAC OUTPUT, CONTINUOUS: ICD-10-PCS | Performed by: THORACIC SURGERY (CARDIOTHORACIC VASCULAR SURGERY)

## 2023-03-21 PROCEDURE — 02UG0JZ SUPPLEMENT MITRAL VALVE WITH SYNTHETIC SUBSTITUTE, OPEN APPROACH: ICD-10-PCS | Performed by: THORACIC SURGERY (CARDIOTHORACIC VASCULAR SURGERY)

## 2023-03-21 PROCEDURE — 5A1223Z PERFORMANCE OF CARDIAC PACING, CONTINUOUS: ICD-10-PCS | Performed by: THORACIC SURGERY (CARDIOTHORACIC VASCULAR SURGERY)

## 2023-03-21 PROCEDURE — 02L70CK OCCLUSION OF LEFT ATRIAL APPENDAGE WITH EXTRALUMINAL DEVICE, OPEN APPROACH: ICD-10-PCS | Performed by: THORACIC SURGERY (CARDIOTHORACIC VASCULAR SURGERY)

## 2023-03-21 PROCEDURE — 30233N0 TRANSFUSION OF AUTOLOGOUS RED BLOOD CELLS INTO PERIPHERAL VEIN, PERCUTANEOUS APPROACH: ICD-10-PCS | Performed by: THORACIC SURGERY (CARDIOTHORACIC VASCULAR SURGERY)

## 2023-03-21 PROCEDURE — 02HV33Z INSERTION OF INFUSION DEVICE INTO SUPERIOR VENA CAVA, PERCUTANEOUS APPROACH: ICD-10-PCS | Performed by: ANESTHESIOLOGY

## 2023-03-21 DEVICE — ATRICLIP® GILINOV-COSGROVE LAA EXCLUSION SYSTEM 45
Type: IMPLANTABLE DEVICE | Site: HEART | Status: FUNCTIONAL
Brand: ATRICLIP™ LAA EXCLUSION SYSTEM

## 2023-03-21 DEVICE — RING 638RL32 CG FUTURE ANNULO 29L
Type: IMPLANTABLE DEVICE | Site: HEART | Status: FUNCTIONAL
Brand: CG FUTURE™

## 2023-03-21 RX ORDER — POTASSIUM CHLORIDE 14.9 MG/ML
20 INJECTION INTRAVENOUS
Status: DISCONTINUED | OUTPATIENT
Start: 2023-03-21 | End: 2023-03-22

## 2023-03-21 RX ORDER — MANNITOL 250 MG/ML
INJECTION, SOLUTION INTRAVENOUS AS NEEDED
Status: DISCONTINUED | OUTPATIENT
Start: 2023-03-21 | End: 2023-03-21

## 2023-03-21 RX ORDER — AMINOCAPROIC ACID 250 MG/ML
INJECTION, SOLUTION INTRAVENOUS AS NEEDED
Status: DISCONTINUED | OUTPATIENT
Start: 2023-03-21 | End: 2023-03-21

## 2023-03-21 RX ORDER — AMIODARONE HYDROCHLORIDE 200 MG/1
200 TABLET ORAL EVERY 8 HOURS SCHEDULED
Status: DISCONTINUED | OUTPATIENT
Start: 2023-03-21 | End: 2023-03-27 | Stop reason: HOSPADM

## 2023-03-21 RX ORDER — SODIUM CHLORIDE 9 MG/ML
INJECTION, SOLUTION INTRAVENOUS CONTINUOUS PRN
Status: DISCONTINUED | OUTPATIENT
Start: 2023-03-21 | End: 2023-03-21

## 2023-03-21 RX ORDER — SODIUM CHLORIDE 450 MG/100ML
20 INJECTION, SOLUTION INTRAVENOUS CONTINUOUS
Status: DISCONTINUED | OUTPATIENT
Start: 2023-03-21 | End: 2023-03-24

## 2023-03-21 RX ORDER — HEPARIN SODIUM 1000 [USP'U]/ML
10000 INJECTION, SOLUTION INTRAVENOUS; SUBCUTANEOUS ONCE
Status: DISCONTINUED | OUTPATIENT
Start: 2023-03-21 | End: 2023-03-21 | Stop reason: HOSPADM

## 2023-03-21 RX ORDER — FENTANYL CITRATE 50 UG/ML
50 INJECTION, SOLUTION INTRAMUSCULAR; INTRAVENOUS ONCE
Status: DISCONTINUED | OUTPATIENT
Start: 2023-03-21 | End: 2023-03-21

## 2023-03-21 RX ORDER — GLYCOPYRROLATE 0.2 MG/ML
INJECTION INTRAMUSCULAR; INTRAVENOUS AS NEEDED
Status: DISCONTINUED | OUTPATIENT
Start: 2023-03-21 | End: 2023-03-21

## 2023-03-21 RX ORDER — NEOSTIGMINE METHYLSULFATE 1 MG/ML
INJECTION INTRAVENOUS AS NEEDED
Status: DISCONTINUED | OUTPATIENT
Start: 2023-03-21 | End: 2023-03-21

## 2023-03-21 RX ORDER — WARFARIN SODIUM 2.5 MG/1
2.5 TABLET ORAL
Status: COMPLETED | OUTPATIENT
Start: 2023-03-21 | End: 2023-03-21

## 2023-03-21 RX ORDER — HEPARIN SODIUM 1000 [USP'U]/ML
INJECTION, SOLUTION INTRAVENOUS; SUBCUTANEOUS AS NEEDED
Status: DISCONTINUED | OUTPATIENT
Start: 2023-03-21 | End: 2023-03-21

## 2023-03-21 RX ORDER — PANTOPRAZOLE SODIUM 40 MG/1
40 TABLET, DELAYED RELEASE ORAL
Status: DISCONTINUED | OUTPATIENT
Start: 2023-03-22 | End: 2023-03-27 | Stop reason: HOSPADM

## 2023-03-21 RX ORDER — ALBUMIN, HUMAN INJ 5% 5 %
12.5 SOLUTION INTRAVENOUS ONCE
Status: COMPLETED | OUTPATIENT
Start: 2023-03-21 | End: 2023-03-21

## 2023-03-21 RX ORDER — SODIUM CHLORIDE, SODIUM LACTATE, POTASSIUM CHLORIDE, CALCIUM CHLORIDE 600; 310; 30; 20 MG/100ML; MG/100ML; MG/100ML; MG/100ML
INJECTION, SOLUTION INTRAVENOUS CONTINUOUS PRN
Status: DISCONTINUED | OUTPATIENT
Start: 2023-03-21 | End: 2023-03-21

## 2023-03-21 RX ORDER — FLUTICASONE PROPIONATE 50 MCG
1 SPRAY, SUSPENSION (ML) NASAL AS NEEDED
Status: DISCONTINUED | OUTPATIENT
Start: 2023-03-22 | End: 2023-03-27 | Stop reason: HOSPADM

## 2023-03-21 RX ORDER — CARDIOPLEGIC SOLUTION NO.16 26/1052.8
PLASTIC BAG, PERFUSION (ML) PERFUSION AS NEEDED
Status: DISCONTINUED | OUTPATIENT
Start: 2023-03-21 | End: 2023-03-21

## 2023-03-21 RX ORDER — CHLORHEXIDINE GLUCONATE 0.12 MG/ML
15 RINSE ORAL ONCE
Status: COMPLETED | OUTPATIENT
Start: 2023-03-21 | End: 2023-03-21

## 2023-03-21 RX ORDER — SODIUM CHLORIDE, SODIUM GLUCONATE, SODIUM ACETATE, POTASSIUM CHLORIDE, MAGNESIUM CHLORIDE, SODIUM PHOSPHATE, DIBASIC, AND POTASSIUM PHOSPHATE .53; .5; .37; .037; .03; .012; .00082 G/100ML; G/100ML; G/100ML; G/100ML; G/100ML; G/100ML; G/100ML
INJECTION, SOLUTION INTRAVENOUS AS NEEDED
Status: DISCONTINUED | OUTPATIENT
Start: 2023-03-21 | End: 2023-03-21

## 2023-03-21 RX ORDER — BISACODYL 10 MG
10 SUPPOSITORY, RECTAL RECTAL DAILY PRN
Status: DISCONTINUED | OUTPATIENT
Start: 2023-03-21 | End: 2023-03-27 | Stop reason: HOSPADM

## 2023-03-21 RX ORDER — CEFAZOLIN SODIUM 2 G/50ML
2000 SOLUTION INTRAVENOUS ONCE
Status: COMPLETED | OUTPATIENT
Start: 2023-03-21 | End: 2023-03-21

## 2023-03-21 RX ORDER — MAGNESIUM HYDROXIDE 1200 MG/15ML
LIQUID ORAL AS NEEDED
Status: DISCONTINUED | OUTPATIENT
Start: 2023-03-21 | End: 2023-03-21 | Stop reason: HOSPADM

## 2023-03-21 RX ORDER — CEFAZOLIN SODIUM 2 G/50ML
2000 SOLUTION INTRAVENOUS EVERY 8 HOURS
Status: COMPLETED | OUTPATIENT
Start: 2023-03-21 | End: 2023-03-22

## 2023-03-21 RX ORDER — ROCURONIUM BROMIDE 10 MG/ML
INJECTION, SOLUTION INTRAVENOUS AS NEEDED
Status: DISCONTINUED | OUTPATIENT
Start: 2023-03-21 | End: 2023-03-21

## 2023-03-21 RX ORDER — ACETAMINOPHEN 650 MG/1
650 SUPPOSITORY RECTAL EVERY 4 HOURS PRN
Status: DISCONTINUED | OUTPATIENT
Start: 2023-03-21 | End: 2023-03-27 | Stop reason: HOSPADM

## 2023-03-21 RX ORDER — FUROSEMIDE 10 MG/ML
40 INJECTION INTRAMUSCULAR; INTRAVENOUS EVERY 6 HOURS PRN
Status: DISCONTINUED | OUTPATIENT
Start: 2023-03-21 | End: 2023-03-22

## 2023-03-21 RX ORDER — OXYCODONE HYDROCHLORIDE 5 MG/1
5 TABLET ORAL EVERY 4 HOURS PRN
Status: DISCONTINUED | OUTPATIENT
Start: 2023-03-21 | End: 2023-03-27 | Stop reason: HOSPADM

## 2023-03-21 RX ORDER — ASPIRIN 325 MG
325 TABLET ORAL DAILY
Status: DISCONTINUED | OUTPATIENT
Start: 2023-03-21 | End: 2023-03-24

## 2023-03-21 RX ORDER — CALCIUM CHLORIDE 100 MG/ML
1 INJECTION INTRAVENOUS; INTRAVENTRICULAR ONCE
Status: DISCONTINUED | OUTPATIENT
Start: 2023-03-21 | End: 2023-03-21

## 2023-03-21 RX ORDER — MAGNESIUM SULFATE HEPTAHYDRATE 40 MG/ML
2 INJECTION, SOLUTION INTRAVENOUS ONCE
Status: COMPLETED | OUTPATIENT
Start: 2023-03-21 | End: 2023-03-21

## 2023-03-21 RX ORDER — PROPOFOL 10 MG/ML
INJECTION, EMULSION INTRAVENOUS AS NEEDED
Status: DISCONTINUED | OUTPATIENT
Start: 2023-03-21 | End: 2023-03-21

## 2023-03-21 RX ORDER — ATORVASTATIN CALCIUM 80 MG/1
80 TABLET, FILM COATED ORAL
Status: DISCONTINUED | OUTPATIENT
Start: 2023-03-21 | End: 2023-03-27 | Stop reason: HOSPADM

## 2023-03-21 RX ORDER — FERROUS SULFATE 325(65) MG
325 TABLET ORAL
Status: DISCONTINUED | OUTPATIENT
Start: 2023-03-22 | End: 2023-03-27 | Stop reason: HOSPADM

## 2023-03-21 RX ORDER — OXYCODONE HYDROCHLORIDE 5 MG/1
2.5 TABLET ORAL EVERY 4 HOURS PRN
Status: DISCONTINUED | OUTPATIENT
Start: 2023-03-21 | End: 2023-03-27 | Stop reason: HOSPADM

## 2023-03-21 RX ORDER — AMIODARONE HYDROCHLORIDE 50 MG/ML
INJECTION, SOLUTION INTRAVENOUS AS NEEDED
Status: DISCONTINUED | OUTPATIENT
Start: 2023-03-21 | End: 2023-03-21

## 2023-03-21 RX ORDER — FENTANYL CITRATE 50 UG/ML
INJECTION, SOLUTION INTRAMUSCULAR; INTRAVENOUS AS NEEDED
Status: DISCONTINUED | OUTPATIENT
Start: 2023-03-21 | End: 2023-03-21

## 2023-03-21 RX ORDER — POLYETHYLENE GLYCOL 3350 17 G/17G
17 POWDER, FOR SOLUTION ORAL DAILY
Status: DISCONTINUED | OUTPATIENT
Start: 2023-03-22 | End: 2023-03-27 | Stop reason: HOSPADM

## 2023-03-21 RX ORDER — ALBUMIN, HUMAN INJ 5% 5 %
SOLUTION INTRAVENOUS
Status: COMPLETED
Start: 2023-03-21 | End: 2023-03-21

## 2023-03-21 RX ORDER — FONDAPARINUX SODIUM 2.5 MG/.5ML
2.5 INJECTION SUBCUTANEOUS DAILY
Status: DISCONTINUED | OUTPATIENT
Start: 2023-03-22 | End: 2023-03-26

## 2023-03-21 RX ORDER — TAMSULOSIN HYDROCHLORIDE 0.4 MG/1
0.4 CAPSULE ORAL
Status: DISCONTINUED | OUTPATIENT
Start: 2023-03-21 | End: 2023-03-27 | Stop reason: HOSPADM

## 2023-03-21 RX ORDER — ONDANSETRON 2 MG/ML
4 INJECTION INTRAMUSCULAR; INTRAVENOUS EVERY 6 HOURS PRN
Status: DISCONTINUED | OUTPATIENT
Start: 2023-03-21 | End: 2023-03-27 | Stop reason: HOSPADM

## 2023-03-21 RX ORDER — ACETAMINOPHEN 325 MG/1
975 TABLET ORAL EVERY 8 HOURS
Status: DISCONTINUED | OUTPATIENT
Start: 2023-03-21 | End: 2023-03-27 | Stop reason: HOSPADM

## 2023-03-21 RX ORDER — POTASSIUM CHLORIDE 14.9 MG/ML
20 INJECTION INTRAVENOUS ONCE AS NEEDED
Status: DISCONTINUED | OUTPATIENT
Start: 2023-03-21 | End: 2023-03-22

## 2023-03-21 RX ORDER — CALCIUM CHLORIDE 100 MG/ML
INJECTION INTRAVENOUS; INTRAVENTRICULAR AS NEEDED
Status: DISCONTINUED | OUTPATIENT
Start: 2023-03-21 | End: 2023-03-21

## 2023-03-21 RX ORDER — ESMOLOL HYDROCHLORIDE 10 MG/ML
INJECTION INTRAVENOUS AS NEEDED
Status: DISCONTINUED | OUTPATIENT
Start: 2023-03-21 | End: 2023-03-21

## 2023-03-21 RX ORDER — VANCOMYCIN HYDROCHLORIDE 1 G/20ML
INJECTION, POWDER, LYOPHILIZED, FOR SOLUTION INTRAVENOUS AS NEEDED
Status: DISCONTINUED | OUTPATIENT
Start: 2023-03-21 | End: 2023-03-21 | Stop reason: HOSPADM

## 2023-03-21 RX ORDER — WARFARIN SODIUM 5 MG/1
5 TABLET ORAL
Status: DISCONTINUED | OUTPATIENT
Start: 2023-03-21 | End: 2023-03-21

## 2023-03-21 RX ORDER — HYDROMORPHONE HCL/PF 1 MG/ML
0.5 SYRINGE (ML) INJECTION EVERY 2 HOUR PRN
Status: DISCONTINUED | OUTPATIENT
Start: 2023-03-21 | End: 2023-03-23

## 2023-03-21 RX ORDER — CHLORHEXIDINE GLUCONATE 0.12 MG/ML
15 RINSE ORAL EVERY 12 HOURS SCHEDULED
Status: DISCONTINUED | OUTPATIENT
Start: 2023-03-21 | End: 2023-03-27 | Stop reason: HOSPADM

## 2023-03-21 RX ORDER — MAGNESIUM SULFATE 500 MG/ML
VIAL (ML) INJECTION AS NEEDED
Status: DISCONTINUED | OUTPATIENT
Start: 2023-03-21 | End: 2023-03-21

## 2023-03-21 RX ORDER — HEPARIN SODIUM 1000 [USP'U]/ML
400 INJECTION, SOLUTION INTRAVENOUS; SUBCUTANEOUS ONCE
Status: DISCONTINUED | OUTPATIENT
Start: 2023-03-21 | End: 2023-03-21 | Stop reason: HOSPADM

## 2023-03-21 RX ORDER — DEXMEDETOMIDINE HYDROCHLORIDE 4 UG/ML
.1-.7 INJECTION, SOLUTION INTRAVENOUS
Status: DISCONTINUED | OUTPATIENT
Start: 2023-03-21 | End: 2023-03-21

## 2023-03-21 RX ORDER — FENTANYL CITRATE 50 UG/ML
50 INJECTION, SOLUTION INTRAMUSCULAR; INTRAVENOUS
Status: DISCONTINUED | OUTPATIENT
Start: 2023-03-21 | End: 2023-03-21

## 2023-03-21 RX ORDER — FINASTERIDE 5 MG/1
5 TABLET, FILM COATED ORAL DAILY
Status: DISCONTINUED | OUTPATIENT
Start: 2023-03-22 | End: 2023-03-27 | Stop reason: HOSPADM

## 2023-03-21 RX ORDER — PROTAMINE SULFATE 10 MG/ML
INJECTION, SOLUTION INTRAVENOUS AS NEEDED
Status: DISCONTINUED | OUTPATIENT
Start: 2023-03-21 | End: 2023-03-21

## 2023-03-21 RX ORDER — SUCCINYLCHOLINE/SOD CL,ISO/PF 100 MG/5ML
SYRINGE (ML) INTRAVENOUS AS NEEDED
Status: DISCONTINUED | OUTPATIENT
Start: 2023-03-21 | End: 2023-03-21

## 2023-03-21 RX ADMIN — PHENYLEPHRINE HYDROCHLORIDE 25 MCG: 10 INJECTION INTRAVENOUS at 09:28

## 2023-03-21 RX ADMIN — LIDOCAINE HYDROCHLORIDE 100 MG: 20 INJECTION INTRAVENOUS at 07:29

## 2023-03-21 RX ADMIN — INSULIN HUMAN 5 UNITS: 100 INJECTION, SOLUTION PARENTERAL at 09:51

## 2023-03-21 RX ADMIN — WARFARIN SODIUM 2.5 MG: 2.5 TABLET ORAL at 17:48

## 2023-03-21 RX ADMIN — ATORVASTATIN CALCIUM 80 MG: 80 TABLET, FILM COATED ORAL at 17:48

## 2023-03-21 RX ADMIN — HYDROMORPHONE HYDROCHLORIDE 0.5 MG: 1 INJECTION, SOLUTION INTRAMUSCULAR; INTRAVENOUS; SUBCUTANEOUS at 19:01

## 2023-03-21 RX ADMIN — GLYCOPYRROLATE 0.8 MCG: 0.2 INJECTION, SOLUTION INTRAMUSCULAR; INTRAVENOUS at 10:41

## 2023-03-21 RX ADMIN — PHENYLEPHRINE HYDROCHLORIDE 25 MCG: 10 INJECTION INTRAVENOUS at 10:01

## 2023-03-21 RX ADMIN — SODIUM BICARBONATE 50 MEQ: 84 INJECTION, SOLUTION INTRAVENOUS at 08:43

## 2023-03-21 RX ADMIN — HEPARIN SODIUM 5000 UNITS: 1000 INJECTION, SOLUTION INTRAVENOUS; SUBCUTANEOUS at 08:35

## 2023-03-21 RX ADMIN — ROCURONIUM BROMIDE 50 MG: 10 INJECTION INTRAVENOUS at 07:32

## 2023-03-21 RX ADMIN — ASPIRIN 325 MG ORAL TABLET 325 MG: 325 PILL ORAL at 13:21

## 2023-03-21 RX ADMIN — CHLORHEXIDINE GLUCONATE 0.12% ORAL RINSE 15 ML: 1.2 LIQUID ORAL at 06:18

## 2023-03-21 RX ADMIN — ALBUMIN (HUMAN) 12.5 G: 12.5 INJECTION, SOLUTION INTRAVENOUS at 13:21

## 2023-03-21 RX ADMIN — OXYCODONE HYDROCHLORIDE 5 MG: 5 TABLET ORAL at 15:07

## 2023-03-21 RX ADMIN — HEPARIN SODIUM 5000 UNITS: 1000 INJECTION, SOLUTION INTRAVENOUS; SUBCUTANEOUS at 09:52

## 2023-03-21 RX ADMIN — TAMSULOSIN HYDROCHLORIDE 0.4 MG: 0.4 CAPSULE ORAL at 17:48

## 2023-03-21 RX ADMIN — ALBUMIN (HUMAN) 12.5 G: 12.5 INJECTION, SOLUTION INTRAVENOUS at 17:12

## 2023-03-21 RX ADMIN — NICARDIPINE HYDROCHLORIDE 5 MG/HR: 2.5 INJECTION, SOLUTION INTRAVENOUS at 11:48

## 2023-03-21 RX ADMIN — CHLORHEXIDINE GLUCONATE 0.12% ORAL RINSE 15 ML: 1.2 LIQUID ORAL at 20:37

## 2023-03-21 RX ADMIN — SODIUM CHLORIDE 1 EACH: 0.9 INJECTION, SOLUTION INTRAVENOUS at 07:17

## 2023-03-21 RX ADMIN — ROCURONIUM BROMIDE 50 MG: 10 INJECTION INTRAVENOUS at 08:10

## 2023-03-21 RX ADMIN — NEOSTIGMINE METHYLSULFATE 5 MG: 1 INJECTION INTRAVENOUS at 10:41

## 2023-03-21 RX ADMIN — PHENYLEPHRINE HYDROCHLORIDE 25 MCG: 10 INJECTION INTRAVENOUS at 10:05

## 2023-03-21 RX ADMIN — ACETAMINOPHEN 975 MG: 325 TABLET ORAL at 20:37

## 2023-03-21 RX ADMIN — MAGNESIUM SULFATE HEPTAHYDRATE 2 G: 500 INJECTION, SOLUTION INTRAMUSCULAR; INTRAVENOUS at 09:53

## 2023-03-21 RX ADMIN — SODIUM CHLORIDE, SODIUM GLUCONATE, SODIUM ACETATE, POTASSIUM CHLORIDE, MAGNESIUM CHLORIDE, SODIUM PHOSPHATE, DIBASIC, AND POTASSIUM PHOSPHATE 500 ML: .53; .5; .37; .037; .03; .012; .00082 INJECTION, SOLUTION INTRAVENOUS at 07:18

## 2023-03-21 RX ADMIN — PROPOFOL 160 MG: 10 INJECTION, EMULSION INTRAVENOUS at 07:29

## 2023-03-21 RX ADMIN — Medication 100 MG: at 10:04

## 2023-03-21 RX ADMIN — HEPARIN SODIUM 5000 UNITS: 1000 INJECTION, SOLUTION INTRAVENOUS; SUBCUTANEOUS at 09:28

## 2023-03-21 RX ADMIN — MUPIROCIN 1 APPLICATION.: 20 OINTMENT TOPICAL at 06:15

## 2023-03-21 RX ADMIN — PROTAMINE SULFATE 10 MG: 10 INJECTION, SOLUTION INTRAVENOUS at 10:13

## 2023-03-21 RX ADMIN — SODIUM CHLORIDE: 0.9 INJECTION, SOLUTION INTRAVENOUS at 07:45

## 2023-03-21 RX ADMIN — AMIODARONE HYDROCHLORIDE 200 MG: 200 TABLET ORAL at 21:01

## 2023-03-21 RX ADMIN — DEXMEDETOMIDINE 0.3 MCG/KG/HR: 100 INJECTION, SOLUTION, CONCENTRATE INTRAVENOUS at 08:13

## 2023-03-21 RX ADMIN — AMIODARONE HYDROCHLORIDE 150 MG: 50 INJECTION, SOLUTION INTRAVENOUS at 10:06

## 2023-03-21 RX ADMIN — HYDROMORPHONE HYDROCHLORIDE 0.5 MG: 1 INJECTION, SOLUTION INTRAMUSCULAR; INTRAVENOUS; SUBCUTANEOUS at 21:01

## 2023-03-21 RX ADMIN — OXYCODONE HYDROCHLORIDE 5 MG: 5 TABLET ORAL at 20:37

## 2023-03-21 RX ADMIN — AMINOCAPROIC ACID 5 G: 250 INJECTION, SOLUTION INTRAVENOUS at 08:18

## 2023-03-21 RX ADMIN — ESMOLOL HYDROCHLORIDE 100 MG: 100 INJECTION, SOLUTION INTRAVENOUS at 07:31

## 2023-03-21 RX ADMIN — ROCURONIUM BROMIDE 50 MG: 10 INJECTION INTRAVENOUS at 09:10

## 2023-03-21 RX ADMIN — Medication 100 MG: at 07:29

## 2023-03-21 RX ADMIN — PROTAMINE SULFATE 340 MG: 10 INJECTION, SOLUTION INTRAVENOUS at 10:14

## 2023-03-21 RX ADMIN — SODIUM CHLORIDE 3 UNITS/HR: 9 INJECTION, SOLUTION INTRAVENOUS at 22:23

## 2023-03-21 RX ADMIN — MAGNESIUM SULFATE HEPTAHYDRATE 2 G: 40 INJECTION, SOLUTION INTRAVENOUS at 11:48

## 2023-03-21 RX ADMIN — MANNITOL 25 G: 12.5 INJECTION, SOLUTION INTRAVENOUS at 08:43

## 2023-03-21 RX ADMIN — CALCIUM CHLORIDE 1 G: 100 INJECTION INTRAVENOUS; INTRAVENTRICULAR at 10:09

## 2023-03-21 RX ADMIN — HYDROMORPHONE HYDROCHLORIDE 0.5 MG: 1 INJECTION, SOLUTION INTRAMUSCULAR; INTRAVENOUS; SUBCUTANEOUS at 16:55

## 2023-03-21 RX ADMIN — HEPARIN SODIUM 38000 UNITS: 1000 INJECTION INTRAVENOUS; SUBCUTANEOUS at 08:23

## 2023-03-21 RX ADMIN — CEFAZOLIN SODIUM 2000 MG: 2 SOLUTION INTRAVENOUS at 10:17

## 2023-03-21 RX ADMIN — ALBUMIN, HUMAN INJ 5% 12.5 G: 5 SOLUTION at 17:12

## 2023-03-21 RX ADMIN — CEFAZOLIN SODIUM 2000 MG: 2 SOLUTION INTRAVENOUS at 17:48

## 2023-03-21 RX ADMIN — HEPARIN SODIUM 5000 UNITS: 1000 INJECTION, SOLUTION INTRAVENOUS; SUBCUTANEOUS at 08:42

## 2023-03-21 RX ADMIN — MUPIROCIN 1 APPLICATION.: 20 OINTMENT TOPICAL at 20:37

## 2023-03-21 RX ADMIN — SODIUM CHLORIDE, SODIUM LACTATE, POTASSIUM CHLORIDE, AND CALCIUM CHLORIDE: .6; .31; .03; .02 INJECTION, SOLUTION INTRAVENOUS at 07:18

## 2023-03-21 RX ADMIN — ACETAMINOPHEN 975 MG: 325 TABLET ORAL at 13:21

## 2023-03-21 RX ADMIN — AMINOCAPROIC ACID 1 G/HR: 250 INJECTION, SOLUTION INTRAVENOUS at 08:18

## 2023-03-21 RX ADMIN — Medication 750 ML: at 08:48

## 2023-03-21 RX ADMIN — PHENYLEPHRINE HYDROCHLORIDE 25 MCG: 10 INJECTION INTRAVENOUS at 08:56

## 2023-03-21 RX ADMIN — FENTANYL CITRATE 1000 MCG: 0.05 INJECTION, SOLUTION INTRAMUSCULAR; INTRAVENOUS at 08:40

## 2023-03-21 RX ADMIN — AMIODARONE HYDROCHLORIDE 200 MG: 200 TABLET ORAL at 13:21

## 2023-03-21 RX ADMIN — SODIUM CHLORIDE 20 ML/HR: 0.45 INJECTION, SOLUTION INTRAVENOUS at 11:49

## 2023-03-21 RX ADMIN — CEFAZOLIN SODIUM 2000 MG: 2 SOLUTION INTRAVENOUS at 07:50

## 2023-03-21 NOTE — OP NOTE
OPERATIVE REPORT  PATIENT NAME: Zamzam Epperson    :  1950  MRN: [id number]      SURGERY DATE: 3/21/2023    SURGEON: Scott Flores DO    ASSISTANT: Elizabeth Barrientos DO    ADDITIONAL ASSISTANT: Jose Brambila PA-C    PREOPERATIVE DIAGNOSIS: Severe mitral regurgitation    POSTOPERATIVE DIAGNOSES: Severe mitral regurgitation    NYHA CLASS: 3    CCS CLASS: 3    PROCEDURE:  1  Mitral valve repair with 32 mm Medtronic CG Future mitral annuloplasty ring and Folding plasty of P1 to P2 and P2 to P3    2   Ligation left atrial appendage using a 45 mm AtriClip device    CARDIOPULMONARY BYPASS TIME: 82 minutes  CROSSCLAMP TIME: 75 minutes  ANESTHESIA: General endotracheal anesthesia with transesophageal echocardiogram guidance, Dr Moreira Click     INDICATIONS:  The patient is a 81y o  year-old male with clinical and echocardiographic findings consistent with severe mitral regurgitation  FINDINGS:  · Intraoperative transesophageal echocardiogram revealed severe mitral regurgitation secondary to a torn cord at P1  Was also redundant myxomatous posterior leaflet with prolapse at P2 and P1  · Epiaortic ultrasound showed less than 5 mm non-mobile plaque  · Inspection of the mitral valve reveals  Type II mitral valve disease with torn cord at P1 and myxomatous P1 and P2   · Final transesophageal echocardiogram demonstrated succesful repair of the mitral valve without evidence of regurgitation or CARMELO   Normal biventricular function      OPERATIVE TECHNIQUE:   The patient was taken to the operating room and placed supine on the operating table  Following the satisfactory induction of general anesthesia and the placement of monitoring lines the patient was prepped and draped in the usual sterile fashion  A time-out procedure was performed  The patient underwent median sternotomy, pericardiotomy, and systemic heparinization  Epiaortic ultrasound showed less than 5 mm non-mobile plaque   Cannulation for cardiopulmonary bypass was performed with an arterial dispersion cannula, bicaval single stage venous cannulas and a vented antegrade cardioplegia cannula  Once the ACT was greater than 480 seconds we placed the patient on cardiopulmonary bypass, the ascending aorta was crossclamped and cardiac arrest was obtained without complications with [place] cardioplegia  A CO2 flooded field was used during the entire case  The atrial appendage was excluded using a 45 mm Atriclip device  Waterson's groove was dissected, left atriotomy was performed and the mitral valve was exposed with a self-retaining retractor  Thorough mitral valve analysis was performed  There was Type IImitral valve disease with a torn cord at P1, myxomatous changes to the posterior leaflet mostly noted at P1 and P2 and P2 prolapse  I felt that the valve was therefore repairable  Annuloplasty sutures were placed with 2-0 Ethibond  The following techniques were used for valve repair: placement of a true-size 32 mm Medtronic CG Future mitral annuloplasty ring  and Folding plasty closure of P1 to P2 and P2 to P3  The 2-0 Ethibond sutures were passed through the ring, the ring was seated and the sutures were tied down  The valve was tested and found to be competent  While de-airing the heart the left atriotomy was closed with  2 layers of running 4-0 Prolene suture  The heart was extensively de-aired, a dose of warm antegrade blood was delivered and the crossclamp was removed  Atrial and ventricular pacing wires were placed  Following a period of reperfusion the patient was weaned from cardiopulmonary bypass and decannulated  Protamine was administered with normalization of the ACT  Hemostasis was confirmed in all fields  Thoracostomy tubes were placed  The sternum was closed with stainless steel wires  The fascia, subdermis and skin were closed with multiple layers of running absorbable suture      Dr Noy Rogers assisted with cannulation, mitral valve repair, decannulation  The assistance of the physician assistant was used for closing and opening  COMPLICATIONS: None    PACKS/TUBES/DRAINS: Chest tubes x 2  EBL: 250mL  TRANSFUSION: None  SPECIMENS: None    As the attending surgeon, I was present and scrubbed for all critical portions of this procedure  There was no qualified surgical resident available  Sponge, needle and instrument counts were reported as correct by the nursing staff  The assistance of a PA was required to complete this case, specifically for assistance with cannulation, decannulation, and exposure during mitral valve repair           SIGNATURE: Rama Loya DO  DATE: March 21, 2023  TIME: 12:09 PM

## 2023-03-21 NOTE — ANESTHESIA POSTPROCEDURE EVALUATION
Post-Op Assessment Note    CV Status:  Stable    Pain management: adequate     Mental Status:  Unresponsive   Hydration Status:  Stable   PONV Controlled:  None   Airway Patency:  Patent  Airway: intubated      Post Op Vitals Reviewed: Yes      Staff: Anesthesiologist         No notable events documented      BP      Temp     Pulse    Resp      SpO2

## 2023-03-21 NOTE — CONSULTS
Consult - [id number] 81 y o  male MRN: [id number]  Unit/Bed#: [facility] 414-01 Encounter: [id number]    Inpatient consult to Vneus Hammer performed by: Doyne Mcardle, PA-C  Consult ordered by: Chelsie Ibarra PA-C          Physician Requesting Consult: Rama Loya DO    Reason for Consult / Principal Problem: S/P mitral valve repair    HPI: Hong Herring is a 81 y o  male with past medical history of hypertension, hyperlipidemia, diabetes mellitus 2, HUMBERTO on CPAP, BPH, diverticulosis with GI bleed, thrombocytopenia and remote history of skin melanoma who originally presented to his primary care physician's office on  due to dyspnea on exertion, fatigue, and decreased exercise tolerance shortly after testing positive for covid  He was evaluated with an echocardiogram on 10/26/22 which revealed left ventricular hypertrophy, EF 60% with grade 2 diastolic dysfunction and mild to moderate mitral valve sclerosis with regurgitation and eccentric regurgitation jet  He was evaluated by cardiology and a follow-up TANG was performed on 2/15 revealing LVEF 60% with mitral valve posterior leaflet failure with severe regurgitation, prompting referral to cardiothoracic surgery  During surgical work-up a cardiac catheterization was performed which revealed a large distal LMCA aneurysm involving the proximal LAD and left circumflex with mild plaque burden  Patient was then scheduled for elective mitral valve repair with possible replacement with tissue valve as well as ligation of the left atrial appendage  Patient arrives POD# 0 s/p mitral valve repair with ring annuloplasty and ligation of left atrial appendage with no supportive medication  Postoperative TANG Reveals LVEF 60%  History obtained from chart review due to patient being intubated and sedated  ---------------------------------------------------------------------------------------------------------------------------------------------------------------------  Impressions:  1  Severe mitral valve regurgitation s/p ring annuloplasty mitral valve repair  2  Hypertension  3  Hyperlipidemia  4  Diabetes mellitus type 2 diet controlled  5  LMCA aneurysm   6  HUMBERTO on CPAP  7  Diverticulosis with GI bleed  8  BPH      Plan:    Neuro: Precidex for  continuous sedation  Fentanyl 50Q1 hr PRN for agitation  Dilaudid 0 5mg Q2 hr PRN pain  Oxycodone 2 5/5 Q4 hr PRN for pain  Trend neuro exam   Delirium precautions  CV: MAP goal >65  SBP goal <130  CI>2 2  Post-op medications: None  Volume resuscitation as needed  Monitor rhythm on telemetry  Epicardial pacing wires ax1, Vx1 Requiring A Pace  Intra-op TANG LVEF 60%  Lung: Check STAT post-op ABG and CXR  Wean vent with spontaneous breathing trial with goal to extubate  GI: GI prophylaxis with PPI  Bowel regimen  Zofran PRN for nausea  FEN: NPO  Replenish K >4 0, mag >2 0 and calcium >7 0  : Check STAT post-op BMP  Feng in place  Monitor UOP with goal >0 5cc/kg/hour  Lasix versus volume resuscitate as needed depending on hemodynamics and volume status  ID: Prophylactic post-op abx  Maintain normothermia  Trend temps  Heme: Check STAT post-op H/H and platelets  Monitor incision site, invasive lines, and chest tube outputs for bleeding  Send coag panel if needed  Endo: Insulin gtt for blood sugar control       Results from last 6 Months   Lab Units 11/14/22  0734   HEMOGLOBIN A1C % 5 3     Disposition: ICU Care   ---------------------------------------------------------------------------------------------------------------------------------------------------------------------  Historical Information   Past Medical History:   Diagnosis Date   • Acute bilateral low back pain without sciatica 03/09/2021   • BMI 33 0-33 9,adult 02/04/2020   • BPH (benign prostatic hyperplasia)    • COVID-19    • Diabetes mellitus associated with hormonal etiology ([facility] ) 2019   • Diverticulitis    • ED (erectile dysfunction)    • Elevated PSA 01/15/2013   • Encounter for well adult exam with abnormal findings 2019   • Herpes zoster 2014   • Hyperlipidemia    • Hypertension    • IFG (impaired fasting glucose)    • Metabolic syndrome    • Obesity (BMI 30 0-34 9) 2019   • Psoriasis    • Seasonal allergic rhinitis    • Type 2 diabetes mellitus ([place] [address] ) 2014     Past Surgical History:   Procedure Laterality Date   • CARDIAC CATHETERIZATION N/A 2023    Procedure: Cardiac Coronary Angiogram;  Surgeon: Casimiro Mayer DO;  Location: [facility]; Service: Cardiology   • CHOLECYSTECTOMY  2015   • COLONOSCOPY     • COLONOSCOPY  2021   • HERNIA REPAIR  2015     Social History   Social History     Substance and Sexual Activity   Alcohol Use Yes    Comment: social     Social History     Substance and Sexual Activity   Drug Use No     Social History     Tobacco Use   Smoking Status Former   • Packs/day: 0 50   • Years: 12 00   • Pack years: 6 00   • Types: Pipe, Cigarettes   • Start date: 1968   • Quit date: 6/10/1980   • Years since quittin 8   Smokeless Tobacco Never     Family History   Problem Relation Age of Onset   • Skin cancer Mother    • Heart disease Father      I have reviewed this patient's family history and commented on sigificant items within the HPI    ROS: ROS unable to be obtained due to patient being intubated and sedated  Allergies: Allergies   Allergen Reactions   • No Known Allergies        Home Medications:   Prior to Admission medications    Medication Sig Start Date End Date Taking?  Authorizing Provider   amLODIPine (NORVASC) 5 mg tablet TAKE 1 TABLET DAILY 2/15/23  Yes Macie German MD   atorvastatin (LIPITOR) 20 mg tablet TAKE 1 TABLET DAILY 22  Yes Macie German MD ferrous sulfate 324 (65 Fe) mg Take 1 tablet (324 mg total) by mouth daily before breakfast 3/1/23  Yes Juan Gomez MD   finasteride (PROSCAR) 5 mg tablet TAKE 1 TABLET DAILY 3/2/23  Yes Juan Gomez MD   tamsulosin (FLOMAX) 0 4 mg TAKE 1 CAPSULE DAILY WITH  DINNER 1/3/23  Yes Juan Gomez MD   aspirin (ECOTRIN LOW STRENGTH) 81 mg EC tablet take 1 tablet (81MG) 1/22/18   Historical Provider, MD   fluticasone (FLONASE) 50 mcg/act nasal spray 1 spray into each nostril if needed for allergies  Patient not taking: Reported on 3/8/2023 3/3/23   NISHANT Marroquin   hydrocortisone 2 5 % cream Apply topically if needed for rash  Patient not taking: Reported on 3/8/2023 3/3/23   NISHANT Marroquin   lisinopril (ZESTRIL) 20 mg tablet TAKE 1 TABLET DAILY 3/2/23   Juan Gomez MD   Multiple Vitamins-Minerals (CENTRUM SILVER 50+MEN PO) Take by mouth in the morning    Historical Provider, MD   mupirocin (BACTROBAN) 2 % ointment Apply 1 application  topically 2 (two) times a day for 5 days Apply to each nostril twice daily for five days before your operation  3/8/23 3/13/23  NISHANT Zamudio   sildenafil (VIAGRA) 100 mg tablet Take 1 tablet (100 mg total) by mouth as needed for erectile dysfunction 3/10/23   Juan Gomez MD     Inpatient Medications:  Scheduled Meds:   acetaminophen, 975 mg, Q8H  amiodarone, 200 mg, Q8H [facility]  aspirin, 325 mg, Daily  atorvastatin, 80 mg, After Dinner  calcium chloride, 1 g, Once  cefazolin, 2,000 mg, Q8H  chlorhexidine, 15 mL, Q12H ALVIN  fentanyl citrate (PF), 50 mcg, Once  [START ON 3/22/2023] ferrous sulfate, 325 mg, Daily With Breakfast  [START ON 3/22/2023] finasteride, 5 mg, Daily  [START ON 3/22/2023] fondaparinux, 2 5 mg, Daily  magnesium sulfate, 2 g, Once  mupirocin, 1 application  , Q12H [facility]  [START ON 3/22/2023] pantoprazole, 40 mg, Early Morning  [START ON 3/22/2023] polyethylene glycol, 17 g, Daily  tamsulosin, 0 4 mg, After Dinner  warfarin, 2 5 mg, Once (warfarin) "  Continuous Infusions:  dexmedetomidine, 0 1-0 7 mcg/kg/hr, Last Rate: 0 3 mcg/kg/hr (23 1211)  insulin regular (HumuLIN R,NovoLIN R) infusion, 0 3-21 Units/hr, Last Rate: Stopped (23 1149)  niCARdipine, 2 5-15 mg/hr, Last Rate: Stopped (23 1154)  sodium chloride, 20 mL/hr, Last Rate: 20 mL/hr (23 114)      PRN Meds:  acetaminophen, 650 mg, Q4H PRN  bisacodyl, 10 mg, Daily PRN  fentanyl citrate (PF), 50 mcg, Q1H PRN  [START ON 3/22/2023] fluticasone, 1 spray, PRN  furosemide, 40 mg, Q6H PRN  HYDROmorphone, 0 5 mg, Q2H PRN  lactated ringers, 500 mL, Q30 Min PRN  lidocaine (cardiac), 100 mg, Q30 Min PRN  ondansetron, 4 mg, Q6H PRN  oxyCODONE, 2 5 mg, Q4H PRN  oxyCODONE, 5 mg, Q4H PRN  potassium chloride, 20 mEq, Once PRN  potassium chloride, 20 mEq, Q1H PRN  potassium chloride, 20 mEq, Q30 Min PRN      ---------------------------------------------------------------------------------------------------------------------------------------------------------------------  Vitals:   Vitals:    23 0602 23 1113 23 1115 23 1200   BP: 157/88      Pulse: (!) 50  80 80   Resp: 18  (!) 10 18   Temp: (!) 97 1 °F (36 2 °C)   (!) 97 °F (36 1 °C)   TempSrc: Temporal   Probe   SpO2: 98% 97% 97% 96%   Weight: 93 kg (205 lb)      Height: 6' 1\" (1 854 m)        Temperature: Temp (24hrs), Av 1 °F (36 2 °C), Min:97 °F (36 1 °C), Max:97 1 °F (36 2 °C)  Current: Temperature: (!) 97 °F (36 1 °C)    Weights: IBW (Ideal Body Weight): 79 9 kg  Body mass index is 27 05 kg/m²      Hemodynamic Monitoring:  PAP: PAP: 31/17, CVP (mean): 12 mmHg, CO (L/min): 4 2 L/min,  CI (L/min/m2): 1 9 L/min/m2, SVR (dyne*sec)/cm5: 1030 (dyne*sec)/cm5    Ventilator Settings:  Respiratory    Lab Data (Last 4 hours)    None         O2/Vent Data (Last 4 hours)       1113           Vent Mode AC/VC       Resp Rate (BPM) (BPM) 15       Vt (mL) (mL) 550       FIO2 (%) (%) 65       PEEP (cmH2O) (cmH2O) 6       " Patient safety screen outcome: Passed       MV 7 49                 Labs:   Results from last 7 days   Lab Units 23  1124 23  1059 23  0946 23  0936   I STAT HEMOGLOBIN g/dl 12 6 12 6 10 5*  --    HEMATOCRIT, ISTAT % 37 37 31*  --    PLATELETS Thousands/uL  --   --   --  110*     Results from last 7 days   Lab Units 23  1124 23  1122 23  1059 23  0946   SODIUM mmol/L  --  142  --   --    POTASSIUM mmol/L  --  3 9  --   --    CHLORIDE mmol/L  --  116*  --   --    CO2 mmol/L  --  25  --   --    CO2, I-STAT mmol/L 26  --  26 32   BUN mg/dL  --  12  --   --    CREATININE mg/dL  --  0 76  --   --    CALCIUM mg/dL  --  7 2*  --   --    GLUCOSE, ISTAT mg/dl 131  --  141* 191*     Post-op AB 50/121/-3/24 4/99%  Post-op CXR: No pleural effusion of pneumothorax, lines and tubes in place I have personally reviewed pertinent films in PACS  Post-op EKG: A- paced at 80 BPM No ST or T wave abnormalities  This was personally reviewed by myself  Physical Exam  Vitals and nursing note reviewed  Constitutional:       Appearance: He is ill-appearing  Comments: Right intubated, awakens and follows commands   HENT:      Head: Normocephalic and atraumatic  Nose: Nose normal       Mouth/Throat:      Mouth: Mucous membranes are moist       Pharynx: Oropharynx is clear  Eyes:      Extraocular Movements: Extraocular movements intact  Pupils: Pupils are equal, round, and reactive to light  Comments: Pupils are equal round and reactive to light   Cardiovascular:      Rate and Rhythm: Normal rate and regular rhythm  Pulses: Normal pulses  Comments: Warm distal extremities, 2+ DP pulses  Pulmonary:      Effort: Pulmonary effort is normal       Breath sounds: Normal breath sounds  Abdominal:      General: Abdomen is flat  Palpations: Abdomen is soft        Comments: Soft, nontender, nondistended   Musculoskeletal:         General: Normal range of motion  Cervical back: Normal range of motion  Skin:     General: Skin is warm  Capillary Refill: Capillary refill takes less than 2 seconds  Neurological:      Mental Status: He is alert  Comments: Awakes and follows commands       Invasive lines and devices: Invasive Devices     Central Venous Catheter Line  Duration           CVC Central Lines 03/21/23 Triple <1 day    Introducer 03/21/23 <1 day          Peripheral Intravenous Line  Duration           Peripheral IV 03/21/23 Left Hand <1 day          Arterial Line  Duration           Arterial Line 03/21/23 Right Radial <1 day          Line  Duration           Pacer Wires <1 day    Pacer Wires <1 day          Drain  Duration           Chest Tube 1 Mediastinal;Posterior 32 Fr  <1 day    Chest Tube 2 Mediastinal;Anterior 32 Fr  <1 day    NG/OG/Enteral Tube Orogastric 18 Fr Center mouth <1 day    Urethral Catheter Non-latex; Temperature probe; Other (Comment) 16 Fr  <1 day          Airway  Duration           ETT  Cuffed; Hi-Lo; Oral 8 mm <1 day              ---------------------------------------------------------------------------------------------------------------------------------------------------------------------  Care Time Delivered:   No Critical Care time spent     SIGNATURE: Gaudencio Cardona PA-C  DATE: March 21, 2023  TIME: 12:15 PM

## 2023-03-21 NOTE — ANESTHESIA PROCEDURE NOTES
Arterial Line Insertion  Performed by: Sherren Henle, MD  Authorized by: Sherren Henle, MD   Consent: Written consent obtained  Risks and benefits: risks, benefits and alternatives were discussed  Consent given by: patient  Patient understanding: patient states understanding of the procedure being performed  Patient consent: the patient's understanding of the procedure matches consent given  Procedure consent: procedure consent matches procedure scheduled  Required items: required blood products, implants, devices, and special equipment available  Patient identity confirmed: arm band  Time out: Immediately prior to procedure a "time out" was called to verify the correct patient, procedure, equipment, support staff and site/side marked as required  Preparation: Patient was prepped and draped in the usual sterile fashion    Indications: multiple ABGs and hemodynamic monitoring  Orientation:  Right  Location: radial artery  Sedation:  Patient sedated: no    Procedure Details:  Needle gauge: 20  Number of attempts: 2    Post-procedure:  Post-procedure: dressing applied  Waveform: good waveform and waveform confirmed  Post-procedure CNS: normal  Patient tolerance: patient tolerated the procedure well with no immediate complications  Comments: Procedure start 6011  Procedure end 9552

## 2023-03-21 NOTE — ANESTHESIA PROCEDURE NOTES
Central Line Insertion  Performed by: Levi Cerrato MD  Authorized by: Levi Cerrato MD     Date/Time: 3/21/2023 7:45 AM  Catheter Type:  triple lumen  Consent: Written consent obtained  Risks and benefits: risks, benefits and alternatives were discussed  Consent given by: patient  Patient understanding: patient states understanding of the procedure being performed  Patient consent: the patient's understanding of the procedure matches consent given  Procedure consent: procedure consent matches procedure scheduled  Required items: required blood products, implants, devices, and special equipment available  Patient identity confirmed: arm band  Time out: Immediately prior to procedure a "time out" was called to verify the correct patient, procedure, equipment, support staff and site/side marked as required    Indications: vascular access and central pressure monitoring  Patient position: Trendelenburg    Sedation:  Patient sedated: yes    Assessment: blood return through all ports and free fluid flow  Preparation: skin prepped with ChloraPrep  Skin prep agent dried: skin prep agent completely dried prior to procedure  Sterile barriers: all five maximum sterile barriers used - cap, mask, sterile gown, sterile gloves, and large sterile sheet  Hand hygiene: hand hygiene performed prior to central venous catheter insertion  sterile gel and probe cover used in ultrasound-guided central venous catheter insertionPre-procedure: landmarks identified  Number of attempts: 1  Successful placement: yes  Post-procedure: chlorhexidine patch applied, dressing applied and line sutured  Patient tolerance: patient tolerated the procedure well with no immediate complications  Comments: Procedure start 0733  Procedure end 0745

## 2023-03-21 NOTE — ANESTHESIA PROCEDURE NOTES
Introducer/[place]  Performed by: Misael Zacarias MD  Authorized by: Misael Zacarias MD     Date/Time: 3/21/2023 7:45 AM  Consent: Written consent obtained  Risks and benefits: risks, benefits and alternatives were discussed  Consent given by: patient  Patient understanding: patient states understanding of the procedure being performed  Patient consent: the patient's understanding of the procedure matches consent given  Procedure consent: procedure consent matches procedure scheduled  Required items: required blood products, implants, devices, and special equipment available  Patient identity confirmed: arm band  Time out: Immediately prior to procedure a "time out" was called to verify the correct patient, procedure, equipment, support staff and site/side marked as required    Indications: vascular access and central pressure monitoring  Location details: right internal jugular  Patient position: Trendelenburg    Sedation:  Patient sedated: yes    Assessment: blood return through all ports and free fluid flow  Preparation: skin prepped with ChloraPrep  Skin prep agent dried: skin prep agent completely dried prior to procedure  Sterile barriers: all five maximum sterile barriers used - cap, mask, sterile gown, sterile gloves, and large sterile sheet  Hand hygiene: hand hygiene performed prior to central venous catheter insertion  Ultrasound guidance: yes  Pre-procedure: landmarks identified  Number of attempts: 1  Successful placement: yes  Post-procedure: line sutured and dressing applied  Patient tolerance: patient tolerated the procedure well with no immediate complications  Comments: Procedure start 0733  Procedure end 0745

## 2023-03-21 NOTE — ANESTHESIA PROCEDURE NOTES
Procedure Performed: TANG Anesthesia  Start Time:  3/21/2023 7:48 AM        Preanesthesia Checklist    Patient identified, IV assessed, risks and benefits discussed, monitors and equipment assessed, procedure being performed at surgeon's request and anesthesia consent obtained  Procedure    Diagnostic Indications for TANG:  assessment of surgical repair  Type of TANG: complete TANG with interpretation  Images Saved: ultrasound permanent image saved  Physician Requesting Echo: Jessica Reynolds DO  Location performed: OR  Intubated  Bite block not placed  Heart visualized  Insertion of TANG Probe:  Atraumatic  Probe Type:  Multiplane  Modalities:  3D, color flow mapping, continuous wave Doppler and pulse wave Doppler  Echocardiographic and Doppler Measurements    PREPROCEDURE    LEFT VENTRICLE:  Systolic Function: normal  Ejection Fraction: 60%  Cavity size: dilated  RIGHT VENTRICLE:  Systolic Function: normal   Cavity size moderately dilated  No hypertrophy              AORTIC VALVE:  Leaflets: normal and trileaflet  Leaflet motions normal and normal  Stenosis: none  Regurgitation: mild  MITRAL VALVE:  Leaflets: normal  Leaflet Motions: prolapse  Regurgitation: severe  Stenosis: none  TRICUSPID VALVE:  Leaflets: normal  Leaflet Motions: normal  Stenosis: none  Regurgitation: mild  PULMONIC VALVE:  Leaflets: normal  Regurgitation: mild  Stenosis: none  ASCENDING AORTA:  Size:  normal   Dissection not present  AORTIC ARCH:  Size:  normal   dissection not present  Grade 2: severe intimal thickening without protruding atheroma  DESCENDING AORTA:  Size: normal   Dissection not present  Grade 3: atheroma protruding < 0 5 cm into lumen  RIGHT ATRIUM:  Size:  normal      LEFT ATRIUM:  Size: dilated   No spontaneous echo contrast     LEFT ATRIAL APPENDAGE:  Size: normal  No spontaneous echo contrast         ATRIAL SEPTUM:  Intra-atrial septal morphology: normal  VENTRICULAR SEPTUM:  Intra-ventricular septum morphology: normal          EPIAORTIC:  Plaque Thickness: 0-5 mm  OTHER FINDINGS:  Pericardium:  normal  Pleural Effusion:  none  POSTPROCEDURE    LEFT VENTRICLE: Unchanged   RIGHT VENTRICLE: Unchanged   AORTIC VALVE: Unchanged   MITRAL VALVE:   Leaflets: ring  Regurgitation: none  Stenosis: none  Mean Gradient: 2  Valve/Ring Size: 32 mm  TRICUSPID VALVE: Unchanged   PULMONIC VALVE: Unchanged             ATRIA: Unchanged   AORTA: Unchanged   REMOVAL:  Probe Removal: atraumatic

## 2023-03-21 NOTE — ANESTHESIA PREPROCEDURE EVALUATION
Procedure:  MITRAL VALVE REPAIR VS POSS TISSUE REPLACEMENT, LIGATION OF ATRIAL APPENDAGE, TANG (Chest)    Relevant Problems   CARDIO   (+) Essential hypertension   (+) Hyperlipidemia   (+) Mitral regurgitation      ENDO   (+) Type 2 diabetes mellitus (HCC)      GI/HEPATIC   (+) Gastrointestinal hemorrhage associated with intestinal diverticulosis      /RENAL   (+) BPH with urinary obstruction      NEURO/PSYCH   (+) History of COVID-19      PULMONARY   (+) HUMBERTO treated with BiPAP   (+) Shortness of breath        Physical Exam    Airway    Mallampati score: II         Dental   No notable dental hx     Cardiovascular      Pulmonary      Other Findings        Anesthesia Plan  ASA Score- 4     Anesthesia Type- general with ASA Monitors  Additional Monitors: arterial line, central venous line and pulmonary artery catheter  Airway Plan: ETT  Comment: I, Dr Henrry Beverly, the attending physician, have personally seen and evaluated the patient prior to anesthetic care  I have reviewed the pre-anesthetic record, and other medical records if appropriate to the anesthetic care  If a CRNA is involved in the case, I have reviewed the CRNA assessment, if present, and agree  The patient is in a suitable condition to proceed with my formulated anesthetic plan          Plan Factors-    Chart reviewed  Induction- intravenous  Postoperative Plan-     Informed Consent- Anesthetic plan and risks discussed with patient  I personally reviewed this patient with the CRNA  Discussed and agreed on the Anesthesia Plan with the CRNA  Crystal German

## 2023-03-21 NOTE — INTERVAL H&P NOTE
H&P reviewed  After examining the patient I find no changes in the patients condition since the H&P had been written  Vitals:    03/21/23 0602   BP: 157/88   Pulse: (!) 50   Resp: 18   Temp: (!) 97 1 °F (36 2 °C)   SpO2: 98%     Anticipated Length of Stay:  Patient will be admitted on an Inpatient basis with an anticipated length of stay of  greater than 2 midnights  Justification for Hospital Stay:  Post surgical recovery following open heart surgery

## 2023-03-21 NOTE — RESPIRATORY THERAPY NOTE
RT Ventilator Management Note  Tiana Lopez 81 y o  male MRN: [id number]  Unit/Bed#: [facility] 414-01 Encounter: [id number]      Daily Screen         3/21/2023  1113 3/21/2023  1345          Patient safety screen outcome[de-identified] Passed Passed      Spont breathing trial % for 30 min: -- --      Spont breathing trial outcome[de-identified] -- Passed      Previous settings resumed: -- No (comment)      Name of Medical Team Notified[de-identified] -- Dania Ashford PA-C      Preparing to extubate/ Notify Nurse: -- Yes      Extubation order obtained: -- Yes      Consider Cuff Test: -- Yes      Patient extubated: -- Yes      RSBI: -- 68                Physical Exam:   Assessment Type: Assess only  General Appearance: Awake, Alert  Respiratory Pattern: Normal  Chest Assessment: Chest expansion symmetrical  Bilateral Breath Sounds: Diminished  R Breath Sounds: Diminished  L Breath Sounds: Diminished  Cough: None  Suction: Oral, ET Tube  O2 Device: vent      Resp Comments: (P) pt awake and tolerating spont wean, will extubate per order, pt extubated to 4 lpm nc, neg stridor, pos voice, pos cuff leak test

## 2023-03-21 NOTE — RESPIRATORY THERAPY NOTE
Arrival/HPI





- General


Chief Complaint: Fever


Time Seen by Provider: 11/15/17 17:23


Historian: Patient, Parent





- History of Present Illness


Narrative History of Present Illness (Text): 





11/15/17 17:35


9yo female with no PMhx bib the father for complaint of intermittent fever x 

3days and right eye redness/discharge. Notes that she was seen by her 

Pediatrician today and rapid strep/flu test was negative. States strep culture 

is pending. The redness/discharge started after coming back from the 

Pediatrician. Father states she vomited once 2days ago. Patient otherwise 

denies sore throat, cough, abdominal pain, urinary symptoms, sick contact, 

diarrhea, any other complaint.





Past Medical History





- Provider Review


Nursing Documentation Reviewed: Yes





- Travel History


Have you traveled outside of the [place] within the last 3 mons?: No





- Immunization


Tetanus Immunization: Up to Date





- Medical History


Past Medical History: No Previous





- Psychiatric History


Hx Physical Abuse: No


Hx Emotional Abuse: No


Hx Depression: No





- Surgical History


Past Surgical History: No Previous


Surgeries: No Surgical History





- Reproductive


Currently Pregnant: No


Currently Lactating: No





- Suicidal Assessment


Feels Threatened at Home: No





Family/Social History





- Physician Review


Nursing Documentation Reviewed: Yes


Family/Social History: Unknown Family HX


Smoking Status: Never Smoked


Hx Alcohol Use: No


Hx Substance Use: No





Allergies/Home Meds


Allergies/Adverse Reactions: 


Allergies





No Known Allergies Allergy (Verified 11/15/17 17:12)


 








Home Medications: 


 Home Meds











 Medication  Instructions  Recorded  Confirmed


 


Acetaminophen [Children's Tylenol] 10 ml PO Q6 PRN 11/15/17 11/15/17


 


Promethazine [Phenergan Syrup] 5 ml PO Q6 11/15/17 11/15/17














Pediatric Review of Systems





- Physician Review


All systems were reviewed & negative as marked: Yes





- Review of Systems


Constitutional: Fevers


Eyes: Eye Pain, Other (Right eye redness/discharge)


ENT: Normal


Respiratory: Normal


Cardiovascular: Normal


Gastrointestinal: Normal


Genitourinary Female: Normal


Musculoskeletal: Normal


Skin: Normal


Neurologic: Normal


Endocrine: Normal


Hemo/Lymphatic: Normal


Psychiatric: Normal





Pediatric Physical Exam


Vital Signs Reviewed: Yes


Vital Signs











  Temp Pulse Resp BP Pulse Ox


 


 11/15/17 19:09  100.0 F H  89  20   99


 


 11/15/17 17:13  100.2 F H  115 H  18  100/61  97











Temperature: Febrile


Blood Pressure: Normal


Pulse: Regular


Respiratory Rate: Normal


Appearance: Positive for: Well-Appearing, Non-Toxic, Comfortable, Happy, Playful


Pain Distress: None


Mental Status: Positive for: Alert and Oriented X 3





- Systems Exam


Head: Present: Atraumatic, Normal [place], Normocephalic


Pupils: Present: PERRL


Extroacular Muscles: Present: EOMI


Conjunctiva: Present: Injected, Icteric (Red right conjunctiva)


Ears: Present: Normal, NORMAL TM, Normal Canal


Mouth: Present: Moist Mucous Membranes


Pharnyx: Present: Normal


Nose (Internal): Present: Normal Inspection


Neck: Present: Normal Range of Motion


Respiratory/Chest: Present: Clear to Auscultation, Good Air Exchange.  No: 

Respiratory Distress, Accessory Muscle Use


Cardiovascular: Present: Regular Rate and Rhythm, Normal S1, S2.  No: Murmurs


Abdomen: Present: Normal Bowel Sounds.  No: Tenderness, Distention, Peritoneal 

Signs


Genitourinary/Pelvic Exam: Present: NI.  No: C, E


Back: Present: GCS, CN, SP


Upper Extremity: Present: Normal Inspection.  No: Cyanosis, Edema


Lower Extremity: Present: Normal Inspection.  No: Edema


Neurological: Present: GCS=15, CN II-XII Intact, Speech Normal


Skin: Present: Warm, Dry, Normal Color.  No: Rashes


Lymphatic: Present: OX3, NI, NC


Psychiatric: Present: Alert, Normal Insight, Normal Concentration





Medical Decision Making


ED Course and Treatment: 





11/15/17 20:04


PT was febrile, although non lethargic in ED. 





PE was benign.


UA show UTI


Rapid strep was negative.





She was treated with Amox.


Tobra was given for conjunctivitis.





Result was DW the father. She was referred to her PMD. TRT ED for any new or 

worsening symptoms.





- Lab Interpretations


Lab Results: 


 Lab Results





11/15/17 18:15: Grp A Beta Strep Ag Negative


11/15/17 18:03: Urine Color Yellow, Urine Appearance Sl cloudy, Urine pH 7.0, 

Ur Specific Gravity 1.020, Urine Protein 30 H, Urine Glucose (UA) Negative, 

Urine Ketones Negative, Urine Blood Small H, Urine Nitrate Negative, Urine 

Bilirubin Negative, Urine Urobilinogen 0.2, Ur Leukocyte Esterase Moderate H, 

Urine RBC 5 - 10, Urine WBC Tntc, Ur Epithelial Cells 6 - 8, Amorphous Sediment 

Moderate, Urine Bacteria Mod











- Medication Orders


Current Medication Orders: 











Discontinued Medications





Amoxicillin (Amoxil 250 Mg/5 Ml Susp)  400 mg PO STAT STA


   PRN Reason: Protocol


   Stop: 11/15/17 18:26


   Last Admin: 11/15/17 19:09  Dose: 400 mg





Tobramycin Sulfate (Tobrex 0.3% Ophth Soln)  2 drop OS STAT STA


   Stop: 11/15/17 17:34


   Last Admin: 11/15/17 18:08  Dose: 2 drop











Disposition/Present on Arrival





- Present on Arrival


Any Indicators Present on Arrival: No


History of DVT/PE: No


History of Uncontrolled Diabetes: No


Urinary Catheter: No


History of Decub. Ulcer: No


History Surgical Site Infection Following: None





- Disposition


Have Diagnosis and Disposition been Completed?: Yes


Diagnosis: 


 UTI (urinary tract infection), Conjunctivitis





Disposition: HOME/ ROUTINE


Disposition Time: 18:45


Patient Plan: Discharge


Condition: STABLE


Discharge Instructions (ExitCare):  Urinary Tract Infection in Children (ED), 

Conjunctivitis (ED)


Additional Instructions: 


Follow up with your doctor within 2days


Return to ED for any new or worsening symptoms


Prescriptions: 


Amoxicillin 400 mg PO BID #75 ml


Referrals: 


Courtney Naik MD [Primary Care Provider] - Follow up with primary


Forms:  [email or web address] (English) RT Ventilator Management Note  Néstor Snow 81 y o  male MRN: [id number]  Unit/Bed#: [facility] 414-01 Encounter: [id number]      Daily Screen         3/21/2023  1113             Patient safety screen outcome[de-identified] Passed              Physical Exam:   Assessment Type: Assess only  General Appearance: Sedated  Respiratory Pattern: Normal  Chest Assessment: Chest expansion symmetrical  Bilateral Breath Sounds: Diminished  R Breath Sounds: Diminished  L Breath Sounds: Diminished  Cough: None  O2 Device: (P) vent      Resp Comments: (P) placed on spont wean

## 2023-03-22 ENCOUNTER — APPOINTMENT (OUTPATIENT)
Dept: RADIOLOGY | Facility: HOSPITAL | Age: 73
End: 2023-03-22

## 2023-03-22 LAB
ABO GROUP BLD BPU: NORMAL
ANION GAP SERPL CALCULATED.3IONS-SCNC: 2 MMOL/L (ref 4–13)
ATRIAL RATE: 58 BPM
ATRIAL RATE: 61 BPM
ATRIAL RATE: 62 BPM
ATRIAL RATE: 70 BPM
ATRIAL RATE: 72 BPM
ATRIAL RATE: 75 BPM
BPU ID: NORMAL
BUN SERPL-MCNC: 15 MG/DL (ref 5–25)
CALCIUM SERPL-MCNC: 7.6 MG/DL (ref 8.3–10.1)
CHLORIDE SERPL-SCNC: 111 MMOL/L (ref 96–108)
CO2 SERPL-SCNC: 25 MMOL/L (ref 21–32)
CREAT SERPL-MCNC: 0.85 MG/DL (ref 0.6–1.3)
CROSSMATCH: NORMAL
ERYTHROCYTE [DISTWIDTH] IN BLOOD BY AUTOMATED COUNT: 13.2 % (ref 11.6–15.1)
GFR SERPL CREATININE-BSD FRML MDRD: 88 ML/MIN/1.73SQ M
GLUCOSE SERPL-MCNC: 106 MG/DL (ref 65–140)
GLUCOSE SERPL-MCNC: 139 MG/DL (ref 65–140)
GLUCOSE SERPL-MCNC: 142 MG/DL (ref 65–140)
GLUCOSE SERPL-MCNC: 149 MG/DL (ref 65–140)
GLUCOSE SERPL-MCNC: 149 MG/DL (ref 65–140)
GLUCOSE SERPL-MCNC: 157 MG/DL (ref 65–140)
GLUCOSE SERPL-MCNC: 159 MG/DL (ref 65–140)
GLUCOSE SERPL-MCNC: 165 MG/DL (ref 65–140)
GLUCOSE SERPL-MCNC: 170 MG/DL (ref 65–140)
GLUCOSE SERPL-MCNC: 172 MG/DL (ref 65–140)
GLUCOSE SERPL-MCNC: 177 MG/DL (ref 65–140)
GLUCOSE SERPL-MCNC: 185 MG/DL (ref 65–140)
GLUCOSE SERPL-MCNC: 192 MG/DL (ref 65–140)
HCT VFR BLD AUTO: 42.9 % (ref 36.5–49.3)
HGB BLD-MCNC: 14.1 G/DL (ref 12–17)
INR PPP: 1.23 (ref 0.84–1.19)
MAGNESIUM SERPL-MCNC: 2.8 MG/DL (ref 1.6–2.6)
MCH RBC QN AUTO: 32.2 PG (ref 26.8–34.3)
MCHC RBC AUTO-ENTMCNC: 32.9 G/DL (ref 31.4–37.4)
MCV RBC AUTO: 98 FL (ref 82–98)
P AXIS: 28 DEGREES
P AXIS: 60 DEGREES
P AXIS: 70 DEGREES
P AXIS: 75 DEGREES
P AXIS: 81 DEGREES
P AXIS: 85 DEGREES
PLATELET # BLD AUTO: 98 THOUSANDS/UL (ref 149–390)
PMV BLD AUTO: 11.2 FL (ref 8.9–12.7)
POTASSIUM SERPL-SCNC: 4.4 MMOL/L (ref 3.5–5.3)
PR INTERVAL: 156 MS
PR INTERVAL: 162 MS
PR INTERVAL: 170 MS
PR INTERVAL: 174 MS
PR INTERVAL: 184 MS
PR INTERVAL: 302 MS
PROTHROMBIN TIME: 15.7 SECONDS (ref 11.6–14.5)
QRS AXIS: 53 DEGREES
QRS AXIS: 67 DEGREES
QRS AXIS: 67 DEGREES
QRS AXIS: 68 DEGREES
QRS AXIS: 72 DEGREES
QRS AXIS: 73 DEGREES
QRSD INTERVAL: 100 MS
QRSD INTERVAL: 102 MS
QRSD INTERVAL: 106 MS
QRSD INTERVAL: 108 MS
QRSD INTERVAL: 96 MS
QRSD INTERVAL: 98 MS
QT INTERVAL: 434 MS
QT INTERVAL: 438 MS
QT INTERVAL: 442 MS
QT INTERVAL: 444 MS
QT INTERVAL: 494 MS
QT INTERVAL: 576 MS
QTC INTERVAL: 435 MS
QTC INTERVAL: 440 MS
QTC INTERVAL: 448 MS
QTC INTERVAL: 468 MS
QTC INTERVAL: 551 MS
QTC INTERVAL: 630 MS
RBC # BLD AUTO: 4.38 MILLION/UL (ref 3.88–5.62)
SODIUM SERPL-SCNC: 138 MMOL/L (ref 135–147)
T WAVE AXIS: 24 DEGREES
T WAVE AXIS: 4 DEGREES
T WAVE AXIS: 72 DEGREES
T WAVE AXIS: 77 DEGREES
T WAVE AXIS: 81 DEGREES
T WAVE AXIS: 83 DEGREES
UNIT DISPENSE STATUS: NORMAL
UNIT PRODUCT CODE: NORMAL
UNIT PRODUCT VOLUME: 350 ML
UNIT RH: NORMAL
VENTRICULAR RATE: 58 BPM
VENTRICULAR RATE: 61 BPM
VENTRICULAR RATE: 62 BPM
VENTRICULAR RATE: 70 BPM
VENTRICULAR RATE: 72 BPM
VENTRICULAR RATE: 75 BPM
WBC # BLD AUTO: 17.29 THOUSAND/UL (ref 4.31–10.16)

## 2023-03-22 RX ORDER — DOCUSATE SODIUM 100 MG/1
100 CAPSULE, LIQUID FILLED ORAL 2 TIMES DAILY
Status: DISCONTINUED | OUTPATIENT
Start: 2023-03-22 | End: 2023-03-27 | Stop reason: HOSPADM

## 2023-03-22 RX ORDER — ACETAMINOPHEN 325 MG/1
650 TABLET ORAL EVERY 6 HOURS PRN
Status: DISCONTINUED | OUTPATIENT
Start: 2023-03-22 | End: 2023-03-27 | Stop reason: HOSPADM

## 2023-03-22 RX ORDER — CALCIUM CARBONATE 200(500)MG
1000 TABLET,CHEWABLE ORAL 2 TIMES DAILY PRN
Status: DISCONTINUED | OUTPATIENT
Start: 2023-03-22 | End: 2023-03-27 | Stop reason: HOSPADM

## 2023-03-22 RX ORDER — LANOLIN ALCOHOL/MO/W.PET/CERES
6 CREAM (GRAM) TOPICAL
Status: DISCONTINUED | OUTPATIENT
Start: 2023-03-22 | End: 2023-03-24

## 2023-03-22 RX ORDER — WARFARIN SODIUM 2.5 MG/1
2.5 TABLET ORAL
Status: COMPLETED | OUTPATIENT
Start: 2023-03-22 | End: 2023-03-22

## 2023-03-22 RX ORDER — FUROSEMIDE 10 MG/ML
40 INJECTION INTRAMUSCULAR; INTRAVENOUS
Status: DISCONTINUED | OUTPATIENT
Start: 2023-03-22 | End: 2023-03-26

## 2023-03-22 RX ORDER — CALCIUM CARBONATE 200(500)MG
500 TABLET,CHEWABLE ORAL 2 TIMES DAILY PRN
Status: DISCONTINUED | OUTPATIENT
Start: 2023-03-22 | End: 2023-03-22

## 2023-03-22 RX ORDER — CALCIUM CARBONATE 200(500)MG
500 TABLET,CHEWABLE ORAL ONCE
Status: COMPLETED | OUTPATIENT
Start: 2023-03-22 | End: 2023-03-22

## 2023-03-22 RX ORDER — POTASSIUM CHLORIDE 20 MEQ/1
20 TABLET, EXTENDED RELEASE ORAL 2 TIMES DAILY
Status: DISCONTINUED | OUTPATIENT
Start: 2023-03-22 | End: 2023-03-26

## 2023-03-22 RX ADMIN — FINASTERIDE 5 MG: 5 TABLET, FILM COATED ORAL at 08:02

## 2023-03-22 RX ADMIN — DOCUSATE SODIUM 100 MG: 100 CAPSULE, LIQUID FILLED ORAL at 17:00

## 2023-03-22 RX ADMIN — FUROSEMIDE 40 MG: 10 INJECTION, SOLUTION INTRAMUSCULAR; INTRAVENOUS at 08:02

## 2023-03-22 RX ADMIN — AMIODARONE HYDROCHLORIDE 200 MG: 200 TABLET ORAL at 14:11

## 2023-03-22 RX ADMIN — CEFAZOLIN SODIUM 2000 MG: 2 SOLUTION INTRAVENOUS at 09:37

## 2023-03-22 RX ADMIN — MUPIROCIN 1 APPLICATION.: 20 OINTMENT TOPICAL at 21:05

## 2023-03-22 RX ADMIN — MUPIROCIN 1 APPLICATION.: 20 OINTMENT TOPICAL at 08:02

## 2023-03-22 RX ADMIN — MELATONIN 6 MG: at 21:04

## 2023-03-22 RX ADMIN — AMIODARONE HYDROCHLORIDE 200 MG: 200 TABLET ORAL at 21:05

## 2023-03-22 RX ADMIN — CALCIUM CARBONATE (ANTACID) CHEW TAB 500 MG 500 MG: 500 CHEW TAB at 03:21

## 2023-03-22 RX ADMIN — TAMSULOSIN HYDROCHLORIDE 0.4 MG: 0.4 CAPSULE ORAL at 17:00

## 2023-03-22 RX ADMIN — ATORVASTATIN CALCIUM 80 MG: 80 TABLET, FILM COATED ORAL at 17:00

## 2023-03-22 RX ADMIN — CHLORHEXIDINE GLUCONATE 0.12% ORAL RINSE 15 ML: 1.2 LIQUID ORAL at 21:05

## 2023-03-22 RX ADMIN — ACETAMINOPHEN 975 MG: 325 TABLET ORAL at 05:36

## 2023-03-22 RX ADMIN — ACETAMINOPHEN 975 MG: 325 TABLET ORAL at 21:04

## 2023-03-22 RX ADMIN — Medication 12.5 MG: at 21:05

## 2023-03-22 RX ADMIN — HYDROMORPHONE HYDROCHLORIDE 0.5 MG: 1 INJECTION, SOLUTION INTRAMUSCULAR; INTRAVENOUS; SUBCUTANEOUS at 05:36

## 2023-03-22 RX ADMIN — Medication 12.5 MG: at 08:02

## 2023-03-22 RX ADMIN — PANTOPRAZOLE SODIUM 40 MG: 40 TABLET, DELAYED RELEASE ORAL at 05:36

## 2023-03-22 RX ADMIN — OXYCODONE HYDROCHLORIDE 5 MG: 5 TABLET ORAL at 01:46

## 2023-03-22 RX ADMIN — NICARDIPINE HYDROCHLORIDE 7.5 MG/HR: 2.5 INJECTION, SOLUTION INTRAVENOUS at 05:36

## 2023-03-22 RX ADMIN — CHLORHEXIDINE GLUCONATE 0.12% ORAL RINSE 15 ML: 1.2 LIQUID ORAL at 08:02

## 2023-03-22 RX ADMIN — FERROUS SULFATE TAB 325 MG (65 MG ELEMENTAL FE) 325 MG: 325 (65 FE) TAB at 08:02

## 2023-03-22 RX ADMIN — OXYCODONE HYDROCHLORIDE 2.5 MG: 5 TABLET ORAL at 18:13

## 2023-03-22 RX ADMIN — POTASSIUM CHLORIDE 20 MEQ: 1500 TABLET, EXTENDED RELEASE ORAL at 08:02

## 2023-03-22 RX ADMIN — ASPIRIN 325 MG ORAL TABLET 325 MG: 325 PILL ORAL at 08:02

## 2023-03-22 RX ADMIN — ACETAMINOPHEN 975 MG: 325 TABLET ORAL at 14:10

## 2023-03-22 RX ADMIN — CEFAZOLIN SODIUM 2000 MG: 2 SOLUTION INTRAVENOUS at 01:46

## 2023-03-22 RX ADMIN — CALCIUM CARBONATE (ANTACID) CHEW TAB 500 MG 500 MG: 500 CHEW TAB at 02:39

## 2023-03-22 RX ADMIN — FUROSEMIDE 40 MG: 10 INJECTION, SOLUTION INTRAMUSCULAR; INTRAVENOUS at 16:11

## 2023-03-22 RX ADMIN — POTASSIUM CHLORIDE 20 MEQ: 1500 TABLET, EXTENDED RELEASE ORAL at 17:00

## 2023-03-22 RX ADMIN — HYDROMORPHONE HYDROCHLORIDE 0.5 MG: 1 INJECTION, SOLUTION INTRAMUSCULAR; INTRAVENOUS; SUBCUTANEOUS at 02:29

## 2023-03-22 RX ADMIN — WARFARIN SODIUM 2.5 MG: 2.5 TABLET ORAL at 17:00

## 2023-03-22 RX ADMIN — FONDAPARINUX SODIUM 2.5 MG: 2.5 INJECTION, SOLUTION SUBCUTANEOUS at 08:02

## 2023-03-22 RX ADMIN — POLYETHYLENE GLYCOL 3350 17 G: 17 POWDER, FOR SOLUTION ORAL at 08:02

## 2023-03-22 RX ADMIN — AMIODARONE HYDROCHLORIDE 200 MG: 200 TABLET ORAL at 05:36

## 2023-03-22 RX ADMIN — GLYCERIN 1 DROP: .002; .002; .01 SOLUTION/ DROPS OPHTHALMIC at 18:09

## 2023-03-22 RX ADMIN — HYDROMORPHONE HYDROCHLORIDE 0.5 MG: 1 INJECTION, SOLUTION INTRAMUSCULAR; INTRAVENOUS; SUBCUTANEOUS at 00:04

## 2023-03-22 NOTE — PHYSICAL THERAPY NOTE
Physical Therapy Evaluation     Patient's Name: Kay Augustine    Admitting Diagnosis  Nonrheumatic mitral valve regurgitation [I34 0]    Problem List  Patient Active Problem List   Diagnosis   • Allergic rhinitis   • BPH with urinary obstruction   • Displacement of lumbar intervertebral disc without myelopathy   • Diverticulosis of colon   • Male erectile disorder   • Essential hypertension   • Herpes zoster   • Indigestion   • Metabolic syndrome   • Left ventricular hypertrophy   • Mitral regurgitation   • Hyperlipidemia   • Idiopathic peripheral neuropathy   • Calculus of gallbladder with cholecystitis   • Colon polyp   • Need for shingles vaccine   • HUMBERTO treated with BiPAP   • Excessive daytime sleepiness   • Platelets decreased (HCC)   • Overweight with body mass index (BMI) of 27 to 27 9 in adult   • Shortness of breath   • History of COVID-19   • Abnormal CBC   • Atrial enlargement, left   • Blood in stool   • Gastrointestinal hemorrhage associated with intestinal diverticulosis   • Diverticulitis of colon with perforation   • Drop in hemoglobin   • FH: cerebral aneurysm   • Obesity, morbid ([facility] [address] )   • S/P mitral valve repair   • S/P left atrial appendage ligation       Past Medical History  Past Medical History:   Diagnosis Date   • Acute bilateral low back pain without sciatica 03/09/2021   • BMI 33 0-33 9,adult 02/04/2020   • BPH (benign prostatic hyperplasia)    • COVID-19    • Diabetes mellitus associated with hormonal etiology ([facility] [address] ) 03/05/2019   • Diverticulitis    • ED (erectile dysfunction)    • Elevated PSA 01/15/2013   • Encounter for well adult exam with abnormal findings 02/04/2019   • Herpes zoster 12/18/2014   • Hyperlipidemia    • Hypertension    • IFG (impaired fasting glucose)    • Metabolic syndrome    • Obesity (BMI 30 0-34 9) 02/04/2019   • Psoriasis    • Seasonal allergic rhinitis    • Type 2 diabetes mellitus ([facility] [address] ) 11/17/2014       Past Surgical History  Past Surgical History:   Procedure Laterality Date   • CARDIAC CATHETERIZATION N/A 03/03/2023    Procedure: Cardiac Coronary Angiogram;  Surgeon: Vidal Rodriguez DO;  Location: [facility] CARDI[facility]; Service: Cardiology   • CHOLECYSTECTOMY  05/14/2015   • COLONOSCOPY  2007   • COLONOSCOPY  06/23/2021   • HERNIA REPAIR  05/14/2015 03/22/23 0950   PT Last Visit   PT Visit Date 03/22/23   Note Type   Note type Evaluation   Pain Assessment   Pain Assessment Tool FLACC   Pain Location/Orientation Orientation: Mid;Location: Chest;Location: Incision   Pain Onset/Description Onset: Ongoing; Descriptor: Aching;Descriptor: Discomfort;Frequency: Intermittent   Effect of Pain on Daily Activities guarding   Patient's Stated Pain Goal No pain   Hospital Pain Intervention(s) Repositioned; Ambulation/increased activity; Emotional support   Pain Rating: FLACC (Rest) - Face 0   Pain Rating: FLACC (Rest) - Legs 0   Pain Rating: FLACC (Rest) - Activity 0   Pain Rating: FLACC (Rest) - Cry 0   Pain Rating: FLACC (Rest) - Consolability 0   Score: FLACC (Rest) 0   Pain Rating: FLACC (Activity) - Face 1   Pain Rating: FLACC (Activity) - Legs 0   Pain Rating: FLACC (Activity) - Activity 0   Pain Rating: FLACC (Activity) - Cry 1   Pain Rating: FLACC (Activity) - Consolability 1   Score: FLACC (Activity) 3   Restrictions/Precautions   Other Precautions Cardiac/sternal;Multiple lines;Telemetry;Pain  (CT)   Home Living   Type of Home House   Home Layout Multi-level  (3 story house; typically w/ 3rd floor set-up but able to stay on the lower level; 2 NICKIE w/ hand rail)   Prior Function   Level of [place] Independent with functional mobility  (amb w/o AD)   Lives With Spouse   General   Additional Pertinent History cleared for assessment (spoke to puma)   Cognition   Overall Cognitive Status [facility]   Arousal/Participation Alert   Attention Within functional limits   Orientation Level Oriented to person;Oriented to place;Oriented to situation   Memory Within functional limits   Following Commands Follows one step commands without difficulty   Subjective   Subjective Alert; in the chair; agreeable to mobilize   RUE Assessment   RUE Assessment WFL  (AROM)   LUE Assessment   LUE Assessment WFL  (AROM)   RLE Assessment   RLE Assessment WFL  (AROM)   Strength RLE   RLE Overall Strength   (good - (grossly))   LLE Assessment   LLE Assessment WFL  (AROM)   Strength LLE   LLE Overall Strength   (good - (grossly))   Bed Mobility   Sit to Supine 3  Moderate assistance   Additional items Assist x 1; Increased time required;Verbal cues;LE management   Transfers   Sit to Stand 4  Minimal assistance   Additional items Assist x 1;Verbal cues   Stand to Sit 4  Minimal assistance   Additional items Assist x 1;Verbal cues   Ambulation/Elevation   Gait pattern Excessively slow; Short stride; Inconsistent roxanna; Shuffling   Gait Assistance 4  Minimal assist   Additional items Assist x 1;Verbal cues; Tactile cues   Assistive Device Rolling walker   Distance 100 ft   Balance   Static Sitting Fair   Dynamic Sitting Fair -   Static Standing Poor +   Dynamic Standing Poor +   Ambulatory Poor +   Activity Tolerance   Activity Tolerance Patient limited by fatigue   Medical Staff Made Aware Co-eval performed w/ OTR due to complexity of medical status and multiple comorbidities   Nurse Made Aware spoke to [facility]   Assessment   Prognosis Good   Problem List Decreased strength;Decreased endurance; Impaired balance;Decreased mobility;Obesity   Assessment Pt is 81 y o  male admitted with Dx of Severe mitral regurgitation and underwent Mitral valve repair with 32 mm [employer] CG Future mitral annuloplasty ring and Folding plasty of P1 to P2 and P2 to P3 and Ligation left atrial appendage using a 45 mm AtriClip device on 3/21/2023   Pt 's comorbidities affecting POC include: Acute bilateral low back pain without sciatica, COVID-19, Diabetes mellitus associated with hormonal etiology, Hypertension, Metabolic syndrome, and Obesity (BMI 30 0-34 9) and personal factors of: NICKIE and steps in the house  Pt's clinical presentation is currently  unstable/unpredictable which is evident in ongoing telem monitoring, abn lab values, CT in place and need for assist w/ all phases of mobility when usually mobilizing independently  Pt presents w/ overall weakness, incl min decreased LE strength, decreased functional endurance and activity tolerance, impaired balance and gait deviations requiring use of rw at this time  Will cont to follow pt in PT for progressive mobilization to address above functional deficits and to max level of (I), endurance, and safety  Otherwise, anticipate pt will return home w/ available family support upon D/C provided he cont improving w/ mobility skills, safety, and endurance (incl on the steps) and when medically cleared; home PT follow up may need to be considered; will follow  Barriers to Discharge Inaccessible home environment; Other (Comment)  (steps in the house)   Goals   Patient Goals to feel better   STG Expiration Date 04/01/23   Short Term Goal #1 7-10 days  Pt will amb 300 ft w/ least restrictive assistive device PRN, mod (I) in order to facilitate safe return to premorbid environment and community amb status  Pt will negotiate 2 --> 14 steps w/ hand rail and SPC PRN, mod (I) in order to navigate in and out of the house and between levels of home environment safely  Pt will achieve (I) level w/ bed mob in order to facilitate safety with OOB and back to bed transitions in own living environment  Pt will perform transfers w/ mod (I) to assure (I) and safety w/ functional mobility/transitions w/ all aspects of mobility/locomotion  Pt will participate in LE therex and balance activities to max progression w/ mobility skills  PT Treatment Day 0   Plan   Treatment/Interventions Functional transfer training;LE strengthening/ROM; Elevations; Therapeutic exercise; Endurance training;Equipment eval/education; Bed mobility;Gait training;Spoke to nursing;OT   PT Frequency 4-6x/wk   Recommendation   PT Discharge Recommendation Home with home health rehabilitation  (home PT)   Equipment Recommended Walker  (at this time)   Eitan Rosenthal walker   AM-PAC Basic Mobility Inpatient   Turning in Flat Bed Without Bedrails 3   Lying on Back to Sitting on Edge of Flat Bed Without Bedrails 2   Moving Bed to Chair 3   Standing Up From Chair Using Arms 3   Walk in Room 3   Climb 3-5 Stairs With Railing 2   Basic Mobility Inpatient Raw Score 16   Basic Mobility Standardized Score 38 32   Highest Level Of Mobility   -HLM Goal 5: Stand one or more mins   -HLM Achieved 7: Walk 25 feet or more   Modified [place] Scale   Modified [place] Scale 4   End of Consult   Patient Position at End of Consult Supine; All needs within reach         [facility], PT

## 2023-03-22 NOTE — CONSULTS
Endocrinology- Consult   Gale Goodell  82 yo M   MRN [id number]  [id number]     Assessment/Plan:    Gale Goodell 81 y o  male with severe mitral valve regurgitation, T2DM, HUMBERTO on CPAP, HTN, HLD, who was admitted for scheduled mitral valve repair and left atrial appendage ligation  He is no POD#1  Endocrinology is consulted for type 2 diabetes management  1  Type 2 diabetes  -a1c 5 3% 4 months ago, diet controlled outpatient  Highest a1c on record 6 0%  -adherent with low carb diet outpatient along with regular aerobic exercise at least 3x weekly  -continue insulin gtt for now  Expect transient need in acute post-op phase  -carb control diet level 2  -will need glucometer on discharge    Reason for Admission / Principal Problem: elective mitral valve repair and MARGARET ligation  Reason for Consult: Type 2 diabetes  Consulting physician: Ami Looney DO    HISTORY OF PRESENT ILLNESS     Gale Goodell 81 y o  male with severe mitral valve regurgitation, T2DM, HUMBERTO on CPAP, HTN, HLD, who was admitted for scheduled mitral valve repair and left atrial appendage ligation  He is now POD#1  Endocrinology is consulted for type 2 diabetes management  During the cardiac procedure he was briefly on and off of low-dose andres pressor support for hypotension  Post-operatively he was not requiring BP support  Diagnosed with pre-diabetes maybe 5 years ago  Was on Metformin formerly for a few years, stopped it a month or two ago  Made lifestyle modifications including low-carb diet, regular aerobic exercise  Has lost about 50 lbs  Metformin has since been discontinued  He only eats 100 g of carbs a day  He does not have a glucometer at home  No known complications of his diabetes including neuropathy, nephropathy, retinopathy  Remote tobacco use history, quit in 1990s  Drinks 1-2 drinks a night  Family history of T2 diabetes in dad      SUBJECTIVE     Patient feeling ok today, just a little short of breath and fatigued  He was downgraded from the CCU earlier today and walked from that room to his present room  His appetite is poor  He ate a banana and some cereal for breakfast but did not touch his lunch  He has no complaints of n/v/d, fever/chills, cough  His post-op pain is well controlled      OBJECTIVE     Vitals:    23 0700 23 0800 23 0900 23 0930   BP: 108/57 101/58     BP Location:  Left arm     Pulse: 65 67 63    Resp: (!) 25 (!) 28 (!) 27    Temp:  98 9 °F (37 2 °C)     TempSrc:  Probe     SpO2: 93% 93% 91% 93%   Weight:       Height:          Temperature:   Temp (24hrs), Av 6 °F (37 °C), Min:97 °F (36 1 °C), Max:99 °F (37 2 °C)    Temperature: 98 9 °F (37 2 °C)  Intake & Output:  I/O        0701   07 0701   0700  0701   0700    P  O   200     I V  (mL/kg)  2590 2 (28 7) 134 4 (1 5)    NG/GT  60     IV Piggyback  1600     Cell Saver  1000     Total Intake(mL/kg)  5450 2 (60 3) 134 4 (1 5)    Urine (mL/kg/hr)  1910 (0 9) 90 (0 2)    Emesis/NG output  0     Blood  1000     Chest Tube  570 0    Total Output  3480 90    Net  +1970 2 +44 4               Weights:   IBW (Ideal Body Weight): 79 9 kg    Body mass index is 26 29 kg/m²  Weight (last 2 days)     Date/Time Weight    23 0600 90 4 (199 3)    23 0602 93 (205)        Physical Exam  Constitutional:       General: He is not in acute distress  Neck:      Comments: Thyroid gland symmetrical and soft without TTP    Cardiovascular:      Rate and Rhythm: Normal rate and regular rhythm  Pulses: Normal pulses  Pulmonary:      Effort: Pulmonary effort is normal       Breath sounds: Normal breath sounds  Abdominal:      General: Bowel sounds are normal       Palpations: Abdomen is soft  Musculoskeletal:      Cervical back: Neck supple  Right lower leg: No edema  Skin:     General: Skin is warm  Neurological:      Mental Status: He is alert and oriented to person, place, and time  Psychiatric:         Mood and Affect: Mood normal          Behavior: Behavior normal          Thought Content: Thought content normal          Judgment: Judgment normal        PAST MEDICAL HISTORY     Past Medical History:   Diagnosis Date   • Acute bilateral low back pain without sciatica 2021   • BMI 33 0-33 9,adult 2020   • BPH (benign prostatic hyperplasia)    • COVID-19    • Diabetes mellitus associated with hormonal etiology ([facility] ) 2019   • Diverticulitis    • ED (erectile dysfunction)    • Elevated PSA 01/15/2013   • Encounter for well adult exam with abnormal findings 2019   • Herpes zoster 2014   • Hyperlipidemia    • Hypertension    • IFG (impaired fasting glucose)    • Metabolic syndrome    • Obesity (BMI 30 0-34 9) 2019   • Psoriasis    • Seasonal allergic rhinitis    • Type 2 diabetes mellitus ([place] [address] ) 2014     PAST SURGICAL HISTORY     Past Surgical History:   Procedure Laterality Date   • CARDIAC CATHETERIZATION N/A 2023    Procedure: Cardiac Coronary Angiogram;  Surgeon: Nyla Chapman DO;  Location: [facility]; Service: Cardiology   • CHOLECYSTECTOMY  2015   • COLONOSCOPY     • COLONOSCOPY  2021   • HERNIA REPAIR  2015     SOCIAL & FAMILY HISTORY     Social History     Substance and Sexual Activity   Alcohol Use Yes    Comment: social     Social History     Substance and Sexual Activity   Drug Use No     Social History     Tobacco Use   Smoking Status Former   • Packs/day: 0 50   • Years: 12 00   • Pack years: 6 00   • Types: Pipe, Cigarettes   • Start date: 1968   • Quit date: 6/10/1980   • Years since quittin 8   Smokeless Tobacco Never     Family History   Problem Relation Age of Onset   • Skin cancer Mother    • Heart disease Father      LABORATORY DATA     Labs: I have personally reviewed pertinent reports      Results from last 7 days   Lab Units 23  0406 23  1859 23  1124 03/21/23  1122 03/21/23  0946 03/21/23  0936   WBC Thousand/uL 17 29*  --   --   --   --   --    HEMOGLOBIN g/dL 14 1 13 2  --  13 9  --   --    I STAT HEMOGLOBIN g/dl  --   --  12 6  --    < >  --    HEMATOCRIT % 42 9 40 5  --  42 5  --   --    HEMATOCRIT, ISTAT %  --   --  37  --    < >  --    PLATELETS Thousands/uL 98*  --   --  76*  --  110*    < > = values in this interval not displayed  Results from last 7 days   Lab Units 03/22/23  0406 03/21/23  1859 03/21/23  1508 03/21/23  1124 03/21/23  1122 03/21/23  1122 03/21/23  1059 03/21/23  0946   POTASSIUM mmol/L 4 4 4 4 4 3  --    < > 3 9  --   --    CHLORIDE mmol/L 111*  --   --   --   --  116*  --   --    CO2 mmol/L 25  --   --   --   --  25  --   --    CO2, I-STAT mmol/L  --   --   --  26  --   --  26 32   BUN mg/dL 15  --   --   --   --  12  --   --    CREATININE mg/dL 0 85  --   --   --   --  0 76  --   --    CALCIUM mg/dL 7 6*  --   --   --   --  7 2*  --   --    GLUCOSE, ISTAT mg/dl  --   --   --  131  --   --  141* 191*    < > = values in this interval not displayed  Results from last 7 days   Lab Units 03/22/23  0406   MAGNESIUM mg/dL 2 8*          Results from last 7 days   Lab Units 03/22/23  0406 03/21/23  1122   INR  1 23* 1 48*   PTT seconds  --  36             Micro:  Lab Results   Component Value Date    BLOODCX No Growth After 5 Days  12/30/2022     IMAGING & DIAGNOSTIC TESTS     Imaging: I have personally reviewed pertinent reports  XR chest portable ICU    Result Date: 3/21/2023  Impression: Status post median sternotomy  The tip of the feeding tube is not included  Film to include the upper abdomen is recommended  The study was marked in [facility] for immediate notification  Workstation performed: [id number]     EKG, Pathology, and Other Studies: I have personally reviewed pertinent reports       ALLERGIES     Allergies   Allergen Reactions   • No Known Allergies      MEDICATIONS PRIOR TO ARRIVAL     Prior to Admission medications Medication Sig Start Date End Date Taking? Authorizing Provider   amLODIPine (NORVASC) 5 mg tablet TAKE 1 TABLET DAILY 2/15/23  Yes Scotty Orozco MD   atorvastatin (LIPITOR) 20 mg tablet TAKE 1 TABLET DAILY 12/28/22  Yes Scotty Orozco MD   ferrous sulfate 324 (65 Fe) mg Take 1 tablet (324 mg total) by mouth daily before breakfast 3/1/23  Yes Scotty Orozco MD   finasteride (PROSCAR) 5 mg tablet TAKE 1 TABLET DAILY 3/2/23  Yes Scotty Orozco MD   tamsulosin (FLOMAX) 0 4 mg TAKE 1 CAPSULE DAILY WITH  DINNER 1/3/23  Yes Scotty Orozco MD   aspirin (ECOTRIN LOW STRENGTH) 81 mg EC tablet take 1 tablet (81MG) 1/22/18   Historical Provider, MD   fluticasone (FLONASE) 50 mcg/act nasal spray 1 spray into each nostril if needed for allergies  Patient not taking: Reported on 3/8/2023 3/3/23   NISHANT Campa   hydrocortisone 2 5 % cream Apply topically if needed for rash  Patient not taking: Reported on 3/8/2023 3/3/23   NISHANT Campa   lisinopril (ZESTRIL) 20 mg tablet TAKE 1 TABLET DAILY 3/2/23   Scotty Orozco MD   Multiple Vitamins-Minerals (CENTRUM SILVER 50+MEN PO) Take by mouth in the morning    Historical Provider, MD   mupirocin (BACTROBAN) 2 % ointment Apply 1 application  topically 2 (two) times a day for 5 days Apply to each nostril twice daily for five days before your operation   3/8/23 3/13/23  Kalpana NISHANT Villafana   sildenafil (VIAGRA) 100 mg tablet Take 1 tablet (100 mg total) by mouth as needed for erectile dysfunction 3/10/23   Scotty Orozco MD     MEDICATIONS ADMINISTERED IN LAST 24 HOURS     Medication Administration - last 24 hours from 03/21/2023 1104 to 03/22/2023 1104       Date/Time Order Dose Route Action Action by     03/21/2023 1149 EDT heparin 400 units/kg perfusion syringe (1000 unit/mL) 400 Units/kg = 38,600 Units -- Perfusion Canceled Entry Sabino Martines     03/21/2023 1149 EDT heparin 10,000 unit/10 mL perfusion syringe 10,000 Units -- Perfusion Canceled Entry [employer]     03/21/2023 1149 EDT amiodarone (CORDARONE) 150 mg/3 mL perfusion syringe -- Perfusion Canceled Entry [facility]     03/21/2023 1148 EDT magnesium sulfate 2 g/4 mL perfusion syringe -- Perfusion Canceled Entry [facility]     03/21/2023 1148 EDT phenylephrine 10,000 mcg/20 mL perfusion syringe (500 mcg/mL) 10,000 mcg -- Perfusion Canceled Entry [facility]     03/22/2023 1024 EDT ferrous sulfate tablet 325 mg -- Oral MAR Unhold Christinia Leaks     03/22/2023 2806 EDT ferrous sulfate tablet 325 mg -- Oral MAR Hold Automatic Transfer Provider     03/22/2023 0802 EDT ferrous sulfate tablet 325 mg 325 mg Oral Given [facility]     03/22/2023 1024 EDT fluticasone (FLONASE) 50 mcg/act nasal spray 1 spray -- Nasal MAR Rosalynd Sickle     03/22/2023 5654 EDT fluticasone (FLONASE) 50 mcg/act nasal spray 1 spray -- Nasal MAR Hold Automatic Transfer Provider     03/22/2023 1024 EDT finasteride (PROSCAR) tablet 5 mg -- Oral MAR Unhold Christinia Leaks     03/22/2023 5228 EDT finasteride (PROSCAR) tablet 5 mg -- Oral MAR Hold Automatic Transfer Provider     03/22/2023 0802 EDT finasteride (PROSCAR) tablet 5 mg 5 mg Oral Given [facility]     03/22/2023 1024 EDT tamsulosin (FLOMAX) capsule 0 4 mg -- Oral MAR Unhold Christinia Leaks     03/22/2023 5489 EDT tamsulosin (FLOMAX) capsule 0 4 mg -- Oral MAR Hold Automatic Transfer Provider     03/21/2023 1748 EDT tamsulosin (FLOMAX) capsule 0 4 mg 0 4 mg Oral Given [facility]     03/21/2023 2229 EDT sodium chloride infusion 0 45 % 20 mL/hr Intravenous Restarted Ramonia Bills     03/21/2023 1924 EDT sodium chloride infusion 0 45 % 0 mL/hr Intravenous Hold Ann Simon     03/21/2023 1149 EDT sodium chloride infusion 0 45 % 20 mL/hr Intravenous New Bag [facility]     03/22/2023 1024 EDT oxyCODONE (ROXICODONE) IR tablet 2 5 mg -- Oral MAR Unhold Christinia Leaks     03/22/2023 9945 EDT oxyCODONE (ROXICODONE) IR tablet 2 5 mg -- Oral [facility] Hold Automatic Transfer Provider     03/22/2023 1024 EDT oxyCODONE (ROXICODONE) IR tablet 5 mg -- Oral MAR Unhold [facility]     03/22/2023 0063 EDT oxyCODONE (ROXICODONE) IR tablet 5 mg -- Oral [facility] Hold Automatic Transfer Provider     03/22/2023 3393 EDT oxyCODONE (ROXICODONE) IR tablet 5 mg 5 mg Oral Given Wava Apley     03/21/2023 2037 EDT oxyCODONE (ROXICODONE) IR tablet 5 mg 5 mg Oral Given Wava Apley     03/21/2023 1507 EDT oxyCODONE (ROXICODONE) IR tablet 5 mg 5 mg Oral Given [facility]     03/22/2023 1024 EDT HYDROmorphone (DILAUDID) injection 0 5 mg -- Intravenous MAR Emiliano Derek     03/22/2023 7845 EDT HYDROmorphone (DILAUDID) injection 0 5 mg -- Intravenous [facility] Hold Automatic Transfer Provider     03/22/2023 0536 EDT HYDROmorphone (DILAUDID) injection 0 5 mg 0 5 mg Intravenous Given Wava Apley     03/22/2023 0229 EDT HYDROmorphone (DILAUDID) injection 0 5 mg 0 5 mg Intravenous Given Wava Apley     03/22/2023 0004 EDT HYDROmorphone (DILAUDID) injection 0 5 mg 0 5 mg Intravenous Given Wava Apley     03/21/2023 2101 EDT HYDROmorphone (DILAUDID) injection 0 5 mg 0 5 mg Intravenous Given Wava Apley     03/21/2023 1901 EDT HYDROmorphone (DILAUDID) injection 0 5 mg 0 5 mg Intravenous Given [facility]     03/21/2023 1655 EDT HYDROmorphone (DILAUDID) injection 0 5 mg 0 5 mg Intravenous Given [facility]     03/21/2023 1225 EDT fentanyl citrate (PF) 100 MCG/2ML 50 mcg 0 mcg Intravenous Hold [facility]     03/22/2023 1024 EDT pantoprazole (PROTONIX) EC tablet 40 mg -- Oral MAR Unhold Kerry [facility]     03/22/2023 0762 EDT pantoprazole (PROTONIX) EC tablet 40 mg -- Oral MAR Hold Automatic Transfer Provider     03/22/2023 0536 EDT pantoprazole (PROTONIX) EC tablet 40 mg 40 mg Oral Given Wava Apley     03/22/2023 1024 EDT aspirin tablet 325 mg -- Oral MAR Unhold Kerry Zhu     03/22/2023 0264 EDT aspirin tablet 325 mg -- Oral MAR Hold Automatic Transfer Provider     03/22/2023 0802 EDT aspirin tablet 325 mg 325 mg Oral Given [facility]     03/21/2023 1321 EDT aspirin tablet 325 mg 325 mg Oral Given Sabino [facility]     03/22/2023 1024 EDT chlorhexidine (PERIDEX) 0 12 % oral rinse 15 mL -- Swish & Spit MAR Unhold Demian Chiquis     03/22/2023 2972 EDT chlorhexidine (PERIDEX) 0 12 % oral rinse 15 mL -- Swish & Spit [facility] Hold Automatic Transfer Provider     03/22/2023 0802 EDT chlorhexidine (PERIDEX) 0 12 % oral rinse 15 mL 15 mL Swish & Spit Given [employer]     03/21/2023 2037 EDT chlorhexidine (PERIDEX) 0 12 % oral rinse 15 mL 15 mL Swish & Spit Given Lige Beth     03/22/2023 1024 EDT mupirocin (BACTROBAN) 2 % nasal ointment 1 application  -- Nasal MAR Ariana Fallen     03/22/2023 0939 EDT mupirocin (BACTROBAN) 2 % nasal ointment 1 application  -- Nasal MAR Hold Automatic Transfer Provider     03/22/2023 0802 EDT mupirocin (BACTROBAN) 2 % nasal ointment 1 application  1 application  Nasal Given [employer]     03/21/2023 2037 EDT mupirocin (BACTROBAN) 2 % nasal ointment 1 application  1 application   Nasal Given Lige Beth     03/22/2023 1024 EDT ondansetron [facility]) injection 4 mg -- Intravenous MAR Unhold Demian Chiquis     03/22/2023 9422 EDT ondansetron [facility]) injection 4 mg -- Intravenous MAR Hold Automatic Transfer Provider     03/21/2023 1255 EDT magnesium sulfate 2 g/50 mL IVPB (premix) 2 g 0 g Intravenous Stopped [facility]     03/21/2023 1148 EDT magnesium sulfate 2 g/50 mL IVPB (premix) 2 g 2 g Intravenous New Bag [facility]     03/21/2023 1158 EDT calcium chloride 10 % injection 1 g 1 g Intravenous Not Given [facility]     03/22/2023 1024 EDT acetaminophen (TYLENOL) tablet 975 mg -- Oral MAR Unhold Demian Chiquis     03/22/2023 6028 EDT acetaminophen (TYLENOL) tablet 975 mg -- Oral MAR Hold Automatic Transfer Provider     03/22/2023 5528 EDT acetaminophen (TYLENOL) tablet 975 mg 975 mg Oral Given Lige Beth     03/21/2023 2037 EDT acetaminophen (TYLENOL) tablet 975 mg 975 mg Oral Given Lige Beth 03/21/2023 1321 EDT acetaminophen (TYLENOL) tablet 975 mg 975 mg Oral Given [employer]     03/22/2023 1024 EDT acetaminophen (TYLENOL) rectal suppository 650 mg -- Rectal MAR Bakari Serve     03/22/2023 4735 EDT acetaminophen (TYLENOL) rectal suppository 650 mg -- Rectal MAR Hold Automatic Transfer Provider     03/22/2023 1024 EDT bisacodyl (DULCOLAX) rectal suppository 10 mg -- Rectal MAR Bakari Serve     03/22/2023 8374 EDT bisacodyl (DULCOLAX) rectal suppository 10 mg -- Rectal MAR Hold Automatic Transfer Provider     03/22/2023 1024 EDT polyethylene glycol (MIRALAX) packet 17 g -- Oral MAR Unhold [facility]     03/22/2023 4924 EDT polyethylene glycol (MIRALAX) packet 17 g -- Oral MAR Hold Automatic Transfer Provider     03/22/2023 0802 EDT polyethylene glycol (MIRALAX) packet 17 g 17 g Oral Given [employer]     03/22/2023 1024 EDT amiodarone tablet 200 mg -- Oral MAR Unhold [facility]     03/22/2023 2249 EDT amiodarone tablet 200 mg -- Oral MAR Hold Automatic Transfer Provider     03/22/2023 0536 EDT amiodarone tablet 200 mg 200 mg Oral Given Madeleine December 03/21/2023 2101 EDT amiodarone tablet 200 mg 200 mg Oral Given Madeleine December 03/21/2023 1321 EDT amiodarone tablet 200 mg 200 mg Oral Given [facility]     03/22/2023 1024 EDT atorvastatin (LIPITOR) tablet 80 mg -- Oral MAR Unhold [facility]     03/22/2023 0182 EDT atorvastatin (LIPITOR) tablet 80 mg -- Oral MAR Hold Automatic Transfer Provider     03/21/2023 1748 EDT atorvastatin (LIPITOR) tablet 80 mg 80 mg Oral Given [employer]     03/22/2023 0718 EDT niCARdipine (CARDENE) 25 mg (STANDARD CONCENTRATION) in sodium chloride 0 9% 250 mL 0 mg/hr Intravenous Stopped [facility]     03/22/2023 6870 EDT niCARdipine (CARDENE) 25 mg (STANDARD CONCENTRATION) in sodium chloride 0 9% 250 mL 2 5 mg/hr Intravenous Rate/Dose Change Madeleine December 03/22/2023 0536 EDT niCARdipine (CARDENE) 25 mg (STANDARD CONCENTRATION) in sodium chloride 0 9% 250 mL 7 5 mg/hr Intravenous New Bag Ann Pemandeepurny     03/22/2023 0318 EDT niCARdipine (CARDENE) 25 mg (STANDARD CONCENTRATION) in sodium chloride 0 9% 250 mL 7 5 mg/hr Intravenous Rate/Dose Change Ren [facility]     03/22/2023 0232 EDT niCARdipine (CARDENE) 25 mg (STANDARD CONCENTRATION) in sodium chloride 0 9% 250 mL 5 mg/hr Intravenous Rate/Dose Change Ren [facility]     03/22/2023 0155 EDT niCARdipine (CARDENE) 25 mg (STANDARD CONCENTRATION) in sodium chloride 0 9% 250 mL 2 5 mg/hr Intravenous Restarted [facility]     03/21/2023 1154 EDT niCARdipine (CARDENE) 25 mg (STANDARD CONCENTRATION) in sodium chloride 0 9% 250 mL 0 mg[facility]     03/21/2023 1153 EDT niCARdipine (CARDENE) 25 mg (STANDARD CONCENTRATION) in sodium chloride 0 9% 250 mL 2 5 mg/hr Intravenous Rate/Dose Change [facility]     03/21/2023 1148 EDT niCARdipine (CARDENE) 25 mg (STANDARD CONCENTRATION) in sodium chloride 0 9% 250 mL 5 mg/hr Intravenous New Bag [facility]     03/22/2023 1024 EDT fondaparinux (ARIXTRA) subcutaneous injection 2 5 mg -- Subcutaneous MAR Unhold Jorge Luis Reas     03/22/2023 1610 EDT fondaparinux (ARIXTRA) subcutaneous injection 2 5 mg -- Subcutaneous MAR Hold Automatic Transfer Provider     03/22/2023 0802 EDT fondaparinux (ARIXTRA) subcutaneous injection 2 5 mg 2 5 mg Subcutaneous Given [facility]     03/22/2023 1000 EDT insulin regular (HumuLIN R,NovoLIN R) 1 Units/mL in sodium chloride 0 9 % 100 mL infusion 3 Units/hr Intravenous Rate/Dose Change [facility]     03/22/2023 0759 EDT insulin regular (HumuLIN R,NovoLIN R) 1 Units/mL in sodium chloride 0 9 % 100 mL infusion 4 Units/hr Intravenous Rate/Dose Change [facility]     03/21/2023 2223 EDT insulin regular (HumuLIN R,NovoLIN R) 1 Units/mL in sodium chloride 0 9 % 100 mL infusion 3 Units/hr Intravenous New Bag Ann Simon     03/21/2023 1149 EDT insulin regular (HumuLIN R,NovoLIN R) 1 Units/mL in sodium chloride 0 9 % 100 mL infusion 0 Units/hr Intravenous Hold [facility]     03/22/2023 0113 EDT ceFAZolin (ANCEF) IVPB (premix in dextrose) 2,000 mg 50 mL 2,000 mg Intravenous New Bag [facility]     03/22/2023 0146 EDT ceFAZolin (ANCEF) IVPB (premix in dextrose) 2,000 mg 50 mL 2,000 mg Intravenous New Bag Ann Simon     03/21/2023 1908 EDT ceFAZolin (ANCEF) IVPB (premix in dextrose) 2,000 mg 50 mL 0 mg Intravenous Stopped [facility]     03/21/2023 1748 EDT ceFAZolin (ANCEF) IVPB (premix in dextrose) 2,000 mg 50 mL 2,000 mg Intravenous New Bag [facility]     03/21/2023 1350 EDT dexmedeTOMIDine (Precedex) 400 mcg in sodium chloride 0 9% 100 mL 0 mcg/kg/hr Intravenous Stopped [facility]     03/21/2023 1234 EDT dexmedeTOMIDine (Precedex) 400 mcg in sodium chloride 0 9% 100 mL 0 2 mcg/kg/hr Intravenous Rate/Dose Change [facility]     03/21/2023 1211 EDT dexmedeTOMIDine (Precedex) 400 mcg in sodium chloride 0 9% 100 mL 0 3 mcg/kg/hr Intravenous Continued from [facility]     03/21/2023 1748 EDT warfarin (COUMADIN) tablet 2 5 mg 2 5 mg Oral Given [facility]     03/21/2023 1519 EDT phenylephrine (NINO-SYNEPHRINE) 50 mg (STANDARD CONCENTRATION) in sodium chloride 0 9% 250 mL 0 mcg/min Intravenous Stopped [facility]     03/21/2023 1235 EDT phenylephrine (NINO-SYNEPHRINE) 50 mg (STANDARD CONCENTRATION) in sodium chloride 0 9% 250 mL 10 mcg/min Intravenous Continued from [facility]     03/21/2023 1330 EDT albumin human (FLEXBUMIN) 5 % injection 12 5 g 0 g Intravenous Stopped [facility]     03/21/2023 1321 EDT albumin human (FLEXBUMIN) 5 % injection 12 5 g 12 5 g Intravenous New Bag [facility]     03/21/2023 1732 EDT albumin human (FLEXBUMIN) 5 % injection 12 5 g 0 g Intravenous Stopped [facility]     03/21/2023 1712 EDT albumin human (FLEXBUMIN) 5 % injection 12 5 g 12 5 g Intravenous New Bag [facility]     03/22/2023 0239 EDT calcium carbonate (TUMS) chewable tablet 500 mg 500 mg Oral Given Earna Soco     03/22/2023 1024 EDT calcium carbonate (TUMS) chewable tablet 1,000 mg -- Oral MAR Clydie Mily     03/22/2023 5403 EDT calcium carbonate (TUMS) chewable tablet 1,000 mg -- Oral MAR Hold Automatic Transfer Provider     03/22/2023 0321 EDT calcium carbonate (TUMS) chewable tablet 500 mg 500 mg Oral Given Earna Soco     03/22/2023 1024 EDT metoprolol tartrate (LOPRESSOR) partial tablet 12 5 mg -- Oral MAR Unhold Marrie [place]     03/22/2023 3306 EDT metoprolol tartrate (LOPRESSOR) partial tablet 12 5 mg -- Oral MAR Hold Automatic Transfer Provider     03/22/2023 0802 EDT metoprolol tartrate (LOPRESSOR) partial tablet 12 5 mg 12 5 mg Oral Given Sabino [facility]     03/22/2023 1024 EDT warfarin (COUMADIN) tablet 2 5 mg -- Oral MAR Unhold Marrie [place]     03/22/2023 4042 EDT warfarin (COUMADIN) tablet 2 5 mg -- Oral MAR Hold Automatic Transfer Provider     03/22/2023 1024 EDT furosemide (LASIX) injection 40 mg -- Intravenous MAR Unhold Marrie [place]     03/22/2023 9638 EDT furosemide (LASIX) injection 40 mg -- Intravenous MAR Hold Automatic Transfer Provider     03/22/2023 0802 EDT furosemide (LASIX) injection 40 mg 40 mg Intravenous Given [facility]     03/22/2023 1024 EDT potassium chloride (K-DUR,KLOR-CON) CR tablet 20 mEq -- Oral MAR Unhold Marrie [place]     03/22/2023 3953 EDT potassium chloride (K-DUR,KLOR-CON) CR tablet 20 mEq -- Oral MAR Hold Automatic Transfer Provider     03/22/2023 0802 EDT potassium chloride (K-DUR,KLOR-CON) CR tablet 20 mEq 20 mEq Oral Given [employer]        CURRENT MEDICATIONS     Current Facility-Administered Medications   Medication Dose Route Frequency Provider Last Rate   • acetaminophen  650 mg Rectal Q4H PRN Neil Deluna PA-C     • acetaminophen  650 mg Oral Q6H PRN Neil Deluna PA-C     • acetaminophen  975 mg Oral Q8H Raudel Mathiesen, PA-C     • amiodarone  200 mg Oral [facility] Raudel kiser PA-C     • aspirin  325 mg Oral Daily Neil Deluna, KEE     • atorvastatin  80 mg Oral After [facility], KEE     • bisacodyl  10 mg Rectal Daily PRN Manual Pitcher, KEE     • calcium carbonate  1,000 mg Oral BID PRN Manual Pitcher, PALYNNETTE     • chlorhexidine  15 mL Swish & Spit Q12H [facility] Raudel kiser [place]     • docusate sodium  100 mg Oral BID Manual Pitcher, KEE     • ferrous sulfate  325 mg Oral Daily With Breakfast Manual Pitcher, KEE     • finasteride  5 mg Oral Daily Manual Pitcher, PALYNNETTE     • fluticasone  1 spray Nasal PRN Manual Pitcher, KEE     • fondaparinux  2 5 mg Subcutaneous Daily Manual Pitcher, KEE     • furosemide  40 mg Intravenous BID (diuretic) Manual Pitcher, PALYNNETTE     • HYDROmorphone  0 5 mg Intravenous Q2H PRN Manual Pitcher, PA-FLEX     • insulin regular (HumuLIN R,NovoLIN R) infusion  0 3-21 Units/hr Intravenous Titrated Manual Pitcher, KEE 3 Units/hr (03/22/23 1000)   • metoprolol tartrate  12 5 mg Oral Q12H [facility] Raudel Bradley PA-C     • mupirocin  1 application   Nasal Q12H [place] , KEE     • niCARdipine  2 5-15 mg/hr Intravenous Titrated Manual Pitcher, KEE Stopped (03/22/23 1718)   • ondansetron  4 mg Intravenous Q6H PRN Manual Pitcher, KEE     • oxyCODONE  2 5 mg Oral Q4H PRN Manual Pitcher, KEE     • oxyCODONE  5 mg Oral Q4H PRN Manual Pitcher, KEE     • pantoprazole  40 mg Oral Early Morning Manual Pitcher, KEE     • polyethylene glycol  17 g Oral Daily Manual Pitcher, KEE     • potassium chloride  20 mEq Oral BID Manual Pitcher, KEE     • sodium chloride  20 mL/hr Intravenous Continuous Manual Pitcher, PA-C 20 mL/hr (03/21/23 2227)   • tamsulosin  0 4 mg Oral After Dinner Manual Pitcher, KEE     • warfarin  2 5 mg Oral Once (warfarin) Manual Pitcher, PA-C       insulin regular (HumuLIN R,NovoLIN R) infusion, 0 3-21 Units/hr, Last Rate: 3 Units/hr (03/22/23 1000)  niCARdipine, 2 5-15 mg/hr, Last Rate: Stopped (03/22/23 0718)  sodium chloride, 20 mL/hr, Last Rate: 20 mL/hr (03/21/23 "2229)      acetaminophen, 650 mg, Q4H PRN  acetaminophen, 650 mg, Q6H PRN  bisacodyl, 10 mg, Daily PRN  calcium carbonate, 1,000 mg, BID PRN  fluticasone, 1 spray, PRN  HYDROmorphone, 0 5 mg, Q2H PRN  ondansetron, 4 mg, Q6H PRN  oxyCODONE, 2 5 mg, Q4H PRN  oxyCODONE, 5 mg, Q4H PRN        Portions of the record may have been created with voice recognition software  Occasional wrong word or \"sound a like\" substitutions may have occurred due to the inherent limitations of voice recognition software    Read the chart carefully and recognize, using context, where substitutions have occurred     ==  Britney Espino, [address] [facility]  Internal Medicine Residency, PGY3  "

## 2023-03-22 NOTE — PLAN OF CARE
Problem: OCCUPATIONAL THERAPY ADULT  Goal: Performs self-care activities at highest level of function for planned discharge setting  See evaluation for individualized goals  Note: Limitation: Decreased ADL status, Decreased endurance, Decreased high-level ADLs, Decreased self-care trans  Prognosis: Good  Assessment: Pt is a 81 y o  male seen for OT evaluation s/p admit to [facility] on 3/21/2023 w/ S/P mitral valve repair  Comorbidities affecting pt's functional performance at time of assessment include: HTN and BPH, peripheral neuorpathy, HUMBERTO/bipap, mitral regurgitation, diverticulosis of colon  Personal factors affecting pt at time of IE include:steps to enter environment, difficulty performing ADLS and difficulty performing IADLS   Prior to admission, pt was living at home in a  home  He reports kitchen/living room is on the first floor, his bed and bathroom on the 3rd floor  Pt is independent w self care, mobility, driving and working as an   Upon evaluation: Pt requires some assist w self care, mobility 2* the following deficits impacting occupational performance: decreased balance and decreased tolerance  Pt to benefit from continued skilled OT tx while in the hospital to address deficits as defined above and maximize level of functional independence w ADL's and functional mobility  Occupational Performance areas to address include: grooming, bathing/shower, toilet hygiene, dressing, functional mobility and clothing management  Based on findings from OT evaluation and functional performance deficits, pt has been identified as a  high complexity evaluation  The patient's raw score on the AM-PAC Daily Activity inpatient short form is 19, standardized score is 40 22, greater than 39 4  Patients at this level are likely to benefit from discharge to home  From OT standpoint, recommendation at time of d/c would be home w family support       OT Discharge Recommendation: No rehabilitation needs

## 2023-03-22 NOTE — RESTORATIVE TECHNICIAN NOTE
Restorative Technician Note      Patient Name: Coco Zhou     Restorative Tech Visit Date: 03/22/23  Note Type: Mobility  Patient Position Upon Consult: Supine  Activity Performed: Ambulated  Assistive Device: Roller walker  Patient Position at End of Consult: All needs within reach;  Bedside chair

## 2023-03-22 NOTE — PLAN OF CARE
Problem: PHYSICAL THERAPY ADULT  Goal: Performs mobility at highest level of function for planned discharge setting  See evaluation for individualized goals  Description: Treatment/Interventions: Functional transfer training, LE strengthening/ROM, Elevations, Therapeutic exercise, Endurance training, Equipment eval/education, Bed mobility, Gait training, Spoke to nursing, OT  Equipment Recommended: Ana Gomez (at this time)       See flowsheet documentation for full assessment, interventions and recommendations  Note: Prognosis: Good  Problem List: Decreased strength, Decreased endurance, Impaired balance, Decreased mobility, Obesity  Assessment: Pt is 81 y o  male admitted with Dx of Severe mitral regurgitation and underwent Mitral valve repair with 32 mm [employer] CG Future mitral annuloplasty ring and Folding plasty of P1 to P2 and P2 to P3 and Ligation left atrial appendage using a 45 mm AtriClip device on 3/21/2023  Pt 's comorbidities affecting POC include: Acute bilateral low back pain without sciatica, COVID-19, Diabetes mellitus associated with hormonal etiology, Hypertension, Metabolic syndrome, and Obesity (BMI 30 0-34 9) and personal factors of: NICKIE and steps in the house  Pt's clinical presentation is currently  unstable/unpredictable which is evident in ongoing telem monitoring, abn lab values, CT in place and need for assist w/ all phases of mobility when usually mobilizing independently  Pt presents w/ overall weakness, incl min decreased LE strength, decreased functional endurance and activity tolerance, impaired balance and gait deviations requiring use of rw at this time  Will cont to follow pt in PT for progressive mobilization to address above functional deficits and to max level of (I), endurance, and safety   Otherwise, anticipate pt will return home w/ available family support upon D/C provided he cont improving w/ mobility skills, safety, and endurance (incl on the steps) and when medically cleared; home PT follow up may need to be considered; will follow  Barriers to Discharge: Inaccessible home environment, Other (Comment) (steps in the house)     PT Discharge Recommendation: Home with home health rehabilitation (home PT)    See flowsheet documentation for full assessment

## 2023-03-22 NOTE — APP STUDENT NOTE
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 and L circumflex involvement with mild plaque burden, medical management    - well appearing, vital signs stable, post op EKG shows NSR with prolonged QT, 60s  Brief run of VT this morning, not sustained  Continue to monitor   - continue post op diuresis with lasix   - continue post op BGL control with insulin, range 149 - 192, per endocrinology recommendations     2  Thrombocytopenia   - since 2021   - PLT: 98 down from 113 on 2/15/23    3  Htn   - continue outpatient management with lisionpril 20 mg qd and amlodipine 5 mg qd   - in-patient blood pressures have been 100s - 110s over 60s    4  Hld   - continue outpatient management with atorvastatin 20 mg qd    5  BPD   - continue outpatient management with tamsulosin 0 4 mg       History of Present Illness   81 YOM with a history of MR, htn, hld, T2DM, and HUMBERTO w CPAP experienced SOB and coughing starting in Sep 2022  He tested positive for COVID but experienced ongoing symptoms including palpitations, MICHELLE, exercise intolerance, dizziness, fatigue, and lightheadedness prompting an echocardiogram in Oct 2022 which showed mild/ moderate MR  His referral for cardiology evaluation was delayed by GIB w acute diverticulitis  His Feb 2023 echo showed EF 60% and a posterior flail MV leaflet with severe MR and a dilated L atrium  Mar 2023 cardiac catheterization showed large distal LMCA aneurysm, planned for medical management      Post op day #1 from mitral valve replacement yesterday, no complications  Posteoperatively he has been briefly treated with andres, IVF and albumin for hypotension  More recently it is reported that cardene was given to control hypertension  BPs have been averaging 110s over low 60s  Pacing wires removed today but chest tube output remains high  Pt reports pain is well controlled today and his SOB is improving  He reports he walked up and down the hallway without significant difficulty  ROS: Cardiovascular ROS: no chest pain or dyspnea on exertion    Historical Information   Past Medical History:   Diagnosis Date   • Acute bilateral low back pain without sciatica 03/09/2021   • BMI 33 0-33 9,adult 02/04/2020   • BPH (benign prostatic hyperplasia)    • COVID-19    • Diabetes mellitus associated with hormonal etiology ([facility] ) 03/05/2019   • Diverticulitis    • ED (erectile dysfunction)    • Elevated PSA 01/15/2013   • Encounter for well adult exam with abnormal findings 02/04/2019   • Herpes zoster 12/18/2014   • Hyperlipidemia    • Hypertension    • IFG (impaired fasting glucose)    • Metabolic syndrome    • Obesity (BMI 30 0-34 9) 02/04/2019   • Psoriasis    • Seasonal allergic rhinitis    • Type 2 diabetes mellitus ([facility] ) 11/17/2014     Past Surgical History:   Procedure Laterality Date   • CARDIAC CATHETERIZATION N/A 03/03/2023    Procedure: Cardiac Coronary Angiogram;  Surgeon: Mihaela Carranza DO;  Location: [facility];   Service: Cardiology   • CHOLECYSTECTOMY  05/14/2015   • COLONOSCOPY  2007   • COLONOSCOPY  06/23/2021   • HERNIA REPAIR  05/14/2015     Social History   Social History     Substance and Sexual Activity   Alcohol Use Yes    Comment: social     Social History     Substance and Sexual Activity   Drug Use No     Social History     Tobacco Use   Smoking Status Former   • Packs/day: 0 50   • Years: 12 00   • Pack years: 6 00   • Types: Pipe, Cigarettes   • Start date: 2/27/1968   • Quit date: 6/10/1980   • Years "since quittin 8   Smokeless Tobacco Never     Family History: father, MVR    Meds/Allergies   all medications and allergies reviewed  Allergies   Allergen Reactions   • No Known Allergies        Objective   First Vitals:   Blood Pressure: 157/88 (23)  Pulse: (!) 50 (23)  Temperature: (!) 97 1 °F (36 2 °C) (23)  Temp Source: Temporal (23)  Respirations: 18 (23)  Height: 6' 1\" (185 4 cm) (23)  Weight - Scale: 93 kg (205 lb) (23)  SpO2: 98 % (23)    Current Vitals:   Blood Pressure: 109/66 (23)  Pulse: 60 (23)  Temperature: 98 1 °F (36 7 °C) (23)  Temp Source: Oral (23)  Respirations: 20 (23)  Height: 6' 1\" (185 4 cm) (23)  Weight - Scale: 90 4 kg (199 lb 4 7 oz) (23)  SpO2: 95 % (23)      Intake/Output Summary (Last 24 hours) at 3/22/2023 1310  Last data filed at 3/22/2023 1217  Gross per 24 hour   Intake 1590 65 ml   Output 1580 ml   Net 10 65 ml       Invasive Devices     Central Venous Catheter Line  Duration           CVC Central Lines 23 Triple 1 day          Peripheral Intravenous Line  Duration           Peripheral IV 23 Left Hand 1 day          Line  Duration           Pacer Wires 1 day    Pacer Wires 1 day          Drain  Duration           Chest Tube 1 Mediastinal;Posterior 32 Fr  1 day    Chest Tube 2 Mediastinal;Anterior 32 Fr  1 day    Urethral Catheter Non-latex; Temperature probe; Other (Comment) 16 Fr  1 day                Physical Exam: General appearance: alert and oriented, in no acute distress  Lungs: crackles  Chest wall: sternal bandage intact, dry  Heart: regular rate and rhythm, S1, S2 normal, no murmur, click, rub or gallop  Extremities: extremities normal, warm and well-perfused; no cyanosis, clubbing, or edema       Code Status: Level 1 - Full Code    Counseling / Coordination of Care:   None    "

## 2023-03-22 NOTE — CONSULTS
Consultation - Cardiology Team 1  Julisa Boyd 81 y o  male MRN: [id number]  Unit/Bed#: [facility] 411-01 Encounter: [id number]  03/22/23  12:47 PM    Assessment/ Plan:  1  s/p MV repair + MARGARET ligation  - MV repair with 32 mm [employer]tr[employer] CG Future mitral annuloplasty ring and folding plasty of P1 to P2 and P2 to P3, L AA with 45 mm AtriClip device (POD #1)  - intraop TAGN LVEF 60%  - preop [facility] (3/3/2023) - no obstructive coronary artery disease identified  - a m  ECG - NSR, prolonged QT, HR 72  - telemetry review - NSR, HR 60s, brief run of NSVT this morning, no further events  - last invasive hemodynamics - PAP 34/9/CVP 7/CO 6 9/CI 3 1/; now declined  - EPW removed today without issue, CT remain  - net + 1 9 L / 24 hours - continue diuresis with Lasix 40 mg IV twice daily with K+ supplementation for postoperative volume overload  - continue amiodarone 200 mg 3 times daily, aspirin 325 mg daily, atorvastatin 80 mg daily, Lopressor 12 5 mg twice daily  - continue Coumadin for systemic anticoagulation for goal INR 2-3 - a m  INR 1 23  - encourage ambulation/IS use  - daily weights, strict I's and O's, salt/fluid restriction    2  Hypertension  - last documented /66  - outpatient antihypertensive regimen - amlodipine 5 mg daily, lisinopril 20 mg daily  - cardene gtt weaned to off this morning  - continue Lopressor    3  Hyperlipidemia  - lipid panel (2/28/2023) - TC 88/triglycerides 29/HDL 49/LDL 33  - continue high intensity statin    4  Type 2 DM  - last hemoglobin A1c 5 3  - remains on insulin gtt  - endocrinology consulted    5  HUMBERTO  - on CPAP    6   Hx of GI bleed  - secondary to diverticulitis of colon with perforation (12/2022)    History of Present Illness   Physician Requesting Consult: Mariely Dowell DO    Reason for Consult / Principal Problem: s/p MV repair + MARGARET ligation    HPI: Julisa Boyd is a 81 y o  male with past medical history of HUMBERTO on BiPAP, hyperlipidemia, hypertension, mitral "regurgitation, and type 2 diabetes who presents to [facility] yesterday for an elective mitral valve repair and left atrial appendage ligation  In summary, patient tested positive for COVID in September 2022 with symptoms of cough and shortness of breath  Echo in October demonstrated markedly enlarged LA with mild to moderate MR for which he was referred to Cardiology for further management  In the interim, he was hospitalized in December with a lower GI bleed and subsequently was seen in consultation outpatient with Cardiology in January 2023  A TANG was completed on 2/15/2023 that revealed a flail posterior mitral valve leaflet with severe MR and severely dilated left atrium with mild TR  He was therefore referred to CT surgery for consideration of surgical intervention  Preoperative testing was ordered and he was seen back in the CT surgery office in the beginning of this month for which it was agreed upon to proceed with mitral valve repair and left atrial appendage ligation  He is now postop day #1  Postoperatively, patient was on and off low-dose andres and Cardene for hypotension/hypertension that has now resolved  He received a total of 1 5 L of fluid due to low filling pressures/MAP with improvement  Upon examination, patient is feeling very well  He is accompanied by his wife at bedside  Notes that he began feeling extremely fatigued and short of breath following his COVID diagnosis last year  Reports being unable to do his regular exercise with yoga three times a week  Notes that he has recently lost about 50 pounds secondary to diet modifications  Reports \" borderline A1c\" for which he was previously maintained on metformin outpatient  Following his weight loss, his PCP discontinued it  Today, patient reports shortness of breath following ambulation with PT in the hallway  He denies any significant chest pain, but does note some itchiness around his midsternal incision site    Patient reports a " decent appetite  He has not had a bowel movement since surgery, and is not passing gas  He denies any lightheadedness, dizziness, nausea, vomiting, palpitations, or diaphoresis  Patient follows with Dr Arminda Carlos outpatient  Inpatient consult to Cardiology  Consult performed by: NISHANT Lui  Consult ordered by: Sandra Cheatham PA-C      EKG: NSR, prolonged QT, HR 72    Review of Systems   Constitutional: Negative for chills, diaphoresis, fatigue and fever  HENT: Negative  Eyes: Negative  Respiratory: Positive for shortness of breath  Negative for chest tightness  Cardiovascular: Negative for chest pain, palpitations and leg swelling  Gastrointestinal: Negative for abdominal distention, nausea and vomiting  Endocrine: Negative  Genitourinary: Negative  Musculoskeletal: Negative  Skin: Negative  Allergic/Immunologic: Negative  Neurological: Negative for dizziness, syncope, weakness and light-headedness  Hematological: Negative  Psychiatric/Behavioral: Negative  Historical Information   Past Medical History:   Diagnosis Date   • Acute bilateral low back pain without sciatica 03/09/2021   • BMI 33 0-33 9,adult 02/04/2020   • BPH (benign prostatic hyperplasia)    • COVID-19    • Diabetes mellitus associated with hormonal etiology ([facility] ) 03/05/2019   • Diverticulitis    • ED (erectile dysfunction)    • Elevated PSA 01/15/2013   • Encounter for well adult exam with abnormal findings 02/04/2019   • Herpes zoster 12/18/2014   • Hyperlipidemia    • Hypertension    • IFG (impaired fasting glucose)    • Metabolic syndrome    • Obesity (BMI 30 0-34 9) 02/04/2019   • Psoriasis    • Seasonal allergic rhinitis    • Type 2 diabetes mellitus ([facility] ) 11/17/2014     Past Surgical History:   Procedure Laterality Date   • CARDIAC CATHETERIZATION N/A 03/03/2023    Procedure: Cardiac Coronary Angiogram;  Surgeon: Mihaela Carranza DO;  Location: [facility]ARD[facility];   Service: "Cardiology   • CHOLECYSTECTOMY  2015   • COLONOSCOPY     • COLONOSCOPY  2021   • HERNIA REPAIR  2015     Social History     Substance and Sexual Activity   Alcohol Use Yes    Comment: social     Social History     Substance and Sexual Activity   Drug Use No     Social History     Tobacco Use   Smoking Status Former   • Packs/day: 0 50   • Years: 12 00   • Pack years: 6 00   • Types: Pipe, Cigarettes   • Start date: 1968   • Quit date: 6/10/1980   • Years since quittin 8   Smokeless Tobacco Never     Family History:   Family History   Problem Relation Age of Onset   • Skin cancer Mother    • Heart disease Father      Meds/Allergies   all current active meds have been reviewed  Allergies   Allergen Reactions   • No Known Allergies      Objective   Vitals: Blood pressure 109/66, pulse 60, temperature 98 1 °F (36 7 °C), temperature source Oral, resp  rate 20, height 6' 1\" (1 854 m), weight 90 4 kg (199 lb 4 7 oz), SpO2 95 %  , Body mass index is 26 29 kg/m² ,   Orthostatic Blood Pressures    Flowsheet Row Most Recent Value   Blood Pressure 109/66 filed at 2023 1127   Patient Position - Orthostatic VS Lying filed at 2023 4549        Systolic (68QRY), KTY:604 , Min:86 , NYS:946     Diastolic (50VYK), NDA:93, Min:52, Max:67      Intake/Output Summary (Last 24 hours) at 3/22/2023 1247  Last data filed at 3/22/2023 1217  Gross per 24 hour   Intake 1644 29 ml   Output 1735 ml   Net -90 71 ml     Invasive Devices     Central Venous Catheter Line  Duration           CVC Central Lines 23 Triple 1 day          Peripheral Intravenous Line  Duration           Peripheral IV 23 Left Hand 1 day          Line  Duration           Pacer Wires 1 day    Pacer Wires 1 day          Drain  Duration           Chest Tube 1 Mediastinal;Posterior 32 Fr  1 day    Chest Tube 2 Mediastinal;Anterior 32 Fr  1 day    Urethral Catheter Non-latex; Temperature probe; Other (Comment) 16 Fr  1 day       " Physical Exam  Constitutional:       General: He is not in acute distress  Appearance: He is not ill-appearing  HENT:      Head: Normocephalic and atraumatic  Nose: Nose normal       Mouth/Throat:      Mouth: Mucous membranes are moist       Pharynx: Oropharynx is clear  Eyes:      Pupils: Pupils are equal, round, and reactive to light  Cardiovascular:      Rate and Rhythm: Normal rate and regular rhythm  Pulses: Normal pulses  Heart sounds: Normal heart sounds  No murmur heard  No friction rub  Pulmonary:      Effort: Pulmonary effort is normal       Comments: Diffuse coarse of breath sounds; on RA  Abdominal:      Palpations: Abdomen is soft  Musculoskeletal:         General: Normal range of motion  Cervical back: Normal range of motion  Right lower leg: No edema  Left lower leg: No edema  Skin:     General: Skin is warm and dry  Capillary Refill: Capillary refill takes less than 2 seconds  Comments: Midsternal incision dressed with Acticoat; stable   Neurological:      General: No focal deficit present  Mental Status: He is alert and oriented to person, place, and time     Psychiatric:         Mood and Affect: Mood normal          Behavior: Behavior normal      Lab Results:   Troponins:     CBC with diff:   Results from last 7 days   Lab Units 03/22/23  0406 03/21/23  1859 03/21/23  1124 03/21/23  1122 03/21/23  1059 03/21/23  0946 03/21/23  0936 03/21/23  0912   WBC Thousand/uL 17 29*  --   --   --   --   --   --   --    HEMOGLOBIN g/dL 14 1 13 2  --  13 9  --   --   --   --    I STAT HEMOGLOBIN g/dl  --   --  12 6  --  12 6 10 5*  --  10 5*   HEMATOCRIT % 42 9 40 5  --  42 5  --   --   --   --    HEMATOCRIT, ISTAT %  --   --  37  --  37 31*  --  31*   MCV fL 98  --   --   --   --   --   --   --    PLATELETS Thousands/uL 98*  --   --  76*  --   --  110*  --    MCH pg 32 2  --   --   --   --   --   --   --    MCHC g/dL 32 9  --   --   --   -- --   --   --    RDW % 13 2  --   --   --   --   --   --   --    MPV fL 11 2  --   --  10 3  --   --  10 4  --      CMP:   Results from last 7 days   Lab Units 03/22/23  0406 03/21/23  1859 03/21/23  1508 03/21/23  1124 03/21/23  1122 03/21/23  1059 03/21/23  0946 03/21/23  0912 03/21/23  0831 03/21/23  0745   POTASSIUM mmol/L 4 4 4 4 4 3  --  3 9  --   --   --   --   --    CHLORIDE mmol/L 111*  --   --   --  116*  --   --   --   --   --    CO2 mmol/L 25  --   --   --  25  --   --   --   --   --    CO2, I-STAT mmol/L  --   --   --  26  --  26 32 31 29 29   BUN mg/dL 15  --   --   --  12  --   --   --   --   --    CREATININE mg/dL 0 85  --   --   --  0 76  --   --   --   --   --    GLUCOSE, ISTAT mg/dl  --   --   --  131  --  141* 191* 152* 126 110   CALCIUM mg/dL 7 6*  --   --   --  7 2*  --   --   --   --   --    EGFR ml/min/1 73sq m 88  --   --   --  92  --   --   --   --   --

## 2023-03-22 NOTE — PROGRESS NOTES
Progress Note - Cardiothoracic Surgery   Nicolas Licea 81 y o  male MRN: [id number]  Unit/Bed#: [facility] 414-01 Encounter: [id number]      POD # 1 s/p Mitral valve repair, MARGARET ligation    Pt seen/examined  Interval history and data reviewed with critical care team   Pt doing well  No specific complaints  Medications:   Scheduled Meds:  Current Facility-Administered Medications   Medication Dose Route Frequency Provider Last Rate   • acetaminophen  650 mg Rectal Q4H PRN Tacos Santos PA-C     • acetaminophen  975 mg Oral [address] [place], [place]     • amiodarone  200 mg Oral [place], [place]     • aspirin  325 mg Oral Daily [place], [place]     • atorvastatin  80 mg Oral After R R  KEE Goodrich     • bisacodyl  10 mg Rectal Daily PRN Tacos Santos PA-C     • calcium carbonate  1,000 mg Oral BID PRN Inocencia Solorio PA-C     • cefazolin  2,000 mg Intravenous Q8H Storm Seek KEE Rice 2,000 mg (03/22/23 0146)   • chlorhexidine  15 mL Swish & Spit Q12H [address] [place], [place]     • ferrous sulfate  325 mg Oral Daily With Breakfast [facility] KEE Rice     • finasteride  5 mg Oral Daily [facility] KEE Rice     • fluticasone  1 spray Nasal PRN Tacos Santos PA-C     • fondaparinux  2 5 mg Subcutaneous Daily [facility] KEE Rice     • furosemide  40 mg Intravenous BID (diuretic) Marla Munguia PA-C     • HYDROmorphone  0 5 mg Intravenous Q2H PRN Tacos Santos PA-C     • insulin regular (HumuLIN R,NovoLIN R) infusion  0 3-21 Units/hr Intravenous Titrated Tacos Santos PA-C 4 Units/hr (03/22/23 4299)   • metoprolol tartrate  12 5 mg Oral Q12H [address] Raudel Bradley PA-C     • mupirocin  1 application   Nasal Q12H [address] [facility] KEE Rice     • niCARdipine  2 5-15 mg/hr Intravenous Titrated Storm Seek Kris Rice Stopped (03/22/23 5658)   • ondansetron  4 mg Intravenous Q6H PRN Tacos Santos PA-C     • oxyCODONE  2 5 mg "Oral Q4H PRN Josh Andersen PA-C     • oxyCODONE  5 mg Oral Q4H PRN Loren Kramer PA-C     • pantoprazole  40 mg Oral Early Morning Loren Kramer [place]     • polyethylene glycol  17 g Oral Daily Loren Kramer PA-C     • potassium chloride  20 mEq Oral BID Keara Lezama PA-C     • sodium chloride  20 mL/hr Intravenous Continuous Josh Andersen PA-C 20 mL/hr (03/21/23 2229)   • tamsulosin  0 4 mg Oral After Dinner Josh Andersen PA-C     • warfarin  2 5 mg Oral Once (warfarin) Keara Lezama PA-C       Continuous Infusions:insulin regular (HumuLIN R,NovoLIN R) infusion, 0 3-21 Units/hr, Last Rate: 4 Units/hr (03/22/23 0759)  niCARdipine, 2 5-15 mg/hr, Last Rate: Stopped (03/22/23 0718)  sodium chloride, 20 mL/hr, Last Rate: 20 mL/hr (03/21/23 2229)      PRN Meds: •  acetaminophen  •  bisacodyl  •  calcium carbonate  •  fluticasone  •  HYDROmorphone  •  ondansetron  •  oxyCODONE  •  oxyCODONE    Vitals: Blood pressure 108/57, pulse 65, temperature 98 8 °F (37 1 °C), temperature source Probe, resp  rate (!) 25, height 6' 1\" (1 854 m), weight 90 4 kg (199 lb 4 7 oz), SpO2 93 %  ,Body mass index is 26 29 kg/m²  I/O last 24 hours: In: 5450 2 [P O :200; I V :2590 2; NG/GT:60; IV Piggyback:1600]  Out: 8990 [Urine:1910; Blood:1000; Chest Tube:570]  Invasive Devices     Central Venous Catheter Line  Duration           CVC Central Lines 03/21/23 Triple 1 day          Peripheral Intravenous Line  Duration           Peripheral IV 03/21/23 Left Hand 1 day          Arterial Line  Duration           Arterial Line 03/21/23 Right Radial 1 day          Line  Duration           Pacer Wires <1 day    Pacer Wires <1 day          Drain  Duration           Urethral Catheter Non-latex; Temperature probe; Other (Comment) 16 Fr  1 day    Chest Tube 1 Mediastinal;Posterior 28 Fr  <1 day    Chest Tube 2 Mediastinal;Anterior 28 Fr  <1 day                  Lab, Imaging and other studies:   Results from last 7 days " Lab Units 03/22/23  0406 03/21/23  1859 03/21/23  1124 03/21/23  1122 03/21/23  0946 03/21/23  0936   WBC Thousand/uL 17 29*  --   --   --   --   --    HEMOGLOBIN g/dL 14 1 13 2  --  13 9  --   --    I STAT HEMOGLOBIN g/dl  --   --  12 6  --    < >  --    HEMATOCRIT % 42 9 40 5  --  42 5  --   --    HEMATOCRIT, ISTAT %  --   --  37  --    < >  --    PLATELETS Thousands/uL 98*  --   --  76*  --  110*    < > = values in this interval not displayed  Results from last 7 days   Lab Units 03/22/23  0406 03/21/23  1859 03/21/23  1508 03/21/23  1124 03/21/23  1122   POTASSIUM mmol/L 4 4 4 4 4 3  --  3 9   CHLORIDE mmol/L 111*  --   --   --  116*   CO2 mmol/L 25  --   --   --  25   CO2, I-STAT mmol/L  --   --   --  26  --    BUN mg/dL 15  --   --   --  12   CREATININE mg/dL 0 85  --   --   --  0 76   GLUCOSE, ISTAT mg/dl  --   --   --  131  --    CALCIUM mg/dL 7 6*  --   --   --  7 2*     Results from last 7 days   Lab Units 03/22/23  0406 03/21/23  1122   INR  1 23* 1 48*   PTT seconds  --  36     Recent Labs     03/21/23  1240   PHART 7 286*   [id number] 21 1*   PO2ART 118 8   [id number] 45 4*   BEART -5 5           Plan:    DC Marshal/[place]/Feng  Continue chest tubes, remove pacing wires  Transfer to floor  Ambulate  Incentive spirometry  Diuresis  PO ASA/Statin/B blocker          SIGNATURE: Erasmo Gonzalez DO  DATE: March 22, 2023  TIME: 8:09 AM

## 2023-03-22 NOTE — PROGRESS NOTES
Progress Note - Critical Care   Tala Al 81 y o  male MRN: [id number]  Unit/Bed#: [facility] 414-01 Encounter: [id number]      Attending Physician: Leonard Camacho DO    24 Hour Events/HPI: POD # 1 s/p  Mitral valve repair mitral annuloplasty ring and ligation of left atrial appendage  Postoperatively the patient was not supported on any medications  He was briefly on and off low-dose andres for hypotension  He received a total of 1 L of LR and 500 of albumin due to low filling pressures and MAP with good response  He was delined this morning  Initiated on Cardene due to hypertension  No acute complaints this morning  ROS: Review of Systems   Respiratory: Positive for chest tightness  Cardiovascular: Negative for chest pain, palpitations and leg swelling  Gastrointestinal: Negative for abdominal pain  Musculoskeletal: Negative for arthralgias  Neurological: Negative for dizziness  Psychiatric/Behavioral: Negative for agitation      ---------------------------------------------------------------------------------------------------------------------------------------------------------------------  Impressions:  1  Severe mitral valve regurgitation s/p ring annuloplasty mitral valve repair  2  Hypertension  3  Hyperlipidemia  4  Diabetes mellitus type 2 diet controlled  5  LMCA aneurysm   6  HUMBERTO on CPAP  7  Diverticulosis with GI bleed  8  BPH    Plan:    Neuro:   · Pain controlled with: Dilaudid 0 5 Q2 PRN for pain, Oxycodone 2 5/5mg Q4 PRN  Tylenol 975 Q8 scheduled  · Regulate sleep/wake cycle  · Delirium precautions  · CAM-ICU daily  · Trend neuro exam    CV:   · Cardiac infusions: Cardene, 2 5 mg/hour  · Wean Cardene as able  · MAP goal > 65 and CI >2 2  · Discontinue PA catheter  · Keep arterial line  · Rhythm: NSR  · Follow rhythm on telemetry  · Lopressor 12 5 mg PO BID, Consider Lisinopril if on Cardene after BB  · Epicardial pacing wires no longer required    Remove today  · Received Coumadin 2 5 last evening, re-dose 2 5 this morning  ·  mg QD  · Statin: Lipitor 80mg qHS  · Amiodarone 200 mg PO q8 hours     Lung:   · Acute post-op pulmonary insufficiency; Requiring 3 liters via nasal cannula, secondary to atelectasis and poor inspiratory effort secondary to pain  Continue incentive spirometry/coughing/deep breathing exercises  Wean supplemental oxygen as tolerated for saturation > 90%  · Chest tube output remains persistently high; Continue chest tubes to suction today    GI:   · Continue PPI for stress ulcer prophylaxis  · Continue bowel regimen  · Trend abdominal exam and bowel function    FEN:   · Diuretic plan: Lasix 40 mg IV q BID  · K-dur 20 mEQ PO q BID  · Nutrition/diet plan: Cardiovascular with 1800 mL fluid restriction  · Replenish electrolytes with goals: K >4 0, Mag >2 0, and Phos >3 0    :   · Indwelling Feng present: yes   · Discontinue Feng  · Trend UOP and BUN/creat  · Strict I and O    ID:   · Trend temps and WBC count  · Maintain normothermia        Heme:   · Trend hgb and plts    Endo:   · Glycemic control plan: History of diabetes: Continue insulin infusion today  Consult Endocrinology for management    Results from last 6 Months   Lab Units 11/14/22  0734   HEMOGLOBIN A1C % 5 3     MSK/Skin:  · Mobility goal: Ambulation today   · PT consult: yes  · OT consult: yes  · Frequent turning and pressure off-loading  · Local wound care as needed    Disposition:  Transfer to telemetry  ---------------------------------------------------------------------------------------------------------------------------------------------------------------------  Allergies:    Allergies   Allergen Reactions   • No Known Allergies        Medications:     VTE Pharmacologic Prophylaxis: Fondaparinux (Arixtra)  VTE Mechanical Prophylaxis: sequential compression device    Scheduled Meds:   acetaminophen, 975 mg, Q8H  amiodarone, 200 mg, Q8H ALVIN  aspirin, 325 mg, Daily  atorvastatin, 80 mg, After Dinner  cefazolin, 2,000 mg, Q8H  chlorhexidine, 15 mL, Q12H [facility]  ferrous sulfate, 325 mg, Daily With Breakfast  finasteride, 5 mg, Daily  fondaparinux, 2 5 mg, Daily  mupirocin, 1 application  , Q12H [facility]  pantoprazole, 40 mg, Early Morning  polyethylene glycol, 17 g, Daily  tamsulosin, 0 4 mg, After Dinner       Continuous Infusions:  insulin regular (HumuLIN R,NovoLIN R) infusion, 0 3-21 Units/hr, Last Rate: 3 Units/hr (03/21/23 2223)  niCARdipine, 2 5-15 mg/hr, Last Rate: 2 5 mg/hr (03/22/23 0619)  phenylephine,  mcg/min, Last Rate: Stopped (03/21/23 1519)  sodium chloride, 20 mL/hr, Last Rate: 20 mL/hr (03/21/23 2229)      PRN Meds:  acetaminophen, 650 mg, Q4H PRN  bisacodyl, 10 mg, Daily PRN  calcium carbonate, 1,000 mg, BID PRN  fluticasone, 1 spray, PRN  furosemide, 40 mg, Q6H PRN  HYDROmorphone, 0 5 mg, Q2H PRN  lidocaine (cardiac), 100 mg, Q30 Min PRN  ondansetron, 4 mg, Q6H PRN  oxyCODONE, 2 5 mg, Q4H PRN  oxyCODONE, 5 mg, Q4H PRN  potassium chloride, 20 mEq, Once PRN  potassium chloride, 20 mEq, Q1H PRN  potassium chloride, 20 mEq, Q30 Min PRN      ---------------------------------------------------------------------------------------------------------------------------------------------------------------------  Vitals:   Vitals:    03/22/23 0300 03/22/23 0400 03/22/23 0500 03/22/23 0600   BP: 117/59 116/60 113/57 104/58   BP Location:       Pulse: 67 66 63 66   Resp: (!) 38 22 (!) 24 (!) 23   Temp:  98 8 °F (37 1 °C)     TempSrc:  Probe     SpO2: 93% 93% 92% 92%   Weight:    90 4 kg (199 lb 4 7 oz)   Height:         Arterial Line:  Arterial Line BP: 98/48  Arterial Line MAP (mmHg): (!) 64 mmHg    Tele Rhythm: NSR (This was personally reviewed by myself )    Respiratory:  SpO2: SpO2: 92 %  SpO2 Device: O2 Device: Nasal cannula  Nasal Cannula O2 Flow Rate (L/min): 3 L/min    Ventilator:  Respiratory    Lab Data (Last 4 hours)    None         O2/Vent Data (Last 4 hours)      03/22 8539 Non-Invasive Ventilation Mode CPAP                 Temperature: Temp (24hrs), Av 6 °F (37 °C), Min:97 °F (36 1 °C), Max:99 °F (37 2 °C)  Current: Temperature: 98 8 °F (37 1 °C)    Weights:   Weight (last 2 days)     Date/Time Weight    23 0600 90 4 (199 3)    23 0602 93 (205)        Body mass index is 26 29 kg/m²  Hemodynamic Monitoring:  PAP: PAP: 34/9  CVP: CVP (mean): 7 mmHg  CO: CO (L/min): 6 9 L/min  CI: CI (L/min/m2): 3 1 L/min/m2  SVR: SVR (dyne*sec)/cm5: 899 (dyne*sec)/cm5    Intake and Outputs:    Intake/Output Summary (Last 24 hours) at 3/22/2023 0654  Last data filed at 3/22/2023 0600  Gross per 24 hour   Intake 5450 19 ml   Output 3480 ml   Net 1970 19 ml     I/O last 24 hours: In: 5450 2 [P O :200; I V :2590 2; NG/GT:60; IV Piggyback:1600]  Out:  [Urine:1910; Blood:1000; Chest Tube:570]    UOP: 40/hour   BM: No BM in the last 24 hours    Chest tube Output:  Mediastinal tubes: 390 mL/8 hours  570 mL/24 hours          Labs:  Results from last 7 days   Lab Units 23  0406 23  1124 23  1122 23  0946 23  0936   WBC Thousand/uL 17 29*  --   --   --   --   --    HEMOGLOBIN g/dL 14 1 13 2  --  13 9  --   --    I STAT HEMOGLOBIN g/dl  --   --  12 6  --    < >  --    HEMATOCRIT % 42 9 40 5  --  42 5  --   --    HEMATOCRIT, ISTAT %  --   --  37  --    < >  --    PLATELETS Thousands/uL 98*  --   --  76*  --  110*    < > = values in this interval not displayed       Results from last 7 days   Lab Units 23  0406 23  1508 23  1124 23  1122 23  1122 23  1059 23  0946   SODIUM mmol/L 138  --   --   --   --  142  --   --    POTASSIUM mmol/L 4 4 4 4 4 3  --    < > 3 9  --   --    CHLORIDE mmol/L 111*  --   --   --   --  116*  --   --    CO2 mmol/L 25  --   --   --   --  25  --   --    CO2, I-STAT mmol/L  --   --   --  26  --   --  26 32   BUN mg/dL 15  --   --   --   --  12  --   --    CREATININE mg/dL 0 85  --   --   --   --  0 76  --   --    CALCIUM mg/dL 7 6*  --   --   --   --  7 2*  --   --    GLUCOSE, ISTAT mg/dl  --   --   --  131  --   --  141* 191*    < > = values in this interval not displayed  Baseline Creat: 0 8    Results from last 7 days   Lab Units 03/22/23  0406   MAGNESIUM mg/dL 2 8*          Results from last 7 days   Lab Units 03/22/23  0406 03/21/23  1122   INR  1 23* 1 48*   PTT seconds  --  36     Coumadin mg 2 5           Results from last 7 days   Lab Units 03/21/23  1240   PH ART  7 286*   PCO2 ART mm Hg 45 4*   PO2 ART mm Hg 118 8   HCO3 ART mmol/L 21 1*   BASE EXC ART mmol/L -5 5   ABG SOURCE  Line, Arterial           Micro:   Blood Culture:   Lab Results   Component Value Date    BLOODCX No Growth After 5 Days  12/30/2022     Urine Culture: No results found for: URINECX  Sputum Culture: No components found for: SPUTUMCX  Wound Culure: No results found for: WOUNDCULT    Diagnostic Studies:  03/22/23 CXR: Lines and  chest tube in place, no pneumothorax or pleural effusion  This was personally reviewed by myself in PACS   03/22/23 EKG: Normal sinus rhythm no ST or T wave abnormalities  This was personally reviewed by myself  Nutrition:        Diet Orders   (From admission, onward)             Start     Ordered    03/22/23 0000  Diet Cardiovascular; Cardiac; Fluid Restriction 1800 ML  Diet effective 0500        References:    Nutrtion Support Algorithm Enteral vs  Parenteral   Question Answer Comment   Diet Type Cardiovascular    Cardiac Cardiac    Other Restriction(s): Fluid Restriction 1800 ML    RD to adjust diet per protocol? Yes        03/21/23 1114              Physical Exam  Vitals and nursing note reviewed  Constitutional:       Appearance: Normal appearance  HENT:      Head: Normocephalic and atraumatic  Mouth/Throat:      Comments: Mucous membranes are moist  Cardiovascular:      Rate and Rhythm: Normal rate and regular rhythm        Comments: Regular rate and rhythm, no murmur or rub  Warm and well-perfused distally with 2+ DP pulses  Pulmonary:      Effort: Pulmonary effort is normal       Breath sounds: Normal breath sounds  Comments: Clear to auscultation bilaterally, no adventitious breath sounds  Abdominal:      General: Abdomen is flat  Palpations: Abdomen is soft  Comments: Soft and nontender   Musculoskeletal:         General: Normal range of motion  Skin:     General: Skin is warm  Capillary Refill: Capillary refill takes less than 2 seconds  Neurological:      General: No focal deficit present  Mental Status: He is alert and oriented to person, place, and time  Invasive lines and devices: Invasive Devices     Central Venous Catheter Line  Duration           CVC Central Lines 03/21/23 Triple <1 day          Peripheral Intravenous Line  Duration           Peripheral IV 03/21/23 Left Hand <1 day          Arterial Line  Duration           Arterial Line 03/21/23 Right Radial <1 day          Line  Duration           Pacer Wires <1 day    Pacer Wires <1 day          Drain  Duration           Chest Tube 1 Mediastinal;Posterior 32 Fr  <1 day    Chest Tube 2 Mediastinal;Anterior 32 Fr  <1 day    Urethral Catheter Non-latex; Temperature probe; Other (Comment) 16 Fr  <1 day              ---------------------------------------------------------------------------------------------------------------------------------------------------------------------  Code Status: Level 1 - Full Code    Care Time Delivered:   No Critical Care time spent     Collaborative bedside rounds performed with cardiac surgery attending, critical care attending and bedside RN      SIGNATURE: Abby Dawn PA-C  DATE: March 22, 2023  TIME: 6:54 AM

## 2023-03-22 NOTE — PROCEDURES
Procedure: Epicardial Wire Removal    03/22/23    Patient was returned to bed  EPW x 2 d/c'd in typical fashion  No immediate complications  Site dressed with clean gauze  Patient and nurse aware of mandatory 1 hour bedrest protocol  Vital signs ordered Q 15 minutes for one hour as per protocol      Marla Munguia PA-C

## 2023-03-22 NOTE — OCCUPATIONAL THERAPY NOTE
Occupational Therapy Evaluation      Hong     3/22/2023    Principal Problem:    S/P mitral valve repair  Active Problems:    Allergic rhinitis    BPH with urinary obstruction    Displacement of lumbar intervertebral disc without myelopathy    Diverticulosis of colon    Essential hypertension    Left ventricular hypertrophy    Mitral regurgitation    Hyperlipidemia    Idiopathic peripheral neuropathy    HUMBERTO treated with BiPAP    Excessive daytime sleepiness    Platelets decreased (HCC)    S/P left atrial appendage ligation      Past Medical History:   Diagnosis Date    Acute bilateral low back pain without sciatica 2021    BMI 33 0-33 9,adult 2020    BPH (benign prostatic hyperplasia)     COVID-19     Diabetes mellitus associated with hormonal etiology ([facility] ) 2019    Diverticulitis     ED (erectile dysfunction)     Elevated PSA 01/15/2013    Encounter for well adult exam with abnormal findings 2019    Herpes zoster 2014    Hyperlipidemia     Hypertension     IFG (impaired fasting glucose)     Metabolic syndrome     Obesity (BMI 30 0-34 9) 2019    Psoriasis     Seasonal allergic rhinitis     Type 2 diabetes mellitus ([facility] ) 2014       Past Surgical History:   Procedure Laterality Date    CARDIAC CATHETERIZATION N/A 2023    Procedure: Cardiac Coronary Angiogram;  Surgeon: Francesca Navarrete DO;  Location: [facility];   Service: Cardiology    CHOLECYSTECTOMY  2015    COLONOSCOPY      COLONOSCOPY  2021    HERNIA REPAIR  2015 0849   OT Last Visit   OT Visit Date 23   Note Type   Note type Evaluation   Pain Assessment   Pain Assessment Tool 0-10   Pain Score 3   Pain Location/Orientation Orientation: Mid;Location: Chest;Location: Incision   Restrictions/Precautions   Weight Bearing Precautions Per Order No   Other Precautions Cardiac/sternal;Multiple lines;Telemetry;Pain  (chest tube, rice, pacer wires, IV)   Home "Living   Type of [address] Multi-level;Bed/bath upstairs  (2 full flight of stairs to his bed and bahtroom  (3rd floor))   Bathroom Shower/Tub Walk-in shower   Bathroom Toilet Standard   Additional Comments denies use of DME at baseline   Prior Function   Level of [place] Independent with ADLs; Independent with functional mobility   Lives With Spouse   IADLs Independent with driving; Independent with meal prep; Independent with medication management   Falls in the last 6 months 0   Vocational Full time employment   Comments pt works full time as an    Lifestyle   Autonomy fully independent w self care, mobility, home management etc   Reciprocal Relationships supportive family  spouse works in real estate but can help as needed  Son in from [place] and is staying to assist as needed \"for the duration\"   Intrinsic Gratification likes to travel, plans on going to [facility] in September   also his [facility] michaels Nahid   Subjective   Subjective \"I do love to travel\"   ADL   Eating Assistance 7  Independent   Grooming Assistance 5  Supervision/Setup   Grooming Deficit Setup   UB Bathing Assistance 4  Minimal Assistance   UB Bathing Deficit Increased time to complete   LB Bathing Assistance 3  Moderate Assistance   UB Dressing Assistance 4  Minimal Assistance   LB Dressing Assistance 3  Moderate Assistance   Bed Mobility   Sit to Supine 3  Moderate assistance   Additional items Assist x 1;Verbal cues   Additional Comments assisted to bed following session   Transfers   Sit to Stand 4  Minimal assistance   Additional items Assist x 1;Verbal cues   Stand to Sit 4  Minimal assistance   Additional items Verbal cues   Stand pivot 4  Minimal assistance   Functional Mobility   Functional Mobility 4  Minimal assistance   Additional Comments + assist for line management and chair follow   Additional items Rolling walker   Balance   Static Sitting Fair +   Dynamic Sitting Fair   Static Standing Fair -   Dynamic " Standing Poor +   Activity Tolerance   Activity Tolerance Patient tolerated treatment well   Medical Staff Made Aware DPT Kemar  seen for co-eval due to medical complexity   Nurse Made Aware ok to see   RUE Assessment   RUE Assessment WFL   LUE Assessment   LUE Assessment WFL   Hand Function   Gross Motor Coordination Functional   Fine Motor Coordination Functional   Sensation   Light Touch No apparent deficits   Vision - Complex Assessment   Tracking Intact   Acuity Able to read clock/calendar on wall without difficulty   Psychosocial   Psychosocial (WDL) WDL   Patient Behaviors/Mood Cooperative   Cognition   Overall Cognitive Status [facility]   Arousal/Participation Alert; Cooperative   Attention Within functional limits   Orientation Level Oriented X4   Memory Within functional limits   Following Commands Follows one step commands without difficulty   Assessment   Limitation Decreased ADL status; Decreased endurance;Decreased high-level ADLs; Decreased self-care trans   Prognosis Good   Assessment Pt is a 81 y o  male seen for OT evaluation s/p admit to [place] [facility] on 3/21/2023 w/ S/P mitral valve repair  Comorbidities affecting pt's functional performance at time of assessment include: HTN and BPH, peripheral neuorpathy, HUMBERTO/bipap, mitral regurgitation, diverticulosis of colon  Personal factors affecting pt at time of IE include:steps to enter environment, difficulty performing ADLS and difficulty performing IADLS   Prior to admission, pt was living at home in a  home  He reports kitchen/living room is on the first floor, his bed and bathroom on the 3rd floor  Pt is independent w self care, mobility, driving and working as an   Upon evaluation: Pt requires some assist w self care, mobility 2* the following deficits impacting occupational performance: decreased balance and decreased tolerance   Pt to benefit from continued skilled OT tx while in the hospital to address deficits as defined above and maximize level of functional independence w ADL's and functional mobility  Occupational Performance areas to address include: grooming, bathing/shower, toilet hygiene, dressing, functional mobility and clothing management  Based on findings from OT evaluation and functional performance deficits, pt has been identified as a  high complexity evaluation  The patient's raw score on the AM-PAC Daily Activity inpatient short form is 19, standardized score is 40 22, greater than 39 4  Patients at this level are likely to benefit from discharge to home  From OT standpoint, recommendation at time of d/c would be home w family support  Goals   Patient Goals to get well and to go travel   Plan   Treatment Interventions ADL retraining;Functional transfer training; Endurance training;Patient/family training; Compensatory technique education; Energy conservation; Activityengagement;Equipment evaluation/education;Cardiac education   Goal Expiration Date 03/29/23   OT Frequency 3-5x/wk   Recommendation   OT Discharge Recommendation No rehabilitation needs   Additional Comments  outpt cardiac rehab as per protocol   AM-PAC Daily Activity Inpatient   Lower Body Dressing 2   Bathing 3   Toileting 3   Upper Body Dressing 3   Grooming 4   Eating 4   Daily Activity Raw Score 19   Daily Activity Standardized Score (Calc for Raw Score >=11) 40 22     OT GOALS TO BE ACHIEVED IN 14 DAYS:    Patient will complete bed mobility w min assist    Pt will demonstrate good balance sitting at EOB x 10 min for increased safety w self care and in preparation for increased indpendence    Pt will complete grooming independently with set up     Pt will complete UB bathing and dressing independently    Pt will complete LB bathing and dressing with supervision    Pt will complete toileting w mod I and good safety     Pt will complete functional transfers with mod I and use of DME as needed demonstrating good safety     Pt will tolerate standing at sinkside x 5 min w F+ balance for increased safety w hygiene    Pt will complete functional mobility in room and bathroom w S and use of most appropriate DME    Pt will verbalize/demonstrate good understanding of cardiac education, precautions, the do's and the dont's following cardiac surgery    Pt will demonstrate good ECT/self pacing skills with all self care and functional mobility

## 2023-03-22 NOTE — PLAN OF CARE
Problem: MOBILITY - ADULT  Goal: Maintain or return to baseline ADL function  Description: INTERVENTIONS:  -  Assess patient's ability to carry out ADLs; assess patient's baseline for ADL function and identify physical deficits which impact ability to perform ADLs (bathing, care of mouth/teeth, toileting, grooming, dressing, etc )  - Assess/evaluate cause of self-care deficits   - Assess range of motion  - Assess patient's mobility; develop plan if impaired  - Assess patient's need for assistive devices and provide as appropriate  - Encourage maximum independence but intervene and supervise when necessary  - Involve family in performance of ADLs  - Assess for home care needs following discharge   - Consider OT consult to assist with ADL evaluation and planning for discharge  - Provide patient education as appropriate  Outcome: Progressing  Goal: Maintains/Returns to pre admission functional level  Description: INTERVENTIONS:  - Perform BMAT or MOVE assessment daily    - Set and communicate daily mobility goal to care team and patient/family/caregiver  - Collaborate with rehabilitation services on mobility goals if consulted  - Reposition patient every 2 hours    - Ambulate patient 3 times a day  - Out of bed for meals 3 times a day  - Out of bed for toileting  - Record patient progress and toleration of activity level   Outcome: Progressing

## 2023-03-23 DIAGNOSIS — E61.1 LOW IRON: ICD-10-CM

## 2023-03-23 PROBLEM — Z79.01 ANTICOAGULATED ON COUMADIN: Status: ACTIVE | Noted: 2023-03-23

## 2023-03-23 PROBLEM — D62 POSTOPERATIVE ANEMIA DUE TO ACUTE BLOOD LOSS: Status: ACTIVE | Noted: 2023-03-23

## 2023-03-23 PROBLEM — D62 POSTOPERATIVE ANEMIA DUE TO ACUTE BLOOD LOSS: Status: RESOLVED | Noted: 2023-03-23 | Resolved: 2023-03-23

## 2023-03-23 LAB
ALBUMIN SERPL BCP-MCNC: 2.7 G/DL (ref 3.5–5)
ALP SERPL-CCNC: 51 U/L (ref 46–116)
ALT SERPL W P-5'-P-CCNC: 17 U/L (ref 12–78)
ANION GAP SERPL CALCULATED.3IONS-SCNC: 0 MMOL/L (ref 4–13)
AST SERPL W P-5'-P-CCNC: 32 U/L (ref 5–45)
BILIRUB SERPL-MCNC: 0.57 MG/DL (ref 0.2–1)
BUN SERPL-MCNC: 21 MG/DL (ref 5–25)
CALCIUM ALBUM COR SERPL-MCNC: 8.9 MG/DL (ref 8.3–10.1)
CALCIUM SERPL-MCNC: 7.9 MG/DL (ref 8.3–10.1)
CHLORIDE SERPL-SCNC: 107 MMOL/L (ref 96–108)
CO2 SERPL-SCNC: 28 MMOL/L (ref 21–32)
CREAT SERPL-MCNC: 0.73 MG/DL (ref 0.6–1.3)
GFR SERPL CREATININE-BSD FRML MDRD: 93 ML/MIN/1.73SQ M
GLUCOSE SERPL-MCNC: 116 MG/DL (ref 65–140)
GLUCOSE SERPL-MCNC: 125 MG/DL (ref 65–140)
GLUCOSE SERPL-MCNC: 130 MG/DL (ref 65–140)
GLUCOSE SERPL-MCNC: 133 MG/DL (ref 65–140)
GLUCOSE SERPL-MCNC: 134 MG/DL (ref 65–140)
GLUCOSE SERPL-MCNC: 142 MG/DL (ref 65–140)
GLUCOSE SERPL-MCNC: 142 MG/DL (ref 65–140)
GLUCOSE SERPL-MCNC: 152 MG/DL (ref 65–140)
GLUCOSE SERPL-MCNC: 155 MG/DL (ref 65–140)
GLUCOSE SERPL-MCNC: 159 MG/DL (ref 65–140)
GLUCOSE SERPL-MCNC: 165 MG/DL (ref 65–140)
GLUCOSE SERPL-MCNC: 194 MG/DL (ref 65–140)
GLUCOSE SERPL-MCNC: 202 MG/DL (ref 65–140)
INR PPP: 1.13 (ref 0.84–1.19)
MAGNESIUM SERPL-MCNC: 2.5 MG/DL (ref 1.6–2.6)
PHOSPHATE SERPL-MCNC: 2 MG/DL (ref 2.3–4.1)
POTASSIUM SERPL-SCNC: 4.3 MMOL/L (ref 3.5–5.3)
PROT SERPL-MCNC: 5.2 G/DL (ref 6.4–8.4)
PROTHROMBIN TIME: 14.7 SECONDS (ref 11.6–14.5)
SODIUM SERPL-SCNC: 135 MMOL/L (ref 135–147)

## 2023-03-23 RX ORDER — INSULIN LISPRO 100 [IU]/ML
1-5 INJECTION, SOLUTION INTRAVENOUS; SUBCUTANEOUS
Status: DISCONTINUED | OUTPATIENT
Start: 2023-03-24 | End: 2023-03-27 | Stop reason: HOSPADM

## 2023-03-23 RX ORDER — LISINOPRIL 10 MG/1
10 TABLET ORAL DAILY
Status: DISCONTINUED | OUTPATIENT
Start: 2023-03-23 | End: 2023-03-27 | Stop reason: HOSPADM

## 2023-03-23 RX ORDER — LANOLIN ALCOHOL/MO/W.PET/CERES
3 CREAM (GRAM) TOPICAL
Status: DISCONTINUED | OUTPATIENT
Start: 2023-03-23 | End: 2023-03-24

## 2023-03-23 RX ORDER — INSULIN GLARGINE 100 [IU]/ML
15 INJECTION, SOLUTION SUBCUTANEOUS
Status: DISCONTINUED | OUTPATIENT
Start: 2023-03-23 | End: 2023-03-27 | Stop reason: HOSPADM

## 2023-03-23 RX ORDER — WARFARIN SODIUM 5 MG/1
5 TABLET ORAL
Status: COMPLETED | OUTPATIENT
Start: 2023-03-23 | End: 2023-03-23

## 2023-03-23 RX ADMIN — ACETAMINOPHEN 975 MG: 325 TABLET ORAL at 05:14

## 2023-03-23 RX ADMIN — AMIODARONE HYDROCHLORIDE 200 MG: 200 TABLET ORAL at 21:00

## 2023-03-23 RX ADMIN — LISINOPRIL 10 MG: 10 TABLET ORAL at 10:52

## 2023-03-23 RX ADMIN — FINASTERIDE 5 MG: 5 TABLET, FILM COATED ORAL at 08:14

## 2023-03-23 RX ADMIN — ACETAMINOPHEN 975 MG: 325 TABLET ORAL at 20:31

## 2023-03-23 RX ADMIN — CHLORHEXIDINE GLUCONATE 0.12% ORAL RINSE 15 ML: 1.2 LIQUID ORAL at 20:32

## 2023-03-23 RX ADMIN — METFORMIN HYDROCHLORIDE 500 MG: 500 TABLET ORAL at 16:16

## 2023-03-23 RX ADMIN — ASPIRIN 325 MG ORAL TABLET 325 MG: 325 PILL ORAL at 08:14

## 2023-03-23 RX ADMIN — FERROUS SULFATE TAB 325 MG (65 MG ELEMENTAL FE) 325 MG: 325 (65 FE) TAB at 08:14

## 2023-03-23 RX ADMIN — ATORVASTATIN CALCIUM 80 MG: 80 TABLET, FILM COATED ORAL at 18:05

## 2023-03-23 RX ADMIN — MELATONIN 6 MG: at 21:00

## 2023-03-23 RX ADMIN — SODIUM CHLORIDE 0.5 UNITS/HR: 9 INJECTION, SOLUTION INTRAVENOUS at 16:16

## 2023-03-23 RX ADMIN — DOCUSATE SODIUM 100 MG: 100 CAPSULE, LIQUID FILLED ORAL at 08:14

## 2023-03-23 RX ADMIN — TAMSULOSIN HYDROCHLORIDE 0.4 MG: 0.4 CAPSULE ORAL at 18:05

## 2023-03-23 RX ADMIN — POLYETHYLENE GLYCOL 3350 17 G: 17 POWDER, FOR SOLUTION ORAL at 08:14

## 2023-03-23 RX ADMIN — PANTOPRAZOLE SODIUM 40 MG: 40 TABLET, DELAYED RELEASE ORAL at 05:14

## 2023-03-23 RX ADMIN — ACETAMINOPHEN 975 MG: 325 TABLET ORAL at 13:35

## 2023-03-23 RX ADMIN — AMIODARONE HYDROCHLORIDE 200 MG: 200 TABLET ORAL at 13:35

## 2023-03-23 RX ADMIN — POTASSIUM CHLORIDE 20 MEQ: 1500 TABLET, EXTENDED RELEASE ORAL at 08:14

## 2023-03-23 RX ADMIN — Medication 12.5 MG: at 08:14

## 2023-03-23 RX ADMIN — AMIODARONE HYDROCHLORIDE 200 MG: 200 TABLET ORAL at 05:14

## 2023-03-23 RX ADMIN — FUROSEMIDE 40 MG: 10 INJECTION, SOLUTION INTRAMUSCULAR; INTRAVENOUS at 16:29

## 2023-03-23 RX ADMIN — MUPIROCIN 1 APPLICATION.: 20 OINTMENT TOPICAL at 20:31

## 2023-03-23 RX ADMIN — CHLORHEXIDINE GLUCONATE 0.12% ORAL RINSE 15 ML: 1.2 LIQUID ORAL at 08:14

## 2023-03-23 RX ADMIN — POTASSIUM CHLORIDE 20 MEQ: 1500 TABLET, EXTENDED RELEASE ORAL at 18:05

## 2023-03-23 RX ADMIN — WARFARIN SODIUM 5 MG: 5 TABLET ORAL at 18:05

## 2023-03-23 RX ADMIN — MUPIROCIN 1 APPLICATION.: 20 OINTMENT TOPICAL at 08:14

## 2023-03-23 RX ADMIN — OXYCODONE HYDROCHLORIDE 2.5 MG: 5 TABLET ORAL at 05:28

## 2023-03-23 RX ADMIN — MELATONIN 3 MG: at 21:00

## 2023-03-23 RX ADMIN — INSULIN GLARGINE 15 UNITS: 100 INJECTION, SOLUTION SUBCUTANEOUS at 22:08

## 2023-03-23 RX ADMIN — FUROSEMIDE 40 MG: 10 INJECTION, SOLUTION INTRAMUSCULAR; INTRAVENOUS at 08:14

## 2023-03-23 RX ADMIN — DOCUSATE SODIUM 100 MG: 100 CAPSULE, LIQUID FILLED ORAL at 18:05

## 2023-03-23 RX ADMIN — FONDAPARINUX SODIUM 2.5 MG: 2.5 INJECTION, SOLUTION SUBCUTANEOUS at 08:14

## 2023-03-23 NOTE — PROGRESS NOTES
Progress Note - Cardiothoracic Surgery   Hong [place] 81 y o  male MRN: [id number]  Unit/Bed#: [facility] 411-01 Encounter: [id number]     Mitral regurgitation  S/P mitral valve repair; POD # 2        24 Hour Events: No overnight events, NSR on tele, no further episodes of PSVT  Epicardial pacing wires removed yesterday  Remains on continuous insulin infusion 1 U/hr  Patient feeling well this AM, reports sternal incision site and shoulder pain  Admits to SOB since surgery but feels this is improving, pt using IS  Pt ambulating in the hallway with nursing staff with no difficulty  Reports no BM, but + flatus   Feels his appetite is returning and he is tolerating a normal diet      Medications:   Scheduled Meds:           Current Facility-Administered Medications   Medication Dose Route Frequency Provider Last Rate   • acetaminophen  650 mg Rectal Q4H PRN Doyne Mcardle, PA-C     • acetaminophen  650 mg Oral Q6H PRN Doyne Mcardle, PA-C     • acetamin[facility]  [facility]     • amiodarone  200 mg Oral [place], [place]     • aspirin  325 mg Oral Daily Doyne Mcardle, PA-C     • atorvastatin  80 mg Oral After [facility], PA-C     • bisacodyl  10 mg Rectal Daily PRN Doyne Mcardle, PA-C     • calcium carbonate  1,000 mg Oral BID PRN Doyne Mcardle, PA-C     • chlorhexidine  15 mL Swish & Spit Q12H [facility] [place], [place]     • docusate sodium  100 mg Oral BID Doyne Mcardle, PA-C     • ferrous sulfate  325 mg Oral Daily With Breakfast Doyne Mcardle, PA-C     • finasteride  5 mg Oral Daily Doyne Mcardle, PA-C     • fluticasone  1 spray Nasal PRN Doyne Mcardle, PA-C     • fondaparinux  2 5 mg Subcutaneous Daily Doyne Mcardle, PA-C     • furosemide  40 mg Intravenous BID (diuretic) Doyne Mcardle, PA-C     • glycerin-hypromellose-  1 drop Both Eyes Q4H PRN Mellissa Swann PA-C     • HYDROmorphone  0 5 mg Intravenous Q2H PRN Doyne Mcardle, PA-C     • insulin regular (HumuLIN R,NovoLIN R) infusion  0 3-21 Units/hr Intravenous Titrated Demaris Emilio, KEE 1 Units/hr (03/23/23 4290)   • melatonin  6 mg Oral HS Guilhermesulaimanantelmo Morris PA-C     • metoprolol tartrate  12 5 mg Oral Q12H [facility] Raudel Bradley PA-C     • mupirocin  1 application  Nasal Q12H [facility] [place] [facility], KEE     • niCARdipine  2 5-15 mg/hr Intravenous Titrated Demaris Emilio, KEE Stopped (03/22/23 6581)   • ondansetron  4 mg Intravenous Q6H PRN Demaris Emilio, KEE     • oxyCODONE  2 5 mg Oral Q4H PRN Demaris Emilio, KEE     • oxyCODONE  5 mg Oral Q4H PRN Demaris Emilio, KEE     • pantoprazole  40 mg Oral Early Morning Demaris EmilioKEE     • polyethylene glycol  17 g Oral Daily Demaris EmilioKEE     • potassium chloride  20 mEq Oral BID Demaris Emilio, KEE     • sodium chloride  20 mL/hr Intravenous Continuous Demaris EmilioKEE 20 mL/hr (03/21/23 2229)   • tamsulosin  0 4 mg Oral After Dinner Raudel Bradley PA-C        Continuous Infusions:insulin regular (HumuLIN R,NovoLIN R) infusion, 0 3-21 Units/hr, Last Rate: 1 Units/hr (03/23/23 0616)  niCARdipine, 2 5-15 mg/hr, Last Rate: Stopped (03/22/23 0718)  sodium chloride, 20 mL/hr, Last Rate: 20 mL/hr (03/21/23 2229)        PRN Meds: •  acetaminophen  •  acetaminophen  •  bisacodyl  •  calcium carbonate  •  fluticasone  •  glycerin-hypromellose-  •  HYDROmorphone  •  ondansetron  •  oxyCODONE  •  oxyCODONE     Vitals:   Vitals          Vitals:     03/22/23 2300 03/23/23 0300 03/23/23 0553 03/23/23 0719   BP: 115/63 116/63   133/69   BP Location: Right arm Right arm   Right arm   Pulse: 65 69   63   Resp: 18 18   18   Temp: 98 6 °F (37 °C) 98 °F (36 7 °C)   98 6 °F (37 °C)   TempSrc: Oral Oral   Oral   SpO2: 92% 92%   98%   Weight:     94 5 kg (208 lb 5 4 oz)     Height:                    Telemetry: NSR; Heart Rate: 73 bpm     Respiratory:   SpO2: SpO2: 98 %;  Room Air     Intake/Output:      Intake/Output Summary (Last 24 hours) at 3/23/2023 0750  Last data filed at 3/23/2023 0611      Gross per 24 hour   Intake 735 08 ml   Output 1915 ml   Net -1179 92 ml         Chest tube Output:     Mediastinal tubes: 90 mL/8 hours             175 mL/24 hours              Weights:        Weight (last 2 days)      Date/Time Weight     03/23/23 0553 94 5 (208 34)     03/22/23 0600 90 4 (199 3)     03/21/23 0602 93 (205)                Results:             Results from last 7 days   Lab Units 03/22/23  0406 03/21/23  1859 03/21/23  1124 03/21/23  1122 03/21/23  0946 03/21/23  0936   WBC Thousand/uL 17 29*  --   --   --   --   --    HEMOGLOBIN g/dL 14 1 13 2  --  13 9  --   --    I STAT HEMOGLOBIN g/dl  --   --  12 6  --    < >  --    HEMATOCRIT % 42 9 40 5  --  42 5  --   --    HEMATOCRIT, ISTAT %  --   --  37  --    < >  --    PLATELETS Thousands/uL 98*  --   --  76*  --  110*    < > = values in this interval not displayed                 Results from last 7 days   Lab Units 03/23/23  0512 03/22/23  0406 03/21/23  1859 03/21/23  1508 03/21/23  1124 03/21/23  1122   SODIUM mmol/L 135 138  --   --   --  142   POTASSIUM mmol/L 4 3 4 4 4 4   < >  --  3 9   CHLORIDE mmol/L 107 111*  --   --   --  116*   CO2 mmol/L 28 25  --   --   --  25   CO2, I-STAT mmol/L  --   --   --   --  26  --    BUN mg/dL 21 15  --   --   --  12   CREATININE mg/dL 0 73 0 85  --   --   --  0 76   GLUCOSE, ISTAT mg/dl  --   --   --   --  131  --    CALCIUM mg/dL 7 9* 7 6*  --   --   --  7 2*    < > = values in this interval not displayed             Results from last 7 days   Lab Units 03/22/23  0406 03/21/23  1122   INR   1 23* 1 48*   PTT seconds  --  36      Point of care glucose: 142-155     Coumadin:   3/23 - pending  3/22 - 2 5mg  3/21 - 2 5mg        Studies:  No new studies     Invasive Lines/Tubes:      Invasive Devices            Central Venous Catheter Line  Duration             CVC Central Lines 03/21/23 Triple 2 days                  Peripheral Intravenous Line  Duration             Peripheral IV 03/21/23 Left Hand 2 days                  Line  Duration             Pacer Wires 1 day     Pacer Wires 1 day                  Drain  Duration             Chest Tube 1 Mediastinal;Posterior 32 Fr  1 day     Chest Tube 2 Mediastinal;Anterior 32 Fr  1 day                    Physical Exam:     HEENT/NECK:  Normocephalic  Atraumatic  No jugular venous distention  Cardiac: Regular rate and rhythm and No murmurs/rubs/gallops  Pulmonary:  Crackles bilaterally  Abdomen:  Non-tender, Hypo-active bowel sounds and Distended  Incisions: Sternum is stable  Incision dressed with Acticoat  No erythema or drainage and Saphenectomy incision dressed with Acticoat  No erythema or drainage  Extremities: Extremities warm/dry  Neuro: Alert and oriented X 3 and No focal deficits  Skin: Warm/Dry, without rashes or lesions      Assessment:  Principal Problem:    S/P mitral valve repair  Active Problems:    Allergic rhinitis    BPH with urinary obstruction    Displacement of lumbar intervertebral disc without myelopathy    Diverticulosis of colon    Essential hypertension    Left ventricular hypertrophy    Mitral regurgitation    Hyperlipidemia    Idiopathic peripheral neuropathy    HUMBERTO treated with BiPAP    Excessive daytime sleepiness    Platelets decreased (HCC)    S/P left atrial appendage ligation                                                                    Mitral regurgitation  S/P mitral valve repair; POD # 2     Plan:     1  Cardiac:      NSR; HR/BP well-controlled  Continue Lopressor, 12 5mg PO BID, start Lisinopril 10mg daily               Continue amiodarone  Continue ASA and Statin therapy   Epicardial pacing wires have been removed  Maintain central IV access today  Continue DVT prophylaxis  INR pending, dose Coumadin based on INR result     2  Pulmonary:   Good Room air oxygen saturation; Continue incentive spirometry/Coughing/Deep breathing exercises  Chest tube drainage diminished; D/C today     3   Renal: Intake/Output net: -1,179 mL/24 hours  Diuretic Regimen:  Continue Lasix 40 mg IV BID  Continue Potassium Chloride 20 mEq PO BID  Post op Creatinine stable; Follow up labs prn     4  Neuro:  Neurologically intact; No active issues  Incisional pain well-controlled  Continue Tylenol, 975 mg PO q 8, standing dose  Continue Oxycodone, 2 5 to 5 mg PO q 4 hours prn pain     5  GI:  Tolerating TLC 2 3 gm sodium diet  Maintain 1800 mL daily fluid restriction   Continue stool softeners and prn suppository  Continue GI prophylaxis     6  Endo:   History of diet-controlled diabetes; Continuous insulin infusion at 0 5 U/hr as directed by endocrinology physician for post-operative hyperglycemia  Endo following and will reassess for transition of insulin infusion today  Patient did not require outpatient DM medications pre-op       7    Hematology:               Post-operative blood count acceptable; Trend prn               Pt received 2 5 of coumadin yesterday, dosing today pending INR     8  Disposition:      Following daily PT/OT recommendations regarding home vs  rehab when medically cleared for discharge  Routine postoperative recovery to this point;  Anticipated discharge date: 3/25     VTE Pharmacologic Prophylaxis: Fondaparinux (Arixtra)  VTE Mechanical Prophylaxis: sequential compression device     Collaborative rounds completed with supervising physician  Plan of care discussed with bedside nurse

## 2023-03-23 NOTE — PLAN OF CARE
Problem: MOBILITY - ADULT  Goal: Maintain or return to baseline ADL function  Description: INTERVENTIONS:  -  Assess patient's ability to carry out ADLs; assess patient's baseline for ADL function and identify physical deficits which impact ability to perform ADLs (bathing, care of mouth/teeth, toileting, grooming, dressing, etc )  - Assess/evaluate cause of self-care deficits   - Assess range of motion  - Assess patient's mobility; develop plan if impaired  - Assess patient's need for assistive devices and provide as appropriate  - Encourage maximum independence but intervene and supervise when necessary  - Involve family in performance of ADLs  - Assess for home care needs following discharge   - Consider OT consult to assist with ADL evaluation and planning for discharge  - Provide patient education as appropriate  Outcome: Progressing  Goal: Maintains/Returns to pre admission functional level  Description: INTERVENTIONS:  - Perform BMAT or MOVE assessment daily    - Set and communicate daily mobility goal to care team and patient/family/caregiver     - Collaborate with rehabilitation services on mobility goals if consulted  - Ambulate patient 3 times a day  - Out of bed for meals 3 times a day  - Out of bed for toileting  - Record patient progress and toleration of activity level   Outcome: Progressing

## 2023-03-23 NOTE — PLAN OF CARE
Problem: OCCUPATIONAL THERAPY ADULT  Goal: Performs self-care activities at highest level of function for planned discharge setting  See evaluation for individualized goals  Outcome: Adequate for Discharge  Note: Limitation: Decreased ADL status, Decreased endurance, Decreased high-level ADLs, Decreased self-care trans  Prognosis: Good  Assessment: Pt seen for OT treatment session w focus on cardiac education  Cardiac education packet reviewed with patient  Patient verbalized good understanding of education provided  Pt educated on the do's and the dont's following cardiac surgery such as no lifting over 10lbs, no driving for at least 4 weeks, no strenuous activities  Pt educated on importance of daily activities and mobilization  Pt educated on simple Energy Conservation Techniques, self pacing skills  Pt pleasant and cooperative and verbalized good understanding of education provided  Wife present for education session  Pt states she will be able to assist pt as needed  w self care  Will get a shower seat for home  At this time, anticipate dc to home when cleared   DC OT     OT Discharge Recommendation: No rehabilitation needs

## 2023-03-23 NOTE — CASE MANAGEMENT
Case Management Discharge Planning Note    Patient name Tala Al  Location [facility] 411/[facility] 357-65 MRN [id number]  : 1950 Date 3/23/2023       Current Admission Date: 3/21/2023  Current Admission Diagnosis:S/P mitral valve repair   Patient Active Problem List    Diagnosis Date Noted   • Anticoagulated on Coumadin 2023   • S/P mitral valve repair 2023   • S/P left atrial appendage ligation 2023   • FH: cerebral aneurysm 2023   • Obesity, morbid ([facility] ) 2023   • Drop in hemoglobin 2023   • Gastrointestinal hemorrhage associated with intestinal diverticulosis 2022   • Diverticulitis of colon with perforation 2022   • Blood in stool 2022   • Abnormal CBC 2022   • Atrial enlargement, left 2022   • History of COVID-19 2022   • Shortness of breath 2022   • Overweight with body mass index (BMI) of 27 to 27 9 in adult 2022   • Platelets decreased ([facility] ) 2021   • HUMBERTO treated with BiPAP 2020   • Excessive daytime sleepiness 2020   • Need for shingles vaccine 2019   • Colon polyp 2018   • Left ventricular hypertrophy 2018   • Mitral regurgitation 2018   • BPH with urinary obstruction 10/31/2016   • Calculus of gallbladder with cholecystitis 2015   • Displacement of lumbar intervertebral disc without myelopathy 2015   • Idiopathic peripheral neuropathy 2015   • Diverticulosis of colon 2015   • Indigestion 2014   • Herpes zoster 2014   • Allergic rhinitis 01/15/2013   • Male erectile disorder 01/15/2013   • Essential hypertension    • Metabolic syndrome    • Hyperlipidemia 01/15/2013      LOS (days): 2  Geometric Mean LOS (GMLOS) (days): 8 90  Days to GMLOS:6 5     OBJECTIVE:  Risk of Unplanned Readmission Score: 16 64         Current admission status: Inpatient   Preferred Pharmacy:   [facility] [address] [facility] [address]  Phone: [phone] Fax: [phone] [phone], [address] [facility] [address] [address]  Phone: [phone] Fax: [phone]    [facility] [address], [place] [address] [address]  Phone: [phone] Fax: [phone]    [facility] [place] [address] [address]  Phone: [phone] Fax: [address] , [address] [facility],  [facility] [address] ,  [address] [facility] [address] [facility]  Phone: [phone] Fax: [address] [address]  Phone: [phone] Fax: [phone]    Primary Care Provider: Ayah Parham MD    Primary Insurance: MEDICARE  Secondary Insurance: BLUE CROSS    DISCHARGE DETAILS:                                                                                        IMM Given (Date):: 03/23/23  IMM Given to[de-identified] Patient

## 2023-03-23 NOTE — RESPIRATORY THERAPY NOTE
03/22/23 2300   Respiratory Assessment   Resp Comments Patient refusing CPAP therapy for h s     Non-Invasive Information   SpO2 92 %

## 2023-03-23 NOTE — CASE MANAGEMENT
Case Management Discharge Planning Note    Patient name Lamont Melendrez  Location [facility] 411/[facility] 552-89 MRN [id number]  : 1950 Date 3/23/2023       Current Admission Date: 3/21/2023  Current Admission Diagnosis:S/P mitral valve repair   Patient Active Problem List    Diagnosis Date Noted   • Anticoagulated on Coumadin 2023   • S/P mitral valve repair 2023   • S/P left atrial appendage ligation 2023   • FH: cerebral aneurysm 2023   • Obesity, morbid ([facility] ) 2023   • Drop in hemoglobin 2023   • Gastrointestinal hemorrhage associated with intestinal diverticulosis 2022   • Diverticulitis of colon with perforation 2022   • Blood in stool 2022   • Abnormal CBC 2022   • Atrial enlargement, left 2022   • History of COVID-19 2022   • Shortness of breath 2022   • Overweight with body mass index (BMI) of 27 to 27 9 in adult 2022   • Platelets decreased ([facility] ) 2021   • HUMBERTO treated with BiPAP 2020   • Excessive daytime sleepiness 2020   • Need for shingles vaccine 2019   • Colon polyp 2018   • Left ventricular hypertrophy 2018   • Mitral regurgitation 2018   • BPH with urinary obstruction 10/31/2016   • Calculus of gallbladder with cholecystitis 2015   • Displacement of lumbar intervertebral disc without myelopathy 2015   • Idiopathic peripheral neuropathy 2015   • Diverticulosis of colon 2015   • Indigestion 2014   • Herpes zoster 2014   • Allergic rhinitis 01/15/2013   • Male erectile disorder 01/15/2013   • Essential hypertension    • Metabolic syndrome    • Hyperlipidemia 01/15/2013      LOS (days): 2  Geometric Mean LOS (GMLOS) (days): 8 90  Days to GMLOS:6 6     OBJECTIVE:  Risk of Unplanned Readmission Score: 15 92         Current admission status: Inpatient   Preferred Pharmacy:   [facility] [address] [place] [address]  Phone: [phone] Fax: [phone] [id number], [address] [facility] [address]  Critical access hospital [address] [address]  Phone: [phone] Fax: [phone]    [facility] [address], [place] [address] [address]  Phone: [phone] Fax: [phone]    [facility], [address] [facility] [place] [address] [address]  Phone: [phone] Fax: Rufino Bergh, [address] [facility],  [facility] [address] ,  [address] [facility] [address]   Phone: [phone] Fax: [phone] [facility] [place] [address] [facility] [address] [address]  Phone: [phone] Fax: [phone]    Primary Care Provider: Charity Haddad MD    Primary Insurance: MEDICARE  Secondary Insurance: BLUE CROSS    DISCHARGE DETAILS:    Discharge planning discussed with[de-identified] referral to [facility] but they are full, blanket referrals made  Other Referral/Resources/Interventions Provided:  Referral Comments: [facility] not accepting, blanket referrals sent

## 2023-03-23 NOTE — PROGRESS NOTES
"Progress Note - Néstor Snow 81 y o  male MRN: [id number]    Unit/Bed#: [facility] 411-01 Encounter: [id number]    CC: diabetes f/u    Subjective:   Néstor Snow is a 81y o  year old male with type 2 DM who presented for elective mitral valve repair and left atrial appendage ligation  He reports appetite has been stable, eating between half to 3/4 with each meal  He and his wife feel that he is eating more carbs inpatient than outpatient as he eats about 100g per day at home  Objective:     Vitals: Blood pressure 111/62, pulse 61, temperature 98 6 °F (37 °C), temperature source Oral, resp  rate 18, height 6' 1\" (1 854 m), weight 94 5 kg (208 lb 5 4 oz), SpO2 98 %  ,Body mass index is 27 49 kg/m²  Intake/Output Summary (Last 24 hours) at 3/23/2023 1339  Last data filed at 3/23/2023 1219  Gross per 24 hour   Intake 900 73 ml   Output 1840 ml   Net -939 27 ml       Physical Exam:  General Appearance: awake, appears stated age and cooperative  Head: Normocephalic, without obvious abnormality, atraumatic  Extremities: moves all extremities  Skin: Skin color and temperature normal    Pulm: no labored breathing    Lab, Imaging and other studies: I have personally reviewed pertinent reports  POC Glucose (mg/dl)   Date Value   03/23/2023 165 (H)   03/23/2023 159 (H)   03/23/2023 194 (H)   03/23/2023 155 (H)   03/23/2023 134   03/23/2023 133   03/23/2023 142 (H)   03/22/2023 139   03/22/2023 149 (H)   03/22/2023 157 (H)       Assessment and plan:  Type 2 DM  Most recent a1c 5 3  Inpatient: via insulin drip used about 31 units between 12pm-12pm  Switch to subcutaneous injections this evening with 15 units lantus  Start metformin 500mg BID while inpatient with goal of metformin only on discharge if blood sugars allow  Discontinue insulin infusion one hour after lantus is administered  Please prescribe glucometer, test strips, lancets on discharge   Recommended pt check twice per day before breakfast and before dinner " and followup with PCP as he may be able to decrease diabetes regimen over the next few weeks  Portions of the record may have been created with voice recognition software

## 2023-03-23 NOTE — APP STUDENT NOTE
ALBERTA STUDENT  Inpatient Progress Note for TRAINING ONLY  Not Part of Legal Medical Record       Progress Note - Cardiothoracic Surgery   Vladislav Jennings 81 y o  male MRN: [id number]  Unit/Bed#: [facility] 411-01 Encounter: [id number]    Mitral regurgitation  S/P mitral valve repair; POD # 2      24 Hour Events: No overnight events, NSR on tele, no further episodes of PSVT  Epicardial pacing wires removed yesterday  Remains on continuous insulin infusion 1 U/hr  Patient feeling well this AM, reports sternal incision site and shoulder pain  Admits to SOB since surgery but feels this is improving, pt using IS  Pt ambulating in the hallway with nursing staff with no difficulty  Reports no BM, but + flatus  Feels his appetite is returning and he is tolerating a normal diet      Medications:   Scheduled Meds:  Current Facility-Administered Medications   Medication Dose Route Frequency Provider Last Rate   • acetaminophen  650 mg Rectal Q4H PRN Shankar Haley PA-C     • acetaminophen  650 mg Oral Q6H PRN Shankar Haley PA-C     • acetaminophen  9[facility]     • amiodarone  200 mg Oral [place], [place]     • aspirin  325 mg Oral Daily Shankar Haley PA-C     • atorvastatin  80 mg Oral After [facility], PA-C     • bisacodyl  10 mg Rectal Daily PRN Shankar Haley PA-C     • calcium carbonate  1,000 mg Oral BID PRN Shankar Haley PA-C     • chlorhexidine  15 mL Swish & Spit Q12H [facility] [place], [place]     • docusate sodium  100 mg Oral BID Shankar Haley PA-C     • ferrous sulfate  325 mg Oral Daily With Breakfast Shankar Haley PA-C     • finasteride  5 mg Oral Daily Shankar Haley PA-C     • fluticasone  1 spray Nasal PRN Shankar Haley PA-C     • fondaparinux  2 5 mg Subcutaneous Daily Shankar Haley PA-C     • furosemide  40 mg Intravenous BID (diuretic) Shankar Haley PA-C     • glycerin-hypromellose-  1 drop Both Eyes Q4H PRN Mellissa Swann PA-C     • HYDROmorphone  0 5 mg Intravenous Q2H PRN Raymond Robertson PA-C     • insulin regular (HumuLIN R,NovoLIN R) infusion  0 3-21 Units/hr Intravenous Titrated Raymond Robertson PA-C 1 Units/hr (03/23/23 5655)   • melatonin  6 mg Oral HS Regis Carvajal PA-C     • metoprolol tartrate  12 5 mg Oral Q12H [address] Raudel Bradley PA-C     • mupirocin  1 application  Nasal Q12H [place] 76KEE     • niCARdipine  2 5-15 mg/hr Intravenous Titrated MARIA TERESA BlantonC Stopped (03/22/23 9241)   • ondansetron  4 mg Intravenous Q6H PRN Raymond Robertson PA-C     • oxyCODONE  2 5 mg Oral Q4H PRN Raymond Robertson PA-C     • oxyCODONE  5 mg Oral Q4H PRN Raymond Robertson PA-C     • pantoprazole  40 mg Oral Early Morning Raymond Robertson PA-C     • polyethylene glycol  17 g Oral Daily Raymond Robertson PA-C     • potassium chloride  20 mEq Oral BID Raymond Robertson PA-C     • sodium chloride  20 mL/hr Intravenous Continuous Raymond Robertson PA-C 20 mL/hr (03/21/23 2229)   • tamsulosin  0 4 mg Oral After Dinner Raudel Bradley PA-C       Continuous Infusions:insulin regular (HumuLIN R,NovoLIN R) infusion, 0 3-21 Units/hr, Last Rate: 1 Units/hr (03/23/23 0616)  niCARdipine, 2 5-15 mg/hr, Last Rate: Stopped (03/22/23 0718)  sodium chloride, 20 mL/hr, Last Rate: 20 mL/hr (03/21/23 2229)      PRN Meds: •  acetaminophen  •  acetaminophen  •  bisacodyl  •  calcium carbonate  •  fluticasone  •  glycerin-hypromellose-  •  HYDROmorphone  •  ondansetron  •  oxyCODONE  •  oxyCODONE    Vitals:   Vitals:    03/22/23 2300 03/23/23 0300 03/23/23 0553 03/23/23 0719   BP: 115/63 116/63  133/69   BP Location: Right arm Right arm  Right arm   Pulse: 65 69  63   Resp: 18 18  18   Temp: 98 6 °F (37 °C) 98 °F (36 7 °C)  98 6 °F (37 °C)   TempSrc: Oral Oral  Oral   SpO2: 92% 92%  98%   Weight:   94 5 kg (208 lb 5 4 oz)    Height:           Telemetry: NSR; Heart Rate: 73 bpm    Respiratory:   SpO2: SpO2: 98 %;  Room Air    Intake/Output:     Intake/Output Summary (Last 24 hours) at 3/23/2023 0750  Last data filed at 3/23/2023 6042  Gross per 24 hour   Intake 735 08 ml   Output 1915 ml   Net -1179 92 ml        Chest tube Output:    Mediastinal tubes: 90 mL/8 hours  175 mL/24 hours          Weights:   Weight (last 2 days)     Date/Time Weight    03/23/23 0553 94 5 (208 34)    03/22/23 0600 90 4 (199 3)    03/21/23 0602 93 (205)            Results:   Results from last 7 days   Lab Units 03/22/23  0406 03/21/23  1859 03/21/23  1124 03/21/23  1122 03/21/23  0946 03/21/23  0936   WBC Thousand/uL 17 29*  --   --   --   --   --    HEMOGLOBIN g/dL 14 1 13 2  --  13 9  --   --    I STAT HEMOGLOBIN g/dl  --   --  12 6  --    < >  --    HEMATOCRIT % 42 9 40 5  --  42 5  --   --    HEMATOCRIT, ISTAT %  --   --  37  --    < >  --    PLATELETS Thousands/uL 98*  --   --  76*  --  110*    < > = values in this interval not displayed  Results from last 7 days   Lab Units 03/23/23  0512 03/22/23  0406 03/21/23  1859 03/21/23  1508 03/21/23  1124 03/21/23  1122   SODIUM mmol/L 135 138  --   --   --  142   POTASSIUM mmol/L 4 3 4 4 4 4   < >  --  3 9   CHLORIDE mmol/L 107 111*  --   --   --  116*   CO2 mmol/L 28 25  --   --   --  25   CO2, I-STAT mmol/L  --   --   --   --  26  --    BUN mg/dL 21 15  --   --   --  12   CREATININE mg/dL 0 73 0 85  --   --   --  0 76   GLUCOSE, ISTAT mg/dl  --   --   --   --  131  --    CALCIUM mg/dL 7 9* 7 6*  --   --   --  7 2*    < > = values in this interval not displayed       Results from last 7 days   Lab Units 03/22/23  0406 03/21/23  1122   INR  1 23* 1 48*   PTT seconds  --  36     Point of care glucose: 142-155    Coumadin:   3/23 - pending  3/22 - 1 23  3/21 - 1 48      Studies:  No new studies    Invasive Lines/Tubes:  Invasive Devices     Central Venous Catheter Line  Duration           CVC Central Lines 03/21/23 Triple 2 days          Peripheral Intravenous Line  Duration           Peripheral IV 03/21/23 Left Hand 2 days          Line Duration           Pacer Wires 1 day    Pacer Wires 1 day          Drain  Duration           Chest Tube 1 Mediastinal;Posterior 32 Fr  1 day    Chest Tube 2 Mediastinal;Anterior 32 Fr  1 day                Physical Exam:    HEENT/NECK:  Normocephalic  Atraumatic  No jugular venous distention  Cardiac: Regular rate and rhythm and No murmurs/rubs/gallops  Pulmonary:  Crackles bilaterally  Abdomen:  Non-tender, Hypo-active bowel sounds and Distended  Incisions: Sternum is stable  Incision dressed with Acticoat  No erythema or drainage and Saphenectomy incision dressed with Acticoat  No erythema or drainage  Extremities: Extremities warm/dry  Neuro: Alert and oriented X 3 and No focal deficits  Skin: Warm/Dry, without rashes or lesions  Assessment:  Principal Problem:    S/P mitral valve repair  Active Problems:    Allergic rhinitis    BPH with urinary obstruction    Displacement of lumbar intervertebral disc without myelopathy    Diverticulosis of colon    Essential hypertension    Left ventricular hypertrophy    Mitral regurgitation    Hyperlipidemia    Idiopathic peripheral neuropathy    HUMBERTO treated with BiPAP    Excessive daytime sleepiness    Platelets decreased (HCC)    S/P left atrial appendage ligation            Mitral regurgitation  S/P mitral valve repair; POD # 2    Plan:    1  Cardiac:     NSR; HR/BP well-controlled  Continue Lopressor, 12 5mg PO BID      Continue amiodarone  Continue ASA and Statin therapy    Epicardial pacing wires have been removed  Maintain central IV access today  Continue DVT prophylaxis    2  Pulmonary:   Good Room air oxygen saturation; Continue incentive spirometry/Coughing/Deep breathing exercises  Chest tube drainage diminished; D/C today    3  Renal:   Intake/Output net: -1,179 mL/24 hours  Diuretic Regimen:  Continue Lasix 40 mg IV BID  Continue Potassium Chloride 20 mEq PO BID  Post op Creatinine stable; Follow up labs prn    4  Neuro:  Neurologically intact;  No active issues  Incisional pain well-controlled  Continue Tylenol, 975 mg PO q 8, standing dose  Continue Oxycodone, 2 5 to 5 mg PO q 4 hours prn pain    5  GI:  Tolerating TLC 2 3 gm sodium diet  Maintain 1800 mL daily fluid restriction   Continue stool softeners and prn suppository  Continue GI prophylaxis    6  Endo:   History of diet-controlled diabetes; Continuous insulin infusion at 0 5 U/hr as directed by endocrinology physician for post-operative hyperglycemia  Endo following and will reassess for transition of insulin infusion today  Patient did not require outpatient DM medications pre-op  7    Hematology:    Post-operative blood count acceptable; Trend prn    Pt received 2 5 of coumadin yesterday, dosing today pending INR    8  Disposition:      Following daily PT/OT recommendations regarding home vs  rehab when medically cleared for discharge  Routine postoperative recovery to this point;  Anticipated discharge date: 3/25    VTE Pharmacologic Prophylaxis: Fondaparinux (Arixtra)  VTE Mechanical Prophylaxis: sequential compression device    Collaborative rounds completed with supervising physician  Plan of care discussed with bedside nurse    SIGNATURE: Christian Ann  DATE: March 23, 2023  TIME: 7:50 AM

## 2023-03-23 NOTE — RESTORATIVE TECHNICIAN NOTE
Restorative Technician Note      Patient Name: Conchis Boyd     Restorative Tech Visit Date: 03/23/23  Note Type: Mobility  Patient Position Upon Consult: Bedside chair  Activity Performed: Ambulated  Assistive Device: Roller walker  Patient Position at End of Consult: All needs within reach;  Bedside chair

## 2023-03-23 NOTE — OCCUPATIONAL THERAPY NOTE
Occupational Therapy Treatment Note      [facility]    3/23/2023    Principal Problem:    S/P mitral valve repair  Active Problems:    BPH with urinary obstruction    Essential hypertension    Left ventricular hypertrophy    Mitral regurgitation    Hyperlipidemia    Idiopathic peripheral neuropathy    HUMBERTO treated with BiPAP    Excessive daytime sleepiness    Platelets decreased (HCC)    S/P left atrial appendage ligation    Anticoagulated on Coumadin      Past Medical History:   Diagnosis Date    Acute bilateral low back pain without sciatica 03/09/2021    BMI 33 0-33 9,adult 02/04/2020    BPH (benign prostatic hyperplasia)     COVID-19     Diabetes mellitus associated with [facility] etiology [facility] ) 03/05/2019    Diverticulitis     ED (erectile dysfunction)     Elevated PSA 01/15/2013    Encounter for well adult exam with abnormal findings 02/04/2019    Herpes zoster 12/18/2014    Hyperlipidemia     Hypertension     IFG (impaired fasting glucose)     Metabolic syndrome     Obesity (BMI 30 0-34 9) 02/04/2019    Psoriasis     Seasonal allergic rhinitis     Type 2 diabetes mellitus ([facility] ) 11/17/2014       Past Surgical History:   Procedure Laterality Date    CARDIAC CATHETERIZATION N/A 03/03/2023    Procedure: Cardiac Coronary Angiogram;  Surgeon: Silvestre Britt DO;  Location: [facility] CARDIAC CATH LAB;   Service: Cardiology    CHOLECYSTECTOMY  05/14/2015    COLONOSCOPY  2007    COLONOSCOPY  06/23/2021    HERNIA REPAIR  05/14/2015    FL REPLACEMENT MITRAL VALVE W/CARDIOPULMONARY BYP N/A 3/21/2023    Procedure: MITRAL VALVE REPAIR with Medtronic 32 mm CG Future Annuloplasty Ring; LIGATION OF Left ATRIAL APPENDAGE with 45 mm AtriClip; TANG;  Surgeon: Ochoa Xavier DO;  Location: [facility] MAIN OR;  Service: Cardiac Surgery        03/23/23 1259   OT Last Visit   OT Visit Date 03/23/23   Pain Assessment   Pain Assessment Tool 0-10   Pain Score No Pain   Restrictions/Precautions   Other Precautions Cardiac/sternal   Lifestyle "  Autonomy fully independent w self care, mobility, home management etc   Reciprocal Relationships supportive family  spouse works in real estate but can help as needed  Son in from [place] and is staying to assist as needed \"for the duration\"   Cognition   Overall Cognitive Status WFL   Arousal/Participation Alert; Cooperative   Attention Within functional limits   Orientation Level Oriented X4   Memory Within functional limits   Following Commands Follows all commands and directions without difficulty   Assessment   Assessment Pt seen for OT treatment session w focus on cardiac education  Cardiac education packet reviewed with patient  Patient verbalized good understanding of education provided  Pt educated on the do's and the dont's following cardiac surgery such as no lifting over 10lbs, no driving for at least 4 weeks, no strenuous activities  Pt educated on importance of daily activities and mobilization  Pt educated on simple Energy Conservation Techniques, self pacing skills  Pt pleasant and cooperative and verbalized good understanding of education provided  Wife present for education session  Pt states she will be able to assist pt as needed  w self care  Will get a shower seat for home  At this time, anticipate dc to home when cleared  DC OT   Plan   Treatment Interventions Cardiac education; Energy conservation; Activityengagement   Goal Expiration Date 03/29/23   OT Treatment Day 1   OT Frequency   (DC OT)   Recommendation   OT Discharge Recommendation No rehabilitation needs   Additional Comments  outpt cardiac rehab as per protocol   AM-PAC Daily Activity Inpatient   Lower Body Dressing 3   Bathing 3   Toileting 4   Upper Body Dressing 4   Grooming 4   Eating 4   Daily Activity Raw Score 22   Daily Activity Standardized Score (Calc for Raw Score >=11) 47 1       "

## 2023-03-23 NOTE — CASE MANAGEMENT
Case Management Assessment & Discharge Planning Note    Patient name Memory Davide  Location [facility] 411/[facility]HP 267-30 MRN [id number]  : 1950 Date 3/23/2023       Current Admission Date: 3/21/2023  Current Admission Diagnosis:S/P mitral valve repair   Patient Active Problem List    Diagnosis Date Noted   • Anticoagulated on Coumadin 2023   • S/P mitral valve repair 2023   • S/P left atrial appendage ligation 2023   • FH: cerebral aneurysm 2023   • Obesity, morbid ([address] ) 2023   • Drop in hemoglobin 2023   • Gastrointestinal hemorrhage associated with intestinal diverticulosis 2022   • Diverticulitis of colon with perforation 2022   • Blood in stool 2022   • Abnormal CBC 2022   • Atrial enlargement, left 2022   • History of COVID-19 2022   • Shortness of breath 2022   • Overweight with body mass index (BMI) of 27 to 27 9 in adult 2022   • Platelets decreased ([address] ) 2021   • HUMBERTO treated with BiPAP 2020   • Excessive daytime sleepiness 2020   • Need for shingles vaccine 2019   • Colon polyp 2018   • Left ventricular hypertrophy 2018   • Mitral regurgitation 2018   • BPH with urinary obstruction 10/31/2016   • Calculus of gallbladder with cholecystitis 2015   • Displacement of lumbar intervertebral disc without myelopathy 2015   • Idiopathic peripheral neuropathy 2015   • Diverticulosis of colon 2015   • Indigestion 2014   • Herpes zoster 2014   • Allergic rhinitis 01/15/2013   • Male erectile disorder 01/15/2013   • Essential hypertension    • Metabolic syndrome    • Hyperlipidemia 01/15/2013      LOS (days): 2  Geometric Mean LOS (GMLOS) (days): 8 90  Days to GMLOS:6 7     OBJECTIVE:    Risk of Unplanned Readmission Score: 15 92         Current admission status: Inpatient       Preferred Pharmacy:   [facility]  - [place], [address] [facility]  [address]  Phone: [phone] Fax: [phone] #[phone] [place], [address]  [facility] [address]  Phone: [phone] Fax: [phone]    ISABEL BLUE [facility] [address], [address]  [address] [address]  Phone: [phone] Fax: [phone]    [facility] [place], [address]  [address] [address]  Phone: [phone] Fax: [address] , [address],  [address] ,  [facility] [address]  Phone: [phone] Fax: [address] [place], [place] [address] [facility] [address] [address]  Phone: [phone] Fax: [phone]    Primary Care Provider: Salty Ross MD    Primary Insurance: MEDICARE  Secondary Insurance: BLUE CROSS    ASSESSMENT:  Isabel 26 Proxies    There are no active Health Care Proxies on file  Advance Directives  Does patient have a Health Care POA?: Yes  Does patient have Advance Directives?: Yes  Advance Directives: Living will, Power of  for health care  Primary Contact: wife Joann Turner         Readmission Root Cause  30 Day Readmission: No    Patient Information  Admitted from[de-identified] Home  Mental Status: Alert  During Assessment patient was accompanied by: Spouse  Assessment information provided by[de-identified] Patient, Spouse  Primary Caregiver: Self  Support Systems: Spouse/significant other  [place] of Residence: [address] [facility] do you live in?: FLEX/ Gordo Winston entry access options   Select all that apply : Stairs  Number of steps to enter home : 2  Do the steps have railings?: Yes  Type of Current Residence: [address] [place] home  Upon entering residence, is there a bedroom on the main floor (no further steps)?: No  A bedroom is located on the following floor levels of residence (select all that apply):: 2nd Floor, 3rd Floor  Upon entering residence, is there a bathroom on the main floor (no further steps)?: No  Indicate which floors of current residence have a bathroom (select all the apply):: 2nd Floor, 3rd Floor  Number of steps to 2nd floor from main floor: One Flight  Number of steps to 3rd floor from main floor: Two Flights  In the last 12 months, was there a time when you were not able to pay the mortgage or rent on time?: No  In the last 12 months, how many places have you lived?: 1  In the last 12 months, was there a time when you did not have a steady place to sleep or slept in a shelter (including now)?: No  Homeless/housing insecurity resource given?: N/A  Living Arrangements: Lives w/ Spouse/significant other  Is patient a ?: No    Activities of Daily Living Prior to Admission  Functional Status: Independent  Completes ADLs independently?: Yes  Ambulates independently?: Yes  Does patient use assisted devices?: Yes  Assisted Devices (DME) used: CPAP  DME Company Name (respiratory supplies):  [employer]  Does patient currently own DME?: No  Does patient have a history of Outpatient Therapy (PT/OT)?: No  Does the patient have a history of Short-Term Rehab?: No  Does patient have a history of HHC?: No  Does patient currently have [facility]?: No         Patient Information Continued  Income Source: Employed  Does patient have prescription coverage?: Yes  Within the past 12 months, you worried that your food would run out before you got the money to buy more : Never true  Within the past 12 months, the food you bought just didn't last and you didn't have money to get more : Never true  Food insecurity resource given?: N/A  Does patient receive dialysis treatments?: No  Does patient have a history of substance abuse?: No  Does patient have a history of Mental Health Diagnosis?: No         Means of Transportation  Means of Transport to Appts[de-identified] Drives Self  In the past 12 months, has lack of transportation kept you from medical appointments or from getting medications?: No  In the past 12 months, has lack of transportation kept you from meetings, work, or from getting things needed for daily living?: No  Was application for public transport provided?: N/A        DISCHARGE DETAILS:    Discharge planning discussed with[de-identified] pt and wife--discussed HHC, no definite request but would accept [facility] if possible  Freedom of Choice: Yes     CM contacted family/caregiver?: Yes  Were Treatment Team discharge recommendations reviewed with patient/caregiver?: Yes  Did patient/caregiver verbalize understanding of patient care needs?: Yes  Were patient/caregiver advised of the risks associated with not following Treatment Team discharge recommendations?: Yes    Contacts  Patient Contacts: wife Jl Virk  Relationship to Patient[de-identified] Family  Contact Method: Phone  Phone Number: [phone]  Reason/Outcome: Continuity of Care, Emergency Contact, Discharge Planning, Referral    Requested [address]         Is the patient interested in [facility] at discharge?: Yes  [address] requested[de-identified] [address] [facility] Provider[de-identified] PCP  Home Health Services Needed[de-identified] Post-Op Care and Assessment, Other (comment) (BPH, Coumadin)  Homebound Criteria Met[de-identified] Requires the Assistance of Another Person for Safe Ambulation or to Leave the Home  Supporting Clincal Findings[de-identified] Limited Endurance, Fatigues Easliy in [employer]    DME Referral Provided  Referral made for DME?: No    Other Referral/Resources/Interventions Provided:  Referral Comments: Wife would choose [facility], or others if necessary           Treatment Team Recommendation: Home with [address]  Discharge Destination Plan[de-identified] Home with [place] at Discharge : Family                                   Family notified[de-identified] wife here  Additional Comments: 71yo male is s/p MVR POD#2  Doing well  Chest tubes out, Insulin gtt, [email or web address]   Plan home with wife transporting and Sandy Frazier pending

## 2023-03-23 NOTE — PROGRESS NOTES
03/23/23    Procedure: Chest tube removal    Chest tubes removed in routine fashion without incident  Insertion site dressed with Acticoat  Balbir Mahendra tolerated the procedure well  Nurse notified      SIGNATURE: Kris Bustos  DATE: March 23, 2023  TIME: 11:16 AM

## 2023-03-23 NOTE — PROGRESS NOTES
"Cardiology Progress Note - Team 1  Nicolas Licea 81 y o  male MRN: [id number]  Unit/Bed#: [facility] 36-80 Encounter: [id number]      Assessment/Plan:  1  s/p MV repair + MARGARET ligation  - MV repair with 32 mm Medtronic CG Future mitral annuloplasty ring and folding plasty of P1 to P2 and P2 to P3, MARGARET with 45 mm AtriClip device (POD #2)  - intraop TANG LVEF 60%  - preop [facility] (3/3/2023) - no obstructive coronary artery disease identified  - telemetry review - NSR, HR 60s, no significant events noted  - EPW/CT removed without issue  - net -1 7 L / 24 hours - continue diuresis with Lasix 40 mg IV twice daily with K+ supplementation for postoperative volume overload  - continue amiodarone 200 mg 3 times daily, aspirin 325 mg daily, atorvastatin 80 mg daily, Lopressor 12 5 mg twice daily  - continue Coumadin for systemic anticoagulation for goal INR 2-3 - a m  INR pending  - encourage ambulation/IS use  - daily weights, strict I's and O's, salt/fluid restriction     2  Hypertension  - last documented /62; stable  - outpatient antihypertensive regimen - amlodipine 5 mg daily, lisinopril 20 mg daily  - continue Lopressor     3  Hyperlipidemia  - lipid panel (2/28/2023) - TC 88/triglycerides 29/HDL 49/LDL 33  - continue high intensity statin     4  Type 2 DM  - last hemoglobin A1c 5 3  - remains on insulin gtt  - endocrinology following - hopeful to wean insulin drip to off today     5  HUMBERTO  - on CPAP     6  Hx of GI bleed  - secondary to diverticulitis of colon with perforation (12/2022)     Subjective:   Patient seen and examined  No significant events overnight  Patient feels well this morning  Endorses a poor appetite  Has not had a bowel movement yet, but feels \" movement  \"  Not necessarily passing gas  Denies any chest pain  Objective:   Vitals: Blood pressure 111/62, pulse 61, temperature 98 6 °F (37 °C), temperature source Oral, resp  rate 18, height 6' 1\" (1 854 m), weight 94 5 kg (208 lb 5 4 oz), SpO2 98 %  , " Body mass index is 27 49 kg/m² ,   Orthostatic Blood Pressures    Flowsheet Row Most Recent Value   Blood Pressure 111/62 filed at 03/23/2023 1050   Patient Position - Orthostatic VS Sitting filed at 03/23/2023 0719          Intake/Output Summary (Last 24 hours) at 3/23/2023 1109  Last data filed at 3/23/2023 0900  Gross per 24 hour   Intake 900 73 ml   Output 2540 ml   Net -1639 27 ml     Physical Exam  Constitutional:       Appearance: Normal appearance  HENT:      Head: Normocephalic and atraumatic  Nose: Nose normal       Mouth/Throat:      Mouth: Mucous membranes are moist       Pharynx: Oropharynx is clear  Eyes:      Pupils: Pupils are equal, round, and reactive to light  Cardiovascular:      Rate and Rhythm: Normal rate and regular rhythm  Pulses: Normal pulses  Heart sounds: Normal heart sounds  Pulmonary:      Effort: Pulmonary effort is normal  No respiratory distress  Breath sounds: Normal breath sounds  Comments: Diminished breath sounds bilateral bases; on RA  Abdominal:      General: There is distension  Palpations: Abdomen is soft  Musculoskeletal:         General: Normal range of motion  Cervical back: Normal range of motion  Right lower leg: No edema  Left lower leg: No edema  Skin:     General: Skin is warm and dry  Capillary Refill: Capillary refill takes less than 2 seconds  Comments: Midsternal incision dressed with Acticoat; stable   Neurological:      General: No focal deficit present  Mental Status: He is alert and oriented to person, place, and time     Psychiatric:         Mood and Affect: Mood normal          Behavior: Behavior normal      Medications:    Current Facility-Administered Medications:   •  acetaminophen (TYLENOL) rectal suppository 650 mg, 650 mg, Rectal, Q4H PRN, Carmina Blair, PA-C  •  acetaminophen (TYLENOL) tablet 650 mg, 650 mg, Oral, Q6H PRN, Carmina Riveras, PA-C  •  acetaminophen (TYLENOL) tablet 975 mg, 975 mg, Oral, Q8H, Raudel Bradley, KEE, 975 mg at 03/23/23 9457  •  amiodarone tablet 200 mg, 200 mg, Oral, Q8H [address], CHET Ponce-C, 200 mg at 03/23/23 9070  •  aspirin tablet 325 mg, 325 mg, Oral, Daily, KEE Ponce, 325 mg at 03/23/23 8038  •  atorvastatin (LIPITOR) tablet 80 mg, 80 mg, Oral, After [employer], KEE, 80 mg at 03/22/23 1700  •  bisacodyl (DULCOLAX) rectal suppository 10 mg, 10 mg, Rectal, Daily PRN, KEE Ponce  •  calcium carbonate (TUMS) chewable tablet 1,000 mg, 1,000 mg, Oral, BID PRN, KEE Ponce  •  chlorhexidine (PERIDEX) 0 12 % oral rinse 15 mL, 15 mL, Swish & Spit, Q12H [address], MARIA TERESA PonceC, 15 mL at 03/23/23 6077  •  docusate sodium (COLACE) capsule 100 mg, 100 mg, Oral, BID, EKE Ponce, 100 mg at 03/23/23 0347  •  ferrous sulfate tablet 325 mg, 325 mg, Oral, Daily With Breakfast, KEE Ponce, 325 mg at 03/23/23 1255  •  finasteride (PROSCAR) tablet 5 mg, 5 mg, Oral, Daily, KEE Ponce, 5 mg at 03/23/23 6164  •  fluticasone (FLONASE) 50 mcg/act nasal spray 1 spray, 1 spray, Nasal, PRN, KEE Ponce  •  fondaparinux (ARIXTRA) subcutaneous injection 2 5 mg, 2 5 mg, Subcutaneous, Daily, KEE Ponce, 2 5 mg at 03/23/23 0038  •  furosemide (LASIX) injection 40 mg, 40 mg, Intravenous, BID (diuretic), KEE Ponce, 40 mg at 03/23/23 0734  •  glycerin-hypromellose- (ARTIFICIAL TEARS) ophthalmic solution 1 drop, 1 drop, Both Eyes, Q4H PRN, Mellissa Swann PA-C, 1 drop at 03/22/23 1809  •  insulin regular (HumuLIN R,NovoLIN R) 1 Units/mL in sodium chloride 0 9 % 100 mL infusion, 0 3-21 Units/hr, Intravenous, Titrated, KEE Ponce, Last Rate: 1 mL/hr at 03/23/23 1022, 1 Units/hr at 03/23/23 1022  •  lisinopril (ZESTRIL) tablet 10 mg, 10 mg, Oral, Daily, Daylin Hernandez PA-C, 10 mg at 03/23/23 1052  •  melatonin tablet 3 mg, 3 mg, Oral, HS, [place], PA-C  • melatonin tablet 6 mg, 6 mg, Oral, HS, Ashley Gross PA-C, 6 mg at 03/22/23 2104  •  metoprolol tartrate (LOPRESSOR) partial tablet 12 5 mg, 12 5 mg, Oral, Q12H [facility], Raudel Bradley PA-C, 12 5 mg at 03/23/23 3449  •  mupirocin (BACTROBAN) 2 % nasal ointment 1 application  , 1 application  , Nasal, Q12H [facility], Raudel Bradley PA-C, 1 application  at 03/23/23 0814  •  ondansetron (ZOFRAN) injection 4 mg, 4 mg, Intravenous, Q6H PRN, Nathaniel Pereira PA-C  •  oxyCODONE (ROXICODONE) IR tablet 2 5 mg, 2 5 mg, Oral, Q4H PRN, Nathaniel Pereira PA-C, 2 5 mg at 03/23/23 4753  •  oxyCODONE (ROXICODONE) IR tablet 5 mg, 5 mg, Oral, Q4H PRN, Nathaniel Pereira PA-C, 5 mg at 03/22/23 0146  •  pantoprazole (PROTONIX) EC tablet 40 mg, 40 mg, Oral, Early Morning, Raudel Bradley PA-C, 40 mg at 03/23/23 5773  •  polyethylene glycol (MIRALAX) packet 17 g, 17 g, Oral, Daily, Nathaniel Pereira PA-C, 17 g at 03/23/23 0823  •  potassium chloride (K-DUR,KLOR-CON) CR tablet 20 mEq, 20 mEq, Oral, BID, Nathaniel Pereira PA-C, 20 mEq at 03/23/23 2972  •  sodium chloride infusion 0 45 %, 20 mL/hr, Intravenous, Continuous, Nathaniel Pereira PA-C, Last Rate: 20 mL/hr at 03/21/23 2229, 20 mL/hr at 03/21/23 2229  •  tamsulosin (FLOMAX) capsule 0 4 mg, 0 4 mg, Oral, After Dinner, Nathaniel Pereira PA-C, 0 4 mg at 03/22/23 1700     Labs & Results:      Results from last 7 days   Lab Units 03/22/23  0406 03/21/23  1859 03/21/23  1124 03/21/23  1122 03/21/23  0946 03/21/23  0936   WBC Thousand/uL 17 29*  --   --   --   --   --    HEMOGLOBIN g/dL 14 1 13 2  --  13 9  --   --    I STAT HEMOGLOBIN g/dl  --   --  12 6  --    < >  --    HEMATOCRIT % 42 9 40 5  --  42 5  --   --    HEMATOCRIT, ISTAT %  --   --  37  --    < >  --    PLATELETS Thousands/uL 98*  --   --  76*  --  110*    < > = values in this interval not displayed           Results from last 7 days   Lab Units 03/23/23  0512 03/22/23  0406 03/21/23  1859 03/21/23  1508 03/21/23  1124 03/21/23  1122 03/21/23  1059 03/21/23  0946   POTASSIUM mmol/L 4 3 4 4 4 4   < >  --  3 9  --   --    CHLORIDE mmol/L 107 111*  --   --   --  116*  --   --    CO2 mmol/L 28 25  --   --   --  25  --   --    CO2, I-STAT mmol/L  --   --   --   --  26  --  26 32   BUN mg/dL 21 15  --   --   --  12  --   --    CREATININE mg/dL 0 73 0 85  --   --   --  0 76  --   --    CALCIUM mg/dL 7 9* 7 6*  --   --   --  7 2*  --   --    ALK PHOS U/L 51  --   --   --   --   --   --   --    ALT U/L 17  --   --   --   --   --   --   --    AST U/L 32  --   --   --   --   --   --   --    GLUCOSE, ISTAT mg/dl  --   --   --   --  131  --  141* 191*    < > = values in this interval not displayed       Results from last 7 days   Lab Units 03/22/23  0406 03/21/23  1122   INR  1 23* 1 48*   PTT seconds  --  36     Results from last 7 days   Lab Units 03/23/23  0512 03/22/23  0406   MAGNESIUM mg/dL 2 5 2 8*

## 2023-03-24 PROBLEM — I48.91 POSTOPERATIVE ATRIAL FIBRILLATION (HCC): Status: ACTIVE | Noted: 2023-03-24

## 2023-03-24 PROBLEM — I97.89 POSTOPERATIVE ATRIAL FIBRILLATION (HCC): Status: ACTIVE | Noted: 2023-03-24

## 2023-03-24 LAB
ERYTHROCYTE [DISTWIDTH] IN BLOOD BY AUTOMATED COUNT: 13.2 % (ref 11.6–15.1)
GLUCOSE SERPL-MCNC: 115 MG/DL (ref 65–140)
GLUCOSE SERPL-MCNC: 116 MG/DL (ref 65–140)
GLUCOSE SERPL-MCNC: 126 MG/DL (ref 65–140)
GLUCOSE SERPL-MCNC: 127 MG/DL (ref 65–140)
GLUCOSE SERPL-MCNC: 136 MG/DL (ref 65–140)
GLUCOSE SERPL-MCNC: 179 MG/DL (ref 65–140)
GLUCOSE SERPL-MCNC: 193 MG/DL (ref 65–140)
HCT VFR BLD AUTO: 33.5 % (ref 36.5–49.3)
HGB BLD-MCNC: 10.7 G/DL (ref 12–17)
INR PPP: 1.18 (ref 0.84–1.19)
MCH RBC QN AUTO: 31.6 PG (ref 26.8–34.3)
MCHC RBC AUTO-ENTMCNC: 31.9 G/DL (ref 31.4–37.4)
MCV RBC AUTO: 99 FL (ref 82–98)
PLATELET # BLD AUTO: 79 THOUSANDS/UL (ref 149–390)
PMV BLD AUTO: 10.9 FL (ref 8.9–12.7)
PROTHROMBIN TIME: 15.2 SECONDS (ref 11.6–14.5)
RBC # BLD AUTO: 3.39 MILLION/UL (ref 3.88–5.62)
WBC # BLD AUTO: 8.99 THOUSAND/UL (ref 4.31–10.16)

## 2023-03-24 RX ORDER — ASPIRIN 81 MG/1
81 TABLET ORAL DAILY
Status: DISCONTINUED | OUTPATIENT
Start: 2023-03-24 | End: 2023-03-27 | Stop reason: HOSPADM

## 2023-03-24 RX ORDER — WARFARIN SODIUM 7.5 MG/1
7.5 TABLET ORAL
Status: COMPLETED | OUTPATIENT
Start: 2023-03-24 | End: 2023-03-24

## 2023-03-24 RX ORDER — LANOLIN ALCOHOL/MO/W.PET/CERES
9 CREAM (GRAM) TOPICAL
Status: DISCONTINUED | OUTPATIENT
Start: 2023-03-24 | End: 2023-03-27

## 2023-03-24 RX ADMIN — AMIODARONE HYDROCHLORIDE 200 MG: 200 TABLET ORAL at 13:40

## 2023-03-24 RX ADMIN — FINASTERIDE 5 MG: 5 TABLET, FILM COATED ORAL at 09:26

## 2023-03-24 RX ADMIN — MUPIROCIN 1 APPLICATION.: 20 OINTMENT TOPICAL at 09:26

## 2023-03-24 RX ADMIN — ACETAMINOPHEN 975 MG: 325 TABLET ORAL at 13:35

## 2023-03-24 RX ADMIN — AMIODARONE HYDROCHLORIDE 1 MG/MIN: 50 INJECTION, SOLUTION INTRAVENOUS at 10:00

## 2023-03-24 RX ADMIN — AMIODARONE HYDROCHLORIDE 200 MG: 200 TABLET ORAL at 05:35

## 2023-03-24 RX ADMIN — TAMSULOSIN HYDROCHLORIDE 0.4 MG: 0.4 CAPSULE ORAL at 17:41

## 2023-03-24 RX ADMIN — POLYETHYLENE GLYCOL 3350 17 G: 17 POWDER, FOR SOLUTION ORAL at 09:23

## 2023-03-24 RX ADMIN — DOCUSATE SODIUM 100 MG: 100 CAPSULE, LIQUID FILLED ORAL at 17:41

## 2023-03-24 RX ADMIN — FUROSEMIDE 40 MG: 10 INJECTION, SOLUTION INTRAMUSCULAR; INTRAVENOUS at 17:47

## 2023-03-24 RX ADMIN — FONDAPARINUX SODIUM 2.5 MG: 2.5 INJECTION, SOLUTION SUBCUTANEOUS at 09:26

## 2023-03-24 RX ADMIN — CHLORHEXIDINE GLUCONATE 0.12% ORAL RINSE 15 ML: 1.2 LIQUID ORAL at 21:47

## 2023-03-24 RX ADMIN — ACETAMINOPHEN 975 MG: 325 TABLET ORAL at 21:42

## 2023-03-24 RX ADMIN — LISINOPRIL 10 MG: 10 TABLET ORAL at 09:32

## 2023-03-24 RX ADMIN — POTASSIUM CHLORIDE 20 MEQ: 1500 TABLET, EXTENDED RELEASE ORAL at 09:26

## 2023-03-24 RX ADMIN — ACETAMINOPHEN 975 MG: 325 TABLET ORAL at 05:35

## 2023-03-24 RX ADMIN — AMIODARONE HYDROCHLORIDE 0.5 MG/MIN: 50 INJECTION, SOLUTION INTRAVENOUS at 17:57

## 2023-03-24 RX ADMIN — GLYCERIN 1 DROP: .002; .002; .01 SOLUTION/ DROPS OPHTHALMIC at 13:44

## 2023-03-24 RX ADMIN — AMIODARONE HYDROCHLORIDE 200 MG: 200 TABLET ORAL at 21:45

## 2023-03-24 RX ADMIN — METOPROLOL TARTRATE 25 MG: 25 TABLET, FILM COATED ORAL at 09:26

## 2023-03-24 RX ADMIN — METOPROLOL TARTRATE 25 MG: 25 TABLET, FILM COATED ORAL at 21:44

## 2023-03-24 RX ADMIN — FERROUS SULFATE TAB 325 MG (65 MG ELEMENTAL FE) 325 MG: 325 (65 FE) TAB at 09:26

## 2023-03-24 RX ADMIN — DOCUSATE SODIUM 100 MG: 100 CAPSULE, LIQUID FILLED ORAL at 09:26

## 2023-03-24 RX ADMIN — ATORVASTATIN CALCIUM 80 MG: 80 TABLET, FILM COATED ORAL at 17:41

## 2023-03-24 RX ADMIN — MELATONIN 9 MG: at 21:46

## 2023-03-24 RX ADMIN — POTASSIUM CHLORIDE 20 MEQ: 1500 TABLET, EXTENDED RELEASE ORAL at 17:42

## 2023-03-24 RX ADMIN — PANTOPRAZOLE SODIUM 40 MG: 40 TABLET, DELAYED RELEASE ORAL at 05:35

## 2023-03-24 RX ADMIN — MUPIROCIN 1 APPLICATION.: 20 OINTMENT TOPICAL at 21:43

## 2023-03-24 RX ADMIN — INSULIN GLARGINE 15 UNITS: 100 INJECTION, SOLUTION SUBCUTANEOUS at 21:47

## 2023-03-24 RX ADMIN — CHLORHEXIDINE GLUCONATE 0.12% ORAL RINSE 15 ML: 1.2 LIQUID ORAL at 09:26

## 2023-03-24 RX ADMIN — METFORMIN HYDROCHLORIDE 500 MG: 500 TABLET ORAL at 17:41

## 2023-03-24 RX ADMIN — FUROSEMIDE 40 MG: 10 INJECTION, SOLUTION INTRAMUSCULAR; INTRAVENOUS at 09:31

## 2023-03-24 RX ADMIN — WARFARIN SODIUM 7.5 MG: 7.5 TABLET ORAL at 17:41

## 2023-03-24 RX ADMIN — METFORMIN HYDROCHLORIDE 500 MG: 500 TABLET ORAL at 09:27

## 2023-03-24 RX ADMIN — INSULIN LISPRO 1 UNITS: 100 INJECTION, SOLUTION INTRAVENOUS; SUBCUTANEOUS at 11:47

## 2023-03-24 RX ADMIN — DEXTROSE 150 MG: 50 INJECTION, SOLUTION INTRAVENOUS at 09:44

## 2023-03-24 RX ADMIN — ASPIRIN 81 MG: 81 TABLET, COATED ORAL at 09:26

## 2023-03-24 NOTE — CASE MANAGEMENT
Case Management Discharge Planning Note    Patient name Memory Davide  Location [facility] 411/PPHP 527-54 MRN [id number]  : 1950 Date 3/24/2023       Current Admission Date: 3/21/2023  Current Admission Diagnosis:S/P mitral valve repair   Patient Active Problem List    Diagnosis Date Noted   • Postoperative atrial fibrillation ([address] ) 2023   • Anticoagulated on Coumadin 2023   • S/P mitral valve repair 2023   • S/P left atrial appendage ligation 2023   • FH: cerebral aneurysm 2023   • Obesity, morbid ([address] ) 2023   • Drop in hemoglobin 2023   • Gastrointestinal hemorrhage associated with intestinal diverticulosis 2022   • Diverticulitis of colon with perforation 2022   • Blood in stool 2022   • Abnormal CBC 2022   • Atrial enlargement, left 2022   • History of COVID-19 2022   • Shortness of breath 2022   • Overweight with body mass index (BMI) of 27 to 27 9 in adult 2022   • Platelets decreased ([facility] [address] ) 2021   • HUMBERTO treated with BiPAP 2020   • Excessive daytime sleepiness 2020   • Need for shingles vaccine 2019   • Colon polyp 2018   • Left ventricular hypertrophy 2018   • Mitral regurgitation 2018   • BPH with urinary obstruction 10/31/2016   • Calculus of gallbladder with cholecystitis 2015   • Displacement of lumbar intervertebral disc without myelopathy 2015   • Idiopathic peripheral neuropathy 2015   • Diverticulosis of colon 2015   • Indigestion 2014   • Herpes zoster 2014   • Allergic rhinitis 01/15/2013   • Male erectile disorder 01/15/2013   • Essential hypertension    • Metabolic syndrome    • Hyperlipidemia 01/15/2013      LOS (days): 3  Geometric Mean LOS (GMLOS) (days): 8 90  Days to GMLOS:5 7     OBJECTIVE:  Risk of Unplanned Readmission Score: 18 24         Current admission status: Inpatient   [facility], [place]  Phone: [phone] Fax: [phone] [phone], [address]  [facility] [address] [facility]  Phone: [phone] Fax: [phone]    ISABEL BLUE [facility] [address], [address] [place] [address]  Phone: [phone] Fax: [phone]    [facility] [place], [address] [place] [address]  Phone: [phone] Fax: [address] , [address] [facility],  [facility] [address] [facility] [address]  Phone: [phone] Fax: [address] [facility] [address] [facility] [address] [place] [address]  Phone: [phone] Fax: [phone]    Primary Care Provider: Alexandria Hunt MD    Primary Insurance: MEDICARE  Secondary Insurance: BLUE CROSS    DISCHARGE DETAILS:    Discharge planning discussed with[de-identified] Accepted by [facility] and pt approves  Freedom of Choice: Yes                        Requested [address] [place] [address] requested[de-identified] Nursing  Home Health Agency Name[de-identified] [facility]    DME Referral Provided  Referral made for DME?: No (has RW)    Other Referral/Resources/Interventions Provided:  Interventions: HHC, DME  Referral Comments: Accepted by [facility] and pt agrees, plan home 3/26/23  Has RW                                                      Additional Comments: POD#3 MVR, Doing well, last night had SVT and afib  Amio bolus and gtt, now po  On Coumadin  Anticipate home in about 2 days with [facility]

## 2023-03-24 NOTE — CASE MANAGEMENT
Case Management Discharge Planning Note    Patient name Rosa Evans  Location [facility] 982-63 MRN [id number]  : 1950 Date 3/24/2023       Current Admission Date: 3/21/2023  Current Admission Diagnosis:S/P mitral valve repair   Patient Active Problem List    Diagnosis Date Noted   • Postoperative atrial fibrillation ([address] ) 2023   • Anticoagulated on Coumadin 2023   • S/P mitral valve repair 2023   • S/P left atrial appendage ligation 2023   • FH: cerebral aneurysm 2023   • Obesity, morbid ([address] ) 2023   • Drop in hemoglobin 2023   • Gastrointestinal hemorrhage associated with intestinal diverticulosis 2022   • Diverticulitis of colon with perforation 2022   • Blood in stool 2022   • Abnormal CBC 2022   • Atrial enlargement, left 2022   • History of COVID-19 2022   • Shortness of breath 2022   • Overweight with body mass index (BMI) of 27 to 27 9 in adult 2022   • Platelets decreased ([address] ) 2021   • HUMBERTO treated with BiPAP 2020   • Excessive daytime sleepiness 2020   • Need for shingles vaccine 2019   • Colon polyp 2018   • Left ventricular hypertrophy 2018   • Mitral regurgitation 2018   • BPH with urinary obstruction 10/31/2016   • Calculus of gallbladder with cholecystitis 2015   • Displacement of lumbar intervertebral disc without myelopathy 2015   • Idiopathic peripheral neuropathy 2015   • Diverticulosis of colon 2015   • Indigestion 2014   • Herpes zoster 2014   • Allergic rhinitis 01/15/2013   • Male erectile disorder 01/15/2013   • Essential hypertension    • Metabolic syndrome    • Hyperlipidemia 01/15/2013      LOS (days): 3  Geometric Mean LOS (GMLOS) (days): 8 90  Days to GMLOS:5 7     OBJECTIVE:  Risk of Unplanned Readmission Score: 18 24         Current admission status: Inpatient   Preferred Pharmacy: [facility] [address]  Phone: [phone] Fax: [phone] Callahan, [address]  [facility] [address] [facility]  Phone: [phone] Fax: [phone]    [facility] [address], [address]  [address] [place] [address]  Phone: [phone] Fax: [phone]    [facility] [place], [address] [facility] [address] [place]  [address] [place] [address]  Phone: [phone] Fax: 81 [facility], [address] [place] [address] 60 ,  669 [facility] [address]  Phone: [phone] Fax: [phone] [facility] [address] [address]  Phone: [phone] Fax: [phone]    Primary Care Provider: Chandu Sin MD    Primary Insurance: MEDICARE  Secondary Insurance: BLUE CROSS    DISCHARGE DETAILS:    Discharge planning discussed with[de-identified] Accepted by [facility] and pt approves  Freedom of Choice: Yes                        Requested [address] [place] [address] requested[de-identified] Nursing  Home Health Agency Name[de-identified] [facility]    DME Referral Provided  Referral made for DME?: No (has RW)    Other Referral/Resources/Interventions Provided:  Interventions: HHC, DME  Referral Comments: Accepted by [facility] and pt agrees, plan home 3/26/23  Has RW                                                      Additional Comments: POD#3 MVR, Doing well, last night had SVT and afib  Amio bolus and gtt, now po  On Coumadin  Anticipate home in about 2 days with [facility]

## 2023-03-24 NOTE — PROGRESS NOTES
Progress Note - Cardiothoracic Surgery   Steven Ace 81 y o  male MRN: [id number]  Unit/Bed#: [facility] 411-01 Encounter: [id number]    Mitral regurgitation  S/P left atrial appendage ligation and mitral valve repair; POD # 3      24 Hour Events: Episode of SVT yesterday afternoon  Went into Afib yesterday evening and remains in Afib, rate controlled  Pt asymptomatic, denies palpitations, lightheadedness, SOB, or CP  Transitioned off insulin infusion to SQ dosing yesterday  No BM since surgery, but passing flatus      Medications:   Scheduled Meds:  Current Facility-Administered Medications   Medication Dose Route Frequency Provider Last Rate   • acetaminophen  650 mg Rectal Q4H PRN Sandra Cheatham PA-C     • acetaminophen  650 mg Oral Q6H PRN Sandra Cheatham PA-C     • acetamin[facility]  [facility]     • amiodarone  200 mg Oral [place], [place]     • aspirin  325 mg Oral Daily Sandra Cheatham PA-C     • atorvastatin  80 mg Oral After [facility], PA-C     • bisacodyl  10 mg Rectal Daily PRN Sandra Cheatham PA-C     • calcium carbonate  1,000 mg Oral BID PRN Sandra Cheatham PA-C     • chlorhexidine  15 mL Swish & Spit Q12H [address] [place], [place]     • docusate sodium  100 mg Oral BID Sandra Cheatham PA-C     • ferrous sulfate  325 mg Oral Daily With Breakfast Sandra Cheatham PA-C     • finasteride  5 mg Oral Daily Sandra Cheatham PA-C     • fluticasone  1 spray Nasal PRN Sandra Cheatham PA-C     • fondaparinux  2 5 mg Subcutaneous Daily Raudel Bradley PA-C     • furosemide  40 mg Intravenous BID (diuretic) Sandra Cheatham PA-C     • glycerin-hypromellose-  1 drop Both Eyes Q4H PRN Mellissa Swann PA-C     • insulin glargine  15 Units Subcutaneous HS Cara Alvarez DO     • insulin lispro  1-5 Units Subcutaneous 4x Daily (AC & HS) Cara Alvarez DO     • lisinopril  10 mg Oral Daily Danae Lawrence PA-C     • melatonin  3 mg Oral HS Danae Lawrence PA-C     • melatonin  6 mg Oral HS [place] KEE Hutson     • metFORMIN  500 mg Oral BID With Meals Cara Alvarez DO     • metoprolol tartrate  12 5 mg Oral Q12H [facility] Raudel Bradley PA-C     • mupirocin  1 application  Nasal Q12H [facility] Raudel Bradley PA-C     • ondansetron  4 mg Intravenous Q6H PRN Rose Steve PA-C     • oxyCODONE  2 5 mg Oral Q4H PRN Rose Steve PA-C     • oxyCODONE  5 mg Oral Q4H PRN Rose Steve PA-C     • pantoprazole  40 mg Oral Early Morning Rose Steve PA-C     • polyethylene glycol  17 g Oral Daily Rose Steve PA-C     • potassium chloride  20 mEq Oral BID Rose Steve PA-C     • sodium chloride  20 mL/hr Intravenous Continuous Rose Steve PA-C Stopped (03/23/23 2300)   • tamsulosin  0 4 mg Oral After Rhondajimenez Bradley PA-C       Continuous Infusions:sodium chloride, 20 mL/hr, Last Rate: Stopped (03/23/23 2300)      PRN Meds: •  acetaminophen  •  acetaminophen  •  bisacodyl  •  calcium carbonate  •  fluticasone  •  glycerin-hypromellose-  •  ondansetron  •  oxyCODONE  •  oxyCODONE    Vitals:   Vitals:    03/23/23 2043 03/23/23 2300 03/24/23 0209 03/24/23 0659   BP:  100/55 114/58 105/59   BP Location:  Right arm Right arm Right arm   Pulse:  60 62 55   Resp:  18  18   Temp:  98 2 °F (36 8 °C) 98 2 °F (36 8 °C) 98 1 °F (36 7 °C)   TempSrc:  Oral Oral Oral   SpO2: 93% 92% 97% 94%   Weight:       Height:           Telemetry: Atrial Fibrillation; Heart Rate: 89    Respiratory:   SpO2: SpO2: 94 %;  Room Air    Intake/Output:     Intake/Output Summary (Last 24 hours) at 3/24/2023 0822  Last data filed at 3/24/2023 0700  Gross per 24 hour   Intake 870 45 ml   Output 2350 ml   Net -1479 55 ml        Chest tube Output: removed      Weights:   Weight (last 2 days)     Date/Time Weight    03/23/23 0553 94 5 (208 34)    03/22/23 0600 90 4 (199 3)            Results:   Results from last 7 days   Lab Units 03/24/23  0535 03/22/23  0406 03/21/23  1859 03/21/23  1124 03/21/23  1122   WBC Thousand/uL 8 99 17 29*  --   --   --    HEMOGLOBIN g/dL 10 7* 14 1 13 2  --  13 9   I STAT HEMOGLOBIN   --   --   --    < >  --    HEMATOCRIT % 33 5* 42 9 40 5  --  42 5   HEMATOCRIT, ISTAT   --   --   --    < >  --    PLATELETS Thousands/uL 79* 98*  --   --  76*    < > = values in this interval not displayed  Results from last 7 days   Lab Units 03/23/23  0512 03/22/23  0406 03/21/23  1859 03/21/23  1508 03/21/23  1124 03/21/23  1122   SODIUM mmol/L 135 138  --   --   --  142   POTASSIUM mmol/L 4 3 4 4 4 4   < >  --  3 9   CHLORIDE mmol/L 107 111*  --   --   --  116*   CO2 mmol/L 28 25  --   --   --  25   CO2, I-STAT mmol/L  --   --   --   --  26  --    BUN mg/dL 21 15  --   --   --  12   CREATININE mg/dL 0 73 0 85  --   --   --  0 76   GLUCOSE, ISTAT mg/dl  --   --   --   --  131  --    CALCIUM mg/dL 7 9* 7 6*  --   --   --  7 2*    < > = values in this interval not displayed  Results from last 7 days   Lab Units 03/24/23  0535 03/23/23  1043 03/22/23  0406 03/21/23  1122   INR  1 18 1 13 1 23* 1 48*   PTT seconds  --   --   --  36     Coumadin mg - 5 2 5 2 5 - -       Point of care glucose:  - 200  Endo following    Studies:  No studies past 24 hrs        Invasive Lines/Tubes:  Invasive Devices     Central Venous Catheter Line  Duration           CV[facility] Central Lines 03/21/23 Triple 3 days          Peripheral Intravenous Line  Duration           Peripheral IV 03/21/23 Left Hand 3 days                Physical Exam:    HEENT/NECK:  Normocephalic  Atraumatic  No jugular venous distention  Cardiac: Irregularly irregular rate and rhythm  Pulmonary:  Breath sounds clear bilaterally and No rales/rhonchi/wheezes  Abdomen:  Non-tender, Non-distended and Normal bowel sounds  Incisions: Sternum is stable  Incision dressed with Acticoat    No erythema or drainage  Extremities: Extremities warm/dry and No edema B/L  Neuro: Alert and oriented X 3, Sensation is grossly intact and No focal deficits  Skin: Warm/Dry, without rashes or lesions  Assessment:  Principal Problem:    S/P mitral valve repair  Active Problems:    BPH with urinary obstruction    Essential hypertension    Left ventricular hypertrophy    Mitral regurgitation    Hyperlipidemia    Idiopathic peripheral neuropathy    HUMBERTO treated with BiPAP    Excessive daytime sleepiness    Platelets decreased (HCC)    S/P left atrial appendage ligation    Anticoagulated on Coumadin       Mitral regurgitation  S/P left atrial appendage ligation and mitral valve repair; POD # 3    Plan:    1  Cardiac:     Atrial Fibrillation; HR/BP well-controlled  Increase to Lopressor, 25mg PO BID  Continue ASA and Statin therapy  Epicardial pacing wires have been removed  Maintain central IV due to administration of IV Amio  Continue DVT prophylaxis  PO Afib - start Amio bolus/gtt, continue PO dosing  Anticoagulation - INR 1 18, dose Coumadin 7 5mg tonight    2  Pulmonary:   Good Room air oxygen saturation; Continue incentive spirometry/Coughing/Deep breathing exercises  Chest tubes have been discontinued    3  Renal:   Intake/Output net: -1339 mL/24 hours  Diuretic Regimen:  Continue Lasix 40 mg IV BID  Continue Potassium Chloride 20 mEq PO BID  Post op Creatinine stable; Follow up labs prn    4  Neuro:  Neurologically intact; No active issues  Incisional pain well-controlled  Continue Tylenol, 975 mg PO q 8, standing dose  Continue Oxycodone, 2 5 to 5 mg PO q 4 hours prn pain    5  GI:  Tolerating TLC 2 3 gm sodium diet  Maintain 1800 mL daily fluid restriction   Continue stool softeners and prn suppository  Continue GI prophylaxis    6  Endo:   History of diabetes; Continue SQ insulin therapy as directed by endocrinology physician  Insulin administration teaching for home therapy ordered    7    Hematology:    Post-operative acute blood loss anemia;  Hemoglobin 10 7; trend prn and Leukocytosis resolved  Thrombocytopenia - H/O Chronic thrombocytopenia with baseline Plt 113  Plt ct decreased 79 today  Monitor      8    Disposition:      Not yet medically cleared for discharge, pending postop Afib/conversion to NSR    VTE Pharmacologic Prophylaxis: Fondaparinux (Arixtra) and Warfarin (Coumadin)  VTE Mechanical Prophylaxis: sequential compression device    Collaborative rounds completed with supervising physician  Plan of care discussed with bedside nurse    SIGNATURE: Valdemar Cox PA-C  DATE: March 24, 2023  TIME: 8:22 AM

## 2023-03-24 NOTE — RESTORATIVE TECHNICIAN NOTE
Restorative Technician Note      Patient Name: Julisa Boyd     Restorative Tech Visit Date: 03/24/23  Note Type: Mobility  Patient Position Upon Consult: Bedside chair  Activity Performed: Ambulated  Assistive Device: Roller walker; Other (Comment) (on lap with the RW; one lap without the RW)  Patient Position at End of Consult: All needs within reach;  Bedside chair

## 2023-03-24 NOTE — PROGRESS NOTES
Cardiology Progress Note - Team 1  Buddy Carrizales 81 y o  male MRN: [id number]  Unit/Bed#: [facility] 36-80 Encounter: [id number]      Assessment/Plan:  1  s/p MV repair + MARGARET ligation  - MV repair with 32 mm Medtronic CG Future mitral annuloplasty ring and folding plasty of P1 to P2 and P2 to P3, MARGARET with 45 mm AtriClip device (POD #2)  - intraop TANG LVEF 60%  - preop [facility] (3/3/2023) - no obstructive coronary artery disease identified  - EPW/CT removed without issue  - net - 1 3 L / 24 hours - continue Lasix 40 mg IV twice daily  - continue amiodarone 200 mg 3 times daily, aspirin 325 mg daily, atorvastatin 80 mg daily, Lopressor 12 5 mg twice daily  - continue Coumadin for systemic anticoagulation for goal INR 2-3 - a m  1 18  - encourage ambulation/IS use  - daily weights, strict I's and O's, salt/fluid restriction    2  Postoperative atrial fibrillation  - developed atrial fibrillation yesterday evening s/p amiodarone bolus + gtt  - telemetry review - A-fib, HR is currently controlled in the 80s, episode of NSVT yesterday afternoon  - [id number] 2 (age, hypertension) - remains on Coumadin for systemic anticoagulation  - lopressor increased to 25 mg twice daily for rate control + amio gtt; agree  - continue to monitor on telemetry    3  Hypertension  - last documented /73; stable  - outpatient antihypertensive regimen - amlodipine 5 mg daily, lisinopril 20 mg daily  - continue Lopressor     4  Hyperlipidemia  - lipid panel (2/28/2023) - TC 88/triglycerides 29/HDL 49/LDL 33  - continue high intensity statin     5  Type 2 DM  - last hemoglobin A1c 5 3  - transitioned off insulin gtt - now on subQ insulin  - endocrinology following     6  HUMBERTO  - on CPAP     7  Hx of GI bleed  - secondary to diverticulitis of colon with perforation (12/2022)    8   Acute postoperative blood loss anemia  - a m  hemoglobin 10 7 - 14 1 yesterday  - no obvious source of bleeding  - monitor closely with daily CBC    Subjective:   Patient "seen and examined  No significant events overnight  Patient complains of fatigue this morning, but otherwise feels well  Denies any significant shortness of breath or chest pain  Denies any palpitations, lightheadedness, or dizziness  Reports a bowel movement last evening  Objective:   Vitals: Blood pressure 104/63, pulse 73, temperature 98 1 °F (36 7 °C), temperature source Oral, resp  rate 18, height 6' 1\" (1 854 m), weight 93 2 kg (205 lb 7 5 oz), SpO2 94 %  , Body mass index is 27 11 kg/m² ,   Orthostatic Blood Pressures    Flowsheet Row Most Recent Value   Blood Pressure 104/63 filed at 03/24/2023 0932   Patient Position - Orthostatic VS Sitting filed at 03/24/2023 0659          Intake/Output Summary (Last 24 hours) at 3/24/2023 1026  Last data filed at 3/24/2023 0700  Gross per 24 hour   Intake 750 45 ml   Output 1675 ml   Net -924 55 ml     Physical Exam  Constitutional:       Appearance: Normal appearance  HENT:      Head: Normocephalic and atraumatic  Nose: Nose normal       Mouth/Throat:      Mouth: Mucous membranes are moist       Pharynx: Oropharynx is clear  Eyes:      Pupils: Pupils are equal, round, and reactive to light  Cardiovascular:      Rate and Rhythm: Normal rate  Rhythm irregular  Pulses: Normal pulses  Heart sounds: Normal heart sounds  No murmur heard  Pulmonary:      Effort: Pulmonary effort is normal  No respiratory distress  Breath sounds: Normal breath sounds  Comments: On RA  Abdominal:      General: Bowel sounds are normal  There is no distension  Palpations: Abdomen is soft  Musculoskeletal:         General: Normal range of motion  Cervical back: Normal range of motion  Right lower leg: No edema  Left lower leg: No edema  Skin:     General: Skin is warm and dry  Capillary Refill: Capillary refill takes less than 2 seconds        Comments: Midsternal incision dressed with Acticoat; stable - no bleeding or drainage " Neurological:      General: No focal deficit present  Mental Status: He is alert and oriented to person, place, and time     Psychiatric:         Mood and Affect: Mood normal          Behavior: Behavior normal      Medications:    Current Facility-Administered Medications:   •  acetaminophen (TYLENOL) rectal suppository 650 mg, 650 mg, Rectal, Q4H PRN, Latasha Lepe PA-C  •  acetaminophen (TYLENOL) tablet 650 mg, 650 mg, Oral, Q6H PRN, Latasha Lepe PA-C  •  acetaminophen (TYLENOL) tablet 975 mg, 975 mg, Oral, Q8H, Raudel Bradley PA-C, 975 mg at 03/24/23 0535  •  amiodarone (CORDARONE) 900 mg in dextrose 5 % 500 mL infusion, 1 mg/min, Intravenous, Continuous, Last Rate: 33 3 mL/hr at 03/24/23 1000, 1 mg/min at 03/24/23 1000 **FOLLOWED BY** amiodarone (CORDARONE) 900 mg in dextrose 5 % 500 mL infusion, 0 5 mg/min, Intravenous, Continuous, Mary Jo Kothari PA-C  •  amiodarone tablet 200 mg, 200 mg, Oral, Q8H [facility], Raudel Bradley PA-C, 200 mg at 03/24/23 0535  •  aspirin (ECOTRIN LOW STRENGTH) EC tablet 81 mg, 81 mg, Oral, Daily, Mary Jo Kothari PA-C, 81 mg at 03/24/23 5113  •  atorvastatin (LIPITOR) tablet 80 mg, 80 mg, Oral, After [employer], KEE, 80 mg at 03/23/23 1805  •  bisacodyl (DULCOLAX) rectal suppository 10 mg, 10 mg, Rectal, Daily PRN, Latasha Lepe PA-C  •  calcium carbonate (TUMS) chewable tablet 1,000 mg, 1,000 mg, Oral, BID PRN, Latasha Lepe PA-C  •  chlorhexidine (PERIDEX) 0 12 % oral rinse 15 mL, 15 mL, Swish & Spit, Q12H [facility], Latasha Lepe PA-C, 15 mL at 03/24/23 6891  •  docusate sodium (COLACE) capsule 100 mg, 100 mg, Oral, BID, Latasha Lepe PA-C, 100 mg at 03/24/23 4904  •  ferrous sulfate tablet 325 mg, 325 mg, Oral, Daily With Breakfast, Latasha Lepe PA-C, 325 mg at 03/24/23 4442  •  finasteride (PROSCAR) tablet 5 mg, 5 mg, Oral, Daily, Latasha Lepe PA-C, 5 mg at 03/24/23 0929  •  fluticasone (FLONASE) 50 mcg/act nasal spray 1 spray, 1 spray, Nasal, PRN, Sergio Jhaveri PA-C  •  fondaparinux (ARIXTRA) subcutaneous injection 2 5 mg, 2 5 mg, Subcutaneous, Daily, Sergio Jhaveri PA-C, 2 5 mg at 03/24/23 6088  •  furosemide (LASIX) injection 40 mg, 40 mg, Intravenous, BID (diuretic), Sergio Jhaveri PA-C, 40 mg at 03/24/23 0931  •  glycerin-hypromellose- (ARTIFICIAL TEARS) ophthalmic solution 1 drop, 1 drop, Both Eyes, Q4H PRN, Mellissa Swann PA-C, 1 drop at 03/22/23 1809  •  insulin glargine (LANTUS) subcutaneous injection 15 Units 0 15 mL, 15 Units, Subcutaneous, HS, Cara Alvarez DO, 15 Units at 03/23/23 2208  •  insulin lispro (HumaLOG) 100 units/mL subcutaneous injection 1-5 Units, 1-5 Units, Subcutaneous, 4x Daily (AC & HS) **AND** Fingerstick Glucose (POCT), , , 4x Daily AC and at bedtime, Cara Alvarez DO  •  lisinopril (ZESTRIL) tablet 10 mg, 10 mg, Oral, Daily, Daylin Hernandez PA-C, 10 mg at 03/24/23 0932  •  melatonin tablet 9 mg, 9 mg, Oral, HS, Daylin Hernandez PA-C  •  metFORMIN (GLUCOPHAGE) tablet 500 mg, 500 mg, Oral, BID With Meals, Cara Alvarez DO, 500 mg at 03/24/23 4185  •  metoprolol tartrate (LOPRESSOR) tablet 25 mg, 25 mg, Oral, Q12H [address], Daylin Hernandez PA-C, 25 mg at 03/24/23 7615  •  mupirocin (BACTROBAN) 2 % nasal ointment 1 application  , 1 application  , Nasal, Q12H [address], Raudel Bradley PA-C, 1 application   at 03/24/23 0926  •  ondansetron (ZOFRAN) injection 4 mg, 4 mg, Intravenous, Q6H PRN, Sergio Jhaveri PA-C  •  oxyCODONE (ROXICODONE) IR tablet 2 5 mg, 2 5 mg, Oral, Q4H PRN, Demaris Emilio, PA-C, 2 5 mg at 03/23/23 9917  •  oxyCODONE (ROXICODONE) IR tablet 5 mg, 5 mg, Oral, Q4H PRN, Demaris Emilio, PA-C, 5 mg at 03/22/23 0146  •  pantoprazole (PROTONIX) EC tablet 40 mg, 40 mg, Oral, Early Morning, Raudel Bradley PA-C, 40 mg at 03/24/23 0535  •  polyethylene glycol (MIRALAX) packet 17 g, 17 g, Oral, Daily, Demaris Emilio, PA-C, 17 g at 03/24/23 7864  •  potassium chloride (K-DUR,KLOR-CON) CR tablet 20 mEq, 20 mEq, Oral, BID, KEE Ponce, 20 mEq at 03/24/23 9652  •  tamsulosin (FLOMAX) capsule 0 4 mg, 0 4 mg, Oral, After Dinner, KEE Ponce, 0 4 mg at 03/23/23 1805  •  warfarin (COUMADIN) tablet 7 5 mg, 7 5 mg, Oral, Once (warfarin), Shakira Urbina PA-C     Labs & Results:      Results from last 7 days   Lab Units 03/24/23  0535 03/22/23  0406 03/21/23  1859 03/21/23  1124 03/21/23  1122   WBC Thousand/uL 8 99 17 29*  --   --   --    HEMOGLOBIN g/dL 10 7* 14 1 13 2  --  13 9   I STAT HEMOGLOBIN   --   --   --    < >  --    HEMATOCRIT % 33 5* 42 9 40 5  --  42 5   HEMATOCRIT, ISTAT   --   --   --    < >  --    PLATELETS Thousands/uL 79* 98*  --   --  76*    < > = values in this interval not displayed  Results from last 7 days   Lab Units 03/23/23  0512 03/22/23  0406 03/21/23  1859 03/21/23  1508 03/21/23  1124 03/21/23  1122 03/21/23  1059 03/21/23  0946   POTASSIUM mmol/L 4 3 4 4 4 4   < >  --  3 9  --   --    CHLORIDE mmol/L 107 111*  --   --   --  116*  --   --    CO2 mmol/L 28 25  --   --   --  25  --   --    CO2, I-STAT mmol/L  --   --   --   --  26  --  26 32   BUN mg/dL 21 15  --   --   --  12  --   --    CREATININE mg/dL 0 73 0 85  --   --   --  0 76  --   --    CALCIUM mg/dL 7 9* 7 6*  --   --   --  7 2*  --   --    ALK PHOS U/L 51  --   --   --   --   --   --   --    ALT U/L 17  --   --   --   --   --   --   --    AST U/L 32  --   --   --   --   --   --   --    GLUCOSE, ISTAT mg/dl  --   --   --   --  131  --  141* 191*    < > = values in this interval not displayed       Results from last 7 days   Lab Units 03/24/23  0535 03/23/23  1043 03/22/23  0406 03/21/23  1122   INR  1 18 1 13 1 23* 1 48*   PTT seconds  --   --   --  36     Results from last 7 days   Lab Units 03/23/23  0512 03/22/23  0406   MAGNESIUM mg/dL 2 5 2 8*

## 2023-03-24 NOTE — RESTORATIVE TECHNICIAN NOTE
Restorative Technician Note      Patient Name: Gale Goodell     Restorative Tech Visit Date: 03/24/23  Note Type: Mobility  Patient Position Upon Consult: Bedside chair  Activity Performed: Ambulated  Assistive Device: Roller walker  Patient Position at End of Consult: All needs within reach;  Bedside chair

## 2023-03-24 NOTE — QUICK NOTE
Endocrinology quick note:  Chart was reviewed  Sarah Gomes is a 81y o  year old male with prediabetes s/p elective mitral valve repair and left atrial appendage ligation on 3/21  Pt noted to go into SVT and Afib yesterday afternoon and not planned for discharge today    Prediabetes  Most recent a1c 5 3, diet controlled  Home medications: previously on metformin for a few years but most recently not on any medications  Inpatient: 15 units lantus, metformin 500mg BID with goal of metformin only on discharge if blood sugars allow  Please prescribe glucometer, test strips, lancets on discharge   Recommended pt check twice per day before breakfast and before dinner and followup with PCP as he may be able to decrease regimen over the next few weeks      Mo Chandra,   Endocrinology Fellow, [id number]

## 2023-03-25 LAB
GLUCOSE SERPL-MCNC: 106 MG/DL (ref 65–140)
GLUCOSE SERPL-MCNC: 120 MG/DL (ref 65–140)
GLUCOSE SERPL-MCNC: 126 MG/DL (ref 65–140)
GLUCOSE SERPL-MCNC: 151 MG/DL (ref 65–140)
HCT VFR BLD AUTO: 31.2 % (ref 36.5–49.3)
HGB BLD-MCNC: 10.2 G/DL (ref 12–17)
INR PPP: 1.28 (ref 0.84–1.19)
PLATELET # BLD AUTO: 94 THOUSANDS/UL (ref 149–390)
PMV BLD AUTO: 9.9 FL (ref 8.9–12.7)
PROTHROMBIN TIME: 16.2 SECONDS (ref 11.6–14.5)

## 2023-03-25 RX ORDER — WARFARIN SODIUM 5 MG/1
5 TABLET ORAL
Status: COMPLETED | OUTPATIENT
Start: 2023-03-25 | End: 2023-03-25

## 2023-03-25 RX ADMIN — ASPIRIN 81 MG: 81 TABLET, COATED ORAL at 08:27

## 2023-03-25 RX ADMIN — DOCUSATE SODIUM 100 MG: 100 CAPSULE, LIQUID FILLED ORAL at 17:11

## 2023-03-25 RX ADMIN — AMIODARONE HYDROCHLORIDE 200 MG: 200 TABLET ORAL at 05:22

## 2023-03-25 RX ADMIN — TAMSULOSIN HYDROCHLORIDE 0.4 MG: 0.4 CAPSULE ORAL at 17:11

## 2023-03-25 RX ADMIN — INSULIN GLARGINE 15 UNITS: 100 INJECTION, SOLUTION SUBCUTANEOUS at 21:29

## 2023-03-25 RX ADMIN — ACETAMINOPHEN 975 MG: 325 TABLET ORAL at 20:08

## 2023-03-25 RX ADMIN — FONDAPARINUX SODIUM 2.5 MG: 2.5 INJECTION, SOLUTION SUBCUTANEOUS at 08:36

## 2023-03-25 RX ADMIN — LISINOPRIL 10 MG: 10 TABLET ORAL at 08:26

## 2023-03-25 RX ADMIN — ACETAMINOPHEN 975 MG: 325 TABLET ORAL at 13:25

## 2023-03-25 RX ADMIN — MUPIROCIN 1 APPLICATION.: 20 OINTMENT TOPICAL at 08:36

## 2023-03-25 RX ADMIN — AMIODARONE HYDROCHLORIDE 200 MG: 200 TABLET ORAL at 21:29

## 2023-03-25 RX ADMIN — FERROUS SULFATE TAB 325 MG (65 MG ELEMENTAL FE) 325 MG: 325 (65 FE) TAB at 08:27

## 2023-03-25 RX ADMIN — FUROSEMIDE 40 MG: 10 INJECTION, SOLUTION INTRAMUSCULAR; INTRAVENOUS at 17:11

## 2023-03-25 RX ADMIN — ATORVASTATIN CALCIUM 80 MG: 80 TABLET, FILM COATED ORAL at 17:11

## 2023-03-25 RX ADMIN — METOPROLOL TARTRATE 25 MG: 25 TABLET, FILM COATED ORAL at 08:27

## 2023-03-25 RX ADMIN — PANTOPRAZOLE SODIUM 40 MG: 40 TABLET, DELAYED RELEASE ORAL at 05:21

## 2023-03-25 RX ADMIN — FINASTERIDE 5 MG: 5 TABLET, FILM COATED ORAL at 08:27

## 2023-03-25 RX ADMIN — MUPIROCIN 1 APPLICATION.: 20 OINTMENT TOPICAL at 20:10

## 2023-03-25 RX ADMIN — INSULIN LISPRO 1 UNITS: 100 INJECTION, SOLUTION INTRAVENOUS; SUBCUTANEOUS at 06:17

## 2023-03-25 RX ADMIN — METFORMIN HYDROCHLORIDE 500 MG: 500 TABLET ORAL at 08:26

## 2023-03-25 RX ADMIN — AMIODARONE HYDROCHLORIDE 200 MG: 200 TABLET ORAL at 13:25

## 2023-03-25 RX ADMIN — METFORMIN HYDROCHLORIDE 500 MG: 500 TABLET ORAL at 17:11

## 2023-03-25 RX ADMIN — METOPROLOL TARTRATE 25 MG: 25 TABLET, FILM COATED ORAL at 20:08

## 2023-03-25 RX ADMIN — MELATONIN 9 MG: at 21:29

## 2023-03-25 RX ADMIN — POTASSIUM CHLORIDE 20 MEQ: 1500 TABLET, EXTENDED RELEASE ORAL at 08:27

## 2023-03-25 RX ADMIN — POTASSIUM CHLORIDE 20 MEQ: 1500 TABLET, EXTENDED RELEASE ORAL at 17:11

## 2023-03-25 RX ADMIN — ACETAMINOPHEN 975 MG: 325 TABLET ORAL at 05:21

## 2023-03-25 RX ADMIN — OXYCODONE HYDROCHLORIDE 2.5 MG: 5 TABLET ORAL at 06:25

## 2023-03-25 RX ADMIN — CHLORHEXIDINE GLUCONATE 0.12% ORAL RINSE 15 ML: 1.2 LIQUID ORAL at 08:36

## 2023-03-25 RX ADMIN — WARFARIN SODIUM 5 MG: 5 TABLET ORAL at 17:11

## 2023-03-25 RX ADMIN — CHLORHEXIDINE GLUCONATE 0.12% ORAL RINSE 15 ML: 1.2 LIQUID ORAL at 20:10

## 2023-03-25 RX ADMIN — FUROSEMIDE 40 MG: 10 INJECTION, SOLUTION INTRAMUSCULAR; INTRAVENOUS at 08:27

## 2023-03-25 NOTE — PROGRESS NOTES
"Cardiology Progress Note - Lev Linda 81 y o  male MRN: [id number]    Unit/Bed#: [facility] 411-01 Encounter: [id number]      Assessment/Recommendations:  1  Status post mitral valve repair with left atrial appendage ligation for mitral valve prolapse and severe mitral regurgitation: Valve repair with 32 mm [employer] CG Future mitral annuloplasty ring and folding plasty of P1 to P2 and P2 to P3  Left atrial appendage with 45 mm atrial clip device  Normal LV function on Intra-Op TANG  No significant CAD seen on left heart cath preoperatively  Postop course complicated by defibrillation  Awaiting INR to become therapeutic between 2-3  Continues on diuresis, incentive spirometry, ambulation  Continue on statin and beta-blocker  2   Postoperative atrial fibrillation: Started on amiodarone p o  as well as bolus and drip  Still remains in atrial fibrillation with slow ventricular response  Will be continued on amiodarone drip to complete this bag  Awaiting therapeutic INR  Continue to follow closely on telemetry  3   Hypertension: Continued current regiment, well controlled  4   Hyperlipidemia: Continued on statin  5   Type 2 diabetes mellitus: As per primary team   6   HUMBERTO: On CPAP  7   History of GI bleed: Secondary to diverticulitis of colon with perforation  Watch for recurrence of bleeding while on Coumadin  8   Acute postoperative blood loss anemia: Significant drop of hemoglobin from 14-10 7  Now remained stable in the 10 range  Continue to watch while in the hospital     Subjective:   Patient seen and examined  No significant events overnight   ; pertinent negatives - chest pain, chest pressure/discomfort, dyspnea, irregular heart beat, lower extremity edema and palpitations  Objective:     Vitals: Blood pressure 115/63, pulse 69, temperature 97 6 °F (36 4 °C), temperature source Oral, resp  rate 18, height 6' 1\" (1 854 m), weight 93 kg (205 lb 0 4 oz), SpO2 96 %  , Body mass index is 27 05 " kg/m² ,   Orthostatic Blood Pressures    Flowsheet Row Most Recent Value   Blood Pressure 115/63 filed at 2023 4869   Patient Position - Orthostatic VS Sitting filed at 2023 0717            Intake/Output Summary (Last 24 hours) at 3/25/2023 1006  Last data filed at 3/25/2023 0747  Gross per 24 hour   Intake 1752 56 ml   Output 700 ml   Net 1052 56 ml       TELE: Remains in A-fib with slow ventricular response      Physical Exam:    GEN: Buddy Carrizales appears well, alert and oriented x 3, pleasant and cooperative   HEENT: pupils equal, round, and reactive to light; extraocular muscles intact  NECK: supple, no carotid bruits   HEART: Irregular rhythm, normal S1 and S2, + systolic murmur, no clicks, gallops or rubs   LUNGS: clear to auscultation bilaterally; no wheezes, rales, or rhonchi   ABDOMEN: normal bowel sounds, soft, no tenderness, no distention  EXTREMITIES: peripheral pulses normal; no clubbing, cyanosis, or edema  NEURO: no focal findings   SKIN: normal without suspicious lesions on exposed skin    Medications:      Current Facility-Administered Medications:   •  acetaminophen (TYLENOL) rectal suppository 650 mg, 650 mg, Rectal, Q4H PRN, Gaudencio Cardona PA-C  •  acetaminophen (TYLENOL) tablet 650 mg, 650 mg, Oral, Q6H PRN, Gaudencio Cardona PA-C  •  acetaminophen (TYLENOL) tablet 975 mg, 975 mg, Oral, Q8H, Raudel Bradley PA-C, 975 mg at 23 9879  •  [] amiodarone (CORDARONE) 900 mg in dextrose 5 % 500 mL infusion, 1 mg/min, Intravenous, Continuous, Last Rate: 33 3 mL/hr at 23 1000, 1 mg/min at 23 1000 **FOLLOWED BY** amiodarone (CORDARONE) 900 mg in dextrose 5 % 500 mL infusion, 0 5 mg/min, Intravenous, Continuous, Daylin Hernandez PA-C, Last Rate: 16 7 mL/hr at 23, 0 5 mg/min at 23  •  amiodarone tablet 200 mg, 200 mg, Oral, Q8H [facility], Raudel Bradley PA-C, 200 mg at 23 0522  •  aspirin (ECOTRIN LOW STRENGTH) EC tablet 81 mg, 81 mg, Oral, Daily, [facility], PA-C, 81 mg at 03/25/23 8706  •  atorvastatin (LIPITOR) tablet 80 mg, 80 mg, Oral, After [employer], PA-C, 80 mg at 03/24/23 1741  •  bisacodyl (DULCOLAX) rectal suppository 10 mg, 10 mg, Rectal, Daily PRN, Chelita Patterson PA-C  •  calcium carbonate (TUMS) chewable tablet 1,000 mg, 1,000 mg, Oral, BID PRN, CHET Talbot-C  •  chlorhexidine (PERIDEX) 0 12 % oral rinse 15 mL, 15 mL, Swish & Spit, Q12H [facility], CHET Talbot-C, 15 mL at 03/25/23 2965  •  docusate sodium (COLACE) capsule 100 mg, 100 mg, Oral, BID, Chelita Patterson PA-C, 100 mg at 03/24/23 1741  •  ferrous sulfate tablet 325 mg, 325 mg, Oral, Daily With Breakfast, CHET Talbot-C, 325 mg at 03/25/23 5780  •  finasteride (PROSCAR) tablet 5 mg, 5 mg, Oral, Daily, Chelita Patterson PA-C, 5 mg at 03/25/23 0827  •  fluticasone (FLONASE) 50 mcg/act nasal spray 1 spray, 1 spray, Nasal, PRN, Chelita Patterson PA-C  •  fondaparinux (ARIXTRA) subcutaneous injection 2 5 mg, 2 5 mg, Subcutaneous, Daily, CHET Talbot-C, 2 5 mg at 03/25/23 7382  •  furosemide (LASIX) injection 40 mg, 40 mg, Intravenous, BID (diuretic), Chelita Patterson PA-C, 40 mg at 03/25/23 0827  •  glycerin-hypromellose- (ARTIFICIAL TEARS) ophthalmic solution 1 drop, 1 drop, Both Eyes, Q4H PRN, Mellissa Swann PA-C, 1 drop at 03/24/23 1344  •  insulin glargine (LANTUS) subcutaneous injection 15 Units 0 15 mL, 15 Units, Subcutaneous, HS, Cara Alvarez, DO, 15 Units at 03/24/23 2147  •  insulin lispro (HumaLOG) 100 units/mL subcutaneous injection 1-5 Units, 1-5 Units, Subcutaneous, 4x Daily (AC & HS), 1 Units at 03/25/23 0617 **AND** Fingerstick Glucose (POCT), , , 4x Daily AC and at bedtime, Cara Alvarez DO  •  lisinopril (ZESTRIL) tablet 10 mg, 10 mg, Oral, Daily, Daylin Hernandez PA-C, 10 mg at 03/25/23 7973  •  melatonin tablet 9 mg, 9 mg, Oral, HS, Daylin Hernandez PA-C, 9 mg at 03/24/23 2141  •  metFORMIN (GLUCOPHAGE) tablet 500 mg, 500 mg, Oral, BID With Meals, Cara Alvarez DO, 500 mg at 03/25/23 0520  •  metoprolol tartrate (LOPRESSOR) tablet 25 mg, 25 mg, Oral, Q12H [facility], Daylin KEE Hernandez, 25 mg at 03/25/23 0827  •  mupirocin (BACTROBAN) 2 % nasal ointment 1 application  , 1 application  , Nasal, Q12H [facility], Daylin KEE Hernandez, 1 application   at 03/25/23 0836  •  ondansetron (ZOFRAN) injection 4 mg, 4 mg, Intravenous, Q6H PRN, Orlando Smith PA-C  •  oxyCODONE (ROXICODONE) IR tablet 2 5 mg, 2 5 mg, Oral, Q4H PRN, Orlando Smith PA-C, 2 5 mg at 03/25/23 0316  •  oxyCODONE (ROXICODONE) IR tablet 5 mg, 5 mg, Oral, Q4H PRN, Orlando Smith PA-C, 5 mg at 03/22/23 0146  •  pantoprazole (PROTONIX) EC tablet 40 mg, 40 mg, Oral, Early Morning, Raudel Bradley PA-C, 40 mg at 03/25/23 8958  •  polyethylene glycol (MIRALAX) packet 17 g, 17 g, Oral, Daily, Orlando Smith PA-C, 17 g at 03/24/23 8948  •  potassium chloride (K-DUR,KLOR-CON) CR tablet 20 mEq, 20 mEq, Oral, BID, Orlando Smith PA-C, 20 mEq at 03/25/23 0827  •  tamsulosin (FLOMAX) capsule 0 4 mg, 0 4 mg, Oral, After Dinner, Orlando Smith PA-C, 0 4 mg at 03/24/23 1741  •  warfarin (COUMADIN) tablet 5 mg, 5 mg, Oral, Once (warfarin), Josefa Barajas PA-C     Labs & Results:        Results from last 7 days   Lab Units 03/25/23  0523 03/24/23  0535 03/22/23  0406   WBC Thousand/uL  --  8 99 17 29*   HEMOGLOBIN g/dL 10 2* 10 7* 14 1   HEMATOCRIT % 31 2* 33 5* 42 9   PLATELETS Thousands/uL 94* 79* 98*         Results from last 7 days   Lab Units 03/23/23  0512 03/22/23  0406 03/21/23  1859 03/21/23  1508 03/21/23  1124 03/21/23  1122 03/21/23  1059 03/21/23  0946   POTASSIUM mmol/L 4 3 4 4 4 4   < >  --  3 9  --   --    CHLORIDE mmol/L 107 111*  --   --   --  116*  --   --    CO2 mmol/L 28 25  --   --   --  25  --   --    CO2, I-STAT mmol/L  --   --   --   --  26  --  26 32   BUN mg/dL 21 15  --   --   --  12  --   --    CREATININE mg/dL 0 73 0 85  --   --   --  0 76  --   --    CALCIUM mg/dL 7  9* 7 6*  --   --   --  7 2*  --   --    ALK PHOS U/L 51  --   --   --   --   --   --   --    ALT U/L 17  --   --   --   --   --   --   --    AST U/L 32  --   --   --   --   --   --   --    GLUCOSE, ISTAT mg/dl  --   --   --   --  131  --  141* 191*    < > = values in this interval not displayed  Results from last 7 days   Lab Units 03/25/23  0523 03/24/23  0535 03/23/23  1043 03/22/23  0406 03/21/23  1122   INR  1 28* 1 18 1 13   < > 1 48*   PTT seconds  --   --   --   --  36    < > = values in this interval not displayed  Results from last 7 days   Lab Units 03/23/23  0512 03/22/23  0406   MAGNESIUM mg/dL 2 5 2 8*       Intra-Op TANG: Ejection fraction normal at 60%, dilated RV and LV  Severe mitral regurgitation with prolapse  Postoperative TANG revealed 32 mm mitral valve ring with a mean gradient of 2 mmHg, no significant stenosis or regurgitation      EKG personally reviewed by Jack Benitez MD

## 2023-03-25 NOTE — PLAN OF CARE
Problem: Prexisting or High Potential for Compromised Skin Integrity  Goal: Skin integrity is maintained or improved  Description: INTERVENTIONS:  - Identify patients at risk for skin breakdown  - Assess and monitor skin integrity  - Assess and monitor nutrition and hydration status  - Monitor labs   - Assess for incontinence   - Turn and reposition patient  - Assist with mobility/ambulation  - Relieve pressure over bony prominences  - Avoid friction and shearing  - Provide appropriate hygiene as needed including keeping skin clean and dry  - Evaluate need for skin moisturizer/barrier cream  - Collaborate with interdisciplinary team   - Patient/family teaching  - Consider wound care consult   Outcome: Progressing     Problem: MOBILITY - ADULT  Goal: Maintain or return to baseline ADL function  Description: INTERVENTIONS:  -  Assess patient's ability to carry out ADLs; assess patient's baseline for ADL function and identify physical deficits which impact ability to perform ADLs (bathing, care of mouth/teeth, toileting, grooming, dressing, etc )  - Assess/evaluate cause of self-care deficits   - Assess range of motion  - Assess patient's mobility; develop plan if impaired  - Assess patient's need for assistive devices and provide as appropriate  - Encourage maximum independence but intervene and supervise when necessary  - Involve family in performance of ADLs  - Assess for home care needs following discharge   - Consider OT consult to assist with ADL evaluation and planning for discharge  - Provide patient education as appropriate  Outcome: Progressing  Goal: Maintains/Returns to pre admission functional level  Description: INTERVENTIONS:  - Perform BMAT or MOVE assessment daily    - Set and communicate daily mobility goal to care team and patient/family/caregiver     - Collaborate with rehabilitation services on mobility goals if consulted  - Out of bed for toileting  - Record patient progress and toleration of activity level   Outcome: Progressing     Problem: CARDIOVASCULAR - ADULT  Goal: Maintains optimal cardiac output and hemodynamic stability  Description: INTERVENTIONS:  - Monitor I/O, vital signs and rhythm  - Monitor for S/S and trends of decreased cardiac output  - Administer and titrate ordered vasoactive medications to optimize hemodynamic stability  - Assess quality of pulses, skin color and temperature  - Assess for signs of decreased coronary artery perfusion  - Instruct patient to report change in severity of symptoms  Outcome: Progressing  Goal: Absence of cardiac dysrhythmias or at baseline rhythm  Description: INTERVENTIONS:  - Continuous cardiac monitoring, vital signs, obtain 12 lead EKG if ordered  - Administer antiarrhythmic and heart rate control medications as ordered  - Monitor electrolytes and administer replacement therapy as ordered  Outcome: Progressing     Problem: PAIN - ADULT  Goal: Verbalizes/displays adequate comfort level or baseline comfort level  Description: Interventions:  - Encourage patient to monitor pain and request assistance  - Assess pain using appropriate pain scale  - Administer analgesics based on type and severity of pain and evaluate response  - Implement non-pharmacological measures as appropriate and evaluate response  - Consider cultural and social influences on pain and pain management  - Notify physician/advanced practitioner if interventions unsuccessful or patient reports new pain  Outcome: Progressing

## 2023-03-25 NOTE — PLAN OF CARE
Problem: Prexisting or High Potential for Compromised Skin Integrity  Goal: Skin integrity is maintained or improved  Description: INTERVENTIONS:  - Identify patients at risk for skin breakdown  - Assess and monitor skin integrity  - Assess and monitor nutrition and hydration status  - Monitor labs   - Assess for incontinence   - Turn and reposition patient  - Assist with mobility/ambulation  - Relieve pressure over bony prominences  - Avoid friction and shearing  - Provide appropriate hygiene as needed including keeping skin clean and dry  - Evaluate need for skin moisturizer/barrier cream  - Collaborate with interdisciplinary team   - Patient/family teaching  - Consider wound care consult   Outcome: Progressing     Problem: MOBILITY - ADULT  Goal: Maintain or return to baseline ADL function  Description: INTERVENTIONS:  -  Assess patient's ability to carry out ADLs; assess patient's baseline for ADL function and identify physical deficits which impact ability to perform ADLs (bathing, care of mouth/teeth, toileting, grooming, dressing, etc )  - Assess/evaluate cause of self-care deficits   - Assess range of motion  - Assess patient's mobility; develop plan if impaired  - Assess patient's need for assistive devices and provide as appropriate  - Encourage maximum independence but intervene and supervise when necessary  - Involve family in performance of ADLs  - Assess for home care needs following discharge   - Consider OT consult to assist with ADL evaluation and planning for discharge  - Provide patient education as appropriate  Outcome: Progressing  Goal: Maintains/Returns to pre admission functional level  Description: INTERVENTIONS:  - Perform BMAT or MOVE assessment daily    - Set and communicate daily mobility goal to care team and patient/family/caregiver     - Collaborate with rehabilitation services on mobility goals if consulted  Problem: CARDIOVASCULAR - ADULT  Goal: Maintains optimal cardiac output and hemodynamic stability  Description: INTERVENTIONS:  - Monitor I/O, vital signs and rhythm  - Monitor for S/S and trends of decreased cardiac output  - Administer and titrate ordered vasoactive medications to optimize hemodynamic stability  - Assess quality of pulses, skin color and temperature  - Assess for signs of decreased coronary artery perfusion  - Instruct patient to report change in severity of symptoms  Outcome: Progressing  Goal: Absence of cardiac dysrhythmias or at baseline rhythm  Description: INTERVENTIONS:  - Continuous cardiac monitoring, vital signs, obtain 12 lead EKG if ordered  - Administer antiarrhythmic and heart rate control medications as ordered  - Monitor electrolytes and administer replacement therapy as ordered  Outcome: Progressing     Problem: PAIN - ADULT  Goal: Verbalizes/displays adequate comfort level or baseline comfort level  Description: Interventions:  - Encourage patient to monitor pain and request assistance  - Assess pain using appropriate pain scale  - Administer analgesics based on type and severity of pain and evaluate response  - Implement non-pharmacological measures as appropriate and evaluate response  - Consider cultural and social influences on pain and pain management  - Notify physician/advanced practitioner if interventions unsuccessful or patient reports new pain  Outcome: Progressing     - Out of bed for toileting  - Record patient progress and toleration of activity level   Outcome: Progressing

## 2023-03-25 NOTE — PROGRESS NOTES
Progress Note - Cardiothoracic Surgery   Gale Goodell 81 y o  male MRN: [id number]  Unit/Bed#: [facility] 411-01 Encounter: [id number]    Mitral regurgitation  S/P left atrial appendage ligation and mitral valve repair; POD # 3      24 Hour Events: Remains in rate controlled Afib  Episode of bradycardia with HR in 40's last evening  Continues on Amio infusion 0 5mg/min  Isolated hypotension last evening, BP 84/62, asymptomatic and self-resolved  Feels well      Medications:   Scheduled Meds:  Current Facility-Administered Medications   Medication Dose Route Frequency Provider Last Rate   • [facility]amin[facility]  [facility]     • [facility]aminophen  650 mg Oral [facility]6H [facility]N [facility]     • [facility]  [facility]     • amiodarone  0 5 mg/min Intravenous Continuous Addie Harris PA-C 0 5 mg/min (03/24/23 2000)   • amiodarone  200 mg Oral Q8H [address] Raudel Bradley PA-C     • aspirin  81 mg Oral Daily Addie Harris PA-C     • atorvastatin  80 mg Oral After [facility]     • bisacodyl  10 mg Rectal Daily [facility]N [facility]     • calcium carbonate  1,000 mg Oral BID PRN [facility]     • chlorhexidine  15 mL Swish & Spit Q12H [address] Raudel [place], [place]     • docusate sodium  100 mg Oral BID [facility]     • ferrous sulfate  325 mg Oral Daily With Breakfast [facility]     • finasteride  5 mg Oral Daily [facility]     • fluticasone  1 spray Nasal PRN [facility]     • fondaparinux  2 5 mg Subcutaneous Daily Raudel Bradley PA-C     • furosemide  40 mg Intravenous BID (diuretic) [facility]     • glycerin-hypromellose-  1 drop Both Eyes Q4H PRN Mellissa Swann PA-C     • insulin glargine  15 Units Subcutaneous HS Cara Alvarez DO     • insulin lispro  1-5 Units Subcutaneous 4x Daily (AC & HS) Cara Alvarez DO     • lisinopril  10 mg Oral Daily Daylin Hernandez PA-C     • melatonin  9 mg Oral HS Nahomy Duke PA-C     • metFORMIN  500 mg Oral BID With Meals Cara Alvarez DO     • metoprolol tartrate  25 mg Oral Q12H [facility] Daylin Hernandez PA-C     • mupirocin  1 application  Nasal Q12H [facility] Raudel Bradley PA-C     • ondansetron  4 mg Intravenous Q6H PRN Heribertodanya Nelson PA-C     • oxyCODONE  2 5 mg Oral Q4H PRN Heribertodanya Nelson PA-C     • oxyCODONE  5 mg Oral Q4H PRN Heriberto KEE Nelson     • pantoprazole  40 mg Oral Early Morning Heribertodanya Nelson PA-C     • polyethylene glycol  17 g Oral Daily Heribertodanya Nelson PA-C     • potassium chloride  20 mEq Oral BID Heribertodanya Nelson PA-C     • tamsulosin  0 4 mg Oral After [facility], PA-C       Continuous Infusions:amiodarone, 0 5 mg/min, Last Rate: 0 5 mg/min (03/24/23 2000)      PRN Meds: •  acetaminophen  •  acetaminophen  •  bisacodyl  •  calcium carbonate  •  fluticasone  •  glycerin-hypromellose-  •  ondansetron  •  oxyCODONE  •  oxyCODONE    Vitals:   Vitals:    03/24/23 2356 03/25/23 0258 03/25/23 0521 03/25/23 0717   BP: 99/58 97/57  104/67   BP Location: Right arm Right arm  Right arm   Pulse: 56 67  63   Resp:  18  18   Temp:  97 7 °F (36 5 °C)  97 6 °F (36 4 °C)   TempSrc:  Oral  Oral   SpO2:  92%  96%   Weight:   93 kg (205 lb 0 4 oz)    Height:           Telemetry: Atrial Fibrillation; Heart Rate: 60    Respiratory:   SpO2: SpO2: 96 %;  Room Air    Intake/Output:     Intake/Output Summary (Last 24 hours) at 3/25/2023 0811  Last data filed at 3/25/2023 0747  Gross per 24 hour   Intake 1852 56 ml   Output 700 ml   Net 1152 56 ml        Chest tube Output: removed      Weights:   Weight (last 2 days)     Date/Time Weight    03/25/23 0521 93 (205 03)    03/24/23 0659 93 2 (205 47)    03/23/23 0553 94 5 (208 34)            Results:   Results from last 7 days   Lab Units 03/25/23  0523 03/24/23  0535 03/22/23  0406   WBC Thousand/uL  --  8 99 17 29*   HEMOGLOBIN g/dL 10 2* 10 7* 14 1   HEMATOCRIT % 31 2* 33 5* 42 9   PLATELETS Thousands/uL 94* 79* 98*     Results from last 7 days   Lab Units 03/23/23  0512 03/22/23  0406 03/21/23  1859 03/21/23  1508 03/21/23  1124 03/21/23  1122   SODIUM mmol/L 135 138  --   --   --  142   POTASSIUM mmol/L 4 3 4 4 4 4   < >  --  3 9   CHLORIDE mmol/L 107 111*  --   --   --  116*   CO2 mmol/L 28 25  --   --   --  25   CO2, I-STAT mmol/L  --   --   --   --  26  --    BUN mg/dL 21 15  --   --   --  12   CREATININE mg/dL 0 73 0 85  --   --   --  0 76   GLUCOSE, ISTAT mg/dl  --   --   --   --  131  --    CALCIUM mg/dL 7 9* 7 6*  --   --   --  7 2*    < > = values in this interval not displayed  Results from last 7 days   Lab Units 03/25/23  0523 03/24/23  0535 03/23/23  1043 03/22/23  0406 03/21/23  1122   INR  1 28* 1 18 1 13   < > 1 48*   PTT seconds  --   --   --   --  36    < > = values in this interval not displayed  Coumadin mg 7 5 5 2 5 2 5 - -       Point of care glucose:  - 193  Endo following    Studies:  No studies past 24 hrs        Invasive Lines/Tubes:  Invasive Devices     Central Venous Catheter Line  Duration           CVC Central Lines 03/21/23 Triple 4 days          Peripheral Intravenous Line  Duration           Peripheral IV 03/21/23 Left Hand 4 days                Physical Exam:    HEENT/NECK:  Normocephalic  Atraumatic  No jugular venous distention  Cardiac: Irregularly irregular rate and rhythm  Pulmonary:  Breath sounds clear bilaterally and No rales/rhonchi/wheezes  Abdomen:  Non-tender, Non-distended and Normal bowel sounds  Incisions: Sternum is stable  Incision is clean, dry, and intact  Extremities: Extremities warm/dry and No edema B/L  Neuro: Alert and oriented X 3, Sensation is grossly intact and No focal deficits  Skin: Warm/Dry, without rashes or lesions          Assessment:  Principal Problem:    S/P mitral valve repair  Active Problems:    BPH with urinary obstruction    Essential hypertension    Left ventricular hypertrophy    Mitral regurgitation Hyperlipidemia    Idiopathic peripheral neuropathy    HUMBERTO treated with BiPAP    Excessive daytime sleepiness    Platelets decreased (HCC)    S/P left atrial appendage ligation    Anticoagulated on Coumadin    Postoperative atrial fibrillation (HCC)       Mitral regurgitation  S/P left atrial appendage ligation and mitral valve repair; POD # 3    Plan:    1  Cardiac:     Atrial Fibrillation; HR/BP well-controlled   Continue Lopressor, 25mg PO BID  Continue ASA and Statin therapy  Epicardial pacing wires have been removed  Maintain central IV due to administration of IV Amio  Continue DVT prophylaxis  PO Afib - given Amio bolus, continues on gtt  D/C Infusion once bag completed, continue PO dosing  Anticoagulation - INR 1 28, dose Coumadin 5mg tonight    2  Pulmonary:   Good Room air oxygen saturation; Continue incentive spirometry/Coughing/Deep breathing exercises  Chest tubes have been discontinued    3  Renal:   Intake/Output net: +700mL/24 hours  Diuretic Regimen:  Continue Lasix 40 mg IV BID  Continue Potassium Chloride 20 mEq PO BID  Post op Creatinine stable; Follow up labs prn    4  Neuro:  Neurologically intact; No active issues  Incisional pain well-controlled  Continue Tylenol, 975 mg PO q 8, standing dose  Continue Oxycodone, 2 5 to 5 mg PO q 4 hours prn pain    5  GI:  Tolerating TLC 2 3 gm sodium diet  Maintain 1800 mL daily fluid restriction   Continue stool softeners and prn suppository  Continue GI prophylaxis    6  Endo:   History of diabetes; Continue SQ insulin therapy as directed by endocrinology physician  Insulin administration teaching for home therapy ordered    7    Hematology:    Post-operative acute blood loss anemia; Hemoglobin 10 7; trend prn and Leukocytosis resolved  Thrombocytopenia - H/O Chronic thrombocytopenia with baseline Plt 113  Plt ct improving today  Monitor  8    Disposition:      Not yet medically cleared for discharge, pending postop Afib/conversion to NSR   Anticipate discharge home tomorrow      VTE Pharmacologic Prophylaxis: Fondaparinux (Arixtra) and Warfarin (Coumadin)  VTE Mechanical Prophylaxis: sequential compression device    Collaborative rounds completed with supervising physician  Plan of care discussed with bedside nurse    SIGNATURE: Jana Carver PA-C  DATE: March 25, 2023  TIME: 8:11 AM

## 2023-03-26 LAB
APTT PPP: 35 SECONDS (ref 23–37)
APTT PPP: 38 SECONDS (ref 23–37)
ERYTHROCYTE [DISTWIDTH] IN BLOOD BY AUTOMATED COUNT: 12.9 % (ref 11.6–15.1)
GLUCOSE SERPL-MCNC: 103 MG/DL (ref 65–140)
GLUCOSE SERPL-MCNC: 110 MG/DL (ref 65–140)
GLUCOSE SERPL-MCNC: 112 MG/DL (ref 65–140)
GLUCOSE SERPL-MCNC: 97 MG/DL (ref 65–140)
HCT VFR BLD AUTO: 32.8 % (ref 36.5–49.3)
HGB BLD-MCNC: 10.9 G/DL (ref 12–17)
INR PPP: 1.52 (ref 0.84–1.19)
MCH RBC QN AUTO: 31.8 PG (ref 26.8–34.3)
MCHC RBC AUTO-ENTMCNC: 33.2 G/DL (ref 31.4–37.4)
MCV RBC AUTO: 96 FL (ref 82–98)
PLATELET # BLD AUTO: 153 THOUSANDS/UL (ref 149–390)
PMV BLD AUTO: 9.4 FL (ref 8.9–12.7)
PROTHROMBIN TIME: 18.5 SECONDS (ref 11.6–14.5)
RBC # BLD AUTO: 3.43 MILLION/UL (ref 3.88–5.62)
WBC # BLD AUTO: 6.48 THOUSAND/UL (ref 4.31–10.16)

## 2023-03-26 RX ORDER — HEPARIN SODIUM 10000 [USP'U]/100ML
3-20 INJECTION, SOLUTION INTRAVENOUS
Status: DISCONTINUED | OUTPATIENT
Start: 2023-03-26 | End: 2023-03-27 | Stop reason: HOSPADM

## 2023-03-26 RX ORDER — POTASSIUM CHLORIDE 20 MEQ/1
20 TABLET, EXTENDED RELEASE ORAL DAILY
Status: DISCONTINUED | OUTPATIENT
Start: 2023-03-26 | End: 2023-03-27 | Stop reason: HOSPADM

## 2023-03-26 RX ORDER — TORSEMIDE 20 MG/1
20 TABLET ORAL DAILY
Status: DISCONTINUED | OUTPATIENT
Start: 2023-03-26 | End: 2023-03-27 | Stop reason: HOSPADM

## 2023-03-26 RX ORDER — WARFARIN SODIUM 5 MG/1
5 TABLET ORAL
Status: COMPLETED | OUTPATIENT
Start: 2023-03-26 | End: 2023-03-26

## 2023-03-26 RX ADMIN — LISINOPRIL 10 MG: 10 TABLET ORAL at 08:29

## 2023-03-26 RX ADMIN — WARFARIN SODIUM 5 MG: 5 TABLET ORAL at 17:20

## 2023-03-26 RX ADMIN — METFORMIN HYDROCHLORIDE 500 MG: 500 TABLET ORAL at 17:20

## 2023-03-26 RX ADMIN — METOPROLOL TARTRATE 25 MG: 25 TABLET, FILM COATED ORAL at 08:29

## 2023-03-26 RX ADMIN — MELATONIN 9 MG: at 21:21

## 2023-03-26 RX ADMIN — PANTOPRAZOLE SODIUM 40 MG: 40 TABLET, DELAYED RELEASE ORAL at 05:15

## 2023-03-26 RX ADMIN — ACETAMINOPHEN 975 MG: 325 TABLET ORAL at 21:24

## 2023-03-26 RX ADMIN — AMIODARONE HYDROCHLORIDE 200 MG: 200 TABLET ORAL at 05:15

## 2023-03-26 RX ADMIN — CHLORHEXIDINE GLUCONATE 0.12% ORAL RINSE 15 ML: 1.2 LIQUID ORAL at 08:29

## 2023-03-26 RX ADMIN — ASPIRIN 81 MG: 81 TABLET, COATED ORAL at 08:29

## 2023-03-26 RX ADMIN — ACETAMINOPHEN 975 MG: 325 TABLET ORAL at 05:15

## 2023-03-26 RX ADMIN — MUPIROCIN 1 APPLICATION.: 20 OINTMENT TOPICAL at 08:29

## 2023-03-26 RX ADMIN — FERROUS SULFATE TAB 325 MG (65 MG ELEMENTAL FE) 325 MG: 325 (65 FE) TAB at 08:29

## 2023-03-26 RX ADMIN — HEPARIN SODIUM 11.1 UNITS/KG/HR: 10000 INJECTION, SOLUTION INTRAVENOUS at 09:56

## 2023-03-26 RX ADMIN — INSULIN GLARGINE 15 UNITS: 100 INJECTION, SOLUTION SUBCUTANEOUS at 21:21

## 2023-03-26 RX ADMIN — CHLORHEXIDINE GLUCONATE 0.12% ORAL RINSE 15 ML: 1.2 LIQUID ORAL at 21:20

## 2023-03-26 RX ADMIN — AMIODARONE HYDROCHLORIDE 200 MG: 200 TABLET ORAL at 21:21

## 2023-03-26 RX ADMIN — METFORMIN HYDROCHLORIDE 500 MG: 500 TABLET ORAL at 08:28

## 2023-03-26 RX ADMIN — OXYCODONE HYDROCHLORIDE 2.5 MG: 5 TABLET ORAL at 05:24

## 2023-03-26 RX ADMIN — FONDAPARINUX SODIUM 2.5 MG: 2.5 INJECTION, SOLUTION SUBCUTANEOUS at 08:28

## 2023-03-26 RX ADMIN — METOPROLOL TARTRATE 25 MG: 25 TABLET, FILM COATED ORAL at 21:21

## 2023-03-26 RX ADMIN — AMIODARONE HYDROCHLORIDE 200 MG: 200 TABLET ORAL at 13:19

## 2023-03-26 RX ADMIN — MUPIROCIN 1 APPLICATION.: 20 OINTMENT TOPICAL at 21:21

## 2023-03-26 RX ADMIN — ATORVASTATIN CALCIUM 80 MG: 80 TABLET, FILM COATED ORAL at 17:20

## 2023-03-26 RX ADMIN — TAMSULOSIN HYDROCHLORIDE 0.4 MG: 0.4 CAPSULE ORAL at 17:20

## 2023-03-26 RX ADMIN — ACETAMINOPHEN 975 MG: 325 TABLET ORAL at 13:18

## 2023-03-26 RX ADMIN — POTASSIUM CHLORIDE 20 MEQ: 1500 TABLET, EXTENDED RELEASE ORAL at 08:29

## 2023-03-26 RX ADMIN — FINASTERIDE 5 MG: 5 TABLET, FILM COATED ORAL at 08:29

## 2023-03-26 RX ADMIN — TORSEMIDE 20 MG: 20 TABLET ORAL at 08:29

## 2023-03-26 NOTE — PROGRESS NOTES
Progress Note - Cardiothoracic Surgery   Para Fab 81 y o  male MRN: [id number]  Unit/Bed#: [facility] 411-01 Encounter: [id number]    Mitral regurgitation  S/P left atrial appendage ligation and mitral valve repair; POD # 4      24 Hour Events: Remains in rate controlled Afib  Amio infusion completed yesterday  Feels well, denies CP, SOB or palpitations  Had BM      Medications:   Scheduled Meds:  Current Facility-Administered Medications   Medication Dose Route Frequency Provider Last Rate   • acetaminophen  650 mg Rectal Q4H PRN Carmina Blair PA-C     • acetaminophen  650 mg Oral Q6H PRN Carmina Blair PA-C     • acetaminophen  975 mg Oral 10 [facility], KEE     • amiodarone  0 5 mg/min Intravenous Continuous Corrie Bucnh PA-C Stopped (03/25/23 1100)   • amiodarone  200 mg Oral Q8H [facility] Raudel Bradley PA-C     • aspirin  81 mg Oral Daily Corrie Bunch PA-C     • atorvastatin  80 mg Oral After [facility], PA-C     • bisacodyl  10 mg Rectal Daily PRN Carmina Blair PA-C     • calcium carbonate  1,000 mg Oral BID PRN Carmina Blair PA-C     • chlorhexidine  15 mL Swish & Spit Q12H [facility] Raudel [place], [place]     • docusate sodium  100 mg Oral BID Carmina Blair PA-C     • ferrous sulfate  325 mg Oral Daily With Breakfast Carmina Blair PA-C     • finasteride  5 mg Oral Daily Carmina Blair PA-C     • fluticasone  1 spray Nasal PRN Carmina Blair PA-C     • fondaparinux  2 5 mg Subcutaneous Daily Raudel Bradley PA-C     • furosemide  40 mg Intravenous BID (diuretic) Carmina Blair PA-C     • glycerin-hypromellose-  1 drop Both Eyes Q4H PRN Mellissa Swann PA-C     • insulin glargine  15 Units Subcutaneous HS Cara Alvarez DO     • insulin lispro  1-5 Units Subcutaneous 4x Daily (AC & HS) Cara Alvarez DO     • lisinopril  10 mg Oral Daily Daylin Hernandez PA-C     • melatonin  9 mg Oral HS Daylin Hernandez PA-C     • metFORMIN  500 mg Oral BID With Meals Cara Alvarez DO • metoprolol tartrate  25 mg Oral Q12H [address] Daylin KEE [place]     • mupirocin  1 application  Nasal Q12H [address]     • ondansetron  4 mg Intravenous Q6H PRN Maria Del Rosario Skinner PA-C     • oxyCODONE  2 5 mg Oral Q4H PRN Maria Del Rosario Skinner PA-C     • oxyCODONE  5 mg Oral Q4H PRN Maria Del Rosario Skinner PA-C     • pantoprazole  40 mg Oral Early Morning Maria Del Rosario Skinner PA-C     • polyethylene glycol  17 g Oral Daily Maria Del Rosario Skinner PA-C     • potassium chloride  20 mEq Oral BID Maria Del Rosario Skinner PA-C     • tamsulosin  0 4 mg Oral After [facility]       Continuous Infusions:amiodarone, 0 5 mg/min, Last Rate: Stopped (03/25/23 1100)      PRN Meds: •  acetaminophen  •  acetaminophen  •  bisacodyl  •  calcium carbonate  •  fluticasone  •  glycerin-hypromellose-  •  ondansetron  •  oxyCODONE  •  oxyCODONE    Vitals:   Vitals:    03/25/23 1958 03/25/23 2312 03/26/23 0216 03/26/23 0513   BP: 116/66 98/55 102/66    BP Location: Right arm Right arm Right arm    Pulse: 65 66 72    Resp: 18 18 16    Temp: 98 2 °F (36 8 °C) 98 °F (36 7 °C) 97 5 °F (36 4 °C)    TempSrc: Oral Oral Oral    SpO2: 94% 91% 93%    Weight:    91 5 kg (201 lb 11 5 oz)   Height:           Telemetry: Atrial Fibrillation; Heart Rate: 63    Respiratory:   SpO2: SpO2: 93 %;  Room Air    Intake/Output:     Intake/Output Summary (Last 24 hours) at 3/26/2023 0702  Last data filed at 3/26/2023 0517  Gross per 24 hour   Intake 388 ml   Output 400 ml   Net -12 ml        Chest tube Output: removed      Weights:   Weight (last 2 days)     Date/Time Weight    03/26/23 0513 91 5 (201 72)    03/25/23 0521 93 (205 03)    03/24/23 0659 93 2 (205 47)            Results:   Results from last 7 days   Lab Units 03/25/23  0523 03/24/23  0535 03/22/23  0406   WBC Thousand/uL  --  8 99 17 29*   HEMOGLOBIN g/dL 10 2* 10 7* 14 1   HEMATOCRIT % 31 2* 33 5* 42 9   PLATELETS Thousands/uL 94* 79* 98*     Results from last 7 days   Lab Units 03/23/23  0758 03/22/23  0406 03/21/23  1859 03/21/23  1508 03/21/23  1124 03/21/23  1122   SODIUM mmol/L 135 138  --   --   --  142   POTASSIUM mmol/L 4 3 4 4 4 4   < >  --  3 9   CHLORIDE mmol/L 107 111*  --   --   --  116*   CO2 mmol/L 28 25  --   --   --  25   CO2, I-STAT mmol/L  --   --   --   --  26  --    BUN mg/dL 21 15  --   --   --  12   CREATININE mg/dL 0 73 0 85  --   --   --  0 76   GLUCOSE, ISTAT mg/dl  --   --   --   --  131  --    CALCIUM mg/dL 7 9* 7 6*  --   --   --  7 2*    < > = values in this interval not displayed  Results from last 7 days   Lab Units 03/26/23  0527 03/25/23  0523 03/24/23  0535 03/22/23  0406 03/21/23  1122   INR  1 52* 1 28* 1 18   < > 1 48*   PTT seconds  --   --   --   --  36    < > = values in this interval not displayed  Coumadin mg 5, 7 5 5 2 5 2 5 - -       Point of care glucose:  - 126  Endo following    Studies:  No studies past 24 hrs        Invasive Lines/Tubes:  Invasive Devices     Central Venous Catheter Line  Duration           CVC Central Lines 03/21/23 Triple 4 days                Physical Exam:    HEENT/NECK:  Normocephalic  Atraumatic  No jugular venous distention  Cardiac: Irregularly irregular rate and rhythm  Pulmonary:  Breath sounds clear bilaterally and No rales/rhonchi/wheezes  Abdomen:  Non-tender, Non-distended and Normal bowel sounds  Incisions: Sternum is stable  Incision is clean, dry, and intact  Extremities: Extremities warm/dry and Trace edema B/L  Neuro: Alert and oriented X 3, Sensation is grossly intact and No focal deficits  Skin: Warm/Dry, without rashes or lesions          Assessment:  Principal Problem:    S/P mitral valve repair  Active Problems:    BPH with urinary obstruction    Essential hypertension    Left ventricular hypertrophy    Mitral regurgitation    Hyperlipidemia    Idiopathic peripheral neuropathy    HUMBERTO treated with BiPAP    Excessive daytime sleepiness    Platelets decreased (HCC)    S/P left atrial appendage ligation    Anticoagulated on Coumadin    Postoperative atrial fibrillation (HCC)       Mitral regurgitation  S/P left atrial appendage ligation and mitral valve repair; POD # 4    Plan:    1  Cardiac:     Atrial Fibrillation; HR/BP well-controlled   Continue Lopressor, 25mg PO BID  Continue ASA and Statin therapy  Epicardial pacing wires have been removed  Remove central IV access today  Continue DVT prophylaxis  PO Afib - completed Amio bolus/gtt, continues on PO dosing  Plan for cardioversion tomorrow  Start Heparin gtt  Anticoagulation - INR 1 52, dose Coumadin 5mg tonight    2  Pulmonary:   Good Room air oxygen saturation; Continue incentive spirometry/Coughing/Deep breathing exercises  Chest tubes have been discontinued    3  Renal:   Intake/Output net: -12mL/24 hours  Diuretic Regimen:  Transition to Demadex 20mg PO daily  Potassium Chloride 20 mEq PO QD  Post op Creatinine stable; Follow up labs prn    4  Neuro:  Neurologically intact; No active issues  Incisional pain well-controlled  Continue Tylenol, 975 mg PO q 8, standing dose  Continue Oxycodone, 2 5 to 5 mg PO q 4 hours prn pain    5  GI:  Tolerating TLC 2 3 gm sodium diet  Maintain 1800 mL daily fluid restriction   Continue stool softeners and prn suppository  Continue GI prophylaxis    6  Endo:   History of diabetes; Continue SQ insulin therapy as directed by endocrinology physician  Insulin administration teaching for home therapy ordered    7    Hematology:    Post-operative acute blood loss anemia; Hemoglobin 10 7; trend prn and Leukocytosis resolved  Thrombocytopenia - H/O Chronic thrombocytopenia with baseline Plt 113  Plt ct improving  Monitor  8    Disposition:      Doing well  For possible discharge tomorrow following cardioversion      VTE Pharmacologic Prophylaxis: Fondaparinux (Arixtra) and Warfarin (Coumadin)  VTE Mechanical Prophylaxis: sequential compression device    Collaborative rounds completed with supervising physician  Plan of care discussed with bedside nurse    SIGNATURE: Nahomy Duke PA-C  DATE: March 26, 2023  TIME: 7:02 AM

## 2023-03-26 NOTE — PROGRESS NOTES
"Cardiology Progress Note - Néstor Snow 81 y o  male MRN: [id number]    Unit/Bed#: [facility] 411-01 Encounter: [id number]      Assessment/Recommendations:  1  Status post mitral valve repair with left atrial appendage ligation for mitral valve prolapse and severe mitral regurgitation: Valve repair with 32 mm Medtronic CG Future mitral annuloplasty ring and folding plasty of P1 to P2 and P2 to P3  Left atrial appendage with 45 mm atrial clip device  Normal LV function on Intra-Op TANG  No significant CAD seen on left heart cath preoperatively  Postop course complicated by atrial fibrillation  Awaiting INR to become therapeutic between 2-3  Continues on diuresis, incentive spirometry, ambulation  Continue on statin and beta-blocker  2   Postoperative atrial fibrillation: Started on amiodarone p o  as well as bolus and drip  Still remains in atrial fibrillation with slow ventricular response  Awaiting therapeutic INR  Continue to follow closely on telemetry  Will arrange for TANG/DCCV tomorrow to attempt to restore NSR  3   Hypertension: Continued current regiment, well controlled  4   Hyperlipidemia: Continued on statin  5   Type 2 diabetes mellitus: As per primary team   6   HUMBERTO: On CPAP  7   History of GI bleed: Secondary to diverticulitis of colon with perforation  Watch for recurrence of bleeding while on Coumadin  8   Acute postoperative blood loss anemia: Significant drop of hemoglobin from 14-10 7  Now remained stable in the 10 range  Continue to watch while in the hospital     Subjective:   Patient seen and examined  No significant events overnight   ; pertinent negatives - chest pain, chest pressure/discomfort, dyspnea, irregular heart beat, lower extremity edema and palpitations  Objective:     Vitals: Blood pressure 126/74, pulse 72, temperature 98 7 °F (37 1 °C), temperature source Oral, resp  rate 17, height 6' 1\" (1 854 m), weight 91 5 kg (201 lb 11 5 oz), SpO2 99 %  , Body mass index " is 26 61 kg/m² ,   Orthostatic Blood Pressures    Flowsheet Row Most Recent Value   Blood Pressure 126/74 filed at 03/26/2023 9172   Patient Position - Orthostatic VS Sitting filed at 03/26/2023 0824            Intake/Output Summary (Last 24 hours) at 3/26/2023 1047  Last data filed at 3/26/2023 0825  Gross per 24 hour   Intake 566 ml   Output 400 ml   Net 166 ml       TELE: Remains in A-fib with slow ventricular response      Physical Exam:  GEN: Sunita Mccullough appears well, alert and oriented x 3, pleasant and cooperative   HEENT: pupils equal, round, and reactive to light; extraocular muscles intact  NECK: supple, no carotid bruits   HEART: irregular rhythm, normal S1 and S2, +systolic murmur, no clicks, gallops or rubs   LUNGS: clear to auscultation bilaterally; no wheezes, rales, or rhonchi   ABDOMEN: normal bowel sounds, soft, no tenderness, no distention  EXTREMITIES: peripheral pulses normal; no clubbing, cyanosis, or edema  NEURO: no focal findings   SKIN: normal without suspicious lesions on exposed skin      Medications:      Current Facility-Administered Medications:   •  acetaminophen (TYLENOL) rectal suppository 650 mg, 650 mg, Rectal, Q4H PRN, Noreen Zavaleta PA-C  •  acetaminophen (TYLENOL) tablet 650 mg, 650 mg, Oral, Q6H PRN, Noreen Zavaleta PA-C  •  acetaminophen (TYLENOL) tablet 975 mg, 975 mg, Oral, Q8H, Raudel Bradley PA-C, 975 mg at 03/26/23 3242  •  amiodarone tablet 200 mg, 200 mg, Oral, Q8H [address], Raudel Bradley PA-C, 200 mg at 03/26/23 0515  •  aspirin (ECOTRIN LOW STRENGTH) EC tablet 81 mg, 81 mg, Oral, Daily, Catia Faith PA-C, 81 mg at 03/26/23 4033  •  atorvastatin (LIPITOR) tablet 80 mg, 80 mg, Oral, After KEE Mcbride, 80 mg at 03/25/23 1711  •  bisacodyl (DULCOLAX) rectal suppository 10 mg, 10 mg, Rectal, Daily PRN, Noreen Waqar, PA-C  •  calcium carbonate (TUMS) chewable tablet 1,000 mg, 1,000 mg, Oral, BID PRN, Noreen Zavaleta PA-C  •  chlorhexidine (PERIDEX) 0 12 % oral rinse 15 mL, 15 mL, Swish & Spit, Q12H [facility], KEE Ponce, 15 mL at 03/26/23 8469  •  docusate sodium (COLACE) capsule 100 mg, 100 mg, Oral, BID, KEE Ponce, 100 mg at 03/25/23 1711  •  ferrous sulfate tablet 325 mg, 325 mg, Oral, Daily With Breakfast, KEE Ponce, 325 mg at 03/26/23 8222  •  finasteride (PROSCAR) tablet 5 mg, 5 mg, Oral, Daily, KEE Ponce, 5 mg at 03/26/23 8227  •  fluticasone (FLONASE) 50 mcg/act nasal spray 1 spray, 1 spray, Nasal, PRN, KEE Ponce  •  glycerin-hypromellose- (ARTIFICIAL TEARS) ophthalmic solution 1 drop, 1 drop, Both Eyes, Q4H PRN, Mellissa Swann PA-C, 1 drop at 03/24/23 1344  •  heparin (porcine) 25,000 units in 0 45% NaCl 250 mL infusion (premix), 3-20 Units/kg/hr (Order-Specific), Intravenous, Titrated, Daylin Hernandez PA-C, Last Rate: 10 mL/hr at 03/26/23 0956, 11 1 Units/kg/hr at 03/26/23 0956  •  insulin glargine (LANTUS) subcutaneous injection 15 Units 0 15 mL, 15 Units, Subcutaneous, HS, Cara Alvarez DO, 15 Units at 03/25/23 2129  •  insulin lispro (HumaLOG) 100 units/mL subcutaneous injection 1-5 Units, 1-5 Units, Subcutaneous, 4x Daily (AC & HS), 1 Units at 03/25/23 0617 **AND** Fingerstick Glucose (POCT), , , 4x Daily AC and at bedtime, Cara Alvarez DO  •  lisinopril (ZESTRIL) tablet 10 mg, 10 mg, Oral, Daily, Daylin Hernandez PA-C, 10 mg at 03/26/23 7983  •  melatonin tablet 9 mg, 9 mg, Oral, HS, Daylin Hernandez PA-C, 9 mg at 03/25/23 2129  •  metFORMIN (GLUCOPHAGE) tablet 500 mg, 500 mg, Oral, BID With Meals, Cara Alvarez DO, 500 mg at 03/26/23 9871  •  metoprolol tartrate (LOPRESSOR) tablet 25 mg, 25 mg, Oral, Q12H [facility], Daylin Hernandez PA-C, 25 mg at 03/26/23 4753  •  mupirocin (BACTROBAN) 2 % nasal ointment 1 application  , 1 application  , Nasal, Q12H [facility], Daylin Hernandez PA-C, 1 application   at 03/26/23 0810  •  ondansetron (ZOFRAN) injection 4 mg, 4 mg, Intravenous, Q6H PRN, Jennifer Grade KEE Bradley  •  oxyCODONE (ROXICODONE) IR tablet 2 5 mg, 2 5 mg, Oral, Q4H PRN, CHET Porter-C, 2 5 mg at 03/26/23 1013  •  oxyCODONE (ROXICODONE) IR tablet 5 mg, 5 mg, Oral, Q4H PRN, CHET Porter-C, 5 mg at 03/22/23 0146  •  pantoprazole (PROTONIX) EC tablet 40 mg, 40 mg, Oral, Early Morning, Raudel Bradley PA-C, 40 mg at 03/26/23 0515  •  polyethylene glycol (MIRALAX) packet 17 g, 17 g, Oral, Daily, CHET Porter-C, 17 g at 03/24/23 4672  •  potassium chloride (K-DUR,KLOR-CON) CR tablet 20 mEq, 20 mEq, Oral, Daily, CHET Bowie-C, 20 mEq at 03/26/23 3947  •  tamsulosin (FLOMAX) capsule 0 4 mg, 0 4 mg, Oral, After Dinner, CHET Porter-C, 0 4 mg at 03/25/23 1711  •  torsemide (DEMADEX) tablet 20 mg, 20 mg, Oral, Daily, Solomon Trevino PA-C, 20 mg at 03/26/23 5564  •  warfarin (COUMADIN) tablet 5 mg, 5 mg, Oral, Once (warfarin), Solomon Trevino PA-C     Labs & Results:        Results from last 7 days   Lab Units 03/26/23  1003 03/25/23  0523 03/24/23  0535 03/22/23  0406   WBC Thousand/uL 6 48  --  8 99 17 29*   HEMOGLOBIN g/dL 10 9* 10 2* 10 7* 14 1   HEMATOCRIT % 32 8* 31 2* 33 5* 42 9   PLATELETS Thousands/uL 153 94* 79* 98*         Results from last 7 days   Lab Units 03/23/23  0512 03/22/23  0406 03/21/23  1859 03/21/23  1508 03/21/23  1124 03/21/23  1122 03/21/23  1059 03/21/23  0946   POTASSIUM mmol/L 4 3 4 4 4 4   < >  --  3 9  --   --    CHLORIDE mmol/L 107 111*  --   --   --  116*  --   --    CO2 mmol/L 28 25  --   --   --  25  --   --    CO2, I-STAT mmol/L  --   --   --   --  26  --  26 32   BUN mg/dL 21 15  --   --   --  12  --   --    CREATININE mg/dL 0 73 0 85  --   --   --  0 76  --   --    CALCIUM mg/dL 7 9* 7 6*  --   --   --  7 2*  --   --    ALK PHOS U/L 51  --   --   --   --   --   --   --    ALT U/L 17  --   --   --   --   --   --   --    AST U/L 32  --   --   --   --   --   --   --    GLUCOSE, ISTAT mg/dl  --   --   --   --  131  --  141* 191*    < > = values in this interval not displayed  Results from last 7 days   Lab Units 03/26/23  1003 03/26/23  0527 03/25/23  0523 03/24/23  0535 03/22/23  0406 03/21/23  1122   INR   --  1 52* 1 28* 1 18   < > 1 48*   PTT seconds 35  --   --   --   --  36    < > = values in this interval not displayed  Results from last 7 days   Lab Units 03/23/23  0512 03/22/23  0406   MAGNESIUM mg/dL 2 5 2 8*       Intra-Op TANG: Ejection fraction normal at 60%, dilated RV and LV  Severe mitral regurgitation with prolapse  Postoperative TANG revealed 32 mm mitral valve ring with a mean gradient of 2 mmHg, no significant stenosis or regurgitation      EKG personally reviewed by Severo Kohler, MD

## 2023-03-27 ENCOUNTER — ANESTHESIA (INPATIENT)
Dept: NON INVASIVE DIAGNOSTICS | Facility: HOSPITAL | Age: 73
End: 2023-03-27

## 2023-03-27 ENCOUNTER — RA CDI HCC (OUTPATIENT)
Dept: OTHER | Facility: HOSPITAL | Age: 73
End: 2023-03-27

## 2023-03-27 ENCOUNTER — APPOINTMENT (INPATIENT)
Dept: NON INVASIVE DIAGNOSTICS | Facility: HOSPITAL | Age: 73
End: 2023-03-27
Attending: INTERNAL MEDICINE

## 2023-03-27 ENCOUNTER — ANESTHESIA EVENT (INPATIENT)
Dept: NON INVASIVE DIAGNOSTICS | Facility: HOSPITAL | Age: 73
End: 2023-03-27

## 2023-03-27 VITALS
BODY MASS INDEX: 26.37 KG/M2 | OXYGEN SATURATION: 94 % | SYSTOLIC BLOOD PRESSURE: 118 MMHG | WEIGHT: 199 LBS | HEIGHT: 73 IN | RESPIRATION RATE: 18 BRPM | HEART RATE: 64 BPM | DIASTOLIC BLOOD PRESSURE: 56 MMHG | TEMPERATURE: 97.7 F

## 2023-03-27 PROBLEM — Z51.81 ANTICOAGULATION GOAL OF INR 2 TO 3: Status: ACTIVE | Noted: 2023-03-27

## 2023-03-27 PROBLEM — R73.9 STRESS HYPERGLYCEMIA: Status: ACTIVE | Noted: 2023-03-27

## 2023-03-27 PROBLEM — Z79.01 ANTICOAGULATION GOAL OF INR 2 TO 3: Status: ACTIVE | Noted: 2023-03-27

## 2023-03-27 LAB
ANION GAP SERPL CALCULATED.3IONS-SCNC: 2 MMOL/L (ref 4–13)
APTT PPP: 60 SECONDS (ref 23–37)
APTT PPP: 85 SECONDS (ref 23–37)
ATRIAL RATE: 65 BPM
BUN SERPL-MCNC: 18 MG/DL (ref 5–25)
CALCIUM SERPL-MCNC: 8.4 MG/DL (ref 8.3–10.1)
CHLORIDE SERPL-SCNC: 102 MMOL/L (ref 96–108)
CO2 SERPL-SCNC: 31 MMOL/L (ref 21–32)
CREAT SERPL-MCNC: 0.78 MG/DL (ref 0.6–1.3)
ERYTHROCYTE [DISTWIDTH] IN BLOOD BY AUTOMATED COUNT: 12.7 % (ref 11.6–15.1)
GFR SERPL CREATININE-BSD FRML MDRD: 91 ML/MIN/1.73SQ M
GLUCOSE SERPL-MCNC: 88 MG/DL (ref 65–140)
GLUCOSE SERPL-MCNC: 91 MG/DL (ref 65–140)
GLUCOSE SERPL-MCNC: 93 MG/DL (ref 65–140)
HCT VFR BLD AUTO: 29.7 % (ref 36.5–49.3)
HGB BLD-MCNC: 10.1 G/DL (ref 12–17)
INR PPP: 1.82 (ref 0.84–1.19)
MAGNESIUM SERPL-MCNC: 2.1 MG/DL (ref 1.6–2.6)
MCH RBC QN AUTO: 32.2 PG (ref 26.8–34.3)
MCHC RBC AUTO-ENTMCNC: 34 G/DL (ref 31.4–37.4)
MCV RBC AUTO: 95 FL (ref 82–98)
P AXIS: 26 DEGREES
PLATELET # BLD AUTO: 154 THOUSANDS/UL (ref 149–390)
PMV BLD AUTO: 9.3 FL (ref 8.9–12.7)
POTASSIUM SERPL-SCNC: 3.6 MMOL/L (ref 3.5–5.3)
PR INTERVAL: 192 MS
PROTHROMBIN TIME: 21.3 SECONDS (ref 11.6–14.5)
QRS AXIS: 19 DEGREES
QRS AXIS: 24 DEGREES
QRSD INTERVAL: 104 MS
QRSD INTERVAL: 114 MS
QT INTERVAL: 448 MS
QT INTERVAL: 482 MS
QTC INTERVAL: 486 MS
QTC INTERVAL: 501 MS
RBC # BLD AUTO: 3.14 MILLION/UL (ref 3.88–5.62)
SL CV LV EF: 60
SODIUM SERPL-SCNC: 135 MMOL/L (ref 135–147)
T WAVE AXIS: 6 DEGREES
T WAVE AXIS: 8 DEGREES
VENTRICULAR RATE: 65 BPM
VENTRICULAR RATE: 71 BPM
WBC # BLD AUTO: 5.47 THOUSAND/UL (ref 4.31–10.16)

## 2023-03-27 PROCEDURE — 5A2204Z RESTORATION OF CARDIAC RHYTHM, SINGLE: ICD-10-PCS | Performed by: THORACIC SURGERY (CARDIOTHORACIC VASCULAR SURGERY)

## 2023-03-27 RX ORDER — PANTOPRAZOLE SODIUM 40 MG/1
40 TABLET, DELAYED RELEASE ORAL
Qty: 30 TABLET | Refills: 0 | Status: SHIPPED | OUTPATIENT
Start: 2023-03-28 | End: 2023-04-27

## 2023-03-27 RX ORDER — POTASSIUM CHLORIDE 20 MEQ/1
20 TABLET, EXTENDED RELEASE ORAL DAILY
Qty: 5 TABLET | Refills: 1 | Status: SHIPPED | OUTPATIENT
Start: 2023-03-28 | End: 2023-04-06

## 2023-03-27 RX ORDER — ACETAMINOPHEN 325 MG/1
TABLET ORAL
Refills: 0 | COMMUNITY
Start: 2023-03-27

## 2023-03-27 RX ORDER — SODIUM CHLORIDE 9 MG/ML
INJECTION, SOLUTION INTRAVENOUS CONTINUOUS PRN
Status: DISCONTINUED | OUTPATIENT
Start: 2023-03-27 | End: 2023-03-27

## 2023-03-27 RX ORDER — PROPOFOL 10 MG/ML
INJECTION, EMULSION INTRAVENOUS CONTINUOUS PRN
Status: DISCONTINUED | OUTPATIENT
Start: 2023-03-27 | End: 2023-03-27

## 2023-03-27 RX ORDER — POTASSIUM CHLORIDE 29.8 MG/ML
40 INJECTION INTRAVENOUS ONCE
Status: COMPLETED | OUTPATIENT
Start: 2023-03-27 | End: 2023-03-27

## 2023-03-27 RX ORDER — OXYCODONE HYDROCHLORIDE 5 MG/1
TABLET ORAL
Qty: 30 TABLET | Refills: 0 | Status: SHIPPED | OUTPATIENT
Start: 2023-03-27 | End: 2023-04-03

## 2023-03-27 RX ORDER — WARFARIN SODIUM 5 MG/1
5 TABLET ORAL
Status: DISCONTINUED | OUTPATIENT
Start: 2023-03-27 | End: 2023-03-27 | Stop reason: HOSPADM

## 2023-03-27 RX ORDER — LIDOCAINE HYDROCHLORIDE 10 MG/ML
INJECTION, SOLUTION EPIDURAL; INFILTRATION; INTRACAUDAL; PERINEURAL AS NEEDED
Status: DISCONTINUED | OUTPATIENT
Start: 2023-03-27 | End: 2023-03-27

## 2023-03-27 RX ORDER — TEMAZEPAM 15 MG/1
15 CAPSULE ORAL
Status: DISCONTINUED | OUTPATIENT
Start: 2023-03-27 | End: 2023-03-27 | Stop reason: HOSPADM

## 2023-03-27 RX ORDER — PROPOFOL 10 MG/ML
INJECTION, EMULSION INTRAVENOUS AS NEEDED
Status: DISCONTINUED | OUTPATIENT
Start: 2023-03-27 | End: 2023-03-27

## 2023-03-27 RX ORDER — EPHEDRINE SULFATE 50 MG/ML
INJECTION INTRAVENOUS AS NEEDED
Status: DISCONTINUED | OUTPATIENT
Start: 2023-03-27 | End: 2023-03-27

## 2023-03-27 RX ORDER — AMIODARONE HYDROCHLORIDE 200 MG/1
TABLET ORAL
Qty: 42 TABLET | Refills: 0 | Status: SHIPPED | OUTPATIENT
Start: 2023-03-27

## 2023-03-27 RX ORDER — TORSEMIDE 20 MG/1
20 TABLET ORAL DAILY
Qty: 5 TABLET | Refills: 1 | Status: SHIPPED | OUTPATIENT
Start: 2023-03-28 | End: 2023-04-03 | Stop reason: SDUPTHER

## 2023-03-27 RX ORDER — POLYETHYLENE GLYCOL 3350 17 G/17G
17 POWDER, FOR SOLUTION ORAL DAILY PRN
Qty: 30 EACH | Refills: 0 | COMMUNITY
Start: 2023-03-27 | End: 2023-04-03

## 2023-03-27 RX ORDER — WARFARIN SODIUM 2.5 MG/1
TABLET ORAL
Qty: 60 TABLET | Refills: 2 | Status: SHIPPED | OUTPATIENT
Start: 2023-03-27

## 2023-03-27 RX ORDER — LISINOPRIL 10 MG/1
10 TABLET ORAL DAILY
Qty: 30 TABLET | Refills: 2 | Status: SHIPPED | OUTPATIENT
Start: 2023-03-27

## 2023-03-27 RX ORDER — DOCUSATE SODIUM 100 MG/1
100 CAPSULE, LIQUID FILLED ORAL 2 TIMES DAILY
Qty: 60 CAPSULE | Refills: 0 | Status: SHIPPED | OUTPATIENT
Start: 2023-03-27 | End: 2023-04-26

## 2023-03-27 RX ADMIN — POTASSIUM CHLORIDE 20 MEQ: 1500 TABLET, EXTENDED RELEASE ORAL at 08:58

## 2023-03-27 RX ADMIN — FINASTERIDE 5 MG: 5 TABLET, FILM COATED ORAL at 08:59

## 2023-03-27 RX ADMIN — PANTOPRAZOLE SODIUM 40 MG: 40 TABLET, DELAYED RELEASE ORAL at 05:05

## 2023-03-27 RX ADMIN — EPHEDRINE SULFATE 5 MG: 50 INJECTION INTRAVENOUS at 10:06

## 2023-03-27 RX ADMIN — SODIUM CHLORIDE: 9 INJECTION, SOLUTION INTRAVENOUS at 09:26

## 2023-03-27 RX ADMIN — DOCUSATE SODIUM 100 MG: 100 CAPSULE, LIQUID FILLED ORAL at 08:59

## 2023-03-27 RX ADMIN — LIDOCAINE HYDROCHLORIDE 100 MG: 10 INJECTION, SOLUTION EPIDURAL; INFILTRATION; INTRACAUDAL; PERINEURAL at 09:58

## 2023-03-27 RX ADMIN — EPHEDRINE SULFATE 5 MG: 50 INJECTION INTRAVENOUS at 10:14

## 2023-03-27 RX ADMIN — ASPIRIN 81 MG: 81 TABLET, COATED ORAL at 08:59

## 2023-03-27 RX ADMIN — FERROUS SULFATE TAB 325 MG (65 MG ELEMENTAL FE) 325 MG: 325 (65 FE) TAB at 08:58

## 2023-03-27 RX ADMIN — PROPOFOL 40 MG: 10 INJECTION, EMULSION INTRAVENOUS at 10:00

## 2023-03-27 RX ADMIN — ACETAMINOPHEN 975 MG: 325 TABLET ORAL at 13:24

## 2023-03-27 RX ADMIN — LISINOPRIL 10 MG: 10 TABLET ORAL at 09:05

## 2023-03-27 RX ADMIN — ACETAMINOPHEN 975 MG: 325 TABLET ORAL at 05:05

## 2023-03-27 RX ADMIN — AMIODARONE HYDROCHLORIDE 200 MG: 200 TABLET ORAL at 13:24

## 2023-03-27 RX ADMIN — METOPROLOL TARTRATE 25 MG: 25 TABLET, FILM COATED ORAL at 09:05

## 2023-03-27 RX ADMIN — PROPOFOL 60 MG: 10 INJECTION, EMULSION INTRAVENOUS at 09:58

## 2023-03-27 RX ADMIN — MUPIROCIN 1 APPLICATION.: 20 OINTMENT TOPICAL at 08:59

## 2023-03-27 RX ADMIN — METFORMIN HYDROCHLORIDE 500 MG: 500 TABLET ORAL at 08:59

## 2023-03-27 RX ADMIN — TORSEMIDE 20 MG: 20 TABLET ORAL at 08:59

## 2023-03-27 RX ADMIN — PROPOFOL 20 MG: 10 INJECTION, EMULSION INTRAVENOUS at 10:21

## 2023-03-27 RX ADMIN — PROPOFOL 20 MG: 10 INJECTION, EMULSION INTRAVENOUS at 10:22

## 2023-03-27 RX ADMIN — AMIODARONE HYDROCHLORIDE 200 MG: 200 TABLET ORAL at 05:05

## 2023-03-27 RX ADMIN — CHLORHEXIDINE GLUCONATE 0.12% ORAL RINSE 15 ML: 1.2 LIQUID ORAL at 08:58

## 2023-03-27 RX ADMIN — PROPOFOL 70 MCG/KG/MIN: 10 INJECTION, EMULSION INTRAVENOUS at 09:58

## 2023-03-27 RX ADMIN — HEPARIN SODIUM 15.1 UNITS/KG/HR: 10000 INJECTION, SOLUTION INTRAVENOUS at 05:05

## 2023-03-27 RX ADMIN — PROPOFOL 20 MG: 10 INJECTION, EMULSION INTRAVENOUS at 10:11

## 2023-03-27 RX ADMIN — POLYETHYLENE GLYCOL 3350 17 G: 17 POWDER, FOR SOLUTION ORAL at 11:36

## 2023-03-27 RX ADMIN — POTASSIUM CHLORIDE 40 MEQ: 29.8 INJECTION, SOLUTION INTRAVENOUS at 08:52

## 2023-03-27 NOTE — PHYSICAL THERAPY NOTE
Physical Therapy Cancellation Note         03/27/23 0830   PT Last Visit   PT Visit Date 03/27/23   Note Type   Note Type Treatment   Cancel Reasons Patient off floor/test     PT consult received  Chart reviewed  Patient is currently  off floor  PT will continue to follow on caseload and perform future treatment sessions as medically appropriate        DERRELL BISHOP PT, DPT

## 2023-03-27 NOTE — ANESTHESIA POSTPROCEDURE EVALUATION
Post-Op Assessment Note    CV Status:  Stable  Pain Score: 0    Pain management: adequate     Mental Status:  Alert and awake   Hydration Status:  Euvolemic   PONV Controlled:  Controlled   Airway Patency:  Patent      Post Op Vitals Reviewed: Yes      Staff: CRNA         No notable events documented      BP   97/60   Temp      Pulse  55   Resp   18   SpO2   96%

## 2023-03-27 NOTE — DISCHARGE SUMMARY
Discharge Summary - Cardiothoracic Surgery   Sarah Gomes 81 y o  male MRN: [id number]  Unit/Bed#: [facility] 411-01 Encounter: [id number]    Admission Date: 3/21/2023     Discharge Date: 03/27/23    Admitting Diagnosis: Nonrheumatic mitral valve regurgitation [I34 0]    Primary Discharge Diagnosis:   Atrial fibrillation, Mitral regurgitation  S/P left atrial appendage ligation and mitral valve repair;    Secondary Discharge Diagnosis:   1  HTN  2  HUMBERTO on CPAP  3  Diverticulosis  4  Hx of GIB  5  BPH on 2 agents  6  Thrombocytopenia (113k)  7  LMCA aneurysm (15 mm)  8  Hx of COVID  9  Sinus bradycardia      Attending: Nidia Mcardle, D O  Consulting Physician(s):   Cardiology  Medical/Critical Care  Occupational Therapy  Physical Therapy  Endocirnology    Procedures Performed:   Procedure(s):  3/21/2023: MITRAL VALVE REPAIR with Medtronic 32 mm CG Future Annuloplasty Ring; LIGATION OF Left ATRIAL APPENDAGE with 45 mm AtriClip; TANG     3/27/2023: Cardioversion    Hospital Course: The patient was seen in consultation prior to this admission for evaluation of Atrial fibrillation, Mitral regurgitation  Risks and benefits of left atrial appendage ligation and mitral valve repair were discussed in detail, and patient was agreeable  Routine preoperative evaluation was completed and informed consent was obtained prior to admission  3/21: Elective admission for Mitral Valve Repair with CG Future and Atriclip    Intraoperative blood products were none   No significant postoperative bleeding   Transferred to ICU supported with no drips   Wean towards extubation  Coumadin 2 5 mg tonight, INR 1 46  A paced at 80  Intraop UA neg     3/22: Postoperatively the patient was not supported on any infusions   He was briefly on and off low-dose andres for hypotension   He received a total of 1 L of LR and 500 of albumin due to low filling pressures and MAP with good response   He was delined this morning   Initiated on Cardene at 2 5 due to hypertension  No acute complaints this morning  NSR, net +1 9L  Plts 98K  Cr at baseline  INR 1 23, redose 2 5 mg of coumadin, d/C EPW  Start lopressor 12 5 mg BID, wean cardene to off  Start lasix 40 mg IV BID w/ K supplementation, d/c dwayne  Consult endocrine  Transfer to [facility]  PM: Artificial tears added for dry eyes  Maintained on insulin gtt per endo      3/23: Remains on Insulin gtt  Appetite improved  NSR, RA  Labs stable  INR 1 13, dose Coumadin 5mg PO  tonight  D/C CTs  Melatonin qhs ordered for sleep       3/24: Went into rate controlled Afib last evening and remains this morning  Given Amio bolus and gtt  INR  1 18, dose Coumadin 7 5mg tonight  On RA  -1 3L/24 hrs  Plt 79, hg 10 7  Increase Metoprolol to 25mg  q12  PM: continues in rate controlled afib, no room for additional BB     3/25: Remains in rate controlled Afib  Bradycardic afib in 40’s overnight, currently in 60’s  Remains on Amio 0 5mg/min  D/C Amio infusion once bag completed  INR 1 28, dose Coumadin 5mg tonight  RA  Plt increased 94      3/26: Remains in rate controlled Afib  INR 1 52, dose Coumadin 5mg tonight  Plan for TANG/cardioversion tomorrow  Start Heparin gtt  NPO after MN  Transition to Demadex 20mg PO daily       3/27: No events  Remains in rate controlled a fib, for cardioversion today, INR 1 82, continue heparin gtt, dose Coumadin 5mg tonight, K 3 6, replete today  PM: Per cardiology ok for discharge, NSR s/p cardioversion      Condition at Discharge:   good     Discharge Physical Exam:    Please see the documented physical exam from this morning's progress note for details      Discharge Data:  Results from last 7 days   Lab Units 03/27/23  0554 03/26/23  1003 03/25/23  0523 03/24/23  0535   WBC Thousand/uL 5 47 6 48  --  8 99   HEMOGLOBIN g/dL 10 1* 10 9* 10 2* 10 7*   HEMATOCRIT % 29 7* 32 8* 31 2* 33 5*   PLATELETS Thousands/uL 154 153 94* 79*     Results from last 7 days   Lab Units 03/27/23  0554 03/23/23  6649 03/22/23  0406 03/21/23  1508 03/21/23  1124   POTASSIUM mmol/L 3 6 4 3 4 4   < >  --    CHLORIDE mmol/L 102 107 111*  --   --    CO2 mmol/L 31 28 25  --   --    CO2, I-STAT mmol/L  --   --   --   --  26   BUN mg/dL 18 21 15  --   --    CREATININE mg/dL 0 78 0 73 0 85  --   --    GLUCOSE, ISTAT mg/dl  --   --   --   --  131   CALCIUM mg/dL 8 4 7 9* 7 6*  --   --     < > = values in this interval not displayed  Results from last 7 days   Lab Units 03/27/23  0650 03/27/23  0554 03/26/23  2344 03/26/23  1618 03/26/23  1003 03/26/23  0527 03/25/23  0523   INR   --  1 82*  --   --   --  1 52* 1 28*   PTT seconds 85*  --  60* 38*   < >  --   --     < > = values in this interval not displayed  Discharge instructions/Information to patient and family:   See after visit summary for information provided to patient and family  Kay Augustine was educated on restrictions regarding driving and lifting, and techniques of proper incisional care  They were specifically counselled on signs and symptoms of an incisional infection, and advised to contact our service immediately should they develop fevers, sweats, chill, redness or drainage at the site of any incisions  Provisions for Follow-Up Care:  See after visit summary for information related to follow-up care and any pertinent home health orders  Disposition:  Home w/ [address], home PT & RW    Planned Readmission:   No    Discharge Medications:  See after visit summary for reconciled discharge medications provided to patient and family  Kay Augustine was provided contact information and scheduled a follow up appointment with MAXX Ravi  Additionally, follow up appointments have been scheduled for their primary care physician and primary cardiologist   Contact information was provided  Kay Augustine was counseled on the importance of avoiding tobacco products    As with all patients whom have undergone open heart surgery, tobacco cessation medication was contraindicated at the time of discharge  ACE/ARB was Contraindicated secondary to EF > 40% and no history of heart failure    Beta Blocker was Prescribed at discharge    Aspirin was Prescribed at discharge    Statin was Prescribed at discharge      The patient was discharged on ongoing diuretic therapy with Torsemide 20 mg, PO QD and Potassium Chloride 20 mEq, PO QD  They were advised to continue these medications for 5 days, unless otherwise directed  Narcotic pain medication was prescribed in the form of Oxycodone  Prior to prescribing, their prescription profile was reviewed on the [facility] of health prescription drug monitoring program  Tylenol PRN also recommended  The patient was informed that following their postoperative surgical evaluation, they will be referred to outpatient cardiac rehabilitation  They were counseled that this program is run by specialists who will help them safely strengthen their heart and prevent more heart disease  Cardiac rehabilitation will include exercise, relaxation, stress management, and heart-healthy nutrition  Caregivers will also check to make sure their medication regimen is working  Stress hyperglycemia therapy/routine per endo at discharge w/ endo f/u  Amio taper per cardiology  Conchis Boyd  is being discharged on anticoagulation therapy for treatment of PAF  As they are new to Coumadin therapy, arrangements have been made the [facility] to monitor INR levels and provide dosing instructions  Their office was notified by [employer] and facsimile report which itemized the patient’s daily INR’s and correlating Coumadin doses during their hospitalization  Conchis Boyd has been prescribed Coumadin, 2 5 mg tabs, with 60 tablets being dispensed  They have been advised to take 5 mg daily, unless otherwise directed  Follow up PT/INR will be ordered at the discretion of the Coumadin clinic  During this admission, the patient was questioned on their use of tobacco, alcohol, and illicit/non-prescription drug use in the  previous 24 months  Nicolas Licea states that they have not used any of these substances in this time frame  I spent 30 minutes discharging the patient  This time was spent on the day of discharge  I had direct contact with the patient on the day of discharge  Additional documentation is required if more than 30 minutes were spent on discharge       Ruben Begum PA-C  DATE: March 27, 2023  TIME: 11:25 AM

## 2023-03-27 NOTE — PROGRESS NOTES
[address]  coding opportunities       Chart reviewed, no opportunity found: CHART REVIEWED, NO OPPORTUNITY FOUND        Patients Insurance     Medicare Insurance: Medicare

## 2023-03-27 NOTE — QUICK NOTE
D/w cardiology, NSR s/p cardioversion w/ INR almost at 2  [id number] Elke Harmon for discharge today w/ amio taper

## 2023-03-27 NOTE — DISCHARGE INSTRUCTIONS
Weight yourself daily  If you gain more than 3lbs in 24 hours or 5lbs in 5 days then call cardiologist to make appointment  Please test blood sugars three times daily before meals & once daily before bedtime  Record in a log & bring to follow-up appointment with endocrinologist or primary care physician

## 2023-03-27 NOTE — PROGRESS NOTES
Progress Note - Cardiothoracic Surgery   Vladislav Jennings 81 y o  male MRN: [id number]  Unit/Bed#: [facility] 411-01 Encounter: [id number]    Atrial fibrillation, Mitral regurgitation  S/P left atrial appendage ligation and mitral valve repair; POD # 5      24 Hour Events: No events  C/o insomnia    Voiding well s/p rice removal  (+) flatus/BM since sx  Ambulating well, home at discharge  Pain controlled      Medications:   Scheduled Meds:  Current Facility-Administered Medications   Medication Dose Route Frequency Provider Last Rate   • acetaminophen  650 mg Rectal Q4H PRN Shankar Haley PA-C     • acetaminophen  650 mg Oral Q6H PRN Shankar Haley PA-C     • [facility]  [facility]     • amiodarone  200 mg Oral [facility] Shankar Haley PA-C     • aspirin  81 mg Oral Daily KEE Gimenez     • atorvastatin  80 mg Oral After [facility], PA-C     • bisacodyl  10 mg Rectal Daily PRN Shankar Haley PA-C     • calcium carbonate  1,000 mg Oral BID PRN Shankar Haley PA-C     • chlorhexidine  15 mL Swish & Spit Q12H [address] [place], [place]     • docusate sodium  100 mg Oral BID Shankar Haley PA-C     • ferrous sulfate  325 mg Oral Daily With Breakfast Shankar Haley PA-C     • finasteride  5 mg Oral Daily Raudel Bradley PA-C     • fluticasone  1 spray Nasal PRN Shankar Haley PA-C     • glycerin-hypromellose-  1 drop Both Eyes Q4H PRN Mellissa Swann PA-C     • heparin (porcine)  3-20 Units/kg/hr (Order-Specific) Intravenous Titrated KEE Gimenez 15 1 Units/kg/hr (03/27/23 0505)   • insulin glargine  15 Units Subcutaneous HS Cara Alvarez DO     • insulin lispro  1-5 Units Subcutaneous 4x Daily (AC & HS) Cara Alvarez DO     • lisinopril  10 mg Oral Daily KEE Gimenez     • melatonin  9 mg Oral HS KEE Gimenez     • metFORMIN  500 mg Oral BID With Meals Cara Alvarez DO     • metoprolol tartrate  25 mg Oral Q12H [address], KEE     • mupirocin  1 application  Nasal Q12H [address]     • ondansetron  4 mg Intravenous Q6H PRN Susan Stone PA-C     • oxyCODONE  2 5 mg Oral Q4H PRN Susan Stone PA-C     • oxyCODONE  5 mg Oral Q4H PRN Susan Stone PA-C     • pantoprazole  40 mg Oral Early Morning Susan Stone PA-C     • polyethylene glycol  17 g Oral Daily Susan Stone PA-C     • potassium chloride  20 mEq Oral Daily Amarilis Nguyen PA-C     • tamsulosin  0 4 mg Oral After [facility]     • torsemide  20 mg Oral Daily Amarilis Nguyen PA-C       Continuous Infusions:heparin (porcine), 3-20 Units/kg/hr (Order-Specific), Last Rate: 15 1 Units/kg/hr (03/27/23 0505)      PRN Meds: •  acetaminophen  •  acetaminophen  •  bisacodyl  •  calcium carbonate  •  fluticasone  •  glycerin-hypromellose-  •  ondansetron  •  oxyCODONE  •  oxyCODONE    Vitals:   Vitals:    03/26/23 2124 03/26/23 2246 03/27/23 0714 03/27/23 0719   BP: 113/65 113/65 136/71    BP Location: Right arm Right arm Right arm    Pulse: 63 63 72    Resp: 17 17 18    Temp: 97 8 °F (36 6 °C) 97 8 °F (36 6 °C) 98 1 °F (36 7 °C)    TempSrc: Oral Oral Oral    SpO2: 94% 94% 95%    Weight:    90 5 kg (199 lb 8 3 oz)   Height:         Bp x24hrs: 100-130/60-80    Telemetry: Atrial Fibrillation; Heart Rate: 71; no events/24hrs    Respiratory:   SpO2: SpO2: 95 %, SpO2 Activity: SpO2 Activity: At Rest, SpO2 Device: O2 Device: None (Room air);  Room Air    Intake/Output:     Intake/Output Summary (Last 24 hours) at 3/27/2023 0741  Last data filed at 3/27/2023 0505  Gross per 24 hour   Intake 634 26 ml   Output 1000 ml   Net -365 74 ml      UOP - 700cc/8hrs; 1000cc/24hrs w/ 2 unmeasured occurences & 1 unmeasured shift    Chest tube Output:  CTs & EPWs have been discontinued    Weights:   Weight (last 2 days)     Date/Time Weight    03/27/23 0719 90 5 (199 52)    03/26/23 0513 91 5 (201 72)    03/25/23 0521 93 (205 03)        Admission weight: 93kg - down 2 5kg from admission weight    Results:   Results from last 7 days   Lab Units 03/27/23  0554 03/26/23  1003 03/25/23  0523 03/24/23  0535   WBC Thousand/uL 5 47 6 48  --  8 99   HEMOGLOBIN g/dL 10 1* 10 9* 10 2* 10 7*   HEMATOCRIT % 29 7* 32 8* 31 2* 33 5*   PLATELETS Thousands/uL 154 153 94* 79*     Results from last 7 days   Lab Units 03/27/23  0554 03/23/23  0512 03/22/23  0406 03/21/23  1508 03/21/23  1124   SODIUM mmol/L 135 135 138  --   --    POTASSIUM mmol/L 3 6 4 3 4 4   < >  --    CHLORIDE mmol/L 102 107 111*  --   --    CO2 mmol/L 31 28 25  --   --    CO2, I-STAT mmol/L  --   --   --   --  26   BUN mg/dL 18 21 15  --   --    CREATININE mg/dL 0 78 0 73 0 85  --   --    GLUCOSE, ISTAT mg/dl  --   --   --   --  131   CALCIUM mg/dL 8 4 7 9* 7 6*  --   --     < > = values in this interval not displayed  Results from last 7 days   Lab Units 03/27/23  0650 03/27/23  0554 03/26/23  2344 03/26/23  1618 03/26/23  1003 03/26/23  0527 03/25/23  0523   INR   --  1 82*  --   --   --  1 52* 1 28*   PTT seconds 85*  --  60* 38*   < >  --   --     < > = values in this interval not displayed  Coumadin mg 3/26 - 5  3/25 - 5  3/24 - 7 5  3/23 - 5  3/22 - 2 5  3/21 - 2 5          Point of care glucose: 93 - 110 - 97 - 103 - 112 - 120 - 106    Studies:  No new studies    I have personally reviewed pertinent reports  and I have personally reviewed pertinent films in PACS    Invasive Lines/Tubes:  Invasive Devices     Central Venous Catheter Line  Duration           CVC Central Lines 03/21/23 Triple 5 days                Physical Exam:    HEENT/NECK:  Normocephalic  Atraumatic  No jugular venous distention  Cardiac: Irregularly irregular rhythm, regular rate  Pulmonary:  on RA, no respiratody distress  Abdomen:  Non-distended  Incisions: Sternum is stable  Incision is clean, dry, and intact     Extremities: Extremities warm/dry and Trace edema B/L  Neuro: Alert and oriented X 3  Skin: Warm/Dry, without rashes or lesions  Assessment:  Principal Problem:    S/P mitral valve repair  Active Problems:    BPH with urinary obstruction    Essential hypertension    Left ventricular hypertrophy    Mitral regurgitation    Hyperlipidemia    Idiopathic peripheral neuropathy    HUMBERTO treated with BiPAP    Excessive daytime sleepiness    Platelets decreased (HCC)    S/P left atrial appendage ligation    Anticoagulated on Coumadin    Postoperative atrial fibrillation (HCC)   INR goal 2-3    Atrial fibrillation, Mitral regurgitation  S/P left atrial appendage ligation and mitral valve repair; POD # 5    Plan:    1  Cardiac:     Atrial Fibrillation; HR/BP well-controlled  Continue Lopressor, 25mg PO BID   Lisinopril 10mg PO QDay  INR 1 82, dose Coumadin tonight, continue heparin gtt  Continue ASA and Statin therapy  Epicardial pacing wires have been removed  Maintain central IV access today for ongoing administration of heparin gtt & a fib monitoring  Continue DVT prophylaxis    2  Pulmonary:   Good Room air oxygen saturation; Continue incentive spirometry/Coughing/Deep breathing exercises  Chest tubes have been discontinued    3  Renal:   Intake/Output net: (-)365 7 mL/24 hours -- innacurate I/Os  Diuretic Regimen:  Continue Torsemide 20 mg PO QD  Continue Potassium Chloride 20 mEq PO QD  Post op Creatinine stable; Follow up labs prn   Continue Proscar   Continue Flomax    4  Neuro:  Neurologically intact; No active issues  Incisional pain well-controlled  Continue Tylenol, 975 mg PO q 8, standing dose  Continue Oxycodone, 2 5 to 5 mg PO q 4 hours prn pain   Continue Melatonin -- change to scheduled Restoril    5  GI:  Tolerating TLC 2 3 gm sodium diet --> NPO this AM for cardioversion w/ EP/cardiology  Maintain 1800 mL daily fluid restriction   Continue stool softeners and prn suppository  Continue GI prophylaxis    6  Endo:   History of diabetes; Continue SQ insulin therapy as directed by endocrinology physician    Insulin administration teaching for home therapy ordered    7    Hematology:    Post-operative acute blood loss anemia; Hemoglobin 10 1; trend prn    Continue iron/vitamin C supplementation   K 3 6, replete today    8     Disposition:      Not yet medically cleared for discharge, pending need for INR to be therapeutic by EP s/p cardioversion or not    VTE Pharmacologic Prophylaxis: Heparin and Warfarin (Coumadin)  VTE Mechanical Prophylaxis: sequential compression device    Collaborative rounds completed with supervising physician  Plan of care discussed with bedside nurse    SIGNATURE: Milagros Beltrán PA-C  DATE: March 27, 2023  TIME: 7:41 AM

## 2023-03-27 NOTE — CASE MANAGEMENT
Case Management Discharge Planning Note    Patient name Jessica Ortiz  Location [facility] 411/[facility] 066-83 MRN [id number]  : 1950 Date 3/27/2023       Current Admission Date: 3/21/2023  Current Admission Diagnosis:S/P mitral valve repair   Patient Active Problem List    Diagnosis Date Noted   • Anticoagulation goal of INR 2 to 3 2023   • Stress hyperglycemia 2023   • Postoperative atrial fibrillation ([address] ) 2023   • Anticoagulated on Coumadin 2023   • S/P mitral valve repair 2023   • S/P left atrial appendage ligation 2023   • FH: cerebral aneurysm 2023   • Obesity, morbid ([address] ) 2023   • Drop in hemoglobin 2023   • Gastrointestinal hemorrhage associated with intestinal diverticulosis 2022   • Diverticulitis of colon with perforation 2022   • Blood in stool 2022   • Abnormal CBC 2022   • Atrial enlargement, left 2022   • History of COVID-19 2022   • Shortness of breath 2022   • Overweight with body mass index (BMI) of 27 to 27 9 in adult 2022   • Platelets decreased ([facility] [address] ) 2021   • HUMBERTO treated with BiPAP 2020   • Excessive daytime sleepiness 2020   • Need for shingles vaccine 2019   • Colon polyp 2018   • Left ventricular hypertrophy 2018   • Mitral regurgitation 2018   • BPH with urinary obstruction 10/31/2016   • Calculus of gallbladder with cholecystitis 2015   • Displacement of lumbar intervertebral disc without myelopathy 2015   • Idiopathic peripheral neuropathy 2015   • Diverticulosis of colon 2015   • Indigestion 2014   • Herpes zoster 2014   • Allergic rhinitis 01/15/2013   • Male erectile disorder 01/15/2013   • Essential hypertension    • Metabolic syndrome    • Hyperlipidemia 01/15/2013      LOS (days): 6  Geometric Mean LOS (GMLOS) (days): 8 90  Days to GMLOS:2 7     OBJECTIVE:  Risk of Unplanned Readmission Score: 16 72         Current admission status: Inpatient   Preferred Pharmacy:   [facility] [address]  Phone: [phone] Fax: [address], [address]  [facility] [address] [facility]  Phone: [phone] Fax: [address] [facility] [address], [place] [address] [place] [address]  Phone: [phone] Fax: [phone]    [facility]  [address] [place] [address]  Phone: [phone] Fax: [address] , [address],  [address] ,  [facility] [address] [facility]  Phone: [phone] Fax: [address] [facility] [address] [facility] [address] [place] [address]  Phone: [phone] Fax: [phone]    Primary Care Provider: Alexandria Hunt MD    Primary Insurance: MEDICARE  Secondary Insurance: BLUE CROSS    DISCHARGE DETAILS:                                                                                        IMM Given (Date):: 03/27/23 (given at 0900)  IMM Given to[de-identified] Patient     Additional Comments: AVS faxed to Ashu Gama

## 2023-03-27 NOTE — PROGRESS NOTES
Cardiology Progress Note - Debra Honeycutt 81 y o  male MRN: [id number]    Unit/Bed#: [facility] 411-01 Encounter: [id number]        Hospital Problems:  Principal Problem:    S/P mitral valve repair  Active Problems:    BPH with urinary obstruction    Essential hypertension    Left ventricular hypertrophy    Mitral regurgitation    Hyperlipidemia    Idiopathic peripheral neuropathy    HUMBERTO treated with BiPAP    Excessive daytime sleepiness    Platelets decreased (HCC)    S/P left atrial appendage ligation    Anticoagulated on Coumadin    Postoperative atrial fibrillation (HCC)    Anticoagulation goal of INR 2 to 3      Assessment/Recommendations:    Severe mitral regurgitation due to mitral valve prolapse: Status post mitral valve repair with left atrial appendage ligation- with 32 mm Medtr[employer] CG Future mitral annuloplasty ring and folding plasty of P1 to P2 and P2 to P3  Left atrial appendage with 45 mm atrial clip device  Normal LV function on Intra-Op TANG  No significant CAD seen on left heart cath preoperatively  Postop course complicated by atrial fibrillation  Continue on statin and beta-blocker  Postoperative atrial fibrillation:  Tele: Atrial fib with controlled VR  One 5 beat run of NSVT  Patient was seen during [address]  Sinus rhythm restored after 2 shocks  INR is 1 8  Follow-up closely in anticoagulation clinic  Continue amiodarone taper  Hypertension:  Controlled on current regimen  Dyslipidemia:  On statin therapy  Postoperative anemia:  Hemoglobin stable  Prior history of GI bleeding  No recurrence thus far on anticoagulation therapy  Subjective:     Overnight events reviewed  Seen during TANG/cardioversion  Underwent procedure with no complications  Review of Systems   Constitutional: Positive for fatigue  Negative for fever  Respiratory: Negative for chest tightness, shortness of breath and wheezing      Cardiovascular: Negative for chest pain, palpitations and leg swelling  Skin: Negative for rash  Neurological: Negative for syncope  Hematological: Does not bruise/bleed easily  Psychiatric/Behavioral: Positive for sleep disturbance  Review of ten system was conducted and was negative except for findings stated elsewhere in the note          Current Facility-Administered Medications:   •  acetaminophen (TYLENOL) rectal suppository 650 mg, 650 mg, Rectal, Q4H PRN, Zaki Booker PA-C  •  acetaminophen (TYLENOL) tablet 650 mg, 650 mg, Oral, Q6H PRN, Zaki Booker PA-C  •  acetaminophen (TYLENOL) tablet 975 mg, 975 mg, Oral, Q8H, Raudel Bradley PA-C, 975 mg at 03/27/23 0505  •  amiodarone tablet 200 mg, 200 mg, Oral, Q8H [facility], Raudel Bradley PA-C, 200 mg at 03/27/23 0505  •  aspirin (ECOTRIN LOW STRENGTH) EC tablet 81 mg, 81 mg, Oral, Daily, Odette Pino PA-C, 81 mg at 03/26/23 7137  •  atorvastatin (LIPITOR) tablet 80 mg, 80 mg, Oral, After [employer], KEE, 80 mg at 03/26/23 1720  •  bisacodyl (DULCOLAX) rectal suppository 10 mg, 10 mg, Rectal, Daily PRN, Zaki Booker PA-C  •  calcium carbonate (TUMS) chewable tablet 1,000 mg, 1,000 mg, Oral, BID PRN, Zaki Booker PA-C  •  chlorhexidine (PERIDEX) 0 12 % oral rinse 15 mL, 15 mL, Swish & Spit, Q12H [facility], Zaki Booker PA-C, 15 mL at 03/26/23 2120  •  docusate sodium (COLACE) capsule 100 mg, 100 mg, Oral, BID, Zaki Booker PA-C, 100 mg at 03/25/23 1711  •  ferrous sulfate tablet 325 mg, 325 mg, Oral, Daily With Breakfast, Zaki Booker PA-C, 325 mg at 03/26/23 6037  •  finasteride (PROSCAR) tablet 5 mg, 5 mg, Oral, Daily, Zaki Booker PA-C, 5 mg at 03/26/23 2834  •  fluticasone (FLONASE) 50 mcg/act nasal spray 1 spray, 1 spray, Nasal, PRN, Zaki Booker PA-C  •  glycerin-hypromellose- (ARTIFICIAL TEARS) ophthalmic solution 1 drop, 1 drop, Both Eyes, Q4H PRN, Mellissa Swann PA-C, 1 drop at 03/24/23 1344  •  heparin (porcine) 25,000 units in 0 45% NaCl 250 mL infusion (premix), 3-20 Units/kg/hr (Order-Specific), Intravenous, Titrated, Daylin Hernandez PA-C, Last Rate: 13 6 mL/hr at 03/27/23 0505, 15 1 Units/kg/hr at 03/27/23 0505  •  insulin glargine (LANTUS) subcutaneous injection 15 Units 0 15 mL, 15 Units, Subcutaneous, HS, Cara Alvarez DO, 15 Units at 03/26/23 2121  •  insulin lispro (HumaLOG) 100 units/mL subcutaneous injection 1-5 Units, 1-5 Units, Subcutaneous, 4x Daily (AC & HS), 1 Units at 03/25/23 0617 **AND** Fingerstick Glucose (POCT), , , 4x Daily AC and at bedtime, Cara Alvarez DO  •  lisinopril (ZESTRIL) tablet 10 mg, 10 mg, Oral, Daily, Daylin Heranndez PA-C, 10 mg at 03/26/23 1200  •  melatonin tablet 9 mg, 9 mg, Oral, HS, Daylin Hernandze PA-C, 9 mg at 03/26/23 2121  •  metFORMIN (GLUCOPHAGE) tablet 500 mg, 500 mg, Oral, BID With Meals, Cara Alvarez DO, 500 mg at 03/26/23 1720  •  metoprolol tartrate (LOPRESSOR) tablet 25 mg, 25 mg, Oral, Q12H [address], Daylin Hernandez PA-C, 25 mg at 03/26/23 2121  •  mupirocin (BACTROBAN) 2 % nasal ointment 1 application  , 1 application  , Nasal, Q12H [address], Daylin Hernandez PA-C, 1 application   at 03/26/23 2121  •  ondansetron (ZOFRAN) injection 4 mg, 4 mg, Intravenous, Q6H PRN, Marla Munguia PA-C  •  oxyCODONE (ROXICODONE) IR tablet 2 5 mg, 2 5 mg, Oral, Q4H PRN, Marla Munguia PA-C, 2 5 mg at 03/26/23 0505  •  oxyCODONE (ROXICODONE) IR tablet 5 mg, 5 mg, Oral, Q4H PRN, Marla Munguia PA-C, 5 mg at 03/22/23 0146  •  pantoprazole (PROTONIX) EC tablet 40 mg, 40 mg, Oral, Early Morning, Raudel Bradley PA-C, 40 mg at 03/27/23 0505  •  polyethylene glycol (MIRALAX) packet 17 g, 17 g, Oral, Daily, Marla Munguia PA-C, 17 g at 03/24/23 8072  •  potassium chloride (K-DUR,KLOR-CON) CR tablet 20 mEq, 20 mEq, Oral, Daily, Catia Faith PA-C, 20 mEq at 03/26/23 6260  •  potassium chloride 40 mEq IVPB (premix), 40 mEq, Intravenous, Once, Mellissa Swann PA-C  •  tamsulosin (FLOMAX) capsule 0 4 mg, 0 4 mg, Oral, After Dinner, "Heriberto Nelson PA-C, 0 4 mg at 03/26/23 1720  •  torsemide (DEMADEX) tablet 20 mg, 20 mg, Oral, Daily, Nahomy Duke PA-C, 20 mg at 03/26/23 6270  •  warfarin (COUMADIN) tablet 5 mg, 5 mg, Oral, Once (warfarin), Mellissa Swann PA-C     Objective:     Vitals:   Blood pressure 136/71, pulse 72, temperature 98 1 °F (36 7 °C), temperature source Oral, resp  rate 18, height 6' 1\" (1 854 m), weight 90 5 kg (199 lb 8 3 oz), SpO2 95 %  Body mass index is 26 32 kg/m²  Orthostatic Blood Pressures    Flowsheet Row Most Recent Value   Blood Pressure 136/71 filed at 03/27/2023 0714   Patient Position - Orthostatic VS Lying filed at 03/27/2023 7032         Systolic (64DIS), XLK:728 , Min:113 , XBI:525     Diastolic (55GXT), SZJ:11, Min:65, Max:81      Intake/Output Summary (Last 24 hours) at 3/27/2023 0846  Last data filed at 3/27/2023 0828  Gross per 24 hour   Intake 336 26 ml   Output 1100 ml   Net -763 74 ml     Weight (last 2 days)     Date/Time Weight    03/27/23 0719 90 5 (199 52)    03/26/23 0513 91 5 (201 72)    03/25/23 0521 93 (205 03)            Physical Exam  Vitals reviewed  Constitutional:       General: He is not in acute distress  Cardiovascular:      Rate and Rhythm: Normal rate and regular rhythm  Pulmonary:      Breath sounds: No wheezing  Skin:     General: Skin is warm  Neurological:      Mental Status: Mental status is at baseline             Labs & Results:    Lab Results   Component Value Date    SODIUM 135 03/27/2023    K 3 6 03/27/2023     03/27/2023    CO2 31 03/27/2023    BUN 18 03/27/2023    CREATININE 0 78 03/27/2023    GLUC 91 03/27/2023    CALCIUM 8 4 03/27/2023         Results from last 7 days   Lab Units 03/27/23  0554 03/26/23  1003 03/25/23  0523 03/24/23  0535   WBC Thousand/uL 5 47 6 48  --  8 99   HEMOGLOBIN g/dL 10 1* 10 9* 10 2* 10 7*   HEMATOCRIT % 29 7* 32 8* 31 2* 33 5*   PLATELETS Thousands/uL 154 153 94* 79*         Results from last 7 days   Lab Units " "03/27/23  0554 03/23/23  0512 03/22/23  0406 03/21/23  1508 03/21/23  1124 03/21/23  1122 03/21/23  1059 03/21/23  0946   POTASSIUM mmol/L 3 6 4 3 4 4   < >  --    < >  --   --    CHLORIDE mmol/L 102 107 111*  --   --    < >  --   --    CO2 mmol/L 31 28 25  --   --    < >  --   --    CO2, I-STAT mmol/L  --   --   --   --  26  --  26 32   BUN mg/dL 18 21 15  --   --    < >  --   --    CREATININE mg/dL 0 78 0 73 0 85  --   --    < >  --   --    CALCIUM mg/dL 8 4 7 9* 7 6*  --   --    < >  --   --    ALK PHOS U/L  --  51  --   --   --   --   --   --    ALT U/L  --  17  --   --   --   --   --   --    AST U/L  --  32  --   --   --   --   --   --    GLUCOSE, ISTAT mg/dl  --   --   --   --  131  --  141* 191*    < > = values in this interval not displayed  Results from last 7 days   Lab Units 03/27/23  0650 03/27/23  0554 03/26/23  2344 03/26/23  1618 03/26/23  1003 03/26/23  0527 03/25/23  0523   INR   --  1 82*  --   --   --  1 52* 1 28*   PTT seconds 85*  --  60* 38*   < >  --   --     < > = values in this interval not displayed  Results from last 7 days   Lab Units 03/27/23  0554 03/23/23  0512 03/22/23  0406   MAGNESIUM mg/dL 2 1 2 5 2 8*     No results for input(s): NTBNP in the last 72 hours  Lab Results   Component Value Date    [id number] 1 780 02/28/2023         Available cardiac and imaging studies were reviewed independently  Available cardiology studies, imaging and lab results independently reviewed today  This note was completed in part utilizing voice recognition software  Grammatical errors, random word insertion, spelling mistakes, occasional wrong word or \"sound-alike\" substitutions and incomplete sentences may be an occasional consequence of the system secondary to software limitations, ambient noise and hardware issues  At the time of dictation, efforts were made to edit, clarify and /or correct errors    Please read the chart carefully and recognize, using context, where " substitutions have occurred  If you have any questions or concerns about the context, text or information contained within the body of this dictation, please contact myself, the provider, for further clarification

## 2023-03-27 NOTE — ANESTHESIA PREPROCEDURE EVALUATION
Procedure:  TANG  Sp mitral valve repair cb post op atrial fibrillation  Relevant Problems   CARDIO   (+) Essential hypertension   (+) Hyperlipidemia   (+) Mitral regurgitation   (+) Postoperative atrial fibrillation (HCC)   (+) S/P mitral valve repair      GI/HEPATIC   (+) Gastrointestinal hemorrhage associated with intestinal diverticulosis      /RENAL   (+) BPH with urinary obstruction      NEURO/PSYCH   (+) History of COVID-19      PULMONARY   (+) HUMBERTO treated with BiPAP   (+) Shortness of breath      Recent labs personally reviewed:  Lab Results   Component Value Date    WBC 5 47 03/27/2023    HGB 10 1 (L) 03/27/2023     03/27/2023     Lab Results   Component Value Date     05/14/2018    K 3 6 03/27/2023    BUN 18 03/27/2023    CREATININE 0 78 03/27/2023    GLUCOSE 131 03/21/2023     Lab Results   Component Value Date    PTT 85 (H) 03/27/2023      Lab Results   Component Value Date    INR 1 82 (H) 03/27/2023       Lab Results   Component Value Date    HGBA1C 5 3 11/14/2022       Type and Screen:  O    Normal intraop TANG sp mitral valve repair   Physical Exam    Airway    Mallampati score: II  TM Distance: >3 FB  Neck ROM: full     Dental       Cardiovascular      Pulmonary      Other Findings        Anesthesia Plan  ASA Score- 3     Anesthesia Type- IV sedation with anesthesia with ASA Monitors  Additional Monitors:   Airway Plan:           Plan Factors-Exercise tolerance (METS): >4 METS  Chart reviewed  Existing labs reviewed  Patient summary reviewed  Induction- intravenous  Postoperative Plan-     Informed Consent- Anesthetic plan and risks discussed with patient  I personally reviewed this patient with the CRNA  Discussed and agreed on the Anesthesia Plan with the CRNA  Sunny Kirkland

## 2023-03-28 ENCOUNTER — TELEPHONE (OUTPATIENT)
Dept: CARDIOLOGY CLINIC | Facility: CLINIC | Age: 73
End: 2023-03-28

## 2023-03-28 ENCOUNTER — ANTICOAG VISIT (OUTPATIENT)
Dept: CARDIOLOGY CLINIC | Facility: CLINIC | Age: 73
End: 2023-03-28

## 2023-03-28 ENCOUNTER — TRANSITIONAL CARE MANAGEMENT (OUTPATIENT)
Dept: FAMILY MEDICINE CLINIC | Facility: CLINIC | Age: 73
End: 2023-03-28

## 2023-03-28 DIAGNOSIS — I48.91 POSTOPERATIVE ATRIAL FIBRILLATION (HCC): Primary | ICD-10-CM

## 2023-03-28 DIAGNOSIS — I97.89 POSTOPERATIVE ATRIAL FIBRILLATION (HCC): Primary | ICD-10-CM

## 2023-03-28 NOTE — PROGRESS NOTES
Spoke with patient, introduced self, educated on Coumadin, medication and diet that affects INR, signs of bleeding  Advised standing INR placed  Patient uses [facility]    Patient states he will be going for blood work 3/30/23  Advised to continue to take 5 mg daily and we can check on 3/30/23

## 2023-03-29 RX ORDER — FERROUS SULFATE TAB EC 324 MG (65 MG FE EQUIVALENT) 324 (65 FE) MG
324 TABLET DELAYED RESPONSE ORAL
Qty: 30 TABLET | Refills: 1 | Status: SHIPPED | OUTPATIENT
Start: 2023-03-29

## 2023-03-29 RX ORDER — FERROUS SULFATE 324(65)MG
TABLET, DELAYED RELEASE (ENTERIC COATED) ORAL
Qty: 90 TABLET | Refills: 1 | OUTPATIENT
Start: 2023-03-29

## 2023-03-30 ENCOUNTER — APPOINTMENT (OUTPATIENT)
Dept: LAB | Facility: CLINIC | Age: 73
End: 2023-03-30

## 2023-03-30 NOTE — PROGRESS NOTES
3/30/23~left message for patient, advised INR still in process, advised to take 2 5 mg tonight only and will call tomorrow to discuss further dosing

## 2023-03-31 ENCOUNTER — ANTICOAG VISIT (OUTPATIENT)
Dept: CARDIOLOGY CLINIC | Facility: CLINIC | Age: 73
End: 2023-03-31

## 2023-03-31 LAB
INR PPP: 1.84 (ref 0.84–1.19)
PROTHROMBIN TIME: 21.5 SECONDS (ref 11.6–14.5)

## 2023-03-31 NOTE — PROGRESS NOTES
Spoke with patient, advised INR still a little low, advised to take 7 5 mg tonight, 5 mg Sat Sun, will recheck [facility] 4/3/23

## 2023-04-03 ENCOUNTER — OFFICE VISIT (OUTPATIENT)
Dept: CARDIOLOGY CLINIC | Facility: CLINIC | Age: 73
End: 2023-04-03

## 2023-04-03 ENCOUNTER — APPOINTMENT (OUTPATIENT)
Dept: LAB | Facility: CLINIC | Age: 73
End: 2023-04-03

## 2023-04-03 ENCOUNTER — OFFICE VISIT (OUTPATIENT)
Dept: FAMILY MEDICINE CLINIC | Facility: CLINIC | Age: 73
End: 2023-04-03

## 2023-04-03 VITALS
HEART RATE: 58 BPM | HEIGHT: 73 IN | BODY MASS INDEX: 27.54 KG/M2 | OXYGEN SATURATION: 98 % | DIASTOLIC BLOOD PRESSURE: 62 MMHG | TEMPERATURE: 98 F | WEIGHT: 207.8 LBS | SYSTOLIC BLOOD PRESSURE: 110 MMHG

## 2023-04-03 VITALS
HEART RATE: 56 BPM | SYSTOLIC BLOOD PRESSURE: 130 MMHG | DIASTOLIC BLOOD PRESSURE: 72 MMHG | BODY MASS INDEX: 27.25 KG/M2 | HEIGHT: 73 IN | WEIGHT: 205.6 LBS

## 2023-04-03 DIAGNOSIS — Z79.01 ANTICOAGULATED ON COUMADIN: ICD-10-CM

## 2023-04-03 DIAGNOSIS — R60.0 LOWER EXTREMITY EDEMA: Primary | ICD-10-CM

## 2023-04-03 DIAGNOSIS — Z98.890 S/P MITRAL VALVE REPAIR: ICD-10-CM

## 2023-04-03 DIAGNOSIS — I97.89 POSTOPERATIVE ATRIAL FIBRILLATION (HCC): Primary | ICD-10-CM

## 2023-04-03 DIAGNOSIS — E66.3 OVERWEIGHT WITH BODY MASS INDEX (BMI) OF 27 TO 27.9 IN ADULT: ICD-10-CM

## 2023-04-03 DIAGNOSIS — Z98.890 S/P LEFT ATRIAL APPENDAGE LIGATION: ICD-10-CM

## 2023-04-03 DIAGNOSIS — R71.0 DROP IN HEMOGLOBIN: ICD-10-CM

## 2023-04-03 DIAGNOSIS — I97.89 POSTOPERATIVE ATRIAL FIBRILLATION (HCC): ICD-10-CM

## 2023-04-03 DIAGNOSIS — Z86.2 HISTORY OF THROMBOCYTOPENIA: ICD-10-CM

## 2023-04-03 DIAGNOSIS — I48.91 POSTOPERATIVE ATRIAL FIBRILLATION (HCC): Primary | ICD-10-CM

## 2023-04-03 DIAGNOSIS — E11.9 TYPE 2 DIABETES MELLITUS WITHOUT COMPLICATION, WITHOUT LONG-TERM CURRENT USE OF INSULIN (HCC): ICD-10-CM

## 2023-04-03 DIAGNOSIS — R79.89 ABNORMAL CBC: ICD-10-CM

## 2023-04-03 DIAGNOSIS — N40.0 BENIGN PROSTATIC HYPERPLASIA, UNSPECIFIED WHETHER LOWER URINARY TRACT SYMPTOMS PRESENT: ICD-10-CM

## 2023-04-03 DIAGNOSIS — E61.1 LOW IRON: ICD-10-CM

## 2023-04-03 DIAGNOSIS — I10 ESSENTIAL HYPERTENSION: ICD-10-CM

## 2023-04-03 DIAGNOSIS — I34.0 NONRHEUMATIC MITRAL VALVE REGURGITATION: ICD-10-CM

## 2023-04-03 DIAGNOSIS — E53.8 B12 DEFICIENCY: ICD-10-CM

## 2023-04-03 DIAGNOSIS — E78.2 MIXED HYPERLIPIDEMIA: ICD-10-CM

## 2023-04-03 DIAGNOSIS — I48.91 POSTOPERATIVE ATRIAL FIBRILLATION (HCC): ICD-10-CM

## 2023-04-03 RX ORDER — TAMSULOSIN HYDROCHLORIDE 0.4 MG/1
CAPSULE ORAL
Qty: 90 CAPSULE | Refills: 0 | Status: SHIPPED | OUTPATIENT
Start: 2023-04-03

## 2023-04-03 RX ORDER — TORSEMIDE 20 MG/1
20 TABLET ORAL DAILY
Qty: 30 TABLET | Refills: 1 | Status: SHIPPED | OUTPATIENT
Start: 2023-04-03 | End: 2023-04-03

## 2023-04-03 RX ORDER — TORSEMIDE 20 MG/1
20 TABLET ORAL DAILY
Qty: 30 TABLET | Refills: 1 | Status: SHIPPED | OUTPATIENT
Start: 2023-04-03 | End: 2023-04-08

## 2023-04-03 NOTE — PATIENT INSTRUCTIONS
Warfarin (By mouth)   Warfarin (WAR-far-in)  Prevents and treats blood clots  May lower the risk of serious complications after a heart attack  This medicine is a blood thinner  Brand Name(s): Coumadin, Keithtoven   There may be other brand names for this medicine  When This Medicine Should Not Be Used: This medicine is not right for everyone  Do not use it if you had an allergic reaction to warfarin, if you are pregnant, or if you have health problems that could cause bleeding  How to Use This Medicine:   Tablet  Take your medicine as directed  Your dose may need to be changed several times to find what works best for you  This medicine should come with a Medication Guide  Ask your pharmacist for a copy if you do not have one  Missed dose: Take a dose as soon as you remember  If it is almost time for your next dose, wait until then and take a regular dose  Do not take extra medicine to make up for a missed dose  Store the medicine in a closed container at room temperature, away from heat, moisture, and direct light  Drugs and Foods to Avoid:   Ask your doctor or pharmacist before using any other medicine, including over-the-counter medicines, vitamins, and herbal products  Many medicines and foods can affect how warfarin works and may affect the PT/INR test results   Tell your doctor before you start or stop any medicine, especially the following:   Co-enzyme K04, echinacea, garlic, ginkgo, ginseng, goldenseal, or Chapis's wort  Another blood thinner, including apixaban, argatroban, bivalirudin, cilostazol, clopidogrel, dabigatran, desirudin, dipyridamole, heparin, lepirudin, prasugrel, rivaroxaban, ticlopidine  Medicine to treat depression or anxiety, including citalopram, desvenlafaxine, duloxetine, escitalopram, fluoxetine, fluvoxamine, milnacipran, paroxetine, sertraline, venlafaxine, vilazodone  Medicine to treat an infection  NSAID pain or arthritis medicine, including aspirin, celecoxib, diclofenac, diflunisal, fenoprofen, ibuprofen, indomethacin, ketoprofen, ketorolac, mefenamic acid, naproxen, oxaprozin, piroxicam, sulindac  Check labels for over-the-counter medicines to find out if they contain an NSAID  Steroid medicine, including dexamethasone, hydrocortisone, methylprednisolone, prednisolone, prednisone  Warfarin works best if you eat about the same amount of vitamin K every day  Foods high in vitamin K include asparagus, broccoli, brussels sprouts, cabbage, green leafy vegetables, plums, rhubarb, and canola oil  Talk to your doctor before you make changes to your normal diet  Do not eat grapefruit or drink grapefruit juice while you are using this medicine  Warnings While Using This Medicine: It is not safe to take this medicine during pregnancy  It could harm an unborn baby  Tell your doctor right away if you become pregnant  Use an effective form of birth control to keep from getting pregnant during treatment and for at least 1 month after your last dose  Tell your doctor if you are breastfeeding, or if you have kidney disease, liver disease, heart disease, diabetes, heart failure, high blood pressure, an infection, a stomach ulcer, or protein C deficiency  Also tell your doctor if you had recent surgery or an injury, or a history of stroke, anemia, severe bleeding or bruising, or problems caused by heparin use  This medicine may cause the following problems:   Bleeding, which may be life-threatening  Gangrene (skin or tissue damage)  Calciphylaxis or calcium uremic arteriolopathy  Kidney problems, including acute kidney injury  Purple toes syndrome  You must have a PT/INR blood test while you use this medicine to check how well your blood is clotting  Your doctor will use the test results to make sure the medicine is working properly  Keep all appointments for the PT/INR blood tests  You may bleed or bruise more easily with warfarin   To prevent injury or cuts, do not play rough sports, be careful with sharp objects, and brush and floss your teeth gently  Skeet Seat your nose gently, and do not pick your nose  Carry an ID card or wear a medical alert bracelet to let emergency caregivers know that you use warfarin  Tell any doctor or dentist who treats you that you are using this medicine  You may need to stop using this medicine several days before you have surgery or medical tests  Keep all medicine out of the reach of children  Never share your medicine with anyone  Possible Side Effects While Using This Medicine:   Call your doctor right away if you notice any of these side effects: Allergic reaction: Itching or hives, swelling in your face or hands, swelling or tingling in your mouth or throat, chest tightness, trouble breathing  Bleeding from your gums or nose, coughing up blood, heavy monthly periods  Bleeding that does not stop, bruising, or weakness  Dizziness, fainting, lightheadedness  Pain, brown or black skin, or skin that is cool to the touch  Purple toes or feet, or new pain in your leg, foot, or toes  Purplish red, net-like, blotchy spots on the skin  Red or dark brown urine, or red or black, tarry stools  Vomiting blood or material that looks like coffee grounds  If you notice other side effects that you think are caused by this medicine, tell your doctor  Call your doctor for medical advice about side effects  You may report side effects to FDA at 8-204-FDA-7518  © Copyright [employer] 2022 Information is for End User's use only and may not be sold, redistributed or otherwise used for commercial purposes  The above information is an  only  It is not intended as medical advice for individual conditions or treatments  Talk to your doctor, nurse or pharmacist before following any medical regimen to see if it is safe and effective for you

## 2023-04-03 NOTE — PROGRESS NOTES
Cardiology   MD Michell Christian MD Morton Gamble, DO, AZALEA Wang MD Larnell Ruiz, DO, Lavern Bustillo DO, [facility]  -------------------------------------------------------------------  [facility]  [address], [place] [id number] [phone]  04/03/23  Sunita Mccullough  YOB: 1950   MRN: [id number]      Referring Physician: Korin Payan MD  [address], [place] [address]     HPI: Sunita Mccullough is a 81 y o  male with:   Severe MR s/p Mitral Valve Repair with Medtronic 32 mm  CG Future and 45mm  Atriclip 3/2023  HTN  HUMBERTO on CPAP  Diverticulosis  Hx of GIB  BPH on 2 agents  Thrombocytopenia (113k)  LMCA aneurysm (15 mm)  Large dist LMCA aneurysm (approximately 15mm diameter in width)  involving the prox LAD & LCx; otherwise mild plaque    Hx of COVID  Sinus bradycardia    Mild concentric left ventricular hypertrophy  Grade 2 diastolic dysfunction  Dilated RV with normal RV systolic function    He presents today for follow-up  He just got the hospital after having his mitral valve repair surgery  He did have postoperative A-fib requiring TANG and cardioversion, he is now on amiodarone and has remained in sinus rhythm is sinus bradycardia today  He states he is doing well, feels well after surgery, the incision is healing nicely  He does have a referral for cardiac rehab to start in late April  The TANG that was performed postoperatively in the setting of A-fib showed no significant evidence of mitral regurgitation  Does note some SOB with exertion, passes quickly  Review of Systems   Constitutional: Negative for chills and fever  HENT: Negative for facial swelling and sore throat  Eyes: Negative for visual disturbance  Respiratory: Positive for shortness of breath  Negative for cough, chest tightness and wheezing      Cardiovascular: Negative for chest pain, palpitations and leg swelling  Gastrointestinal: Negative for abdominal pain, blood in stool, constipation, diarrhea, nausea and vomiting  Endocrine: Negative for cold intolerance and heat intolerance  Genitourinary: Negative for decreased urine volume, difficulty urinating, dysuria and hematuria  Musculoskeletal: Negative for arthralgias, back pain and myalgias  Skin: Negative for rash  Neurological: Negative for dizziness, syncope, weakness and numbness  Psychiatric/Behavioral: Negative for agitation, behavioral problems and confusion  The patient is not nervous/anxious  OBJECTIVE  Vitals:    04/03/23 1529   BP: 130/72   Pulse: 56       Physical Exam  Constitutional: awake, alert and oriented, in no acute distress, no obvious deformities  Head: Normocephalic, without obvious abnormality, atraumatic  Eyes: conjunctivae clear and moist  Sclera anicteric  No xanthelasmas  Pupils equal bilaterally  Extraocular motions are full  Ear nose mouth and throat: ears are symmetrical bilaterally, hearing appears to be equal bilaterally, no nasal discharge or epistaxis, oropharynx is clear with moist mucous membranes  Neck:  Trachea is midline, neck is supple, no thyromegaly or significant lymphadenopathy, there is full range of motion  Lungs: clear to auscultation bilaterally, no wheezes, no rales, no rhonchi, no accessory muscle use, breathing is nonlabored  Heart: Sternal incision is healing well, there is still some ecchymosis in the area, scab has pretty much totally healed,  regular rate and rhythm, S1, S2 normal, no murmur, no click, rub or gallop, 1-2+ lower extremity edema  Abdomen: soft, non-tender; bowel sounds normal; no masses,  no organomegaly  Psychiatric:  Patient is oriented to time, place, person, mood/affect is negative for depression, anxiety, agitation, appears to have appropriate insight  Skin: Skin is warm, dry, intact  No obvious rashes or lesions on exposed extremities    Nail beds are pink with no cyanosis or clubbing  EKG:  Results for orders placed or performed in visit on 04/03/23   POCT ECG    Impression    Sinus bradycardia RSR' in V1, non specific T wave changes        IMPRESSION:  Severe MR s/p Mitral Valve Repair with Medtronic 32 mm  CG Future and 45mm  Atriclip 3/2023  HTN  HUMBERTO on CPAP  Diverticulosis  Hx of GIB  BPH on 2 agents  Thrombocytopenia (113k)  LMCA aneurysm (15 mm)  Large dist LMCA aneurysm (approximately 15mm diameter in width)  involving the prox LAD & LCx; otherwise mild plaque      Hx of COVID  Sinus bradycardia    Mild concentric left ventricular hypertrophy  Grade 2 diastolic dysfunction  Dilated RV with normal RV systolic function    DISCUSSION/RECOMMENDATIONS:  He presents today for follow-up  He does appear to be healing very well after surgery and recovering nicely  He has referral for cardiac rehab to start in late April which I definitely agree with  He was doing well with the torsemide postoperatively but now has begun to have some recurrence of lower extremity swelling  I sent a refill for the diuretics today  I asked him to take this for another 2 weeks and then stop after that and see how he does with the lower extremity edema  His heart sounds good today, no significant murmur is auscultated  Continue with amiodarone on a decreasing dose until this is complete  Continue with aspirin, Lipitor 20, lisinopril 10, and warfarin  He is in sinus rhythm today, did require cardioversion postoperatively however for A-fib (TANG postop showed no evidence of MR)  Plan for f/u 12 weeks      Devendra Gaytan DO, FAC, [facility]  --------------------------------------------------------------------------------  TREADMILL STRESS  No results found for this or any previous visit      ----------------------------------------------------------------------------------------------  NUCLEAR STRESS TEST: No results found for this or any previous visit      No results found for this or any previous visit       --------------------------------------------------------------------------------  CATH:  No results found for this or any previous visit     --------------------------------------------------------------------------------  ECHO:   No results found for this or any previous visit  No results found for this or any previous visit     --------------------------------------------------------------------------------  HOLTER  No results found for this or any previous visit  No results found for this or any previous visit     --------------------------------------------------------------------------------  CAROTIDS  No results found for this or any previous visit      --------------------------------------------------------------------------------  Diagnoses and all orders for this visit:    Postoperative atrial fibrillation ([address] )  -     POCT ECG    Nonrheumatic mitral valve regurgitation    Essential hypertension    Mixed hyperlipidemia    S/P mitral valve repair  -     torsemide (DEMADEX) 20 mg tablet; Take 1 tablet (20 mg total) by mouth daily for 5 days Unless otherwise directed  S/P left atrial appendage ligation  -     torsemide (DEMADEX) 20 mg tablet;  Take 1 tablet (20 mg total) by mouth daily for 5 days Unless otherwise directed      ======================================================    Past Medical History:   Diagnosis Date   • Acute bilateral low back pain without sciatica 03/09/2021   • BMI 33 0-33 9,adult 02/04/2020   • BPH (benign prostatic hyperplasia)    • COVID-19    • Diabetes mellitus associated with hormonal etiology ([facility] ) 03/05/2019   • Diverticulitis    • ED (erectile dysfunction)    • Elevated PSA 01/15/2013   • Encounter for well adult exam with abnormal findings 02/04/2019   • Herpes zoster 12/18/2014   • Hyperlipidemia    • Hypertension    • IFG (impaired fasting glucose)    • Metabolic syndrome    • Obesity (BMI 30 0-34 9) 02/04/2019   • Psoriasis    • Seasonal allergic rhinitis    • Type 2 diabetes mellitus ([facility] ) 11/17/2014     Past Surgical History:   Procedure Laterality Date   • CARDIAC CATHETERIZATION N/A 03/03/2023    Procedure: Cardiac Coronary Angiogram;  Surgeon: Mathieu Jeffery DO;  Location: [facility] CARDIAC CATH [facility]; Service: Cardiology   • CHOLECYSTECTOMY  05/14/2015   • COLONOSCOPY  2007   • COLONOSCOPY  06/23/2021   • HERNIA REPAIR  05/14/2015   • MN REPLACEMENT MITRAL VALVE W/CARDIOPULMONARY BYP N/A 3/21/2023    Procedure: MITRAL VALVE REPAIR with Medtronic 32 mm CG Future Annuloplasty Ring; LIGATION OF Left ATRIAL APPENDAGE with 45 mm AtriClip; TANG;  Surgeon: Leonard Camacho DO;  Location: [facility] MAIN OR;  Service: Cardiac Surgery         Medications  Current Outpatient Medications   Medication Sig Dispense Refill   • acetaminophen (TYLENOL) 325 mg tablet Take 1-2 tablets Q4-6 hours PRN pain  Do not take more than 4 grams in 24 hours  0   • amiodarone 200 mg tablet Take 1 tablet TID x7 days then 1 tablet BID x 7 days then 1 tablet QDay x7 days for total of 21 days   42 tablet 0   • aspirin (ECOTRIN LOW STRENGTH) 81 mg EC tablet take 1 tablet (81MG)     • atorvastatin (LIPITOR) 20 mg tablet TAKE 1 TABLET DAILY 90 tablet 1   • ferrous sulfate 324 (65 Fe) mg Take 1 tablet (324 mg total) by mouth daily before breakfast 30 tablet 1   • finasteride (PROSCAR) 5 mg tablet TAKE 1 TABLET DAILY 90 tablet 0   • lisinopril (ZESTRIL) 10 mg tablet Take 1 tablet (10 mg total) by mouth daily 30 tablet 2   • metFORMIN (GLUCOPHAGE) 500 mg tablet Take 1 tablet (500 mg total) by mouth 2 (two) times a day with meals 60 tablet 2   • metoprolol tartrate (LOPRESSOR) 25 mg tablet Take 1 tablet (25 mg total) by mouth every 12 (twelve) hours 60 tablet 2   • pantoprazole (PROTONIX) 40 mg tablet Take 1 tablet (40 mg total) by mouth daily in the early morning Do not start before March 28, 2023  30 tablet 0   • potassium chloride (K-DUR,KLOR-CON) 20 mEq tablet Take 1 tablet (20 mEq total) by mouth daily for 5 days Unless otherwise directed  Do not start before 2023  5 tablet 1   • tamsulosin (FLOMAX) 0 4 mg TAKE 1 CAPSULE DAILY WITH  DINNER 90 capsule 0   • torsemide (DEMADEX) 20 mg tablet Take 1 tablet (20 mg total) by mouth daily for 5 days Unless otherwise directed  30 tablet 1   • warfarin (COUMADIN) 2 5 mg tablet Take 2 tablets (5mg total) QHS unless otherwise directed by Coumadin clinic  60 tablet 2   • docusate sodium (COLACE) 100 mg capsule Take 1 capsule (100 mg total) by mouth 2 (two) times a day Hold for soft stools  (Patient not taking: Reported on 4/3/2023) 60 capsule 0     No current facility-administered medications for this visit  Allergies   Allergen Reactions   • No Known Allergies        Social History     Socioeconomic History   • Marital status: /Civil Union     Spouse name: Not on file   • Number of children: Not on file   • Years of education: Not on file   • Highest education level: Not on file   Occupational History   • Not on file   Tobacco Use   • Smoking status: Former     Packs/day: 0 50     Years: 12 00     Pack years: 6 00     Types: Pipe, Cigarettes     Start date: 1968     Quit date: 6/10/1980     Years since quittin 8     Passive exposure: Never   • Smokeless tobacco: Never   Vaping Use   • Vaping Use: Never used   Substance and Sexual Activity   • Alcohol use:  Yes     Alcohol/week: 2 0 standard drinks     Types: 1 Glasses of wine, 1 Shots of liquor per week     Comment: social   • Drug use: No   • Sexual activity: Yes     Partners: Female   Other Topics Concern   • Not on file   Social History Narrative    Has children    Right handed     Social Determinants of Health     Financial Resource Strain: Not on file   Food Insecurity: No Food Insecurity   • Worried About Running Out of Food in the Last Year: Never true   • Ran Out of Food in the Last Year: Never true   Transportation Needs: No Transportation Needs   • Lack of Transportation (Medical): No   • Lack of Transportation (Non-Medical):  No   Physical Activity: Not on file   Stress: Not on file   Social Connections: Not on file   Intimate Partner Violence: Not on file   Housing Stability: Low Risk    • Unable to Pay for Housing in the Last Year: No   • Number of Places Lived in the Last Year: 1   • Unstable Housing in the Last Year: No        Family History   Problem Relation Age of Onset   • Skin cancer Mother    • Heart disease Father        Lab Results   Component Value Date    WBC 5 47 03/27/2023    HGB 10 1 (L) 03/27/2023    HCT 29 7 (L) 03/27/2023    MCV 95 03/27/2023     03/27/2023      Lab Results   Component Value Date    SODIUM 135 03/27/2023    K 3 6 03/27/2023     03/27/2023    CO2 31 03/27/2023    BUN 18 03/27/2023    CREATININE 0 78 03/27/2023    GLUC 91 03/27/2023    CALCIUM 8 4 03/27/2023      Lab Results   Component Value Date    HGBA1C 5 3 11/14/2022      Lab Results   Component Value Date    CHOL 97 05/14/2018     Lab Results   Component Value Date    HDL 49 02/28/2023    HDL 49 11/14/2022    HDL 41 07/11/2022     Lab Results   Component Value Date    LDLCALC 33 02/28/2023    LDLCALC 29 11/14/2022    LDLCALC 19 07/11/2022     Lab Results   Component Value Date    TRIG 29 02/28/2023    TRIG 70 11/14/2022    TRIG 65 07/11/2022     No results found for: [place], [place]   Lab Results   Component Value Date    INR 1 84 (H) 03/30/2023    INR 1 82 (H) 03/27/2023    INR 1 52 (H) 03/26/2023    PROTIME 21 5 (H) 03/30/2023    PROTIME 21 3 (H) 03/27/2023    PROTIME 18 5 (H) 03/26/2023          Patient Active Problem List    Diagnosis Date Noted   • Anticoagulation goal of INR 2 to 3 03/27/2023   • Stress hyperglycemia 03/27/2023   • Postoperative atrial fibrillation ([address] ) 03/24/2023   • Anticoagulated on Coumadin 03/23/2023   • S/P mitral valve repair 03/21/2023   • S/P left atrial appendage ligation 03/21/2023   • FH: cerebral aneurysm 02/22/2023 "  • Obesity, morbid ([facility] ) 02/22/2023   • Drop in hemoglobin 01/06/2023   • Gastrointestinal hemorrhage associated with intestinal diverticulosis 12/31/2022   • Diverticulitis of colon with perforation 12/31/2022   • Blood in stool 12/30/2022   • Abnormal CBC 11/18/2022   • Atrial enlargement, left 11/18/2022   • History of COVID-19 09/29/2022   • Shortness of breath 09/28/2022   • Overweight with body mass index (BMI) of 27 to 27 9 in adult 03/08/2022   • Platelets decreased ([facility] ) 07/30/2021   • HUMBERTO treated with BiPAP 09/29/2020   • Excessive daytime sleepiness 09/29/2020   • Need for shingles vaccine 02/04/2019   • Colon polyp 08/27/2018   • Left ventricular hypertrophy 02/06/2018   • Mitral regurgitation 02/06/2018   • BPH with urinary obstruction 10/31/2016   • Calculus of gallbladder with cholecystitis 04/29/2015   • Displacement of lumbar intervertebral disc without myelopathy 04/09/2015   • Idiopathic peripheral neuropathy 04/09/2015   • Diverticulosis of colon 01/26/2015   • Indigestion 12/23/2014   • Herpes zoster 12/18/2014   • Allergic rhinitis 01/15/2013   • Male erectile disorder 01/15/2013   • Essential hypertension 90/69/3382   • Metabolic syndrome 98/44/6972   • Hyperlipidemia 01/15/2013       Portions of the record may have been created with voice recognition software  Occasional wrong word or \"sound a like\" substitutions may have occurred due to the inherent limitations of voice recognition software  Read the chart carefully and recognize, using context, where substitutions have occurred      Matt Hoffmann DO, [facility]  4/3/2023 4:20 PM          "

## 2023-04-03 NOTE — PROGRESS NOTES
4/3/23~patient in office to visit with DR Valeria Meeks, introduced self, advised INR not done, take 2 5 mg tonight, will call tomorrow with further instructions

## 2023-04-04 ENCOUNTER — ANTICOAG VISIT (OUTPATIENT)
Dept: CARDIOLOGY CLINIC | Facility: CLINIC | Age: 73
End: 2023-04-04

## 2023-04-04 ENCOUNTER — TELEPHONE (OUTPATIENT)
Dept: CARDIAC SURGERY | Facility: CLINIC | Age: 73
End: 2023-04-04

## 2023-04-04 NOTE — PROGRESS NOTES
Spoke with patient, advised INR almost at goal, advised to take 7 5 mg Fri, 5 mg all other days, will recheck next week 4/10/23

## 2023-04-04 NOTE — TELEPHONE ENCOUNTER
Called patient to perform routine post-op follow up after hospital discharge  No answer, left voicemail to call office at [phone]

## 2023-04-05 NOTE — PROGRESS NOTES
Assessment & Plan     1  Lower extremity edema  Assessment & Plan:  Finding on the physical exam patient had mitral valve replacement surgery on March 21 he just finished a dose of the torsemide during the hospital patient had echocardiogram ejection fraction is normal recommend to continue with the torsemide patient has appointment with cardiology today afternoon he will consult with him      2  Anticoagulated on Coumadin  Assessment & Plan:  Patient currently on Coumadin handout has been given to the patient regarding Coumadin affected by his diet    Orders:  -     Folate; Future    3  Drop in hemoglobin  Assessment & Plan:  Drop in the hemoglobin status post cardiac surgery mitral valve repair possible secondary to blood loss recommend to repeat CBC    Orders:  -     CBC and differential; Future  -     Comprehensive metabolic panel; Future  -     Ferritin; Future  -     Iron; Future  -     Iron Saturation %; Future  -     Vitamin B12; Future  -     Folate; Future    4  Nonrheumatic mitral valve regurgitation  Assessment & Plan:  Status post mitral valve repair well on March 21, 2023 patient currently on Coumadin follow-up with the Coumadin clinic PT/INR subtherapeutic patient will follow up with cardiology later today  Patient planned to have cardiac rehab at the end of April    Orders:  -     CBC and differential; Future  -     Comprehensive metabolic panel; Future  -     Ferritin; Future  -     Iron; Future  -     Iron Saturation %; Future  -     Vitamin B12; Future  -     Folate; Future    5   Type 2 diabetes mellitus without complication, without long-term current use of insulin [facility])  Assessment & Plan:    Lab Results   Component Value Date    HGBA1C 5 3 11/14/2022   During hospitalization secondary to the stress of the surgery patient her blood sugars elevated he restarted on metformin and patient will follow-up with endocrinology as outpatient    Orders:  -     CBC and differential; Future  - Comprehensive metabolic panel; Future  -     Hemoglobin A1C; Future  -     Ferritin; Future  -     Iron; Future  -     Iron Saturation %; Future  -     Vitamin B12; Future  -     Folate; Future    6  Low iron  -     Ferritin; Future  -     Iron; Future  -     Iron Saturation %; Future  -     Folate; Future    7  History of thrombocytopenia  -     Ferritin; Future  -     Iron; Future  -     Iron Saturation %; Future  -     Folate; Future    8  Abnormal CBC  -     CBC and differential; Future  -     Folate; Future    9  Overweight with body mass index (BMI) of 27 to 27 9 in adult  -     Folate; Future    10  B12 deficiency  -     Vitamin B12; Future  -     Folate; Future    11  Mixed hyperlipidemia  -     Folate; Future    12  Postoperative atrial fibrillation [facility])  Assessment & Plan:  Status post cardioversion to normal sinus rhythm on February 27 patient currently on amiodarone and beta-blocker he is on Coumadin       13  S/P mitral valve repair  Assessment & Plan:   mitral valve repair in March 21, 2023 patient currently on Coumadin PT/INR subtherapeutic follow-up with the Coumadin clinic and cardiology           Subjective     Transitional Care Management Review:   Yadira Ratliff is a 81 y o  male here for TCM follow up  During the TCM phone call patient stated:  TCM Call     Date and time call was made  3/28/2023  8:13 AM    Hospital care reviewed  Records reviewed    Patient was hospitialized at  [facility]    Date of Admission  03/21/23    Date of discharge  03/27/23    Diagnosis  s/p mitral valve repair    Disposition  Home    Were the patients medications reviewed and updated  No    Current Symptoms  None      TCM Call     Post hospital issues  None    Should patient be enrolled in anticoag monitoring? No    Scheduled for follow up?   Yes    Did you obtain your prescribed medications  Yes    Do you need help managing your prescriptions or medications  No    Is transportation to your appointment needed  No    I have advised the patient to call PCP with any new or worsening symptoms  mary kate chahal    Living Arrangements  Spouse or Significiant other    Support System  Spouse    The type of support provided  Emotional; Physical    Do you have social support  Yes, as much as I need    Are you recieving any outpatient services  No    Are you recieving home care services  No    Are you using any community resources  No    Current waiver services  No    Have you fallen in the last 12 months  No    Interperter language line needed  No    Counseling  Patient    Counseling topics  Risk factor reduction        Patient here with his wife status post hospitalization patient was admitted March 21, 2023 for elective mitral valve repair patient had surgery status post surgery he developed A-fib patient his metoprolol has been increased he was on Coumadin PT/INR was therapeutic patient also was on amiodarone  Patient had a TANG with cardioversion on March 27 converted to sinus rhythm during hospitalization patient had elevated blood sugar 3 secondary to stress he been restarted on metformin evaluated by endocrinology   patient discharged home April 27 with recommendation to follow-up as outpatient with the cardiology and recommendation to start cardiac rehab at some point end of April patient was consulted about the driving and sexual activity I reviewed with the patient and his wife Hospital record weekly concern medication today patient have slight short of breath and he had lower extremity edema wife mention he gained weight since yesterday 3 pound    Review of Systems   Constitutional: Negative for chills and fever  HENT: Negative for ear pain and sore throat  Eyes: Negative for pain and visual disturbance  Respiratory: Positive for shortness of breath  Negative for cough and wheezing  Cardiovascular: Positive for leg swelling  Negative for chest pain and palpitations     Gastrointestinal: Negative for "abdominal pain, constipation, diarrhea and vomiting  Genitourinary: Negative for dysuria and hematuria  Musculoskeletal: Negative for arthralgias and back pain  Skin: Negative for color change and rash  Neurological: Negative for seizures and syncope  All other systems reviewed and are negative  Objective     /62 (BP Location: Left arm, Patient Position: Sitting)   Pulse 58   Temp 98 °F (36 7 °C) (Tympanic)   Ht 6' 1\" (1 854 m)   Wt 94 3 kg (207 lb 12 8 oz)   SpO2 98%   BMI 27 42 kg/m²      Physical Exam  Vitals and nursing note reviewed  Constitutional:       General: He is not in acute distress  Appearance: He is well-developed  HENT:      Head: Normocephalic and atraumatic  Right Ear: Tympanic membrane normal  There is no impacted cerumen  Left Ear: Tympanic membrane normal       Nose: No congestion  Mouth/Throat:      Pharynx: No oropharyngeal exudate  Eyes:      Conjunctiva/sclera: Conjunctivae normal    Cardiovascular:      Rate and Rhythm: Normal rate and regular rhythm  Heart sounds: Murmur heard  Pulmonary:      Effort: Pulmonary effort is normal  No respiratory distress  Breath sounds: Normal breath sounds  Abdominal:      Palpations: Abdomen is soft  Tenderness: There is no abdominal tenderness  Musculoskeletal:         General: No swelling  Cervical back: Neck supple  Skin:     General: Skin is warm and dry  Capillary Refill: Capillary refill takes less than 2 seconds  Neurological:      Mental Status: He is alert and oriented to person, place, and time     Psychiatric:         Mood and Affect: Mood normal        Medications have been reviewed by provider in current encounter    Eduardo Sanchez MD       "

## 2023-04-06 ENCOUNTER — OFFICE VISIT (OUTPATIENT)
Dept: ENDOCRINOLOGY | Facility: CLINIC | Age: 73
End: 2023-04-06

## 2023-04-06 VITALS
WEIGHT: 201.8 LBS | SYSTOLIC BLOOD PRESSURE: 120 MMHG | HEIGHT: 73 IN | HEART RATE: 55 BPM | DIASTOLIC BLOOD PRESSURE: 76 MMHG | BODY MASS INDEX: 26.74 KG/M2

## 2023-04-06 DIAGNOSIS — R73.03 PREDIABETES: Primary | ICD-10-CM

## 2023-04-06 PROBLEM — R60.0 LOWER EXTREMITY EDEMA: Status: ACTIVE | Noted: 2023-04-03

## 2023-04-06 PROBLEM — E53.8 B12 DEFICIENCY: Status: ACTIVE | Noted: 2023-04-06

## 2023-04-06 PROBLEM — R60.0 LOWER EXTREMITY EDEMA: Status: ACTIVE | Noted: 2023-04-06

## 2023-04-06 LAB — SL AMB POCT HEMOGLOBIN AIC: 5.4 (ref ?–6.5)

## 2023-04-06 NOTE — ASSESSMENT & PLAN NOTE
Status post cardioversion to normal sinus rhythm on February 27 patient currently on amiodarone and beta-blocker he is on Coumadin

## 2023-04-06 NOTE — ASSESSMENT & PLAN NOTE
Status post mitral valve repair well on March 21, 2023 patient currently on Coumadin follow-up with the Coumadin clinic PT/INR subtherapeutic patient will follow up with cardiology later today  Patient planned to have cardiac rehab at the end of April

## 2023-04-06 NOTE — ASSESSMENT & PLAN NOTE
Drop in the hemoglobin status post cardiac surgery mitral valve repair possible secondary to blood loss recommend to repeat CBC

## 2023-04-06 NOTE — PROGRESS NOTES
Established Patient Progress Note      Chief Complaint   Patient presents with   • Prediabetes        History of Present Illness:   Que Osborne is a 81 y o  male with hx of prediabetes  He was on Metformin for a short period of time but has been diet controlled for the last year  He recently underwent elective mitral valve repair and left atrial appendage ligation  He was seen by Dr Javy Medeiros and Dr Reymundo Aly in the hospital  He was discharged home on Metformin  He has been checking BGs 4 times daily       Home blood glucose readings:   Before breakfast: 100-120  Before lunch: 100-120  Before dinner: 105-150  Bedtime: 115-130     Current regimen:   Metformin 500 mg twice daily     Has hypertension: Taking lisinopril  Has hyperlipidemia: Taking atorvastatin       Patient Active Problem List   Diagnosis   • Allergic rhinitis   • BPH with urinary obstruction   • Displacement of lumbar intervertebral disc without myelopathy   • Diverticulosis of colon   • Male erectile disorder   • Essential hypertension   • Herpes zoster   • Indigestion   • Metabolic syndrome   • Left ventricular hypertrophy   • Mitral regurgitation   • Type 2 diabetes mellitus without complication, without long-term current use of insulin ([facility])   • Hyperlipidemia   • Idiopathic peripheral neuropathy   • Calculus of gallbladder with cholecystitis   • Colon polyp   • Need for shingles vaccine   • HUMBERTO treated with BiPAP   • Excessive daytime sleepiness   • Platelets decreased ([facility])   • Overweight with body mass index (BMI) of 27 to 27 9 in adult   • Shortness of breath   • History of COVID-19   • Abnormal CBC   • Atrial enlargement, left   • Blood in stool   • Gastrointestinal hemorrhage associated with intestinal diverticulosis   • Diverticulitis of colon with perforation   • Drop in hemoglobin   • FH: cerebral aneurysm   • Obesity, morbid ([address] )   • S/P mitral valve repair   • S/P left atrial appendage ligation   • Anticoagulated on Coumadin   • Postoperative atrial fibrillation (HCC)   • Anticoagulation goal of INR 2 to 3   • Stress hyperglycemia   • B12 deficiency   • Lower extremity edema   • Prediabetes      Past Medical History:   Diagnosis Date   • Acute bilateral low back pain without sciatica 2021   • BMI 33 0-33 9,adult 2020   • BPH (benign prostatic hyperplasia)    • COVID-19    • Diabetes mellitus associated with hormonal etiology ([facility] ) 2019   • Diverticulitis    • ED (erectile dysfunction)    • Elevated PSA 01/15/2013   • Encounter for well adult exam with abnormal findings 2019   • Herpes zoster 2014   • Hyperlipidemia    • Hypertension    • IFG (impaired fasting glucose)    • Metabolic syndrome    • Obesity (BMI 30 0-34 9) 2019   • Psoriasis    • Seasonal allergic rhinitis    • Type 2 diabetes mellitus ([place] [address] ) 2014      Past Surgical History:   Procedure Laterality Date   • CARDIAC CATHETERIZATION N/A 2023    Procedure: Cardiac Coronary Angiogram;  Surgeon: Mick Rose DO;  Location: [facility] CARD[facility];   Service: Cardiology   • CHOLECYSTECTOMY  2015   • COLONOSCOPY     • COLONOSCOPY  2021   • HERNIA REPAIR  2015   • [place] REPLACEMENT MITRAL VALVE W/CARDIOPULMONARY BYP N/A 3/21/2023    Procedure: MITRAL VALVE REPAIR with Medtronic 32 mm CG Future Annuloplasty Ring; LIGATION OF Left ATRIAL APPENDAGE with 45 mm AtriClip; TANG;  Surgeon: Ami Looney DO;  Location: [facility] MAIN OR;  Service: Cardiac Surgery      Family History   Problem Relation Age of Onset   • Skin cancer Mother    • Diabetes type II Mother    • Hyperlipidemia Mother    • Heart disease Father    • Thyroid disease unspecified Paternal Aunt      Social History     Tobacco Use   • Smoking status: Former     Packs/day: 1 00     Years: 10 00     Pack years: 10 00     Types: Cigarettes, Pipe     Start date: 1970     Quit date: 1980     Years since quittin 9     Passive exposure: Never   • Smokeless tobacco: Never   Substance Use Topics   • Alcohol use: Yes     Alcohol/week: 21 0 standard drinks     Types: 7 Glasses of wine, 14 Shots of liquor per week     Comment: social     Allergies   Allergen Reactions   • No Known Allergies          Current Outpatient Medications:   •  acetaminophen (TYLENOL) 325 mg tablet, Take 1-2 tablets Q4-6 hours PRN pain  Do not take more than 4 grams in 24 hours  , Disp: , Rfl: 0  •  amiodarone 200 mg tablet, Take 1 tablet TID x7 days then 1 tablet BID x 7 days then 1 tablet QDay x7 days for total of 21 days  , Disp: 42 tablet, Rfl: 0  •  aspirin (ECOTRIN LOW STRENGTH) 81 mg EC tablet, take 1 tablet (81MG), Disp: , Rfl:   •  atorvastatin (LIPITOR) 20 mg tablet, TAKE 1 TABLET DAILY, Disp: 90 tablet, Rfl: 1  •  ferrous sulfate 324 (65 Fe) mg, Take 1 tablet (324 mg total) by mouth daily before breakfast, Disp: 30 tablet, Rfl: 1  •  finasteride (PROSCAR) 5 mg tablet, TAKE 1 TABLET DAILY, Disp: 90 tablet, Rfl: 0  •  lisinopril (ZESTRIL) 10 mg tablet, Take 1 tablet (10 mg total) by mouth daily, Disp: 30 tablet, Rfl: 2  •  metFORMIN (GLUCOPHAGE) 500 mg tablet, Take 1 tablet (500 mg total) by mouth 2 (two) times a day with meals, Disp: 60 tablet, Rfl: 2  •  metoprolol tartrate (LOPRESSOR) 25 mg tablet, Take 1 tablet (25 mg total) by mouth every 12 (twelve) hours, Disp: 60 tablet, Rfl: 2  •  pantoprazole (PROTONIX) 40 mg tablet, Take 1 tablet (40 mg total) by mouth daily in the early morning Do not start before March 28, 2023 , Disp: 30 tablet, Rfl: 0  •  potassium chloride (K-DUR,KLOR-CON) 20 mEq tablet, Take 1 tablet (20 mEq total) by mouth daily for 5 days Unless otherwise directed   Do not start before March 28, 2023 , Disp: 5 tablet, Rfl: 1  •  tamsulosin (FLOMAX) 0 4 mg, TAKE 1 CAPSULE DAILY WITH  DINNER, Disp: 90 capsule, Rfl: 0  •  torsemide (DEMADEX) 20 mg tablet, Take 1 tablet (20 mg total) by mouth daily for 5 days Unless otherwise directed , Disp: 30 tablet, Rfl: 1  • "warfarin (COUMADIN) 2 5 mg tablet, Take 2 tablets (5mg total) QHS unless otherwise directed by Coumadin clinic , Disp: 60 tablet, Rfl: 2  •  docusate sodium (COLACE) 100 mg capsule, Take 1 capsule (100 mg total) by mouth 2 (two) times a day Hold for soft stools  (Patient not taking: Reported on 4/3/2023), Disp: 60 capsule, Rfl: 0    Review of Systems   Constitutional: Negative for activity change, appetite change, chills, diaphoresis, fatigue, fever and unexpected weight change  Respiratory: Negative for chest tightness and shortness of breath  Cardiovascular: Negative for chest pain, palpitations and leg swelling  Gastrointestinal: Negative for abdominal pain, constipation, diarrhea and vomiting  All other systems reviewed and are negative  Physical Exam:  Body mass index is 26 62 kg/m²  /76 (BP Location: Left arm, Patient Position: Sitting, Cuff Size: Standard)   Pulse 55   Ht 6' 1\" (1 854 m)   Wt 91 5 kg (201 lb 12 8 oz)   BMI 26 62 kg/m²    Wt Readings from Last 3 Encounters:   04/06/23 91 5 kg (201 lb 12 8 oz)   04/03/23 93 3 kg (205 lb 9 6 oz)   04/03/23 94 3 kg (207 lb 12 8 oz)       Physical Exam  Vitals reviewed  Constitutional:       Appearance: Normal appearance  HENT:      Head: Normocephalic  Cardiovascular:      Rate and Rhythm: Regular rhythm  Pulses: Normal pulses  Heart sounds: Normal heart sounds  No murmur heard  Pulmonary:      Effort: Pulmonary effort is normal  No respiratory distress  Breath sounds: Normal breath sounds  No wheezing, rhonchi or rales  Neurological:      Mental Status: He is alert and oriented to person, place, and time  Psychiatric:         Mood and Affect: Mood normal          Behavior: Behavior normal          Thought Content:  Thought content normal          Judgment: Judgment normal        Labs:   Lab Results   Component Value Date    HGBA1C 5 4 04/06/2023    HGBA1C 5 3 11/14/2022    HGBA1C 5 6 07/11/2022     Lab Results " Component Value Date    CREATININE 0 78 03/27/2023    CREATININE 0 73 03/23/2023    CREATININE 0 85 03/22/2023    BUN 18 03/27/2023     05/14/2018    K 3 6 03/27/2023     03/27/2023    CO2 31 03/27/2023     eGFR   Date Value Ref Range Status   03/27/2023 91 ml/min/1 73sq m Final     Lab Results   Component Value Date    CHOL 97 05/14/2018    HDL 49 02/28/2023    TRIG 29 02/28/2023     Lab Results   Component Value Date    ALT 17 03/23/2023    AST 32 03/23/2023    ALKPHOS 51 03/23/2023    BILITOT 0 4 05/14/2018     Lab Results   Component Value Date    KNM5WKTZWMZF 1 780 02/28/2023    CDM5MDHTHDNS 1 300 07/11/2022    [id number] 1 370 03/04/2022     Impression & Plan:    Problem List Items Addressed This Visit        Other    Prediabetes - Primary     A1c is well controlled and in normal range  BGs are well controlled, no hyperglycemia  Recommend he continue follow up with PCP for prediabetes management  Relevant Orders    POCT hemoglobin A1c (Completed)       Orders Placed This Encounter   Procedures   • POCT hemoglobin A1c       There are no Patient Instructions on file for this visit  Discussed with the patient and all questioned fully answered  He will call me if any problems arise      NISHANT Bates

## 2023-04-06 NOTE — ASSESSMENT & PLAN NOTE
A1c is well controlled and in normal range  BGs are well controlled, no hyperglycemia  Recommend he continue follow up with PCP for prediabetes management

## 2023-04-06 NOTE — ASSESSMENT & PLAN NOTE
Lab Results   Component Value Date    HGBA1C 5 3 11/14/2022   During hospitalization secondary to the stress of the surgery patient her blood sugars elevated he restarted on metformin and patient will follow-up with endocrinology as outpatient

## 2023-04-06 NOTE — ASSESSMENT & PLAN NOTE
Finding on the physical exam patient had mitral valve replacement surgery on March 21 he just finished a dose of the torsemide during the hospital patient had echocardiogram ejection fraction is normal recommend to continue with the torsemide patient has appointment with cardiology today afternoon he will consult with him

## 2023-04-06 NOTE — ASSESSMENT & PLAN NOTE
Patient currently on Coumadin handout has been given to the patient regarding Coumadin affected by his diet

## 2023-04-06 NOTE — ASSESSMENT & PLAN NOTE
mitral valve repair in March 21, 2023 patient currently on Coumadin PT/INR subtherapeutic follow-up with the Coumadin clinic and cardiology

## 2023-04-19 PROBLEM — Z48.89 ENCOUNTER FOR POSTOPERATIVE CARE: Status: ACTIVE | Noted: 2023-04-19

## 2023-04-24 ENCOUNTER — TELEPHONE (OUTPATIENT)
Dept: CARDIAC REHAB | Facility: CLINIC | Age: 73
End: 2023-04-24

## 2023-04-25 ENCOUNTER — CLINICAL SUPPORT (OUTPATIENT)
Dept: CARDIAC REHAB | Facility: CLINIC | Age: 73
End: 2023-04-25

## 2023-04-25 ENCOUNTER — APPOINTMENT (OUTPATIENT)
Dept: LAB | Facility: CLINIC | Age: 73
End: 2023-04-25

## 2023-04-25 DIAGNOSIS — Z98.890 S/P MITRAL VALVE REPAIR: ICD-10-CM

## 2023-04-25 DIAGNOSIS — Z98.890 S/P LEFT ATRIAL APPENDAGE LIGATION: ICD-10-CM

## 2023-04-25 NOTE — PROGRESS NOTES
"CARDIAC REHAB ASSESSMENT    Today's date: 2023  Patient name: Lamont Melendrez     : 1950       MRN: [id number]  PCP: Halie Perea MD  Referring Physician: Arnaud Eckert DO  Cardiologist: Monica Miles DO  Surgeon: Angela Acuña DO  Dx:   Encounter Diagnoses   Name Primary?    • S/P left atrial appendage ligation    • S/P mitral valve repair        Date of onset: 3/21/23  Cultural needs: None    Weight    Wt Readings from Last 1 Encounters:   23 93 kg (205 lb)      Height:   Ht Readings from Last 1 Encounters:   23 6' 1\" (1 854 m)     Medical History:   Past Medical History:   Diagnosis Date   • Acute bilateral low back pain without sciatica 2021   • BMI 33 0-33 9,adult 2020   • BPH (benign prostatic hyperplasia)    • COVID-19    • Diabetes mellitus associated with hormonal etiology ([place] [address] ) 2019   • Diverticulitis    • ED (erectile dysfunction)    • Elevated PSA 01/15/2013   • Encounter for well adult exam with abnormal findings 2019   • Herpes zoster 2014   • Hyperlipidemia    • Hypertension    • IFG (impaired fasting glucose)    • Metabolic syndrome    • Obesity (BMI 30 0-34 9) 2019   • Psoriasis    • Seasonal allergic rhinitis    • Type 2 diabetes mellitus ([facility] ) 2014         Physical Limitations: None    Fall Risk: Low   Comments: Ambulates with a steady gait with no assist device and Denies a fall in the past 6 months    Risk Factors   Cholesterol: Yes  Smoking: Former user - remote history  HTN: Yes  DM: Pre-diabetes  Obesity: No   Inactivity: No  Stress:  perceived  stress: 3/10   Stressors:work     Family History:  Family History   Problem Relation Age of Onset   • Skin cancer Mother    • Diabetes type II Mother    • Hyperlipidemia Mother    • Heart disease Father    • Thyroid disease unspecified Paternal Aunt        Allergies: No known allergies  ETOH:   Social History     Substance and Sexual Activity   Alcohol Use Yes   • " Alcohol/week: 21 0 standard drinks   • Types: 7 Glasses of wine, 14 Shots of liquor per week    Comment: social         Current Medications:   Current Outpatient Medications   Medication Sig Dispense Refill   • acetaminophen (TYLENOL) 325 mg tablet Take 1-2 tablets Q4-6 hours PRN pain  Do not take more than 4 grams in 24 hours  0   • aspirin (ECOTRIN LOW STRENGTH) 81 mg EC tablet take 1 tablet (81MG)     • atorvastatin (LIPITOR) 20 mg tablet TAKE 1 TABLET DAILY 90 tablet 1   • ferrous sulfate 324 (65 Fe) mg Take 1 tablet (324 mg total) by mouth daily before breakfast 30 tablet 1   • finasteride (PROSCAR) 5 mg tablet TAKE 1 TABLET DAILY 90 tablet 0   • lisinopril (ZESTRIL) 10 mg tablet Take 1 tablet (10 mg total) by mouth daily 30 tablet 2   • metoprolol tartrate (LOPRESSOR) 25 mg tablet Take 1 tablet (25 mg total) by mouth every 12 (twelve) hours 60 tablet 2   • potassium chloride (K-DUR,KLOR-CON) 20 mEq tablet Take 1 tablet (20 mEq total) by mouth daily for 5 days Unless otherwise directed  Do not start before March 28, 2023  5 tablet 1   • tamsulosin (FLOMAX) 0 4 mg TAKE 1 CAPSULE DAILY WITH  DINNER 90 capsule 0   • torsemide (DEMADEX) 20 mg tablet Take 1 tablet (20 mg total) by mouth daily for 5 days Unless otherwise directed  30 tablet 1   • warfarin (COUMADIN) 2 5 mg tablet Take 2 tablets (5mg total) QHS unless otherwise directed by Coumadin clinic  (Patient taking differently: Take 2 tablets (5mg total) QHS unless otherwise directed by Coumadin clinic  Thursday 5mg  7 5 mg Friday ) 60 tablet 2     No current facility-administered medications for this visit           Functional Status Prior to Diagnosis for Treatment   Occupation: full time job   Recreation: travelling, spending time with dogs  ADL’s: No limitations  [place]: No limitations  Exercise: yoga, swimming  Other: 3 floors, walking a lot of stairs at home    Current Functional Status  Occupation: slowly getting back to full time job  Recreation: reading and writing  ADL’s:Capable of performing light to moderate ADLs limited by sternal precautions  [place]: Capable of performing light to moderate ADLs able to perform self-care resumed driving  Exercise: 5 blocks, twice per day walking  Other: plans to get back to [employer]     Patient Specific Goals:  Resume exercise at the gym (elliptical and stationary bike for 60 min); go to [facility] in September    Short Term Program Goals: increased strength improved energy/stamina with ADLs exercise 120-150 mins/wk    Long Term Goals: Improved [facility] Activity Status score  Improved functional capacity  Improved Quality of Life - [facility] score reduced    Ability to reach goals/rehabilitation potential:  Very Good     Projected return to function: 12 weeks  Objective tests: sub-max TM ETT      Nutritional   Reviewed details of Rate your Plate  Discussed key elements of heart healthy eating  Reviewed most recent lipid profile       Goals for dietary modification based on Rate Your Plate Dietary Assessment:  more meatless meals  reduced fat cheese  reduce sweets/frozen desserts    [employer] program    Emotional/Social  Patient reports he is coping well with good social support and denies depression or anxiety    Marital status:     Domestic Violence Screening: No

## 2023-04-25 NOTE — PROGRESS NOTES
Cardiac Rehabilitation Plan of Care   Initial Care Plan          Today's date: 2023   # of Exercise Sessions Completed: 1 - initial evaluation  Patient name: Sunita Mccullough      : 1950  Age: 81 y o  MRN: [id number]  Referring Physician: Guerda Mills DO  Cardiologist: Lazarus Matas, DO  Provider: [facility]  Clinician: Claudette Pacheco MS, CEP    Dx:   Encounter Diagnoses   Name Primary? • S/P left atrial appendage ligation    • S/P mitral valve repair      Date of onset: 3/21/23      SUMMARY OF PROGRESS:  Today is Balbir's initial evaluation to begin Cardiac Rehab now 5 wks post mitral valve repair and MARGARET ligation  Post op Afib with conversion to NSR post cardioversion  Erick Willard has a history of LMCA aneurysm (15mm), HTN, HLD, HUMBERTO (compliant with CPAP), covid (), BPH and Diverticulosis  He does currently exercise at home, including walking 5 blocks twice per day and using his stairs often at home (2 flights to get to his bedroom)  Prior to surgery, Erick Willard was doing more yoga, swimming at [employer] and could use both the stationary cycle and elliptical for 60 min  He has resumed most ADLs following sternal restrictions  Today, Erick Willard completed an initial submaximal TM ETT  He completed 1 minutes of stage 4 (5 8METs) with test termination of RHR +30  Resting BP of 116/70 and resting HR of 60 bpm with appropriate hemodynamic response to exercise; BP reaching 132/78 - 158/78 and peak HR of 96 bpm  Balbir denied symptoms during exercise and denies any symptoms at home besides collar bone tenderness  Telemetry revealed NSR with two isolated PVCs at peak exercise  Erick Willard was counseled on exercise guidelines and the goal to achieve a minimum of 150 mins/wk of moderate intensity (RPE 4-6) exercise per [facility] - [place] guidelines  Erick Willard has recently lost 50 lbs prior to surgery with [employer] and plans to continue with a healthy diet, supplementing with Kerri Olp   His RYP score today was 62 suggesting healthy diet choices  Depression and anxiety were assessed with PHQ-9 and DARIEL-7 questionnaires with scores of 2 and 0 respectively, suggesting  1-4 = Minimal Depression and  0-4  = Not anxious  When addressed, Olga Bautista denies having depression/anxiety symptoms and reports very good social/emotional support  His goals for cardiac rehab include: travel to [facility] in Sept '23 and return to biking/elliptical at the gym for 60 min  Olga Bautista will attend 35 monitored exercise sessions, 3x/wk for 12-18 weeks beginning Friday, April 25  Medication compliance: Yes   Comments: Pt reports to be compliant with medications  Fall Risk: Low   Comments: Ambulates with a steady gait with no assist device and Denies a fall in the past 6 months    EKG Interpretation: NSR, 2 isolated PVCs      EXERCISE ASSESSMENT and PLAN    Exercise Prescription:      Frequency: 3 days/week      Minutes: 30-40         METS: 4-5 8            HR:    RPE: 3-5         Modalities: Treadmill, UBE and Lifecycle      30 Day Goals for Exercise Progression:    Frequency: 3 days/week of cardiac rehab       Supplement with home exercise 2+ days/wk as tolerated    Minutes: 40                              >150 mins/wk of moderate intensity exercise   METS: 4-5 8   HR:     RPE: 4-6   Modalities: Treadmill, UBE, Lifecycle and Elliptical    Strength training:   Will be added following at least 8 weeks post surgery and 8-10 monitored sessions   Modalities: Leg Press, Chest Press, Pull Downs, Lateral Raise, Arm Extension, Arm Curl, Front Raises, Shoulder Shrugs and Calf Raises    Home Exercise: walking 5 blocks twice a day and using stairs often at home    Goals: 10% improvement in functional capacity - based on max METs achieved in fitness assessment, improved DASI score by 10%, Increase in exercise capacity by 40% - based on peak METs tolerated in cardiac rehab exercise session, Exercise 5 days/wk, >150mins/wk of moderate intensity exercise, Attend Rehab regularly and return to regular exercise at the gym    Progression Toward Goals:  Reviewed Pt goals and determined plan of care    Education: benefit of exercise for CAD risk factors, AHA guidelines to achieve >150 mins/wk of moderate exercise and RPE scale   Plan:education on home exercise guidelines  Readiness to change: Preparation:  (Getting ready to change)       NUTRITION ASSESSMENT AND PLAN    Weight control:    Starting weight: 207 2 lb   Current weight:     Waist circumference:    Startin in   Current:      Diabetes: N/A  A1c: 5 3%    last measured: 23    Lipid management: Last lipid profile 23  Chol 88  TRG 29  HDL 49  LDL 33    Goals:reduced BMI to < 25    Measurable goals were based Rate Your Plate Dietary Self-Assessment  These are the areas in which the patient could score higher on the assessment  Goals include recommendations for a heart healthy diet based on American Heart Association      Progression Toward Goals: Reviewed Pt goals and determined plan of care    Education: heart healthy eating  Plan: continue with healthy eating and add regular exercise  Readiness to change: Preparation:  (Getting ready to change)       PSYCHOSOCIAL ASSESSMENT AND PLAN    Emotional:  Depression assessment:  PHQ-9 = 2  1-4 = Minimal Depression            Anxiety measure:  DARIEL-7 = 0  0-4  = Not anxious  Self-reported stress level:  3  Social support: Very Good    Goals:  increased energy    Progression Toward Goals: Reviewed Pt goals and determined plan of care    Education: benefits of a positive support system and depression and CAD  Plan: Exercise  Readiness to change: Preparation:  (Getting ready to change)       OTHER CORE COMPONENTS     Tobacco:   Social History     Tobacco Use   Smoking Status Former   • Packs/day: 1 00   • Years: 10 00   • Pack years: 10 00   • Types: Cigarettes, Pipe   • Start date: 1970   • Quit date: 1980   • Years since quittin 0   • Passive exposure: Never   Smokeless Tobacco Never       Tobacco Use Intervention:   N/A: Pt has a remote history of smoking    Blood pressure:    Restin/70   Exercise: 132/78 - 158/78    Goals: consistent BP < 130/80, moderate intensity exercise >150 mins/wk and medication compliance    Progression Toward Goals: Reviewed Pt goals and determined plan of care    Education:  understanding high blood pressure and it's relationship to CAD  Plan: Class: Understanding Heart Disease, Class: Common Heart Medications and engage in regular exercise  Readiness to change: Preparation:  (Getting ready to change)

## 2023-04-26 ENCOUNTER — ANTICOAG VISIT (OUTPATIENT)
Dept: CARDIOLOGY CLINIC | Facility: CLINIC | Age: 73
End: 2023-04-26

## 2023-04-26 DIAGNOSIS — Z98.890 S/P MITRAL VALVE REPAIR: ICD-10-CM

## 2023-04-26 DIAGNOSIS — Z98.890 S/P LEFT ATRIAL APPENDAGE LIGATION: ICD-10-CM

## 2023-04-26 RX ORDER — WARFARIN SODIUM 2.5 MG/1
TABLET ORAL
Qty: 90 TABLET | Refills: 2 | Status: SHIPPED | OUTPATIENT
Start: 2023-04-26

## 2023-04-26 NOTE — PROGRESS NOTES
Spoke with patient, advised INR coming up, current dose verified, advised to take 5 mg Mon Thurs, 7 5 mg all other days, will recheck next week 5/3/23

## 2023-04-28 ENCOUNTER — CLINICAL SUPPORT (OUTPATIENT)
Dept: CARDIAC REHAB | Facility: CLINIC | Age: 73
End: 2023-04-28

## 2023-04-28 DIAGNOSIS — Z98.890 S/P MITRAL VALVE REPAIR: Primary | ICD-10-CM

## 2023-05-01 ENCOUNTER — CLINICAL SUPPORT (OUTPATIENT)
Dept: CARDIAC REHAB | Facility: CLINIC | Age: 73
End: 2023-05-01

## 2023-05-01 DIAGNOSIS — Z98.890 S/P MITRAL VALVE REPAIR: Primary | ICD-10-CM

## 2023-05-03 ENCOUNTER — APPOINTMENT (OUTPATIENT)
Dept: LAB | Facility: CLINIC | Age: 73
End: 2023-05-03

## 2023-05-03 ENCOUNTER — CLINICAL SUPPORT (OUTPATIENT)
Dept: CARDIAC REHAB | Facility: CLINIC | Age: 73
End: 2023-05-03

## 2023-05-03 DIAGNOSIS — Z98.890 S/P MITRAL VALVE REPAIR: Primary | ICD-10-CM

## 2023-05-04 ENCOUNTER — ANTICOAG VISIT (OUTPATIENT)
Dept: CARDIOLOGY CLINIC | Facility: CLINIC | Age: 73
End: 2023-05-04

## 2023-05-04 NOTE — PROGRESS NOTES
Left message for patient, advised INR still a little low, advised to take 7 5 mg daily, will recheck next week 5/10/23  Advised to call with questions or concerns

## 2023-05-05 ENCOUNTER — CLINICAL SUPPORT (OUTPATIENT)
Dept: CARDIAC REHAB | Facility: CLINIC | Age: 73
End: 2023-05-05

## 2023-05-05 DIAGNOSIS — Z98.890 S/P MITRAL VALVE REPAIR: Primary | ICD-10-CM

## 2023-05-08 ENCOUNTER — CLINICAL SUPPORT (OUTPATIENT)
Dept: CARDIAC REHAB | Facility: CLINIC | Age: 73
End: 2023-05-08

## 2023-05-08 DIAGNOSIS — Z98.890 S/P MITRAL VALVE REPAIR: Primary | ICD-10-CM

## 2023-05-10 ENCOUNTER — CLINICAL SUPPORT (OUTPATIENT)
Dept: CARDIAC REHAB | Facility: CLINIC | Age: 73
End: 2023-05-10

## 2023-05-10 ENCOUNTER — APPOINTMENT (OUTPATIENT)
Dept: LAB | Facility: CLINIC | Age: 73
End: 2023-05-10

## 2023-05-10 DIAGNOSIS — Z98.890 S/P MITRAL VALVE REPAIR: Primary | ICD-10-CM

## 2023-05-11 ENCOUNTER — ANTICOAG VISIT (OUTPATIENT)
Dept: CARDIOLOGY CLINIC | Facility: CLINIC | Age: 73
End: 2023-05-11

## 2023-05-11 DIAGNOSIS — Z98.890 S/P MITRAL VALVE REPAIR: ICD-10-CM

## 2023-05-11 DIAGNOSIS — Z98.890 S/P LEFT ATRIAL APPENDAGE LIGATION: ICD-10-CM

## 2023-05-11 RX ORDER — WARFARIN SODIUM 2.5 MG/1
TABLET ORAL
Qty: 90 TABLET | Refills: 2 | Status: SHIPPED | OUTPATIENT
Start: 2023-05-11

## 2023-05-12 ENCOUNTER — CLINICAL SUPPORT (OUTPATIENT)
Dept: CARDIAC REHAB | Facility: CLINIC | Age: 73
End: 2023-05-12

## 2023-05-12 DIAGNOSIS — Z98.890 S/P LEFT ATRIAL APPENDAGE LIGATION: ICD-10-CM

## 2023-05-12 DIAGNOSIS — Z98.890 S/P MITRAL VALVE REPAIR: Primary | ICD-10-CM

## 2023-05-12 DIAGNOSIS — Z98.890 S/P MITRAL VALVE REPAIR: ICD-10-CM

## 2023-05-12 RX ORDER — TORSEMIDE 20 MG/1
20 TABLET ORAL DAILY
Qty: 30 TABLET | Refills: 1 | Status: SHIPPED | OUTPATIENT
Start: 2023-05-12 | End: 2023-05-17

## 2023-05-12 NOTE — PROGRESS NOTES
Exercise Session Detail - 8 lb weight gain, lower leg edema, intermittent Afib  Pt asymptomatic  Discussed with Dr Vicente Velarde who added torsemide 20 mg daily for 5 days   Will reevaluate Monday 5/15

## 2023-05-15 ENCOUNTER — CLINICAL SUPPORT (OUTPATIENT)
Dept: CARDIAC REHAB | Facility: CLINIC | Age: 73
End: 2023-05-15

## 2023-05-15 DIAGNOSIS — Z98.890 S/P MITRAL VALVE REPAIR: Primary | ICD-10-CM

## 2023-05-17 ENCOUNTER — CLINICAL SUPPORT (OUTPATIENT)
Dept: CARDIAC REHAB | Facility: CLINIC | Age: 73
End: 2023-05-17

## 2023-05-17 DIAGNOSIS — Z98.890 S/P MITRAL VALVE REPAIR: Primary | ICD-10-CM

## 2023-05-19 ENCOUNTER — CLINICAL SUPPORT (OUTPATIENT)
Dept: CARDIAC REHAB | Facility: CLINIC | Age: 73
End: 2023-05-19

## 2023-05-19 DIAGNOSIS — Z98.890 S/P MITRAL VALVE REPAIR: Primary | ICD-10-CM

## 2023-05-19 NOTE — PROGRESS NOTES
Cardiac Rehabilitation Plan of Care   Initial Care Plan          Today's date: 2023   # of Exercise Sessions Completed: 11  Patient name: Haroldo Mak      : 1950  Age: 81 y o  MRN: [id number]  Referring Physician: Mitchel Ansari PA-C  Cardiologist: Rush Coyle DO  Provider: [facility]  Clinician: Kamar Dougherty MS, CEP    Dx:   Encounter Diagnosis   Name Primary? • S/P mitral valve repair Yes     Date of onset: 3/21/23      SUMMARY OF PROGRESS:  Natalya Thomas is doing well in cardiac rehab  He tolerates 42 min of exercise at 3 9-4 1 METs and weight training was added today  Last session, Natalya Thomas started the elliptical (3 9 METs); however, while in Afib, his HR reached 170 bpm  Today, he tried the elliptical again (4 1 METs) while in NSR and his HR reached 93 bpm  In the future, Natalya Thomas will only do the elliptical if he is in NSR  His first Afib noted in cardiac rehab occurred 23 - sinus bradycardia at rest with intermittent Afib; continuous Afib while using the upright cycle and converted to NSR for the remainder of exercise  On 5/15, Natalya Thomas was in Afib with occasional PVCs at rest and with exercise  On , he was in NSR at rest and Afib with occasional PVCs with exercise and recovery  Today, he remained in NSR throughout rest, exercise, and recovery  Resting HR 54-64 bpm  Exercise HR (NSR) 69-96 bpm and 100-170bpm  while in Afib  Typically normal resting BP of 106/70 - 140/82 with typically normal BP response to exercise reaching 130/72 - 160/64  Natalya Thomas has not made dietary changes and continues to snack before and after dinner  However, he does try to watch his sodium intake and reports taking his Lasix daily  He has gained 9 lbs in the past 30 days  Natalya Thomas denies depression or anxiety, but reports increased stress at work recently  Education and progression of exercise intensity as tolerated will be continued in the next 30 days in an effort to increase MET level to 4-5 METs      23: Yoko initial evaluation to begin Cardiac Rehab, 5 wks post mitral valve repair and MARGARET ligation  Post op Afib with conversion to NSR post cardioversion  Saroj Mendez has a history of LMCA aneurysm (15mm), HTN, HLD, HUMBERTO (compliant with CPAP), covid (2022), BPH and Diverticulosis  He does currently exercise at home, including walking 5 blocks twice per day and using his stairs often at home (2 flights to get to his bedroom)  Prior to surgery, Saroj Mendez was doing more yoga, swimming at [employer] and could use both the stationary cycle and elliptical for 60 min  Saroj Mendez has recently lost 50 lbs prior to surgery with [employer] and plans to continue with a healthy diet, supplementing with [employer] Santiago  Medication compliance: Yes   Comments: Pt reports to be compliant with medications  Fall Risk: Low   Comments: Ambulates with a steady gait with no assist device and Denies a fall in the past 6 months    EKG Interpretation: Intermittent NSR and Afib      EXERCISE ASSESSMENT and PLAN    Exercise Prescription:      Frequency: 3 days/week      Minutes: 40-42        METS: 3 9-4 1            HR: 69-96 (NSR) / 100-170 (Afib)   RPE: 3-5         Modalities: Treadmill, UBE, Lifecycle and Elliptical      30 Day Goals for Exercise Progression:    Frequency: 3 days/week of cardiac rehab       Supplement with home exercise 2+ days/wk as tolerated    Minutes: 40 -45                             >150 mins/wk of moderate intensity exercise   METS: 4-5   HR:     RPE: 4-6   Modalities: Treadmill, UBE, Lifecycle, Elliptical and Rower    Strength training:   Will be started next week as tolerated   Modalities: Leg Press, Chest Press, Pull Downs, Lateral Raise, Arm Extension, Arm Curl, Front Raises, Shoulder Shrugs and Calf Raises    Home Exercise: walking 5 blocks twice a day and using stairs often at home    Goals: 10% improvement in functional capacity - based on max METs achieved in fitness assessment, improved DASI score by 10%, Increase in exercise capacity by 40% - based on peak METs tolerated in cardiac rehab exercise session, Exercise 5 days/wk, >150mins/wk of moderate intensity exercise, Attend Rehab regularly and return to regular exercise at the gym    Progression Toward Goals:  Pt is progressing and showing improvement  toward the following goals:  consistent with cardiac rehab exercise 3 days/week   , Will continue to educate and progress as tolerated  Education: benefit of exercise for CAD risk factors, AHA guidelines to achieve >150 mins/wk of moderate exercise and RPE scale   Plan:education on home exercise guidelines  Readiness to change: Action:  (Changing behavior)      NUTRITION ASSESSMENT AND PLAN    Weight control:    Starting weight: 207 2 lb   Current weight:   216 2 lb  Waist circumference:    Startin in   Current:      Diabetes: N/A  A1c: 5 3%    last measured: 23    Lipid management: Last lipid profile 23  Chol 88  TRG 29  HDL 49  LDL 33    Goals:reduced BMI to < 25    Measurable goals were based Rate Your Plate Dietary Self-Assessment  These are the areas in which the patient could score higher on the assessment  Goals include recommendations for a heart healthy diet based on American Heart Association  Progression Toward Goals: Pt has not made progress toward the following goals: no dietary changes reported and patient has been snacking more before and after dinner; he has gained 9 lbs int he past month  , Will continue to educate and progress as tolerated      Education: heart healthy eating  Plan: continue with healthy eating and add regular exercise  Readiness to change: Contemplation:  (Acknowledging that there is a problem but not yet ready or sure of wanting to make a change)      PSYCHOSOCIAL ASSESSMENT AND PLAN    Emotional:  Depression assessment:  PHQ-9 = 2  1-4 = Minimal Depression            Anxiety measure:  DARIEL-7 = 0  0-4  = Not anxious  Self-reported stress level:  7  Social support: Very Good    Goals:  increased energy    Progression Toward Goals: Pt has not made progress toward the following goals: increased stress recently at work  , Will continue to educate and progress as tolerated  Education: benefits of a positive support system and depression and CAD  Plan: Exercise  Readiness to change: Preparation:  (Getting ready to change)       OTHER CORE COMPONENTS     Tobacco:   Social History     Tobacco Use   Smoking Status Former   • Packs/day: 1 00   • Years: 10 00   • Pack years: 10 00   • Types: Cigarettes, Pipe   • Start date: 1970   • Quit date: 1980   • Years since quittin 0   • Passive exposure: Never   Smokeless Tobacco Never       Tobacco Use Intervention:   N/A: Pt has a remote history of smoking    Blood pressure:    Restin/70 -140/82   Exercise: 130/72 - 160/64    Goals: consistent BP < 130/80, moderate intensity exercise >150 mins/wk and medication compliance    Progression Toward Goals: Pt is progressing and showing improvement  toward the following goals:  medication compliance, typically normal BP at rest and exercise   , Will continue to educate and progress as tolerated      Education:  understanding high blood pressure and it's relationship to CAD  Plan: Class: Understanding Heart Disease, Class: Common Heart Medications and engage in regular exercise  Readiness to change: Action:  (Changing behavior)

## 2023-05-22 ENCOUNTER — CLINICAL SUPPORT (OUTPATIENT)
Dept: CARDIAC REHAB | Facility: CLINIC | Age: 73
End: 2023-05-22

## 2023-05-22 DIAGNOSIS — Z98.890 S/P MITRAL VALVE REPAIR: Primary | ICD-10-CM

## 2023-05-24 ENCOUNTER — CLINICAL SUPPORT (OUTPATIENT)
Dept: CARDIAC REHAB | Facility: CLINIC | Age: 73
End: 2023-05-24

## 2023-05-24 ENCOUNTER — APPOINTMENT (OUTPATIENT)
Dept: LAB | Facility: CLINIC | Age: 73
End: 2023-05-24

## 2023-05-24 DIAGNOSIS — E53.8 B12 DEFICIENCY: ICD-10-CM

## 2023-05-24 DIAGNOSIS — I48.91 POSTOPERATIVE ATRIAL FIBRILLATION (HCC): ICD-10-CM

## 2023-05-24 DIAGNOSIS — Z79.01 ANTICOAGULATED ON COUMADIN: ICD-10-CM

## 2023-05-24 DIAGNOSIS — I97.89 POSTOPERATIVE ATRIAL FIBRILLATION (HCC): ICD-10-CM

## 2023-05-24 DIAGNOSIS — E61.1 LOW IRON: ICD-10-CM

## 2023-05-24 DIAGNOSIS — R71.0 DROP IN HEMOGLOBIN: ICD-10-CM

## 2023-05-24 DIAGNOSIS — E11.9 TYPE 2 DIABETES MELLITUS WITHOUT COMPLICATION, WITHOUT LONG-TERM CURRENT USE OF INSULIN (HCC): ICD-10-CM

## 2023-05-24 DIAGNOSIS — Z98.890 S/P MITRAL VALVE REPAIR: Primary | ICD-10-CM

## 2023-05-24 DIAGNOSIS — E66.3 OVERWEIGHT WITH BODY MASS INDEX (BMI) OF 27 TO 27.9 IN ADULT: ICD-10-CM

## 2023-05-24 DIAGNOSIS — I34.0 NONRHEUMATIC MITRAL VALVE REGURGITATION: ICD-10-CM

## 2023-05-24 DIAGNOSIS — N40.0 BENIGN PROSTATIC HYPERPLASIA, UNSPECIFIED WHETHER LOWER URINARY TRACT SYMPTOMS PRESENT: ICD-10-CM

## 2023-05-24 DIAGNOSIS — E78.2 MIXED HYPERLIPIDEMIA: ICD-10-CM

## 2023-05-24 DIAGNOSIS — R79.89 ABNORMAL CBC: ICD-10-CM

## 2023-05-24 DIAGNOSIS — Z86.2 HISTORY OF THROMBOCYTOPENIA: ICD-10-CM

## 2023-05-24 LAB
ALBUMIN SERPL BCP-MCNC: 3.2 G/DL (ref 3.5–5)
ALP SERPL-CCNC: 134 U/L (ref 46–116)
ALT SERPL W P-5'-P-CCNC: 31 U/L (ref 12–78)
ANION GAP SERPL CALCULATED.3IONS-SCNC: 0 MMOL/L (ref 4–13)
AST SERPL W P-5'-P-CCNC: 24 U/L (ref 5–45)
BASOPHILS # BLD AUTO: 0.03 THOUSANDS/ÂΜL (ref 0–0.1)
BASOPHILS NFR BLD AUTO: 1 % (ref 0–1)
BILIRUB SERPL-MCNC: 0.29 MG/DL (ref 0.2–1)
BUN SERPL-MCNC: 16 MG/DL (ref 5–25)
CALCIUM ALBUM COR SERPL-MCNC: 9.4 MG/DL (ref 8.3–10.1)
CALCIUM SERPL-MCNC: 8.8 MG/DL (ref 8.3–10.1)
CHLORIDE SERPL-SCNC: 108 MMOL/L (ref 96–108)
CO2 SERPL-SCNC: 30 MMOL/L (ref 21–32)
CREAT SERPL-MCNC: 1.08 MG/DL (ref 0.6–1.3)
EOSINOPHIL # BLD AUTO: 0.19 THOUSAND/ÂΜL (ref 0–0.61)
EOSINOPHIL NFR BLD AUTO: 4 % (ref 0–6)
ERYTHROCYTE [DISTWIDTH] IN BLOOD BY AUTOMATED COUNT: 13.4 % (ref 11.6–15.1)
EST. AVERAGE GLUCOSE BLD GHB EST-MCNC: 120 MG/DL
FERRITIN SERPL-MCNC: 19 NG/ML (ref 24–336)
FOLATE SERPL-MCNC: 19.1 NG/ML
GFR SERPL CREATININE-BSD FRML MDRD: 69 ML/MIN/1.73SQ M
GLUCOSE P FAST SERPL-MCNC: 119 MG/DL (ref 65–99)
HBA1C MFR BLD: 5.8 %
HCT VFR BLD AUTO: 38.1 % (ref 36.5–49.3)
HGB BLD-MCNC: 12.1 G/DL (ref 12–17)
IMM GRANULOCYTES # BLD AUTO: 0.03 THOUSAND/UL (ref 0–0.2)
IMM GRANULOCYTES NFR BLD AUTO: 1 % (ref 0–2)
INR PPP: 1.92 (ref 0.84–1.19)
IRON SATN MFR SERPL: 12 % (ref 20–50)
IRON SERPL-MCNC: 42 UG/DL (ref 65–175)
LYMPHOCYTES # BLD AUTO: 1.07 THOUSANDS/ÂΜL (ref 0.6–4.47)
LYMPHOCYTES NFR BLD AUTO: 22 % (ref 14–44)
MCH RBC QN AUTO: 31.8 PG (ref 26.8–34.3)
MCHC RBC AUTO-ENTMCNC: 31.8 G/DL (ref 31.4–37.4)
MCV RBC AUTO: 100 FL (ref 82–98)
MONOCYTES # BLD AUTO: 0.53 THOUSAND/ÂΜL (ref 0.17–1.22)
MONOCYTES NFR BLD AUTO: 11 % (ref 4–12)
NEUTROPHILS # BLD AUTO: 3.07 THOUSANDS/ÂΜL (ref 1.85–7.62)
NEUTS SEG NFR BLD AUTO: 61 % (ref 43–75)
NRBC BLD AUTO-RTO: 0 /100 WBCS
PLATELET # BLD AUTO: 183 THOUSANDS/UL (ref 149–390)
PMV BLD AUTO: 10 FL (ref 8.9–12.7)
POTASSIUM SERPL-SCNC: 3.8 MMOL/L (ref 3.5–5.3)
PROT SERPL-MCNC: 6.5 G/DL (ref 6.4–8.4)
PROTHROMBIN TIME: 22.3 SECONDS (ref 11.6–14.5)
RBC # BLD AUTO: 3.8 MILLION/UL (ref 3.88–5.62)
SODIUM SERPL-SCNC: 138 MMOL/L (ref 135–147)
TIBC SERPL-MCNC: 339 UG/DL (ref 250–450)
VIT B12 SERPL-MCNC: 367 PG/ML (ref 180–914)
WBC # BLD AUTO: 4.92 THOUSAND/UL (ref 4.31–10.16)

## 2023-05-24 RX ORDER — FINASTERIDE 5 MG/1
TABLET, FILM COATED ORAL
Qty: 90 TABLET | Refills: 0 | Status: SHIPPED | OUTPATIENT
Start: 2023-05-24

## 2023-05-25 ENCOUNTER — ANTICOAG VISIT (OUTPATIENT)
Dept: CARDIOLOGY CLINIC | Facility: CLINIC | Age: 73
End: 2023-05-25

## 2023-05-25 NOTE — PROGRESS NOTES
Left message for patient, advised INR a little low, advised to take 10 mg Thurs, 7 5 mg all other days, will recheck 6/5/23  Advised to call with any missed doses, changes in medications, diet or health

## 2023-05-26 ENCOUNTER — APPOINTMENT (OUTPATIENT)
Dept: CARDIAC REHAB | Facility: CLINIC | Age: 73
End: 2023-05-26
Payer: MEDICARE

## 2023-05-30 ENCOUNTER — CLINICAL SUPPORT (OUTPATIENT)
Dept: CARDIAC REHAB | Facility: CLINIC | Age: 73
End: 2023-05-30

## 2023-05-30 DIAGNOSIS — Z98.890 S/P MITRAL VALVE REPAIR: Primary | ICD-10-CM

## 2023-05-31 ENCOUNTER — CLINICAL SUPPORT (OUTPATIENT)
Dept: CARDIAC REHAB | Facility: CLINIC | Age: 73
End: 2023-05-31

## 2023-05-31 DIAGNOSIS — Z98.890 S/P MITRAL VALVE REPAIR: Primary | ICD-10-CM

## 2023-06-01 ENCOUNTER — CLINICAL SUPPORT (OUTPATIENT)
Dept: CARDIAC REHAB | Facility: CLINIC | Age: 73
End: 2023-06-01

## 2023-06-01 DIAGNOSIS — Z98.890 S/P MITRAL VALVE REPAIR: Primary | ICD-10-CM

## 2023-06-02 ENCOUNTER — APPOINTMENT (OUTPATIENT)
Dept: CARDIAC REHAB | Facility: CLINIC | Age: 73
End: 2023-06-02
Payer: MEDICARE

## 2023-06-05 ENCOUNTER — OFFICE VISIT (OUTPATIENT)
Dept: FAMILY MEDICINE CLINIC | Facility: CLINIC | Age: 73
End: 2023-06-05
Payer: MEDICARE

## 2023-06-05 ENCOUNTER — CLINICAL SUPPORT (OUTPATIENT)
Dept: CARDIAC REHAB | Facility: CLINIC | Age: 73
End: 2023-06-05
Payer: MEDICARE

## 2023-06-05 VITALS
TEMPERATURE: 98.6 F | OXYGEN SATURATION: 99 % | DIASTOLIC BLOOD PRESSURE: 70 MMHG | HEIGHT: 72 IN | HEART RATE: 61 BPM | SYSTOLIC BLOOD PRESSURE: 130 MMHG | BODY MASS INDEX: 29.12 KG/M2 | WEIGHT: 215 LBS

## 2023-06-05 DIAGNOSIS — R71.0 DROP IN HEMOGLOBIN: ICD-10-CM

## 2023-06-05 DIAGNOSIS — E11.9 TYPE 2 DIABETES MELLITUS WITHOUT COMPLICATION, WITHOUT LONG-TERM CURRENT USE OF INSULIN (HCC): ICD-10-CM

## 2023-06-05 DIAGNOSIS — Z98.890 S/P MITRAL VALVE REPAIR: Primary | ICD-10-CM

## 2023-06-05 DIAGNOSIS — Z86.2 HISTORY OF THROMBOCYTOPENIA: ICD-10-CM

## 2023-06-05 DIAGNOSIS — E61.1 LOW IRON: ICD-10-CM

## 2023-06-05 DIAGNOSIS — Z98.890 S/P LEFT ATRIAL APPENDAGE LIGATION: ICD-10-CM

## 2023-06-05 DIAGNOSIS — I10 ESSENTIAL HYPERTENSION: ICD-10-CM

## 2023-06-05 DIAGNOSIS — R79.89 ABNORMAL CBC: ICD-10-CM

## 2023-06-05 DIAGNOSIS — Z98.890 S/P MITRAL VALVE REPAIR: ICD-10-CM

## 2023-06-05 DIAGNOSIS — Z12.5 SCREENING FOR PROSTATE CANCER: ICD-10-CM

## 2023-06-05 DIAGNOSIS — I48.91 POSTOPERATIVE ATRIAL FIBRILLATION (HCC): ICD-10-CM

## 2023-06-05 DIAGNOSIS — E78.2 MIXED HYPERLIPIDEMIA: ICD-10-CM

## 2023-06-05 DIAGNOSIS — I97.89 POSTOPERATIVE ATRIAL FIBRILLATION (HCC): ICD-10-CM

## 2023-06-05 DIAGNOSIS — Z00.00 MEDICARE ANNUAL WELLNESS VISIT, SUBSEQUENT: Primary | ICD-10-CM

## 2023-06-05 PROCEDURE — G0439 PPPS, SUBSEQ VISIT: HCPCS | Performed by: FAMILY MEDICINE

## 2023-06-05 PROCEDURE — 93798 PHYS/QHP OP CAR RHAB W/ECG: CPT

## 2023-06-05 PROCEDURE — 99214 OFFICE O/P EST MOD 30 MIN: CPT | Performed by: FAMILY MEDICINE

## 2023-06-05 PROCEDURE — G0444 DEPRESSION SCREEN ANNUAL: HCPCS | Performed by: FAMILY MEDICINE

## 2023-06-05 RX ORDER — WARFARIN SODIUM 2.5 MG/1
TABLET ORAL
Qty: 360 TABLET | Refills: 1 | Status: SHIPPED | OUTPATIENT
Start: 2023-06-05

## 2023-06-05 NOTE — PROGRESS NOTES
Assessment and Plan:     Problem List Items Addressed This Visit        Endocrine    Type 2 diabetes mellitus without complication, without long-term current use of insulin ([facility] [address] )       Lab Results   Component Value Date    HGBA1C 5 8 (H) 05/24/2023   Chronic asymptomatic fair control with the  Well low-carb diet encouraged patient to continue and discussed important lose weight patient already on ACE and statin         Relevant Orders    CBC and differential    Comprehensive metabolic panel    Hemoglobin A1C       Cardiovascular and Mediastinum    Essential hypertension     Chronic asymptomatic fair control continue lisinopril 10 mg once a day patient also on the metoprolol 25 mg twice a day low-salt diet discussed with the patient         Postoperative atrial fibrillation (HCC)    Relevant Orders    CBC and differential    Comprehensive metabolic panel       Other    Hyperlipidemia     Chronic asymptomatic fair control continue current management including atorvastatin 20 mg a day low-fat diet reviewed with the patient           Relevant Orders    CBC and differential    Comprehensive metabolic panel    Lipid Panel with Direct LDL reflex    Abnormal CBC    Relevant Orders    CBC and differential    Comprehensive metabolic panel    Drop in hemoglobin    Relevant Orders    CBC and differential    Comprehensive metabolic panel    Medicare annual wellness visit, subsequent - Primary     Advice and education were given regarding nutrition, aerobic exercises, weight-bearing exercises, cardiovascular risk reduction, fall risk reduction, and age-appropriate supplements  The patient was counseled regarding instructions for management, risk factor reductions, prognosis, risks and benefits of treatment options, patient and family education, and importance of compliance with treatment           Relevant Orders    CBC and differential    Comprehensive metabolic panel    Low iron     Low iron and hemoglobin improved compared with before recommended to continue ferrous sulfate 325 mg once a day to take it with vitamin C to improve absorption             Relevant Orders    CBC and differential    Comprehensive metabolic panel   Other Visit Diagnoses     History of thrombocytopenia        Relevant Orders    CBC and differential    Comprehensive metabolic panel    Screening for prostate cancer        Relevant Orders    PSA, Total Screen        BMI Counseling: Body mass index is 29 16 kg/m²  The BMI is above normal  Nutrition recommendations include decreasing portion sizes, decreasing fast food intake and limiting drinks that contain sugar  Exercise recommendations include exercising 3-5 times per week  No pharmacotherapy was ordered  Rationale for BMI follow-up plan is due to patient being overweight or obese  Depression Screening and Follow-up Plan: Patient was screened for depression during today's encounter  They screened negative with a PHQ-2 score of 0  Preventive health issues were discussed with patient, and age appropriate screening tests were ordered as noted in patient's After Visit Summary  Personalized health advice and appropriate referrals for health education or preventive services given if needed, as noted in patient's After Visit Summary  History of Present Illness:     Patient presents for a Medicare Wellness Visit      Patient here for Medicare exam follow-up with a chronic condition compliant with the medication tolerated well without side effects no new concerns recent blood work discussed with patient     Patient Care Team:  Radha Britton MD as PCP - General (Family Medicine)  Graciela Fu MD (Ophthalmology)  Sury Llanos MD (Urology)     Review of Systems:     Review of Systems   Constitutional: Negative for chills and fever  HENT: Negative for ear pain and sore throat  Eyes: Negative for pain and visual disturbance  Respiratory: Negative for cough and shortness of breath      Cardiovascular: Negative for chest pain and palpitations  Gastrointestinal: Negative for abdominal pain, constipation, diarrhea and vomiting  Genitourinary: Negative for dysuria and hematuria  Musculoskeletal: Negative for arthralgias and back pain  Skin: Negative for color change and rash  Neurological: Negative for seizures and syncope  All other systems reviewed and are negative  Problem List:     Patient Active Problem List   Diagnosis   • Allergic rhinitis   • BPH with urinary obstruction   • Displacement of lumbar intervertebral disc without myelopathy   • Diverticulosis of colon   • Male erectile disorder   • Essential hypertension   • Herpes zoster   • Indigestion   • Metabolic syndrome   • Left ventricular hypertrophy   • Mitral regurgitation   • Type 2 diabetes mellitus without complication, without long-term current use of insulin (HCC)   • Hyperlipidemia   • Idiopathic peripheral neuropathy   • Calculus of gallbladder with cholecystitis   • Colon polyp   • Need for shingles vaccine   • HUMBERTO treated with BiPAP   • Excessive daytime sleepiness   • Platelets decreased ([address] )   • Overweight (BMI 25 0-29  9)   • Shortness of breath   • History of COVID-19   • Abnormal CBC   • Atrial enlargement, left   • Blood in stool   • Gastrointestinal hemorrhage associated with intestinal diverticulosis   • Diverticulitis of colon with perforation   • Drop in hemoglobin   • FH: cerebral aneurysm   • Obesity, morbid ([address] )   • S/P mitral valve repair   • S/P left atrial appendage ligation   • Anticoagulated on Coumadin   • Postoperative atrial fibrillation (HCC)   • Anticoagulation goal of INR 2 to 3   • Stress hyperglycemia   • B12 deficiency   • Lower extremity edema   • Prediabetes   • Encounter for postoperative care   • Medicare annual wellness visit, subsequent   • Low iron      Past Medical and Surgical History:     Past Medical History:   Diagnosis Date   • Acute bilateral low back pain without sciatica 03/09/2021 • BMI 33 0-33 9,adult 2020   • BPH (benign prostatic hyperplasia)    • COVID-19    • Diabetes mellitus associated with hormonal etiology ([email or web address] ) 2019   • Diverticulitis    • ED (erectile dysfunction)    • Elevated PSA 01/15/2013   • Encounter for well adult exam with abnormal findings 2019   • Herpes zoster 2014   • Hyperlipidemia    • Hypertension    • IFG (impaired fasting glucose)    • Metabolic syndrome    • Obesity (BMI 30 0-34 9) 2019   • Psoriasis    • Seasonal allergic rhinitis    • Type 2 diabetes mellitus ([email or web address] ) 2014     Past Surgical History:   Procedure Laterality Date   • CARDIAC CATHETERIZATION N/A 2023    Procedure: Cardiac Coronary Angiogram;  Surgeon: Avelino Gregorio DO;  Location: [facility] CARDIAC [facility]ATH [facility];   Service: Cardiology   • CHOLECYSTECTOMY  2015   • COLONOSCOPY     • COLONOSCOPY  2021   • HERNIA REPAIR  2015   • [place] REPLACEMENT MITRAL VALVE W/CARDIOPULMONARY BYP N/A 3/21/2023    Procedure: MITRAL VALVE REPAIR with Medtronic 32 mm CG Future Annuloplasty Ring; LIGATION OF Left ATRIAL APPENDAGE with 45 mm AtriClip; TANG;  Surgeon: John Chapin DO;  Location: [facility] MAIN OR;  Service: Cardiac Surgery      Family History:     Family History   Problem Relation Age of Onset   • Skin cancer Mother    • Diabetes type II Mother    • Hyperlipidemia Mother    • Heart disease Father    • Thyroid disease unspecified Paternal Aunt       Social History:     Social History     Socioeconomic History   • Marital status: /Civil Union     Spouse name: None   • Number of children: None   • Years of education: None   • Highest education level: None   Occupational History   • None   Tobacco Use   • Smoking status: Former     Packs/day: 1 00     Years: 10 00     Total pack years: 10 00     Types: Cigarettes, Pipe     Start date: 1970     Quit date: 1980     Years since quittin 1     Passive exposure: Never   • Smokeless tobacco: Never   Vaping Use   • Vaping Use: Never used   Substance and Sexual Activity   • Alcohol use: Yes     Alcohol/week: 21 0 standard drinks of alcohol     Types: 7 Glasses of wine, 14 Shots of liquor per week     Comment: social   • Drug use: Not Currently     Frequency: 1 0 times per week     Types: Marijuana     Comment: less than once a week   • Sexual activity: Yes     Partners: Female     Birth control/protection: None   Other Topics Concern   • None   Social History Narrative    Has children    Right handed     Social Determinants of Health     Financial Resource Strain: Low Risk  (6/5/2023)    Overall Financial Resource Strain (CARDIA)    • Difficulty of Paying Living Expenses: Not hard at all   Food Insecurity: No Food Insecurity (3/23/2023)    Hunger Vital Sign    • Worried About Running Out of Food in the Last Year: Never true    • Ran Out of Food in the Last Year: Never true   Transportation Needs: No Transportation Needs (6/5/2023)    PRAPARE - Transportation    • Lack of Transportation (Medical): No    • Lack of Transportation (Non-Medical): No   Physical Activity: Insufficiently Active (6/8/2021)    Exercise Vital Sign    • Days of Exercise per Week: 1 day    • Minutes of Exercise per Session: 60 min   Stress: No Stress Concern Present (6/8/2021)    [address]    • Feeling of Stress : Not at all   Social Connections: Socially Integrated (6/8/2021)    Social Connection and Isolation Panel [NHANES]    • Frequency of Communication with Friends and Family: More than three times a week    • Frequency of Social Gatherings with Friends and Family: Once a week    • Attends Mandaen Services: More than 4 times per year    • Active Member of Clubs or Organizations:  Yes    • Attends Club or Organization Meetings: More than 4 times per year    • Marital Status:    Intimate Partner Violence: Not At Risk (6/8/2021)    Humiliation, Afraid, Rape, and Kick questionnaire    • Fear of Current or Ex-Partner: No    • Emotionally Abused: No    • Physically Abused: No    • Sexually Abused: No   Housing Stability: Low Risk  (3/23/2023)    Housing Stability Vital Sign    • Unable to Pay for Housing in the Last Year: No    • Number of Places Lived in the Last Year: 1    • Unstable Housing in the Last Year: No      Medications and Allergies:     Current Outpatient Medications   Medication Sig Dispense Refill   • aspirin (ECOTRIN LOW STRENGTH) 81 mg EC tablet take 1 tablet (81MG)     • atorvastatin (LIPITOR) 20 mg tablet TAKE 1 TABLET DAILY 90 tablet 1   • ferrous sulfate 324 (65 Fe) mg TAKE 1 TABLET (324 MG TOTAL) BY MOUTH DAILY BEFORE BREAKFAST 90 tablet 0   • finasteride (PROSCAR) 5 mg tablet TAKE 1 TABLET DAILY 90 tablet 0   • lisinopril (ZESTRIL) 10 mg tablet Take 1 tablet (10 mg total) by mouth daily 30 tablet 2   • metoprolol tartrate (LOPRESSOR) 25 mg tablet Take 1 tablet (25 mg total) by mouth every 12 (twelve) hours 60 tablet 2   • potassium chloride (K-DUR,KLOR-CON) 20 mEq tablet Take 1 tablet (20 mEq total) by mouth daily for 5 days Unless otherwise directed  Do not start before March 28, 2023  5 tablet 1   • tamsulosin (FLOMAX) 0 4 mg TAKE 1 CAPSULE DAILY WITH  DINNER 90 capsule 0   • torsemide (DEMADEX) 20 mg tablet Take 1 tablet (20 mg total) by mouth daily for 5 days Unless otherwise directed  30 tablet 1   • warfarin (Coumadin) 2 5 mg tablet Take 3-4 tablet daily as directed by MD  (7 5 to 10 mg daily) 360 tablet 1     No current facility-administered medications for this visit       Allergies   Allergen Reactions   • No Known Allergies       Immunizations:     Immunization History   Administered Date(s) Administered   • COVID-19 PFIZER VACCINE 0 3 ML IM 02/27/2021, 03/20/2021, 10/02/2021   • INFLUENZA 10/13/2015, 10/31/2016, 10/16/2017   • Influenza Split 10/29/2013   • Influenza, high dose seasonal 0 7 mL 10/19/2018, 09/25/2019, 11/23/2020, 10/26/2021, 11/18/2022   • Influenza, seasonal, injectable 11/17/2009, 10/22/2010, 11/11/2011, 10/07/2014   • Pneumococcal Conjugate 13-Valent 01/09/2018   • Pneumococcal Polysaccharide PPV23 02/04/2019   • Tdap 07/28/2015   • Zoster 10/27/2015   • Zoster Vaccine Recombinant 10/27/2015, 06/12/2021      Health Maintenance:         Topic Date Due   • Colorectal Cancer Screening  06/23/2024   • Hepatitis C Screening  Completed         Topic Date Due   • COVID-19 Vaccine (4 - Pfizer series) 11/27/2021      Medicare Screening Tests and Risk Assessments:     Jessica Ricardo is here for his Subsequent Wellness visit  Last Medicare Wellness visit information reviewed, patient interviewed and updates made to the record today  Health Risk Assessment:   Patient rates overall health as good  Patient feels that their physical health rating is same  Patient is very satisfied with their life  Eyesight was rated as same  Hearing was rated as same  Patient feels that their emotional and mental health rating is same  Patients states they are never, rarely angry  Patient states they are often unusually tired/fatigued  Pain experienced in the last 7 days has been none  Patient states that he has experienced no weight loss or gain in last 6 months  Depression Screening:   PHQ-2 Score: 0      Fall Risk Screening: In the past year, patient has experienced: no history of falling in past year      Home Safety:  Patient does not have trouble with stairs inside or outside of their home  Patient has working smoke alarms and has working carbon monoxide detector  Home safety hazards include: none  Nutrition:   Current diet is Low Cholesterol, Low Carb and Limited junk food  Medications:   Patient is currently taking over-the-counter supplements  OTC medications include: see medication list  Patient is able to manage medications       Activities of Daily Living (ADLs)/Instrumental Activities of Daily Living (IADLs):   Walk and transfer into and out of bed and chair?: Yes  Dress and groom yourself?: Yes    Bathe or shower yourself?: Yes    Feed yourself? Yes  Do your laundry/housekeeping?: Yes  Manage your money, pay your bills and track your expenses?: Yes  Make your own meals?: Yes    Do your own shopping?: Yes    Durable Medical Equipment Suppliers  none    Previous Hospitalizations:   Any hospitalizations or ED visits within the last 12 months?: Yes    How many hospitalizations have you had in the last year?: 1-2    Advance Care Planning:   Living will: Yes    Durable POA for healthcare: Yes    Advanced directive: Yes    Advanced directive counseling given: Yes    Five wishes given: Yes    Patient declined ACP directive: No    End of Life Decisions reviewed with patient: Yes    Provider agrees with end of life decisions: Yes      Cognitive Screening:   Provider or family/friend/caregiver concerned regarding cognition?: No    PREVENTIVE SCREENINGS      Cardiovascular Screening:    General: Screening Not Indicated and History Lipid Disorder      Diabetes Screening:     General: Screening Not Indicated and History Diabetes      Colorectal Cancer Screening:     General: Screening Current      Prostate Cancer Screening:    General: Risks and Benefits Discussed    Due for: PSA      Osteoporosis Screening:    General: Screening Not Indicated      Abdominal Aortic Aneurysm (AAA) Screening:    Risk factors include: age between 73-67 yo and tobacco use        Lung Cancer Screening:     General: Screening Not Indicated      Hepatitis C Screening:    General: Screening Current    Screening, Brief Intervention, and Referral to Treatment (SBIRT)    Screening  Typical number of drinks in a day: 1  Typical number of drinks in a week: 2  Interpretation: Low risk drinking behavior      AUDIT-C Screenin) How often did you have a drink containing alcohol in the past year? monthly or less  2) How many drinks did you have on a typical day when you were drinking in the past year? 1 to 2  3) How often did you have 6 or more drinks on one occasion in the past year? never    AUDIT-C Score: 1  Interpretation: Score 0-3 (male): Negative screen for alcohol misuse    Single Item Drug Screening:  How often have you used an illegal drug (including marijuana) or a prescription medication for non-medical reasons in the past year? never    Single Item Drug Screen Score: 0  Interpretation: Negative screen for possible drug use disorder    Brief Intervention  Alcohol & drug use screenings were reviewed  No concerns regarding substance use disorder identified  Annual Depression Screening  Time spent screening and evaluating the patient for depression during today's encounter was 5 minutes  No results found  Physical Exam:     /70 (BP Location: Left arm, Patient Position: Sitting)   Pulse 61   Temp 98 6 °F (37 °C) (Tympanic)   Ht 6' (1 829 m)   Wt 97 5 kg (215 lb)   SpO2 99%   BMI 29 16 kg/m²     Physical Exam  Vitals and nursing note reviewed  Constitutional:       General: He is not in acute distress  Appearance: He is well-developed  HENT:      Head: Normocephalic and atraumatic  Right Ear: Tympanic membrane normal  There is no impacted cerumen  Left Ear: Tympanic membrane normal  There is no impacted cerumen  Nose: No congestion or rhinorrhea  Mouth/Throat:      Pharynx: No oropharyngeal exudate  Eyes:      General:         Right eye: No discharge  Left eye: No discharge  Conjunctiva/sclera: Conjunctivae normal    Cardiovascular:      Rate and Rhythm: Normal rate and regular rhythm  Pulses: no weak pulses          Dorsalis pedis pulses are 2+ on the right side and 2+ on the left side  Heart sounds: Murmur heard  Pulmonary:      Effort: Pulmonary effort is normal  No respiratory distress  Breath sounds: Normal breath sounds  Abdominal:      Palpations: Abdomen is soft  Tenderness:  There is no abdominal tenderness  Musculoskeletal:         General: No swelling  Cervical back: Neck supple  Feet:    Feet:      Right foot:      Skin integrity: No warmth  Left foot:      Skin integrity: No warmth  Skin:     General: Skin is warm and dry  Capillary Refill: Capillary refill takes less than 2 seconds  Findings: No erythema or rash  Neurological:      Mental Status: He is alert and oriented to person, place, and time  Psychiatric:         Mood and Affect: Mood normal         Patient's shoes and socks removed  Right Foot/Ankle   Right Foot Inspection  Skin Exam: skin intact  No warmth and no pre-ulcer  Toe Exam: No swelling and erythema    Sensory   Monofilament testing: intact    Vascular  Capillary refills: < 3 seconds  The right DP pulse is 2+  Left Foot/Ankle  Left Foot Inspection  Skin Exam: skin intact  No warmth and no pre-ulcer  Toe Exam: No swelling and no erythema  Sensory   Monofilament testing: intact    Vascular  Capillary refills: < 3 seconds  The left DP pulse is 2+       Assign Risk Category  No deformity present  No loss of protective sensation  No weak pulses  Risk: 0        Yuri Knight MD

## 2023-06-05 NOTE — PATIENT INSTRUCTIONS
Medicare Preventive Visit Patient Instructions  Thank you for completing your Welcome to Medicare Visit or Medicare Annual Wellness Visit today  Your next wellness visit will be due in one year (6/5/2024)  The screening/preventive services that you may require over the next 5-10 years are detailed below  Some tests may not apply to you based off risk factors and/or age  Screening tests ordered at today's visit but not completed yet may show as past due  Also, please note that scanned in results may not display below  Preventive Screenings:  Service Recommendations Previous Testing/Comments   Colorectal Cancer Screening  · Colonoscopy    · Fecal Occult Blood Test (FOBT)/Fecal Immunochemical Test (FIT)  · Fecal DNA/Cologuard Test  · Flexible Sigmoidoscopy Age: 39-70 years old   Colonoscopy: every 10 years (May be performed more frequently if at higher risk)  OR  FOBT/FIT: every 1 year  OR  Cologuard: every 3 years  OR  Sigmoidoscopy: every 5 years  Screening may be recommended earlier than age 39 if at higher risk for colorectal cancer  Also, an individualized decision between you and your healthcare provider will decide whether screening between the ages of 74-80 would be appropriate   Colonoscopy: 06/23/2021  FOBT/FIT: 12/30/2022  Cologuard: Not on file  Sigmoidoscopy: Not on file    Screening Current     Prostate Cancer Screening Individualized decision between patient and health care provider in men between ages of 53-78   Medicare will cover every 12 months beginning on the day after your 50th birthday PSA: 0 6 ng/mL           Hepatitis C Screening Once for adults born between 1945 and 1965  More frequently in patients at high risk for Hepatitis C Hep C Antibody: 02/27/2019    Screening Current   Diabetes Screening 1-2 times per year if you're at risk for diabetes or have pre-diabetes Fasting glucose: 119 mg/dL (5/24/2023)  A1C: 5 8 % (5/24/2023)  Screening Not Indicated  History Diabetes   Cholesterol Screening Once every 5 years if you don't have a lipid disorder  May order more often based on risk factors  Lipid panel: 02/28/2023  Screening Not Indicated  History Lipid Disorder      Other Preventive Screenings Covered by Medicare:  1  Abdominal Aortic Aneurysm (AAA) Screening: covered once if your at risk  You're considered to be at risk if you have a family history of AAA or a male between the age of 73-68 who smoking at least 100 cigarettes in your lifetime  2  Lung Cancer Screening: covers low dose CT scan once per year if you meet all of the following conditions: (1) Age 50-69; (2) No signs or symptoms of lung cancer; (3) Current smoker or have quit smoking within the last 15 years; (4) You have a tobacco smoking history of at least 20 pack years (packs per day x number of years you smoked); (5) You get a written order from a healthcare provider  3  Glaucoma Screening: covered annually if you're considered high risk: (1) You have diabetes OR (2) Family history of glaucoma OR (3)  aged 48 and older OR (3)  American aged 72 and older  3  Osteoporosis Screening: covered every 2 years if you meet one of the following conditions: (1) Have a vertebral abnormality; (2) On glucocorticoid therapy for more than 3 months; (3) Have primary hyperparathyroidism; (4) On osteoporosis medications and need to assess response to drug therapy  5  HIV Screening: covered annually if you're between the age of 12-76  Also covered annually if you are younger than 13 and older than 72 with risk factors for HIV infection  For pregnant patients, it is covered up to 3 times per pregnancy      Immunizations:  Immunization Recommendations   Influenza Vaccine Annual influenza vaccination during flu season is recommended for all persons aged >= 6 months who do not have contraindications   Pneumococcal Vaccine   * Pneumococcal conjugate vaccine = PCV13 (Prevnar 13), PCV15 (Vaxneuvance), PCV20 (Prevnar 20)  * Pneumococcal polysaccharide vaccine = PPSV23 (Pneumovax) Adults 2364 years old: 1-3 doses may be recommended based on certain risk factors  Adults 72 years old: 1-2 doses may be recommended based off what pneumonia vaccine you previously received   Hepatitis B Vaccine 3 dose series if at intermediate or high risk (ex: diabetes, end stage renal disease, liver disease)   Tetanus (Td) Vaccine - COST NOT COVERED BY MEDICARE PART B Following completion of primary series, a booster dose should be given every 10 years to maintain immunity against tetanus  Td may also be given as tetanus wound prophylaxis  Tdap Vaccine - COST NOT COVERED BY MEDICARE PART B Recommended at least once for all adults  For pregnant patients, recommended with each pregnancy  Shingles Vaccine (Shingrix) - COST NOT COVERED BY MEDICARE PART B  2 shot series recommended in those aged 48 and above     Health Maintenance Due:      Topic Date Due   • Colorectal Cancer Screening  06/23/2024   • Hepatitis C Screening  Completed     Immunizations Due:      Topic Date Due   • COVID-19 Vaccine (4 - Pfizer series) 11/27/2021     Advance Directives   What are advance directives? Advance directives are legal documents that state your wishes and plans for medical care  These plans are made ahead of time in case you lose your ability to make decisions for yourself  Advance directives can apply to any medical decision, such as the treatments you want, and if you want to donate organs  What are the types of advance directives? There are many types of advance directives, and each state has rules about how to use them  You may choose a combination of any of the following:  · Living will: This is a written record of the treatment you want  You can also choose which treatments you do not want, which to limit, and which to stop at a certain time  This includes surgery, medicine, IV fluid, and tube feedings     · Durable power of  for healthcare [facility]): This is a written record that states who you want to make healthcare choices for you when you are unable to make them for yourself  This person, called a proxy, is usually a family member or a friend  You may choose more than 1 proxy  · Do not resuscitate (DNR) order:  A DNR order is used in case your heart stops beating or you stop breathing  It is a request not to have certain forms of treatment, such as CPR  A DNR order may be included in other types of advance directives  · Medical directive: This covers the care that you want if you are in a coma, near death, or unable to make decisions for yourself  You can list the treatments you want for each condition  Treatment may include pain medicine, surgery, blood transfusions, dialysis, IV or tube feedings, and a ventilator (breathing machine)  · Values history: This document has questions about your views, beliefs, and how you feel and think about life  This information can help others choose the care that you would choose  Why are advance directives important? An advance directive helps you control your care  Although spoken wishes may be used, it is better to have your wishes written down  Spoken wishes can be misunderstood, or not followed  Treatments may be given even if you do not want them  An advance directive may make it easier for your family to make difficult choices about your care  Weight Management   Why it is important to manage your weight:  Being overweight increases your risk of health conditions such as heart disease, high blood pressure, type 2 diabetes, and certain types of cancer  It can also increase your risk for osteoarthritis, sleep apnea, and other respiratory problems  Aim for a slow, steady weight loss  Even a small amount of weight loss can lower your risk of health problems  How to lose weight safely:  A safe and healthy way to lose weight is to eat fewer calories and get regular exercise   You can lose up about 1 pound a week by decreasing the number of calories you eat by 500 calories each day  Healthy meal plan for weight management:  A healthy meal plan includes a variety of foods, contains fewer calories, and helps you stay healthy  A healthy meal plan includes the following:  · Eat whole-grain foods more often  A healthy meal plan should contain fiber  Fiber is the part of grains, fruits, and vegetables that is not broken down by your body  Whole-grain foods are healthy and provide extra fiber in your diet  Some examples of whole-grain foods are whole-wheat breads and pastas, oatmeal, brown rice, and bulgur  · Eat a variety of vegetables every day  Include dark, leafy greens such as spinach, kale, adal greens, and mustard greens  Eat yellow and orange vegetables such as carrots, sweet potatoes, and winter squash  · Eat a variety of fruits every day  Choose fresh or canned fruit (canned in its own juice or light syrup) instead of juice  Fruit juice has very little or no fiber  · Eat low-fat dairy foods  Drink fat-free (skim) milk or 1% milk  Eat fat-free yogurt and low-fat cottage cheese  Try low-fat cheeses such as mozzarella and other reduced-fat cheeses  · Choose meat and other protein foods that are low in fat  Choose beans or other legumes such as split peas or lentils  Choose fish, skinless poultry (chicken or turkey), or lean cuts of red meat (beef or pork)  Before you cook meat or poultry, cut off any visible fat  · Use less fat and oil  Try baking foods instead of frying them  Add less fat, such as margarine, sour cream, regular salad dressing and mayonnaise to foods  Eat fewer high-fat foods  Some examples of high-fat foods include french fries, doughnuts, ice cream, and cakes  · Eat fewer sweets  Limit foods and drinks that are high in sugar  This includes candy, cookies, regular soda, and sweetened drinks  Exercise:  Exercise at least 30 minutes per day on most days of the week   Some examples of exercise include walking, biking, dancing, and swimming  You can also fit in more physical activity by taking the stairs instead of the elevator or parking farther away from stores  Ask your healthcare provider about the best exercise plan for you  © Copyright [employer] 2018 Information is for End User's use only and may not be sold, redistributed or otherwise used for commercial purposes   All illustrations and images included in CareNotes® are the copyrighted property of A D A M , [employer]  or [address] [place] [address]

## 2023-06-07 ENCOUNTER — CLINICAL SUPPORT (OUTPATIENT)
Dept: CARDIAC REHAB | Facility: CLINIC | Age: 73
End: 2023-06-07
Payer: MEDICARE

## 2023-06-07 DIAGNOSIS — Z98.890 S/P MITRAL VALVE REPAIR: Primary | ICD-10-CM

## 2023-06-07 PROBLEM — Z00.00 MEDICARE ANNUAL WELLNESS VISIT, SUBSEQUENT: Status: ACTIVE | Noted: 2023-06-07

## 2023-06-07 PROBLEM — E61.1 LOW IRON: Status: ACTIVE | Noted: 2023-06-07

## 2023-06-07 PROBLEM — E61.1 LOW IRON: Status: ACTIVE | Noted: 2023-06-05

## 2023-06-07 PROCEDURE — 93798 PHYS/QHP OP CAR RHAB W/ECG: CPT

## 2023-06-07 NOTE — ASSESSMENT & PLAN NOTE
Chronic asymptomatic fair control continue current management including atorvastatin 20 mg a day low-fat diet reviewed with the patient

## 2023-06-07 NOTE — ASSESSMENT & PLAN NOTE
Low iron and hemoglobin improved compared with before recommended to continue ferrous sulfate 325 mg once a day to take it with vitamin C to improve absorption

## 2023-06-07 NOTE — ASSESSMENT & PLAN NOTE
Chronic asymptomatic fair control continue lisinopril 10 mg once a day patient also on the metoprolol 25 mg twice a day low-salt diet discussed with the patient

## 2023-06-07 NOTE — ASSESSMENT & PLAN NOTE
Lab Results   Component Value Date    HGBA1C 5 8 (H) 05/24/2023   Chronic asymptomatic fair control with the  Well low-carb diet encouraged patient to continue and discussed important lose weight patient already on ACE and statin

## 2023-06-09 ENCOUNTER — CLINICAL SUPPORT (OUTPATIENT)
Dept: CARDIAC REHAB | Facility: CLINIC | Age: 73
End: 2023-06-09
Payer: MEDICARE

## 2023-06-09 DIAGNOSIS — Z98.890 S/P MITRAL VALVE REPAIR: Primary | ICD-10-CM

## 2023-06-09 PROCEDURE — 93798 PHYS/QHP OP CAR RHAB W/ECG: CPT

## 2023-06-10 DIAGNOSIS — E78.2 MIXED HYPERLIPIDEMIA: ICD-10-CM

## 2023-06-12 ENCOUNTER — CLINICAL SUPPORT (OUTPATIENT)
Dept: CARDIAC REHAB | Facility: CLINIC | Age: 73
End: 2023-06-12
Payer: MEDICARE

## 2023-06-12 DIAGNOSIS — Z98.890 S/P MITRAL VALVE REPAIR: Primary | ICD-10-CM

## 2023-06-12 PROCEDURE — 93798 PHYS/QHP OP CAR RHAB W/ECG: CPT

## 2023-06-12 RX ORDER — ATORVASTATIN CALCIUM 20 MG/1
TABLET, FILM COATED ORAL
Qty: 90 TABLET | Refills: 1 | Status: SHIPPED | OUTPATIENT
Start: 2023-06-12

## 2023-06-14 ENCOUNTER — CLINICAL SUPPORT (OUTPATIENT)
Dept: CARDIAC REHAB | Facility: CLINIC | Age: 73
End: 2023-06-14
Payer: MEDICARE

## 2023-06-14 DIAGNOSIS — Z98.890 S/P MITRAL VALVE REPAIR: Primary | ICD-10-CM

## 2023-06-14 PROCEDURE — 93798 PHYS/QHP OP CAR RHAB W/ECG: CPT

## 2023-06-14 NOTE — PROGRESS NOTES
Cardiac Rehabilitation Plan of Care   60 Day Reassessment          Today's date: 2023   # of Exercise Sessions Completed: 21  Patient name: Chelsey Gaston      : 1950  Age: 81 y o  MRN: [id number]  Referring Physician: Vince Reed PA-C  Cardiologist: Grisel Simpson DO  Provider: [facility]  Clinician: Flor Lopez MS, CEP    Dx:   Encounter Diagnosis   Name Primary? • S/P mitral valve repair Yes     Date of onset: 3/21/23      SUMMARY OF PROGRESS:  Cesar Manriquez is doing well in cardiac rehab  He tolerates 40-45 min of exercise at 4 4-4 5 METs plus weight training  Cesar Manriquez has been able to complete the elliptical in rehab since he has been in NSR with occ PVCs for the last week  He also began using the rower and tolerates well  He has been having 2 alcoholic drinks per night, and still has remained NSR  Alcohol may not be associated with his previous bouts of Afib but will continue to monitor closely  Resting HR 69-79 bpm  Exercise HR 85-96 bpm  Normal resting BP of 124//80 with typically normal BP response to exercise reaching 144//82  Cesar Manriquez has not made dietary changes and continues to snack before and after dinner  However, he does try to watch his sodium intake and reports taking his Lasix daily  He has gained 5 lbs since his initial evaluation but lost 4lbs in the past 30 days  Cesar Manriquez denies depression or anxiety, but reports increased stress at work recently  Education and progression of exercise intensity as tolerated will be continued in the next 30 days in an effort to increase MET level to 4-5 METs      Medication compliance: Yes   Comments: Pt reports to be compliant with medications  Fall Risk: Low   Comments: Ambulates with a steady gait with no assist device and Denies a fall in the past 6 months    EKG Interpretation: NSR with occ PVCs      EXERCISE ASSESSMENT and PLAN    Exercise Prescription:      Frequency: 3 days/week      Minutes: 40-45       METS: 4 4-4 5           HR: 85-96    RPE: 3-5         Modalities: Treadmill, UBE, Lifecycle, Elliptical and Rower      30 Day Goals for Exercise Progression:    Frequency: 3 days/week of cardiac rehab       Supplement with home exercise 2+ days/wk as tolerated    Minutes: 40 -45                             >150 mins/wk of moderate intensity exercise   METS: 4-5   HR:     RPE: 4-6   Modalities: Treadmill, UBE, Lifecycle, Elliptical and Rower    Strength trainin-3 days / week  12-15 repetitions  1-2 sets per modality    Modalities: Leg Press, Chest Press, Pull Downs, Lateral Raise, Arm Extension, Arm Curl, Front Raises and Calf Raises    Home Exercise: walking 5 blocks twice a day and using stairs often at home    Goals: 10% improvement in functional capacity - based on max METs achieved in fitness assessment, improved DASI score by 10%, Increase in exercise capacity by 40% - based on peak METs tolerated in cardiac rehab exercise session, Exercise 5 days/wk, >150mins/wk of moderate intensity exercise, Attend Rehab regularly and return to regular exercise at the gym    Progression Toward Goals:  Pt is progressing and showing improvement  toward the following goals:  consistent with cardiac rehab exercise 3 days/week; has been able to complete elliptical in rehab being in NSR, started using rower   , Will continue to educate and progress as tolerated      Education: benefit of exercise for CAD risk factors, AHA guidelines to achieve >150 mins/wk of moderate exercise, RPE scale and class: Risk Factors for Heart Disease   Plan:education on home exercise guidelines  Readiness to change: Action:  (Changing behavior)      NUTRITION ASSESSMENT AND PLAN    Weight control:    Starting weight: 207 2 lb   Current weight: 212 6 lb  Waist circumference:    Startin in   Current:      Diabetes: N/A  A1c: 5 3%    last measured: 23    Lipid management: Last lipid profile 23  Chol 88  TRG 29  HDL 49  LDL 35    Goals:reduced BMI to < 25    Measurable goals were based Rate Your Plate Dietary Self-Assessment  These are the areas in which the patient could score higher on the assessment  Goals include recommendations for a heart healthy diet based on American Heart Association  Progression Toward Goals: Pt has not made progress toward the following goals: no dietary changes reported and patient has been snacking more before and after dinner; he has gained 5 lbs since initial evaluation  , Will continue to educate and progress as tolerated  Education: heart healthy eating  education class: Heart Healthy Eating  Plan: continue with healthy eating and add regular exercise  Readiness to change: Contemplation:  (Acknowledging that there is a problem but not yet ready or sure of wanting to make a change)      PSYCHOSOCIAL ASSESSMENT AND PLAN    Emotional:  Depression assessment:  PHQ-9 = 2  1-4 = Minimal Depression            Anxiety measure:  DARIEL-7 = 0  0-4  = Not anxious  Self-reported stress level:  7  Social support: Very Good    Goals:  increased energy    Progression Toward Goals: Pt is progressing and showing improvement  toward the following goals:  denies anxiety and depression  , Will continue to educate and progress as tolerated      Education: benefits of a positive support system and depression and CAD  Plan: Exercise  Readiness to change: Action:  (Changing behavior)      OTHER CORE COMPONENTS     Tobacco:   Social History     Tobacco Use   Smoking Status Former   • Packs/day: 1 00   • Years: 10 00   • Total pack years: 10 00   • Types: Cigarettes, Pipe   • Start date: 1970   • Quit date: 1980   • Years since quittin 1   • Passive exposure: Never   Smokeless Tobacco Never       Tobacco Use Intervention:   N/A: Pt has a remote history of smoking    Blood pressure:    Restin//80   Exercise: 144//82    Goals: consistent BP < 130/80, moderate intensity exercise >150 mins/wk and medication compliance    Progression Toward Goals: Pt is progressing and showing improvement  toward the following goals:  medication compliance, typically normal BP at rest and exercise   , Will continue to educate and progress as tolerated      Education:  understanding high blood pressure and it's relationship to CAD and Education class:  Common Heart Medications  Plan: Class: Understanding Heart Disease and engage in regular exercise  Readiness to change: Action:  (Changing behavior)

## 2023-06-16 ENCOUNTER — CLINICAL SUPPORT (OUTPATIENT)
Dept: CARDIAC REHAB | Facility: CLINIC | Age: 73
End: 2023-06-16
Payer: MEDICARE

## 2023-06-16 DIAGNOSIS — Z98.890 S/P MITRAL VALVE REPAIR: Primary | ICD-10-CM

## 2023-06-16 PROCEDURE — 93798 PHYS/QHP OP CAR RHAB W/ECG: CPT

## 2023-06-19 ENCOUNTER — CLINICAL SUPPORT (OUTPATIENT)
Dept: CARDIAC REHAB | Facility: CLINIC | Age: 73
End: 2023-06-19
Payer: MEDICARE

## 2023-06-19 DIAGNOSIS — Z98.890 S/P MITRAL VALVE REPAIR: Primary | ICD-10-CM

## 2023-06-19 PROCEDURE — 93798 PHYS/QHP OP CAR RHAB W/ECG: CPT

## 2023-06-20 DIAGNOSIS — Z98.890 S/P LEFT ATRIAL APPENDAGE LIGATION: ICD-10-CM

## 2023-06-20 DIAGNOSIS — I10 ESSENTIAL HYPERTENSION: ICD-10-CM

## 2023-06-20 DIAGNOSIS — Z98.890 S/P MITRAL VALVE REPAIR: ICD-10-CM

## 2023-06-20 RX ORDER — LISINOPRIL 10 MG/1
10 TABLET ORAL DAILY
Qty: 90 TABLET | Refills: 2 | Status: SHIPPED | OUTPATIENT
Start: 2023-06-20

## 2023-06-21 ENCOUNTER — TELEPHONE (OUTPATIENT)
Dept: CARDIOLOGY CLINIC | Facility: CLINIC | Age: 73
End: 2023-06-21

## 2023-06-21 ENCOUNTER — CLINICAL SUPPORT (OUTPATIENT)
Dept: CARDIAC REHAB | Facility: CLINIC | Age: 73
End: 2023-06-21
Payer: MEDICARE

## 2023-06-21 DIAGNOSIS — Z98.890 S/P MITRAL VALVE REPAIR: Primary | ICD-10-CM

## 2023-06-21 PROCEDURE — 93798 PHYS/QHP OP CAR RHAB W/ECG: CPT

## 2023-06-21 NOTE — TELEPHONE ENCOUNTER
Patient called, advised he is overdue for his INR  States he had been taking 7 5 mg daily  Advised I left message 5/25/23 with dose change and repeat INR 6/5/23  States he never received that message  Reminded patient any time he has INR if I don't speak with him I will leave a message, if he doesn't get message he should call office  Advised will do INR more frequently until he is in goal, then will let him go no more than 4 weeks  Patient verbalizes understanding

## 2023-06-23 ENCOUNTER — APPOINTMENT (OUTPATIENT)
Dept: LAB | Facility: CLINIC | Age: 73
End: 2023-06-23
Payer: MEDICARE

## 2023-06-23 ENCOUNTER — CLINICAL SUPPORT (OUTPATIENT)
Dept: CARDIAC REHAB | Facility: CLINIC | Age: 73
End: 2023-06-23
Payer: MEDICARE

## 2023-06-23 DIAGNOSIS — Z98.890 S/P MITRAL VALVE REPAIR: Primary | ICD-10-CM

## 2023-06-23 PROCEDURE — 93798 PHYS/QHP OP CAR RHAB W/ECG: CPT

## 2023-06-26 ENCOUNTER — CLINICAL SUPPORT (OUTPATIENT)
Dept: CARDIAC REHAB | Facility: CLINIC | Age: 73
End: 2023-06-26
Payer: MEDICARE

## 2023-06-26 ENCOUNTER — ANTICOAG VISIT (OUTPATIENT)
Dept: CARDIOLOGY CLINIC | Facility: CLINIC | Age: 73
End: 2023-06-26

## 2023-06-26 DIAGNOSIS — Z98.890 S/P MITRAL VALVE REPAIR: Primary | ICD-10-CM

## 2023-06-26 PROCEDURE — 93798 PHYS/QHP OP CAR RHAB W/ECG: CPT

## 2023-06-26 NOTE — PROGRESS NOTES
Left message for patient, advised INR low, advised to take 10 mg Mon, 7 5 mg all other days, will recheck in 2 weeks 7/10/23  Advised to call office that he received this message

## 2023-06-27 DIAGNOSIS — Z29.8 INDICATION PRESENT FOR ENDOCARDITIS PROPHYLAXIS: Primary | ICD-10-CM

## 2023-06-27 RX ORDER — AMOXICILLIN 500 MG/1
CAPSULE ORAL
Qty: 4 CAPSULE | Refills: 0 | Status: SHIPPED | OUTPATIENT
Start: 2023-06-27 | End: 2023-06-27

## 2023-06-28 ENCOUNTER — CLINICAL SUPPORT (OUTPATIENT)
Dept: CARDIAC REHAB | Facility: CLINIC | Age: 73
End: 2023-06-28
Payer: MEDICARE

## 2023-06-28 DIAGNOSIS — Z98.890 S/P MITRAL VALVE REPAIR: Primary | ICD-10-CM

## 2023-06-28 PROCEDURE — 93798 PHYS/QHP OP CAR RHAB W/ECG: CPT

## 2023-06-30 ENCOUNTER — CLINICAL SUPPORT (OUTPATIENT)
Dept: CARDIAC REHAB | Facility: CLINIC | Age: 73
End: 2023-06-30
Payer: MEDICARE

## 2023-06-30 DIAGNOSIS — Z98.890 S/P MITRAL VALVE REPAIR: Primary | ICD-10-CM

## 2023-06-30 PROCEDURE — 93798 PHYS/QHP OP CAR RHAB W/ECG: CPT

## 2023-07-03 ENCOUNTER — CLINICAL SUPPORT (OUTPATIENT)
Dept: CARDIAC REHAB | Facility: CLINIC | Age: 73
End: 2023-07-03
Payer: MEDICARE

## 2023-07-03 DIAGNOSIS — Z98.890 S/P MITRAL VALVE REPAIR: Primary | ICD-10-CM

## 2023-07-03 PROCEDURE — 93798 PHYS/QHP OP CAR RHAB W/ECG: CPT

## 2023-07-05 ENCOUNTER — CLINICAL SUPPORT (OUTPATIENT)
Dept: CARDIAC REHAB | Facility: CLINIC | Age: 73
End: 2023-07-05
Payer: MEDICARE

## 2023-07-05 DIAGNOSIS — Z98.890 S/P MITRAL VALVE REPAIR: Primary | ICD-10-CM

## 2023-07-05 PROCEDURE — 93798 PHYS/QHP OP CAR RHAB W/ECG: CPT

## 2023-07-07 ENCOUNTER — CLINICAL SUPPORT (OUTPATIENT)
Dept: CARDIAC REHAB | Facility: CLINIC | Age: 73
End: 2023-07-07
Payer: MEDICARE

## 2023-07-07 DIAGNOSIS — Z98.890 S/P MITRAL VALVE REPAIR: Primary | ICD-10-CM

## 2023-07-07 PROCEDURE — 93798 PHYS/QHP OP CAR RHAB W/ECG: CPT

## 2023-07-10 ENCOUNTER — CLINICAL SUPPORT (OUTPATIENT)
Dept: CARDIAC REHAB | Facility: CLINIC | Age: 73
End: 2023-07-10
Payer: MEDICARE

## 2023-07-10 ENCOUNTER — APPOINTMENT (OUTPATIENT)
Dept: LAB | Facility: CLINIC | Age: 73
End: 2023-07-10
Payer: MEDICARE

## 2023-07-10 DIAGNOSIS — E61.1 LOW IRON: ICD-10-CM

## 2023-07-10 DIAGNOSIS — Z98.890 S/P MITRAL VALVE REPAIR: Primary | ICD-10-CM

## 2023-07-10 PROCEDURE — 93798 PHYS/QHP OP CAR RHAB W/ECG: CPT

## 2023-07-10 RX ORDER — FERROUS SULFATE TAB EC 324 MG (65 MG FE EQUIVALENT) 324 (65 FE) MG
324 TABLET DELAYED RESPONSE ORAL
Qty: 90 TABLET | Refills: 0 | Status: SHIPPED | OUTPATIENT
Start: 2023-07-10 | End: 2023-07-19 | Stop reason: SDUPTHER

## 2023-07-10 NOTE — PROGRESS NOTES
Cardiac Rehabilitation Plan of Care   90 Day Reassessment          Today's date: 7/10/2023   # of Exercise Sessions Completed: 32  Patient name: Candace Duarte      : 1950  Age: 81 y.o. MRN: [id number]  Referring Physician: Shereen England PA-C  Cardiologist: Lesvia Vallejo DO  Provider: [facility]  Clinician: Kelly Toney MS, CEP    Dx:   Encounter Diagnosis   Name Primary? • S/P mitral valve repair Yes     Date of onset: 3/21/23      SUMMARY OF PROGRESS:  Delaney Toure has 4 exercise sessions of cardiac rehab remaining. On  and 7/10, he returned to Afib with elevated HR during exercise reaching 120-150 bpm during moderate intensity exercise. His last Afib bout was 23. Delaney Toure does not report symptoms when in Afib. He ordered an Apple watch that should be arriving today to track his rhythm. When in NSR with rare PACs/PVCs, Balbir's exercise HR is  bpm. Balbir tolerates 45-55 min of exercise at 2.7-5.4 METs plus weight training without cardiac complaints. Resting /74 - 140/88 with increased BP response to exercise reaching 162/60 - 196/100. Delaney Toure has not made significant dietary changes. He has been limiting his alcohol intake to 2 cocktails/night in an attempt to avoid Afib but was unsuccessful in avoiding Afib. Delaney Toure has also gained 8 lbs since starting cardiac rehab. Delaney Toure does report a dramatic increase in stress starting last week (around the time of his Afib starting again). He does not have a particular stress management tool that he uses and just tries to work through the problem.      Medication compliance: Yes   Comments: Pt reports to be compliant with medications  Fall Risk: Low   Comments: Ambulates with a steady gait with no assist device and Denies a fall in the past 6 months    EKG Interpretation: NSR with rare PACs/PVCs / Afib with occ PVCs and isolated couplet      EXERCISE ASSESSMENT and PLAN    Exercise Prescription:      Frequency: 3 days/week      Minutes: 45-55      METS: 2.7-5.4           HR:  (120-150 with Afib)   RPE: 3-5         Modalities: Treadmill, UBE, Lifecycle, Elliptical and Rower      30 Day Goals for Exercise Progression:    Frequency: 3 days/week of cardiac rehab       Supplement with home exercise 2+ days/wk as tolerated    Minutes: 45-55                           >150 mins/wk of moderate intensity exercise   METS: 4-5.5   HR:     RPE: 4-6   Modalities: Treadmill, UBE, Lifecycle, Elliptical and Rower    Strength trainin-3 days / week  12-15 repetitions  1-2 sets per modality    Modalities: Leg Press, Chest Press, Pull Downs, Lateral Raise, Arm Extension, Arm Curl, Front Raises and Calf Raises    Home Exercise: walking 5 blocks twice a day and using stairs often at home    Goals: 10% improvement in functional capacity - based on max METs achieved in fitness assessment, improved DASI score by 10%, Increase in exercise capacity by 40% - based on peak METs tolerated in cardiac rehab exercise session, Exercise 5 days/wk, >150mins/wk of moderate intensity exercise, Attend Rehab regularly and return to regular exercise at the gym    Progression Toward Goals:  Pt is progressing and showing improvement  toward the following goals:  consistent with cardiac rehab exercise 3 days/week; increased exercise duration and intensity.  , Will continue to educate and progress as tolerated.     Education: benefit of exercise for CAD risk factors, AHA guidelines to achieve >150 mins/wk of moderate exercise, RPE scale and class: Risk Factors for Heart Disease   Plan:education on home exercise guidelines  Readiness to change: Action:  (Changing behavior)      NUTRITION ASSESSMENT AND PLAN    Weight control:    Starting weight: 207.2 lb   Current weight: 215 lb  Waist circumference:    Startin in   Current:      Diabetes: N/A  A1c: 5.3%    last measured: 23    Lipid management: Last lipid profile 23  Chol 88  TRG 29  HDL 49  LDL 33    Goals:reduced BMI to < 25    Progression Toward Goals: Pt has not made progress toward the following goals: no dietary changes reported; he has gained 8 lbs since initial evaluation. , Will continue to educate and progress as tolerated. Education: heart healthy eating  education class: Heart Healthy Eating  Plan: continue with healthy eating and add regular exercise  Readiness to change: Contemplation:  (Acknowledging that there is a problem but not yet ready or sure of wanting to make a change)      PSYCHOSOCIAL ASSESSMENT AND PLAN    Emotional:  Depression assessment:  PHQ-9 = 2  1-4 = Minimal Depression            Anxiety measure:  DARIEL-7 = 0  0-4  = Not anxious  Self-reported stress level:  10  Social support: Very Good    Goals:  increased energy    Progression Toward Goals: Pt has not made progress toward the following goals: significantly increased stress at [place] particiular stress management used. , Will continue to educate and progress as tolerated. Education: benefits of a positive support system and depression and CAD  Plan: Exercise  Readiness to change: Action:  (Changing behavior)      OTHER CORE COMPONENTS     Tobacco:   Social History     Tobacco Use   Smoking Status Former   • Packs/day: 1.00   • Years: 10.00   • Total pack years: 10.00   • Types: Cigarettes, Pipe   • Start date: 1970   • Quit date: 1980   • Years since quittin.2   • Passive exposure: Never   Smokeless Tobacco Never       Tobacco Use Intervention:   N/A: Pt has a remote history of smoking    Blood pressure:    Restin/74 - 140/88    Exercise: 162/60 - 196/100    Goals: consistent BP < 130/80, moderate intensity exercise >150 mins/wk and medication compliance    Progression Toward Goals: Pt is progressing and showing improvement  toward the following goals:  medication compliance, typically normal BP at rest and exercise.  , Will continue to educate and progress as tolerated.     Education:  understanding high blood pressure and it's relationship to CAD, Education class:  Common Heart Medications and Education class: Understanding Heart Disease  Plan: engage in regular exercise  Readiness to change: Action:  (Changing behavior)

## 2023-07-11 ENCOUNTER — ANTICOAG VISIT (OUTPATIENT)
Dept: CARDIOLOGY CLINIC | Facility: CLINIC | Age: 73
End: 2023-07-11

## 2023-07-11 NOTE — PROGRESS NOTES
Spoke with patient, states no missed doses, verified current dose, no changes in medications or diet.    Advised to take 10 mg tonight and Fri, 7.5 mg all other day, starting next week, take 10 mg Mon Wed Fri, 7.5 mg all other days, will recheck next week 7/19/23

## 2023-07-11 NOTE — PROGRESS NOTES
Left message for patient, advised INR even lower, advised to call office to discuss. Advised if he cannot call today to take 10 mg tonight.

## 2023-07-12 ENCOUNTER — APPOINTMENT (OUTPATIENT)
Dept: CARDIAC REHAB | Facility: CLINIC | Age: 73
End: 2023-07-12
Payer: MEDICARE

## 2023-07-14 ENCOUNTER — APPOINTMENT (OUTPATIENT)
Dept: CARDIAC REHAB | Facility: CLINIC | Age: 73
End: 2023-07-14
Payer: MEDICARE

## 2023-07-17 ENCOUNTER — APPOINTMENT (OUTPATIENT)
Dept: CARDIAC REHAB | Facility: CLINIC | Age: 73
End: 2023-07-17
Payer: MEDICARE

## 2023-07-19 ENCOUNTER — APPOINTMENT (OUTPATIENT)
Dept: LAB | Facility: CLINIC | Age: 73
End: 2023-07-19
Payer: MEDICARE

## 2023-07-19 ENCOUNTER — CLINICAL SUPPORT (OUTPATIENT)
Dept: CARDIAC REHAB | Facility: CLINIC | Age: 73
End: 2023-07-19
Payer: MEDICARE

## 2023-07-19 DIAGNOSIS — Z98.890 S/P MITRAL VALVE REPAIR: Primary | ICD-10-CM

## 2023-07-19 DIAGNOSIS — E61.1 LOW IRON: ICD-10-CM

## 2023-07-19 DIAGNOSIS — N40.0 BENIGN PROSTATIC HYPERPLASIA, UNSPECIFIED WHETHER LOWER URINARY TRACT SYMPTOMS PRESENT: ICD-10-CM

## 2023-07-19 PROCEDURE — 93798 PHYS/QHP OP CAR RHAB W/ECG: CPT

## 2023-07-19 RX ORDER — FERROUS SULFATE TAB EC 324 MG (65 MG FE EQUIVALENT) 324 (65 FE) MG
324 TABLET DELAYED RESPONSE ORAL
Qty: 90 TABLET | Refills: 0 | Status: SHIPPED | OUTPATIENT
Start: 2023-07-19

## 2023-07-19 RX ORDER — TAMSULOSIN HYDROCHLORIDE 0.4 MG/1
0.4 CAPSULE ORAL
Qty: 90 CAPSULE | Refills: 0 | Status: SHIPPED | OUTPATIENT
Start: 2023-07-19

## 2023-07-20 ENCOUNTER — ANTICOAG VISIT (OUTPATIENT)
Dept: CARDIOLOGY CLINIC | Facility: CLINIC | Age: 73
End: 2023-07-20

## 2023-07-20 DIAGNOSIS — Z98.890 S/P MITRAL VALVE REPAIR: ICD-10-CM

## 2023-07-20 DIAGNOSIS — Z98.890 S/P LEFT ATRIAL APPENDAGE LIGATION: ICD-10-CM

## 2023-07-20 RX ORDER — TORSEMIDE 20 MG/1
TABLET ORAL
Qty: 30 TABLET | Refills: 1 | OUTPATIENT
Start: 2023-07-20

## 2023-07-20 NOTE — TELEPHONE ENCOUNTER
Patient has appt tomorrow 7/21/23 with Dr. Gordo Louis. Can address if appropriate for refill at appt.

## 2023-07-20 NOTE — PROGRESS NOTES
Left message for patient, advised INR good, continue 10 mg Mon Wed Fri, 7.5 mg all other days, will recheck in 2 weeks 8/2/23  Advised to call with questions or concerns. Offered to call in 5 mg tablets, advised to call office if interested      Patient called, verified instructions. Patient will continue to use 2.5 mg talbets for now.

## 2023-07-21 ENCOUNTER — OFFICE VISIT (OUTPATIENT)
Dept: CARDIOLOGY CLINIC | Facility: CLINIC | Age: 73
End: 2023-07-21
Payer: MEDICARE

## 2023-07-21 ENCOUNTER — CLINICAL SUPPORT (OUTPATIENT)
Dept: CARDIAC REHAB | Facility: CLINIC | Age: 73
End: 2023-07-21
Payer: MEDICARE

## 2023-07-21 VITALS
SYSTOLIC BLOOD PRESSURE: 110 MMHG | HEART RATE: 62 BPM | HEIGHT: 73 IN | BODY MASS INDEX: 28.94 KG/M2 | WEIGHT: 218.4 LBS | OXYGEN SATURATION: 100 % | DIASTOLIC BLOOD PRESSURE: 70 MMHG

## 2023-07-21 DIAGNOSIS — I48.0 PAROXYSMAL ATRIAL FIBRILLATION (HCC): ICD-10-CM

## 2023-07-21 DIAGNOSIS — E78.2 MIXED HYPERLIPIDEMIA: ICD-10-CM

## 2023-07-21 DIAGNOSIS — Z98.890 S/P MITRAL VALVE REPAIR: Primary | ICD-10-CM

## 2023-07-21 DIAGNOSIS — Z98.890 S/P MITRAL VALVE REPAIR: ICD-10-CM

## 2023-07-21 DIAGNOSIS — I51.7 LEFT VENTRICULAR HYPERTROPHY: ICD-10-CM

## 2023-07-21 DIAGNOSIS — Z79.01 ANTICOAGULATION GOAL OF INR 2 TO 3: ICD-10-CM

## 2023-07-21 DIAGNOSIS — E11.9 TYPE 2 DIABETES MELLITUS WITHOUT COMPLICATION, WITHOUT LONG-TERM CURRENT USE OF INSULIN (HCC): ICD-10-CM

## 2023-07-21 DIAGNOSIS — Z51.81 ANTICOAGULATION GOAL OF INR 2 TO 3: ICD-10-CM

## 2023-07-21 DIAGNOSIS — I10 ESSENTIAL HYPERTENSION: Primary | ICD-10-CM

## 2023-07-21 DIAGNOSIS — Z98.890 S/P LEFT ATRIAL APPENDAGE LIGATION: ICD-10-CM

## 2023-07-21 DIAGNOSIS — E66.01 OBESITY, MORBID (HCC): ICD-10-CM

## 2023-07-21 DIAGNOSIS — I34.0 NONRHEUMATIC MITRAL VALVE REGURGITATION: ICD-10-CM

## 2023-07-21 DIAGNOSIS — Z79.01 ANTICOAGULATED ON COUMADIN: ICD-10-CM

## 2023-07-21 PROCEDURE — 93798 PHYS/QHP OP CAR RHAB W/ECG: CPT

## 2023-07-21 PROCEDURE — 99214 OFFICE O/P EST MOD 30 MIN: CPT

## 2023-07-21 RX ORDER — AMIODARONE HYDROCHLORIDE 200 MG/1
200 TABLET ORAL DAILY
Qty: 90 TABLET | Refills: 1 | Status: SHIPPED | OUTPATIENT
Start: 2023-07-21

## 2023-07-21 RX ORDER — TORSEMIDE 20 MG/1
20 TABLET ORAL DAILY
Qty: 30 TABLET | Refills: 3 | Status: SHIPPED | OUTPATIENT
Start: 2023-07-21 | End: 2023-07-26

## 2023-07-21 RX ORDER — AMIODARONE HYDROCHLORIDE 200 MG/1
200 TABLET ORAL DAILY
Qty: 90 TABLET | Refills: 1 | Status: SHIPPED | OUTPATIENT
Start: 2023-07-21 | End: 2023-07-21

## 2023-07-21 RX ORDER — AMIODARONE HYDROCHLORIDE 200 MG/1
200 TABLET ORAL
Qty: 21 TABLET | Refills: 0 | Status: SHIPPED | OUTPATIENT
Start: 2023-07-21 | End: 2023-07-21

## 2023-07-21 RX ORDER — AMIODARONE HYDROCHLORIDE 200 MG/1
200 TABLET ORAL
Qty: 21 TABLET | Refills: 0 | Status: SHIPPED | OUTPATIENT
Start: 2023-07-21

## 2023-07-21 NOTE — PROGRESS NOTES
Cardiology   MD Bridger Brandon MD, Steve Amato DO, AZALEA Marcum MD Maceo Dama, DO, Eric Titus DO, [facility]  -------------------------------------------------------------------  [facility]  [address]  [place] [address] [address] [phone]  [phone]  07/21/23  Kathie Cox  YOB: 1950   MRN: [id number]      Referring Physician: Kamilla Kessler MD  [id number] [facility]. [place] [address] [address] [address]     HPI: Kathie Cox is a 81 y.o. male with:   Severe MR s/p Mitral Valve Repair with Medtronic 32 mm  CG Future and 45mm  Atriclip 3/2023  HTN  HUMBERTO on CPAP  Diverticulosis  Hx of GIB  BPH on 2 agents  Thrombocytopenia (113k)  LMCA aneurysm (15 mm)  Large dist LMCA aneurysm (approximately 15mm diameter in width)  involving the prox LAD & LCx; otherwise mild plaque     Hx of COVID  Sinus bradycardia    Mild concentric left ventricular hypertrophy  Grade 2 diastolic dysfunction  Dilated RV with normal RV systolic function  Paroxysmal atrial fibrillation, on warfarin, first noticed postoperatively after surgery    Apple watch is saying 18% afib burden. Doing well in cardiac rehab. Sometimes he gets some shortness of breath in hot weather. Sometimes with exertion as well. Could be when he is in A-fib    Review of Systems   Constitutional: Negative for chills and fever. HENT: Negative for facial swelling and sore throat. Eyes: Negative for visual disturbance. Respiratory: Negative for cough, chest tightness, shortness of breath and wheezing. Cardiovascular: Negative for chest pain, palpitations and leg swelling. Gastrointestinal: Negative for abdominal pain, blood in stool, constipation, diarrhea, nausea and vomiting. Endocrine: Negative for cold intolerance and heat intolerance. Genitourinary: Negative for decreased urine volume, difficulty urinating, dysuria and hematuria. Musculoskeletal: Negative for arthralgias, back pain and myalgias. Skin: Negative for rash. Neurological: Negative for dizziness, syncope, weakness and numbness. Psychiatric/Behavioral: Negative for agitation, behavioral problems and confusion. The patient is not nervous/anxious. OBJECTIVE  Vitals:    07/21/23 1153   BP: 110/70   Pulse: 62   SpO2: 100%       Physical Exam  Constitutional: awake, alert and oriented, in no acute distress, no obvious deformities  Head: Normocephalic, without obvious abnormality, atraumatic  Eyes: conjunctivae clear and moist. Sclera anicteric. No xanthelasmas. Pupils equal bilaterally. Extraocular motions are full. Ear nose mouth and throat: ears are symmetrical bilaterally, hearing appears to be equal bilaterally, no nasal discharge or epistaxis, oropharynx is clear with moist mucous membranes  Neck:  Trachea is midline, neck is supple, no thyromegaly or significant lymphadenopathy, there is full range of motion. Lungs: clear to auscultation bilaterally, no wheezes, no rales, no rhonchi, no accessory muscle use, breathing is nonlabored  Heart: regular rate and rhythm, S1, S2 normal, no murmur, no click, rub or gallop, no lower extremity edema  Abdomen: soft, non-tender; bowel sounds normal; no masses,  no organomegaly  Psychiatric:  Patient is oriented to time, place, person, mood/affect is negative for depression, anxiety, agitation, appears to have appropriate insight  Skin: Skin is warm, dry, intact. No obvious rashes or lesions on exposed extremities. Nail beds are pink with no cyanosis or clubbing. EKG:  No results found for this visit on 07/21/23.      IMPRESSION:  Severe MR s/p Mitral Valve Repair with Medtronic 32 mm  CG Future and 45mm  Atriclip 3/2023  HTN  HUMBERTO on CPAP  Diverticulosis  Hx of GIB  BPH on 2 agents  Thrombocytopenia (113k)  LMCA aneurysm (15 mm)  Large dist LMCA aneurysm (approximately 15mm diameter in width)  involving the prox LAD & LCx; otherwise mild plaque     Hx of COVID  Sinus bradycardia    Mild concentric left ventricular hypertrophy  Grade 2 diastolic dysfunction  Dilated RV with normal RV systolic function  Paroxysmal atrial fibrillation, on warfarin, first noticed postoperatively after surgery    DISCUSSION/RECOMMENDATIONS:  Rates controlled today, he is in and out of A-fib  He did require amiodarone postoperatively, will place back on loading dose amiodarone and continue for the next 3 months to see if this gets better control of the A-fib. We will use this short-term. He is euvolemic appearing but he does require torsemide, sent new Rx for this  Continue with lisinopril and metoprolol at current doses  Continue with atorvastatin  Continue with cardiac rehab, this is almost completed however. Gave referral for medical fitness/[facility] today as well    Malcom Cisse DO, [facility], [address]  --------------------------------------------------------------------------------  TREADMILL STRESS  No results found for this or any previous visit.     ----------------------------------------------------------------------------------------------  NUCLEAR STRESS TEST: No results found for this or any previous visit. No results found for this or any previous visit.      --------------------------------------------------------------------------------  CATH:  No results found for this or any previous visit.    --------------------------------------------------------------------------------  ECHO:   No results found for this or any previous visit. No results found for this or any previous visit.    --------------------------------------------------------------------------------  HOLTER  No results found for this or any previous visit. No results found for this or any previous visit.    --------------------------------------------------------------------------------  CAROTIDS  No results found for this or any previous visit. --------------------------------------------------------------------------------  Diagnoses and all orders for this visit:    Essential hypertension    Left ventricular hypertrophy    Nonrheumatic mitral valve regurgitation    Mixed hyperlipidemia    S/P mitral valve repair  -     torsemide (DEMADEX) 20 mg tablet; Take 1 tablet (20 mg total) by mouth daily for 5 days Unless otherwise directed. S/P left atrial appendage ligation  -     torsemide (DEMADEX) 20 mg tablet; Take 1 tablet (20 mg total) by mouth daily for 5 days Unless otherwise directed. Anticoagulated on Coumadin    Anticoagulation goal of INR 2 to 3    Obesity, morbid (HCC)    Type 2 diabetes mellitus without complication, without long-term current use of insulin (HCC)    Paroxysmal atrial fibrillation (HCC)  -     Discontinue: amiodarone 200 mg tablet; Take 1 tablet (200 mg total) by mouth 3 (three) times a day with meals  -     Discontinue: amiodarone 200 mg tablet; Take 1 tablet (200 mg total) by mouth daily  -     amiodarone 200 mg tablet; Take 1 tablet (200 mg total) by mouth daily  -     amiodarone 200 mg tablet;  Take 1 tablet (200 mg total) by mouth 3 (three) times a day with meals       ======================================================    Past Medical History:   Diagnosis Date   • Acute bilateral low back pain without sciatica 03/09/2021   • BMI 33.0-33.9,adult 02/04/2020   • BPH (benign prostatic hyperplasia)    • COVID-19    • Diabetes mellitus associated with hormonal etiology ([address]) 03/05/2019   • Diverticulitis    • ED (erectile dysfunction)    • Elevated PSA 01/15/2013   • Encounter for well adult exam with abnormal findings 02/04/2019   • Herpes zoster 12/18/2014   • Hyperlipidemia    • Hypertension    • IFG (impaired fasting glucose)    • Metabolic syndrome    • Obesity (BMI 30.0-34.9) 02/04/2019   • Psoriasis    • Seasonal allergic rhinitis    • Type 2 diabetes mellitus ([address] [facility]) 11/17/2014     Past Surgical History:   Procedure Laterality Date   • CARDIAC CATHETERIZATION N/A 03/03/2023    Procedure: Cardiac Coronary Angiogram;  Surgeon: Manuel Tee DO;  Location: [facility]; Service: Cardiology   • CHOLECYSTECTOMY  05/14/2015   • COLONOSCOPY  2007   • COLONOSCOPY  06/23/2021   • HERNIA REPAIR  05/14/2015   • MN REPLACEMENT MITRAL VALVE W/CARDIOPULMONARY BYP N/A 3/21/2023    Procedure: MITRAL VALVE REPAIR with Medtronic 32 mm CG Future Annuloplasty Ring; LIGATION OF Left ATRIAL APPENDAGE with 45 mm AtriClip; TANG;  Surgeon: Lj Sidhu DO;  Location: [facility] MAIN OR;  Service: Cardiac Surgery         Medications  Current Outpatient Medications   Medication Sig Dispense Refill   • amiodarone 200 mg tablet Take 1 tablet (200 mg total) by mouth daily 90 tablet 1   • amiodarone 200 mg tablet Take 1 tablet (200 mg total) by mouth 3 (three) times a day with meals 21 tablet 0   • aspirin (ECOTRIN LOW STRENGTH) 81 mg EC tablet take 1 tablet (81MG)     • atorvastatin (LIPITOR) 20 mg tablet TAKE 1 TABLET DAILY 90 tablet 1   • ferrous sulfate 324 (65 Fe) mg Take 1 tablet (324 mg total) by mouth daily before breakfast 90 tablet 0   • finasteride (PROSCAR) 5 mg tablet TAKE 1 TABLET DAILY 90 tablet 0   • lisinopril (ZESTRIL) 10 mg tablet Take 1 tablet (10 mg total) by mouth daily 90 tablet 2   • metoprolol tartrate (LOPRESSOR) 25 mg tablet Take 1 tablet (25 mg total) by mouth every 12 (twelve) hours 180 tablet 2   • tamsulosin (FLOMAX) 0.4 mg Take 1 capsule (0.4 mg total) by mouth daily with dinner 90 capsule 0   • torsemide (DEMADEX) 20 mg tablet Take 1 tablet (20 mg total) by mouth daily for 5 days Unless otherwise directed. 30 tablet 3   • warfarin (Coumadin) 2.5 mg tablet Take 3-4 tablet daily as directed by MD  (7.5 to 10 mg daily) 360 tablet 1   • potassium chloride (K-DUR,KLOR-CON) 20 mEq tablet Take 1 tablet (20 mEq total) by mouth daily for 5 days Unless otherwise directed.  Do not start before March 28, 2023. 5 tablet 1 No current facility-administered medications for this visit. Allergies   Allergen Reactions   • No Known Allergies        Social History     Socioeconomic History   • Marital status: /Civil Union     Spouse name: Not on file   • Number of children: Not on file   • Years of education: Not on file   • Highest education level: Not on file   Occupational History   • Not on file   Tobacco Use   • Smoking status: Former     Packs/day: 1.00     Years: 10.00     Total pack years: 10.00     Types: Cigarettes, Pipe     Start date: 1970     Quit date: 1980     Years since quittin.2     Passive exposure: Never   • Smokeless tobacco: Never   Vaping Use   • Vaping Use: Never used   Substance and Sexual Activity   • Alcohol use: Yes     Alcohol/week: 21.0 standard drinks of alcohol     Types: 7 Glasses of wine, 14 Shots of liquor per week     Comment: social   • Drug use: Not Currently     Frequency: 1.0 times per week     Types: Marijuana     Comment: less than once a week   • Sexual activity: Yes     Partners: Female     Birth control/protection: None   Other Topics Concern   • Not on file   Social History Narrative    Has children    Right handed     Social Determinants of Health     Financial Resource Strain: Low Risk  (2023)    Overall Financial Resource Strain (CARDIA)    • Difficulty of Paying Living Expenses: Not hard at all   Food Insecurity: No Food Insecurity (3/23/2023)    Hunger Vital Sign    • Worried About Running Out of Food in the Last Year: Never true    • Ran Out of Food in the Last Year: Never true   Transportation Needs: No Transportation Needs (2023)    PRAPARE - Transportation    • Lack of Transportation (Medical): No    • Lack of Transportation (Non-Medical):  No   Physical Activity: Insufficiently Active (2021)    Exercise Vital Sign    • Days of Exercise per Week: 1 day    • Minutes of Exercise per Session: 60 min   Stress: No Stress Concern Present (2021) [address] Questionnaire    • Feeling of Stress : Not at all   Social Connections: Socially Integrated (6/8/2021)    Social Connection and Isolation Panel [NHANES]    • Frequency of Communication with Friends and Family: More than three times a week    • Frequency of Social Gatherings with Friends and Family: Once a week    • Attends Yarsani Services: More than 4 times per year    • Active Member of Clubs or Organizations:  Yes    • Attends Club or Organization Meetings: More than 4 times per year    • Marital Status:    Intimate Partner Violence: Not At Risk (6/8/2021)    Humiliation, Afraid, Rape, and Kick questionnaire    • Fear of Current or Ex-Partner: No    • Emotionally Abused: No    • Physically Abused: No    • Sexually Abused: No   Housing Stability: Low Risk  (3/23/2023)    Housing Stability Vital Sign    • Unable to Pay for Housing in the Last Year: No    • Number of Places Lived in the Last Year: 1    • Unstable Housing in the Last Year: No        Family History   Problem Relation Age of Onset   • Skin cancer Mother    • Diabetes type II Mother    • Hyperlipidemia Mother    • Heart disease Father    • Thyroid disease unspecified Paternal Aunt        Lab Results   Component Value Date    WBC 4.92 05/24/2023    HGB 12.1 05/24/2023    HCT 38.1 05/24/2023     (H) 05/24/2023     05/24/2023      Lab Results   Component Value Date    SODIUM 138 05/24/2023    K 3.8 05/24/2023     05/24/2023    CO2 30 05/24/2023    BUN 16 05/24/2023    CREATININE 1.08 05/24/2023    GLUC 91 03/27/2023    CALCIUM 8.8 05/24/2023      Lab Results   Component Value Date    HGBA1C 5.8 (H) 05/24/2023      Lab Results   Component Value Date    CHOL 97 05/14/2018     Lab Results   Component Value Date    HDL 49 02/28/2023    HDL 49 11/14/2022    HDL 41 07/11/2022     Lab Results   Component Value Date    LDLCALC 33 02/28/2023    LDLCALC 29 11/14/2022 [facility] 19 07/11/2022     Lab Results   Component Value Date    TRIG 29 02/28/2023    TRIG 70 11/14/2022    TRIG 65 07/11/2022     No results found for: "CHOLHDL"   Lab Results   Component Value Date    INR 2.28 (H) 07/19/2023    INR 1.50 (H) 07/10/2023    INR 1.68 (H) 06/23/2023    PROTIME 25.4 (H) 07/19/2023    PROTIME 18.3 (H) 07/10/2023    PROTIME 20.0 (H) 06/23/2023          Patient Active Problem List    Diagnosis Date Noted   • Medicare annual wellness visit, subsequent 06/07/2023   • Low iron 06/05/2023   • Encounter for postoperative care 04/19/2023   • B12 deficiency 04/06/2023   • Prediabetes 04/06/2023   • Lower extremity edema 04/03/2023   • Anticoagulation goal of INR 2 to 3 03/27/2023   • Stress hyperglycemia 03/27/2023   • Postoperative atrial fibrillation ([address]) 03/24/2023   • Anticoagulated on Coumadin 03/23/2023   • S/P mitral valve repair 03/21/2023   • S/P left atrial appendage ligation 03/21/2023   • FH: cerebral aneurysm 02/22/2023   • Obesity, morbid ([address]) 02/22/2023   • Drop in hemoglobin 01/06/2023   • Gastrointestinal hemorrhage associated with intestinal diverticulosis 12/31/2022   • Diverticulitis of colon with perforation 12/31/2022   • Blood in stool 12/30/2022   • Abnormal CBC 11/18/2022   • Atrial enlargement, left 11/18/2022   • History of COVID-19 09/29/2022   • Shortness of breath 09/28/2022   • Overweight (BMI 25.0-29.9) 03/08/2022   • Platelets decreased ([address]) 07/30/2021   • HUMBERTO treated with BiPAP 09/29/2020   • Excessive daytime sleepiness 09/29/2020   • Need for shingles vaccine 02/04/2019   • Colon polyp 08/27/2018   • Left ventricular hypertrophy 02/06/2018   • Mitral regurgitation 02/06/2018   • BPH with urinary obstruction 10/31/2016   • Calculus of gallbladder with cholecystitis 04/29/2015   • Displacement of lumbar intervertebral disc without myelopathy 04/09/2015   • Idiopathic peripheral neuropathy 04/09/2015   • Diverticulosis of colon 01/26/2015   • Indigestion 12/23/2014   • Herpes zoster 12/18/2014   • Type 2 diabetes mellitus without complication, without long-term current use of insulin ([address]) 11/17/2014   • Allergic rhinitis 01/15/2013   • Male erectile disorder 01/15/2013   • Essential hypertension 89/50/7822   • Metabolic syndrome 32/76/7551   • Hyperlipidemia 01/15/2013       Portions of the record may have been created with voice recognition software. Occasional wrong word or "sound a like" substitutions may have occurred due to the inherent limitations of voice recognition software. Read the chart carefully and recognize, using context, where substitutions have occurred.     Silvestre Vincent DO, [facility] [place]  7/21/2023 1:15 PM

## 2023-07-21 NOTE — LETTER
Medical Fitness Referral    Patient: Mirian Costello  : 1950  MRN: [id number]  Address: [address] [facility] [address]  Phone Number: [phone]  E-mail: [email or web address]    [] Medical Disorders (Ex. Diabetes)   [x] Cardiovascular Disorders (Ex. Hypertension)   [] Pulmonary Disorders (Ex. Asthma)   [] Cancer Disorders (Ex. Breast, Prostate)   [] Immunological & Hematological Disorders (Rheumatoid Arthritis)   [] Neurological Disorders (Ex. Parkinson's Disease)   [] Cognitive Disorders (Ex. Dementia)   [] Children & Adolescents (Ex. BMI)   [] Older Adults (Ex. Balance & Ambulation)   [] Female Specific Disorders (Ex. Osteoporosis)       Diagnoses:   1. Essential hypertension        2. Left ventricular hypertrophy        3. Nonrheumatic mitral valve regurgitation        4. Mixed hyperlipidemia        5. S/P mitral valve repair  torsemide (DEMADEX) 20 mg tablet      6. S/P left atrial appendage ligation  torsemide (DEMADEX) 20 mg tablet      7. Anticoagulated on Coumadin        8. Anticoagulation goal of INR 2 to 3        9. Obesity, morbid ([address])        10. Type 2 diabetes mellitus without complication, without long-term current use of insulin ([address])        11.  Paroxysmal atrial fibrillation (HCC)  amiodarone 200 mg tablet    amiodarone 200 mg tablet        Additional Comments:     Service Requested:  [x] Medical Fitness Consult- Screening For Appropriateness of Medical Fitness Program   [x] Medical Fitness Program- Assessment of Body Composition, Aerobic, Anaerobic, Muscle Strength & Endurance, Flexibility, Neuromuscular & Functional Fitness   [x] Medical Fitness Assessment- Individualized Evidence-Based Exercise Program       Requesting Provider: Gamal Landon DO  Date: 23

## 2023-07-24 ENCOUNTER — CLINICAL SUPPORT (OUTPATIENT)
Dept: CARDIAC REHAB | Facility: CLINIC | Age: 73
End: 2023-07-24
Payer: MEDICARE

## 2023-07-24 DIAGNOSIS — Z98.890 S/P MITRAL VALVE REPAIR: Primary | ICD-10-CM

## 2023-07-24 PROCEDURE — 93798 PHYS/QHP OP CAR RHAB W/ECG: CPT

## 2023-07-26 ENCOUNTER — CLINICAL SUPPORT (OUTPATIENT)
Dept: CARDIAC REHAB | Facility: CLINIC | Age: 73
End: 2023-07-26
Payer: MEDICARE

## 2023-07-26 DIAGNOSIS — Z98.890 S/P MITRAL VALVE REPAIR: Primary | ICD-10-CM

## 2023-07-26 PROCEDURE — 93798 PHYS/QHP OP CAR RHAB W/ECG: CPT

## 2023-07-26 NOTE — PROGRESS NOTES
Cardiac Rehabilitation Plan of Care   Discharge          Today's date: 2023   # of Exercise Sessions Completed: 36  Patient name: Festus Correa      : 1950  Age: 81 y.o. MRN: [id number]  Referring Physician: Lucero Jackson PA-C  Cardiologist: Polina Reynaga DO  Provider: [facility]  Clinician: Coco Mckinney MS, CEP    Dx:   Encounter Diagnosis   Name Primary? • S/P mitral valve repair Yes     Date of onset: 3/21/23      SUMMARY OF PROGRESS:  Discharge note for Maia Lewis. He had 29.3% improvement in functional capacity increasing His max METs in the submaximal TM ETT  from 5.8 to 7.5 with test termination of RPE 6. His exercise tolerance (max METs in tolerated in cardiac rehab) increased by 35.9%. He had a 102.8% improvement in the [facility] activity estimated MET level with ADLs and physical activity. His [facility] QOL improved by 7.1%. PHQ-9 score increased from 2 to 4. DARIEL-7 increased from 0 to 11. Maia Lewis reports high stress due to his company increasing demand over the last few months, which explains the increased DARIEL-7 score. His weight increased by 14 pounds. Waist circumference increased by 2 inches. Balbir's weight gain can also be explained by his recent increase in stress. He denies any SOB or fluid retention but seems to have large portion sizes and increased stress eating. He does report reading and traveling as a stress management technique. Rate Your Plate score improved from 62 to 66. Maia Lewis has been supplementing CR sessions with home exercise. He plans to do yoga 3x/week, MITT training and swimming. He was encouraged to keep his exercise moderate. He reports increased stamina, strength and reduced SOB with activity. Balbir tolerates 40 mins at 3.0 - 5.4 METs plus wt training. Balbir rhythm is inconsistent, switching between NSR and Afib. Today, he was NSR with occ PVCs at rest and Afib with occ PVCs during exercise. He purchased an [employer], so he can monitor his afib.   RHR  56 - 66, ExHR NSR 75 - 100 and 120-150 while in afib. Resting /68 - 130/84 with appropriate response to exercise reaching 134/70 - 162/88. All group education classes on cardiac risk factor modification were attended by the patient. Discharge plans include continuing exercise on his own 5 days/week and monitoring his weight and his afib. Encouraged Pt to continue exercise. Frequency: 4-6 days/wk, Intensity: RPE 4-5, Time: 40-50 mins daily, 150-200 mins/wk. Pt was encouraged to continue eating heart healthy. Pt was encouraged to remain compliant with medications and f/u with cardiologist with any cardiac symptoms, medication management and updated lipid profile.      Medication compliance: Yes   Comments: Pt reports to be compliant with medications  Fall Risk: Low   Comments: Ambulates with a steady gait with no assist device and Denies a fall in the past 6 months    EKG Interpretation: NSR with rare PACs/PVCs / Afib with occ PVCs and isolated couplet      EXERCISE ASSESSMENT and PLAN    Exercise Prescription:      Frequency: 3 days/week      Minutes: 45-55      METS: 2.7-5.4           HR:  (120-150 with Afib)   RPE: 3-5         Modalities: Treadmill, UBE, Lifecycle, Elliptical and Rower      Exercise Progression after Discharge:    Frequency: 3-5 days of gym or home exercise   Minutes: 30-50  >150 mins/wk of moderate intensity exercise   METS: 4.0 - 5.5   HR:  NSR; 120-150 AFIB    RPE: 4-6   Modalities: Treadmill, Lifecycle, Elliptical, Rower and swimming and yoga    Strength trainin-3 days / week  12-15 repetitions  1-2 sets per modality    Modalities: Leg Press, Chest Press, Pull Downs, Lateral Raise, Arm Extension, Arm Curl, Front Raises and Calf Raises    Home Exercise: walking 5 blocks twice a day and using stairs often at home    Goals: 10% improvement in functional capacity - based on max METs achieved in fitness assessment, improved DASI score by 10%, Increase in exercise capacity by 40% - based on peak METs tolerated in cardiac rehab exercise session, Exercise 5 days/wk, >150mins/wk of moderate intensity exercise, Attend Rehab regularly and return to regular exercise at the gym    Progression Toward Goals:  Goals met: completed cardiac rehab; increased max METs tolerated; has plan for exercise after discharge., Patient will be encouraged to focus on lifestyle modifications following discharge. Education: benefit of exercise for CAD risk factors, AHA guidelines to achieve >150 mins/wk of moderate exercise, RPE scale, class: Risk Factors for Heart Disease and exercise instructions/guidelines for discharge    Plan:continue exercise  Readiness to change: Maintenance: (Maintaining the behavior change)      NUTRITION ASSESSMENT AND PLAN    Weight control:    Starting weight: 207.2 lb   Current weight: 221.2  lb  Waist circumference:    Startin in   Current: 41.25 in    Diabetes: N/A  A1c: 5.8%    last measured: 23    Lipid management: Last lipid profile 23  Chol 88  TRG 29  HDL 49  LDL 33    Goals:reduced BMI to < 25    Progression Toward Goals: Goals not met: gained 14 lbs since initial evaluation; admits to stress eating.  , Goals met: increased RYP score from 62 to 66., Patient will be encouraged to focus on lifestyle modifications following discharge.     Education: heart healthy eating  education class: Heart Healthy Eating  education class:  Label Reading  Plan: continue with healthy eating and add regular exercise  Readiness to change: Maintenance: (Maintaining the behavior change)      PSYCHOSOCIAL ASSESSMENT AND PLAN    Emotional:  Depression assessment:  PHQ-9 = 4  1-4 = Minimal Depression            Anxiety measure:  DARIEL-7 = 11  0-4  = Not anxious  Self-reported stress level:  10  Social support: Very Good    Goals:  increased energy    Progression Toward Goals: Goals not met: reports increased stress- increased DARIEL-7 score due to high demand at work.  , Goals met: denies depression. , Patient will be encouraged to focus on lifestyle modifications following discharge. Education: benefits of a positive support system, depression and CAD and class:  Stress and Your Health   Plan: Exercise  Readiness to change: Maintenance: (Maintaining the behavior change)      OTHER CORE COMPONENTS     Tobacco:   Social History     Tobacco Use   Smoking Status Former   • Packs/day: 1.00   • Years: 10.00   • Total pack years: 10.00   • Types: Cigarettes, Pipe   • Start date: 1970   • Quit date: 1980   • Years since quittin.2   • Passive exposure: Never   Smokeless Tobacco Never       Tobacco Use Intervention:   N/A: Pt has a remote history of smoking    Blood pressure:    Restin//84   Exercise: 136//88    Goals: consistent BP < 130/80, moderate intensity exercise >150 mins/wk and medication compliance    Progression Toward Goals: Goals met: normal resting BP; takes meds; exercises 150 mins/week.     Education:  understanding high blood pressure and it's relationship to CAD, Education class:  Common Heart Medications and Education class: Understanding Heart Disease  Plan: engage in regular exercise  Readiness to change: Maintenance: (Maintaining the behavior change)

## 2023-07-28 ENCOUNTER — APPOINTMENT (OUTPATIENT)
Dept: CARDIAC REHAB | Facility: CLINIC | Age: 73
End: 2023-07-28
Payer: MEDICARE

## 2023-08-02 ENCOUNTER — APPOINTMENT (OUTPATIENT)
Dept: LAB | Facility: CLINIC | Age: 73
End: 2023-08-02
Payer: MEDICARE

## 2023-08-03 ENCOUNTER — ANTICOAG VISIT (OUTPATIENT)
Dept: CARDIOLOGY CLINIC | Facility: CLINIC | Age: 73
End: 2023-08-03

## 2023-08-06 PROBLEM — Z00.00 MEDICARE ANNUAL WELLNESS VISIT, SUBSEQUENT: Status: RESOLVED | Noted: 2023-06-07 | Resolved: 2023-08-06

## 2023-08-16 ENCOUNTER — APPOINTMENT (OUTPATIENT)
Dept: LAB | Facility: CLINIC | Age: 73
End: 2023-08-16
Payer: MEDICARE

## 2023-08-17 ENCOUNTER — ANTICOAG VISIT (OUTPATIENT)
Dept: CARDIOLOGY CLINIC | Facility: CLINIC | Age: 73
End: 2023-08-17

## 2023-08-17 NOTE — PROGRESS NOTES
Left message for patient, advised INR still high, advised most likely due to restarting amiodarone, advised to take 2.5 mg tonight only, then 7.5 mg daily, will recheck next week 8/23/23  Advised to call with questions or concerns.

## 2023-08-23 ENCOUNTER — APPOINTMENT (OUTPATIENT)
Dept: LAB | Facility: CLINIC | Age: 73
End: 2023-08-23
Payer: MEDICARE

## 2023-08-23 DIAGNOSIS — I48.91 POSTOPERATIVE ATRIAL FIBRILLATION (HCC): ICD-10-CM

## 2023-08-23 DIAGNOSIS — I97.89 POSTOPERATIVE ATRIAL FIBRILLATION (HCC): ICD-10-CM

## 2023-08-23 LAB
INR PPP: 2.75 (ref 0.84–1.19)
PROTHROMBIN TIME: 29.3 SECONDS (ref 11.6–14.5)

## 2023-08-23 PROCEDURE — 85610 PROTHROMBIN TIME: CPT

## 2023-08-23 PROCEDURE — 36415 COLL VENOUS BLD VENIPUNCTURE: CPT

## 2023-08-24 ENCOUNTER — ANTICOAG VISIT (OUTPATIENT)
Dept: CARDIOLOGY CLINIC | Facility: CLINIC | Age: 73
End: 2023-08-24

## 2023-09-02 DIAGNOSIS — N40.0 BENIGN PROSTATIC HYPERPLASIA, UNSPECIFIED WHETHER LOWER URINARY TRACT SYMPTOMS PRESENT: ICD-10-CM

## 2023-09-05 RX ORDER — FINASTERIDE 5 MG/1
TABLET, FILM COATED ORAL
Qty: 90 TABLET | Refills: 0 | Status: SHIPPED | OUTPATIENT
Start: 2023-09-05

## 2023-09-07 ENCOUNTER — APPOINTMENT (OUTPATIENT)
Dept: LAB | Facility: CLINIC | Age: 73
End: 2023-09-07
Payer: MEDICARE

## 2023-09-08 ENCOUNTER — TELEPHONE (OUTPATIENT)
Dept: CARDIOLOGY CLINIC | Facility: CLINIC | Age: 73
End: 2023-09-08

## 2023-09-08 ENCOUNTER — ANTICOAG VISIT (OUTPATIENT)
Dept: CARDIOLOGY CLINIC | Facility: CLINIC | Age: 73
End: 2023-09-08

## 2023-09-08 NOTE — TELEPHONE ENCOUNTER
Spoke with patient, states it is the time of the year for vaccines, asking if it is ok to get them?     Please advise

## 2023-09-08 NOTE — PROGRESS NOTES
Spoke with patient, advised INR a little low, current dose verified, advised to take 10 mg Fri, 7.5 mg all other days, requesting patient recheck 9/21/23, patient will be on vacation, will get INR 9/27/23.

## 2023-09-27 DIAGNOSIS — E61.1 LOW IRON: ICD-10-CM

## 2023-09-27 RX ORDER — FERROUS SULFATE TAB EC 324 MG (65 MG FE EQUIVALENT) 324 (65 FE) MG
324 TABLET DELAYED RESPONSE ORAL
Qty: 90 TABLET | Refills: 0 | Status: SHIPPED | OUTPATIENT
Start: 2023-09-27

## 2023-09-28 ENCOUNTER — APPOINTMENT (OUTPATIENT)
Dept: LAB | Facility: CLINIC | Age: 73
End: 2023-09-28
Payer: MEDICARE

## 2023-09-29 ENCOUNTER — ANTICOAG VISIT (OUTPATIENT)
Dept: CARDIOLOGY CLINIC | Facility: CLINIC | Age: 73
End: 2023-09-29

## 2023-10-04 DIAGNOSIS — N40.0 BENIGN PROSTATIC HYPERPLASIA, UNSPECIFIED WHETHER LOWER URINARY TRACT SYMPTOMS PRESENT: ICD-10-CM

## 2023-10-04 RX ORDER — TAMSULOSIN HYDROCHLORIDE 0.4 MG/1
CAPSULE ORAL
Qty: 90 CAPSULE | Refills: 0 | Status: SHIPPED | OUTPATIENT
Start: 2023-10-04

## 2023-10-09 ENCOUNTER — APPOINTMENT (OUTPATIENT)
Dept: LAB | Facility: CLINIC | Age: 73
End: 2023-10-09
Payer: MEDICARE

## 2023-10-10 ENCOUNTER — ANTICOAG VISIT (OUTPATIENT)
Dept: CARDIOLOGY CLINIC | Facility: CLINIC | Age: 73
End: 2023-10-10

## 2023-10-26 ENCOUNTER — OFFICE VISIT (OUTPATIENT)
Dept: CARDIOLOGY CLINIC | Facility: CLINIC | Age: 73
End: 2023-10-26
Payer: MEDICARE

## 2023-10-26 VITALS
DIASTOLIC BLOOD PRESSURE: 60 MMHG | HEART RATE: 58 BPM | BODY MASS INDEX: 30.4 KG/M2 | SYSTOLIC BLOOD PRESSURE: 110 MMHG | WEIGHT: 230.4 LBS

## 2023-10-26 DIAGNOSIS — I48.0 PAROXYSMAL ATRIAL FIBRILLATION (HCC): Primary | ICD-10-CM

## 2023-10-26 DIAGNOSIS — Z98.890 S/P LEFT ATRIAL APPENDAGE LIGATION: ICD-10-CM

## 2023-10-26 DIAGNOSIS — Z98.890 S/P MITRAL VALVE REPAIR: ICD-10-CM

## 2023-10-26 PROCEDURE — 99214 OFFICE O/P EST MOD 30 MIN: CPT

## 2023-10-26 PROCEDURE — 93000 ELECTROCARDIOGRAM COMPLETE: CPT

## 2023-10-26 RX ORDER — TORSEMIDE 20 MG/1
20 TABLET ORAL DAILY
Qty: 90 TABLET | Refills: 3 | Status: SHIPPED | OUTPATIENT
Start: 2023-10-26 | End: 2024-10-20

## 2023-10-26 RX ORDER — POTASSIUM CHLORIDE 20 MEQ/1
20 TABLET, EXTENDED RELEASE ORAL DAILY
Qty: 90 TABLET | Refills: 3 | Status: SHIPPED | OUTPATIENT
Start: 2023-10-26 | End: 2024-10-20

## 2023-10-26 NOTE — PROGRESS NOTES
Cardiology   MD Francisca Rubio MD, Juliano Huber DO, AZALEA Jimenez MD Norvel Feeling, DO, Marlene Bradshaw DO, [facility]  -------------------------------------------------------------------  On license of [facility]  [address] [place] [address]  Phone: [phone]  Fax: [phone]  10/26/23  Milagros Duarte  YOB: 1950   MRN: [id number]      Referring Physician: Kiara Cash MD  [id number] [facility]. [place] [address]     HPI: Milagros Duarte is a 72 y.o. male with:   Severe MR s/p Mitral Valve Repair with Medtronic 32 mm  CG Future and 45mm  Atriclip 3/2023  HTN  HUMBERTO on CPAP  Diverticulosis  Hx of GIB  BPH on 2 agents  Thrombocytopenia (113k)  LMCA aneurysm (15 mm)  Large dist LMCA aneurysm (approximately 15mm diameter in width)  involving the prox LAD & LCx; otherwise mild plaque     Hx of COVID  Sinus bradycardia    Mild concentric left ventricular hypertrophy  Grade 2 diastolic dysfunction  Dilated RV with normal RV systolic function  Paroxysmal atrial fibrillation, on warfarin, first noticed postoperatively after surgery    Apple watch with 2% A-fib burden    His weight is gone up but this is not likely due to his his heart or swelling. He has no swelling he states. He has been drinking much more frequently several shots a night plus eating rather unhealthy-cheese, crackers, doughnuts etc.  Since he stopped cardiac rehab he also stopped exercising. Although his wife goes to the gym regularly he does not go with her. He has sleep apnea and he is compliant with his CPAP    Review of Systems   Constitutional:  Negative for chills and fever. HENT:  Negative for facial swelling and sore throat. Eyes:  Negative for visual disturbance. Respiratory:  Negative for cough, chest tightness, shortness of breath and wheezing. Cardiovascular:  Negative for chest pain, palpitations and leg swelling. Gastrointestinal:  Negative for abdominal pain, blood in stool, constipation, diarrhea, nausea and vomiting. Endocrine: Negative for cold intolerance and heat intolerance. Genitourinary:  Negative for decreased urine volume, difficulty urinating, dysuria and hematuria. Musculoskeletal:  Negative for arthralgias, back pain and myalgias. Skin:  Negative for rash. Neurological:  Negative for dizziness, syncope, weakness and numbness. Psychiatric/Behavioral:  Negative for agitation, behavioral problems and confusion. The patient is not nervous/anxious. OBJECTIVE  Vitals:    10/26/23 1608   BP: 110/60   Pulse: 58       Physical Exam  Constitutional: awake, alert and oriented, in no acute distress, no obvious deformities  Head: Normocephalic, without obvious abnormality, atraumatic  Eyes: conjunctivae clear and moist. Sclera anicteric. No xanthelasmas. Pupils equal bilaterally. Extraocular motions are full. Ear nose mouth and throat: ears are symmetrical bilaterally, hearing appears to be equal bilaterally, no nasal discharge or epistaxis, oropharynx is clear with moist mucous membranes  Neck:  Trachea is midline, neck is supple, no thyromegaly or significant lymphadenopathy, there is full range of motion. Lungs: clear to auscultation bilaterally, no wheezes, no rales, no rhonchi, no accessory muscle use, breathing is nonlabored  Heart: regular rate and rhythm, S1, S2 normal, no murmur, no click, rub or gallop, no lower extremity edema  Abdomen: soft, non-tender; bowel sounds normal; no masses,  no organomegaly  Psychiatric:  Patient is oriented to time, place, person, mood/affect is negative for depression, anxiety, agitation, appears to have appropriate insight  Skin: Skin is warm, dry, intact. No obvious rashes or lesions on exposed extremities. Nail beds are pink with no cyanosis or clubbing.       EKG:  Results for orders placed or performed in visit on 10/26/23   POCT ECG    Impression Sinus bradycardia 58 bpm with incomplete right bundle branch block        IMPRESSION:  Severe MR s/p Mitral Valve Repair with Medtronic 32 mm  CG Future and 45mm  Atriclip 3/2023  HTN  HUMBERTO on CPAP  Diverticulosis  Hx of GIB  BPH on 2 agents  Thrombocytopenia (113k)  LMCA aneurysm (15 mm)  Large dist LMCA aneurysm (approximately 15mm diameter in width)  involving the prox LAD & LCx; otherwise mild plaque     Hx of COVID  Sinus bradycardia    Mild concentric left ventricular hypertrophy  Grade 2 diastolic dysfunction  Dilated RV with normal RV systolic function  Paroxysmal atrial fibrillation, on warfarin, first noticed postoperatively after surgery    DISCUSSION/RECOMMENDATIONS:  He presents today for follow-up. He states he is feeling well but his weight is been increasing. I do believe this is not cardiac related and is related to increased caloric intake from alcohol and food. Had long discussion with him and his wife today regarding this and with strategies for heart healthy diet. The alcohol could be playing a role with his A-fib as well  He did have postop A-fib off and on, I have him on amiodarone right now but we will stop it today. He is in sinus rhythm today  Blood pressure is controlled  Recommended that he start going back to the gym to get back in shape. He did very well in cardiac rehab and these habits that he developed in cardiac rehab should be continued now going forward. Sent refills on his potassium and torsemide  We will plan for follow-up in the spring. He is to have a new echo at that time to reevaluate his heart 1 year after surgical mitral valve repair and then every 2 to 3 years going forward based on the most recent valvular heart disease guidelines    Catherine Mcleod DO, FAC, [facility]  --------------------------------------------------------------------------------  TREADMILL STRESS  No results found for this or any previous visit. ----------------------------------------------------------------------------------------------  NUCLEAR STRESS TEST: No results found for this or any previous visit. No results found for this or any previous visit.      --------------------------------------------------------------------------------  CATH:  No results found for this or any previous visit.    --------------------------------------------------------------------------------  ECHO:   No results found for this or any previous visit. No results found for this or any previous visit.    --------------------------------------------------------------------------------  HOLTER  No results found for this or any previous visit. No results found for this or any previous visit.    --------------------------------------------------------------------------------  CAROTIDS  No results found for this or any previous visit.     --------------------------------------------------------------------------------  Diagnoses and all orders for this visit:    Paroxysmal atrial fibrillation ([address])  -     POCT ECG    S/P mitral valve repair  -     torsemide (DEMADEX) 20 mg tablet; Take 1 tablet (20 mg total) by mouth daily  -     potassium chloride (K-DUR,KLOR-CON) 20 mEq tablet; Take 1 tablet (20 mEq total) by mouth daily Unless otherwise directed. S/P left atrial appendage ligation  -     torsemide (DEMADEX) 20 mg tablet; Take 1 tablet (20 mg total) by mouth daily  -     potassium chloride (K-DUR,KLOR-CON) 20 mEq tablet;  Take 1 tablet (20 mEq total) by mouth daily Unless otherwise directed.       ======================================================    Past Medical History:   Diagnosis Date   • Acute bilateral low back pain without sciatica 03/09/2021   • BMI 33.0-33.9,adult 02/04/2020   • BPH (benign prostatic hyperplasia)    • COVID-19    • Diabetes mellitus associated with hormonal etiology ([address]) 03/05/2019   • Diverticulitis    • ED (erectile dysfunction) • Elevated PSA 01/15/2013   • Encounter for well adult exam with abnormal findings 02/04/2019   • Herpes zoster 12/18/2014   • Hyperlipidemia    • Hypertension    • IFG (impaired fasting glucose)    • Metabolic syndrome    • Obesity (BMI 30.0-34.9) 02/04/2019   • Psoriasis    • Seasonal allergic rhinitis    • Type 2 diabetes mellitus ([address]) 11/17/2014     Past Surgical History:   Procedure Laterality Date   • CARDIAC CATHETERIZATION N/A 03/03/2023    Procedure: Cardiac Coronary Angiogram;  Surgeon: Griselda Alberts DO;  Location: [facility]; Service: Cardiology   • CHOLECYSTECTOMY  05/14/2015   • COLONOSCOPY  2007   • COLONOSCOPY  06/23/2021   • HERNIA REPAIR  05/14/2015   • KS REPLACEMENT MITRAL VALVE W/CARDIOPULMONARY BYP N/A 3/21/2023    Procedure: MITRAL VALVE REPAIR with Medtronic 32 mm CG Future Annuloplasty Ring; LIGATION OF Left ATRIAL APPENDAGE with 45 mm AtriClip; TANG;  Surgeon: Hakeem Chang DO;  Location: [facility] MAIN OR;  Service: Cardiac Surgery         Medications  Current Outpatient Medications   Medication Sig Dispense Refill   • aspirin (ECOTRIN LOW STRENGTH) 81 mg EC tablet take 1 tablet (81MG)     • atorvastatin (LIPITOR) 20 mg tablet TAKE 1 TABLET DAILY 90 tablet 1   • ferrous sulfate 324 (65 Fe) mg TAKE 1 TABLET (324 MG TOTAL) BY MOUTH DAILY BEFORE BREAKFAST 90 tablet 0   • finasteride (PROSCAR) 5 mg tablet TAKE 1 TABLET DAILY 90 tablet 0   • lisinopril (ZESTRIL) 10 mg tablet Take 1 tablet (10 mg total) by mouth daily 90 tablet 2   • metoprolol tartrate (LOPRESSOR) 25 mg tablet Take 1 tablet (25 mg total) by mouth every 12 (twelve) hours 180 tablet 2   • potassium chloride (K-DUR,KLOR-CON) 20 mEq tablet Take 1 tablet (20 mEq total) by mouth daily Unless otherwise directed.  90 tablet 3   • tamsulosin (FLOMAX) 0.4 mg TAKE 1 CAPSULE DAILY WITH  DINNER 90 capsule 0   • torsemide (DEMADEX) 20 mg tablet Take 1 tablet (20 mg total) by mouth daily 90 tablet 3   • warfarin (Coumadin) 2.5 mg tablet Take 3-4 tablet daily as directed by MD  (7.5 to 10 mg daily) 360 tablet 1     No current facility-administered medications for this visit. Allergies   Allergen Reactions   • No Known Allergies        Social History     Socioeconomic History   • Marital status: /Civil Union     Spouse name: Not on file   • Number of children: Not on file   • Years of education: Not on file   • Highest education level: Not on file   Occupational History   • Not on file   Tobacco Use   • Smoking status: Former     Packs/day: 1.00     Years: 10.00     Total pack years: 10.00     Types: Cigarettes, Pipe     Start date: 1970     Quit date: 1980     Years since quittin.5     Passive exposure: Never   • Smokeless tobacco: Never   Vaping Use   • Vaping Use: Never used   Substance and Sexual Activity   • Alcohol use: Yes     Alcohol/week: 21.0 standard drinks of alcohol     Types: 7 Glasses of wine, 14 Shots of liquor per week     Comment: social   • Drug use: Not Currently     Frequency: 1.0 times per week     Types: Marijuana     Comment: less than once a week   • Sexual activity: Yes     Partners: Female     Birth control/protection: None   Other Topics Concern   • Not on file   Social History Narrative    Has children    Right handed     Social Determinants of Health     Financial Resource Strain: Low Risk  (2023)    Overall Financial Resource Strain (CARDIA)    • Difficulty of Paying Living Expenses: Not hard at all   Food Insecurity: No Food Insecurity (3/23/2023)    Hunger Vital Sign    • Worried About Running Out of Food in the Last Year: Never true    • Ran Out of Food in the Last Year: Never true   Transportation Needs: No Transportation Needs (2023)    PRAPARE - Transportation    • Lack of Transportation (Medical): No    • Lack of Transportation (Non-Medical):  No   Physical Activity: Insufficiently Active (2021)    Exercise Vital Sign    • Days of Exercise per Week: 1 day    • Minutes of Exercise per Session: 60 min   Stress: No Stress Concern Present (6/8/2021)    [facility]    • Feeling of Stress : Not at all   Social Connections: Socially Integrated (6/8/2021)    Social Connection and Isolation Panel [NHANES]    • Frequency of Communication with Friends and Family: More than three times a week    • Frequency of Social Gatherings with Friends and Family: Once a week    • Attends Mandaen Services: More than 4 times per year    • Active Member of Clubs or Organizations:  Yes    • Attends Club or Organization Meetings: More than 4 times per year    • Marital Status:    Intimate Partner Violence: Not At Risk (6/8/2021)    Humiliation, Afraid, Rape, and Kick questionnaire    • Fear of Current or Ex-Partner: No    • Emotionally Abused: No    • Physically Abused: No    • Sexually Abused: No   Housing Stability: Low Risk  (3/23/2023)    Housing Stability Vital Sign    • Unable to Pay for Housing in the Last Year: No    • Number of Places Lived in the Last Year: 1    • Unstable Housing in the Last Year: No        Family History   Problem Relation Age of Onset   • Skin cancer Mother    • Diabetes type II Mother    • Hyperlipidemia Mother    • Heart disease Father    • Thyroid disease unspecified Paternal Aunt        Lab Results   Component Value Date    WBC 4.92 05/24/2023    HGB 12.1 05/24/2023    HCT 38.1 05/24/2023     (H) 05/24/2023     05/24/2023      Lab Results   Component Value Date    SODIUM 138 05/24/2023    K 3.8 05/24/2023     05/24/2023    CO2 30 05/24/2023    BUN 16 05/24/2023    CREATININE 1.08 05/24/2023    GLUC 91 03/27/2023    CALCIUM 8.8 05/24/2023      Lab Results   Component Value Date    HGBA1C 5.8 (H) 05/24/2023      Lab Results   Component Value Date    CHOL 97 05/14/2018     Lab Results   Component Value Date    HDL 49 02/28/2023    HDL 49 11/14/2022    HDL 41 07/11/2022     Lab Results   Component Value Date    LDLCALC 33 02/28/2023    LDLCALC 29 11/14/2022    LDLCALC 19 07/11/2022     Lab Results   Component Value Date    TRIG 29 02/28/2023    TRIG 70 11/14/2022    TRIG 65 07/11/2022     No results found for: "CHOLHDL"   Lab Results   Component Value Date    INR 3.84 (H) 10/09/2023    INR 3.93 (H) 09/28/2023    INR 1.84 (H) 09/07/2023    PROTIME 36.9 (H) 10/09/2023    PROTIME 38.7 (H) 09/28/2023    PROTIME 21.5 (H) 09/07/2023          Patient Active Problem List    Diagnosis Date Noted   • Low iron 06/05/2023   • Encounter for postoperative care 04/19/2023   • B12 deficiency 04/06/2023   • Prediabetes 04/06/2023   • Lower extremity edema 04/03/2023   • Anticoagulation goal of INR 2 to 3 03/27/2023   • Stress hyperglycemia 03/27/2023   • Postoperative atrial fibrillation ([address]) 03/24/2023   • Anticoagulated on Coumadin 03/23/2023   • S/P mitral valve repair 03/21/2023   • S/P left atrial appendage ligation 03/21/2023   • FH: cerebral aneurysm 02/22/2023   • Obesity, morbid ([address]) 02/22/2023   • Drop in hemoglobin 01/06/2023   • Gastrointestinal hemorrhage associated with intestinal diverticulosis 12/31/2022   • Diverticulitis of colon with perforation 12/31/2022   • Blood in stool 12/30/2022   • Abnormal CBC 11/18/2022   • Atrial enlargement, left 11/18/2022   • History of COVID-19 09/29/2022   • Shortness of breath 09/28/2022   • Overweight (BMI 25.0-29.9) 03/08/2022   • Platelets decreased ([address]) 07/30/2021   • HUMBERTO treated with BiPAP 09/29/2020   • Excessive daytime sleepiness 09/29/2020   • Need for shingles vaccine 02/04/2019   • Colon polyp 08/27/2018   • Left ventricular hypertrophy 02/06/2018   • Mitral regurgitation 02/06/2018   • BPH with urinary obstruction 10/31/2016   • Calculus of gallbladder with cholecystitis 04/29/2015   • Displacement of lumbar intervertebral disc without myelopathy 04/09/2015   • Idiopathic peripheral neuropathy 04/09/2015   • Diverticulosis of colon 01/26/2015   • Indigestion 12/23/2014   • Herpes zoster 12/18/2014   • Type 2 diabetes mellitus without complication, without long-term current use of insulin ([address]) 11/17/2014   • Allergic rhinitis 01/15/2013   • Male erectile disorder 01/15/2013   • Essential hypertension 33/87/6309   • Metabolic syndrome 40/21/2329   • Hyperlipidemia 01/15/2013       Portions of the record may have been created with voice recognition software. Occasional wrong word or "sound a like" substitutions may have occurred due to the inherent limitations of voice recognition software. Read the chart carefully and recognize, using context, where substitutions have occurred.     Karli Goode DO, [facility]  10/26/2023 5:11 PM

## 2023-10-30 ENCOUNTER — APPOINTMENT (OUTPATIENT)
Dept: LAB | Facility: CLINIC | Age: 73
End: 2023-10-30
Payer: MEDICARE

## 2023-10-30 DIAGNOSIS — I48.91 POSTOPERATIVE ATRIAL FIBRILLATION (HCC): ICD-10-CM

## 2023-10-30 DIAGNOSIS — I97.89 POSTOPERATIVE ATRIAL FIBRILLATION (HCC): ICD-10-CM

## 2023-10-30 DIAGNOSIS — R79.89 ABNORMAL CBC: ICD-10-CM

## 2023-10-30 DIAGNOSIS — Z86.2 HISTORY OF THROMBOCYTOPENIA: ICD-10-CM

## 2023-10-30 DIAGNOSIS — R71.0 DROP IN HEMOGLOBIN: ICD-10-CM

## 2023-10-30 DIAGNOSIS — E11.9 TYPE 2 DIABETES MELLITUS WITHOUT COMPLICATION, WITHOUT LONG-TERM CURRENT USE OF INSULIN (HCC): ICD-10-CM

## 2023-10-30 DIAGNOSIS — Z00.00 MEDICARE ANNUAL WELLNESS VISIT, SUBSEQUENT: ICD-10-CM

## 2023-10-30 DIAGNOSIS — Z12.5 SCREENING FOR PROSTATE CANCER: ICD-10-CM

## 2023-10-30 DIAGNOSIS — E78.2 MIXED HYPERLIPIDEMIA: ICD-10-CM

## 2023-10-30 DIAGNOSIS — E61.1 LOW IRON: ICD-10-CM

## 2023-10-30 LAB
ALBUMIN SERPL BCP-MCNC: 4.3 G/DL (ref 3.5–5)
ALP SERPL-CCNC: 100 U/L (ref 34–104)
ALT SERPL W P-5'-P-CCNC: 29 U/L (ref 7–52)
ANION GAP SERPL CALCULATED.3IONS-SCNC: 8 MMOL/L
AST SERPL W P-5'-P-CCNC: 25 U/L (ref 13–39)
BASOPHILS # BLD AUTO: 0.02 THOUSANDS/ÂΜL (ref 0–0.1)
BASOPHILS NFR BLD AUTO: 0 % (ref 0–1)
BILIRUB SERPL-MCNC: 0.94 MG/DL (ref 0.2–1)
BUN SERPL-MCNC: 22 MG/DL (ref 5–25)
CALCIUM SERPL-MCNC: 9.1 MG/DL (ref 8.4–10.2)
CHLORIDE SERPL-SCNC: 102 MMOL/L (ref 96–108)
CHOLEST SERPL-MCNC: 109 MG/DL
CO2 SERPL-SCNC: 30 MMOL/L (ref 21–32)
CREAT SERPL-MCNC: 1.27 MG/DL (ref 0.6–1.3)
EOSINOPHIL # BLD AUTO: 0.24 THOUSAND/ÂΜL (ref 0–0.61)
EOSINOPHIL NFR BLD AUTO: 5 % (ref 0–6)
ERYTHROCYTE [DISTWIDTH] IN BLOOD BY AUTOMATED COUNT: 13.4 % (ref 11.6–15.1)
EST. AVERAGE GLUCOSE BLD GHB EST-MCNC: 128 MG/DL
GFR SERPL CREATININE-BSD FRML MDRD: 56 ML/MIN/1.73SQ M
GLUCOSE P FAST SERPL-MCNC: 117 MG/DL (ref 65–99)
HBA1C MFR BLD: 6.1 %
HCT VFR BLD AUTO: 42.2 % (ref 36.5–49.3)
HDLC SERPL-MCNC: 48 MG/DL
HGB BLD-MCNC: 13.9 G/DL (ref 12–17)
IMM GRANULOCYTES # BLD AUTO: 0.02 THOUSAND/UL (ref 0–0.2)
IMM GRANULOCYTES NFR BLD AUTO: 0 % (ref 0–2)
LDLC SERPL CALC-MCNC: 49 MG/DL (ref 0–100)
LYMPHOCYTES # BLD AUTO: 1.43 THOUSANDS/ÂΜL (ref 0.6–4.47)
LYMPHOCYTES NFR BLD AUTO: 31 % (ref 14–44)
MCH RBC QN AUTO: 32.3 PG (ref 26.8–34.3)
MCHC RBC AUTO-ENTMCNC: 32.9 G/DL (ref 31.4–37.4)
MCV RBC AUTO: 98 FL (ref 82–98)
MONOCYTES # BLD AUTO: 0.45 THOUSAND/ÂΜL (ref 0.17–1.22)
MONOCYTES NFR BLD AUTO: 10 % (ref 4–12)
NEUTROPHILS # BLD AUTO: 2.45 THOUSANDS/ÂΜL (ref 1.85–7.62)
NEUTS SEG NFR BLD AUTO: 54 % (ref 43–75)
NRBC BLD AUTO-RTO: 0 /100 WBCS
PLATELET # BLD AUTO: 131 THOUSANDS/UL (ref 149–390)
PMV BLD AUTO: 10.9 FL (ref 8.9–12.7)
POTASSIUM SERPL-SCNC: 3.9 MMOL/L (ref 3.5–5.3)
PROT SERPL-MCNC: 7.1 G/DL (ref 6.4–8.4)
PSA SERPL-MCNC: 0.86 NG/ML (ref 0–4)
RBC # BLD AUTO: 4.3 MILLION/UL (ref 3.88–5.62)
SODIUM SERPL-SCNC: 140 MMOL/L (ref 135–147)
TRIGL SERPL-MCNC: 59 MG/DL
WBC # BLD AUTO: 4.61 THOUSAND/UL (ref 4.31–10.16)

## 2023-10-30 PROCEDURE — 80053 COMPREHEN METABOLIC PANEL: CPT

## 2023-10-30 PROCEDURE — 83036 HEMOGLOBIN GLYCOSYLATED A1C: CPT

## 2023-10-30 PROCEDURE — 80061 LIPID PANEL: CPT

## 2023-10-30 PROCEDURE — G0103 PSA SCREENING: HCPCS

## 2023-10-30 PROCEDURE — 85025 COMPLETE CBC W/AUTO DIFF WBC: CPT

## 2023-10-31 ENCOUNTER — ANTICOAG VISIT (OUTPATIENT)
Dept: CARDIOLOGY CLINIC | Facility: CLINIC | Age: 73
End: 2023-10-31

## 2023-11-15 ENCOUNTER — OFFICE VISIT (OUTPATIENT)
Dept: FAMILY MEDICINE CLINIC | Facility: CLINIC | Age: 73
End: 2023-11-15
Payer: MEDICARE

## 2023-11-15 VITALS
HEART RATE: 56 BPM | TEMPERATURE: 97 F | HEIGHT: 73 IN | BODY MASS INDEX: 30.32 KG/M2 | WEIGHT: 228.8 LBS | DIASTOLIC BLOOD PRESSURE: 78 MMHG | OXYGEN SATURATION: 99 % | SYSTOLIC BLOOD PRESSURE: 110 MMHG

## 2023-11-15 DIAGNOSIS — I10 ESSENTIAL HYPERTENSION: ICD-10-CM

## 2023-11-15 DIAGNOSIS — E11.9 TYPE 2 DIABETES MELLITUS WITHOUT COMPLICATION, WITHOUT LONG-TERM CURRENT USE OF INSULIN (HCC): ICD-10-CM

## 2023-11-15 DIAGNOSIS — Z23 ENCOUNTER FOR IMMUNIZATION: ICD-10-CM

## 2023-11-15 DIAGNOSIS — N18.31 STAGE 3A CHRONIC KIDNEY DISEASE (HCC): Primary | ICD-10-CM

## 2023-11-15 PROCEDURE — G0008 ADMIN INFLUENZA VIRUS VAC: HCPCS

## 2023-11-15 PROCEDURE — 99214 OFFICE O/P EST MOD 30 MIN: CPT | Performed by: FAMILY MEDICINE

## 2023-11-15 PROCEDURE — 90662 IIV NO PRSV INCREASED AG IM: CPT

## 2023-11-15 NOTE — PROGRESS NOTES
Name: Jovani Arias      : 1950      MRN: [id number]  Encounter Provider: Norma Garcia MD  Encounter Date: 11/15/2023   Encounter department: [address] [place] [address]     1. Stage 3a chronic kidney disease [facility])  Assessment & Plan:  Lab Results   Component Value Date    EGFR 56 10/30/2023    EGFR 69 2023    EGFR 91 2023    CREATININE 1.27 10/30/2023    CREATININE 1.08 2023    CREATININE 0.78 2023   New diagnosis finding on recent blood work it can be multifactorial including diabetic obesity and patient on diuretic  discussed the well hydration avoid NSAID  Patient at the goal for the hemoglobin A1c below 7 and at the goal for the blood pressure below 130/80  Plan work-up including a blood work: PTH  Magnesium phosphorus uric acid vitamin D urinalysis for UA and micro and microalbuminuria. Plan for ultrasound of the kidney    Orders:  -     Urinalysis with microscopic; Future; Expected date: 11/15/2023  -     Albumin / creatinine urine ratio; Future; Expected date: 11/15/2023  -     PTH, intact; Future; Expected date: 11/15/2023  -     Vitamin D 25 hydroxy; Future; Expected date: 11/15/2023  -     Phosphorus; Future; Expected date: 11/15/2023  -     Uric acid; Future; Expected date: 11/15/2023  -     Magnesium; Future; Expected date: 11/15/2023  -     US kidney and bladder; Future; Expected date: 11/15/2023    2. Type 2 diabetes mellitus without complication, without long-term current use of insulin ([facility])  Assessment & Plan:    Lab Results   Component Value Date    HGBA1C 6.1 (H) 10/30/2023   Chronic asymptomatic hemoglobin A1c fair control continue current management patient he is following up low-carb diet encouraged patient to continue well continue to monitor his blood sugar he is already on statin and ACE inhibitor      3.  Encounter for immunization  Comments:  Benefit versus side effect of flu shot reviewed with the patient  Orders:  -     FLUZONE HIGH-DOSE: influenza vaccine, high-dose, preservative-free 0.7 mL    4. Essential hypertension  Assessment & Plan:  Chronic asymptomatic fair control continue current management lisinopril 10 mg he is on metoprolol 25 mg 1 tablet twice a day low-salt diet important lose weight reviewed          Depression Screening and Follow-up Plan: Patient was screened for depression during today's encounter. They screened negative with a PHQ-2 score of 0. Subjective      Patient here follow-up with a chronic condition compliant with the medication tolerated well without side effect no new concerns recent blood work discussed with the patient      Review of Systems   Constitutional:  Negative for chills and fever. HENT:  Negative for ear pain and sore throat. Eyes:  Negative for pain and visual disturbance. Respiratory:  Negative for cough and shortness of breath. Cardiovascular:  Negative for chest pain and palpitations. Gastrointestinal:  Negative for abdominal pain, blood in stool, constipation, diarrhea and vomiting. Genitourinary:  Negative for dysuria and hematuria. Musculoskeletal:  Negative for arthralgias and back pain. Skin:  Negative for color change and rash. Neurological:  Negative for seizures and syncope. Hematological:  Does not bruise/bleed easily. Psychiatric/Behavioral:  Negative for behavioral problems. All other systems reviewed and are negative.       Current Outpatient Medications on File Prior to Visit   Medication Sig   • aspirin (ECOTRIN LOW STRENGTH) 81 mg EC tablet take 1 tablet (81MG)   • atorvastatin (LIPITOR) 20 mg tablet TAKE 1 TABLET DAILY   • ferrous sulfate 324 (65 Fe) mg TAKE 1 TABLET (324 MG TOTAL) BY MOUTH DAILY BEFORE BREAKFAST   • finasteride (PROSCAR) 5 mg tablet TAKE 1 TABLET DAILY   • lisinopril (ZESTRIL) 10 mg tablet Take 1 tablet (10 mg total) by mouth daily   • metoprolol tartrate (LOPRESSOR) 25 mg tablet Take 1 tablet (25 mg total) by mouth every 12 (twelve) hours   • potassium chloride (K-DUR,KLOR-CON) 20 mEq tablet Take 1 tablet (20 mEq total) by mouth daily Unless otherwise directed. • tamsulosin (FLOMAX) 0.4 mg TAKE 1 CAPSULE DAILY WITH  DINNER   • torsemide (DEMADEX) 20 mg tablet Take 1 tablet (20 mg total) by mouth daily   • warfarin (Coumadin) 2.5 mg tablet Take 3-4 tablet daily as directed by MD  (7.5 to 10 mg daily)       Objective     /78 (BP Location: Left arm, Patient Position: Sitting)   Pulse 56   Temp (!) 97 °F (36.1 °C) (Tympanic)   Ht 6' 1" (1.854 m)   Wt 104 kg (228 lb 12.8 oz)   SpO2 99%   BMI 30.19 kg/m²     Physical Exam  Vitals and nursing note reviewed. Constitutional:       General: He is not in acute distress. Appearance: Normal appearance. He is well-developed. He is not diaphoretic. HENT:      Head: Normocephalic. Right Ear: Tympanic membrane, ear canal and external ear normal.      Left Ear: Tympanic membrane, ear canal and external ear normal.      Nose: Nose normal. No congestion or rhinorrhea. Mouth/Throat:      Mouth: Mucous membranes are moist.      Pharynx: Oropharynx is clear. No oropharyngeal exudate or posterior oropharyngeal erythema. Eyes:      General:         Right eye: No discharge. Left eye: No discharge. Conjunctiva/sclera: Conjunctivae normal.   Neck:      Vascular: No JVD. Cardiovascular:      Rate and Rhythm: Normal rate and regular rhythm. Heart sounds: Murmur heard. No gallop. Pulmonary:      Effort: Pulmonary effort is normal. No respiratory distress. Breath sounds: Normal breath sounds. No stridor. No wheezing or rales. Chest:      Chest wall: No tenderness. Abdominal:      General: There is no distension. Palpations: Abdomen is soft. There is no mass. Tenderness: There is no abdominal tenderness. There is no rebound. Musculoskeletal:         General: Normal range of motion.       Cervical back: Normal range of motion and neck supple. Lymphadenopathy:      Cervical: No cervical adenopathy. Skin:     General: Skin is warm. Neurological:      Mental Status: He is alert and oriented to person, place, and time.        Devika Zee MD

## 2023-11-15 NOTE — ASSESSMENT & PLAN NOTE
Chronic asymptomatic fair control continue current management lisinopril 10 mg he is on metoprolol 25 mg 1 tablet twice a day low-salt diet important lose weight reviewed

## 2023-11-15 NOTE — ASSESSMENT & PLAN NOTE
Lab Results   Component Value Date    HGBA1C 6.1 (H) 10/30/2023   Chronic asymptomatic hemoglobin A1c fair control continue current management patient he is following up low-carb diet encouraged patient to continue well continue to monitor his blood sugar he is already on statin and ACE inhibitor

## 2023-11-21 ENCOUNTER — APPOINTMENT (OUTPATIENT)
Dept: LAB | Facility: CLINIC | Age: 73
End: 2023-11-21
Payer: MEDICARE

## 2023-11-21 DIAGNOSIS — N18.31 STAGE 3A CHRONIC KIDNEY DISEASE (HCC): ICD-10-CM

## 2023-11-21 LAB
25(OH)D3 SERPL-MCNC: 32 NG/ML (ref 30–100)
BACTERIA UR QL AUTO: ABNORMAL /HPF
BILIRUB UR QL STRIP: NEGATIVE
CLARITY UR: CLEAR
COLOR UR: YELLOW
CREAT UR-MCNC: 148.1 MG/DL
GLUCOSE UR STRIP-MCNC: NEGATIVE MG/DL
HGB UR QL STRIP.AUTO: NEGATIVE
KETONES UR STRIP-MCNC: NEGATIVE MG/DL
LEUKOCYTE ESTERASE UR QL STRIP: NEGATIVE
MAGNESIUM SERPL-MCNC: 2.3 MG/DL (ref 1.9–2.7)
MICROALBUMIN UR-MCNC: <7 MG/L
MICROALBUMIN/CREAT 24H UR: <5 MG/G CREATININE (ref 0–30)
NITRITE UR QL STRIP: NEGATIVE
NON-SQ EPI CELLS URNS QL MICRO: ABNORMAL /HPF
PH UR STRIP.AUTO: 6 [PH]
PHOSPHATE SERPL-MCNC: 3.1 MG/DL (ref 2.3–4.1)
PROT UR STRIP-MCNC: ABNORMAL MG/DL
PTH-INTACT SERPL-MCNC: 51.3 PG/ML (ref 12–88)
RBC #/AREA URNS AUTO: ABNORMAL /HPF
SP GR UR STRIP.AUTO: 1.02 (ref 1–1.03)
URATE SERPL-MCNC: 6.6 MG/DL (ref 3.5–8.5)
UROBILINOGEN UR STRIP-ACNC: <2 MG/DL
WBC #/AREA URNS AUTO: ABNORMAL /HPF

## 2023-11-21 PROCEDURE — 81001 URINALYSIS AUTO W/SCOPE: CPT

## 2023-11-21 PROCEDURE — 82570 ASSAY OF URINE CREATININE: CPT

## 2023-11-21 PROCEDURE — 82306 VITAMIN D 25 HYDROXY: CPT

## 2023-11-21 PROCEDURE — 82043 UR ALBUMIN QUANTITATIVE: CPT

## 2023-11-21 PROCEDURE — 84550 ASSAY OF BLOOD/URIC ACID: CPT

## 2023-11-21 PROCEDURE — 84100 ASSAY OF PHOSPHORUS: CPT

## 2023-11-21 PROCEDURE — 83970 ASSAY OF PARATHORMONE: CPT

## 2023-11-21 PROCEDURE — 83735 ASSAY OF MAGNESIUM: CPT

## 2023-11-22 ENCOUNTER — ANTICOAG VISIT (OUTPATIENT)
Dept: CARDIOLOGY CLINIC | Facility: CLINIC | Age: 73
End: 2023-11-22

## 2023-11-24 ENCOUNTER — HOSPITAL ENCOUNTER (OUTPATIENT)
Dept: ULTRASOUND IMAGING | Facility: HOSPITAL | Age: 73
Discharge: HOME | End: 2023-11-24
Payer: MEDICARE

## 2023-11-24 DIAGNOSIS — N18.31 STAGE 3A CHRONIC KIDNEY DISEASE (HCC): ICD-10-CM

## 2023-11-24 PROCEDURE — 76775 US EXAM ABDO BACK WALL LIM: CPT

## 2023-11-25 DIAGNOSIS — E78.2 MIXED HYPERLIPIDEMIA: ICD-10-CM

## 2023-11-27 RX ORDER — ATORVASTATIN CALCIUM 20 MG/1
TABLET, FILM COATED ORAL
Qty: 90 TABLET | Refills: 1 | Status: SHIPPED | OUTPATIENT
Start: 2023-11-27

## 2023-11-30 ENCOUNTER — TELEPHONE (OUTPATIENT)
Dept: FAMILY MEDICINE CLINIC | Facility: CLINIC | Age: 73
End: 2023-11-30

## 2023-11-30 DIAGNOSIS — N28.1 KIDNEY CYSTS: Primary | ICD-10-CM

## 2023-11-30 NOTE — TELEPHONE ENCOUNTER
----- Message from Isaías Russell MD sent at 11/30/2023  7:33 AM EST -----  Small simple cyst left kidney recommend to continue watch it will repeat US in one year

## 2023-12-01 DIAGNOSIS — N40.0 BENIGN PROSTATIC HYPERPLASIA, UNSPECIFIED WHETHER LOWER URINARY TRACT SYMPTOMS PRESENT: ICD-10-CM

## 2023-12-01 RX ORDER — FINASTERIDE 5 MG/1
TABLET, FILM COATED ORAL
Qty: 90 TABLET | Refills: 0 | Status: SHIPPED | OUTPATIENT
Start: 2023-12-01

## 2023-12-03 DIAGNOSIS — Z98.890 S/P MITRAL VALVE REPAIR: ICD-10-CM

## 2023-12-03 DIAGNOSIS — Z98.890 S/P LEFT ATRIAL APPENDAGE LIGATION: ICD-10-CM

## 2023-12-04 RX ORDER — WARFARIN SODIUM 2.5 MG/1
TABLET ORAL
Qty: 360 TABLET | Refills: 1 | Status: SHIPPED | OUTPATIENT
Start: 2023-12-04

## 2023-12-05 ENCOUNTER — APPOINTMENT (OUTPATIENT)
Dept: LAB | Facility: CLINIC | Age: 73
End: 2023-12-05
Payer: MEDICARE

## 2023-12-06 ENCOUNTER — ANTICOAG VISIT (OUTPATIENT)
Dept: CARDIOLOGY CLINIC | Facility: CLINIC | Age: 73
End: 2023-12-06

## 2023-12-21 ENCOUNTER — TELEPHONE (OUTPATIENT)
Dept: FAMILY MEDICINE CLINIC | Facility: CLINIC | Age: 73
End: 2023-12-21

## 2023-12-21 NOTE — TELEPHONE ENCOUNTER
Spoke to someone at [facility] eye [facility]. She will be faxing over the diabetic eye exam notes within the next week.

## 2023-12-25 DIAGNOSIS — E61.1 LOW IRON: ICD-10-CM

## 2023-12-26 DIAGNOSIS — N40.0 BENIGN PROSTATIC HYPERPLASIA, UNSPECIFIED WHETHER LOWER URINARY TRACT SYMPTOMS PRESENT: ICD-10-CM

## 2023-12-26 RX ORDER — FERROUS SULFATE 324(65)MG
324 TABLET, DELAYED RELEASE (ENTERIC COATED) ORAL
Qty: 90 TABLET | Refills: 0 | Status: SHIPPED | OUTPATIENT
Start: 2023-12-26

## 2023-12-26 RX ORDER — TAMSULOSIN HYDROCHLORIDE 0.4 MG/1
CAPSULE ORAL
Qty: 90 CAPSULE | Refills: 0 | Status: SHIPPED | OUTPATIENT
Start: 2023-12-26

## 2023-12-28 ENCOUNTER — APPOINTMENT (OUTPATIENT)
Dept: LAB | Facility: CLINIC | Age: 73
End: 2023-12-28
Payer: MEDICARE

## 2023-12-29 ENCOUNTER — ANTICOAG VISIT (OUTPATIENT)
Dept: CARDIOLOGY CLINIC | Facility: CLINIC | Age: 73
End: 2023-12-29

## 2024-01-02 ENCOUNTER — TELEMEDICINE (OUTPATIENT)
Dept: FAMILY MEDICINE CLINIC | Facility: CLINIC | Age: 74
End: 2024-01-02
Payer: MEDICARE

## 2024-01-02 DIAGNOSIS — U07.1 COVID-19 VIRUS INFECTION: Primary | ICD-10-CM

## 2024-01-02 PROCEDURE — 99214 OFFICE O/P EST MOD 30 MIN: CPT | Performed by: FAMILY MEDICINE

## 2024-01-02 RX ORDER — NIRMATRELVIR AND RITONAVIR 150-100 MG
2 KIT ORAL 2 TIMES DAILY
Qty: 20 TABLET | Refills: 0 | Status: SHIPPED | OUTPATIENT
Start: 2024-01-02 | End: 2024-01-07

## 2024-01-02 NOTE — PROGRESS NOTES
COVID-19 Outpatient Progress Note    Assessment/Plan:    Problem List Items Addressed This Visit     COVID-19 virus infection - Primary     Acute symptomatic started symptoms December 30, 2023 he had tested positive for COVID in December 31 patient has symptoms of fatigue chills body ache cough runny nose no chest pain no short of breath no dyspnea on exertion patient may consider higher risk he is candidate for Paxlovid I discussed with the patient benefit versus side effect and adjust medication with the Paxlovid including hold atorvastatin for 10 days and we will plan to check a PT/INR and third day of the dose patient would like to have the medication sent to the pharmacy but he did not make the decision if he wanted take it or not yet recommend supportive treatment vitamin D vitamin C and zinc well hydration keep monitor temperature oxygen level decreases to quit worse to call the office  COVID-19 Home Care Guidelines    Your healthcare provider and/or public health staff have evaluated you and have determined that you do not need to remain in the hospital at this time.  At this time you can be isolated at home where you will be monitored by staff from your local or state health department. You should carefully follow the prevention and isolation steps below until a healthcare provider or local or state health department says that you can return to your normal activities.      Stay home except to get medical care    People who are mildly ill with COVID-19 are able to isolate at home during their illness. You should restrict activities outside your home, except for getting medical care. Do not go to work, school, or public areas. Avoid using public transportation, ride-sharing, or taxis.    Separate yourself from other people and animals in your home    People: As much as possible, you should stay in a specific room and away from other people in your home. Also, you should use a separate bathroom, if  available.  Animals: You should restrict contact with pets and other animals while you are sick with COVID-19, just like you would around other people. Although there have not been reports of pets or other animals becoming sick with COVID-19, it is still recommended that people sick with COVID-19 limit contact with animals until more information is known about the virus. When possible, have another member of your household care for your animals while you are sick. If you are sick with COVID-19, avoid contact with your pet, including petting, snuggling, being kissed or licked, and sharing food. If you must care for your pet or be around animals while you are sick, wash your hands before and after you interact with pets and wear a facemask. See COVID-19 and Animals for more information.    Call ahead before visiting your doctor    If you have a medical appointment, call the healthcare provider and tell them that you have or may have COVID-19. This will help the healthcare provider’s office take steps to keep other people from getting infected or exposed.    Wear a facemask    You should wear a facemask when you are around other people (e.g., sharing a room or vehicle) or pets and before you enter a healthcare provider’s office. If you are not able to wear a facemask (for example, because it causes trouble breathing), then people who live with you should not stay in the same room with you, or they should wear a facemask if they enter your room.    Cover your coughs and sneezes    Cover your mouth and nose with a tissue when you cough or sneeze. Throw used tissues in a lined trash can. Immediately wash your hands with soap and water for at least 20 seconds or, if soap and water are not available, clean your hands with an alcohol-based hand  that contains at least 60% alcohol.    Clean your hands often    Wash your hands often with soap and water for at least 20 seconds, especially after blowing your nose,  coughing, or sneezing; going to the bathroom; and before eating or preparing food. If soap and water are not readily available, use an alcohol-based hand  with at least 60% alcohol, covering all surfaces of your hands and rubbing them together until they feel dry.  Soap and water are the best option if hands are visibly dirty. Avoid touching your eyes, nose, and mouth with unwashed hands.    Avoid sharing personal household items    You should not share dishes, drinking glasses, cups, eating utensils, towels, or bedding with other people or pets in your home. After using these items, they should be washed thoroughly with soap and water.    Clean all “high-touch” surfaces everyday    High touch surfaces include counters, tabletops, doorknobs, bathroom fixtures, toilets, phones, keyboards, tablets, and bedside tables. Also, clean any surfaces that may have blood, stool, or body fluids on them. Use a household cleaning spray or wipe, according to the label instructions. Labels contain instructions for safe and effective use of the cleaning product including precautions you should take when applying the product, such as wearing gloves and making sure you have good ventilation during use of the product.    Monitor your symptoms    Seek prompt medical attention if your illness is worsening (e.g., difficulty breathing). Before seeking care, call your healthcare provider and tell them that you have, or are being evaluated for, COVID-19. Put on a facemask before you enter the facility. These steps will help the healthcare provider’s office to keep other people in the office or waiting room from getting infected or exposed. Ask your healthcare provider to call the local or [facility] health department. Persons who are placed under active monitoring or facilitated self-monitoring should follow instructions provided by their local health department or occupational health professionals, as appropriate.  If you have a medical  emergency and need to call 911, notify the dispatch personnel that you have, or are being evaluated for COVID-19. If possible, put on a facemask before emergency medical services arrive.    Discontinuing home isolation    Patients with confirmed COVID-19 should remain under home isolation precautions until the following conditions are met:   They have had no fever for at least 24 hours (that is one full day of no fever without the use medicine that reduces fevers)  AND  other symptoms have improved (for example, when their cough or shortness of breath have improved)  AND  If had mild or moderate illness, at least 10 days have passed since their symptoms first appeared or if severe illness (needed oxygen) or immunosuppressed, at least 20 days have passed since symptoms first appeared  Patients with confirmed COVID-19 should also notify close contacts (including their workplace) and ask that they self-quarantine. Currently, close contact is defined as being within 6 feet for 15 minutes or more from the period 24 hours starting 48 hours before symptom onset to the time at which the patient went into isolation.  Close contacts of patients diagnosed with COVID-19 should be instructed by the patient to self-quarantine for 14 days from the last time of their last contact with the patient.     Source: https://www.cdc.gov/coronavirus/2019-ncov/hcp/guidance-prevent-spread.html          Relevant Medications    nirmatrelvir & ritonavir (Paxlovid, 150/100,) tablet therapy pack        Disposition:     Discussed symptom directed medication options with patient. Discussed vitamin D, vitamin C, and/or zinc supplementation with patient.     I have spent a total time of 15 minutes on the day of the encounter for this patient including discussing diagnostic results, risks and benefits of treatment options and patient and family education.       Encounter provider: Monalisa Anglin MD     Provider located at: [facility]  [facility]  [address]  [address]  [place] [address]  [phone]     Recent Visits  Date Type Provider Dept   01/02/24 Telemedicine Monalisa Anglin MD [facility] [place]   Showing recent visits within past 7 days and meeting all other requirements  Future Appointments  No visits were found meeting these conditions.  Showing future appointments within next 150 days and meeting all other requirements     This virtual check-in was done via [employer] and patient was informed that this is a secure, HIPAA-compliant platform. He agrees to proceed.    Patient agrees to participate in a virtual check in via telephone or video visit instead of presenting to the office to address urgent/immediate medical needs. Patient is aware this is a billable service. He acknowledged consent and understanding of privacy and security of the video platform. The patient has agreed to participate and understands they can discontinue the visit at any time.    After connecting through [employer], the patient was identified by name and date of birth. Balbir Mccray was informed that this was a telemedicine visit and that the exam was being conducted confidentially over secure lines. My office door was closed. No one else was in the room. Balbir Mccray acknowledged consent and understanding of privacy and security of the telemedicine visit. I informed the patient that I have reviewed his record in Epic and presented the opportunity for him to ask any questions regarding the visit today. The patient agreed to participate.     Verification of patient location:  Patient is located in the following state in which I hold an active license: [place]    Subjective:   Balbir Mccray is a 73 y.o. male who is concerned about COVID-19. Patient's symptoms include chills, fatigue, malaise, nasal congestion, rhinorrhea and cough. Patient denies fever, sore throat, anosmia, loss of taste, shortness of  breath, chest tightness, abdominal pain, nausea, vomiting, diarrhea, myalgias and headaches.     - Date of symptom onset: 12/30/2023      COVID-19 vaccination status: Fully vaccinated with booster    Exposure:   Contact with a person who is under investigation (PUI) for or who is positive for COVID-19 within the last 14 days?: No    Hospitalized recently for fever and/or lower respiratory symptoms?: No      Currently a healthcare worker that is involved in direct patient care?: No      Works in a special setting where the risk of COVID-19 transmission may be high? (this may include long-term care, correctional and prison facilities; homeless shelters; assisted-living facilities and group homes.): No      Resident in a special setting where the risk of COVID-19 transmission may be high? (this may include long-term care, correctional and prison facilities; homeless shelters; assisted-living facilities and group homes.): No      Lab Results   Component Value Date    SARSCOV2 Negative 12/02/2021       Review of Systems   Constitutional:  Positive for chills and fatigue. Negative for fever.   HENT:  Positive for congestion and rhinorrhea. Negative for sore throat.    Respiratory:  Positive for cough. Negative for chest tightness and shortness of breath.    Gastrointestinal:  Negative for abdominal pain, diarrhea, nausea and vomiting.   Musculoskeletal:  Negative for myalgias.   Neurological:  Negative for headaches.     Current Outpatient Medications on File Prior to Visit   Medication Sig   • aspirin (ECOTRIN LOW STRENGTH) 81 mg EC tablet take 1 tablet (81MG)   • atorvastatin (LIPITOR) 20 mg tablet TAKE 1 TABLET DAILY   • ferrous sulfate 324 (65 Fe) mg TAKE 1 TABLET (324 MG TOTAL) BY MOUTH DAILY BEFORE BREAKFAST   • finasteride (PROSCAR) 5 mg tablet TAKE 1 TABLET DAILY   • lisinopril (ZESTRIL) 10 mg tablet Take 1 tablet (10 mg total) by mouth daily   • metoprolol tartrate (LOPRESSOR) 25 mg tablet Take 1 tablet (25  mg total) by mouth every 12 (twelve) hours   • potassium chloride (K-DUR,KLOR-CON) 20 mEq tablet Take 1 tablet (20 mEq total) by mouth daily Unless otherwise directed.   • tamsulosin (FLOMAX) 0.4 mg TAKE 1 CAPSULE DAILY WITH  DINNER   • torsemide (DEMADEX) 20 mg tablet Take 1 tablet (20 mg total) by mouth daily   • warfarin (COUMADIN) 2.5 mg tablet TAKE 3-4 TABLETS DAILY AS DIRECTED BY MD (7.5 TO 10 MG DAILY)       Objective:    There were no vitals taken for this visit.       Physical Exam  Vitals and nursing note reviewed.   Constitutional:       General: He is not in acute distress.     Appearance: He is well-developed.   HENT:      Head: Normocephalic and atraumatic.   Eyes:      Conjunctiva/sclera: Conjunctivae normal.   Cardiovascular:      Rate and Rhythm: Normal rate and regular rhythm.      Heart sounds: No murmur heard.  Pulmonary:      Effort: No respiratory distress.   Abdominal:      Tenderness: There is no abdominal tenderness.   Musculoskeletal:      Cervical back: Neck supple.   Neurological:      Mental Status: He is alert.       Monalisa Anglin MD

## 2024-01-04 PROBLEM — U07.1 COVID-19 VIRUS INFECTION: Status: ACTIVE | Noted: 2024-01-04

## 2024-01-04 NOTE — ASSESSMENT & PLAN NOTE
Acute symptomatic started symptoms December 30, 2023 he had tested positive for COVID in December 31 patient has symptoms of fatigue chills body ache cough runny nose no chest pain no short of breath no dyspnea on exertion patient may consider higher risk he is candidate for Paxlovid I discussed with the patient benefit versus side effect and adjust medication with the Paxlovid including hold atorvastatin for 10 days and we will plan to check a PT/INR and third day of the dose patient would like to have the medication sent to the pharmacy but he did not make the decision if he wanted take it or not yet recommend supportive treatment vitamin D vitamin C and zinc well hydration keep monitor temperature oxygen level decreases to quit worse to call the office  COVID-19 Home Care Guidelines    Your healthcare provider and/or public health staff have evaluated you and have determined that you do not need to remain in the hospital at this time.  At this time you can be isolated at home where you will be monitored by staff from your local or state health department. You should carefully follow the prevention and isolation steps below until a healthcare provider or local or state health department says that you can return to your normal activities.      Stay home except to get medical care    People who are mildly ill with COVID-19 are able to isolate at home during their illness. You should restrict activities outside your home, except for getting medical care. Do not go to work, school, or public areas. Avoid using public transportation, ride-sharing, or taxis.    Separate yourself from other people and animals in your home    People: As much as possible, you should stay in a specific room and away from other people in your home. Also, you should use a separate bathroom, if available.  Animals: You should restrict contact with pets and other animals while you are sick with COVID-19, just like you would around other people.  Although there have not been reports of pets or other animals becoming sick with COVID-19, it is still recommended that people sick with COVID-19 limit contact with animals until more information is known about the virus. When possible, have another member of your household care for your animals while you are sick. If you are sick with COVID-19, avoid contact with your pet, including petting, snuggling, being kissed or licked, and sharing food. If you must care for your pet or be around animals while you are sick, wash your hands before and after you interact with pets and wear a facemask. See COVID-19 and Animals for more information.    Call ahead before visiting your doctor    If you have a medical appointment, call the healthcare provider and tell them that you have or may have COVID-19. This will help the healthcare provider’s office take steps to keep other people from getting infected or exposed.    Wear a facemask    You should wear a facemask when you are around other people (e.g., sharing a room or vehicle) or pets and before you enter a healthcare provider’s office. If you are not able to wear a facemask (for example, because it causes trouble breathing), then people who live with you should not stay in the same room with you, or they should wear a facemask if they enter your room.    Cover your coughs and sneezes    Cover your mouth and nose with a tissue when you cough or sneeze. Throw used tissues in a lined trash can. Immediately wash your hands with soap and water for at least 20 seconds or, if soap and water are not available, clean your hands with an alcohol-based hand  that contains at least 60% alcohol.    Clean your hands often    Wash your hands often with soap and water for at least 20 seconds, especially after blowing your nose, coughing, or sneezing; going to the bathroom; and before eating or preparing food. If soap and water are not readily available, use an alcohol-based hand   with at least 60% alcohol, covering all surfaces of your hands and rubbing them together until they feel dry.  Soap and water are the best option if hands are visibly dirty. Avoid touching your eyes, nose, and mouth with unwashed hands.    Avoid sharing personal household items    You should not share dishes, drinking glasses, cups, eating utensils, towels, or bedding with other people or pets in your home. After using these items, they should be washed thoroughly with soap and water.    Clean all “high-touch” surfaces everyday    High touch surfaces include counters, tabletops, doorknobs, bathroom fixtures, toilets, phones, keyboards, tablets, and bedside tables. Also, clean any surfaces that may have blood, stool, or body fluids on them. Use a household cleaning spray or wipe, according to the label instructions. Labels contain instructions for safe and effective use of the cleaning product including precautions you should take when applying the product, such as wearing gloves and making sure you have good ventilation during use of the product.    Monitor your symptoms    Seek prompt medical attention if your illness is worsening (e.g., difficulty breathing). Before seeking care, call your healthcare provider and tell them that you have, or are being evaluated for, COVID-19. Put on a facemask before you enter the facility. These steps will help the healthcare provider’s office to keep other people in the office or waiting room from getting infected or exposed. Ask your healthcare provider to call the local or state health department. Persons who are placed under active monitoring or facilitated self-monitoring should follow instructions provided by their local health department or occupational health professionals, as appropriate.  If you have a medical emergency and need to call 911, notify the dispatch personnel that you have, or are being evaluated for COVID-19. If possible, put on a facemask before  emergency medical services arrive.    Discontinuing home isolation    Patients with confirmed COVID-19 should remain under home isolation precautions until the following conditions are met:   They have had no fever for at least 24 hours (that is one full day of no fever without the use medicine that reduces fevers)  AND  other symptoms have improved (for example, when their cough or shortness of breath have improved)  AND  If had mild or moderate illness, at least 10 days have passed since their symptoms first appeared or if severe illness (needed oxygen) or immunosuppressed, at least 20 days have passed since symptoms first appeared  Patients with confirmed COVID-19 should also notify close contacts (including their workplace) and ask that they self-quarantine. Currently, close contact is defined as being within 6 feet for 15 minutes or more from the period 24 hours starting 48 hours before symptom onset to the time at which the patient went into isolation.  Close contacts of patients diagnosed with COVID-19 should be instructed by the patient to self-quarantine for 14 days from the last time of their last contact with the patient.     Source: https://www.cdc.gov/coronavirus/2019-ncov/hcp/guidance-prevent-spread.html

## 2024-01-05 ENCOUNTER — TELEPHONE (OUTPATIENT)
Dept: FAMILY MEDICINE CLINIC | Facility: CLINIC | Age: 74
End: 2024-01-05

## 2024-01-22 ENCOUNTER — TELEPHONE (OUTPATIENT)
Dept: FAMILY MEDICINE CLINIC | Facility: CLINIC | Age: 74
End: 2024-01-22

## 2024-01-22 NOTE — TELEPHONE ENCOUNTER
I spoke to someone at [facility] eye [facility] and they do not have a current diabetic eye exam on file. Last one was from 2022, which is already in epic. I will contact patient.

## 2024-02-06 ENCOUNTER — APPOINTMENT (OUTPATIENT)
Dept: LAB | Facility: CLINIC | Age: 74
End: 2024-02-06
Payer: MEDICARE

## 2024-02-06 ENCOUNTER — TELEPHONE (OUTPATIENT)
Dept: CARDIOLOGY CLINIC | Facility: CLINIC | Age: 74
End: 2024-02-06

## 2024-02-06 DIAGNOSIS — Z98.890 S/P MITRAL VALVE REPAIR: Primary | ICD-10-CM

## 2024-02-06 DIAGNOSIS — Z98.890 S/P MITRAL VALVE REPAIR: ICD-10-CM

## 2024-02-06 LAB
INR PPP: 1.93 (ref 0.84–1.19)
PROTHROMBIN TIME: 21.7 SECONDS (ref 11.6–14.5)

## 2024-02-06 PROCEDURE — 85610 PROTHROMBIN TIME: CPT

## 2024-02-06 PROCEDURE — 36415 COLL VENOUS BLD VENIPUNCTURE: CPT

## 2024-02-07 ENCOUNTER — ANTICOAG VISIT (OUTPATIENT)
Dept: CARDIOLOGY CLINIC | Facility: CLINIC | Age: 74
End: 2024-02-07

## 2024-02-07 DIAGNOSIS — Z98.890 S/P MITRAL VALVE REPAIR: ICD-10-CM

## 2024-02-07 DIAGNOSIS — Z98.890 S/P LEFT ATRIAL APPENDAGE LIGATION: ICD-10-CM

## 2024-02-07 RX ORDER — WARFARIN SODIUM 2.5 MG/1
TABLET ORAL
Qty: 360 TABLET | Refills: 1 | Status: SHIPPED | OUTPATIENT
Start: 2024-02-07

## 2024-02-29 ENCOUNTER — APPOINTMENT (OUTPATIENT)
Dept: LAB | Facility: CLINIC | Age: 74
End: 2024-02-29
Payer: MEDICARE

## 2024-02-29 ENCOUNTER — OFFICE VISIT (OUTPATIENT)
Dept: SLEEP CENTER | Facility: CLINIC | Age: 74
End: 2024-02-29
Payer: MEDICARE

## 2024-02-29 VITALS
BODY MASS INDEX: 30.48 KG/M2 | SYSTOLIC BLOOD PRESSURE: 110 MMHG | HEART RATE: 64 BPM | HEIGHT: 73 IN | RESPIRATION RATE: 18 BRPM | WEIGHT: 230 LBS | OXYGEN SATURATION: 98 % | DIASTOLIC BLOOD PRESSURE: 80 MMHG

## 2024-02-29 DIAGNOSIS — N40.0 BENIGN PROSTATIC HYPERPLASIA, UNSPECIFIED WHETHER LOWER URINARY TRACT SYMPTOMS PRESENT: ICD-10-CM

## 2024-02-29 DIAGNOSIS — Z98.890 S/P MITRAL VALVE REPAIR: ICD-10-CM

## 2024-02-29 DIAGNOSIS — G47.33 OSA TREATED WITH BIPAP: Primary | ICD-10-CM

## 2024-02-29 LAB
INR PPP: 1.72 (ref 0.84–1.19)
PROTHROMBIN TIME: 19.9 SECONDS (ref 11.6–14.5)

## 2024-02-29 PROCEDURE — 99214 OFFICE O/P EST MOD 30 MIN: CPT | Performed by: PHYSICIAN ASSISTANT

## 2024-02-29 PROCEDURE — 85610 PROTHROMBIN TIME: CPT

## 2024-02-29 PROCEDURE — 36415 COLL VENOUS BLD VENIPUNCTURE: CPT

## 2024-02-29 RX ORDER — FINASTERIDE 5 MG/1
TABLET, FILM COATED ORAL
Qty: 90 TABLET | Refills: 1 | Status: SHIPPED | OUTPATIENT
Start: 2024-02-29

## 2024-02-29 NOTE — ASSESSMENT & PLAN NOTE
Patient has a history of moderate to severe obstructive sleep apnea treated with BiPAP.  His compliance report shows 100% usage greater than 4 hours 100% with a residual AHI of 2.5.  I placed an order for a mask fitting today as he felt some fluttering and air leakage around the sides.  Patient states he put in the serial number for the CPAP and it was not noted to be recalled.  However, upon further investigation we found that it is a recalled machine.  We registered the machine for the recall today and hopefully he will receive a new 1 from [employer] in the near future.  I advised him he should follow-up after he receives the new machine from [employer] so we may ensure that it is set properly.

## 2024-02-29 NOTE — PROGRESS NOTES
Progress Note - [facility] [place]   Balbir Mccray 73 y.o. male   :1950, MRN: [id number]  2024          Follow Up Evaluation / Problem:     Obstructive Sleep Apnea      Thank you for the opportunity of participating in the evaluation and care of this patient in the Sleep Clinic at [facility].      HPI: Balbir Mccray is a 73 y.o. year old male.  The patient presents for follow up of obstructive sleep apnea. The patient was diagnosis with moderate to severe obstructive sleep apnea with hypoxemia on 2020 with a diagnostic sleep study.  He then had a CPAP study on 2021.  He was set up with BiPAP therapy on 2021 with a maximum IPAP of 20 and a minimum EPAP of 16 with a full face mask.  He then had a pressure adjustment on 2021 to a max IPAP of 20 and a minimum EPAP of 9 and max pressure support of 5.  He presents today to review his compliance and effectiveness of treatment.         Current Outpatient Medications:     aspirin (ECOTRIN LOW STRENGTH) 81 mg EC tablet, take 1 tablet (81MG), Disp: , Rfl:     atorvastatin (LIPITOR) 20 mg tablet, TAKE 1 TABLET DAILY, Disp: 90 tablet, Rfl: 1    ferrous sulfate 324 (65 Fe) mg, TAKE 1 TABLET (324 MG TOTAL) BY MOUTH DAILY BEFORE BREAKFAST, Disp: 90 tablet, Rfl: 0    finasteride (PROSCAR) 5 mg tablet, TAKE 1 TABLET DAILY, Disp: 90 tablet, Rfl: 1    lisinopril (ZESTRIL) 10 mg tablet, Take 1 tablet (10 mg total) by mouth daily, Disp: 90 tablet, Rfl: 2    metoprolol tartrate (LOPRESSOR) 25 mg tablet, Take 1 tablet (25 mg total) by mouth every 12 (twelve) hours, Disp: 180 tablet, Rfl: 2    potassium chloride (K-DUR,KLOR-CON) 20 mEq tablet, Take 1 tablet (20 mEq total) by mouth daily Unless otherwise directed., Disp: 90 tablet, Rfl: 3    tamsulosin (FLOMAX) 0.4 mg, TAKE 1 CAPSULE DAILY WITH  DINNER, Disp: 90 capsule, Rfl: 0    torsemide (DEMADEX) 20 mg tablet, Take 1 tablet (20 mg total) by mouth  "daily, Disp: 90 tablet, Rfl: 3    warfarin (COUMADIN) 2.5 mg tablet, TAKE 3-4 TABLETS DAILY AS DIRECTED BY MD (7.5 TO 10 MG DAILY), Disp: 360 tablet, Rfl: 1    How likely are you to doze off or fall asleep in the following situations, in contrast to feeling just tired?  Sitting and reading: Slight chance of dozing  Watching TV: Slight chance of dozing  Sitting, inactive in a public place (e.g. a theatre or a meeting): Slight chance of dozing  As a passenger in a car for an hour without a break: Slight chance of dozing  Lying down to rest in the afternoon when circumstances permit: Moderate chance of dozing  Sitting and talking to someone: Would never doze  Sitting quietly after a lunch without alcohol: Slight chance of dozing  In a car, while stopped for a few minutes in traffic: Would never doze  Total score: 7              Vitals:    02/29/24 1301   BP: 110/80   Pulse: 64   Resp: 18   SpO2: 98%   Weight: 104 kg (230 lb)   Height: 6' 1\" (1.854 m)       Body mass index is 30.34 kg/m².            EPWORTH SLEEPINESS SCORE  Total score: 7      Past History Since Last Sleep Center Visit:     Mitral valve repair May 2023   Mask fluttering on sides     The patient reports that he cleans the equipment appropriately and changes supplies on a regular basis.    The review of systems and following portions of the patient's history were reviewed and updated as appropriate: allergies, current medications, past family history, past medical history, past social history, past surgical history, and problem list.        OBJECTIVE  Equipment set up date:  03/02/2021, recalled and not registered, registered for recall today  PAP Pressure: Nasal BiPAP set to deliver 20 cm of IPAP and 9 cm of EPAP, max pressure support of 5  Type of mask used: full face  DME Provider: [facility]  Denies aerophagia or AM headaches    Physical Exam:     General Appearance:   Alert, cooperative, no distress, appears stated age         ASSESSMENT / " PLAN    1. HUMBERTO treated with BiPAP  Assessment & Plan:  Patient has a history of moderate to severe obstructive sleep apnea treated with BiPAP.  His compliance report shows 100% usage greater than 4 hours 100% with a residual AHI of 2.5.  I placed an order for a mask fitting today as he felt some fluttering and air leakage around the sides.  Patient states he put in the serial number for the CPAP and it was not noted to be recalled.  However, upon further investigation we found that it is a recalled machine.  We registered the machine for the recall today and hopefully he will receive a new 1 from [employer] in the near future.  I advised him he should follow-up after he receives the new machine from [employer] so we may ensure that it is set properly.    Orders:  -     Mask fitting only; Future  -     PAP DME Resupply/Reorder             Counseling / Coordination of Care  I have spent a total time of 30 minutes on 02/29/24 in caring for this patient including Diagnostic results, Prognosis, Risks and benefits of tx options, Instructions for management, Patient and family education, Importance of tx compliance, Risk factor reductions, Impressions, Counseling / Coordination of care, Documenting in the medical record, Reviewing / ordering tests, medicine, procedures  , and Obtaining or reviewing history  .      Today I reviewed the patient's compliance data.  he has been able to use the equipment 100% of all days recorded.  Average usage was 4 or more hours 100% of all days recorded.  The patient uses the equipment for an average of 7 hours and 30 minutes per night.  The estimated AHI is 2.5 abnormal breathing events per hour.  The patient feels they benefit from the use of PAP equipment and would like to continue PAP therapy.  Response to treatment has been good.  A prescription for supplies has been provided to last for the next year.    He will continue using this equipment at the settings noted above for the next 12  months.  At that timehe will then return for a routine follow-up evaluation. I have asked the patient to contact the Sleep Disorders Center if he encounters any difficulties prior to that time.    The following instructions have been given to the patient today:    There are no Patient Instructions on file for this visit.      Marilyn Faye PA-C  [facility]

## 2024-03-01 ENCOUNTER — ANTICOAG VISIT (OUTPATIENT)
Dept: CARDIOLOGY CLINIC | Facility: CLINIC | Age: 74
End: 2024-03-01

## 2024-03-01 ENCOUNTER — TELEPHONE (OUTPATIENT)
Dept: SLEEP CENTER | Facility: CLINIC | Age: 74
End: 2024-03-01

## 2024-03-01 NOTE — PROGRESS NOTES
Left message to take 10 mg today return call.pt returned call denies any changes in meds or diet will take 10 MF MF 7.5 others recheck in 2 weeks.

## 2024-03-08 DIAGNOSIS — I10 ESSENTIAL HYPERTENSION: ICD-10-CM

## 2024-03-08 DIAGNOSIS — Z98.890 S/P MITRAL VALVE REPAIR: ICD-10-CM

## 2024-03-08 DIAGNOSIS — Z98.890 S/P LEFT ATRIAL APPENDAGE LIGATION: ICD-10-CM

## 2024-03-08 RX ORDER — LISINOPRIL 10 MG/1
10 TABLET ORAL DAILY
Qty: 90 TABLET | Refills: 0 | Status: SHIPPED | OUTPATIENT
Start: 2024-03-08

## 2024-03-12 ENCOUNTER — RA CDI HCC (OUTPATIENT)
Dept: OTHER | Facility: HOSPITAL | Age: 74
End: 2024-03-12

## 2024-03-12 PROBLEM — Z23 NEED FOR SHINGLES VACCINE: Status: RESOLVED | Noted: 2019-02-04 | Resolved: 2024-03-12

## 2024-03-12 PROBLEM — Z48.89 ENCOUNTER FOR POSTOPERATIVE CARE: Status: RESOLVED | Noted: 2023-04-19 | Resolved: 2024-03-12

## 2024-03-14 ENCOUNTER — APPOINTMENT (OUTPATIENT)
Dept: LAB | Facility: CLINIC | Age: 74
End: 2024-03-14
Payer: MEDICARE

## 2024-03-14 DIAGNOSIS — Z98.890 S/P MITRAL VALVE REPAIR: ICD-10-CM

## 2024-03-14 LAB
INR PPP: 2.27 (ref 0.84–1.19)
PROTHROMBIN TIME: 24.6 SECONDS (ref 11.6–14.5)

## 2024-03-14 PROCEDURE — 85610 PROTHROMBIN TIME: CPT

## 2024-03-14 PROCEDURE — 36415 COLL VENOUS BLD VENIPUNCTURE: CPT

## 2024-03-15 ENCOUNTER — TELEPHONE (OUTPATIENT)
Age: 74
End: 2024-03-15

## 2024-03-15 ENCOUNTER — ANTICOAG VISIT (OUTPATIENT)
Dept: CARDIOLOGY CLINIC | Facility: CLINIC | Age: 74
End: 2024-03-15

## 2024-03-15 DIAGNOSIS — I48.0 PAF (PAROXYSMAL ATRIAL FIBRILLATION) (HCC): Primary | ICD-10-CM

## 2024-03-15 NOTE — TELEPHONE ENCOUNTER
Pt wanted Dr. Anglin to know that he is not able to reschedule the recent appt he cancelled until he gets back home. He had a family emergency.

## 2024-03-19 ENCOUNTER — TELEPHONE (OUTPATIENT)
Dept: FAMILY MEDICINE CLINIC | Facility: CLINIC | Age: 74
End: 2024-03-19

## 2024-03-19 NOTE — TELEPHONE ENCOUNTER
[facility]. I will make sure we have it on file.

## 2024-03-21 DIAGNOSIS — E61.1 LOW IRON: ICD-10-CM

## 2024-03-21 DIAGNOSIS — N40.0 BENIGN PROSTATIC HYPERPLASIA, UNSPECIFIED WHETHER LOWER URINARY TRACT SYMPTOMS PRESENT: ICD-10-CM

## 2024-03-21 RX ORDER — TAMSULOSIN HYDROCHLORIDE 0.4 MG/1
CAPSULE ORAL
Qty: 90 CAPSULE | Refills: 1 | Status: SHIPPED | OUTPATIENT
Start: 2024-03-21

## 2024-03-21 RX ORDER — FERROUS SULFATE 324(65)MG
324 TABLET, DELAYED RELEASE (ENTERIC COATED) ORAL
Qty: 90 TABLET | Refills: 1 | Status: SHIPPED | OUTPATIENT
Start: 2024-03-21

## 2024-04-04 ENCOUNTER — TELEPHONE (OUTPATIENT)
Age: 74
End: 2024-04-04

## 2024-04-04 ENCOUNTER — APPOINTMENT (EMERGENCY)
Dept: LAB | Age: 74
End: 2024-04-04
Payer: MEDICARE

## 2024-04-04 ENCOUNTER — NURSE TRIAGE (OUTPATIENT)
Age: 74
End: 2024-04-04

## 2024-04-04 ENCOUNTER — OFFICE VISIT (OUTPATIENT)
Dept: URGENT CARE | Age: 74
End: 2024-04-04
Payer: MEDICARE

## 2024-04-04 ENCOUNTER — HOSPITAL ENCOUNTER (EMERGENCY)
Facility: HOSPITAL | Age: 74
Discharge: HOME | End: 2024-04-04
Attending: EMERGENCY MEDICINE
Payer: MEDICARE

## 2024-04-04 ENCOUNTER — APPOINTMENT (EMERGENCY)
Dept: CT IMAGING | Facility: HOSPITAL | Age: 74
End: 2024-04-04
Payer: MEDICARE

## 2024-04-04 VITALS
HEART RATE: 70 BPM | OXYGEN SATURATION: 98 % | WEIGHT: 239 LBS | TEMPERATURE: 98.1 F | RESPIRATION RATE: 18 BRPM | DIASTOLIC BLOOD PRESSURE: 88 MMHG | BODY MASS INDEX: 31.53 KG/M2 | SYSTOLIC BLOOD PRESSURE: 144 MMHG

## 2024-04-04 VITALS
TEMPERATURE: 97.7 F | DIASTOLIC BLOOD PRESSURE: 83 MMHG | OXYGEN SATURATION: 99 % | RESPIRATION RATE: 18 BRPM | SYSTOLIC BLOOD PRESSURE: 143 MMHG | HEART RATE: 66 BPM

## 2024-04-04 DIAGNOSIS — R31.9 HEMATURIA, UNSPECIFIED TYPE: Primary | ICD-10-CM

## 2024-04-04 DIAGNOSIS — R31.9 HEMATURIA: Primary | ICD-10-CM

## 2024-04-04 DIAGNOSIS — N39.0 UTI (URINARY TRACT INFECTION): ICD-10-CM

## 2024-04-04 DIAGNOSIS — R31.0 GROSS HEMATURIA: Primary | ICD-10-CM

## 2024-04-04 LAB
ANION GAP SERPL CALCULATED.3IONS-SCNC: 10 MMOL/L (ref 4–13)
BACTERIA UR QL AUTO: ABNORMAL /HPF
BASOPHILS # BLD AUTO: 0.02 THOUSANDS/ÂΜL (ref 0–0.1)
BASOPHILS NFR BLD AUTO: 0 % (ref 0–1)
BILIRUB UR QL STRIP: NEGATIVE
BUN SERPL-MCNC: 20 MG/DL (ref 5–25)
CALCIUM SERPL-MCNC: 9.6 MG/DL (ref 8.4–10.2)
CHLORIDE SERPL-SCNC: 103 MMOL/L (ref 96–108)
CLARITY UR: ABNORMAL
CO2 SERPL-SCNC: 25 MMOL/L (ref 21–32)
COLOR UR: ABNORMAL
CREAT SERPL-MCNC: 1.08 MG/DL (ref 0.6–1.3)
EOSINOPHIL # BLD AUTO: 0.03 THOUSAND/ÂΜL (ref 0–0.61)
EOSINOPHIL NFR BLD AUTO: 0 % (ref 0–6)
ERYTHROCYTE [DISTWIDTH] IN BLOOD BY AUTOMATED COUNT: 12.6 % (ref 11.6–15.1)
GFR SERPL CREATININE-BSD FRML MDRD: 68 ML/MIN/1.73SQ M
GLUCOSE SERPL-MCNC: 117 MG/DL (ref 65–140)
GLUCOSE UR STRIP-MCNC: NEGATIVE MG/DL
HCT VFR BLD AUTO: 40.8 % (ref 36.5–49.3)
HGB BLD-MCNC: 13.7 G/DL (ref 12–17)
HGB UR QL STRIP.AUTO: 250
IMM GRANULOCYTES # BLD AUTO: 0.03 THOUSAND/UL (ref 0–0.2)
IMM GRANULOCYTES NFR BLD AUTO: 0 % (ref 0–2)
INR PPP: 2.08 (ref 0.84–1.19)
KETONES UR STRIP-MCNC: NEGATIVE MG/DL
LEUKOCYTE ESTERASE UR QL STRIP: 100
LYMPHOCYTES # BLD AUTO: 0.84 THOUSANDS/ÂΜL (ref 0.6–4.47)
LYMPHOCYTES NFR BLD AUTO: 13 % (ref 14–44)
MCH RBC QN AUTO: 32.6 PG (ref 26.8–34.3)
MCHC RBC AUTO-ENTMCNC: 33.6 G/DL (ref 31.4–37.4)
MCV RBC AUTO: 97 FL (ref 82–98)
MONOCYTES # BLD AUTO: 0.67 THOUSAND/ÂΜL (ref 0.17–1.22)
MONOCYTES NFR BLD AUTO: 10 % (ref 4–12)
NEUTROPHILS # BLD AUTO: 5.12 THOUSANDS/ÂΜL (ref 1.85–7.62)
NEUTS SEG NFR BLD AUTO: 77 % (ref 43–75)
NITRITE UR QL STRIP: NEGATIVE
NON-SQ EPI CELLS URNS QL MICRO: ABNORMAL /HPF
NRBC BLD AUTO-RTO: 0 /100 WBCS
PH UR STRIP.AUTO: 7 [PH]
PLATELET # BLD AUTO: 119 THOUSANDS/UL (ref 149–390)
PMV BLD AUTO: 10.6 FL (ref 8.9–12.7)
POTASSIUM SERPL-SCNC: 3.9 MMOL/L (ref 3.5–5.3)
PROT UR STRIP-MCNC: ABNORMAL MG/DL
PROTHROMBIN TIME: 23.8 SECONDS (ref 11.6–14.5)
RBC # BLD AUTO: 4.2 MILLION/UL (ref 3.88–5.62)
RBC #/AREA URNS AUTO: ABNORMAL /HPF
SODIUM SERPL-SCNC: 138 MMOL/L (ref 135–147)
SP GR UR STRIP.AUTO: 1.01 (ref 1–1.04)
UROBILINOGEN UA: 1 MG/DL
WBC # BLD AUTO: 6.71 THOUSAND/UL (ref 4.31–10.16)
WBC #/AREA URNS AUTO: ABNORMAL /HPF

## 2024-04-04 PROCEDURE — 81003 URINALYSIS AUTO W/O SCOPE: CPT

## 2024-04-04 PROCEDURE — 80048 BASIC METABOLIC PNL TOTAL CA: CPT

## 2024-04-04 PROCEDURE — 87186 SC STD MICRODIL/AGAR DIL: CPT

## 2024-04-04 PROCEDURE — 87086 URINE CULTURE/COLONY COUNT: CPT

## 2024-04-04 PROCEDURE — 99284 EMERGENCY DEPT VISIT MOD MDM: CPT

## 2024-04-04 PROCEDURE — G0463 HOSPITAL OUTPT CLINIC VISIT: HCPCS | Performed by: EMERGENCY MEDICINE

## 2024-04-04 PROCEDURE — 85025 COMPLETE CBC W/AUTO DIFF WBC: CPT

## 2024-04-04 PROCEDURE — 36415 COLL VENOUS BLD VENIPUNCTURE: CPT

## 2024-04-04 PROCEDURE — 87077 CULTURE AEROBIC IDENTIFY: CPT

## 2024-04-04 PROCEDURE — 96360 HYDRATION IV INFUSION INIT: CPT

## 2024-04-04 PROCEDURE — 74176 CT ABD & PELVIS W/O CONTRAST: CPT

## 2024-04-04 PROCEDURE — 85610 PROTHROMBIN TIME: CPT

## 2024-04-04 PROCEDURE — 99213 OFFICE O/P EST LOW 20 MIN: CPT | Performed by: EMERGENCY MEDICINE

## 2024-04-04 PROCEDURE — 81001 URINALYSIS AUTO W/SCOPE: CPT

## 2024-04-04 RX ORDER — CEFUROXIME AXETIL 500 MG/1
500 TABLET ORAL EVERY 12 HOURS SCHEDULED
Qty: 14 TABLET | Refills: 0 | Status: SHIPPED | OUTPATIENT
Start: 2024-04-04 | End: 2024-04-11

## 2024-04-04 RX ORDER — CEFUROXIME AXETIL 500 MG/1
500 TABLET ORAL EVERY 12 HOURS SCHEDULED
Qty: 14 TABLET | Refills: 0 | Status: SHIPPED | OUTPATIENT
Start: 2024-04-04 | End: 2024-04-04 | Stop reason: RX

## 2024-04-04 RX ADMIN — SODIUM CHLORIDE 250 ML: 0.9 INJECTION, SOLUTION INTRAVENOUS at 14:16

## 2024-04-04 NOTE — ED PROVIDER NOTES
"History  Chief Complaint   Patient presents with    Blood in Urine     Started today with lower right back pain and urine is \"pure blood\"      73-year-old male past medical history of CKD, type 2 diabetes mellitus, hypertension, BPH, ED, nephrolithiasis, chronic back pain status post mitral valve repair on chronic anticoagulation, presents to emergency department complaining of hematuria, dysuria starting this morning and R low back pain (atraumatic) since last night.      History provided by:  Patient  Blood in Urine  This is a new problem. The current episode started today. The problem has been gradually improving since onset. He describes the hematuria as gross hematuria. The hematuria occurs throughout his entire urinary stream. Clotting in stream: patient is unsure when the clots come out but did see small clots. The pain is mild. He describes his urine color as dark red. Irritative symptoms include frequency. Obstructive symptoms do not include incomplete emptying. Associated symptoms include dysuria. Pertinent negatives include no abdominal pain, bladder pain, chills, facial swelling, fever, genital pain, inability to urinate, nausea, urinary retention or vomiting. His past medical history is significant for BPH, hypertension and kidney stones. There is no history of  trauma or recent infection. Risk factors include anticoagulant and aspirin.       Prior to Admission Medications   Prescriptions Last Dose Informant Patient Reported? Taking?   aspirin (ECOTRIN LOW STRENGTH) 81 mg EC tablet  Self Yes No   Sig: take 1 tablet (81MG)   atorvastatin (LIPITOR) 20 mg tablet   No No   Sig: TAKE 1 TABLET DAILY   ferrous sulfate 324 (65 Fe) mg   No No   Sig: TAKE 1 TABLET (324 MG TOTAL) BY MOUTH DAILY BEFORE BREAKFAST   finasteride (PROSCAR) 5 mg tablet   No No   Sig: TAKE 1 TABLET DAILY   lisinopril (ZESTRIL) 10 mg tablet   No No   Sig: Take 1 tablet (10 mg total) by mouth daily   metoprolol tartrate (LOPRESSOR) 25 mg " tablet   No No   Sig: Take 1 tablet (25 mg total) by mouth every 12 (twelve) hours   potassium chloride (K-DUR,KLOR-CON) 20 mEq tablet  Self No No   Sig: Take 1 tablet (20 mEq total) by mouth daily Unless otherwise directed.   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) 03/05/2019    Diverticulitis     ED (erectile dysfunction)     Elevated PSA 01/15/2013    Encounter for postoperative care 04/19/2023    Encounter for well adult exam with abnormal findings 02/04/2019    Herpes zoster 12/18/2014    Hyperlipidemia     Hypertension     IFG (impaired fasting glucose)     Metabolic syndrome     Need for shingles vaccine 02/04/2019    Obesity (BMI 30.0-34.9) 02/04/2019    Psoriasis     Seasonal allergic rhinitis     Stage 3a chronic kidney disease (HCC) 11/15/2023    Type 2 diabetes mellitus (HCC) 11/17/2014       Past Surgical History:   Procedure Laterality Date    CARDIAC CATHETERIZATION N/A 03/03/2023    Procedure: Cardiac Coronary Angiogram;  Surgeon: Joshua Yo DO;  Location: [facility];  Service: Cardiology    CHOLECYSTECTOMY  05/14/2015    COLONOSCOPY  2007    COLONOSCOPY  06/23/2021    HERNIA REPAIR  05/14/2015    NC REPLACEMENT MITRAL VALVE W/CARDIOPULMONARY BYP N/A 3/21/2023    Procedure: MITRAL VALVE REPAIR with Medtronic 32 mm CG Future Annuloplasty Ring; LIGATION OF Left ATRIAL APPENDAGE with 45 mm AtriClip; TANG;  Surgeon: Adriel Xiong DO;  Location: [facility]  MAIN OR;  Service: Cardiac Surgery       Family History   Problem Relation Age of Onset    Skin cancer Mother     Diabetes type II Mother     Hyperlipidemia Mother     Heart disease Father     Thyroid disease unspecified Paternal Aunt      I have reviewed and agree with the history as documented.    E-Cigarette/Vaping    E-Cigarette Use Never User      E-Cigarette/Vaping Substances    Nicotine No     THC No     CBD No     Flavoring No     Other No      Social History     Tobacco Use    Smoking status: Former     Current packs/day: 0.00     Average packs/day: 1 pack/day for 10.3 years (10.3 ttl pk-yrs)     Types: Cigarettes, Pipe     Start date: 1970     Quit date: 1980     Years since quittin.9     Passive exposure: Never    Smokeless tobacco: Never   Vaping Use    Vaping status: Never Used   Substance Use Topics    Alcohol use: Yes     Alcohol/week: 21.0 standard drinks of alcohol     Types: 7 Glasses of wine, 14 Shots of liquor per week     Comment: social    Drug use: Not Currently     Frequency: 1.0 times per week     Types: Marijuana     Comment: less than once a week       Review of Systems   Constitutional:  Negative for chills and fever.   HENT:  Negative for facial swelling.    Respiratory:  Negative for shortness of breath.    Cardiovascular:  Negative for chest pain and palpitations.   Gastrointestinal:  Negative for abdominal pain, nausea and vomiting.   Genitourinary:  Positive for dysuria, frequency and hematuria. Negative for decreased urine volume, difficulty urinating, genital sores, incomplete emptying, penile discharge, penile pain and testicular pain.   Musculoskeletal:  Positive for back pain.   Skin:  Negative for rash.   Neurological:  Negative for dizziness, syncope, weakness and numbness.   All other systems reviewed and are negative.      Physical Exam  Physical Exam  Vitals and nursing note reviewed.   Constitutional:       General: He is awake. He is not in acute distress.      Appearance: Normal appearance. He is not ill-appearing, toxic-appearing or diaphoretic.   HENT:      Head: Normocephalic.      Mouth/Throat:      Lips: Pink.      Mouth: Mucous membranes are moist.   Eyes:      General: Vision grossly intact.   Cardiovascular:      Rate and Rhythm: Normal rate and regular rhythm.      Heart sounds: Normal heart sounds.   Pulmonary:      Effort: Pulmonary effort is normal. No respiratory distress.      Breath sounds: Normal breath sounds.   Abdominal:      General: There is no distension.      Palpations: Abdomen is soft.      Tenderness: There is no abdominal tenderness. There is no right CVA tenderness or left CVA tenderness.   Skin:     General: Skin is warm and dry.      Capillary Refill: Capillary refill takes less than 2 seconds.      Findings: No rash.   Neurological:      Mental Status: He is alert.      Gait: Gait normal.         Vital Signs  ED Triage Vitals [04/04/24 1313]   Temperature Pulse Respirations Blood Pressure SpO2   98.1 °F (36.7 °C) 70 18 144/88 98 %      Temp src Heart Rate Source Patient Position - Orthostatic VS BP Location FiO2 (%)   -- Monitor -- -- --      Pain Score       --           Vitals:    04/04/24 1313   BP: 144/88   Pulse: 70         Visual Acuity      ED Medications  Medications   sodium chloride 0.9 % bolus 250 mL (0 mL Intravenous Stopped 4/4/24 1508)       Diagnostic Studies  Results Reviewed       Procedure Component Value Units Date/Time    Urine culture [[id number]] Collected: 04/04/24 1417    Lab Status: In process Specimen: Urine, Other Updated: 04/04/24 1714    Urine Microscopic [[id number]]  (Abnormal) Collected: 04/04/24 1417    Lab Status: Final result Specimen: Urine, Other Updated: 04/04/24 1519     RBC, UA Innumerable /hpf      WBC, UA 2-4 /hpf      Epithelial Cells Occasional /hpf      Bacteria, UA Moderate /hpf     CBC and differential [[id number]]  (Abnormal) Collected: 04/04/24 1355    Lab Status: Final result Specimen: Blood  from Arm, Left Updated: 04/04/24 1436     WBC 6.71 Thousand/uL      RBC 4.20 Million/uL      Hemoglobin 13.7 g/dL      Hematocrit 40.8 %      MCV 97 fL      MCH 32.6 pg      MCHC 33.6 g/dL      RDW 12.6 %      MPV 10.6 fL      Platelets 119 Thousands/uL      nRBC 0 /100 WBCs      Neutrophils Relative 77 %      Immature Grans % 0 %      Lymphocytes Relative 13 %      Monocytes Relative 10 %      Eosinophils Relative 0 %      Basophils Relative 0 %      Neutrophils Absolute 5.12 Thousands/µL      Absolute Immature Grans 0.03 Thousand/uL      Absolute Lymphocytes 0.84 Thousands/µL      Absolute Monocytes 0.67 Thousand/µL      Eosinophils Absolute 0.03 Thousand/µL      Basophils Absolute 0.02 Thousands/µL     Protime-INR [[id number]]  (Abnormal) Collected: 04/04/24 1355    Lab Status: Final result Specimen: Blood from Arm, Left Updated: 04/04/24 1429     Protime 23.8 seconds      INR 2.08    UA w Reflex to Microscopic w Reflex to Culture [[id number]]  (Abnormal) Collected: 04/04/24 1417    Lab Status: Final result Specimen: Urine, Other Updated: 04/04/24 1428     Color, UA Jelena     Clarity, UA Cloudy     Specific Gravity, UA 1.010     pH, UA 7.0     Leukocytes, .0     Nitrite, UA Negative     Protein,  (2+) mg/dl      Glucose, UA Negative mg/dl      Ketones, UA Negative mg/dl      Bilirubin, UA Negative     Occult Blood, .0     UROBILINOGEN UA 1.0 mg/dL     Basic metabolic panel [[id number]] Collected: 04/04/24 1355    Lab Status: Final result Specimen: Blood from Arm, Left Updated: 04/04/24 1416     Sodium 138 mmol/L      Potassium 3.9 mmol/L      Chloride 103 mmol/L      CO2 25 mmol/L      ANION GAP 10 mmol/L      BUN 20 mg/dL      Creatinine 1.08 mg/dL      Glucose 117 mg/dL      Calcium 9.6 mg/dL      eGFR 68 ml/min/1.73sq m     Narrative:      National Kidney Disease Foundation guidelines for Chronic Kidney Disease (CKD):     Stage 1 with normal or high GFR (GFR > 90 mL/min/1.73 square meters)     Stage 2 Mild CKD (GFR = 60-89 mL/min/1.73 square meters)    Stage 3A Moderate CKD (GFR = 45-59 mL/min/1.73 square meters)    Stage 3B Moderate CKD (GFR = 30-44 mL/min/1.73 square meters)    Stage 4 Severe CKD (GFR = 15-29 mL/min/1.73 square meters)    Stage 5 End Stage CKD (GFR <15 mL/min/1.73 square meters)  Note: GFR calculation is accurate only with a steady state creatinine                   CT renal stone study abdomen pelvis without contrast   Final Result by Daniela March MD (04/04 1440)      No hydroureteronephrosis or radiopaque urolithiasis.   Limited evaluation of the decompressed bladder, with suspected diffuse wall thickening. Correlate for symptoms of cystitis.   Mild prostatomegaly.   Diverticulosis coli. No evidence of diverticulitis.   Unchanged large duodenal diverticula.   Other findings, as per the body of the report.         Workstation performed: [id number]                    Procedures  Procedures         ED Course  ED Course as of 04/04/24 2241   Thu Apr 04, 2024   1415 Per RN urine color now tea colored.    1435 Per review of outpatient cardiology notes, patient's goal INR 2 to 3   1446 CT renal stone study abdomen pelvis without contrast  IMPRESSION:     No hydroureteronephrosis or radiopaque urolithiasis.  Limited evaluation of the decompressed bladder, with suspected diffuse wall thickening. Correlate for symptoms of cystitis.  Mild prostatomegaly.  Diverticulosis coli. No evidence of diverticulitis.  Unchanged large duodenal diverticula.  Other findings, as per the body of the report.     1503 Hemoglobin: 13.7   1503 Creatinine: 1.08   1503 Discussed with on call Urology Provider ALBERTA Felipe who reviewed associated imaging and findings. Irrigation not recommended at this time. She recommends holding 1 day of warfarin, PVR, urine culture, treatment for UTI. If he is not retaining, and hgb and Cr are stable, she recommends outpatient Urology follow up for full hematuria workup.     1513 Urology also recommends return to ED sooner if bleeding would get worse, any signs of retention, decrease in urine, large clots, etc. And if patient does have recurrent hematuria she recommends at that point going straight to  [facility] and CBI him to clear it all out and possible CT to assess any clot burden.                               SBIRT 20yo+      Flowsheet Row Most Recent Value   Initial Alcohol Screen: US AUDIT-C     1. How often do you have a drink containing alcohol? 0 Filed at: 04/04/2024 1335   2. How many drinks containing alcohol do you have on a typical day you are drinking?  0 Filed at: 04/04/2024 1335   3b. FEMALE Any Age, or MALE 65+: How often do you have 4 or more drinks on one occassion? 0 Filed at: 04/04/2024 1335   Audit-C Score 0 Filed at: 04/04/2024 1335   EDD: How many times in the past year have you...    Used an illegal drug or used a prescription medication for non-medical reasons? Never Filed at: 04/04/2024 1335                      Medical Decision Making   gross hematuria, dysuria, urinary frequency since this morning and R low back pain since last night. initially urine was merlot colored and he saw small clots. the urine sample he provided here was improved, more tea-colored and cloudy per his RN. Benign reassuring abdominal exam. INR 2.08 (within goal of 2-3 per chart review). CT non con with bladder thickening, no hydronephrosis or urolithiasis. UA macroscopic with 100 (2+) leuks, 250 rbc.  Discussed with urology, see ED course.  Recommendations followed.    All imaging and/or lab testing discussed with patient, strict return to ED precautions discussed. Patient recommended to follow up promptly with appropriate outpatient provider. Patient and/or family members verbalizes understanding and agrees with plan. Patient and/or family members were given opportunity to ask questions, all questions were answered at this time. Patient is stable for discharge.     Portions of  "the record may have been created with voice recognition software. Occasional wrong word or \"sound a like\" substitutions may have occurred due to the inherent limitations of voice recognition software. Read the chart carefully and recognize, using context, where substitutions have occurred.       Amount and/or Complexity of Data Reviewed  Labs: ordered. Decision-making details documented in ED Course.  Radiology: ordered. Decision-making details documented in ED Course.    Risk  Prescription drug management.             Disposition  Final diagnoses:   Hematuria   UTI (urinary tract infection)     Time reflects when diagnosis was documented in both MDM as applicable and the Disposition within this note       Time User Action Codes Description Comment    4/4/2024  3:20 PM Elizabeth Simmons Add [R31.9] Hematuria     4/4/2024  3:22 PM Elizabeth Simmons Add [N39.0] UTI (urinary tract infection)           ED Disposition       ED Disposition   Discharge    Condition   Stable    Date/Time   Thu Apr 4, 2024 1537    Comment   Balbir Mccray discharge to home/self care.                   Follow-up Information       Follow up With Specialties Details Why Contact Info Additional Information    [facility] Schedule an appointment as soon as possible for a visit  For follow up regarding your symptoms [address] [place] [id number]  [phone] [facility] [address], [place], [place], [address]   [phone]    [facility] Emergency Department Emergency Medicine  If symptoms worsen [address] [facility] [address]  [phone] [facility] Emergency Department    Monalisa Anglin MD Family Medicine   [address] [place] [address].  [address]  [facility] [id number]  [phone]               Discharge Medication List as of 4/4/2024  3:44 PM        START taking these " medications    Details   cefuroxime (CEFTIN) 500 mg tablet Take 1 tablet (500 mg total) by mouth every 12 (twelve) hours for 7 days, Starting Thu 4/4/2024, Until Thu 4/11/2024, Normal           CONTINUE these medications which have NOT CHANGED    Details   aspirin (ECOTRIN LOW STRENGTH) 81 mg EC tablet take 1 tablet (81MG), Historical Med      atorvastatin (LIPITOR) 20 mg tablet TAKE 1 TABLET DAILY, Normal      ferrous sulfate 324 (65 Fe) mg TAKE 1 TABLET (324 MG TOTAL) BY MOUTH DAILY BEFORE BREAKFAST, Starting Thu 3/21/2024, Normal      finasteride (PROSCAR) 5 mg tablet TAKE 1 TABLET DAILY, Normal      lisinopril (ZESTRIL) 10 mg tablet Take 1 tablet (10 mg total) by mouth daily, Starting Fri 3/8/2024, Normal      metoprolol tartrate (LOPRESSOR) 25 mg tablet Take 1 tablet (25 mg total) by mouth every 12 (twelve) hours, Starting Fri 3/8/2024, Normal      potassium chloride (K-DUR,KLOR-CON) 20 mEq tablet Take 1 tablet (20 mEq total) by mouth daily Unless otherwise directed., Starting Thu 10/26/2023, Until Sun 10/20/2024, Normal      tamsulosin (FLOMAX) 0.4 mg TAKE 1 CAPSULE DAILY WITH  DINNER, Normal      torsemide (DEMADEX) 20 mg tablet Take 1 tablet (20 mg total) by mouth daily, Starting Thu 10/26/2023, Until Sun 10/20/2024, Normal      warfarin (COUMADIN) 2.5 mg tablet TAKE 3-4 TABLETS DAILY AS DIRECTED BY MD (7.5 TO 10 MG DAILY), Normal                 PDMP Review         Value Time User    PDMP Reviewed  Yes 3/27/2023 11:23 AM Mellissa Swann PA-C            ED Provider  Electronically Signed by             Elizabeth Simmons PA-C  04/04/24 1046

## 2024-04-04 NOTE — TELEPHONE ENCOUNTER
"Patient called in with c/o hematuria. Patient reports bright red bleeding with some burning and pressure when urinating. Patient also reports that he is only able to go small amounts. No Physician in the office on Thursdays. Patient has decided to go to the ED at [facility].  Reason for Disposition   Side (flank) or lower back pain present    Answer Assessment - Initial Assessment Questions  1. SYMPTOM: \"What's the main symptom you're concerned about?\" (e.g., frequency, incontinence)      hematuria  2. ONSET: \"When did the  hematuria  start?\"      1 day  3. PAIN: \"Is there any pain?\" If Yes, ask: \"How bad is it?\" (Scale: 1-10; mild, moderate, severe)      Some burning with urination and pressure, pain also R low back  4. CAUSE: \"What do you think is causing the symptoms?\"      ?UTI  5. OTHER SYMPTOMS: \"Do you have any other symptoms?\" (e.g., fever, flank pain, blood in urine, pain with urination)      Yes, pressure, hematuria, low back pain  6. PREGNANCY: \"Is there any chance you are pregnant?\" \"When was your last menstrual period?\"      N/A    Protocols used: Urinary Symptoms-ADULT-[place]    "

## 2024-04-04 NOTE — PROGRESS NOTES
[facility]        NAME: Balbir Mccray is a 73 y.o. male  : 1950    MRN: [id number]  DATE: 2024  TIME: 12:37 PM    Assessment and Plan   Hematuria, unspecified type [R31.9]  1. Hematuria, unspecified type              Patient Instructions       Follow up with PCP in 3-5 days.  Proceed to  ER if symptoms worsen.    If tests have been performed at [facility], our office will contact you with results if changes need to be made to the care plan discussed with you at the visit.  You can review your full results on [facility].    Chief Complaint     Chief Complaint   Patient presents with    Back Pain     Lower right back pain for about 3 days. Blood in urine started this morning. Hx of kidney stones.          History of Present Illness       This is a 73-year-old male who presents today with his wife.  He states Monday he started to feel fatigue and he has been having lower back pain.  The lower back pain initially started in his right lower back spread across his entire back and now today again it is just in the right lower back he denies any fever chills.  He has burning, frequency, pressure.  And burning with urination.  He is on Coumadin and aspirin and states that his urine is bloody and he thinks that he has passed a few clots.  He states he is due for blood work for his INR.  He did bring a specimen of urine with him which was noted to have blood in it    Back Pain  Associated symptoms include dysuria. Pertinent negatives include no chest pain.       Review of Systems   Review of Systems   Constitutional:  Positive for fatigue.   HENT: Negative.     Eyes: Negative.    Respiratory: Negative.  Negative for cough and shortness of breath.    Cardiovascular:  Negative for chest pain.   Gastrointestinal:  Negative for diarrhea, nausea and vomiting.   Genitourinary:  Positive for dysuria, flank pain and frequency.   Musculoskeletal:  Positive for back pain.   Neurological: Negative.           Current Medications       Current Outpatient Medications:     aspirin (ECOTRIN LOW STRENGTH) 81 mg EC tablet, take 1 tablet (81MG), Disp: , Rfl:     warfarin (COUMADIN) 2.5 mg tablet, TAKE 3-4 TABLETS DAILY AS DIRECTED BY MD (7.5 TO 10 MG DAILY), Disp: 360 tablet, Rfl: 1    atorvastatin (LIPITOR) 20 mg tablet, TAKE 1 TABLET DAILY, Disp: 90 tablet, Rfl: 1    ferrous sulfate 324 (65 Fe) mg, TAKE 1 TABLET (324 MG TOTAL) BY MOUTH DAILY BEFORE BREAKFAST, Disp: 90 tablet, Rfl: 1    finasteride (PROSCAR) 5 mg tablet, TAKE 1 TABLET DAILY, Disp: 90 tablet, Rfl: 1    lisinopril (ZESTRIL) 10 mg tablet, Take 1 tablet (10 mg total) by mouth daily, Disp: 90 tablet, Rfl: 0    metoprolol tartrate (LOPRESSOR) 25 mg tablet, Take 1 tablet (25 mg total) by mouth every 12 (twelve) hours, Disp: 180 tablet, Rfl: 0    potassium chloride (K-DUR,KLOR-CON) 20 mEq tablet, Take 1 tablet (20 mEq total) by mouth daily Unless otherwise directed., Disp: 90 tablet, Rfl: 3    tamsulosin (FLOMAX) 0.4 mg, TAKE 1 CAPSULE DAILY WITH  DINNER, Disp: 90 capsule, Rfl: 1    torsemide (DEMADEX) 20 mg tablet, Take 1 tablet (20 mg total) by mouth daily, Disp: 90 tablet, Rfl: 3    Current Allergies     Allergies as of 04/04/2024 - Reviewed 02/29/2024   Allergen Reaction Noted    No known allergies  05/22/2018            The following portions of the patient's history were reviewed and updated as appropriate: allergies, current medications, past family history, past medical history, past social history, past surgical history and problem list.     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) 03/05/2019    Diverticulitis     ED (erectile dysfunction)     Elevated PSA 01/15/2013    Encounter for postoperative care 04/19/2023    Encounter for well adult exam  with abnormal findings 02/04/2019    Herpes zoster 12/18/2014    Hyperlipidemia     Hypertension     IFG (impaired fasting glucose)     Metabolic syndrome     Need for shingles vaccine 02/04/2019    Obesity (BMI 30.0-34.9) 02/04/2019    Psoriasis     Seasonal allergic rhinitis     Stage 3a chronic kidney disease (HCC) 11/15/2023    Type 2 diabetes mellitus (HCC) 11/17/2014       Past Surgical History:   Procedure Laterality Date    CARDIAC CATHETERIZATION N/A 03/03/2023    Procedure: Cardiac Coronary Angiogram;  Surgeon: Joshua Yo DO;  Location: [facility] C[facility]AC [facility];  Service: Cardiology    CHOLECYSTECTOMY  05/14/2015    COLONOSCOPY  2007    COLONOSCOPY  06/23/2021    HERNIA REPAIR  05/14/2015    MA REPLACEMENT MITRAL VALVE W/CARDIOPULMONARY BYP N/A 3/21/2023    Procedure: MITRAL VALVE REPAIR with [employer]tr[employer] 32 mm CG Future Annuloplasty Ring; LIGATION OF Left ATRIAL APPENDAGE with 45 mm AtriClip; TANG;  Surgeon: Adriel Xiong DO;  Location: [facility] MAIN OR;  Service: Cardiac Surgery       Family History   Problem Relation Age of Onset    Skin cancer Mother     Diabetes type II Mother     Hyperlipidemia Mother     Heart disease Father     Thyroid disease unspecified Paternal Aunt          Medications have been verified.        Objective   /83   Pulse 66   Temp 97.7 °F (36.5 °C) (Tympanic)   Resp 18   SpO2 99%   No LMP for male patient.       Physical Exam     Physical Exam  Constitutional:       Appearance: Normal appearance. He is normal weight.   HENT:      Head: Normocephalic and atraumatic.      Right Ear: Tympanic membrane, ear canal and external ear normal.      Left Ear: Tympanic membrane, ear canal and external ear normal.      Nose: Nose normal.      Mouth/Throat:      Mouth: Mucous membranes are moist.      Pharynx: Oropharynx is clear.   Eyes:      Conjunctiva/sclera: Conjunctivae normal.      Pupils: Pupils are equal, round, and reactive to light.   Cardiovascular:      Rate and Rhythm:  Regular rhythm. Tachycardia present.      Pulses: Normal pulses.   Pulmonary:      Effort: Pulmonary effort is normal.      Breath sounds: Normal breath sounds.   Abdominal:      General: Abdomen is flat. Bowel sounds are normal.      Tenderness: There is no abdominal tenderness. There is right CVA tenderness (trans).   Musculoskeletal:         General: Normal range of motion.   Skin:     General: Skin is warm and dry.      Capillary Refill: Capillary refill takes less than 2 seconds.   Neurological:      General: No focal deficit present.      Mental Status: He is alert and oriented to person, place, and time.   Psychiatric:         Mood and Affect: Mood normal.         Thought Content: Thought content normal.         Judgment: Judgment normal.

## 2024-04-04 NOTE — DISCHARGE INSTRUCTIONS
Follow up with Urology, a referral was placed, but you must still call them to schedule appointment. Return to ED sooner if bleeding would get worse, any signs of  urinary retention, decrease in urine, large clots, etc. Hold your coumadin for 1 day. Restart tomorrow evening. Take antibiotic as prescribed.

## 2024-04-05 ENCOUNTER — TELEPHONE (OUTPATIENT)
Dept: FAMILY MEDICINE CLINIC | Facility: CLINIC | Age: 74
End: 2024-04-05

## 2024-04-07 LAB — BACTERIA UR CULT: ABNORMAL

## 2024-04-08 NOTE — TELEPHONE ENCOUNTER
There is no note from me in the chart --- unsure when I saw this patient unless it was an e-consult for advice? Patient had positive UTI which could be the cause of his bleeding, he is also on Warfarin.     Recommend CT renal protocol and BMP. Urine cytology once antibiotics are completed and cysto with MD at next opening. No urgent treatment needed as long as he is emptying and not continuously bleeding.

## 2024-04-09 NOTE — TELEPHONE ENCOUNTER
Call placed to patient and his wife. Spoke with them and informed them of the AP recommendations. Pt is currently in [place] at this time and will be home around 4-.   Pt w ill have urine testing completed when he arrives to [place] and he will call central scheduling to arrange for CT scan that has been ordered.   Pt is scheduled for 5- at 10am with Dr. Murdock for cystoscopy.     Pt is aware he can contact the office with any questions, concerns, or worsening symptoms he may experience.

## 2024-04-16 ENCOUNTER — TELEPHONE (OUTPATIENT)
Dept: FAMILY MEDICINE CLINIC | Facility: CLINIC | Age: 74
End: 2024-04-16

## 2024-04-16 DIAGNOSIS — R31.9 HEMATURIA, UNSPECIFIED TYPE: ICD-10-CM

## 2024-04-16 DIAGNOSIS — R39.9 UTI SYMPTOMS: ICD-10-CM

## 2024-04-16 DIAGNOSIS — Z79.01 ANTICOAGULATED ON COUMADIN: Primary | ICD-10-CM

## 2024-04-16 DIAGNOSIS — R31.9 HEMATURIA, UNSPECIFIED TYPE: Primary | ICD-10-CM

## 2024-04-16 RX ORDER — SULFAMETHOXAZOLE AND TRIMETHOPRIM 800; 160 MG/1; MG/1
1 TABLET ORAL 2 TIMES DAILY
Qty: 10 TABLET | Refills: 0 | Status: SHIPPED | OUTPATIENT
Start: 2024-04-16 | End: 2024-04-21

## 2024-04-16 NOTE — TELEPHONE ENCOUNTER
I did leave message to return call     I review ER record from 04/04/24 positive urine culture ,patient was treated with Antibiotic  His symptoms frequency and burning are UTI symptom I recommend to start antibiotic  Hematuria can be from UTI and possible 2 to Coumadin patient to be evaluated by Urology for possible cystoscopy  Candis ,can you send Bactrim DS BID for 5 days to pharmacy in [place] and schedule patient appointment when come back  To inform patient antibiotic can interact with coumadin need to check PT/INR in 5 days

## 2024-04-19 ENCOUNTER — TELEPHONE (OUTPATIENT)
Dept: FAMILY MEDICINE CLINIC | Facility: CLINIC | Age: 74
End: 2024-04-19

## 2024-04-19 NOTE — TELEPHONE ENCOUNTER
Called and left message for patient to return call to the office regarding appointment on 6/10/24 to reschedule due to provider being out of the office.

## 2024-04-22 DIAGNOSIS — Z98.890 S/P LEFT ATRIAL APPENDAGE LIGATION: ICD-10-CM

## 2024-04-22 DIAGNOSIS — Z98.890 S/P MITRAL VALVE REPAIR: ICD-10-CM

## 2024-04-23 ENCOUNTER — HOSPITAL ENCOUNTER (OUTPATIENT)
Dept: CT IMAGING | Facility: HOSPITAL | Age: 74
Discharge: HOME | End: 2024-04-23
Payer: MEDICARE

## 2024-04-23 DIAGNOSIS — R31.0 GROSS HEMATURIA: ICD-10-CM

## 2024-04-23 PROCEDURE — 74178 CT ABD&PLV WO CNTR FLWD CNTR: CPT

## 2024-04-23 RX ADMIN — IOHEXOL 100 ML: 350 INJECTION, SOLUTION INTRAVENOUS at 08:46

## 2024-04-24 ENCOUNTER — OFFICE VISIT (OUTPATIENT)
Dept: FAMILY MEDICINE CLINIC | Facility: CLINIC | Age: 74
End: 2024-04-24
Payer: MEDICARE

## 2024-04-24 VITALS
HEIGHT: 72 IN | TEMPERATURE: 98.2 F | OXYGEN SATURATION: 98 % | BODY MASS INDEX: 31.69 KG/M2 | WEIGHT: 234 LBS | SYSTOLIC BLOOD PRESSURE: 130 MMHG | HEART RATE: 60 BPM | DIASTOLIC BLOOD PRESSURE: 80 MMHG

## 2024-04-24 DIAGNOSIS — E11.65 TYPE 2 DIABETES MELLITUS WITH HYPERGLYCEMIA, WITHOUT LONG-TERM CURRENT USE OF INSULIN (HCC): ICD-10-CM

## 2024-04-24 DIAGNOSIS — E11.9 TYPE 2 DIABETES MELLITUS WITHOUT COMPLICATION, WITHOUT LONG-TERM CURRENT USE OF INSULIN (HCC): ICD-10-CM

## 2024-04-24 DIAGNOSIS — R31.0 GROSS HEMATURIA: Primary | ICD-10-CM

## 2024-04-24 DIAGNOSIS — N40.1 BPH WITH URINARY OBSTRUCTION: ICD-10-CM

## 2024-04-24 DIAGNOSIS — I48.0 PAROXYSMAL ATRIAL FIBRILLATION (HCC): ICD-10-CM

## 2024-04-24 DIAGNOSIS — Z99.11 DEPENDENCE ON RESPIRATOR (VENTILATOR) STATUS (HCC): ICD-10-CM

## 2024-04-24 DIAGNOSIS — N13.8 BPH WITH URINARY OBSTRUCTION: ICD-10-CM

## 2024-04-24 DIAGNOSIS — E66.01 OBESITY, MORBID (HCC): ICD-10-CM

## 2024-04-24 DIAGNOSIS — N18.31 STAGE 3A CHRONIC KIDNEY DISEASE (HCC): ICD-10-CM

## 2024-04-24 DIAGNOSIS — D69.6 PLATELETS DECREASED (HCC): ICD-10-CM

## 2024-04-24 DIAGNOSIS — N28.1 ACQUIRED RENAL CYST OF LEFT KIDNEY: ICD-10-CM

## 2024-04-24 LAB
BACTERIA UR QL AUTO: ABNORMAL /HPF
BILIRUB UR QL STRIP: NEGATIVE
CLARITY UR: ABNORMAL
COLOR UR: YELLOW
GLUCOSE UR STRIP-MCNC: NEGATIVE MG/DL
HGB UR QL STRIP.AUTO: ABNORMAL
KETONES UR STRIP-MCNC: NEGATIVE MG/DL
LEUKOCYTE ESTERASE UR QL STRIP: ABNORMAL
NITRITE UR QL STRIP: NEGATIVE
NON-SQ EPI CELLS URNS QL MICRO: ABNORMAL /HPF
PH UR STRIP.AUTO: 6 [PH]
PROT UR STRIP-MCNC: ABNORMAL MG/DL
RBC #/AREA URNS AUTO: ABNORMAL /HPF
SL AMB  POCT GLUCOSE, UA: ABNORMAL
SL AMB LEUKOCYTE ESTERASE,UA: ABNORMAL
SL AMB POCT BILIRUBIN,UA: ABNORMAL
SL AMB POCT BLOOD,UA: ABNORMAL
SL AMB POCT CLARITY,UA: ABNORMAL
SL AMB POCT COLOR,UA: YELLOW
SL AMB POCT KETONES,UA: ABNORMAL
SL AMB POCT NITRITE,UA: ABNORMAL
SL AMB POCT PH,UA: 6
SL AMB POCT SPECIFIC GRAVITY,UA: 1.02
SL AMB POCT URINE PROTEIN: ABNORMAL
SL AMB POCT UROBILINOGEN: 1
SP GR UR STRIP.AUTO: 1.02 (ref 1–1.03)
UROBILINOGEN UR STRIP-ACNC: <2 MG/DL
WBC #/AREA URNS AUTO: ABNORMAL /HPF
WBC CLUMPS # UR AUTO: PRESENT /UL

## 2024-04-24 PROCEDURE — 99214 OFFICE O/P EST MOD 30 MIN: CPT | Performed by: FAMILY MEDICINE

## 2024-04-24 PROCEDURE — 87186 SC STD MICRODIL/AGAR DIL: CPT | Performed by: FAMILY MEDICINE

## 2024-04-24 PROCEDURE — 81001 URINALYSIS AUTO W/SCOPE: CPT | Performed by: FAMILY MEDICINE

## 2024-04-24 PROCEDURE — 81002 URINALYSIS NONAUTO W/O SCOPE: CPT | Performed by: FAMILY MEDICINE

## 2024-04-24 PROCEDURE — 87077 CULTURE AEROBIC IDENTIFY: CPT | Performed by: FAMILY MEDICINE

## 2024-04-24 PROCEDURE — 87086 URINE CULTURE/COLONY COUNT: CPT | Performed by: FAMILY MEDICINE

## 2024-04-24 PROCEDURE — G2211 COMPLEX E/M VISIT ADD ON: HCPCS | Performed by: FAMILY MEDICINE

## 2024-04-24 RX ORDER — POTASSIUM CHLORIDE 20 MEQ/1
20 TABLET, EXTENDED RELEASE ORAL DAILY
Qty: 90 TABLET | Refills: 1 | Status: SHIPPED | OUTPATIENT
Start: 2024-04-24 | End: 2025-04-19

## 2024-04-24 NOTE — PROGRESS NOTES
Name: Balbir Daniels      : 1950      MRN: [id number]  Encounter Provider: Monalisa Anglin MD  Encounter Date: 2024   Encounter department: [facility]    Assessment & Plan     1. Gross hematuria  Assessment & Plan:  New diagnosis symptomatic patient on  had urine culture was positive for Klebsiella plan to send the urine for UA and CS and follow-up with the result accordingly also patient had enlarged prostate I recommend patient to follow-up with the urology for cystoscopy patient on Coumadin his PT/INR therapeutic we will hold on change in the dose until evaluated by urology    Orders:  -     Urine culture  -     UA (URINE) with reflex to Scope  -     POCT urine dip  -     Urine Microscopic    2. BPH with urinary obstruction  Assessment & Plan:  Chronic patient currently on Flomax and Proscar continue current management      3. Acquired renal cyst of left kidney    4. Dependence on respirator (ventilator) status (HCC)    5. Paroxysmal atrial fibrillation (HCC)    6. Platelets decreased ([facility])    7. Obesity, morbid (HCC)    8. Type 2 diabetes mellitus without complication, without long-term current use of insulin ([facility])  Assessment & Plan:    Lab Results   Component Value Date    HGBA1C 6.1 (H) 10/30/2023   Chronic asymptomatic fair control continue current management due for hemoglobin A1c    Orders:  -     CBC and differential; Future  -     Comprehensive metabolic panel; Future  -     Lipid panel; Future  -     TSH, 3rd generation with Free T4 reflex; Future  -     Hemoglobin A1C; Future    9. Stage 3a chronic kidney disease (HCC)  Assessment & Plan:  Lab Results   Component Value Date    EGFR 68 2024    EGFR 56 10/30/2023    EGFR 69 2023    CREATININE 1.08 2024    CREATININE 1.27 10/30/2023    CREATININE 1.08 2023   Chronic asymptomatic improvement the GFR compared with before blood pressure at the goal discussed with patient avoid  NSAIDs keep well hydration    Orders:  -     CBC and differential; Future  -     Comprehensive metabolic panel; Future  -     Lipid panel; Future  -     TSH, 3rd generation with Free T4 reflex; Future  -     Hemoglobin A1C; Future    10. Type 2 diabetes mellitus with hyperglycemia, without long-term current use of insulin (HCC)  -     CBC and differential; Future  -     Comprehensive metabolic panel; Future  -     Lipid panel; Future  -     TSH, 3rd generation with Free T4 reflex; Future  -     Hemoglobin A1C; Future           Subjective      Patient here follow-up with a chronic condition and concerned about the blood in the urine with the dysuria no flank pain no abdomen pain no fever patient had a urine infection positive for Klebsiella in April.  Treated with antibiotic per patient his symptoms improved but now it is coming back again CAT scan of the abdomen and pelvic from April 23 discussed with the patient      Review of Systems   Constitutional:  Negative for chills and fever.   HENT:  Negative for ear pain and sore throat.    Eyes:  Negative for pain and visual disturbance.   Respiratory:  Negative for cough and shortness of breath.    Cardiovascular:  Negative for chest pain and palpitations.   Gastrointestinal:  Negative for abdominal pain, constipation, diarrhea and vomiting.   Genitourinary:  Positive for dysuria and hematuria.   Musculoskeletal:  Negative for gait problem.   Skin:  Negative for color change and rash.   Neurological:  Negative for seizures and syncope.   All other systems reviewed and are negative.      Current Outpatient Medications on File Prior to Visit   Medication Sig    aspirin (ECOTRIN LOW STRENGTH) 81 mg EC tablet take 1 tablet (81MG)    atorvastatin (LIPITOR) 20 mg tablet TAKE 1 TABLET DAILY    ferrous sulfate 324 (65 Fe) mg TAKE 1 TABLET (324 MG TOTAL) BY MOUTH DAILY BEFORE BREAKFAST    finasteride (PROSCAR) 5 mg tablet TAKE 1 TABLET DAILY    lisinopril (ZESTRIL) 10 mg tablet  Take 1 tablet (10 mg total) by mouth daily    metoprolol tartrate (LOPRESSOR) 25 mg tablet Take 1 tablet (25 mg total) by mouth every 12 (twelve) hours    potassium chloride (Klor-Con M20) 20 mEq tablet Take 1 tablet (20 mEq total) by mouth daily Unless otherwise directed.    tamsulosin (FLOMAX) 0.4 mg TAKE 1 CAPSULE DAILY WITH  DINNER    torsemide (DEMADEX) 20 mg tablet Take 1 tablet (20 mg total) by mouth daily    warfarin (COUMADIN) 2.5 mg tablet TAKE 3-4 TABLETS DAILY AS DIRECTED BY MD (7.5 TO 10 MG DAILY)       Objective     /80 (BP Location: Left arm, Patient Position: Sitting)   Pulse 60   Temp 98.2 °F (36.8 °C) (Tympanic)   Ht 6' (1.829 m)   Wt 106 kg (234 lb)   SpO2 98%   BMI 31.74 kg/m²     Physical Exam  Vitals and nursing note reviewed.   Constitutional:       General: He is not in acute distress.     Appearance: He is well-developed. He is not diaphoretic.   HENT:      Head: Normocephalic.      Right Ear: Tympanic membrane and external ear normal.      Left Ear: Tympanic membrane and external ear normal.      Nose: No rhinorrhea.      Mouth/Throat:      Pharynx: No posterior oropharyngeal erythema.   Eyes:      General:         Right eye: No discharge.         Left eye: No discharge.      Conjunctiva/sclera: Conjunctivae normal.   Neck:      Vascular: No JVD.   Cardiovascular:      Rate and Rhythm: Normal rate.      Heart sounds: Murmur heard.      No gallop.   Pulmonary:      Effort: Pulmonary effort is normal. No respiratory distress.      Breath sounds: Normal breath sounds. No stridor. No wheezing or rales.   Chest:      Chest wall: No tenderness.   Abdominal:      General: There is no distension.      Palpations: Abdomen is soft. There is no mass.      Tenderness: There is no abdominal tenderness. There is no right CVA tenderness, left CVA tenderness or rebound.   Musculoskeletal:         General: No tenderness.      Cervical back: Normal range of motion and neck supple.    Lymphadenopathy:      Cervical: No cervical adenopathy.   Skin:     General: Skin is warm.      Findings: No erythema or rash.   Neurological:      Mental Status: He is alert and oriented to person, place, and time.       Monalisa Anglin MD

## 2024-04-26 ENCOUNTER — TELEPHONE (OUTPATIENT)
Dept: FAMILY MEDICINE CLINIC | Facility: CLINIC | Age: 74
End: 2024-04-26

## 2024-04-26 DIAGNOSIS — N39.0 URINARY TRACT INFECTION WITHOUT HEMATURIA, SITE UNSPECIFIED: Primary | ICD-10-CM

## 2024-04-26 DIAGNOSIS — N39.0 URINARY TRACT INFECTION WITH HEMATURIA, SITE UNSPECIFIED: Primary | ICD-10-CM

## 2024-04-26 DIAGNOSIS — R31.9 URINARY TRACT INFECTION WITH HEMATURIA, SITE UNSPECIFIED: Primary | ICD-10-CM

## 2024-04-26 LAB — BACTERIA UR CULT: ABNORMAL

## 2024-04-26 RX ORDER — SULFAMETHOXAZOLE AND TRIMETHOPRIM 800; 160 MG/1; MG/1
1 TABLET ORAL 2 TIMES DAILY
Qty: 14 TABLET | Refills: 0 | Status: SHIPPED | OUTPATIENT
Start: 2024-04-26 | End: 2024-05-03

## 2024-04-26 NOTE — TELEPHONE ENCOUNTER
----- Message from Monalisa Anglin MD sent at 4/26/2024 11:38 AM EDT -----  Urine culture is positive I already send Bactrim DS BID for 7 days to his pharmacy to notify patient  Patient to repeat Urine culture 1 week after finish antibiotic

## 2024-04-26 NOTE — TELEPHONE ENCOUNTER
Lm to notify patient    Urine culture order placed for patient to complete one week after finished antibiotics

## 2024-04-26 NOTE — RESULT ENCOUNTER NOTE
Urine culture is positive I already send Bactrim DS BID for 7 days to his pharmacy to notify patient  Patient to repeat Urine culture 1 week after finish antibiotic

## 2024-04-27 PROBLEM — Z99.11 DEPENDENCE ON RESPIRATOR (VENTILATOR) STATUS (HCC): Status: RESOLVED | Noted: 2024-04-24 | Resolved: 2024-04-27

## 2024-04-27 NOTE — ASSESSMENT & PLAN NOTE
Lab Results   Component Value Date    EGFR 68 04/04/2024    EGFR 56 10/30/2023    EGFR 69 05/24/2023    CREATININE 1.08 04/04/2024    CREATININE 1.27 10/30/2023    CREATININE 1.08 05/24/2023   Chronic asymptomatic improvement the GFR compared with before blood pressure at the goal discussed with patient avoid NSAIDs keep well hydration

## 2024-04-27 NOTE — ASSESSMENT & PLAN NOTE
New diagnosis symptomatic patient on April 4 had urine culture was positive for Klebsiella plan to send the urine for UA and CS and follow-up with the result accordingly also patient had enlarged prostate I recommend patient to follow-up with the urology for cystoscopy patient on Coumadin his PT/INR therapeutic we will hold on change in the dose until evaluated by urology

## 2024-04-27 NOTE — ASSESSMENT & PLAN NOTE
Lab Results   Component Value Date    HGBA1C 6.1 (H) 10/30/2023   Chronic asymptomatic fair control continue current management due for hemoglobin A1c

## 2024-05-03 ENCOUNTER — APPOINTMENT (OUTPATIENT)
Dept: LAB | Facility: CLINIC | Age: 74
End: 2024-05-03
Payer: MEDICARE

## 2024-05-03 ENCOUNTER — TELEPHONE (OUTPATIENT)
Dept: CARDIOLOGY CLINIC | Facility: CLINIC | Age: 74
End: 2024-05-03

## 2024-05-03 DIAGNOSIS — E11.65 TYPE 2 DIABETES MELLITUS WITH HYPERGLYCEMIA, WITHOUT LONG-TERM CURRENT USE OF INSULIN (HCC): ICD-10-CM

## 2024-05-03 DIAGNOSIS — N18.31 STAGE 3A CHRONIC KIDNEY DISEASE (HCC): ICD-10-CM

## 2024-05-03 DIAGNOSIS — R31.0 GROSS HEMATURIA: ICD-10-CM

## 2024-05-03 DIAGNOSIS — N39.0 URINARY TRACT INFECTION WITHOUT HEMATURIA, SITE UNSPECIFIED: ICD-10-CM

## 2024-05-03 DIAGNOSIS — E11.9 TYPE 2 DIABETES MELLITUS WITHOUT COMPLICATION, WITHOUT LONG-TERM CURRENT USE OF INSULIN (HCC): ICD-10-CM

## 2024-05-03 DIAGNOSIS — I48.0 PAF (PAROXYSMAL ATRIAL FIBRILLATION) (HCC): ICD-10-CM

## 2024-05-03 DIAGNOSIS — Z79.01 ANTICOAGULATED ON COUMADIN: ICD-10-CM

## 2024-05-03 LAB
ALBUMIN SERPL BCP-MCNC: 4.1 G/DL (ref 3.5–5)
ALP SERPL-CCNC: 94 U/L (ref 34–104)
ALT SERPL W P-5'-P-CCNC: 27 U/L (ref 7–52)
ANION GAP SERPL CALCULATED.3IONS-SCNC: 9 MMOL/L (ref 4–13)
AST SERPL W P-5'-P-CCNC: 23 U/L (ref 13–39)
BASOPHILS # BLD AUTO: 0.04 THOUSANDS/ÂΜL (ref 0–0.1)
BASOPHILS NFR BLD AUTO: 1 % (ref 0–1)
BILIRUB SERPL-MCNC: 0.47 MG/DL (ref 0.2–1)
BUN SERPL-MCNC: 22 MG/DL (ref 5–25)
CALCIUM SERPL-MCNC: 8.9 MG/DL (ref 8.4–10.2)
CHLORIDE SERPL-SCNC: 104 MMOL/L (ref 96–108)
CHOLEST SERPL-MCNC: 104 MG/DL
CO2 SERPL-SCNC: 28 MMOL/L (ref 21–32)
CREAT SERPL-MCNC: 1.33 MG/DL (ref 0.6–1.3)
EOSINOPHIL # BLD AUTO: 0.31 THOUSAND/ÂΜL (ref 0–0.61)
EOSINOPHIL NFR BLD AUTO: 5 % (ref 0–6)
ERYTHROCYTE [DISTWIDTH] IN BLOOD BY AUTOMATED COUNT: 13.1 % (ref 11.6–15.1)
GFR SERPL CREATININE-BSD FRML MDRD: 53 ML/MIN/1.73SQ M
GLUCOSE P FAST SERPL-MCNC: 113 MG/DL (ref 65–99)
HCT VFR BLD AUTO: 40.8 % (ref 36.5–49.3)
HDLC SERPL-MCNC: 39 MG/DL
HGB BLD-MCNC: 13 G/DL (ref 12–17)
IMM GRANULOCYTES # BLD AUTO: 0.03 THOUSAND/UL (ref 0–0.2)
IMM GRANULOCYTES NFR BLD AUTO: 1 % (ref 0–2)
INR PPP: 2.88 (ref 0.84–1.19)
LDLC SERPL CALC-MCNC: 44 MG/DL (ref 0–100)
LYMPHOCYTES # BLD AUTO: 1.94 THOUSANDS/ÂΜL (ref 0.6–4.47)
LYMPHOCYTES NFR BLD AUTO: 33 % (ref 14–44)
MCH RBC QN AUTO: 32.1 PG (ref 26.8–34.3)
MCHC RBC AUTO-ENTMCNC: 31.9 G/DL (ref 31.4–37.4)
MCV RBC AUTO: 101 FL (ref 82–98)
MONOCYTES # BLD AUTO: 0.57 THOUSAND/ÂΜL (ref 0.17–1.22)
MONOCYTES NFR BLD AUTO: 10 % (ref 4–12)
NEUTROPHILS # BLD AUTO: 2.98 THOUSANDS/ÂΜL (ref 1.85–7.62)
NEUTS SEG NFR BLD AUTO: 50 % (ref 43–75)
NONHDLC SERPL-MCNC: 65 MG/DL
NRBC BLD AUTO-RTO: 0 /100 WBCS
PLATELET # BLD AUTO: 117 THOUSANDS/UL (ref 149–390)
PMV BLD AUTO: 11.3 FL (ref 8.9–12.7)
POTASSIUM SERPL-SCNC: 4.4 MMOL/L (ref 3.5–5.3)
PROT SERPL-MCNC: 6.4 G/DL (ref 6.4–8.4)
PROTHROMBIN TIME: 29.5 SECONDS (ref 11.6–14.5)
RBC # BLD AUTO: 4.05 MILLION/UL (ref 3.88–5.62)
SODIUM SERPL-SCNC: 141 MMOL/L (ref 135–147)
TRIGL SERPL-MCNC: 103 MG/DL
TSH SERPL DL<=0.05 MIU/L-ACNC: 1.87 UIU/ML (ref 0.45–4.5)
WBC # BLD AUTO: 5.87 THOUSAND/UL (ref 4.31–10.16)

## 2024-05-03 PROCEDURE — 80053 COMPREHEN METABOLIC PANEL: CPT

## 2024-05-03 PROCEDURE — 80061 LIPID PANEL: CPT

## 2024-05-03 PROCEDURE — 85610 PROTHROMBIN TIME: CPT

## 2024-05-03 PROCEDURE — 87086 URINE CULTURE/COLONY COUNT: CPT

## 2024-05-03 PROCEDURE — 84443 ASSAY THYROID STIM HORMONE: CPT

## 2024-05-03 PROCEDURE — 83036 HEMOGLOBIN GLYCOSYLATED A1C: CPT

## 2024-05-03 PROCEDURE — 88112 CYTOPATH CELL ENHANCE TECH: CPT | Performed by: STUDENT IN AN ORGANIZED HEALTH CARE EDUCATION/TRAINING PROGRAM

## 2024-05-03 PROCEDURE — 85025 COMPLETE CBC W/AUTO DIFF WBC: CPT

## 2024-05-03 PROCEDURE — 36415 COLL VENOUS BLD VENIPUNCTURE: CPT

## 2024-05-04 LAB
BACTERIA UR CULT: NORMAL
EST. AVERAGE GLUCOSE BLD GHB EST-MCNC: 126 MG/DL
HBA1C MFR BLD: 6 %

## 2024-05-06 ENCOUNTER — ANTICOAG VISIT (OUTPATIENT)
Dept: CARDIOLOGY CLINIC | Facility: CLINIC | Age: 74
End: 2024-05-06

## 2024-05-07 ENCOUNTER — TELEPHONE (OUTPATIENT)
Dept: UROLOGY | Facility: MEDICAL CENTER | Age: 74
End: 2024-05-07

## 2024-05-07 PROCEDURE — 88112 CYTOPATH CELL ENHANCE TECH: CPT | Performed by: STUDENT IN AN ORGANIZED HEALTH CARE EDUCATION/TRAINING PROGRAM

## 2024-05-07 NOTE — TELEPHONE ENCOUNTER
Urine cytology returned - Negative for high grade urothelial carcinoma. This is reassuring. Proceed with cystoscopy in office on 5/29/24 to complete hematuria workup.

## 2024-05-07 NOTE — TELEPHONE ENCOUNTER
Left voice message letting pt know the following:    Zelda Felipe PA-C Physician Assistant Signed 2:29 PM     Copy     Urine cytology returned - Negative for high grade urothelial carcinoma. This is reassuring. Proceed with cystoscopy in office on 5/29/24 to complete hematuria workup.            Call back number provided.

## 2024-05-08 NOTE — TELEPHONE ENCOUNTER
Spoke with pt and he was relayed the following message:    Zelda Felipe PA-C Physician Assistant Signed Yesterday     Copy     Urine cytology returned - Negative for high grade urothelial carcinoma. This is reassuring. Proceed with cystoscopy in office on 5/29/24 to complete hematuria workup.

## 2024-05-17 ENCOUNTER — TELEPHONE (OUTPATIENT)
Dept: UROLOGY | Facility: MEDICAL CENTER | Age: 74
End: 2024-05-17

## 2024-05-20 ENCOUNTER — TELEPHONE (OUTPATIENT)
Dept: UROLOGY | Facility: MEDICAL CENTER | Age: 74
End: 2024-05-20

## 2024-05-24 DIAGNOSIS — I10 ESSENTIAL HYPERTENSION: ICD-10-CM

## 2024-05-24 RX ORDER — LISINOPRIL 10 MG/1
10 TABLET ORAL DAILY
Qty: 90 TABLET | Refills: 1 | Status: SHIPPED | OUTPATIENT
Start: 2024-05-24

## 2024-06-10 ENCOUNTER — APPOINTMENT (OUTPATIENT)
Dept: LAB | Facility: CLINIC | Age: 74
End: 2024-06-10
Payer: MEDICARE

## 2024-06-10 DIAGNOSIS — Z98.890 S/P MITRAL VALVE REPAIR: ICD-10-CM

## 2024-06-10 DIAGNOSIS — I48.0 PAF (PAROXYSMAL ATRIAL FIBRILLATION) (HCC): ICD-10-CM

## 2024-06-10 LAB
INR PPP: 2.26 (ref 0.84–1.19)
PROTHROMBIN TIME: 24.6 SECONDS (ref 11.6–14.5)

## 2024-06-10 PROCEDURE — 85610 PROTHROMBIN TIME: CPT

## 2024-06-10 PROCEDURE — 36415 COLL VENOUS BLD VENIPUNCTURE: CPT

## 2024-06-11 ENCOUNTER — ANTICOAG VISIT (OUTPATIENT)
Dept: CARDIOLOGY CLINIC | Facility: CLINIC | Age: 74
End: 2024-06-11

## 2024-06-12 DIAGNOSIS — Z98.890 S/P LEFT ATRIAL APPENDAGE LIGATION: ICD-10-CM

## 2024-06-12 DIAGNOSIS — Z98.890 S/P MITRAL VALVE REPAIR: ICD-10-CM

## 2024-06-12 DIAGNOSIS — E78.2 MIXED HYPERLIPIDEMIA: ICD-10-CM

## 2024-06-12 RX ORDER — ATORVASTATIN CALCIUM 20 MG/1
TABLET, FILM COATED ORAL
Qty: 90 TABLET | Refills: 1 | Status: SHIPPED | OUTPATIENT
Start: 2024-06-12

## 2024-06-26 ENCOUNTER — RA CDI HCC (OUTPATIENT)
Dept: OTHER | Facility: HOSPITAL | Age: 74
End: 2024-06-26

## 2024-07-05 ENCOUNTER — TELEPHONE (OUTPATIENT)
Dept: FAMILY MEDICINE CLINIC | Facility: CLINIC | Age: 74
End: 2024-07-05

## 2024-07-05 DIAGNOSIS — E61.1 LOW IRON: ICD-10-CM

## 2024-07-05 NOTE — TELEPHONE ENCOUNTER
Pt rescheduled for 7/15.    Pt also requested a refill on his ferrous sulfate 324.    Warm transferred to [facility] with [facility] line for further assistance.

## 2024-07-05 NOTE — TELEPHONE ENCOUNTER
Lm to ask if patient is able to reschedule his appointment that is currently on 7/8/24. We do not have the eye exam machine that day but we will have the machine the following week of 7/15.

## 2024-07-05 NOTE — TELEPHONE ENCOUNTER
Reason for call:   [x] Refill   [] Prior Auth  [] Other:     Office:   [x] PCP/Provider - Monalisa Anglin MD   [] Specialty/Provider -     Medication:     ferrous sulfate 324 (65 Fe) mg       Dose/Frequency:     TAKE 1 TABLET (324 MG TOTAL) BY MOUTH DAILY BEFORE BREAKFAST       Quantity: 90    Pharmacy:   [id number] - [place] - [address] [phone]   Does the patient have enough for 3 days?   [x] Yes   [] No - Send as HP to POD

## 2024-07-06 RX ORDER — FERROUS SULFATE 324(65)MG
324 TABLET, DELAYED RELEASE (ENTERIC COATED) ORAL
Qty: 30 TABLET | Refills: 0 | Status: SHIPPED | OUTPATIENT
Start: 2024-07-06

## 2024-07-08 ENCOUNTER — APPOINTMENT (OUTPATIENT)
Dept: LAB | Facility: CLINIC | Age: 74
End: 2024-07-08
Payer: MEDICARE

## 2024-07-08 DIAGNOSIS — I48.0 PAF (PAROXYSMAL ATRIAL FIBRILLATION) (HCC): ICD-10-CM

## 2024-07-08 DIAGNOSIS — Z98.890 S/P MITRAL VALVE REPAIR: ICD-10-CM

## 2024-07-08 LAB
INR PPP: 1.92 (ref 0.84–1.19)
PROTHROMBIN TIME: 21.6 SECONDS (ref 11.6–14.5)

## 2024-07-08 PROCEDURE — 36415 COLL VENOUS BLD VENIPUNCTURE: CPT

## 2024-07-08 PROCEDURE — 85610 PROTHROMBIN TIME: CPT

## 2024-07-09 ENCOUNTER — ANTICOAG VISIT (OUTPATIENT)
Dept: CARDIOLOGY CLINIC | Facility: CLINIC | Age: 74
End: 2024-07-09

## 2024-07-15 ENCOUNTER — OFFICE VISIT (OUTPATIENT)
Dept: FAMILY MEDICINE CLINIC | Facility: CLINIC | Age: 74
End: 2024-07-15
Payer: MEDICARE

## 2024-07-15 VITALS
DIASTOLIC BLOOD PRESSURE: 72 MMHG | WEIGHT: 236.6 LBS | SYSTOLIC BLOOD PRESSURE: 120 MMHG | HEART RATE: 70 BPM | OXYGEN SATURATION: 98 % | HEIGHT: 72 IN | TEMPERATURE: 98.1 F | BODY MASS INDEX: 32.05 KG/M2

## 2024-07-15 DIAGNOSIS — Z13.29 SCREENING FOR THYROID DISORDER: ICD-10-CM

## 2024-07-15 DIAGNOSIS — E11.9 TYPE 2 DIABETES MELLITUS WITHOUT COMPLICATION, WITHOUT LONG-TERM CURRENT USE OF INSULIN (HCC): ICD-10-CM

## 2024-07-15 DIAGNOSIS — N18.31 STAGE 3A CHRONIC KIDNEY DISEASE (HCC): ICD-10-CM

## 2024-07-15 DIAGNOSIS — I10 ESSENTIAL HYPERTENSION: ICD-10-CM

## 2024-07-15 DIAGNOSIS — Z13.220 SCREENING, LIPID: ICD-10-CM

## 2024-07-15 DIAGNOSIS — I97.89 POSTOPERATIVE ATRIAL FIBRILLATION (HCC): ICD-10-CM

## 2024-07-15 DIAGNOSIS — Z00.00 MEDICARE ANNUAL WELLNESS VISIT, SUBSEQUENT: Primary | ICD-10-CM

## 2024-07-15 DIAGNOSIS — I48.91 POSTOPERATIVE ATRIAL FIBRILLATION (HCC): ICD-10-CM

## 2024-07-15 PROCEDURE — G0439 PPPS, SUBSEQ VISIT: HCPCS | Performed by: FAMILY MEDICINE

## 2024-07-15 PROCEDURE — 99214 OFFICE O/P EST MOD 30 MIN: CPT | Performed by: FAMILY MEDICINE

## 2024-07-15 NOTE — PATIENT INSTRUCTIONS
Medicare Preventive Visit Patient Instructions  Thank you for completing your Welcome to Medicare Visit or Medicare Annual Wellness Visit today. Your next wellness visit will be due in one year (7/16/2025).  The screening/preventive services that you may require over the next 5-10 years are detailed below. Some tests may not apply to you based off risk factors and/or age. Screening tests ordered at today's visit but not completed yet may show as past due. Also, please note that scanned in results may not display below.  Preventive Screenings:  Service Recommendations Previous Testing/Comments   Colorectal Cancer Screening  Colonoscopy    Fecal Occult Blood Test (FOBT)/Fecal Immunochemical Test (FIT)  Fecal DNA/Cologuard Test  Flexible Sigmoidoscopy Age: 45-75 years old   Colonoscopy: every 10 years (May be performed more frequently if at higher risk)  OR  FOBT/FIT: every 1 year  OR  Cologuard: every 3 years  OR  Sigmoidoscopy: every 5 years  Screening may be recommended earlier than age 45 if at higher risk for colorectal cancer. Also, an individualized decision between you and your healthcare provider will decide whether screening between the ages of 76-85 would be appropriate. Colonoscopy: 06/23/2021  FOBT/FIT: 12/30/2022  Cologuard: Not on file  Sigmoidoscopy: Not on file    Screening Current     Prostate Cancer Screening Individualized decision between patient and health care provider in men between ages of 55-69   Medicare will cover every 12 months beginning on the day after your 50th birthday PSA: 0.86 ng/mL     Screening Current     Hepatitis C Screening Once for adults born between 1945 and 1965  More frequently in patients at high risk for Hepatitis C Hep C Antibody: 02/27/2019    Screening Current   Diabetes Screening 1-2 times per year if you're at risk for diabetes or have pre-diabetes Fasting glucose: 113 mg/dL (5/3/2024)  A1C: 6.0 % (5/3/2024)  Screening Not Indicated  History Diabetes   Cholesterol  Screening Once every 5 years if you don't have a lipid disorder. May order more often based on risk factors. Lipid panel: 05/03/2024  Screening Not Indicated  History Lipid Disorder      Other Preventive Screenings Covered by Medicare:  Abdominal Aortic Aneurysm (AAA) Screening: covered once if your at risk. You're considered to be at risk if you have a family history of AAA or a male between the age of 65-75 who smoking at least 100 cigarettes in your lifetime.  Lung Cancer Screening: covers low dose CT scan once per year if you meet all of the following conditions: (1) Age 55-77; (2) No signs or symptoms of lung cancer; (3) Current smoker or have quit smoking within the last 15 years; (4) You have a tobacco smoking history of at least 20 pack years (packs per day x number of years you smoked); (5) You get a written order from a healthcare provider.  Glaucoma Screening: covered annually if you're considered high risk: (1) You have diabetes OR (2) Family history of glaucoma OR (3)  aged 50 and older OR (4)  American aged 65 and older  Osteoporosis Screening: covered every 2 years if you meet one of the following conditions: (1) Have a vertebral abnormality; (2) On glucocorticoid therapy for more than 3 months; (3) Have primary hyperparathyroidism; (4) On osteoporosis medications and need to assess response to drug therapy.  HIV Screening: covered annually if you're between the age of 15-65. Also covered annually if you are younger than 15 and older than 65 with risk factors for HIV infection. For pregnant patients, it is covered up to 3 times per pregnancy.    Immunizations:  Immunization Recommendations   Influenza Vaccine Annual influenza vaccination during flu season is recommended for all persons aged >= 6 months who do not have contraindications   Pneumococcal Vaccine   * Pneumococcal conjugate vaccine = PCV13 (Prevnar 13), PCV15 (Vaxneuvance), PCV20 (Prevnar 20)  * Pneumococcal  polysaccharide vaccine = PPSV23 (Pneumovax) Adults 19-65 yo with certain risk factors or if 65+ yo  If never received any pneumonia vaccine: recommend Prevnar 20 (PCV20)  Give PCV20 if previously received 1 dose of PCV13 or PPSV23   Hepatitis B Vaccine 3 dose series if at intermediate or high risk (ex: diabetes, end stage renal disease, liver disease)   Respiratory syncytial virus (RSV) Vaccine - COVERED BY MEDICARE PART D  * RSVPreF3 (Arexvy) CDC recommends that adults 60 years of age and older may receive a single dose of RSV vaccine using shared clinical decision-making (SCDM)   Tetanus (Td) Vaccine - COST NOT COVERED BY MEDICARE PART B Following completion of primary series, a booster dose should be given every 10 years to maintain immunity against tetanus. Td may also be given as tetanus wound prophylaxis.   Tdap Vaccine - COST NOT COVERED BY MEDICARE PART B Recommended at least once for all adults. For pregnant patients, recommended with each pregnancy.   Shingles Vaccine (Shingrix) - COST NOT COVERED BY MEDICARE PART B  2 shot series recommended in those 19 years and older who have or will have weakened immune systems or those 50 years and older     Health Maintenance Due:      Topic Date Due   • Colorectal Cancer Screening  06/23/2024   • Hepatitis C Screening  Completed     Immunizations Due:      Topic Date Due   • COVID-19 Vaccine (5 - 2023-24 season) 09/01/2023   • Influenza Vaccine (1) 09/01/2024     Advance Directives   What are advance directives?  Advance directives are legal documents that state your wishes and plans for medical care. These plans are made ahead of time in case you lose your ability to make decisions for yourself. Advance directives can apply to any medical decision, such as the treatments you want, and if you want to donate organs.   What are the types of advance directives?  There are many types of advance directives, and each state has rules about how to use them. You may choose a  combination of any of the following:  Living will:  This is a written record of the treatment you want. You can also choose which treatments you do not want, which to limit, and which to stop at a certain time. This includes surgery, medicine, IV fluid, and tube feedings.   Durable power of  for healthcare (DPAHC):  This is a written record that states who you want to make healthcare choices for you when you are unable to make them for yourself. This person, called a proxy, is usually a family member or a friend. You may choose more than 1 proxy.  Do not resuscitate (DNR) order:  A DNR order is used in case your heart stops beating or you stop breathing. It is a request not to have certain forms of treatment, such as CPR. A DNR order may be included in other types of advance directives.  Medical directive:  This covers the care that you want if you are in a coma, near death, or unable to make decisions for yourself. You can list the treatments you want for each condition. Treatment may include pain medicine, surgery, blood transfusions, dialysis, IV or tube feedings, and a ventilator (breathing machine).  Values history:  This document has questions about your views, beliefs, and how you feel and think about life. This information can help others choose the care that you would choose.  Why are advance directives important?  An advance directive helps you control your care. Although spoken wishes may be used, it is better to have your wishes written down. Spoken wishes can be misunderstood, or not followed. Treatments may be given even if you do not want them. An advance directive may make it easier for your family to make difficult choices about your care.   Weight Management   Why it is important to manage your weight:  Being overweight increases your risk of health conditions such as heart disease, high blood pressure, type 2 diabetes, and certain types of cancer. It can also increase your risk for  osteoarthritis, sleep apnea, and other respiratory problems. Aim for a slow, steady weight loss. Even a small amount of weight loss can lower your risk of health problems.  How to lose weight safely:  A safe and healthy way to lose weight is to eat fewer calories and get regular exercise. You can lose up about 1 pound a week by decreasing the number of calories you eat by 500 calories each day.   Healthy meal plan for weight management:  A healthy meal plan includes a variety of foods, contains fewer calories, and helps you stay healthy. A healthy meal plan includes the following:  Eat whole-grain foods more often.  A healthy meal plan should contain fiber. Fiber is the part of grains, fruits, and vegetables that is not broken down by your body. Whole-grain foods are healthy and provide extra fiber in your diet. Some examples of whole-grain foods are whole-wheat breads and pastas, oatmeal, brown rice, and bulgur.  Eat a variety of vegetables every day.  Include dark, leafy greens such as spinach, kale, adal greens, and mustard greens. Eat yellow and orange vegetables such as carrots, sweet potatoes, and winter squash.   Eat a variety of fruits every day.  Choose fresh or canned fruit (canned in its own juice or light syrup) instead of juice. Fruit juice has very little or no fiber.  Eat low-fat dairy foods.  Drink fat-free (skim) milk or 1% milk. Eat fat-free yogurt and low-fat cottage cheese. Try low-fat cheeses such as mozzarella and other reduced-fat cheeses.  Choose meat and other protein foods that are low in fat.  Choose beans or other legumes such as split peas or lentils. Choose fish, skinless poultry (chicken or turkey), or lean cuts of red meat (beef or pork). Before you cook meat or poultry, cut off any visible fat.   Use less fat and oil.  Try baking foods instead of frying them. Add less fat, such as margarine, sour cream, regular salad dressing and mayonnaise to foods. Eat fewer high-fat foods. Some  "examples of high-fat foods include french fries, doughnuts, ice cream, and cakes.  Eat fewer sweets.  Limit foods and drinks that are high in sugar. This includes candy, cookies, regular soda, and sweetened drinks.  Exercise:  Exercise at least 30 minutes per day on most days of the week. Some examples of exercise include walking, biking, dancing, and swimming. You can also fit in more physical activity by taking the stairs instead of the elevator or parking farther away from stores. Ask your healthcare provider about the best exercise plan for you.   Alcohol Use and Your Health    Drinking too much can harm your health.  Excessive alcohol use leads to about 88,000 death in the [place] each year, and shortens the life of those who diet by almost 30 years.  Further, excessive drinking cost the economy $249 billion in 2010.  Most excessive drinkers are not alcohol dependent.    Excessive alcohol use has immediate effects that increase the risk of many harmful health conditions.  These are most often the result of binge drinking.  Over time, excessive alcohol use can lead to the development of chronic diseases and other series health problems.    What is considered a \"drink\"?        Excessive alcohol use includes:  Binge Drinking: For women, 4 or more drinks consumed on one occasion. For men, 5 or more drinks consumed on one occasion.  Heavy Drinking: For women, 8 or more drinks per week. For men, 15 or more drinks per week  Any alcohol used by pregnant women  Any alcohol used by those under the age of 21 years    If you choose to drink, do so in moderation:  Do not drink at all if you are under the age of 21, or if you are or may be pregnant, or have health problems that could be made worse by drinking.  For women, up to 1 drink per day  For men, up to 2 drinks a day    No one should begin drinking or drink more frequently based on potential health benefits    Short-Term Health Risks:  Injuries: motor vehicle " crashes, falls, drownings, burns  Violence: homicide, suicide, sexual assault, intimate partner violence  Alcohol poisoning  Reproductive health: risky sexual behaviors, unintended prengnacy, sexually transmitted diseases, miscarriage, stillbirth, fetal alcohol syndrome    Long-Term Health Risks:  Chronic diseases: high blood pressure, heart disease, stroke, liver disease, digestive problems  Cancers: breast, mouth and throat, liver, colon  Learning and memory problems: dementia, poor school performance  Mental health: depression, anxiety, insomnia  Social problems: lost productivity, family problems, unemployment  Alcohol dependence    For support and more information:  Substance Abuse and Mental Health Services Administration  [address]  [place] [address]  Web Address: http://www.samhsa.gov    Alcoholics Anonymous        Web Address: http://www.aa.org    https://www.cdc.gov/alcohol/fact-sheets/alcohol-use.htm     © Copyright [employer] 2018 Information is for End User's use only and may not be sold, redistributed or otherwise used for commercial purposes. All illustrations and images included in CareNotes® are the copyrighted property of [employer] or [employer]

## 2024-07-15 NOTE — PROGRESS NOTES
Ambulatory Visit  Name: Balbir Daniels      : 1950      MRN: [id number]  Encounter Provider: Monalisa Anglin MD  Encounter Date: 7/15/2024   Encounter department: [facility]    Assessment & Plan   1. Medicare annual wellness visit, subsequent  Assessment & Plan:  Advice and education were given regarding nutrition, aerobic exercises, weight-bearing exercises, cardiovascular risk reduction, fall risk reduction, and age-appropriate supplements.     The patient was counseled regarding instructions for management, risk factor reductions, prognosis, risks and benefits of treatment options, patient and family education, and importance of compliance with treatment.    Patient overdue for his colonoscopy it was a schedule and postponed secondary to valve replacement patient will call GI for appointment  Orders:  -     CBC and differential; Future  -     Comprehensive metabolic panel; Future  2. Type 2 diabetes mellitus without complication, without long-term current use of insulin (HCC)  Assessment & Plan:    Lab Results   Component Value Date    HGBA1C 6.0 (H) 2024   Chronic asymptomatic hemoglobin A1c well-controlled continue current management low-carb diet will continue to monitor  Orders:  -     IRIS Diabetic eye exam  -     CBC and differential; Future  -     Comprehensive metabolic panel; Future  -     Hemoglobin A1C; Future  3. Stage 3a chronic kidney disease (HCC)  Assessment & Plan:  Lab Results   Component Value Date    EGFR 53 2024    EGFR 68 2024    EGFR 56 10/30/2023    CREATININE 1.33 (H) 2024    CREATININE 1.08 2024    CREATININE 1.27 10/30/2023   Chronic decline in the GFR compared with before patient already on ACE inhibitor possible secondary to diuretic he is on Demadex discussed well hydration avoid NSAID patient at the goal for the blood pressure for chronic kidney disease stage III  Orders:  -     CBC and differential;  Future  -     Comprehensive metabolic panel; Future  4. Essential hypertension  Assessment & Plan:  Chronic asymptomatic fair control continue lisinopril 10 mg once a day metoprolol tartrate 25 mg twice a day  Orders:  -     CBC and differential; Future  -     Comprehensive metabolic panel; Future  5. Screening, lipid  -     Lipid Panel with Direct LDL reflex; Future  6. Screening for thyroid disorder  -     TSH, 3rd generation with Free T4 reflex; Future  7. Postoperative atrial fibrillation (HCC)  Assessment & Plan:  Rate is controlled patient on beta-blocker rhythm is sinus patient follow-up with the cardiology he is currently on Coumadin for mitral valve replacement  Patient overdue for appointment with cardiology       Preventive health issues were discussed with patient, and age appropriate screening tests were ordered as noted in patient's After Visit Summary. Personalized health advice and appropriate referrals for health education or preventive services given if needed, as noted in patient's After Visit Summary.    History of Present Illness     Patient here for Medicare exam follow-up with a chronic condition compliant with the medication tolerated well without side effect no new concern recent blood work reviewed with the patient       Patient Care Team:  Monalisa Anglin MD as PCP - General (Family Medicine)  Chelsie Angel MD as PCP - Endocrinology (Endocrinology)  John Moffett MD (Ophthalmology)  Iglesia Murdock MD (Urology)    Review of Systems   Constitutional:  Negative for chills and fever.   HENT:  Negative for ear pain and sore throat.    Eyes:  Negative for pain and visual disturbance.   Respiratory:  Negative for cough and shortness of breath.    Cardiovascular:  Negative for chest pain and palpitations.   Gastrointestinal:  Negative for abdominal pain, constipation, diarrhea and vomiting.   Genitourinary:  Negative for dysuria and hematuria.   Musculoskeletal:  Negative for gait problem.   Skin:   Negative for color change and rash.   Neurological:  Negative for seizures and syncope.   All other systems reviewed and are negative.    Medical History Reviewed by provider this encounter:  Tobacco  Allergies  Meds  Problems  Med Hx  Surg Hx  Fam Hx       Annual Wellness Visit Questionnaire       Health Risk Assessment:   Patient rates overall health as good. Patient feels that their physical health rating is slightly worse. Patient is satisfied with their life. Eyesight was rated as same. Hearing was rated as same. Patient feels that their emotional and mental health rating is slightly better. Patients states they are sometimes angry. Patient states they are often unusually tired/fatigued. Pain experienced in the last 7 days has been some. Patient's pain rating has been 3/10. Patient states that he has experienced no weight loss or gain in last 6 months.     Depression Screening:   PHQ-2 Score: 0      Fall Risk Screening:   In the past year, patient has experienced: no history of falling in past year      Home Safety:  Patient does not have trouble with stairs inside or outside of their home. Patient has working smoke alarms and has working carbon monoxide detector. Home safety hazards include: none.     Nutrition:   Current diet is Regular.     Medications:   Patient is currently taking over-the-counter supplements. OTC medications include: see medication list. Patient is able to manage medications.     Activities of Daily Living (ADLs)/Instrumental Activities of Daily Living (IADLs):   Walk and transfer into and out of bed and chair?: Yes  Dress and groom yourself?: Yes    Bathe or shower yourself?: Yes    Feed yourself? Yes  Do your laundry/housekeeping?: Yes  Manage your money, pay your bills and track your expenses?: Yes  Make your own meals?: Yes    Do your own shopping?: Yes    Previous Hospitalizations:   Any hospitalizations or ED visits within the last 12 months?: Yes    How many hospitalizations  have you had in the last year?: 1-2    Advance Care Planning:   Living will: Yes    Durable POA for healthcare: Yes    Advanced directive: Yes      Cognitive Screening:   Provider or family/friend/caregiver concerned regarding cognition?: No    PREVENTIVE SCREENINGS      Cardiovascular Screening:    General: Screening Not Indicated and History Lipid Disorder      Diabetes Screening:     General: Screening Not Indicated and History Diabetes      Colorectal Cancer Screening:     General: Screening Current      Prostate Cancer Screening:    General: Screening Current      Osteoporosis Screening:    General: Screening Not Indicated      Abdominal Aortic Aneurysm (AAA) Screening:    Risk factors include: age between 65-74 yo and tobacco use        Lung Cancer Screening:     General: Screening Not Indicated      Hepatitis C Screening:    General: Screening Current    Screening, Brief Intervention, and Referral to Treatment (SBIRT)    Screening      AUDIT-C Screenin) How often did you have a drink containing alcohol in the past year? 4 or more times a week  2) How many drinks did you have on a typical day when you were drinking in the past year? 1 to 2  3) How often did you have 6 or more drinks on one occasion in the past year? less than monthly    AUDIT-C Score: 5  Interpretation: Score 4-12 (male): POSITIVE screen for alcohol misuse    AUDIT Screenin) How often during the last year have you found that you were not able to stop drinking once you had started? 0 - never  5) How often during the last year have you failed to do what was normally expected from you because of drinking? 0 - never  6) How often during the last year have you needed a first drink in the morning to get yourself going after a heavy drinking session? 0 - never  7) How often during the last year have you had a feeling of guilt or remorse after drinking? 0 - never  8) How often during the last year have you been unable to remember what  "happened the night before because you had been drinking? 0 - never  9) Have you or someone else been injured as a result of your drinking? 0 - no  10) Has a relative or friend or a doctor or another health worker been concerned about your drinking or suggested you cut down? 0 - no    AUDIT Score: 5  Interpretation: Low risk alcohol consumption    Single Item Drug Screening:  How often have you used an illegal drug (including marijuana) or a prescription medication for non-medical reasons in the past year? weekly    Single Item Drug Screen Score: 3  Interpretation: POSITIVE screen for possible drug use disorder    Drug Abuse Screening Test (DAST-10):  1) Have you used drugs other than those required for medical reasons? Yes  2) Do you abuse more than one drug at a time? No  3) Are you always able to stop using drugs when you want to? Yes  4) Have you had \"blackouts\" or \"flashbacks\" as a result of drug use? No  5) Do you ever feel bad or guilty about your drug use? No  6) Does your spouse (or parents) ever complain about your involvement with drugs? No  7) Have you neglected your family because of your use of drugs? No  9) Have you ever experienced withdrawal symptoms (felt sick) when you stopped taking drugs? No  10) Have you had medical problems as a result of your drug use (e.g., memory loss, hepatitis, convulsions, bleeding, etc.)? No    Brief Intervention  Healthy alcohol use/limits discussed.     Social Determinants of Health     Financial Resource Strain: Low Risk  (6/5/2023)    Overall Financial Resource Strain (CARDIA)     Difficulty of Paying Living Expenses: Not hard at all   Food Insecurity: No Food Insecurity (7/15/2024)    Hunger Vital Sign     Worried About Running Out of Food in the Last Year: Never true     Ran Out of Food in the Last Year: Never true   Transportation Needs: No Transportation Needs (7/15/2024)    PRAPARE - Transportation     Lack of Transportation (Medical): No     Lack of " "Transportation (Non-Medical): No   Housing Stability: Low Risk  (7/15/2024)    Housing Stability Vital Sign     Unable to Pay for Housing in the Last Year: No     Number of Times Moved in the Last Year: 0     Homeless in the Last Year: No   Utilities: Not At Risk (7/15/2024)    [facility] Utilities     Threatened with loss of utilities: No     No results found.    Objective     /72 (BP Location: Left arm, Patient Position: Sitting, Cuff Size: Standard)   Pulse 70   Temp 98.1 °F (36.7 °C) (Tympanic)   Ht 6' 0.17\" (1.833 m)   Wt 107 kg (236 lb 9.6 oz)   SpO2 98%   BMI 31.94 kg/m²     Physical Exam  Vitals and nursing note reviewed.   Constitutional:       General: He is not in acute distress.     Appearance: He is well-developed. He is not diaphoretic.   HENT:      Head: Normocephalic.      Right Ear: Tympanic membrane and external ear normal.      Left Ear: Tympanic membrane and external ear normal.      Nose: No rhinorrhea.      Mouth/Throat:      Pharynx: No posterior oropharyngeal erythema.   Eyes:      General:         Right eye: No discharge.         Left eye: No discharge.      Conjunctiva/sclera: Conjunctivae normal.   Neck:      Vascular: No JVD.   Cardiovascular:      Rate and Rhythm: Normal rate and regular rhythm.      Pulses: no weak pulses.           Dorsalis pedis pulses are 2+ on the right side and 2+ on the left side.      Heart sounds: Murmur heard.      No gallop.   Pulmonary:      Effort: Pulmonary effort is normal. No respiratory distress.      Breath sounds: Normal breath sounds. No stridor. No wheezing or rales.   Chest:      Chest wall: No tenderness.   Abdominal:      General: There is no distension.      Palpations: Abdomen is soft. There is no mass.      Tenderness: There is no abdominal tenderness. There is no rebound.   Musculoskeletal:         General: No tenderness.      Cervical back: Normal range of motion and neck supple.        Feet:    Feet:      Right foot:      Skin " integrity: No warmth.      Left foot:      Skin integrity: No warmth.   Lymphadenopathy:      Cervical: No cervical adenopathy.   Skin:     General: Skin is warm.      Findings: No erythema or rash.   Neurological:      Mental Status: He is alert and oriented to person, place, and time.       Patient's shoes and socks removed.    Right Foot/Ankle   Right Foot Inspection  Skin Exam: skin intact. No warmth and no pre-ulcer.     Toe Exam: No swelling and erythema    Sensory   Monofilament testing: intact    Vascular  Capillary refills: < 3 seconds  The right DP pulse is 2+.     Left Foot/Ankle  Left Foot Inspection  Skin Exam: skin intact. No warmth and no pre-ulcer.     Toe Exam: No swelling and no erythema.     Sensory   Monofilament testing: intact    Vascular  Capillary refills: < 3 seconds  The left DP pulse is 2+.     Assign Risk Category  No deformity present  No loss of protective sensation  No weak pulses  Risk: 2

## 2024-07-15 NOTE — ASSESSMENT & PLAN NOTE
Lab Results   Component Value Date    EGFR 53 05/03/2024    EGFR 68 04/04/2024    EGFR 56 10/30/2023    CREATININE 1.33 (H) 05/03/2024    CREATININE 1.08 04/04/2024    CREATININE 1.27 10/30/2023   Chronic decline in the GFR compared with before patient already on ACE inhibitor possible secondary to diuretic he is on Demadex discussed well hydration avoid NSAID patient at the goal for the blood pressure for chronic kidney disease stage III

## 2024-07-16 PROBLEM — Z00.00 MEDICARE ANNUAL WELLNESS VISIT, SUBSEQUENT: Status: ACTIVE | Noted: 2024-07-16

## 2024-07-16 NOTE — ASSESSMENT & PLAN NOTE
Rate is controlled patient on beta-blocker rhythm is sinus patient follow-up with the cardiology he is currently on Coumadin for mitral valve replacement  Patient overdue for appointment with cardiology

## 2024-07-16 NOTE — ASSESSMENT & PLAN NOTE
Chronic asymptomatic fair control continue lisinopril 10 mg once a day metoprolol tartrate 25 mg twice a day

## 2024-07-16 NOTE — ASSESSMENT & PLAN NOTE
Advice and education were given regarding nutrition, aerobic exercises, weight-bearing exercises, cardiovascular risk reduction, fall risk reduction, and age-appropriate supplements.     The patient was counseled regarding instructions for management, risk factor reductions, prognosis, risks and benefits of treatment options, patient and family education, and importance of compliance with treatment.    Patient overdue for his colonoscopy it was a schedule and postponed secondary to valve replacement patient will call GI for appointment

## 2024-07-16 NOTE — ASSESSMENT & PLAN NOTE
Lab Results   Component Value Date    HGBA1C 6.0 (H) 05/03/2024   Chronic asymptomatic hemoglobin A1c well-controlled continue current management low-carb diet will continue to monitor

## 2024-08-04 DIAGNOSIS — Z98.890 S/P MITRAL VALVE REPAIR: ICD-10-CM

## 2024-08-04 DIAGNOSIS — Z98.890 S/P LEFT ATRIAL APPENDAGE LIGATION: ICD-10-CM

## 2024-08-04 DIAGNOSIS — E61.1 LOW IRON: ICD-10-CM

## 2024-08-05 RX ORDER — FERROUS SULFATE 324(65)MG
324 TABLET, DELAYED RELEASE (ENTERIC COATED) ORAL
Qty: 30 TABLET | Refills: 0 | Status: SHIPPED | OUTPATIENT
Start: 2024-08-05

## 2024-08-05 RX ORDER — POTASSIUM CHLORIDE 20 MEQ/1
20 TABLET, EXTENDED RELEASE ORAL DAILY
Qty: 90 TABLET | Refills: 0 | Status: SHIPPED | OUTPATIENT
Start: 2024-08-05 | End: 2025-07-31

## 2024-08-09 ENCOUNTER — ANTICOAG VISIT (OUTPATIENT)
Dept: CARDIOLOGY CLINIC | Facility: CLINIC | Age: 74
End: 2024-08-09

## 2024-08-09 LAB
INR PPP: 2.1 (ref 0.9–1.2)
PROTHROMBIN TIME: 22 SEC (ref 9.1–12)

## 2024-08-15 ENCOUNTER — TELEPHONE (OUTPATIENT)
Dept: FAMILY MEDICINE CLINIC | Facility: CLINIC | Age: 74
End: 2024-08-15

## 2024-08-15 PROBLEM — Z00.00 MEDICARE ANNUAL WELLNESS VISIT, SUBSEQUENT: Status: RESOLVED | Noted: 2024-07-16 | Resolved: 2024-08-15

## 2024-08-15 NOTE — TELEPHONE ENCOUNTER
I called [facility] to ask for diabetic eye exam notes. Last notes are from 12/2022 so they will be sending that over.

## 2024-08-16 ENCOUNTER — TELEPHONE (OUTPATIENT)
Dept: ADMINISTRATIVE | Facility: OTHER | Age: 74
End: 2024-08-16

## 2024-08-16 NOTE — TELEPHONE ENCOUNTER
----- Message from Ct TOWNSEND sent at 8/16/2024 11:20 AM EDT -----  Regarding: care gap request  08/16/24 11:20 AM    Hello, our patient attached above has had Diabetic Eye Exam completed/performed. Please assist in updating the patient chart by pulling the document from the Media Tab. The date of service is 12/6/2022. The date that it was scanned into media is 8/15/2024.    Thank you,  Ct Martínez MA  [facility]  [facility]

## 2024-08-16 NOTE — TELEPHONE ENCOUNTER
Upon review of the In Basket request we were able to locate, review, and update the patient chart as requested for Diabetic Eye Exam.    Any additional questions or concerns should be emailed to the Practice Liaisons via the appropriate education email address, please do not reply via In Basket.    Thank you  Breanna Greene   [facility]

## 2024-08-20 DIAGNOSIS — N40.0 BENIGN PROSTATIC HYPERPLASIA, UNSPECIFIED WHETHER LOWER URINARY TRACT SYMPTOMS PRESENT: ICD-10-CM

## 2024-08-20 RX ORDER — FINASTERIDE 5 MG/1
TABLET, FILM COATED ORAL
Qty: 90 TABLET | Refills: 1 | Status: SHIPPED | OUTPATIENT
Start: 2024-08-20

## 2024-09-10 DIAGNOSIS — Z98.890 S/P MITRAL VALVE REPAIR: ICD-10-CM

## 2024-09-10 DIAGNOSIS — Z98.890 S/P LEFT ATRIAL APPENDAGE LIGATION: ICD-10-CM

## 2024-09-10 RX ORDER — WARFARIN SODIUM 2.5 MG/1
TABLET ORAL
Qty: 360 TABLET | Refills: 1 | Status: SHIPPED | OUTPATIENT
Start: 2024-09-10

## 2024-09-11 ENCOUNTER — TELEPHONE (OUTPATIENT)
Dept: CARDIOLOGY CLINIC | Facility: CLINIC | Age: 74
End: 2024-09-11

## 2024-09-11 DIAGNOSIS — E61.1 LOW IRON: ICD-10-CM

## 2024-09-12 RX ORDER — FERROUS SULFATE 324(65)MG
324 TABLET, DELAYED RELEASE (ENTERIC COATED) ORAL
Qty: 30 TABLET | Refills: 2 | Status: SHIPPED | OUTPATIENT
Start: 2024-09-12

## 2024-09-13 ENCOUNTER — ANTICOAG VISIT (OUTPATIENT)
Dept: CARDIOLOGY CLINIC | Facility: CLINIC | Age: 74
End: 2024-09-13

## 2024-09-13 LAB
INR PPP: 2.5 (ref 0.9–1.2)
PROTHROMBIN TIME: 25.6 SEC (ref 9.1–12)

## 2024-09-13 NOTE — PROGRESS NOTES
Pt tolerating PO fluids and crackers, Pt resting comfortably at bedside with breathing steady and unlabored.    Sleep Study Documentation    Pre-Sleep Study       Sleep testing procedure explained to patient:YES    Patient napped prior to study:NO    [address] [place] worker after 12PM   Caffeine use:NO    Alcohol:Dayshift workers after 5PM: Alcohol use:YES-Wine 1 serving  Liquor 1 serving    Typical day for patient:YES       Study Documentation    Sleep Study Indications:  OAS-diagnosed at [address]  Sleep Study: Treatment   Optimal PAP pressure:  BIPAP 20/16 cm H2O  Leak:Small  Snore:Eliminated  REM Obtained:yes  Supplemental O2: no    Minimum SaO2  79 %  Baseline SaO2  92 9 %  PAP mask tried (list all) ResMed AirFit n20 Medium Nasal Mask  ResMed AirTouch F20 Medium Full Face Mask with no humidity  PAP mask choice (final) ResMed AirTouch F20 Medium Full Face Mask with no humidity  PAP mask type:full face  PAP pressure at which snoring was eliminated  BIPAP 20/16 cm H2O  Minimum SaO2 at final PAP pressure  95 %  Mode of Therapy:BiPAP  ETCO2:No  CPAP changed to BiPAP:Yes  If yes why PT continues to experience events at CPAP 16  Mode of Therapy:BiPAP    EKG abnormalities: yes:  Occasional PAC,  EPOCHS: 437, 489  Occasional PVC, EPOCHS: 432, 489, 502  Irregular rhythm, EPOCH 428  EEG abnormalities: no    Sleep Study Recorded < 2 hours: N/A    Sleep Study Recorded > 2 hours but incomplete study: N/A    Sleep Study Recorded 6 hours but no sleep obtained: NO          Post-Sleep Study    Medication used at bedtime or during sleep study:YES prescription sleep aid  Other prescription medications  Patient reports time it took to fall asleep:20 to 30 minutes    Patient reports waking up during study:Denied    Patient reports sleeping 6 to 8 hours without dreaming  Patient reports sleep during study:better than usual    Patient rated sleepiness: Not sleepy or tired    PAP treatment:yes: Post PAP treatment patient reports feeling better and  would wear PAP mask at home

## 2024-09-16 NOTE — TELEPHONE ENCOUNTER
Health Maintenance       Osteoporosis Screening (Once)  Never done    COVID-19 Vaccine (3 - 2023-24 season)  Overdue since 9/1/2024    Influenza Vaccine (1)  Due since 9/1/2024           Following review of the above:  Patient is not proceeding with: Osteoporosis screening, COVID-19, and Influenza    Note: Refer to final orders and clinician documentation.          Recent PHQ 2/9 Score    PHQ 2:  PHQ 2 Score Adult PHQ 2 Score Adult PHQ 2 Interpretation Little interest or pleasure in activity?   9/16/2024  10:22 AM 0 No further screening needed 0       PHQ 9:  PHQ 9 Score Adult PHQ 9 Score Adult PHQ 9 Interpretation   1/30/2024  10:10 AM 2 Minimal Depression          Pt's wife Gin calling to schedule a follow up appt as soon as possible per ER visit 4/4/24. Stated the pt was consulted in the ER by Zelda Felipe who recommended an appt with Urology as soon as possible. Pt was seen and diagnosed with Hematuria and a UTI. Please review notes for appropriate timing.     Pt call back: [phone]

## 2024-09-28 DIAGNOSIS — N40.0 BENIGN PROSTATIC HYPERPLASIA, UNSPECIFIED WHETHER LOWER URINARY TRACT SYMPTOMS PRESENT: ICD-10-CM

## 2024-09-28 RX ORDER — TAMSULOSIN HYDROCHLORIDE 0.4 MG/1
CAPSULE ORAL
Qty: 90 CAPSULE | Refills: 1 | Status: SHIPPED | OUTPATIENT
Start: 2024-09-28

## 2024-10-22 ENCOUNTER — APPOINTMENT (OUTPATIENT)
Dept: LAB | Facility: CLINIC | Age: 74
End: 2024-10-22
Payer: MEDICARE

## 2024-10-22 DIAGNOSIS — Z98.890 S/P MITRAL VALVE REPAIR: ICD-10-CM

## 2024-10-22 LAB
INR PPP: 2.12 (ref 0.85–1.19)
PROTHROMBIN TIME: 23.8 SECONDS (ref 12.3–15)

## 2024-10-22 PROCEDURE — 85610 PROTHROMBIN TIME: CPT

## 2024-10-22 PROCEDURE — 36415 COLL VENOUS BLD VENIPUNCTURE: CPT

## 2024-10-23 ENCOUNTER — ANTICOAG VISIT (OUTPATIENT)
Dept: CARDIOLOGY CLINIC | Facility: CLINIC | Age: 74
End: 2024-10-23

## 2024-10-23 NOTE — PROGRESS NOTES
PC to patient leaving a message on answering machine to continue same dosing of 10 mgs every M/F and 7.5 mgs all other days of the week. Retest in 4 weeks.

## 2024-10-28 DIAGNOSIS — E61.1 LOW IRON: ICD-10-CM

## 2024-10-28 DIAGNOSIS — Z98.890 S/P MITRAL VALVE REPAIR: ICD-10-CM

## 2024-10-28 DIAGNOSIS — Z98.890 S/P LEFT ATRIAL APPENDAGE LIGATION: ICD-10-CM

## 2024-10-28 RX ORDER — FERROUS SULFATE 324(65)MG
324 TABLET, DELAYED RELEASE (ENTERIC COATED) ORAL
Qty: 30 TABLET | Refills: 2 | Status: SHIPPED | OUTPATIENT
Start: 2024-10-28

## 2024-10-28 RX ORDER — POTASSIUM CHLORIDE 1500 MG/1
20 TABLET, EXTENDED RELEASE ORAL DAILY
Qty: 90 TABLET | Refills: 0 | Status: CANCELLED | OUTPATIENT
Start: 2024-10-28 | End: 2025-10-23

## 2024-10-28 NOTE — TELEPHONE ENCOUNTER
Medication: ferrous sulfate 324 (65 Fe) mg     Dose/Frequency: Take 1 tablet (324 mg total) by mouth daily before breakfast     Quantity: 30    Pharmacy: [id number] [place]  [address]    Office:   [x] PCP/Provider -   [] Speciality/Provider -     Does the patient have enough for 3 days?   [x] Yes   [] No - Send as HP to POD       Medication: potassium chloride (Klor-Con M20) 20 mEq tablet     Dose/Frequency:     Take 1 tablet (20 mEq total) by mouth daily Unless otherwise directed.       Quantity: 90    [facility] [address]    Office:   [x] PCP/Provider -   [] Speciality/Provider -     Does the patient have enough for 3 days?   [x] Yes   [] No - Send as HP to POD

## 2024-10-29 ENCOUNTER — APPOINTMENT (OUTPATIENT)
Dept: LAB | Facility: CLINIC | Age: 74
End: 2024-10-29
Payer: MEDICARE

## 2024-10-29 DIAGNOSIS — E11.9 TYPE 2 DIABETES MELLITUS WITHOUT COMPLICATION, WITHOUT LONG-TERM CURRENT USE OF INSULIN (HCC): ICD-10-CM

## 2024-10-29 DIAGNOSIS — N18.31 STAGE 3A CHRONIC KIDNEY DISEASE (HCC): ICD-10-CM

## 2024-10-29 DIAGNOSIS — Z98.890 S/P MITRAL VALVE REPAIR: ICD-10-CM

## 2024-10-29 DIAGNOSIS — Z13.29 SCREENING FOR THYROID DISORDER: ICD-10-CM

## 2024-10-29 DIAGNOSIS — Z00.00 MEDICARE ANNUAL WELLNESS VISIT, SUBSEQUENT: ICD-10-CM

## 2024-10-29 DIAGNOSIS — I10 ESSENTIAL HYPERTENSION: ICD-10-CM

## 2024-10-29 DIAGNOSIS — Z13.220 SCREENING, LIPID: ICD-10-CM

## 2024-10-29 LAB
ALBUMIN SERPL BCG-MCNC: 3.9 G/DL (ref 3.5–5)
ALP SERPL-CCNC: 117 U/L (ref 34–104)
ALT SERPL W P-5'-P-CCNC: 17 U/L (ref 7–52)
ANION GAP SERPL CALCULATED.3IONS-SCNC: 5 MMOL/L (ref 4–13)
AST SERPL W P-5'-P-CCNC: 16 U/L (ref 13–39)
BASOPHILS # BLD AUTO: 0.04 THOUSANDS/ΜL (ref 0–0.1)
BASOPHILS NFR BLD AUTO: 1 % (ref 0–1)
BILIRUB SERPL-MCNC: 0.42 MG/DL (ref 0.2–1)
BUN SERPL-MCNC: 18 MG/DL (ref 5–25)
CALCIUM SERPL-MCNC: 8.4 MG/DL (ref 8.4–10.2)
CHLORIDE SERPL-SCNC: 105 MMOL/L (ref 96–108)
CHOLEST SERPL-MCNC: 112 MG/DL
CO2 SERPL-SCNC: 29 MMOL/L (ref 21–32)
CREAT SERPL-MCNC: 1.03 MG/DL (ref 0.6–1.3)
EOSINOPHIL # BLD AUTO: 0.35 THOUSAND/ΜL (ref 0–0.61)
EOSINOPHIL NFR BLD AUTO: 7 % (ref 0–6)
ERYTHROCYTE [DISTWIDTH] IN BLOOD BY AUTOMATED COUNT: 12.9 % (ref 11.6–15.1)
EST. AVERAGE GLUCOSE BLD GHB EST-MCNC: 134 MG/DL
GFR SERPL CREATININE-BSD FRML MDRD: 72 ML/MIN/1.73SQ M
GLUCOSE P FAST SERPL-MCNC: 124 MG/DL (ref 65–99)
HBA1C MFR BLD: 6.3 %
HCT VFR BLD AUTO: 41.2 % (ref 36.5–49.3)
HDLC SERPL-MCNC: 44 MG/DL
HGB BLD-MCNC: 13.2 G/DL (ref 12–17)
IMM GRANULOCYTES # BLD AUTO: 0.02 THOUSAND/UL (ref 0–0.2)
IMM GRANULOCYTES NFR BLD AUTO: 0 % (ref 0–2)
LDLC SERPL CALC-MCNC: 35 MG/DL (ref 0–100)
LYMPHOCYTES # BLD AUTO: 1.65 THOUSANDS/ΜL (ref 0.6–4.47)
LYMPHOCYTES NFR BLD AUTO: 32 % (ref 14–44)
MCH RBC QN AUTO: 32.6 PG (ref 26.8–34.3)
MCHC RBC AUTO-ENTMCNC: 32 G/DL (ref 31.4–37.4)
MCV RBC AUTO: 102 FL (ref 82–98)
MONOCYTES # BLD AUTO: 0.49 THOUSAND/ΜL (ref 0.17–1.22)
MONOCYTES NFR BLD AUTO: 10 % (ref 4–12)
NEUTROPHILS # BLD AUTO: 2.62 THOUSANDS/ΜL (ref 1.85–7.62)
NEUTS SEG NFR BLD AUTO: 50 % (ref 43–75)
NRBC BLD AUTO-RTO: 0 /100 WBCS
PLATELET # BLD AUTO: 119 THOUSANDS/UL (ref 149–390)
PMV BLD AUTO: 10.8 FL (ref 8.9–12.7)
POTASSIUM SERPL-SCNC: 4 MMOL/L (ref 3.5–5.3)
PROT SERPL-MCNC: 6.1 G/DL (ref 6.4–8.4)
RBC # BLD AUTO: 4.05 MILLION/UL (ref 3.88–5.62)
SODIUM SERPL-SCNC: 139 MMOL/L (ref 135–147)
TRIGL SERPL-MCNC: 165 MG/DL
TSH SERPL DL<=0.05 MIU/L-ACNC: 1.23 UIU/ML (ref 0.45–4.5)
WBC # BLD AUTO: 5.17 THOUSAND/UL (ref 4.31–10.16)

## 2024-10-29 PROCEDURE — 83036 HEMOGLOBIN GLYCOSYLATED A1C: CPT

## 2024-10-29 PROCEDURE — 80061 LIPID PANEL: CPT

## 2024-10-29 PROCEDURE — 80053 COMPREHEN METABOLIC PANEL: CPT

## 2024-10-29 PROCEDURE — 85025 COMPLETE CBC W/AUTO DIFF WBC: CPT

## 2024-10-29 PROCEDURE — 84443 ASSAY THYROID STIM HORMONE: CPT

## 2024-10-29 PROCEDURE — 36415 COLL VENOUS BLD VENIPUNCTURE: CPT

## 2024-11-01 ENCOUNTER — OFFICE VISIT (OUTPATIENT)
Dept: FAMILY MEDICINE CLINIC | Facility: CLINIC | Age: 74
End: 2024-11-01
Payer: MEDICARE

## 2024-11-01 VITALS
TEMPERATURE: 97.9 F | HEIGHT: 72 IN | BODY MASS INDEX: 32.64 KG/M2 | HEART RATE: 64 BPM | SYSTOLIC BLOOD PRESSURE: 126 MMHG | OXYGEN SATURATION: 98 % | DIASTOLIC BLOOD PRESSURE: 80 MMHG | WEIGHT: 241 LBS

## 2024-11-01 DIAGNOSIS — R74.8 ALKALINE PHOSPHATASE ELEVATION: ICD-10-CM

## 2024-11-01 DIAGNOSIS — K63.5 POLYP OF COLON, UNSPECIFIED PART OF COLON, UNSPECIFIED TYPE: ICD-10-CM

## 2024-11-01 DIAGNOSIS — E78.2 MIXED HYPERLIPIDEMIA: ICD-10-CM

## 2024-11-01 DIAGNOSIS — N18.31 STAGE 3A CHRONIC KIDNEY DISEASE (HCC): ICD-10-CM

## 2024-11-01 DIAGNOSIS — Z98.890 S/P LEFT ATRIAL APPENDAGE LIGATION: ICD-10-CM

## 2024-11-01 DIAGNOSIS — Z98.890 S/P MITRAL VALVE REPAIR: ICD-10-CM

## 2024-11-01 DIAGNOSIS — E11.9 TYPE 2 DIABETES MELLITUS WITHOUT COMPLICATION, WITHOUT LONG-TERM CURRENT USE OF INSULIN (HCC): Primary | ICD-10-CM

## 2024-11-01 LAB
CREAT UR-MCNC: 38.8 MG/DL
MICROALBUMIN UR-MCNC: <7 MG/L

## 2024-11-01 PROCEDURE — G2211 COMPLEX E/M VISIT ADD ON: HCPCS | Performed by: FAMILY MEDICINE

## 2024-11-01 PROCEDURE — 82043 UR ALBUMIN QUANTITATIVE: CPT | Performed by: FAMILY MEDICINE

## 2024-11-01 PROCEDURE — 99214 OFFICE O/P EST MOD 30 MIN: CPT | Performed by: FAMILY MEDICINE

## 2024-11-01 PROCEDURE — 82570 ASSAY OF URINE CREATININE: CPT | Performed by: FAMILY MEDICINE

## 2024-11-03 NOTE — ASSESSMENT & PLAN NOTE
Patient history of colon polyp  is overdue for colonoscopy    Orders:    Ambulatory Referral to Gastroenterology; Future

## 2024-11-03 NOTE — ASSESSMENT & PLAN NOTE
Lab Results   Component Value Date    EGFR 72 10/29/2024    EGFR 53 05/03/2024    EGFR 68 04/04/2024    CREATININE 1.03 10/29/2024    CREATININE 1.33 (H) 05/03/2024    CREATININE 1.08 04/04/2024   Chronic asymptomatic improving the GFR compared with before discussed the patient avoid NSAID keep well hydration    Orders:    CBC and differential; Future    Lipid panel; Future    TSH, 3rd generation with Free T4 reflex; Future    Comprehensive metabolic panel; Future    Hemoglobin A1C; Future

## 2024-11-03 NOTE — ASSESSMENT & PLAN NOTE
Lab Results   Component Value Date    HGBA1C 6.3 (H) 10/29/2024   Chronic asymptomatic fair control continue current management low-carb diet discussed with patient    Orders:    CBC and differential; Future    Lipid panel; Future    TSH, 3rd generation with Free T4 reflex; Future    Comprehensive metabolic panel; Future    Hemoglobin A1C; Future    Albumin / creatinine urine ratio; Future    Albumin / creatinine urine ratio

## 2024-11-03 NOTE — ASSESSMENT & PLAN NOTE
New diagnosis finding on recent blood work increase repayment facilities recommend to recheck a level and do ultrasound of the liver    Orders:    Alkaline phosphatase; Future    CBC and differential; Future    Lipid panel; Future    TSH, 3rd generation with Free T4 reflex; Future    Comprehensive metabolic panel; Future    Hemoglobin A1C; Future    US abdomen complete; Future

## 2024-11-03 NOTE — ASSESSMENT & PLAN NOTE
Chronic asymptomatic fair control continue atorvastatin 20 mg once a day low-fat diet discussed with patient    Orders:    CBC and differential; Future    Lipid panel; Future    TSH, 3rd generation with Free T4 reflex; Future    Comprehensive metabolic panel; Future    Hemoglobin A1C; Future

## 2024-11-03 NOTE — PROGRESS NOTES
Ambulatory Visit  Name: Balbir Daniels      : 1950      MRN: [id number]  Encounter Provider: Monalisa Anglin MD  Encounter Date: 2024   Encounter department: [facility]      Assessment & Plan  Type 2 diabetes mellitus without complication, without long-term current use of insulin (HCC)    Lab Results   Component Value Date    HGBA1C 6.3 (H) 10/29/2024   Chronic asymptomatic fair control continue current management low-carb diet discussed with patient    Orders:    CBC and differential; Future    Lipid panel; Future    TSH, 3rd generation with Free T4 reflex; Future    Comprehensive metabolic panel; Future    Hemoglobin A1C; Future    Albumin / creatinine urine ratio; Future    Albumin / creatinine urine ratio    Alkaline phosphatase elevation  New diagnosis finding on recent blood work increase repayment facilities recommend to recheck a level and do ultrasound of the liver    Orders:    Alkaline phosphatase; Future    CBC and differential; Future    Lipid panel; Future    TSH, 3rd generation with Free T4 reflex; Future    Comprehensive metabolic panel; Future    Hemoglobin A1C; Future    US abdomen complete; Future    Stage 3a chronic kidney disease (HCC)  Lab Results   Component Value Date    EGFR 72 10/29/2024    EGFR 53 2024    EGFR 68 2024    CREATININE 1.03 10/29/2024    CREATININE 1.33 (H) 2024    CREATININE 1.08 2024   Chronic asymptomatic improving the GFR compared with before discussed the patient avoid NSAID keep well hydration    Orders:    CBC and differential; Future    Lipid panel; Future    TSH, 3rd generation with Free T4 reflex; Future    Comprehensive metabolic panel; Future    Hemoglobin A1C; Future    Mixed hyperlipidemia  Chronic asymptomatic fair control continue atorvastatin 20 mg once a day low-fat diet discussed with patient    Orders:    CBC and differential; Future    Lipid panel; Future    TSH, 3rd generation with  "Free T4 reflex; Future    Comprehensive metabolic panel; Future    Hemoglobin A1C; Future    Polyp of colon, unspecified part of colon, unspecified type  Patient history of colon polyp  is overdue for colonoscopy    Orders:    Ambulatory Referral to Gastroenterology; Future         History of Present Illness     Patient here follow-up with a chronic condition compliant with the medication tolerated well without side effect no new concern recent blood test.  reviewed      Review of Systems   Constitutional:  Negative for chills and fever.   HENT:  Negative for ear pain and sore throat.    Eyes:  Negative for pain and visual disturbance.   Respiratory:  Negative for cough and shortness of breath.    Cardiovascular:  Negative for chest pain and palpitations.   Gastrointestinal:  Negative for abdominal pain, constipation, diarrhea and vomiting.   Genitourinary:  Negative for dysuria and hematuria.   Musculoskeletal:  Negative for gait problem.   Skin:  Negative for color change and rash.   Neurological:  Negative for seizures and syncope.   All other systems reviewed and are negative.    Objective     /80 (BP Location: Left arm, Patient Position: Sitting)   Pulse 64   Temp 97.9 °F (36.6 °C) (Tympanic)   Ht 6' 0.17\" (1.833 m)   Wt 109 kg (241 lb)   SpO2 98%   BMI 32.53 kg/m²     Physical Exam  Vitals and nursing note reviewed.   Constitutional:       General: He is not in acute distress.     Appearance: He is well-developed. He is not diaphoretic.   HENT:      Head: Normocephalic.      Right Ear: Tympanic membrane and external ear normal.      Left Ear: Tympanic membrane and external ear normal.      Nose: No rhinorrhea.      Mouth/Throat:      Pharynx: No posterior oropharyngeal erythema.   Eyes:      General:         Right eye: No discharge.         Left eye: No discharge.      Conjunctiva/sclera: Conjunctivae normal.   Neck:      Vascular: No JVD.   Cardiovascular:      Rate and Rhythm: Normal rate.      " Heart sounds: Murmur heard.      No gallop.   Pulmonary:      Effort: Pulmonary effort is normal. No respiratory distress.      Breath sounds: Normal breath sounds. No wheezing.   Abdominal:      General: There is no distension.      Palpations: Abdomen is soft.      Tenderness: There is no abdominal tenderness. There is no rebound.   Musculoskeletal:         General: No tenderness.      Cervical back: Normal range of motion and neck supple.   Lymphadenopathy:      Cervical: No cervical adenopathy.   Skin:     General: Skin is warm.      Findings: No rash.   Neurological:      Mental Status: He is alert and oriented to person, place, and time.         Monalisa Anglin MD      No

## 2024-11-07 RX ORDER — POTASSIUM CHLORIDE 1500 MG/1
TABLET, EXTENDED RELEASE ORAL
Qty: 90 TABLET | Refills: 0 | Status: SHIPPED | OUTPATIENT
Start: 2024-11-07

## 2024-11-07 NOTE — TELEPHONE ENCOUNTER
Patient called requesting refill for  Klor-Con M20 20 MEQ tablet. Patient made aware medication was refilled on 11- for 90 with 0 refills to  [facility]pharmacy #[id number]  pharmacy. Patient instructed to contact the pharmacy to obtain refills of medication. Patient verbalized understanding.

## 2024-11-13 DIAGNOSIS — E78.2 MIXED HYPERLIPIDEMIA: ICD-10-CM

## 2024-11-13 RX ORDER — ATORVASTATIN CALCIUM 20 MG/1
20 TABLET, FILM COATED ORAL DAILY
Qty: 90 TABLET | Refills: 1 | Status: SHIPPED | OUTPATIENT
Start: 2024-11-13

## 2024-11-14 DIAGNOSIS — Z98.890 S/P LEFT ATRIAL APPENDAGE LIGATION: ICD-10-CM

## 2024-11-14 DIAGNOSIS — Z98.890 S/P MITRAL VALVE REPAIR: ICD-10-CM

## 2024-11-14 NOTE — TELEPHONE ENCOUNTER
Office received notification from [facility] stating that the patient is needing torsemide refilled. The patient is a patient of Dr. Richards.

## 2024-11-18 RX ORDER — TORSEMIDE 20 MG/1
20 TABLET ORAL DAILY
Qty: 90 TABLET | Refills: 3 | Status: SHIPPED | OUTPATIENT
Start: 2024-11-18 | End: 2025-11-13

## 2024-12-05 DIAGNOSIS — Z98.890 S/P MITRAL VALVE REPAIR: ICD-10-CM

## 2024-12-05 DIAGNOSIS — Z98.890 S/P LEFT ATRIAL APPENDAGE LIGATION: ICD-10-CM

## 2024-12-08 DIAGNOSIS — I10 ESSENTIAL HYPERTENSION: ICD-10-CM

## 2024-12-10 ENCOUNTER — TELEPHONE (OUTPATIENT)
Dept: CARDIOLOGY CLINIC | Facility: CLINIC | Age: 74
End: 2024-12-10

## 2024-12-11 RX ORDER — LISINOPRIL 10 MG/1
10 TABLET ORAL DAILY
Qty: 90 TABLET | Refills: 1 | Status: SHIPPED | OUTPATIENT
Start: 2024-12-11

## 2024-12-11 RX ORDER — METOPROLOL TARTRATE 25 MG/1
25 TABLET, FILM COATED ORAL EVERY 12 HOURS SCHEDULED
Qty: 180 TABLET | Refills: 1 | Status: SHIPPED | OUTPATIENT
Start: 2024-12-11

## 2024-12-12 ENCOUNTER — TELEPHONE (OUTPATIENT)
Dept: CARDIOLOGY CLINIC | Facility: CLINIC | Age: 74
End: 2024-12-12

## 2024-12-13 ENCOUNTER — TELEPHONE (OUTPATIENT)
Dept: CARDIOLOGY CLINIC | Facility: CLINIC | Age: 74
End: 2024-12-13

## 2024-12-20 ENCOUNTER — TELEPHONE (OUTPATIENT)
Dept: GASTROENTEROLOGY | Facility: CLINIC | Age: 74
End: 2024-12-20

## 2024-12-20 NOTE — TELEPHONE ENCOUNTER
Called pt., lvm to call so that we can assist pt with scheduling ov with Dr. Parikh or one of GI providers.

## 2024-12-26 ENCOUNTER — ANTICOAG VISIT (OUTPATIENT)
Dept: CARDIOLOGY CLINIC | Facility: CLINIC | Age: 74
End: 2024-12-26

## 2024-12-26 ENCOUNTER — HOSPITAL ENCOUNTER (OUTPATIENT)
Dept: ULTRASOUND IMAGING | Facility: HOSPITAL | Age: 74
Discharge: HOME | End: 2024-12-26
Payer: MEDICARE

## 2024-12-26 ENCOUNTER — APPOINTMENT (OUTPATIENT)
Dept: LAB | Facility: HOSPITAL | Age: 74
End: 2024-12-26
Payer: MEDICARE

## 2024-12-26 DIAGNOSIS — R74.8 ALKALINE PHOSPHATASE ELEVATION: ICD-10-CM

## 2024-12-26 LAB
INR PPP: 2.7 (ref 0.85–1.19)
PROTHROMBIN TIME: 28.3 SECONDS (ref 12.3–15)

## 2024-12-26 PROCEDURE — 76700 US EXAM ABDOM COMPLETE: CPT

## 2025-01-18 DIAGNOSIS — Z98.890 S/P MITRAL VALVE REPAIR: ICD-10-CM

## 2025-01-18 DIAGNOSIS — Z98.890 S/P LEFT ATRIAL APPENDAGE LIGATION: ICD-10-CM

## 2025-01-21 RX ORDER — WARFARIN SODIUM 2.5 MG/1
TABLET ORAL
Qty: 360 TABLET | Refills: 1 | Status: SHIPPED | OUTPATIENT
Start: 2025-01-21

## 2025-01-29 ENCOUNTER — ANTICOAG VISIT (OUTPATIENT)
Dept: CARDIOLOGY CLINIC | Facility: CLINIC | Age: 75
End: 2025-01-29

## 2025-01-29 LAB
INR PPP: 3.2 (ref 0.9–1.2)
PROTHROMBIN TIME: 31.9 SEC (ref 9.1–12)

## 2025-01-29 NOTE — PROGRESS NOTES
Pt called will take 2.5 mg today then 10 M 7.5 others recheck in 4 weeks..the patient is in [place] right now.

## 2025-01-30 ENCOUNTER — TELEPHONE (OUTPATIENT)
Age: 75
End: 2025-01-30

## 2025-01-30 NOTE — TELEPHONE ENCOUNTER
Caller: Gin    Doctor/Office: Dr Richards    CB#:       What needs to be faxed: Referral from Dr. Richards to [facility] in [place] with DX codes.  Pt dropped off request in Dec & noted that all records have been received but not the Referral.  Thank you.    ATTN to: [facility] [place]    Fax#: [phone]

## 2025-02-04 DIAGNOSIS — N40.0 BENIGN PROSTATIC HYPERPLASIA, UNSPECIFIED WHETHER LOWER URINARY TRACT SYMPTOMS PRESENT: ICD-10-CM

## 2025-02-05 RX ORDER — FINASTERIDE 5 MG/1
5 TABLET, FILM COATED ORAL DAILY
Qty: 90 TABLET | Refills: 1 | Status: SHIPPED | OUTPATIENT
Start: 2025-02-05

## 2025-02-06 DIAGNOSIS — Z98.890 S/P LEFT ATRIAL APPENDAGE LIGATION: ICD-10-CM

## 2025-02-06 DIAGNOSIS — Z98.890 S/P MITRAL VALVE REPAIR: ICD-10-CM

## 2025-02-06 RX ORDER — POTASSIUM CHLORIDE 1500 MG/1
TABLET, EXTENDED RELEASE ORAL
Qty: 90 TABLET | Refills: 0 | Status: SHIPPED | OUTPATIENT
Start: 2025-02-06

## 2025-02-06 NOTE — TELEPHONE ENCOUNTER
Courtesy refill previously given 11.2024. Patient need an appt and have an upcoming appt 03.05.2025 - Please review to see if the refill is appropriate.

## 2025-02-07 ENCOUNTER — TELEPHONE (OUTPATIENT)
Dept: GASTROENTEROLOGY | Facility: CLINIC | Age: 75
End: 2025-02-07

## 2025-02-07 NOTE — TELEPHONE ENCOUNTER
Patient called the RX Refill Line. Message is being forwarded to the office.     Patient called to cancel his appointment on 2/10/25 because of incoming weather. He said he would call back to reschedule.    Please contact patient at [phone] with any questions

## 2025-02-25 ENCOUNTER — RA CDI HCC (OUTPATIENT)
Dept: OTHER | Facility: HOSPITAL | Age: 75
End: 2025-02-25

## 2025-03-12 ENCOUNTER — TELEPHONE (OUTPATIENT)
Age: 75
End: 2025-03-12

## 2025-03-12 ENCOUNTER — APPOINTMENT (OUTPATIENT)
Dept: LAB | Facility: CLINIC | Age: 75
End: 2025-03-12
Payer: MEDICARE

## 2025-03-12 DIAGNOSIS — N18.31 STAGE 3A CHRONIC KIDNEY DISEASE (HCC): ICD-10-CM

## 2025-03-12 DIAGNOSIS — I48.0 PAF (PAROXYSMAL ATRIAL FIBRILLATION) (HCC): ICD-10-CM

## 2025-03-12 DIAGNOSIS — E11.9 TYPE 2 DIABETES MELLITUS WITHOUT COMPLICATION, WITHOUT LONG-TERM CURRENT USE OF INSULIN (HCC): ICD-10-CM

## 2025-03-12 DIAGNOSIS — R74.8 ALKALINE PHOSPHATASE ELEVATION: ICD-10-CM

## 2025-03-12 DIAGNOSIS — E78.2 MIXED HYPERLIPIDEMIA: ICD-10-CM

## 2025-03-12 LAB
ALBUMIN SERPL BCG-MCNC: 4.3 G/DL (ref 3.5–5)
ALP SERPL-CCNC: 106 U/L (ref 34–104)
ALT SERPL W P-5'-P-CCNC: 26 U/L (ref 7–52)
ANION GAP SERPL CALCULATED.3IONS-SCNC: 9 MMOL/L (ref 4–13)
AST SERPL W P-5'-P-CCNC: 24 U/L (ref 13–39)
BASOPHILS # BLD AUTO: 0.03 THOUSANDS/ÂΜL (ref 0–0.1)
BASOPHILS NFR BLD AUTO: 1 % (ref 0–1)
BILIRUB SERPL-MCNC: 0.88 MG/DL (ref 0.2–1)
BUN SERPL-MCNC: 16 MG/DL (ref 5–25)
CALCIUM SERPL-MCNC: 9.1 MG/DL (ref 8.4–10.2)
CHLORIDE SERPL-SCNC: 103 MMOL/L (ref 96–108)
CHOLEST SERPL-MCNC: 112 MG/DL (ref ?–200)
CO2 SERPL-SCNC: 30 MMOL/L (ref 21–32)
CREAT SERPL-MCNC: 1.07 MG/DL (ref 0.6–1.3)
EOSINOPHIL # BLD AUTO: 0.24 THOUSAND/ÂΜL (ref 0–0.61)
EOSINOPHIL NFR BLD AUTO: 5 % (ref 0–6)
ERYTHROCYTE [DISTWIDTH] IN BLOOD BY AUTOMATED COUNT: 12.7 % (ref 11.6–15.1)
GFR SERPL CREATININE-BSD FRML MDRD: 68 ML/MIN/1.73SQ M
GLUCOSE P FAST SERPL-MCNC: 131 MG/DL (ref 65–99)
HCT VFR BLD AUTO: 42.4 % (ref 36.5–49.3)
HDLC SERPL-MCNC: 41 MG/DL
HGB BLD-MCNC: 13.7 G/DL (ref 12–17)
IMM GRANULOCYTES # BLD AUTO: 0.04 THOUSAND/UL (ref 0–0.2)
IMM GRANULOCYTES NFR BLD AUTO: 1 % (ref 0–2)
INR PPP: 1.36 (ref 0.85–1.19)
LDLC SERPL CALC-MCNC: 49 MG/DL (ref 0–100)
LYMPHOCYTES # BLD AUTO: 1.4 THOUSANDS/ÂΜL (ref 0.6–4.47)
LYMPHOCYTES NFR BLD AUTO: 29 % (ref 14–44)
MCH RBC QN AUTO: 31.9 PG (ref 26.8–34.3)
MCHC RBC AUTO-ENTMCNC: 32.3 G/DL (ref 31.4–37.4)
MCV RBC AUTO: 99 FL (ref 82–98)
MONOCYTES # BLD AUTO: 0.49 THOUSAND/ÂΜL (ref 0.17–1.22)
MONOCYTES NFR BLD AUTO: 10 % (ref 4–12)
NEUTROPHILS # BLD AUTO: 2.7 THOUSANDS/ÂΜL (ref 1.85–7.62)
NEUTS SEG NFR BLD AUTO: 54 % (ref 43–75)
NONHDLC SERPL-MCNC: 71 MG/DL
NRBC BLD AUTO-RTO: 0 /100 WBCS
PLATELET # BLD AUTO: 132 THOUSANDS/UL (ref 149–390)
PMV BLD AUTO: 10.7 FL (ref 8.9–12.7)
POTASSIUM SERPL-SCNC: 3.7 MMOL/L (ref 3.5–5.3)
PROT SERPL-MCNC: 6.5 G/DL (ref 6.4–8.4)
PROTHROMBIN TIME: 17 SECONDS (ref 12.3–15)
RBC # BLD AUTO: 4.3 MILLION/UL (ref 3.88–5.62)
SODIUM SERPL-SCNC: 142 MMOL/L (ref 135–147)
TRIGL SERPL-MCNC: 111 MG/DL (ref ?–150)
TSH SERPL DL<=0.05 MIU/L-ACNC: 1.09 UIU/ML (ref 0.45–4.5)
WBC # BLD AUTO: 4.9 THOUSAND/UL (ref 4.31–10.16)

## 2025-03-12 PROCEDURE — 85610 PROTHROMBIN TIME: CPT

## 2025-03-12 PROCEDURE — 84443 ASSAY THYROID STIM HORMONE: CPT

## 2025-03-12 PROCEDURE — 80061 LIPID PANEL: CPT

## 2025-03-12 PROCEDURE — 85025 COMPLETE CBC W/AUTO DIFF WBC: CPT

## 2025-03-12 PROCEDURE — 36415 COLL VENOUS BLD VENIPUNCTURE: CPT

## 2025-03-12 PROCEDURE — 83036 HEMOGLOBIN GLYCOSYLATED A1C: CPT

## 2025-03-12 PROCEDURE — 80053 COMPREHEN METABOLIC PANEL: CPT

## 2025-03-12 NOTE — TELEPHONE ENCOUNTER
Pts wife called and stated that she has been trying to get a referral for a cardiologist in [place] through her cardiologist and they informed her they can't do that. Pts wife said they are moving to [place] and pt can't see one without referral. She asked if Dr Anglin can please put one in for the pt. Please advise [phone] thank you.

## 2025-03-13 ENCOUNTER — TELEPHONE (OUTPATIENT)
Dept: CARDIOLOGY CLINIC | Facility: CLINIC | Age: 75
End: 2025-03-13

## 2025-03-13 ENCOUNTER — ANTICOAG VISIT (OUTPATIENT)
Dept: CARDIOLOGY CLINIC | Facility: CLINIC | Age: 75
End: 2025-03-13

## 2025-03-13 DIAGNOSIS — N40.0 BENIGN PROSTATIC HYPERPLASIA, UNSPECIFIED WHETHER LOWER URINARY TRACT SYMPTOMS PRESENT: ICD-10-CM

## 2025-03-13 LAB
EST. AVERAGE GLUCOSE BLD GHB EST-MCNC: 131 MG/DL
HBA1C MFR BLD: 6.2 %
LEFT EYE DIABETIC RETINOPATHY: NORMAL
RIGHT EYE DIABETIC RETINOPATHY: NORMAL

## 2025-03-13 NOTE — TELEPHONE ENCOUNTER
Pt calling states his records have been received by cardiology in [place]  but are still requesting a referral.

## 2025-03-13 NOTE — PROGRESS NOTES
Called LM to call back INR low. Missed doses? Would like to take 12.5 mg today then 10 MF 7.5 others recheck in 2 weeks.pt denies any missed doses.

## 2025-03-14 RX ORDER — TAMSULOSIN HYDROCHLORIDE 0.4 MG/1
CAPSULE ORAL
Qty: 90 CAPSULE | Refills: 1 | Status: SHIPPED | OUTPATIENT
Start: 2025-03-14

## 2025-03-14 NOTE — TELEPHONE ENCOUNTER
Faxed LOV note to [facility] at request of patient.  (10/26/23 was LOV note.)

## 2025-03-16 NOTE — PROGRESS NOTES
Name: Balbir Daniels      : 1950      MRN: [id number]  Encounter Provider: SLEEP FELLOW Ryan COBOS  Encounter Date: 3/17/2025   Encounter department: [facility]  :  Assessment & Plan  HUMBERTO treated with BiPAP  Patient very compliant on BiPAP MaxIPAP 72lqN2P Min EPAP 9cmH2O PS 5cmH2O with no signficant issues. Patient does have some dryness in the mouth and does notice leakage around the mouth occasionally. Patient does note continued improvement of snoring and daytime energy with BiPAP and willing to continue to use the machine.    Plan:  Discussed with Dr. Delaney  Compliance report reviewed and analyzed  Continue AutoBiPAP at settings of minimum EPAP 9cmH2O, maximum IPAP [id number], PS 5cmH2O  Patient to talk to [employer] about humidity/temperature adjustments  Prescription for new supplies ordered today  Reviewed CMS/insurance requirements and resupply guidelines  Information provided on the above as well as general maintenance steps  Recommended maintaining good Sleep Hygiene  Patient to followup in 1 year  Patient agreeable to above.   Orders:    PAP DME Resupply/Reorder    Essential hypertension  Continue treatment of HUMBERTO with BiPAP which can improve HTN outcomes.       Type 2 diabetes mellitus with stage 3a chronic kidney disease, without long-term current use of insulin (HCC)    Lab Results   Component Value Date    HGBA1C 6.2 (H) 2025     Continue treatment of HUMBERTO with BiPAP which can improve T2DM outcomes.       Class 1 obesity due to excess calories with serious comorbidity and body mass index (BMI) of 33.0 to 33.9 in adult  Weight loss can also improve sleep apnea.           History of Present Illness   HPI   Per Last Visit Note (Date: 2024):  1. HUMBERTO treated with BiPAP  Assessment & Plan:  Patient has a history of moderate to severe obstructive sleep apnea treated with BiPAP.  His compliance report shows 100% usage greater than 4 hours 100% with a residual AHI of  2.5.  I placed an order for a mask fitting today as he felt some fluttering and air leakage around the sides.  Patient states he put in the serial number for the CPAP and it was not noted to be recalled.  However, upon further investigation we found that it is a recalled machine.  We registered the machine for the recall today and hopefully he will receive a new 1 from [employer] in the near future.  I advised him he should follow-up after he receives the new machine from [employer] so we may ensure that it is set properly.     Orders:  -     Mask fitting only; Future  -     PAP DME Resupply/Reorder      Sleep Studies:  Diagnostic Sleep Study 2020: AHI 28.3. SpO2 hubert 76%. BMI 32.3  CPAP study 2021: BiPAP 20/85vtS6O optimal. BMI 32.3    ________________________________________________________________________________________________      Interval History: Balbir Daniels is a 74 y.o. male with a PMHx of HUMBERTO, HTN, Afib, Mitral regurgitation, T2DM, B12 deficiency who presents in follow up for sleep apnea compliance. Patient states the machine is helpful as it improves the duration of sleep and notices less episodes of awakenings during the night. Also notes improved daytime energy.    SDB:  -Current experience with PAP Therapy: See below.  -Mask type: Full Face Mask  -Difficulties with mask: Leakage noticed with the mask and does find it distracting with whistling noise.   -Device: DreamStation BiPAP Max IPAP [id number] Min EPAP 9cmH2O PS 5cmH2O  -Difficulties with device: Noticed dryness in the morning  -Compliance:  Percent days usage >4 hours 96.7%  Average usage 7 hours 19 minutes  90% IPAP 17.6cmH2O  90% EPAP 16.9cmH2O  Median leaks 14.6L/min  Average Large Leak 8:47 minutes  Residual AHI 2.4    Changes to PMH, PSH, SH: None      Sitting and reading: (Patient-Rptd) (P) Slight chance of dozing  Watching TV: (Patient-Rptd) (P) Slight chance of dozing  Sitting, inactive in a public place (e.g. a theatre or a meeting):  (Patient-Rptd) (P) Would never doze  As a passenger in a car for an hour without a break: (Patient-Rptd) (P) Slight chance of dozing  Lying down to rest in the afternoon when circumstances permit: (Patient-Rptd) (P) Moderate chance of dozing  Sitting and talking to someone: (Patient-Rptd) (P) Would never doze  Sitting quietly after a lunch without alcohol: (Patient-Rptd) (P) Would never doze  In a car, while stopped for a few minutes in traffic: (Patient-Rptd) (P) Would never doze  Total score: (Patient-Rptd) (P) 5     Review of Systems   Respiratory:  Negative for chest tightness and shortness of breath.    Cardiovascular:  Negative for chest pain and palpitations.   Gastrointestinal:  Negative for constipation and diarrhea.   Genitourinary:  Negative for dysuria and hematuria.   Musculoskeletal:  Negative for gait problem.   Skin:  Negative for rash.   Neurological:  Negative for weakness and numbness.   Psychiatric/Behavioral:  Negative for dysphoric mood. The patient is not nervous/anxious.      Pertinent positives/negatives included in HPI and also as noted:     Pertinent Medical History         Medical History Reviewed by provider this encounter:     .  Past Medical History   Past Medical History:   Diagnosis Date    Acute bilateral low back pain without sciatica 03/09/2021    BMI 33.0-33.9,adult 02/04/2020    BPH (benign prostatic hyperplasia)     Cancer ([facility]) 2015    skin    COVID-19     Dependence on respirator (ventilator) status ([facility]) 04/24/2024    Diabetes mellitus (HCC) 1998    borderline    Diabetes mellitus associated with hormonal etiology ([facility]) 03/05/2019    Diverticulitis     ED (erectile dysfunction)     Elevated PSA 01/15/2013    Encounter for postoperative care 04/19/2023    Encounter for well adult exam with abnormal findings 02/04/2019    GERD (gastroesophageal reflux disease) 2018    Herpes zoster 12/18/2014    Hyperlipidemia     Hypertension     IFG (impaired fasting glucose)     Metabolic  syndrome     Need for shingles vaccine 02/04/2019    Obesity 2015    Obesity (BMI 30.0-34.9) 02/04/2019    Psoriasis     Seasonal allergic rhinitis     Shingles 2018    Stage 3a chronic kidney disease (HCC) 11/15/2023    Type 2 diabetes mellitus (HCC) 11/17/2014     Past Surgical History:   Procedure Laterality Date    CARDIAC CATHETERIZATION N/A 03/03/2023    Procedure: Cardiac Coronary Angiogram;  Surgeon: Joshua Yo DO;  Location: [facility] CARDIAC [facility];  Service: Cardiology    CHOLECYSTECTOMY  05/14/2015    COLONOSCOPY  2007    COLONOSCOPY  06/23/2021    HERNIA REPAIR  05/14/2015    MI REPLACEMENT MITRAL VALVE W/CARDIOPULMONARY BYP N/A 3/21/2023    Procedure: MITRAL VALVE REPAIR with [employer] 32 mm CG Future Annuloplasty Ring; LIGATION OF Left ATRIAL APPENDAGE with 45 mm AtriClip; TANG;  Surgeon: Adriel Xiong DO;  Location: [facility] MAIN OR;  Service: Cardiac Surgery     Family History   Problem Relation Age of Onset    Skin cancer Mother     Diabetes type II Mother     Hyperlipidemia Mother     Diabetes Mother         type 2    Cancer Mother         skin    Stroke Mother     Heart disease Father         valve repair    Thyroid disease unspecified Paternal Aunt       reports that he quit smoking about 44 years ago. His smoking use included cigarettes and pipe. He started smoking about 55 years ago. He has a 10.3 pack-year smoking history. He has never been exposed to tobacco smoke. He has never used smokeless tobacco. He reports current alcohol use of about 21.0 standard drinks of alcohol per week. He reports current drug use. Frequency: 1.00 time per week. Drug: Marijuana.  Current Outpatient Medications   Medication Instructions    aspirin (ECOTRIN LOW STRENGTH) 81 mg EC tablet take 1 tablet (81MG)    atorvastatin (LIPITOR) 20 mg, Oral, Daily    ferrous sulfate 324 mg, Oral, Daily before breakfast    finasteride (PROSCAR) 5 mg, Oral, Daily    Klor-Con M20 20 MEQ tablet TAKE 1 TABLET (20 MEQ TOTAL) BY  MOUTH DAILY UNLESS OTHERWISE DIRECTED.    lisinopril (ZESTRIL) 10 mg, Oral, Daily    metoprolol tartrate (LOPRESSOR) 25 mg, Oral, Every 12 hours scheduled    tamsulosin (FLOMAX) 0.4 mg TAKE 1 CAPSULE DAILY WITH  DINNER    torsemide (DEMADEX) 20 mg, Oral, Daily    warfarin (COUMADIN) 2.5 mg tablet TAKE 3 TO 4 TABLETS BY MOUTH DAILY AS DIRECTED BY MD (7.5MG TO 10 MG DAILY)     Allergies   Allergen Reactions    No Known Allergies       Current Outpatient Medications on File Prior to Visit   Medication Sig Dispense Refill    aspirin (ECOTRIN LOW STRENGTH) 81 mg EC tablet take 1 tablet (81MG)      atorvastatin (LIPITOR) 20 mg tablet Take 1 tablet (20 mg total) by mouth daily 90 tablet 1    ferrous sulfate 324 (65 Fe) mg Take 1 tablet (324 mg total) by mouth daily before breakfast 30 tablet 2    finasteride (PROSCAR) 5 mg tablet TAKE 1 TABLET DAILY 90 tablet 1    Klor-Con M20 20 MEQ tablet TAKE 1 TABLET (20 MEQ TOTAL) BY MOUTH DAILY UNLESS OTHERWISE DIRECTED. 90 tablet 0    lisinopril (ZESTRIL) 10 mg tablet TAKE 1 TABLET BY MOUTH EVERY DAY 90 tablet 1    metoprolol tartrate (LOPRESSOR) 25 mg tablet TAKE 1 TABLET (25 MG TOTAL) BY MOUTH EVERY 12 (TWELVE) HOURS 180 tablet 1    tamsulosin (FLOMAX) 0.4 mg TAKE 1 CAPSULE DAILY WITH  DINNER 90 capsule 1    torsemide (DEMADEX) 20 mg tablet Take 1 tablet (20 mg total) by mouth daily 90 tablet 3    warfarin (COUMADIN) 2.5 mg tablet TAKE 3 TO 4 TABLETS BY MOUTH DAILY AS DIRECTED BY MD (7.5MG TO 10 MG DAILY) 360 tablet 1     No current facility-administered medications on file prior to visit.      Social History     Tobacco Use    Smoking status: Former     Current packs/day: 0.00     Average packs/day: 1 pack/day for 10.3 years (10.3 ttl pk-yrs)     Types: Cigarettes, Pipe     Start date: 1970     Quit date: 1980     Years since quittin.9     Passive exposure: Never    Smokeless tobacco: Never   Vaping Use    Vaping status: Never Used   Substance and Sexual Activity     Alcohol use: Yes     Alcohol/week: 21.0 standard drinks of alcohol     Types: 7 Glasses of wine, 14 Shots of liquor per week     Comment: social    Drug use: Yes     Frequency: 1.0 times per week     Types: Marijuana     Comment: less than once a week    Sexual activity: Yes     Partners: Female     Birth control/protection: None     Objective   There were no vitals taken for this visit.           Appearance : no distress, alert, cooperative  Mental Status. Memory, and Affect : alert and oriented, normal affect, makes good eye contact, provides a detailed history  Cardiac- : regular rate and rhythm, S1, S2 normal, no murmur, click, rub or gallop  Pulmonary : clear to auscultation bilaterally  Cranial Nerves: CN II-XII grossly intact  Edema- 1+ pitting edema on the left       Data  Lab Results   Component Value Date    HGB 13.7 03/12/2025    HCT 42.4 03/12/2025    MCV 99 (H) 03/12/2025      Lab Results   Component Value Date    GLUCOSE 131 03/21/2023    CALCIUM 9.1 03/12/2025     05/14/2018    K 3.7 03/12/2025    CO2 30 03/12/2025     03/12/2025    BUN 16 03/12/2025    CREATININE 1.07 03/12/2025     Lab Results   Component Value Date    IRON 42 (L) 05/24/2023    TIBC 339 05/24/2023    FERRITIN 19 (L) 05/24/2023     Lab Results   Component Value Date    AST 24 03/12/2025    ALT 26 03/12/2025

## 2025-03-17 ENCOUNTER — OFFICE VISIT (OUTPATIENT)
Dept: SLEEP CENTER | Facility: CLINIC | Age: 75
End: 2025-03-17
Payer: MEDICARE

## 2025-03-17 ENCOUNTER — TELEPHONE (OUTPATIENT)
Age: 75
End: 2025-03-17

## 2025-03-17 ENCOUNTER — TELEPHONE (OUTPATIENT)
Dept: FAMILY MEDICINE CLINIC | Facility: CLINIC | Age: 75
End: 2025-03-17

## 2025-03-17 ENCOUNTER — OFFICE VISIT (OUTPATIENT)
Dept: FAMILY MEDICINE CLINIC | Facility: CLINIC | Age: 75
End: 2025-03-17
Payer: MEDICARE

## 2025-03-17 VITALS
OXYGEN SATURATION: 100 % | SYSTOLIC BLOOD PRESSURE: 120 MMHG | DIASTOLIC BLOOD PRESSURE: 80 MMHG | HEIGHT: 72 IN | TEMPERATURE: 96.7 F | WEIGHT: 246 LBS | BODY MASS INDEX: 33.32 KG/M2 | HEART RATE: 94 BPM

## 2025-03-17 VITALS
HEART RATE: 99 BPM | WEIGHT: 247 LBS | SYSTOLIC BLOOD PRESSURE: 138 MMHG | HEIGHT: 72 IN | DIASTOLIC BLOOD PRESSURE: 71 MMHG | BODY MASS INDEX: 33.46 KG/M2

## 2025-03-17 DIAGNOSIS — I10 ESSENTIAL HYPERTENSION: ICD-10-CM

## 2025-03-17 DIAGNOSIS — E66.09 CLASS 1 OBESITY DUE TO EXCESS CALORIES WITH SERIOUS COMORBIDITY AND BODY MASS INDEX (BMI) OF 33.0 TO 33.9 IN ADULT: ICD-10-CM

## 2025-03-17 DIAGNOSIS — E11.22 TYPE 2 DIABETES MELLITUS WITH STAGE 3A CHRONIC KIDNEY DISEASE, WITHOUT LONG-TERM CURRENT USE OF INSULIN (HCC): Primary | ICD-10-CM

## 2025-03-17 DIAGNOSIS — N18.31 TYPE 2 DIABETES MELLITUS WITH STAGE 3A CHRONIC KIDNEY DISEASE, WITHOUT LONG-TERM CURRENT USE OF INSULIN (HCC): Primary | ICD-10-CM

## 2025-03-17 DIAGNOSIS — I97.89 POSTOPERATIVE ATRIAL FIBRILLATION (HCC): ICD-10-CM

## 2025-03-17 DIAGNOSIS — N18.31 STAGE 3A CHRONIC KIDNEY DISEASE (HCC): ICD-10-CM

## 2025-03-17 DIAGNOSIS — I48.91 POSTOPERATIVE ATRIAL FIBRILLATION (HCC): ICD-10-CM

## 2025-03-17 DIAGNOSIS — E66.811 CLASS 1 OBESITY DUE TO EXCESS CALORIES WITH SERIOUS COMORBIDITY AND BODY MASS INDEX (BMI) OF 33.0 TO 33.9 IN ADULT: ICD-10-CM

## 2025-03-17 DIAGNOSIS — N18.31 TYPE 2 DIABETES MELLITUS WITH STAGE 3A CHRONIC KIDNEY DISEASE, WITHOUT LONG-TERM CURRENT USE OF INSULIN (HCC): ICD-10-CM

## 2025-03-17 DIAGNOSIS — E11.22 TYPE 2 DIABETES MELLITUS WITH STAGE 3A CHRONIC KIDNEY DISEASE, WITHOUT LONG-TERM CURRENT USE OF INSULIN (HCC): ICD-10-CM

## 2025-03-17 DIAGNOSIS — G47.33 OSA TREATED WITH BIPAP: Primary | ICD-10-CM

## 2025-03-17 PROCEDURE — G2211 COMPLEX E/M VISIT ADD ON: HCPCS | Performed by: INTERNAL MEDICINE

## 2025-03-17 PROCEDURE — 99214 OFFICE O/P EST MOD 30 MIN: CPT | Performed by: FAMILY MEDICINE

## 2025-03-17 PROCEDURE — G2211 COMPLEX E/M VISIT ADD ON: HCPCS | Performed by: FAMILY MEDICINE

## 2025-03-17 PROCEDURE — 99214 OFFICE O/P EST MOD 30 MIN: CPT | Performed by: INTERNAL MEDICINE

## 2025-03-17 RX ORDER — TIRZEPATIDE 2.5 MG/.5ML
2.5 INJECTION, SOLUTION SUBCUTANEOUS WEEKLY
Qty: 2 ML | Refills: 0 | Status: SHIPPED | OUTPATIENT
Start: 2025-03-17

## 2025-03-17 RX ORDER — AMOXICILLIN 500 MG/1
2000 CAPSULE ORAL AS NEEDED
COMMUNITY
Start: 2025-01-02

## 2025-03-17 NOTE — ASSESSMENT & PLAN NOTE
Lab Results   Component Value Date    EGFR 68 03/12/2025    EGFR 72 10/29/2024    EGFR 53 05/03/2024    CREATININE 1.07 03/12/2025    CREATININE 1.03 10/29/2024    CREATININE 1.33 (H) 05/03/2024   Chronic slight decrease in the GFR compared with before encourage patient to keep well hydration avoid NSAID hemoglobin A1c at the goal blood pressure in the office today 120/80 at the goal discussed important lose weight continue to monitor  Orders:  •  CBC and differential; Future  •  Comprehensive metabolic panel; Future  •  Lipid panel; Future  •  TSH, 3rd generation with Free T4 reflex; Future  •  Hemoglobin A1C; Future

## 2025-03-17 NOTE — ASSESSMENT & PLAN NOTE
Lab Results   Component Value Date    HGBA1C 6.2 (H) 03/12/2025   Chronic asymptomatic hemoglobin A1c 6.2 patient will be starting Mounjaro injection weekly 2.5 mg once a week patient already on statin and ACE inhibitor benefit versus side effect reviewed with the patient sign of symptom of hypoglycemia review  Orders:  •  Mounjaro 2.5 MG/0.5ML SOAJ; Inject 2.5 mg under the skin once a week  •  CBC and differential; Future  •  Comprehensive metabolic panel; Future  •  Lipid panel; Future  •  TSH, 3rd generation with Free T4 reflex; Future  •  Hemoglobin A1C; Future

## 2025-03-17 NOTE — PATIENT INSTRUCTIONS
Continue PAP Therapy  Continue AutoBiPAP at settings of Max IPAP [id number] min EPAP 9cmH2O Pressure Support 5cmH2O.  Remember to clean your mask and equipment regularly, as directed.  You should be eligible for new supplies approximately every 3-6 months, depending on your insurance coverage. Contact your Durable Medical Equipment (DME) company for new supplies as needed.  Practice good Sleep Hygiene, as outlined below.  Follow up in 12 months.      Care and Maintenance  Headgear should be washed as needed. Daily inspection and weekly washings are recommended. Do not disassemble the straps. Machine wash in warm water, making sure to attach Velcro hooks and tabs before washing. Line dry or machine dry on a low setting.  Masks should be washed every day. Daily inspection is recommended. Leave the mask and tubing attached. Gently wash the mask with a soft cloth using warm water and mild detergent, concentrating on the mask cushion flaps. DO NOT use alcohol or bleach. Rinse thoroughly and air dry.  Tubing and headgear should be washed weekly. Daily inspection is recommended. Wash in warm water and mild detergent and rinse thoroughly. Hook the tubing to the machine and blow until dry.  Humidifier should be washed daily and filled with DISTILLED water before use. Wash with warm water and mild detergent. Rinse thoroughly and air dry.  Disposable filters should be replaced once a month. Wash reusable foam filters with warm water and mild detergent at least once a month. Rinse thoroughly and dry with paper towels.  Avoid  that contain fragrance or conditioners, as these will leave a residue.  NEVER iron any soft goods.      CMS Requirements    Your insurance requires a face-to-face follow up visit within a 31-90 day period after starting CPAP.  Your insurance requires compliance with CPAP, which is at least 4 hours per night for 70% of the time. This must be done over a 30 day period and must occur within the initial  31-90 day period after starting CPAP.  Your insurance also requires at least yearly follow ups to continue to pay for CPAP supplies.       PAP Supply Guidelines    Below are the guidelines for reordering your supplies. You will be responsible for your deductible, co payments, and out of pocket expenses.    Item Frequency   Nasal Mask (no headgear) 1 every 3 months   Nasal Mask Cushion 1 every 2 weeks   Full Face Mask (no headgear) 1 every 3 months   Full Face Mask Cushion 1 every month   Nasal Pillows 1 every 2 weeks   Headgear 1 every 6 months   hin Strap 1 every 6 months   tripp 1 every 3 months   Filters: Reusable 1 every 6 months   Filters: Disposable 1 every 2 weeks   Humidifier Chamber(disposable) 1 every 6 months         Good Sleep Hygiene  Wake up at the same time every day, even on the weekends.  Use your bed for sleep and intimacy only.  If you have been in bed awake for 30 minutes, get up and leave the bedroom. Choose a dull activity not involving a blue screen (TV, computer, handheld devices). Go back to bed when you feel sleepy.  Avoid caffeine, nicotine and alcohol before you go to bed.  Avoid large meals before you go to bed.  Avoid using screens (computers, tablets, smartphones, etc.) for at least 1 hour before bedtime  Exercise regularly, but do not exercise right before you go to bed.  Avoid daytime naps. If you do take a nap, sleep for 20-40 minutes, and not after 2pm.

## 2025-03-17 NOTE — TELEPHONE ENCOUNTER
PA for Mounjaro 2.5MG/0.5ML SUBMITTED to [facility]    via    []CMM-KEY:   [x]Surescripts-Case ID # [id number]   []Availity-Auth ID # NDC #   []Faxed to plan   []Other website   []Phone call Case ID #     [x]PA sent as URGENT    All office notes, labs and other pertaining documents and studies sent. Clinical questions answered. Awaiting determination from insurance company.     Turnaround time for your insurance to make a decision on your Prior Authorization can take 7-21 business days.

## 2025-03-17 NOTE — ASSESSMENT & PLAN NOTE
Lab Results   Component Value Date    HGBA1C 6.2 (H) 03/12/2025     Continue treatment of HUMBERTO with BiPAP which can improve T2DM outcomes.

## 2025-03-17 NOTE — PROGRESS NOTES
Name: Balbir Daniels      : 1950      MRN: [id number]  Encounter Provider: Monalisa Anglin MD  Encounter Date: 3/17/2025   Encounter department: [facility]  :  Assessment & Plan  Type 2 diabetes mellitus with stage 3a chronic kidney disease, without long-term current use of insulin (HCC)    Lab Results   Component Value Date    HGBA1C 6.2 (H) 2025   Chronic asymptomatic hemoglobin A1c 6.2 patient will be starting Mounjaro injection weekly 2.5 mg once a week patient already on statin and ACE inhibitor benefit versus side effect reviewed with the patient sign of symptom of hypoglycemia review  Orders:  •  Mounjaro 2.5 MG/0.5ML SOAJ; Inject 2.5 mg under the skin once a week  •  CBC and differential; Future  •  Comprehensive metabolic panel; Future  •  Lipid panel; Future  •  TSH, 3rd generation with Free T4 reflex; Future  •  Hemoglobin A1C; Future    Postoperative atrial fibrillation (HCC)  Chronic asymptomatic rate is controlled follow-up with the cardiology periodically patient on Coumadin       Class 1 obesity due to excess calories with serious comorbidity and body mass index (BMI) of 33.0 to 33.9 in adult  BMI Counseling: Body mass index is 33.36 kg/m². The BMI             Stage 3a chronic kidney disease (HCC)  Lab Results   Component Value Date    EGFR 68 2025    EGFR 72 10/29/2024    EGFR 53 2024    CREATININE 1.07 2025    CREATININE 1.03 10/29/2024    CREATININE 1.33 (H) 2024   Chronic slight decrease in the GFR compared with before encourage patient to keep well hydration avoid NSAID hemoglobin A1c at the goal blood pressure in the office today 120/80 at the goal discussed important lose weight continue to monitor  Orders:  •  CBC and differential; Future  •  Comprehensive metabolic panel; Future  •  Lipid panel; Future  •  TSH, 3rd generation with Free T4 reflex; Future  •  Hemoglobin A1C; Future          Depression Screening and  Follow-up Plan: Patient was screened for depression during today's encounter. They screened negative with a PHQ-2 score of 0.        History of Present Illness   Patient is here f/up with chronic condition ,complaint with med tolerated well no new concern recent blood work reviewed       Review of Systems   Constitutional:  Negative for chills and fever.   HENT:  Negative for ear pain and sore throat.    Eyes:  Negative for pain and visual disturbance.   Respiratory:  Negative for cough and shortness of breath.    Cardiovascular:  Negative for chest pain and palpitations.   Gastrointestinal:  Negative for abdominal pain, constipation, diarrhea and vomiting.   Genitourinary:  Negative for dysuria and hematuria.   Musculoskeletal:  Negative for gait problem.   Skin:  Negative for color change and rash.   Neurological:  Negative for seizures and syncope.   All other systems reviewed and are negative.      Objective   /80 (BP Location: Left arm, Patient Position: Sitting)   Pulse 94   Temp (!) 96.7 °F (35.9 °C) (Tympanic)   Ht 6' (1.829 m)   Wt 112 kg (246 lb)   SpO2 100%   BMI 33.36 kg/m²      Physical Exam  Vitals and nursing note reviewed.   Constitutional:       General: He is not in acute distress.     Appearance: He is well-developed. He is not diaphoretic.   HENT:      Head: Normocephalic.      Right Ear: Tympanic membrane and external ear normal.      Left Ear: Tympanic membrane and external ear normal.      Nose: No rhinorrhea.      Mouth/Throat:      Pharynx: No posterior oropharyngeal erythema.   Eyes:      General:         Right eye: No discharge.         Left eye: No discharge.      Conjunctiva/sclera: Conjunctivae normal.   Neck:      Vascular: No JVD.   Cardiovascular:      Rate and Rhythm: Normal rate and regular rhythm.      Heart sounds: Murmur heard.      No gallop.   Pulmonary:      Effort: Pulmonary effort is normal. No respiratory distress.      Breath sounds: Normal breath sounds. No  wheezing.   Abdominal:      General: There is no distension.      Palpations: Abdomen is soft.      Tenderness: There is no abdominal tenderness. There is no rebound.   Musculoskeletal:         General: No tenderness.      Cervical back: Normal range of motion and neck supple.   Lymphadenopathy:      Cervical: No cervical adenopathy.   Skin:     General: Skin is warm.      Findings: No rash.   Neurological:      Mental Status: He is alert and oriented to person, place, and time.

## 2025-03-17 NOTE — TELEPHONE ENCOUNTER
PA for Mounjaro 2.5MG/0.5ML APPROVED     Date(s) approved 01/01/2025-03/17/2026    Case #[id number]     Patient advised by          []MyChart Message  []Phone call   [x]LMOM  []L/M to call office as no active Communication consent on file  []Unable to leave detailed message as VM not approved on Communication consent       Pharmacy advised by    [x]Fax  []Phone call  []Secure Chat    Approval letter scanned into Media Yes

## 2025-03-17 NOTE — ASSESSMENT & PLAN NOTE
Patient very compliant on BiPAP MaxIPAP 23mbE0E Min EPAP 9cmH2O PS 5cmH2O with no signficant issues. Patient does have some dryness in the mouth and does notice leakage around the mouth occasionally. Patient does note continued improvement of snoring and daytime energy with BiPAP and willing to continue to use the machine.    Plan:  Discussed with Dr. Delaney  Compliance report reviewed and analyzed  Continue AutoBiPAP at settings of minimum EPAP 9cmH2O, maximum IPAP [id number], PS 5cmH2O  Patient to talk to [facility] about humidity/temperature adjustments  Prescription for new supplies ordered today  Reviewed CMS/insurance requirements and resupply guidelines  Information provided on the above as well as general maintenance steps  Recommended maintaining good Sleep Hygiene  Patient to followup in 1 year  Patient agreeable to above.   Orders:    PAP DME Resupply/Reorder

## 2025-03-17 NOTE — ASSESSMENT & PLAN NOTE
Chronic asymptomatic rate is controlled follow-up with the cardiology periodically patient on Coumadin

## 2025-03-17 NOTE — TELEPHONE ENCOUNTER
Spoke to Jessi at [facility] patient seen on 3/13/2025 for exam and will fax over for our records

## 2025-03-18 ENCOUNTER — TELEPHONE (OUTPATIENT)
Dept: SLEEP CENTER | Facility: CLINIC | Age: 75
End: 2025-03-18

## 2025-03-20 LAB

## 2025-04-02 ENCOUNTER — TELEPHONE (OUTPATIENT)
Dept: CARDIOLOGY CLINIC | Facility: CLINIC | Age: 75
End: 2025-04-02

## 2025-04-07 ENCOUNTER — TELEPHONE (OUTPATIENT)
Dept: CARDIOLOGY CLINIC | Facility: CLINIC | Age: 75
End: 2025-04-07

## 2025-04-14 ENCOUNTER — TELEPHONE (OUTPATIENT)
Dept: CARDIOLOGY CLINIC | Facility: CLINIC | Age: 75
End: 2025-04-14

## 2025-04-14 ENCOUNTER — OFFICE VISIT (OUTPATIENT)
Dept: CARDIOLOGY CLINIC | Facility: CLINIC | Age: 75
End: 2025-04-14
Payer: MEDICARE

## 2025-04-14 VITALS
BODY MASS INDEX: 32.77 KG/M2 | HEART RATE: 63 BPM | DIASTOLIC BLOOD PRESSURE: 70 MMHG | SYSTOLIC BLOOD PRESSURE: 120 MMHG | WEIGHT: 241.6 LBS

## 2025-04-14 DIAGNOSIS — E66.811 CLASS 1 OBESITY DUE TO EXCESS CALORIES WITH SERIOUS COMORBIDITY AND BODY MASS INDEX (BMI) OF 33.0 TO 33.9 IN ADULT: ICD-10-CM

## 2025-04-14 DIAGNOSIS — I48.91 POSTOPERATIVE ATRIAL FIBRILLATION (HCC): ICD-10-CM

## 2025-04-14 DIAGNOSIS — I51.7 LEFT VENTRICULAR HYPERTROPHY: ICD-10-CM

## 2025-04-14 DIAGNOSIS — E11.22 TYPE 2 DIABETES MELLITUS WITH STAGE 3A CHRONIC KIDNEY DISEASE, WITHOUT LONG-TERM CURRENT USE OF INSULIN (HCC): ICD-10-CM

## 2025-04-14 DIAGNOSIS — I34.0 NONRHEUMATIC MITRAL VALVE REGURGITATION: ICD-10-CM

## 2025-04-14 DIAGNOSIS — Z79.01 ANTICOAGULATED ON COUMADIN: ICD-10-CM

## 2025-04-14 DIAGNOSIS — E78.2 MIXED HYPERLIPIDEMIA: ICD-10-CM

## 2025-04-14 DIAGNOSIS — I97.89 POSTOPERATIVE ATRIAL FIBRILLATION (HCC): ICD-10-CM

## 2025-04-14 DIAGNOSIS — Z01.810 PREOP CARDIOVASCULAR EXAM: Primary | ICD-10-CM

## 2025-04-14 DIAGNOSIS — N18.31 STAGE 3A CHRONIC KIDNEY DISEASE (HCC): ICD-10-CM

## 2025-04-14 DIAGNOSIS — E66.01 OBESITY, MORBID (HCC): ICD-10-CM

## 2025-04-14 DIAGNOSIS — N18.31 TYPE 2 DIABETES MELLITUS WITH STAGE 3A CHRONIC KIDNEY DISEASE, WITHOUT LONG-TERM CURRENT USE OF INSULIN (HCC): ICD-10-CM

## 2025-04-14 DIAGNOSIS — I10 ESSENTIAL HYPERTENSION: ICD-10-CM

## 2025-04-14 DIAGNOSIS — Z79.01 ANTICOAGULATION GOAL OF INR 2 TO 3: ICD-10-CM

## 2025-04-14 DIAGNOSIS — Z51.81 ANTICOAGULATION GOAL OF INR 2 TO 3: ICD-10-CM

## 2025-04-14 DIAGNOSIS — E66.09 CLASS 1 OBESITY DUE TO EXCESS CALORIES WITH SERIOUS COMORBIDITY AND BODY MASS INDEX (BMI) OF 33.0 TO 33.9 IN ADULT: ICD-10-CM

## 2025-04-14 DIAGNOSIS — I51.7 ATRIAL ENLARGEMENT, LEFT: ICD-10-CM

## 2025-04-14 DIAGNOSIS — Z98.890 S/P LEFT ATRIAL APPENDAGE LIGATION: ICD-10-CM

## 2025-04-14 DIAGNOSIS — Z98.890 S/P MITRAL VALVE REPAIR: ICD-10-CM

## 2025-04-14 DIAGNOSIS — R06.02 SHORTNESS OF BREATH: ICD-10-CM

## 2025-04-14 PROCEDURE — 93000 ELECTROCARDIOGRAM COMPLETE: CPT

## 2025-04-14 PROCEDURE — 99214 OFFICE O/P EST MOD 30 MIN: CPT

## 2025-04-14 NOTE — PROGRESS NOTES
Cardiology   MD John Shaffer MD, FACC  Don Abdi DO, ROBERT KIDD FACP, FASNC Ather Mansoor, MD Rujul Patel, DO, [facility] HealthcareFLEX Richards DO, ALEJANDRA KIDD  -------------------------------------------------------------------  [facility]  [address] [place] [address]  Phone: [phone]  Fax: [phone]  04/14/25  Balbir Daniels  YOB: 1950   MRN: [id number]      Referring Physician: No referring provider defined for this encounter.     HPI: Balbir Daniels is a 74 y.o. male with:   Severe MR s/p Mitral Valve Repair with Medtronic 32 mm  CG Future and 45mm  Atriclip 3/2023  HTN  HUMBERTO on CPAP  Diverticulosis  Hx of GIB  BPH on 2 agents  Thrombocytopenia (113k)  LMCA aneurysm (15 mm)  Large dist LMCA aneurysm (approximately 15mm diameter in width)  involving the prox LAD & LCx; otherwise mild plaque     Hx of COVID  Sinus bradycardia    Mild concentric left ventricular hypertrophy  Grade 2 diastolic dysfunction  Dilated RV with normal RV systolic function  Paroxysmal atrial fibrillation, on warfarin, first noticed postoperatively after surgery    He presents for follow-up with cardiology.  States for the most part he is doing well from a heart standpoint, does note some shortness of breath with going up steps but does not really describe this is out of proportion to the level of activity that he is doing.  Otherwise he is remained stable, last cardiac imaging with transthoracic echo was about 3 years ago, has had TANG 2 years ago in the setting of cardioversion for A-fib.  He is going to have a colonoscopy next week.  His ECG done today is unchanged from last year.  His A1c has been slowly coming up, just tarted Mounjaro about a month ago and has lost about 7 pounds or so.    Review of Systems   Constitutional:  Negative for chills and fever.   HENT:  Negative for facial swelling and sore throat.    Eyes:  Negative for visual disturbance.    Respiratory:  Positive for shortness of breath. Negative for cough, chest tightness and wheezing.    Cardiovascular:  Negative for chest pain, palpitations and leg swelling.   Gastrointestinal:  Negative for abdominal pain, blood in stool, constipation, diarrhea, nausea and vomiting.   Endocrine: Negative for cold intolerance and heat intolerance.   Genitourinary:  Negative for decreased urine volume, difficulty urinating, dysuria and hematuria.   Musculoskeletal:  Negative for arthralgias, back pain and myalgias.   Skin:  Negative for rash.   Neurological:  Negative for dizziness, syncope, weakness and numbness.   Psychiatric/Behavioral:  Negative for agitation, behavioral problems and confusion. The patient is not nervous/anxious.         OBJECTIVE  Vitals:    04/14/25 0756   BP: 120/70   Pulse: 63       Physical Exam  Constitutional: awake, alert and oriented, in no acute distress, no obvious deformities, obese male  Head: Normocephalic, without obvious abnormality, atraumatic  Eyes: conjunctivae clear and moist. Sclera anicteric. No xanthelasmas. Pupils equal bilaterally. Extraocular motions are full.  Ear nose mouth and throat: ears are symmetrical bilaterally, hearing appears to be equal bilaterally, no nasal discharge or epistaxis, oropharynx is clear with moist mucous membranes  Neck: Trachea is midline, neck is supple, no thyromegaly or significant lymphadenopathy, there is full range of motion.  Lungs: clear to auscultation bilaterally, no wheezes, no rales, no rhonchi, no accessory muscle use, breathing is nonlabored  Heart: Regular rhythm with a Normal heart rate, S1, S2 normal, No Murmur, no click, rub or gallop, No lower extremity edema  Abdomen: Obese, soft, non-tender; bowel sounds normal; no masses, no organomegaly  Psychiatric: Patient is oriented to time, place, person, mood/affect is negative for depression, anxiety, agitation, appears to have appropriate insight  Skin: Skin is warm, dry,  intact. No obvious rashes or lesions on exposed extremities. Nail beds are pink with no cyanosis or clubbing.     EKG:  Results for orders placed or performed in visit on 04/14/25   POCT ECG    Impression    Normal Sinus Rhythm, incomplete RBBB, unchanged from prior        IMPRESSION:  Severe MR s/p Mitral Valve Repair with Medtronic 32 mm  CG Future and 45mm  Atriclip 3/2023  HTN  HUMBERTO on CPAP  Diverticulosis  Hx of GIB  BPH on 2 agents  Thrombocytopenia (113k)  LMCA aneurysm (15 mm)  Large dist LMCA aneurysm (approximately 15mm diameter in width)  involving the prox LAD & LCx; otherwise mild plaque     Hx of COVID  Sinus bradycardia    Mild concentric left ventricular hypertrophy  Grade 2 diastolic dysfunction  Dilated RV with normal RV systolic function  Paroxysmal atrial fibrillation, on warfarin, first noticed postoperatively after surgery    DISCUSSION/RECOMMENDATIONS:  He presents for follow-up with cardiology  His physical exam today is unremarkable, he appears euvolemic without significant murmur auscultated.  His ECG is unchanged from prior  His blood pressure is under excellent control.  Continue with lisinopril 10 mg metoprolol 25 mg twice a day and torsemide 20 mg daily  His cholesterol is under excellent control as well.  Continue with atorvastatin 20 mg daily and aspirin 81 mg.  His A1c has been creeping up, just darted Mounjaro, I suspect this will improve dramatically with this medication.  Will continue with this.  He is on warfarin for anticoagulation for A-fib which was first noticed postoperatively after his mitral valve surgery.  Continue with the warfarin but okay to hold x 5 days prior to upcoming colonoscopy.  Plan to resume as soon as safely possible thereafter.  Due for new transthoracic echo at this time given his history of mitral valve surgery, LVH, hypertension, will order today.  .  He is in the process of moving to [place].  Will need to establish with cardiology group there.    Kye  DO Richards FACC, FASNC  --------------------------------------------------------------------------------  ----------------------    TANG March 2023    Left Ventricle: Left ventricular cavity size is normal. Wall thickness is mildly increased. The left ventricular ejection fraction is 60%. Systolic function is normal. Wall motion is normal.    Right Ventricle: Right ventricular cavity size is normal. Systolic function is normal.    Left Atrium: There is no spontaneous echo contrast.    Atrial Septum: No patent foramen ovale detected using color flow Doppler at rest.    Left Atrial Appendage: There is no thrombus. There is no spontaneous echo contrast. There is a surgical closure with complete occlusion.    Aortic Valve: There is mild regurgitation.    Mitral Valve: The valve has been repaired with an annular ring.    Pulmonic Valve: There is moderate regurgitation.    Aorta: There is non-protruding atheroma.    Cardiac cath March 2023   Large dist LMCA aneurysm (approximately 15mm diameter in width)  involving the prox LAD & LCx; otherwise mild plaque     Transthoracic echo October 2022  1. Mild concentric left ventricular hypertrophy with prominent basal interventricular septum, normal left ventricular systolic function, suspected  grade 2 diastolic dysfunction.  Normal estimated left ventricular filling pressure.  2.  dilated right ventricular cavity, normal right ventricular systolic function.  3. Marked left atrial and mild to moderate right atrial cavity enlargement.  4. Trileaflet aortic valve with sclerosis, mild aortic valve regurgitation.  5. Mitral valve sclerosis with slight redundancy of anterior mitral valve.  Mild to moderate mitral valve regurgitation with eccentric regurgitation jet.  6. Mild-to-moderate tricuspid and mild pulmonic valve regurgitation.  7. Mild pulmonary hypertension.  Estimated RVSP/PASP is 46 mmHg.  8. No pericardial effusion.    Diagnoses and all orders for this visit:    Preop  cardiovascular exam  -     POCT ECG    Atrial enlargement, left    Essential hypertension  -     Echo complete w/ contrast if indicated; Future    Left ventricular hypertrophy  -     Echo complete w/ contrast if indicated; Future    Nonrheumatic mitral valve regurgitation    Postoperative atrial fibrillation (HCC)  -     Echo complete w/ contrast if indicated; Future    Type 2 diabetes mellitus with stage 3a chronic kidney disease, without long-term current use of insulin (HCC)    Stage 3a chronic kidney disease (HCC)    Anticoagulated on Coumadin  -     Echo complete w/ contrast if indicated; Future    Anticoagulation goal of INR 2 to 3    S/P left atrial appendage ligation  -     Echo complete w/ contrast if indicated; Future    S/P mitral valve repair  -     Echo complete w/ contrast if indicated; Future    Class 1 obesity due to excess calories with serious comorbidity and body mass index (BMI) of 33.0 to 33.9 in adult    Mixed hyperlipidemia    Shortness of breath    Obesity, morbid (HCC)       ======================================================    Past Medical History:   Diagnosis Date    Acute bilateral low back pain without sciatica 03/09/2021    BMI 33.0-33.9,adult 02/04/2020    BPH (benign prostatic hyperplasia)     Cancer (HCC) 2015    skin    COVID-19     Dependence on respirator (ventilator) status ([facility]) 04/24/2024    Diabetes mellitus (HCC) 1998    borderline    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) 03/05/2019    Diverticulitis     ED (erectile dysfunction)     Elevated PSA 01/15/2013    Encounter for postoperative care 04/19/2023    Encounter for well adult exam with abnormal findings 02/04/2019    GERD (gastroesophageal reflux disease) 2018    Herpes zoster 12/18/2014    Hyperlipidemia     Hypertension     IFG (impaired fasting glucose)     Metabolic syndrome     Need for shingles vaccine 02/04/2019    Obesity 2015    Obesity (BMI 30.0-34.9) 02/04/2019    Psoriasis     Seasonal  allergic rhinitis     Shingles 2018    Stage 3a chronic kidney disease (HCC) 11/15/2023    Type 2 diabetes mellitus (HCC) 11/17/2014     Past Surgical History:   Procedure Laterality Date    CARDIAC CATHETERIZATION N/A 03/03/2023    Procedure: Cardiac Coronary Angiogram;  Surgeon: Joshua Yo DO;  Location: [facility];  Service: Cardiology    CHOLECYSTECTOMY  05/14/2015    COLONOSCOPY  2007    COLONOSCOPY  06/23/2021    HERNIA REPAIR  05/14/2015    TX REPLACEMENT MITRAL VALVE W/CARDIOPULMONARY BYP N/A 3/21/2023    Procedure: MITRAL VALVE REPAIR with Medtronic 32 mm CG Future Annuloplasty Ring; LIGATION OF Left ATRIAL APPENDAGE with 45 mm AtriClip; TANG;  Surgeon: Adriel Xiong DO;  Location: [facility] MAIN [facility];  Service: Cardiac Surgery         Medications  Current Outpatient Medications   Medication Sig Dispense Refill    amoxicillin (AMOXIL) 500 mg capsule Take 2,000 mg by mouth as needed 1 hour prior to dental appointment      aspirin (ECOTRIN LOW STRENGTH) 81 mg EC tablet take 1 tablet (81MG)      atorvastatin (LIPITOR) 20 mg tablet Take 1 tablet (20 mg total) by mouth daily 90 tablet 1    ferrous sulfate 324 (65 Fe) mg Take 1 tablet (324 mg total) by mouth daily before breakfast 30 tablet 2    finasteride (PROSCAR) 5 mg tablet TAKE 1 TABLET DAILY 90 tablet 1    Klor-Con M20 20 MEQ tablet TAKE 1 TABLET (20 MEQ TOTAL) BY MOUTH DAILY UNLESS OTHERWISE DIRECTED. 90 tablet 0    lisinopril (ZESTRIL) 10 mg tablet TAKE 1 TABLET BY MOUTH EVERY DAY 90 tablet 1    metoprolol tartrate (LOPRESSOR) 25 mg tablet TAKE 1 TABLET (25 MG TOTAL) BY MOUTH EVERY 12 (TWELVE) HOURS 180 tablet 1    Mounjaro 2.5 MG/0.5ML SOAJ Inject 2.5 mg under the skin once a week 2 mL 0    tamsulosin (FLOMAX) 0.4 mg TAKE 1 CAPSULE DAILY WITH  DINNER 90 capsule 1    torsemide (DEMADEX) 20 mg tablet Take 1 tablet (20 mg total) by mouth daily 90 tablet 3    warfarin (COUMADIN) 2.5 mg tablet TAKE 3 TO 4 TABLETS BY MOUTH DAILY AS DIRECTED BY  MD (7.5MG TO 10 MG DAILY) 360 tablet 1     No current facility-administered medications for this visit.        Allergies   Allergen Reactions    No Known Allergies        Social History     Socioeconomic History    Marital status: /Civil Union     Spouse name: Not on file    Number of children: Not on file    Years of education: Not on file    Highest education level: Not on file   Occupational History    Not on file   Tobacco Use    Smoking status: Former     Current packs/day: 0.00     Average packs/day: 1 pack/day for 10.3 years (10.3 ttl pk-yrs)     Types: Cigarettes, Pipe     Start date: 1970     Quit date: 1980     Years since quittin.9     Passive exposure: Never    Smokeless tobacco: Never   Vaping Use    Vaping status: Never Used   Substance and Sexual Activity    Alcohol use: Yes     Alcohol/week: 21.0 standard drinks of alcohol     Types: 7 Glasses of wine, 14 Shots of liquor per week     Comment: social    Drug use: Yes     Frequency: 1.0 times per week     Types: Marijuana     Comment: less than once a week    Sexual activity: Yes     Partners: Female     Birth control/protection: None   Other Topics Concern    Not on file   Social History Narrative    Has children    Right handed     Social Drivers of Health     Financial Resource Strain: Low Risk  (2023)    Overall Financial Resource Strain (CARDIA)     Difficulty of Paying Living Expenses: Not hard at all   Food Insecurity: No Food Insecurity (7/15/2024)    Nursing - Inadequate Food Risk Classification     Worried About Running Out of Food in the Last Year: Never true     Ran Out of Food in the Last Year: Never true     Ran Out of Food in the Last Year: Not on file   Transportation Needs: No Transportation Needs (7/15/2024)    PRAPARE - Transportation     Lack of Transportation (Medical): No     Lack of Transportation (Non-Medical): No   Physical Activity: Insufficiently Active (2021)    Exercise Vital Sign     Days of  Exercise per Week: 1 day     Minutes of Exercise per Session: 60 min   Stress: No Stress Concern Present (6/8/2021)    [employer] - Occupational Stress Questionnaire     Feeling of Stress : Not at all   Social Connections: Socially Integrated (6/8/2021)    Social Connection and Isolation Panel [NHANES]     Frequency of Communication with Friends and Family: More than three times a week     Frequency of Social Gatherings with Friends and Family: Once a week     Attends Sabianism Services: More than 4 times per year     Active Member of Clubs or Organizations: Yes     Attends Club or Organization Meetings: More than 4 times per year     Marital Status:    Intimate Partner Violence: Not At Risk (6/8/2021)    Humiliation, Afraid, Rape, and Kick questionnaire     Fear of Current or Ex-Partner: No     Emotionally Abused: No     Physically Abused: No     Sexually Abused: No   Housing Stability: Low Risk  (7/15/2024)    Housing Stability Vital Sign     Unable to Pay for Housing in the Last Year: No     Number of Times Moved in the Last Year: 0     Homeless in the Last Year: No        Family History   Problem Relation Age of Onset    Skin cancer Mother     Diabetes type II Mother     Hyperlipidemia Mother     Diabetes Mother         type 2    Cancer Mother         skin    Stroke Mother     Heart disease Father         valve repair    Thyroid disease unspecified Paternal Aunt        Lab Results   Component Value Date    WBC 4.90 03/12/2025    HGB 13.7 03/12/2025    HCT 42.4 03/12/2025    MCV 99 (H) 03/12/2025     (L) 03/12/2025      Lab Results   Component Value Date    SODIUM 142 03/12/2025    K 3.7 03/12/2025     03/12/2025    CO2 30 03/12/2025    BUN 16 03/12/2025    CREATININE 1.07 03/12/2025    GLUC 117 04/04/2024    CALCIUM 9.1 03/12/2025      Lab Results   Component Value Date    HGBA1C 6.2 (H) 03/12/2025      Lab Results   Component Value Date    CHOL 97 05/14/2018  "    Lab Results   Component Value Date    HDL 41 03/12/2025    HDL 44 10/29/2024    HDL 39 (L) 05/03/2024     Lab Results   Component Value Date    LDLCALC 49 03/12/2025    LDLCALC 35 10/29/2024    LDLCALC 44 05/03/2024     Lab Results   Component Value Date    TRIG 111 03/12/2025    TRIG 165 (H) 10/29/2024    TRIG 103 05/03/2024     No results found for: \"CHOLHDL\"   Lab Results   Component Value Date    INR 1.36 (H) 03/12/2025    INR 3.2 (H) 01/28/2025    INR 2.70 (H) 12/26/2024    PROTIME 17.0 (H) 03/12/2025    PROTIME 31.9 (H) 01/28/2025    PROTIME 28.3 (H) 12/26/2024          Patient Active Problem List    Diagnosis Date Noted    Obesity, morbid (HCC) 04/14/2025    Alkaline phosphatase elevation 11/01/2024    Hematuria 04/04/2024    Stage 3a chronic kidney disease (HCC) 11/15/2023    Low iron 06/05/2023    B12 deficiency 04/06/2023    Prediabetes 04/06/2023    Lower extremity edema 04/03/2023    Anticoagulation goal of INR 2 to 3 03/27/2023    Stress hyperglycemia 03/27/2023    Postoperative atrial fibrillation (HCC) 03/24/2023    Anticoagulated on Coumadin 03/23/2023    S/P mitral valve repair 03/21/2023    S/P left atrial appendage ligation 03/21/2023    FH: cerebral aneurysm 02/22/2023    Class 1 obesity due to excess calories with serious comorbidity and body mass index (BMI) of 33.0 to 33.9 in adult 02/22/2023    Drop in hemoglobin 01/06/2023    Gastrointestinal hemorrhage associated with intestinal diverticulosis 12/31/2022    Diverticulitis of colon with perforation 12/31/2022    Blood in stool 12/30/2022    Abnormal CBC 11/18/2022    Atrial enlargement, left 11/18/2022    History of COVID-19 09/29/2022    Shortness of breath 09/28/2022    Overweight (BMI 25.0-29.9) 03/08/2022    Platelets decreased (HCC) 07/30/2021    HUMBERTO treated with BiPAP 09/29/2020    Excessive daytime sleepiness 09/29/2020    Colon polyp 08/27/2018    Left ventricular hypertrophy 02/06/2018    Mitral regurgitation 02/06/2018    BPH " "with urinary obstruction 10/31/2016    Calculus of gallbladder with cholecystitis 04/29/2015    Displacement of lumbar intervertebral disc without myelopathy 04/09/2015    Idiopathic peripheral neuropathy 04/09/2015    Diverticulosis of colon 01/26/2015    Indigestion 12/23/2014    Herpes zoster 12/18/2014    Type 2 diabetes mellitus with stage 3a chronic kidney disease, without long-term current use of insulin (HCC) 11/17/2014    Allergic rhinitis 01/15/2013    Male erectile disorder 01/15/2013    Essential hypertension 01/15/2013    Metabolic syndrome 01/15/2013    Hyperlipidemia 01/15/2013       Portions of the record may have been created with voice recognition software. Occasional wrong word or \"sound a like\" substitutions may have occurred due to the inherent limitations of voice recognition software. Read the chart carefully and recognize, using context, where substitutions have occurred.    Kye Richards DO, [facility]  4/14/2025 8:24 AM          "

## 2025-04-14 NOTE — TELEPHONE ENCOUNTER
Cardiac risk assessment completed by Dr. Richards faxed along w/ EKG & progress note from 4/14/25 to [facility] @ [phone].    Fax confirmation received.

## 2025-04-15 ENCOUNTER — APPOINTMENT (OUTPATIENT)
Dept: LAB | Facility: CLINIC | Age: 75
End: 2025-04-15
Payer: MEDICARE

## 2025-04-15 DIAGNOSIS — Z98.890 S/P LEFT ATRIAL APPENDAGE LIGATION: ICD-10-CM

## 2025-04-15 DIAGNOSIS — I48.0 PAF (PAROXYSMAL ATRIAL FIBRILLATION) (HCC): Primary | ICD-10-CM

## 2025-04-15 DIAGNOSIS — N18.31 STAGE 3A CHRONIC KIDNEY DISEASE (HCC): ICD-10-CM

## 2025-04-15 DIAGNOSIS — Z98.890 S/P MITRAL VALVE REPAIR: ICD-10-CM

## 2025-04-15 DIAGNOSIS — E11.22 TYPE 2 DIABETES MELLITUS WITH STAGE 3A CHRONIC KIDNEY DISEASE, WITHOUT LONG-TERM CURRENT USE OF INSULIN (HCC): ICD-10-CM

## 2025-04-15 DIAGNOSIS — N18.31 TYPE 2 DIABETES MELLITUS WITH STAGE 3A CHRONIC KIDNEY DISEASE, WITHOUT LONG-TERM CURRENT USE OF INSULIN (HCC): ICD-10-CM

## 2025-04-15 LAB
INR PPP: 1.38 (ref 0.85–1.19)
PROTHROMBIN TIME: 17.2 SECONDS (ref 12.3–15)

## 2025-04-15 PROCEDURE — 85610 PROTHROMBIN TIME: CPT

## 2025-04-15 PROCEDURE — 36415 COLL VENOUS BLD VENIPUNCTURE: CPT

## 2025-04-15 RX ORDER — METOPROLOL TARTRATE 25 MG/1
25 TABLET, FILM COATED ORAL EVERY 12 HOURS SCHEDULED
Qty: 180 TABLET | Refills: 1 | Status: SHIPPED | OUTPATIENT
Start: 2025-04-15

## 2025-04-16 ENCOUNTER — ANTICOAG VISIT (OUTPATIENT)
Dept: CARDIOLOGY CLINIC | Facility: CLINIC | Age: 75
End: 2025-04-16

## 2025-04-16 NOTE — PROGRESS NOTES
Pt called INR is very low last INR was low (1.36)and patient was to recheck in 2 weeks,waited 4 weeks.  Pt called and is now holding for 5 days for upcoming colonoscopy on 4/21 LM to please when restarts post procedure 10 mg MWF 7.5 others recheck on 4/j30/25

## 2025-04-20 DIAGNOSIS — E61.1 LOW IRON: ICD-10-CM

## 2025-04-21 RX ORDER — FERROUS SULFATE 324(65)MG
324 TABLET, DELAYED RELEASE (ENTERIC COATED) ORAL
Qty: 30 TABLET | Refills: 0 | Status: SHIPPED | OUTPATIENT
Start: 2025-04-21

## 2025-04-24 ENCOUNTER — RESULTS FOLLOW-UP (OUTPATIENT)
Dept: FAMILY MEDICINE CLINIC | Facility: CLINIC | Age: 75
End: 2025-04-24

## 2025-04-24 ENCOUNTER — TELEPHONE (OUTPATIENT)
Age: 75
End: 2025-04-24

## 2025-04-24 NOTE — TELEPHONE ENCOUNTER
Patient called stating that he took his last dose of Mounjaro 2.5 mg on 4/22.  He is tolerating well without any side effects.  Patient asking if next dose can please be sent to:   [facility]/pharmacy #[id number] - [place] - [address] [address]  Phone: [phone]  Fax: [phone]  ESTEFANI #: [id number]

## 2025-04-24 NOTE — RESULT ENCOUNTER NOTE
Ultrasound of the liver which show hepatosplenomegaly recommend the patient to follow-up with the GI he already seen one to contact them for appointment

## 2025-04-25 NOTE — TELEPHONE ENCOUNTER
Patient called and informed. Please send the mounjaro 5 mgs to the [id number] - [place], [place] - [address] [[id number]] thank you.

## 2025-04-29 DIAGNOSIS — N18.31 TYPE 2 DIABETES MELLITUS WITH STAGE 3A CHRONIC KIDNEY DISEASE, WITHOUT LONG-TERM CURRENT USE OF INSULIN (HCC): Primary | ICD-10-CM

## 2025-04-29 DIAGNOSIS — E11.22 TYPE 2 DIABETES MELLITUS WITH STAGE 3A CHRONIC KIDNEY DISEASE, WITHOUT LONG-TERM CURRENT USE OF INSULIN (HCC): Primary | ICD-10-CM

## 2025-04-29 RX ORDER — TIRZEPATIDE 5 MG/.5ML
5 INJECTION, SOLUTION SUBCUTANEOUS WEEKLY
Qty: 2 ML | Refills: 1 | Status: SHIPPED | OUTPATIENT
Start: 2025-04-29

## 2025-04-29 NOTE — TELEPHONE ENCOUNTER
Patient is due for next dose today. He confirmed his preferred pharmacy is [facility]/pharmacy #[id number] - [place] - [address] [phone]

## 2025-04-30 ENCOUNTER — APPOINTMENT (OUTPATIENT)
Dept: LAB | Facility: CLINIC | Age: 75
End: 2025-04-30
Payer: MEDICARE

## 2025-04-30 DIAGNOSIS — I48.0 PAF (PAROXYSMAL ATRIAL FIBRILLATION) (HCC): ICD-10-CM

## 2025-04-30 LAB
ALBUMIN SERPL BCG-MCNC: 4.1 G/DL (ref 3.5–5)
ALP SERPL-CCNC: 110 U/L (ref 34–104)
ALT SERPL W P-5'-P-CCNC: 20 U/L (ref 7–52)
ANION GAP SERPL CALCULATED.3IONS-SCNC: 6 MMOL/L (ref 4–13)
AST SERPL W P-5'-P-CCNC: 20 U/L (ref 13–39)
BASOPHILS # BLD AUTO: 0.04 THOUSANDS/ÂΜL (ref 0–0.1)
BASOPHILS NFR BLD AUTO: 1 % (ref 0–1)
BILIRUB SERPL-MCNC: 0.51 MG/DL (ref 0.2–1)
BUN SERPL-MCNC: 20 MG/DL (ref 5–25)
CALCIUM SERPL-MCNC: 9 MG/DL (ref 8.4–10.2)
CHLORIDE SERPL-SCNC: 103 MMOL/L (ref 96–108)
CHOLEST SERPL-MCNC: 92 MG/DL (ref ?–200)
CO2 SERPL-SCNC: 30 MMOL/L (ref 21–32)
CREAT SERPL-MCNC: 1.05 MG/DL (ref 0.6–1.3)
EOSINOPHIL # BLD AUTO: 0.18 THOUSAND/ÂΜL (ref 0–0.61)
EOSINOPHIL NFR BLD AUTO: 4 % (ref 0–6)
ERYTHROCYTE [DISTWIDTH] IN BLOOD BY AUTOMATED COUNT: 12.5 % (ref 11.6–15.1)
EST. AVERAGE GLUCOSE BLD GHB EST-MCNC: 123 MG/DL
GFR SERPL CREATININE-BSD FRML MDRD: 70 ML/MIN/1.73SQ M
GLUCOSE P FAST SERPL-MCNC: 98 MG/DL (ref 65–99)
HBA1C MFR BLD: 5.9 %
HCT VFR BLD AUTO: 39.5 % (ref 36.5–49.3)
HDLC SERPL-MCNC: 36 MG/DL
HGB BLD-MCNC: 13.3 G/DL (ref 12–17)
IMM GRANULOCYTES # BLD AUTO: 0.01 THOUSAND/UL (ref 0–0.2)
IMM GRANULOCYTES NFR BLD AUTO: 0 % (ref 0–2)
INR PPP: 1.26 (ref 0.85–1.19)
LDLC SERPL CALC-MCNC: 34 MG/DL (ref 0–100)
LYMPHOCYTES # BLD AUTO: 1.59 THOUSANDS/ÂΜL (ref 0.6–4.47)
LYMPHOCYTES NFR BLD AUTO: 34 % (ref 14–44)
MCH RBC QN AUTO: 32.9 PG (ref 26.8–34.3)
MCHC RBC AUTO-ENTMCNC: 33.7 G/DL (ref 31.4–37.4)
MCV RBC AUTO: 98 FL (ref 82–98)
MONOCYTES # BLD AUTO: 0.49 THOUSAND/ÂΜL (ref 0.17–1.22)
MONOCYTES NFR BLD AUTO: 11 % (ref 4–12)
NEUTROPHILS # BLD AUTO: 2.31 THOUSANDS/ÂΜL (ref 1.85–7.62)
NEUTS SEG NFR BLD AUTO: 50 % (ref 43–75)
NONHDLC SERPL-MCNC: 56 MG/DL
NRBC BLD AUTO-RTO: 0 /100 WBCS
PLATELET # BLD AUTO: 135 THOUSANDS/UL (ref 149–390)
PMV BLD AUTO: 11.2 FL (ref 8.9–12.7)
POTASSIUM SERPL-SCNC: 4 MMOL/L (ref 3.5–5.3)
PROT SERPL-MCNC: 6.3 G/DL (ref 6.4–8.4)
PROTHROMBIN TIME: 16.1 SECONDS (ref 12.3–15)
RBC # BLD AUTO: 4.04 MILLION/UL (ref 3.88–5.62)
SODIUM SERPL-SCNC: 139 MMOL/L (ref 135–147)
TRIGL SERPL-MCNC: 111 MG/DL (ref ?–150)
TSH SERPL DL<=0.05 MIU/L-ACNC: 1.22 UIU/ML (ref 0.45–4.5)
WBC # BLD AUTO: 4.62 THOUSAND/UL (ref 4.31–10.16)

## 2025-04-30 PROCEDURE — 80061 LIPID PANEL: CPT

## 2025-04-30 PROCEDURE — 84443 ASSAY THYROID STIM HORMONE: CPT

## 2025-04-30 PROCEDURE — 85610 PROTHROMBIN TIME: CPT

## 2025-04-30 PROCEDURE — 36415 COLL VENOUS BLD VENIPUNCTURE: CPT

## 2025-04-30 PROCEDURE — 80053 COMPREHEN METABOLIC PANEL: CPT

## 2025-04-30 PROCEDURE — 83036 HEMOGLOBIN GLYCOSYLATED A1C: CPT

## 2025-04-30 PROCEDURE — 85025 COMPLETE CBC W/AUTO DIFF WBC: CPT

## 2025-05-01 ENCOUNTER — ANTICOAG VISIT (OUTPATIENT)
Dept: CARDIOLOGY CLINIC | Facility: CLINIC | Age: 75
End: 2025-05-01

## 2025-05-06 ENCOUNTER — HOSPITAL ENCOUNTER (OUTPATIENT)
Dept: NON INVASIVE DIAGNOSTICS | Facility: HOSPITAL | Age: 75
Discharge: HOME | End: 2025-05-06
Payer: MEDICARE

## 2025-05-06 VITALS
HEIGHT: 72 IN | WEIGHT: 241 LBS | DIASTOLIC BLOOD PRESSURE: 70 MMHG | HEART RATE: 66 BPM | BODY MASS INDEX: 32.64 KG/M2 | SYSTOLIC BLOOD PRESSURE: 120 MMHG

## 2025-05-06 DIAGNOSIS — I48.91 POSTOPERATIVE ATRIAL FIBRILLATION (HCC): ICD-10-CM

## 2025-05-06 DIAGNOSIS — I10 ESSENTIAL HYPERTENSION: ICD-10-CM

## 2025-05-06 DIAGNOSIS — I51.7 LEFT VENTRICULAR HYPERTROPHY: ICD-10-CM

## 2025-05-06 DIAGNOSIS — Z98.890 S/P MITRAL VALVE REPAIR: ICD-10-CM

## 2025-05-06 DIAGNOSIS — Z79.01 ANTICOAGULATED ON COUMADIN: ICD-10-CM

## 2025-05-06 DIAGNOSIS — I97.89 POSTOPERATIVE ATRIAL FIBRILLATION (HCC): ICD-10-CM

## 2025-05-06 DIAGNOSIS — Z98.890 S/P LEFT ATRIAL APPENDAGE LIGATION: ICD-10-CM

## 2025-05-06 PROCEDURE — 93306 TTE W/DOPPLER COMPLETE: CPT | Performed by: INTERNAL MEDICINE

## 2025-05-06 PROCEDURE — 93306 TTE W/DOPPLER COMPLETE: CPT

## 2025-05-10 LAB
AORTIC ROOT: 4 CM
AV REGURGITATION PRESSURE HALF TIME: 935 MS
BSA FOR ECHO PROCEDURE: 2.31 M2
DOP CALC MV VTI: 33.14 CM
E WAVE DECELERATION TIME: 282 MS
E/A RATIO: 1.29
FRACTIONAL SHORTENING: 34 (ref 28–44)
INTERVENTRICULAR SEPTUM IN DIASTOLE (PARASTERNAL SHORT AXIS VIEW): 1.1 CM
INTERVENTRICULAR SEPTUM: 1.1 CM (ref 0.6–1.1)
LAAS-AP2: 30.9 CM2
LAAS-AP4: 27.8 CM2
LEFT ATRIUM SIZE: 4.9 CM
LEFT ATRIUM VOLUME (MOD BIPLANE): 104 ML
LEFT ATRIUM VOLUME INDEX (MOD BIPLANE): 45 ML/M2
LEFT INTERNAL DIMENSION IN SYSTOLE: 2.9 CM (ref 2.1–4)
LEFT VENTRICULAR INTERNAL DIMENSION IN DIASTOLE: 4.4 CM (ref 3.5–6)
LEFT VENTRICULAR POSTERIOR WALL IN END DIASTOLE: 1.1 CM
LEFT VENTRICULAR STROKE VOLUME: 59 ML
LV EF US.2D.A4C+ESTIMATED: 65 %
LVSV (TEICH): 59 ML
MV E'TISSUE VEL-SEP: 7 CM/S
MV MEAN GRADIENT: 2 MMHG
MV PEAK A VEL: 0.89 M/S
MV PEAK E VEL: 115 CM/S
MV PEAK GRADIENT: 5 MMHG
MV STENOSIS PRESSURE HALF TIME: 82 MS
MV VALVE AREA P 1/2 METHOD: 2.68
RIGHT ATRIUM AREA SYSTOLE A4C: 22.4 CM2
RIGHT VENTRICLE ID DIMENSION: 4.4 CM
SL CV AV DECELERATION TIME RETROGRADE: 3226 MS
SL CV AV PEAK GRADIENT RETROGRADE: 62 MMHG
SL CV LEFT ATRIUM LENGTH A2C: 6.9 CM
SL CV LV EF: 57
SL CV PED ECHO LEFT VENTRICLE DIASTOLIC VOLUME (MOD BIPLANE) 2D: 90 ML
SL CV PED ECHO LEFT VENTRICLE SYSTOLIC VOLUME (MOD BIPLANE) 2D: 31 ML
TR MAX PG: 29 MMHG
TR PEAK VELOCITY: 2.7 M/S
TRICUSPID ANNULAR PLANE SYSTOLIC EXCURSION: 2.3 CM
TRICUSPID VALVE PEAK REGURGITATION VELOCITY: 2.7 M/S

## 2025-05-13 DIAGNOSIS — Z98.890 S/P MITRAL VALVE REPAIR: ICD-10-CM

## 2025-05-13 DIAGNOSIS — Z98.890 S/P LEFT ATRIAL APPENDAGE LIGATION: ICD-10-CM

## 2025-05-13 RX ORDER — POTASSIUM CHLORIDE 1500 MG/1
TABLET, EXTENDED RELEASE ORAL
Qty: 90 TABLET | Refills: 1 | Status: SHIPPED | OUTPATIENT
Start: 2025-05-13

## 2025-05-14 ENCOUNTER — APPOINTMENT (OUTPATIENT)
Dept: LAB | Facility: CLINIC | Age: 75
End: 2025-05-14
Payer: MEDICARE

## 2025-05-14 DIAGNOSIS — I48.0 PAF (PAROXYSMAL ATRIAL FIBRILLATION) (HCC): ICD-10-CM

## 2025-05-14 LAB
INR PPP: 1.57 (ref 0.85–1.19)
PROTHROMBIN TIME: 19 SECONDS (ref 12.3–15)

## 2025-05-14 PROCEDURE — 85610 PROTHROMBIN TIME: CPT

## 2025-05-14 PROCEDURE — 36415 COLL VENOUS BLD VENIPUNCTURE: CPT

## 2025-05-15 ENCOUNTER — RESULTS FOLLOW-UP (OUTPATIENT)
Dept: CARDIOLOGY CLINIC | Facility: CLINIC | Age: 75
End: 2025-05-15

## 2025-05-15 ENCOUNTER — ANTICOAG VISIT (OUTPATIENT)
Dept: CARDIOLOGY CLINIC | Facility: CLINIC | Age: 75
End: 2025-05-15

## 2025-05-15 NOTE — TELEPHONE ENCOUNTER
Placed call to patient. He verbalized understanding. Patient was questioning your thoughts on the aortic valve regurgitation. Please advise.

## 2025-05-15 NOTE — TELEPHONE ENCOUNTER
----- Message from Kye Richards DO sent at 5/15/2025 10:01 AM EDT -----  Please call the patient regarding their Echocardiogram results. Normal function with normal gradients across the repaired valve. .    ----- Message -----  From: Don Abdi DO  Sent: 5/10/2025  10:02 PM EDT  To: Kye Richards DO

## 2025-05-15 NOTE — PROGRESS NOTES
Pt called to take 12.5 today then 7. 5 MF 10 others recheck INR in 2 weeks  LM and requested call if any questions. Would like to discuss if anything else is new with meds or diet

## 2025-05-23 DIAGNOSIS — E11.22 TYPE 2 DIABETES MELLITUS WITH STAGE 3A CHRONIC KIDNEY DISEASE, WITHOUT LONG-TERM CURRENT USE OF INSULIN (HCC): ICD-10-CM

## 2025-05-23 DIAGNOSIS — N18.31 TYPE 2 DIABETES MELLITUS WITH STAGE 3A CHRONIC KIDNEY DISEASE, WITHOUT LONG-TERM CURRENT USE OF INSULIN (HCC): ICD-10-CM

## 2025-05-25 ENCOUNTER — PATIENT MESSAGE (OUTPATIENT)
Dept: FAMILY MEDICINE CLINIC | Facility: CLINIC | Age: 75
End: 2025-05-25

## 2025-05-25 RX ORDER — TIRZEPATIDE 5 MG/.5ML
5 INJECTION, SOLUTION SUBCUTANEOUS WEEKLY
Qty: 2 ML | Refills: 1 | OUTPATIENT
Start: 2025-05-25

## 2025-05-27 DIAGNOSIS — E61.1 LOW IRON: ICD-10-CM

## 2025-05-28 ENCOUNTER — APPOINTMENT (OUTPATIENT)
Dept: LAB | Facility: CLINIC | Age: 75
End: 2025-05-28
Payer: MEDICARE

## 2025-05-28 DIAGNOSIS — I48.0 PAF (PAROXYSMAL ATRIAL FIBRILLATION) (HCC): ICD-10-CM

## 2025-05-28 DIAGNOSIS — E78.2 MIXED HYPERLIPIDEMIA: ICD-10-CM

## 2025-05-28 LAB
INR PPP: 1.88 (ref 0.85–1.19)
PROTHROMBIN TIME: 21.7 SECONDS (ref 12.3–15)

## 2025-05-28 PROCEDURE — 36415 COLL VENOUS BLD VENIPUNCTURE: CPT

## 2025-05-28 PROCEDURE — 85610 PROTHROMBIN TIME: CPT

## 2025-05-28 NOTE — TELEPHONE ENCOUNTER
Reason for call:   [x] Refill   [] Prior Auth  [] Other:     Office:   [x] PCP/Provider -   [] Specialty/Provider -     Medication: atorvastatin (LIPITOR) 20 mg tablet     Dose/Frequency: Take 1 tablet (20 mg total) by mouth daily     Quantity: 90    Pharmacy: [facility]      Mail Away Pharmacy   Does the patient have enough for 10 days?   [x] Yes   [] No - Send as HP to POD

## 2025-05-29 ENCOUNTER — ANTICOAG VISIT (OUTPATIENT)
Dept: CARDIOLOGY CLINIC | Facility: CLINIC | Age: 75
End: 2025-05-29

## 2025-05-29 RX ORDER — ATORVASTATIN CALCIUM 20 MG/1
20 TABLET, FILM COATED ORAL DAILY
Qty: 90 TABLET | Refills: 1 | Status: SHIPPED | OUTPATIENT
Start: 2025-05-29

## 2025-05-29 RX ORDER — FERROUS SULFATE 324(65)MG
324 TABLET, DELAYED RELEASE (ENTERIC COATED) ORAL
Qty: 30 TABLET | Refills: 2 | Status: SHIPPED | OUTPATIENT
Start: 2025-05-29

## 2025-06-11 DIAGNOSIS — E11.22 TYPE 2 DIABETES MELLITUS WITH STAGE 3A CHRONIC KIDNEY DISEASE, WITHOUT LONG-TERM CURRENT USE OF INSULIN (HCC): ICD-10-CM

## 2025-06-11 DIAGNOSIS — N18.31 TYPE 2 DIABETES MELLITUS WITH STAGE 3A CHRONIC KIDNEY DISEASE, WITHOUT LONG-TERM CURRENT USE OF INSULIN (HCC): ICD-10-CM

## 2025-06-11 RX ORDER — TIRZEPATIDE 5 MG/.5ML
5 INJECTION, SOLUTION SUBCUTANEOUS WEEKLY
Qty: 2 ML | Refills: 0 | Status: SHIPPED | OUTPATIENT
Start: 2025-06-11

## 2025-06-11 NOTE — TELEPHONE ENCOUNTER
Requested medication(s) are due for refill today: Yes  **If antibiotic or given during sick visit, contact patient to discuss current symptoms.   **Confirm prescribing provider    LOV:  3/17/2025  **If longer then 1 year, contact patient to schedule annual PRIOR to refilling. Once scheduled, adjust refill for 30 days, no refills.  **Update CareEverywhere to confirm not being seen elsewhere    NOV:  7/18/2025    Is patient due for annual visit: No  **If future appointment, adjust to annual/follow up.  ** No appointment call to schedule annual/follow up.    Route to PCP, unless PCP no longer here, then physician they are seeing next.

## 2025-06-15 DIAGNOSIS — I10 ESSENTIAL HYPERTENSION: ICD-10-CM

## 2025-06-15 RX ORDER — LISINOPRIL 10 MG/1
10 TABLET ORAL DAILY
Qty: 90 TABLET | Refills: 1 | Status: SHIPPED | OUTPATIENT
Start: 2025-06-15

## 2025-06-18 ENCOUNTER — APPOINTMENT (OUTPATIENT)
Dept: LAB | Facility: CLINIC | Age: 75
End: 2025-06-18
Payer: MEDICARE

## 2025-06-18 DIAGNOSIS — I48.0 PAF (PAROXYSMAL ATRIAL FIBRILLATION) (HCC): ICD-10-CM

## 2025-06-18 LAB
INR PPP: 1.47 (ref 0.85–1.19)
PROTHROMBIN TIME: 18.1 SECONDS (ref 12.3–15)

## 2025-06-18 PROCEDURE — 36415 COLL VENOUS BLD VENIPUNCTURE: CPT

## 2025-06-18 PROCEDURE — 85610 PROTHROMBIN TIME: CPT

## 2025-06-19 ENCOUNTER — ANTICOAG VISIT (OUTPATIENT)
Dept: CARDIOLOGY CLINIC | Facility: CLINIC | Age: 75
End: 2025-06-19

## 2025-06-19 NOTE — PROGRESS NOTES
PC to patient.  INR is low at 1.47.  Last time it was 1.88.  He denies any new medications, no changes, no excessive salads.  Advised 15 for tonight and then 10 mgs daily for 2 weeks.  Retest then.   
Patient requests all Lab and Radiology Results on their Discharge Instructions

## 2025-07-03 ENCOUNTER — APPOINTMENT (OUTPATIENT)
Dept: LAB | Facility: CLINIC | Age: 75
End: 2025-07-03
Payer: MEDICARE

## 2025-07-03 DIAGNOSIS — I48.0 PAF (PAROXYSMAL ATRIAL FIBRILLATION) (HCC): ICD-10-CM

## 2025-07-03 LAB
INR PPP: 1.76 (ref 0.85–1.19)
PROTHROMBIN TIME: 20.6 SECONDS (ref 12.3–15)

## 2025-07-03 PROCEDURE — 85610 PROTHROMBIN TIME: CPT

## 2025-07-03 PROCEDURE — 36415 COLL VENOUS BLD VENIPUNCTURE: CPT

## 2025-07-07 ENCOUNTER — ANTICOAG VISIT (OUTPATIENT)
Dept: CARDIOLOGY CLINIC | Facility: CLINIC | Age: 75
End: 2025-07-07

## 2025-07-07 NOTE — PROGRESS NOTES
Pt called 15 mg today then 12.5 Friday 10 others recheck in 3 weeks. Called patient and left message.

## 2025-07-18 ENCOUNTER — OFFICE VISIT (OUTPATIENT)
Dept: FAMILY MEDICINE CLINIC | Facility: CLINIC | Age: 75
End: 2025-07-18
Payer: MEDICARE

## 2025-07-18 VITALS
HEART RATE: 67 BPM | HEIGHT: 72 IN | SYSTOLIC BLOOD PRESSURE: 120 MMHG | TEMPERATURE: 96.6 F | OXYGEN SATURATION: 98 % | DIASTOLIC BLOOD PRESSURE: 80 MMHG | BODY MASS INDEX: 30.2 KG/M2 | WEIGHT: 223 LBS

## 2025-07-18 DIAGNOSIS — I10 ESSENTIAL HYPERTENSION: ICD-10-CM

## 2025-07-18 DIAGNOSIS — Z13.6 SCREENING FOR AAA (ABDOMINAL AORTIC ANEURYSM): ICD-10-CM

## 2025-07-18 DIAGNOSIS — N18.31 TYPE 2 DIABETES MELLITUS WITH STAGE 3A CHRONIC KIDNEY DISEASE, WITHOUT LONG-TERM CURRENT USE OF INSULIN (HCC): ICD-10-CM

## 2025-07-18 DIAGNOSIS — E66.811 CLASS 1 OBESITY DUE TO EXCESS CALORIES WITH SERIOUS COMORBIDITY AND BODY MASS INDEX (BMI) OF 30.0 TO 30.9 IN ADULT: ICD-10-CM

## 2025-07-18 DIAGNOSIS — K57.91 GASTROINTESTINAL HEMORRHAGE ASSOCIATED WITH INTESTINAL DIVERTICULOSIS: ICD-10-CM

## 2025-07-18 DIAGNOSIS — Z00.00 MEDICARE ANNUAL WELLNESS VISIT, SUBSEQUENT: Primary | ICD-10-CM

## 2025-07-18 DIAGNOSIS — N40.0 BENIGN PROSTATIC HYPERPLASIA, UNSPECIFIED WHETHER LOWER URINARY TRACT SYMPTOMS PRESENT: ICD-10-CM

## 2025-07-18 DIAGNOSIS — E61.1 LOW IRON: ICD-10-CM

## 2025-07-18 DIAGNOSIS — E66.09 CLASS 1 OBESITY DUE TO EXCESS CALORIES WITH SERIOUS COMORBIDITY AND BODY MASS INDEX (BMI) OF 30.0 TO 30.9 IN ADULT: ICD-10-CM

## 2025-07-18 DIAGNOSIS — E11.22 TYPE 2 DIABETES MELLITUS WITH STAGE 3A CHRONIC KIDNEY DISEASE, WITHOUT LONG-TERM CURRENT USE OF INSULIN (HCC): ICD-10-CM

## 2025-07-18 PROBLEM — E66.3 OVERWEIGHT (BMI 25.0-29.9): Status: RESOLVED | Noted: 2022-03-08 | Resolved: 2025-07-18

## 2025-07-18 PROCEDURE — G2211 COMPLEX E/M VISIT ADD ON: HCPCS | Performed by: FAMILY MEDICINE

## 2025-07-18 PROCEDURE — G0439 PPPS, SUBSEQ VISIT: HCPCS | Performed by: FAMILY MEDICINE

## 2025-07-18 PROCEDURE — G0537 PR RISK ASCVD TST ONCE PR 12 MO: HCPCS | Performed by: FAMILY MEDICINE

## 2025-07-18 PROCEDURE — 99214 OFFICE O/P EST MOD 30 MIN: CPT | Performed by: FAMILY MEDICINE

## 2025-07-18 RX ORDER — FERROUS SULFATE 324(65)MG
324 TABLET, DELAYED RELEASE (ENTERIC COATED) ORAL
Qty: 30 TABLET | Refills: 2 | Status: SHIPPED | OUTPATIENT
Start: 2025-07-18

## 2025-07-18 RX ORDER — TIRZEPATIDE 5 MG/.5ML
5 INJECTION, SOLUTION SUBCUTANEOUS WEEKLY
Qty: 2 ML | Refills: 0 | Status: SHIPPED | OUTPATIENT
Start: 2025-07-18

## 2025-07-18 NOTE — PROGRESS NOTES
Diabetic Foot Exam    Patient's shoes and socks removed.    Right Foot/Ankle   Right Foot Inspection  Skin Exam: skin normal and skin intact. No dry skin, no warmth, no callus, no erythema, no maceration, no abnormal color, no pre-ulcer, no ulcer and no callus.     Toe Exam: ROM and strength within normal limits.     Sensory   Monofilament testing: intact    Vascular  Capillary refills: < 3 seconds  The right DP pulse is 2+. The right PT pulse is 2+.     Left Foot/Ankle  Left Foot Inspection  Skin Exam: skin normal and skin intact. No dry skin, no warmth, no erythema, no maceration, normal color, no pre-ulcer, no ulcer and no callus.     Toe Exam: ROM and strength within normal limits.     Sensory   Monofilament testing: intact    Vascular  Capillary refills: < 3 seconds  The left DP pulse is 2+. The left PT pulse is 2+.     Assign Risk Category  No deformity present  No loss of protective sensation  No weak pulses  Risk: 2      Name: Balbir Daniels      : 1950      MRN: [id number]  Encounter Provider: Monalisa Anglin MD  Encounter Date: 2025   Encounter department: [facility] PRIMARY [facility]ARE  :  Assessment & Plan  Medicare annual wellness visit, subsequent  Advice and education were given regarding nutrition, aerobic exercises, weight-bearing exercises, cardiovascular risk reduction, fall risk reduction, and age-appropriate supplements.     The patient was counseled regarding instructions for management, risk factor reductions, prognosis, risks and benefits of treatment options, patient and family education, and importance of compliance with treatment.  Patient candidate for abdomen aneurysm screening discussed with him and likely will order today       Class 1 obesity due to excess calories with serious comorbidity and body mass index (BMI) of 30.0 to 30.9 in adult  BMI Counseling: Body mass index is 30.24 kg/m². The BMI is above normal. Nutrition recommendations include reducing portion  sizes, 3-5 servings of fruits/vegetables daily, and consuming healthier snacks. Exercise recommendations include moderate aerobic physical activity for 150 minutes/week.        Type 2 diabetes mellitus with stage 3a chronic kidney disease, without long-term current use of insulin ([facility])    Lab Results   Component Value Date    HGBA1C 5.9 (H) 04/30/2025     Chronic asymptomatic patient happy with the result with the Mounjaro and he tolerated well continue current management we will check hemoglobin A1c before next appointment  Orders:  •  Tirzepatide (Mounjaro) 5 MG/0.5ML SOAJ; Inject 5 mg under the skin once a week    Essential hypertension  Chronic asymptomatic fair control continue current management he is on lisinopril 10 mg once a day       Gastrointestinal hemorrhage associated with intestinal diverticulosis  Chronic asymptomatic fair control discussed with patient avoid before food do not eat or lie down       Low iron    Orders:  •  ferrous sulfate 324 (65 Fe) mg; Take 1 tablet (324 mg total) by mouth daily before breakfast    Screening for AAA (abdominal aortic aneurysm)    Orders:  •  US abdominal aorta screening aaa; Future      Depression Screening and Follow-up Plan: Patient was screened for depression during today's encounter. They screened negative with a PHQ-2 score of 0.        Preventive health issues were discussed with patient, and age appropriate screening tests were ordered as noted in patient's After Visit Summary. Personalized health advice and appropriate referrals for health education or preventive services given if needed, as noted in patient's After Visit Summary.    History of Present Illness     Patient here for Medicare exam and follow-up with a chronic condition compliant with the medication tolerated well without side effect patient was started on Mounjaro for his diabetes and he is happy with the results sugar numbers controlled and he been losing weight and he tolerated well        Patient Care Team:  Monalisa Anglin MD as PCP - General (Family Medicine)  Chelsie Angel MD as PCP - Endocrinology (Endocrinology)  John Moffett MD (Ophthalmology)  Iglesia Murdock MD (Urology)    Review of Systems   Constitutional:  Negative for activity change, appetite change, fatigue and fever.   HENT:  Negative for congestion, ear pain, sinus pressure, sinus pain and sore throat.    Eyes:  Negative for discharge and redness.   Respiratory:  Negative for cough, chest tightness and shortness of breath.    Cardiovascular:  Negative for chest pain, palpitations and leg swelling.   Gastrointestinal:  Negative for abdominal pain, constipation and diarrhea.   Genitourinary:  Negative for dysuria, flank pain, frequency and hematuria.   Musculoskeletal:  Negative for gait problem.   Skin:  Negative for pallor and rash.   Neurological:  Negative for dizziness, tremors, weakness, numbness and headaches.   Hematological:  Does not bruise/bleed easily.     Medical History Reviewed by provider this encounter:  Tobacco  Allergies  Meds  Problems  Med Hx  Surg Hx  Fam Hx       Annual Wellness Visit Questionnaire   Balbir MCCORD is here for his Subsequent Wellness visit. Last Medicare Wellness visit information reviewed, patient interviewed and updates made to the record today.      Health Risk Assessment:   Patient rates overall health as good. Patient feels that their physical health rating is same. Patient is satisfied with their life. Eyesight was rated as same. Hearing was rated as same. Patient feels that their emotional and mental health rating is same. Patients states they are sometimes angry. Patient states they are sometimes unusually tired/fatigued. Pain experienced in the last 7 days has been some. Patient's pain rating has been 2/10. Patient states that he has experienced no weight loss or gain in last 6 months.     Depression Screening:   PHQ-2 Score: 0      Fall Risk Screening:   In the past year, patient has  experienced: no history of falling in past year      Home Safety:  Patient does not have trouble with stairs inside or outside of their home. Patient has working smoke alarms and has working carbon monoxide detector. Home safety hazards include: medications that cause fatigue.     Nutrition:   Current diet is Regular, Low Cholesterol, Low Saturated Fat, Low Carb and No Added Salt.     Medications:   Patient is not currently taking any over-the-counter supplements. Patient is able to manage medications.     Activities of Daily Living (ADLs)/Instrumental Activities of Daily Living (IADLs):   Walk and transfer into and out of bed and chair?: Yes  Dress and groom yourself?: Yes    Bathe or shower yourself?: Yes    Feed yourself? Yes  Do your laundry/housekeeping?: Yes  Manage your money, pay your bills and track your expenses?: Yes  Make your own meals?: Yes    Do your own shopping?: Yes    Durable Medical Equipment Suppliers  none    Previous Hospitalizations:   Any hospitalizations or ED visits within the last 12 months?: Yes    How many hospitalizations have you had in the last year?: 1-2    Hospitalization Comments: colonoscopy    Advance Care Planning:   Living will: Yes    Durable POA for healthcare: Yes    Advanced directive: Yes    Advanced directive counseling given: Yes    ACP document given: Yes    Patient declined ACP directive: No    End of Life Decisions reviewed with patient: Yes    Provider agrees with end of life decisions: Yes      Cognitive Screening:   Provider or family/friend/caregiver concerned regarding cognition?: No    Preventive Screenings      Cardiovascular Screening:    General: Screening Not Indicated and History Lipid Disorder      Diabetes Screening:     General: Screening Not Indicated and History Diabetes      Colorectal Cancer Screening:     General: Screening Current      Prostate Cancer Screening:    General: Risks and Benefits Discussed    Due for: PSA      Abdominal Aortic  "Aneurysm (AAA) Screening:    Risk factors include: age between 65-76 yo and tobacco use        General: Risks and Benefits Discussed    Due for: Screening AAA Ultrasound      Lung Cancer Screening:     General: Screening Not Indicated      Hepatitis C Screening:    General: Screening Current    Cardiovascular Risk Assessment:  Patient does not have underlying ASCVD and their cardiovascular risk was assessed today. Their cardiovascular risk factors include: hypertension, hyperlipidemia, overweight/obesity, drug use, pre-diabetes or diabetes and CKD/proteinuria.     The ASCVD Risk score (Aleksandr GARCIA, et al., 2019) failed to calculate for the following reasons:    The valid total cholesterol range is 130 to 320 mg/dL    Time spent assessing cardiovascular risk: 5 minutes.     Screening, Brief Intervention, and Referral to Treatment (SBIRT)     Screening  Typical number of drinks in a day: 2  Typical number of drinks in a week: 10  Interpretation: Low risk drinking behavior.    AUDIT-C Screenin) How often did you have a drink containing alcohol in the past year? never  2) How many drinks did you have on a typical day when you were drinking in the past year? 0  3) How often did you have 6 or more drinks on one occasion in the past year? never    AUDIT-C Score: 0  Interpretation: Score 0-3 (male): Negative screen for alcohol misuse    Single Item Drug Screening:  How often have you used an illegal drug (including marijuana) or a prescription medication for non-medical reasons in the past year? less than monthly    Single Item Drug Screen Score: 1  Interpretation: POSITIVE screen for possible drug use disorder    Drug Abuse Screening Test (DAST-10):  1) Have you used drugs other than those required for medical reasons? No  2) Do you abuse more than one drug at a time? No  3) Are you always able to stop using drugs when you want to? Yes  4) Have you had \"blackouts\" or \"flashbacks\" as a result of drug use? No  5) Do you " ever feel bad or guilty about your drug use? No  6) Does your spouse (or parents) ever complain about your involvement with drugs? No  7) Have you neglected your family because of your use of drugs? No  8) Have you engaged in illegal activities in order to obtain drugs? No  9) Have you ever experienced withdrawal symptoms (felt sick) when you stopped taking drugs? No  10) Have you had medical problems as a result of your drug use (e.g., memory loss, hepatitis, convulsions, bleeding, etc.)? No    DAST-10 Score: 0  Interpretation: No problems reported    Social Drivers of Health     Financial Resource Strain: Low Risk  (6/5/2023)    Overall Financial Resource Strain (CARDIA)    • Difficulty of Paying Living Expenses: Not hard at all   Food Insecurity: No Food Insecurity (7/18/2025)    Nursing - Inadequate Food Risk Classification    • Worried About Running Out of Food in the Last Year: Never true    • Ran Out of Food in the Last Year: Never true   Transportation Needs: No Transportation Needs (7/18/2025)    PRAPARE - Transportation    • Lack of Transportation (Medical): No    • Lack of Transportation (Non-Medical): No   Housing Stability: Low Risk  (7/18/2025)    Housing Stability Vital Sign    • Unable to Pay for Housing in the Last Year: No    • Number of Times Moved in the Last Year: 1    • Homeless in the Last Year: No   Utilities: Not At Risk (7/18/2025)    [facility] Utilities    • Threatened with loss of utilities: No     No results found.    Objective   /80 (BP Location: Left arm, Patient Position: Sitting)   Pulse 67   Temp (!) 96.6 °F (35.9 °C) (Tympanic)   Ht 6' (1.829 m)   Wt 101 kg (223 lb)   SpO2 98%   BMI 30.24 kg/m²     Physical Exam  Vitals and nursing note reviewed.   Constitutional:       General: He is not in acute distress.     Appearance: He is well-developed. He is not diaphoretic.   HENT:      Head: Normocephalic.      Right Ear: Tympanic membrane and external ear normal.      Left Ear:  Tympanic membrane and external ear normal.      Nose: No rhinorrhea.      Mouth/Throat:      Pharynx: No posterior oropharyngeal erythema.     Eyes:      General:         Right eye: No discharge.         Left eye: No discharge.      Conjunctiva/sclera: Conjunctivae normal.     Neck:      Vascular: No JVD.     Cardiovascular:      Rate and Rhythm: Normal rate and regular rhythm.      Pulses: no weak pulses.           Dorsalis pedis pulses are 2+ on the right side and 2+ on the left side.        Posterior tibial pulses are 2+ on the right side and 2+ on the left side.      Heart sounds: Murmur heard.      No gallop.   Pulmonary:      Effort: Pulmonary effort is normal. No respiratory distress.      Breath sounds: Normal breath sounds. No wheezing.   Abdominal:      General: There is no distension.      Palpations: Abdomen is soft.      Tenderness: There is no abdominal tenderness. There is no rebound.     Musculoskeletal:         General: No tenderness.      Cervical back: Normal range of motion and neck supple.        Feet:    Feet:      Right foot:      Skin integrity: No ulcer, skin breakdown, erythema, warmth, callus or dry skin.      Left foot:      Skin integrity: No ulcer, skin breakdown, erythema, warmth, callus or dry skin.   Lymphadenopathy:      Cervical: No cervical adenopathy.     Skin:     General: Skin is warm.      Findings: No rash.     Neurological:      Mental Status: He is alert and oriented to person, place, and time.

## 2025-07-18 NOTE — ASSESSMENT & PLAN NOTE
Lab Results   Component Value Date    HGBA1C 5.9 (H) 04/30/2025     Chronic asymptomatic patient happy with the result with the Mounjaro and he tolerated well continue current management we will check hemoglobin A1c before next appointment  Orders:  •  Tirzepatide (Mounjaro) 5 MG/0.5ML SOAJ; Inject 5 mg under the skin once a week

## 2025-07-18 NOTE — PATIENT INSTRUCTIONS
Medicare Preventive Visit Patient Instructions  Thank you for completing your Welcome to Medicare Visit or Medicare Annual Wellness Visit today. Your next wellness visit will be due in one year (7/19/2026).  The screening/preventive services that you may require over the next 5-10 years are detailed below. Some tests may not apply to you based off risk factors and/or age. Screening tests ordered at today's visit but not completed yet may show as past due. Also, please note that scanned in results may not display below.  Preventive Screenings:  Service Recommendations Previous Testing/Comments   Colorectal Cancer Screening  Colonoscopy    Fecal Occult Blood Test (FOBT)/Fecal Immunochemical Test (FIT)  Fecal DNA/Cologuard Test  Flexible Sigmoidoscopy Age: 45-75 years old   Colonoscopy: every 10 years (May be performed more frequently if at higher risk)  OR  FOBT/FIT: every 1 year  OR  Cologuard: every 3 years  OR  Sigmoidoscopy: every 5 years  Screening may be recommended earlier than age 45 if at higher risk for colorectal cancer. Also, an individualized decision between you and your healthcare provider will decide whether screening between the ages of 76-85 would be appropriate. Colonoscopy: 06/23/2021  FOBT/FIT: 12/30/2022  Cologuard: Not on file  Sigmoidoscopy: Not on file    Screening Current     Prostate Cancer Screening Individualized decision between patient and health care provider in men between ages of 55-69   Medicare will cover every 12 months beginning on the day after your 50th birthday PSA: 0.86 ng/mL           Hepatitis C Screening Once for adults born between 1945 and 1965  More frequently in patients at high risk for Hepatitis C Hep C Antibody: 02/27/2019    Screening Current   Diabetes Screening 1-2 times per year if you're at risk for diabetes or have pre-diabetes Fasting glucose: 98 mg/dL (4/30/2025)  A1C: 5.9 % (4/30/2025)  Screening Not Indicated  History Diabetes   Cholesterol Screening Once  every 5 years if you don't have a lipid disorder. May order more often based on risk factors. Lipid panel: 04/30/2025  Screening Not Indicated  History Lipid Disorder      Other Preventive Screenings Covered by Medicare:  Abdominal Aortic Aneurysm (AAA) Screening: covered once if your at risk. You're considered to be at risk if you have a family history of AAA or a male between the age of 65-75 who smoking at least 100 cigarettes in your lifetime.  Lung Cancer Screening: covers low dose CT scan once per year if you meet all of the following conditions: (1) Age 55-77; (2) No signs or symptoms of lung cancer; (3) Current smoker or have quit smoking within the last 15 years; (4) You have a tobacco smoking history of at least 20 pack years (packs per day x number of years you smoked); (5) You get a written order from a healthcare provider.  Glaucoma Screening: covered annually if you're considered high risk: (1) You have diabetes OR (2) Family history of glaucoma OR (3)  aged 50 and older OR (4)  American aged 65 and older  Osteoporosis Screening: covered every 2 years if you meet one of the following conditions: (1) Have a vertebral abnormality; (2) On glucocorticoid therapy for more than 3 months; (3) Have primary hyperparathyroidism; (4) On osteoporosis medications and need to assess response to drug therapy.  HIV Screening: covered annually if you're between the age of 15-65. Also covered annually if you are younger than 15 and older than 65 with risk factors for HIV infection. For pregnant patients, it is covered up to 3 times per pregnancy.    Immunizations:  Immunization Recommendations   Influenza Vaccine Annual influenza vaccination during flu season is recommended for all persons aged >= 6 months who do not have contraindications   Pneumococcal Vaccine   * Pneumococcal conjugate vaccine = PCV13 (Prevnar 13), PCV15 (Vaxneuvance), PCV20 (Prevnar 20)  * Pneumococcal polysaccharide vaccine  = PPSV23 (Pneumovax) Adults 19-65 yo with certain risk factors or if 65+ yo  If never received any pneumonia vaccine: recommend Prevnar 20 (PCV20)  Give PCV20 if previously received 1 dose of PCV13 or PPSV23   Hepatitis B Vaccine 3 dose series if at intermediate or high risk (ex: diabetes, end stage renal disease, liver disease)   Respiratory syncytial virus (RSV) Vaccine - COVERED BY MEDICARE PART D  * RSVPreF3 (Arexvy) CDC recommends that adults 60 years of age and older may receive a single dose of RSV vaccine using shared clinical decision-making (SCDM)   Tetanus (Td) Vaccine - COST NOT COVERED BY MEDICARE PART B Following completion of primary series, a booster dose should be given every 10 years to maintain immunity against tetanus. Td may also be given as tetanus wound prophylaxis.   Tdap Vaccine - COST NOT COVERED BY MEDICARE PART B Recommended at least once for all adults. For pregnant patients, recommended with each pregnancy.   Shingles Vaccine (Shingrix) - COST NOT COVERED BY MEDICARE PART B  2 shot series recommended in those 19 years and older who have or will have weakened immune systems or those 50 years and older     Health Maintenance Due:      Topic Date Due   • Colorectal Cancer Screening  06/23/2024   • Hepatitis C Screening  Completed     Immunizations Due:      Topic Date Due   • COVID-19 Vaccine (6 - 2024-25 season) 04/24/2025   • Influenza Vaccine (1) 09/01/2025     Advance Directives   What are advance directives?  Advance directives are legal documents that state your wishes and plans for medical care. These plans are made ahead of time in case you lose your ability to make decisions for yourself. Advance directives can apply to any medical decision, such as the treatments you want, and if you want to donate organs.   What are the types of advance directives?  There are many types of advance directives, and each state has rules about how to use them. You may choose a combination of any of  the following:  Living will:  This is a written record of the treatment you want. You can also choose which treatments you do not want, which to limit, and which to stop at a certain time. This includes surgery, medicine, IV fluid, and tube feedings.   Durable power of  for healthcare (DPAHC):  This is a written record that states who you want to make healthcare choices for you when you are unable to make them for yourself. This person, called a proxy, is usually a family member or a friend. You may choose more than 1 proxy.  Do not resuscitate (DNR) order:  A DNR order is used in case your heart stops beating or you stop breathing. It is a request not to have certain forms of treatment, such as CPR. A DNR order may be included in other types of advance directives.  Medical directive:  This covers the care that you want if you are in a coma, near death, or unable to make decisions for yourself. You can list the treatments you want for each condition. Treatment may include pain medicine, surgery, blood transfusions, dialysis, IV or tube feedings, and a ventilator (breathing machine).  Values history:  This document has questions about your views, beliefs, and how you feel and think about life. This information can help others choose the care that you would choose.  Why are advance directives important?  An advance directive helps you control your care. Although spoken wishes may be used, it is better to have your wishes written down. Spoken wishes can be misunderstood, or not followed. Treatments may be given even if you do not want them. An advance directive may make it easier for your family to make difficult choices about your care.   Weight Management   Why it is important to manage your weight:  Being overweight increases your risk of health conditions such as heart disease, high blood pressure, type 2 diabetes, and certain types of cancer. It can also increase your risk for osteoarthritis, sleep apnea,  and other respiratory problems. Aim for a slow, steady weight loss. Even a small amount of weight loss can lower your risk of health problems.  How to lose weight safely:  A safe and healthy way to lose weight is to eat fewer calories and get regular exercise. You can lose up about 1 pound a week by decreasing the number of calories you eat by 500 calories each day.   Healthy meal plan for weight management:  A healthy meal plan includes a variety of foods, contains fewer calories, and helps you stay healthy. A healthy meal plan includes the following:  Eat whole-grain foods more often.  A healthy meal plan should contain fiber. Fiber is the part of grains, fruits, and vegetables that is not broken down by your body. Whole-grain foods are healthy and provide extra fiber in your diet. Some examples of whole-grain foods are whole-wheat breads and pastas, oatmeal, brown rice, and bulgur.  Eat a variety of vegetables every day.  Include dark, leafy greens such as spinach, kale, adal greens, and mustard greens. Eat yellow and orange vegetables such as carrots, sweet potatoes, and winter squash.   Eat a variety of fruits every day.  Choose fresh or canned fruit (canned in its own juice or light syrup) instead of juice. Fruit juice has very little or no fiber.  Eat low-fat dairy foods.  Drink fat-free (skim) milk or 1% milk. Eat fat-free yogurt and low-fat cottage cheese. Try low-fat cheeses such as mozzarella and other reduced-fat cheeses.  Choose meat and other protein foods that are low in fat.  Choose beans or other legumes such as split peas or lentils. Choose fish, skinless poultry (chicken or turkey), or lean cuts of red meat (beef or pork). Before you cook meat or poultry, cut off any visible fat.   Use less fat and oil.  Try baking foods instead of frying them. Add less fat, such as margarine, sour cream, regular salad dressing and mayonnaise to foods. Eat fewer high-fat foods. Some examples of high-fat foods  include french fries, doughnuts, ice cream, and cakes.  Eat fewer sweets.  Limit foods and drinks that are high in sugar. This includes candy, cookies, regular soda, and sweetened drinks.  Exercise:  Exercise at least 30 minutes per day on most days of the week. Some examples of exercise include walking, biking, dancing, and swimming. You can also fit in more physical activity by taking the stairs instead of the elevator or parking farther away from stores. Ask your healthcare provider about the best exercise plan for you.    © Copyright [employer] 2018 Information is for End User's use only and may not be sold, redistributed or otherwise used for commercial purposes. All illustrations and images included in CareNotes® are the copyrighted property of [employer] or [employer]

## 2025-07-18 NOTE — ASSESSMENT & PLAN NOTE
Chronic asymptomatic fair control continue current management he is on lisinopril 10 mg once a day

## 2025-07-18 NOTE — ASSESSMENT & PLAN NOTE
BMI Counseling: Body mass index is 30.24 kg/m². The BMI is above normal. Nutrition recommendations include reducing portion sizes, 3-5 servings of fruits/vegetables daily, and consuming healthier snacks. Exercise recommendations include moderate aerobic physical activity for 150 minutes/week.

## 2025-07-18 NOTE — ASSESSMENT & PLAN NOTE
Chronic asymptomatic fair control discussed with patient avoid before food do not eat or lie down

## 2025-07-18 NOTE — ASSESSMENT & PLAN NOTE
Orders:  •  ferrous sulfate 324 (65 Fe) mg; Take 1 tablet (324 mg total) by mouth daily before breakfast

## 2025-07-30 ENCOUNTER — ANTICOAG VISIT (OUTPATIENT)
Dept: CARDIOLOGY CLINIC | Facility: CLINIC | Age: 75
End: 2025-07-30

## 2025-07-30 LAB — INR PPP: 2.2 (ref 0.85–1.19)

## 2025-08-04 DIAGNOSIS — E61.1 LOW IRON: ICD-10-CM

## 2025-08-04 DIAGNOSIS — E78.2 MIXED HYPERLIPIDEMIA: ICD-10-CM

## 2025-08-06 RX ORDER — ATORVASTATIN CALCIUM 20 MG/1
20 TABLET, FILM COATED ORAL DAILY
Qty: 90 TABLET | Refills: 1 | Status: SHIPPED | OUTPATIENT
Start: 2025-08-06

## 2025-08-06 RX ORDER — FERROUS SULFATE 324(65)MG
324 TABLET, DELAYED RELEASE (ENTERIC COATED) ORAL
Qty: 90 TABLET | Refills: 0 | Status: SHIPPED | OUTPATIENT
Start: 2025-08-06

## 2025-08-13 DIAGNOSIS — I10 ESSENTIAL HYPERTENSION: ICD-10-CM

## 2025-08-18 RX ORDER — LISINOPRIL 10 MG/1
10 TABLET ORAL DAILY
Qty: 90 TABLET | Refills: 1 | Status: SHIPPED | OUTPATIENT
Start: 2025-08-18

## (undated) DEVICE — ELECTRODE BLADE MOD  E-Z CLEAN 6.5IN -0014M

## (undated) DEVICE — PUMP TUBING FUSION PACK

## (undated) DEVICE — ELECTRODE BLADE E-Z CLEAN 4IN -0014A

## (undated) DEVICE — SUT SILK 3-0 SH CR/8 18 IN C013D

## (undated) DEVICE — SUTURE GUIDE

## (undated) DEVICE — INTENDED FOR TISSUE SEPARATION, AND OTHER PROCEDURES THAT REQUIRE A SHARP SURGICAL BLADE TO PUNCTURE OR CUT.: Brand: BARD-PARKER ® CARBON RIB-BACK BLADES

## (undated) DEVICE — 3000CC GUARDIAN II: Brand: GUARDIAN

## (undated) DEVICE — DGW .035 FC J3MM 260CM TEF: Brand: EMERALD

## (undated) DEVICE — GLOVE SRG BIOGEL ECLIPSE 7

## (undated) DEVICE — PLUMEPEN PRO 10FT

## (undated) DEVICE — THERMOFLECT BLANKET, L, 25EA                               TS THERMOFLECT BLANKET, 48" X 84", SILVER, 5/BG, 5 BG/CS NW: Brand: THERMOFLECT

## (undated) DEVICE — RECIP.STERNUM SAW BLADE 34/7.5/0.7MM: Brand: AESCULAP

## (undated) DEVICE — GLOVE INDICATOR PI UNDERGLOVE SZ 7 BLUE

## (undated) DEVICE — SUT PROLENE 4-0 BB 36 IN 8581H

## (undated) DEVICE — SUT VICRYL 3-0 SH 27 IN J416H

## (undated) DEVICE — SUT POLYESTER TAPE D-G 8618-00

## (undated) DEVICE — BLANKET HYPOTHERMIA ADULT GAYMAR

## (undated) DEVICE — 40601 PROLONGED POSITIONING SYSTEM: Brand: 40601 PROLONGED POSITIONING SYSTEM

## (undated) DEVICE — PACK CUSTOM PERFUSION PLEG PK

## (undated) DEVICE — UMBILICAL TAPE: Brand: DEROYAL

## (undated) DEVICE — SUT PDS PLUS 1 CTB 36 IN PDPB359T

## (undated) DEVICE — TR BAND RADIAL ARTERY COMPRESSION DEVICE: Brand: TR BAND

## (undated) DEVICE — SUT SILK 2 60 IN SA8H

## (undated) DEVICE — 32 FR STRAIGHT – SOFT PVC CATHETER: Brand: PVC THORACIC CATHETERS

## (undated) DEVICE — CATH DIAG 5FR IMPULSE 100CM AL1

## (undated) DEVICE — SUCTION CATH 18 FR

## (undated) DEVICE — BONE WAX WHITE: Brand: BONE WAX WHITE

## (undated) DEVICE — PVC URETHRAL CATHETER: Brand: DOVER

## (undated) DEVICE — EVERGRIP INSERT SET 61MM: Brand: FOGARTY EVERGRIP

## (undated) DEVICE — RADIFOCUS OPTITORQUE ANGIOGRAPHIC CATHETER: Brand: OPTITORQUE

## (undated) DEVICE — 32 FR RIGHT ANGLE – SOFT PVC CATHETER: Brand: PVC THORACIC CATHETERS

## (undated) DEVICE — PACK VALVE PBDS

## (undated) DEVICE — PACK CUSTOM PERFUSION AV LOOP

## (undated) DEVICE — PLEDGET CARDIO PTFE 9.5 X 4.8 SOFT LF (6EA/PK)

## (undated) DEVICE — SUT PROLENE 4-0 SH 36 IN 8521H

## (undated) DEVICE — OASIS DRAIN, SINGLE, INLINE & ATS COMPATIBLE: Brand: OASIS

## (undated) DEVICE — CATH DIAG 5FR IMPULSE 100CM FR4

## (undated) DEVICE — DRESSING ALLEVYN LIFE HEEL 25 X 25.2CM

## (undated) DEVICE — GLIDESHEATH BASIC HYDROPHILIC COATED INTRODUCER SHEATH: Brand: GLIDESHEATH

## (undated) DEVICE — TRAY FOLEY 16FR SURESTEP TEMP SENS URIMETER STAT LOK

## (undated) DEVICE — EVERGRIP INSERT SET 86MM: Brand: FOGARTY EVERGRIP

## (undated) DEVICE — LIGHT HANDLE COVER SLEEVE DISP BLUE STELLAR

## (undated) DEVICE — ANTIBACTERIAL UNDYED BRAIDED (POLYGLACTIN 910), SYNTHETIC ABSORBABLE SUTURE: Brand: COATED VICRYL

## (undated) DEVICE — FILTER SMOKE EVAC VIROSAFE

## (undated) DEVICE — Device: Brand: ASAHI SILVERWAY

## (undated) DEVICE — SUT SILK 0 CT-1 30 IN 424H

## (undated) DEVICE — STERNAL WIRE

## (undated) DEVICE — SILVER-COATED ANTIBACTERIAL BARRIER DRESSING: Brand: ACTICOAT SURGIC 10X25CM 5PK US

## (undated) DEVICE — SUT ETHIBOND 2-0 SH-1/SH-1 30 IN X763H

## (undated) DEVICE — CATHETER PLUG WITH CAP: Brand: DOVER

## (undated) DEVICE — RED RUBBER URETHRAL CATHETER: Brand: DOVER